# Patient Record
Sex: FEMALE | Race: WHITE | NOT HISPANIC OR LATINO | Employment: UNEMPLOYED | URBAN - METROPOLITAN AREA
[De-identification: names, ages, dates, MRNs, and addresses within clinical notes are randomized per-mention and may not be internally consistent; named-entity substitution may affect disease eponyms.]

---

## 2017-03-07 ENCOUNTER — APPOINTMENT (EMERGENCY)
Dept: CT IMAGING | Facility: HOSPITAL | Age: 42
DRG: 439 | End: 2017-03-07
Payer: COMMERCIAL

## 2017-03-07 ENCOUNTER — APPOINTMENT (INPATIENT)
Dept: ULTRASOUND IMAGING | Facility: HOSPITAL | Age: 42
DRG: 439 | End: 2017-03-07
Payer: COMMERCIAL

## 2017-03-07 ENCOUNTER — HOSPITAL ENCOUNTER (INPATIENT)
Facility: HOSPITAL | Age: 42
LOS: 4 days | Discharge: HOME | DRG: 439 | End: 2017-03-11
Attending: EMERGENCY MEDICINE | Admitting: INTERNAL MEDICINE
Payer: COMMERCIAL

## 2017-03-07 DIAGNOSIS — K85.90 PANCREATITIS: Primary | ICD-10-CM

## 2017-03-07 PROBLEM — E87.6 HYPOKALEMIA: Status: ACTIVE | Noted: 2017-03-07

## 2017-03-07 LAB
ALBUMIN SERPL BCP-MCNC: 2.8 G/DL (ref 3.5–5)
ALP SERPL-CCNC: 314 U/L (ref 46–116)
ALT SERPL W P-5'-P-CCNC: 32 U/L (ref 12–78)
ANION GAP SERPL CALCULATED.3IONS-SCNC: 10 MMOL/L (ref 4–13)
ANION GAP SERPL CALCULATED.3IONS-SCNC: 13 MMOL/L (ref 4–13)
AST SERPL W P-5'-P-CCNC: 126 U/L (ref 5–45)
BASOPHILS # BLD AUTO: 0.05 THOUSANDS/ΜL (ref 0–0.1)
BASOPHILS # BLD AUTO: 0.06 THOUSANDS/ΜL (ref 0–0.1)
BASOPHILS NFR BLD AUTO: 0 % (ref 0–1)
BASOPHILS NFR BLD AUTO: 0 % (ref 0–1)
BILIRUB SERPL-MCNC: 0.8 MG/DL (ref 0.2–1)
BUN SERPL-MCNC: 6 MG/DL (ref 5–25)
BUN SERPL-MCNC: 7 MG/DL (ref 5–25)
CALCIUM SERPL-MCNC: 7.5 MG/DL (ref 8.3–10.1)
CALCIUM SERPL-MCNC: 8.4 MG/DL (ref 8.3–10.1)
CHLORIDE SERPL-SCNC: 96 MMOL/L (ref 100–108)
CHLORIDE SERPL-SCNC: 97 MMOL/L (ref 100–108)
CLARITY, POC: CLEAR
CO2 SERPL-SCNC: 26 MMOL/L (ref 21–32)
CO2 SERPL-SCNC: 27 MMOL/L (ref 21–32)
COLOR, POC: ABNORMAL
CREAT SERPL-MCNC: 0.61 MG/DL (ref 0.6–1.3)
CREAT SERPL-MCNC: 0.68 MG/DL (ref 0.6–1.3)
EOSINOPHIL # BLD AUTO: 0.07 THOUSAND/ΜL (ref 0–0.61)
EOSINOPHIL # BLD AUTO: 0.08 THOUSAND/ΜL (ref 0–0.61)
EOSINOPHIL NFR BLD AUTO: 1 % (ref 0–6)
EOSINOPHIL NFR BLD AUTO: 1 % (ref 0–6)
ERYTHROCYTE [DISTWIDTH] IN BLOOD BY AUTOMATED COUNT: 14.6 % (ref 11.6–15.1)
ERYTHROCYTE [DISTWIDTH] IN BLOOD BY AUTOMATED COUNT: 14.7 % (ref 11.6–15.1)
EXT BILIRUBIN, UA: ABNORMAL
EXT BLOOD URINE: ABNORMAL
EXT GLUCOSE, UA: ABNORMAL
EXT KETONES: ABNORMAL
EXT NITRITE, UA: ABNORMAL
EXT PH, UA: 6
EXT PROTEIN, UA: ABNORMAL
EXT SPECIFIC GRAVITY, UA: 1
EXT UROBILINOGEN: 2
GFR SERPL CREATININE-BSD FRML MDRD: >60 ML/MIN/1.73SQ M
GFR SERPL CREATININE-BSD FRML MDRD: >60 ML/MIN/1.73SQ M
GLUCOSE SERPL-MCNC: 135 MG/DL (ref 65–140)
GLUCOSE SERPL-MCNC: 173 MG/DL (ref 65–140)
HCG UR QL: NEGATIVE
HCT VFR BLD AUTO: 38.6 % (ref 34.8–46.1)
HCT VFR BLD AUTO: 46.6 % (ref 34.8–46.1)
HGB BLD-MCNC: 12.9 G/DL (ref 11.5–15.4)
HGB BLD-MCNC: 15.6 G/DL (ref 11.5–15.4)
LIPASE SERPL-CCNC: 558 U/L (ref 73–393)
LYMPHOCYTES # BLD AUTO: 2.46 THOUSANDS/ΜL (ref 0.6–4.47)
LYMPHOCYTES # BLD AUTO: 2.91 THOUSANDS/ΜL (ref 0.6–4.47)
LYMPHOCYTES NFR BLD AUTO: 17 % (ref 14–44)
LYMPHOCYTES NFR BLD AUTO: 18 % (ref 14–44)
MAGNESIUM SERPL-MCNC: 1.7 MG/DL (ref 1.6–2.6)
MCH RBC QN AUTO: 32.4 PG (ref 26.8–34.3)
MCH RBC QN AUTO: 33 PG (ref 26.8–34.3)
MCHC RBC AUTO-ENTMCNC: 33.4 G/DL (ref 31.4–37.4)
MCHC RBC AUTO-ENTMCNC: 33.5 G/DL (ref 31.4–37.4)
MCV RBC AUTO: 97 FL (ref 82–98)
MCV RBC AUTO: 99 FL (ref 82–98)
MONOCYTES # BLD AUTO: 1.1 THOUSAND/ΜL (ref 0.17–1.22)
MONOCYTES # BLD AUTO: 1.17 THOUSAND/ΜL (ref 0.17–1.22)
MONOCYTES NFR BLD AUTO: 7 % (ref 4–12)
MONOCYTES NFR BLD AUTO: 8 % (ref 4–12)
NEUTROPHILS # BLD AUTO: 10.64 THOUSANDS/ΜL (ref 1.85–7.62)
NEUTROPHILS # BLD AUTO: 12.21 THOUSANDS/ΜL (ref 1.85–7.62)
NEUTS SEG NFR BLD AUTO: 74 % (ref 43–75)
NEUTS SEG NFR BLD AUTO: 74 % (ref 43–75)
NRBC BLD AUTO-RTO: 0 /100 WBCS
NRBC BLD AUTO-RTO: 0 /100 WBCS
PLATELET # BLD AUTO: 187 THOUSANDS/UL (ref 149–390)
PLATELET # BLD AUTO: 187 THOUSANDS/UL (ref 149–390)
PLATELET # BLD AUTO: 243 THOUSANDS/UL (ref 149–390)
PMV BLD AUTO: 10 FL (ref 8.9–12.7)
POTASSIUM SERPL-SCNC: 3.2 MMOL/L (ref 3.5–5.3)
POTASSIUM SERPL-SCNC: 3.6 MMOL/L (ref 3.5–5.3)
PROT SERPL-MCNC: 8 G/DL (ref 6.4–8.2)
RBC # BLD AUTO: 3.91 MILLION/UL (ref 3.81–5.12)
RBC # BLD AUTO: 4.81 MILLION/UL (ref 3.81–5.12)
SODIUM SERPL-SCNC: 133 MMOL/L (ref 136–145)
SODIUM SERPL-SCNC: 136 MMOL/L (ref 136–145)
WBC # BLD AUTO: 14.44 THOUSAND/UL (ref 4.31–10.16)
WBC # BLD AUTO: 16.5 THOUSAND/UL (ref 4.31–10.16)
WBC # BLD EST: ABNORMAL 10*3/UL

## 2017-03-07 PROCEDURE — 96361 HYDRATE IV INFUSION ADD-ON: CPT

## 2017-03-07 PROCEDURE — 85025 COMPLETE CBC W/AUTO DIFF WBC: CPT | Performed by: EMERGENCY MEDICINE

## 2017-03-07 PROCEDURE — 85049 AUTOMATED PLATELET COUNT: CPT | Performed by: PHYSICIAN ASSISTANT

## 2017-03-07 PROCEDURE — 36415 COLL VENOUS BLD VENIPUNCTURE: CPT | Performed by: EMERGENCY MEDICINE

## 2017-03-07 PROCEDURE — 81002 URINALYSIS NONAUTO W/O SCOPE: CPT

## 2017-03-07 PROCEDURE — 83735 ASSAY OF MAGNESIUM: CPT | Performed by: PHYSICIAN ASSISTANT

## 2017-03-07 PROCEDURE — 80053 COMPREHEN METABOLIC PANEL: CPT | Performed by: EMERGENCY MEDICINE

## 2017-03-07 PROCEDURE — 83690 ASSAY OF LIPASE: CPT | Performed by: EMERGENCY MEDICINE

## 2017-03-07 PROCEDURE — 74177 CT ABD & PELVIS W/CONTRAST: CPT

## 2017-03-07 PROCEDURE — 80048 BASIC METABOLIC PNL TOTAL CA: CPT | Performed by: PHYSICIAN ASSISTANT

## 2017-03-07 PROCEDURE — 85025 COMPLETE CBC W/AUTO DIFF WBC: CPT | Performed by: PHYSICIAN ASSISTANT

## 2017-03-07 PROCEDURE — 81025 URINE PREGNANCY TEST: CPT

## 2017-03-07 PROCEDURE — 99285 EMERGENCY DEPT VISIT HI MDM: CPT

## 2017-03-07 PROCEDURE — 96374 THER/PROPH/DIAG INJ IV PUSH: CPT

## 2017-03-07 PROCEDURE — 96375 TX/PRO/DX INJ NEW DRUG ADDON: CPT

## 2017-03-07 RX ORDER — DOCUSATE SODIUM 100 MG/1
100 CAPSULE, LIQUID FILLED ORAL 2 TIMES DAILY
Status: DISCONTINUED | OUTPATIENT
Start: 2017-03-07 | End: 2017-03-11 | Stop reason: HOSPADM

## 2017-03-07 RX ORDER — OXYCODONE HYDROCHLORIDE 5 MG/1
5 TABLET ORAL EVERY 4 HOURS PRN
Status: DISCONTINUED | OUTPATIENT
Start: 2017-03-07 | End: 2017-03-11 | Stop reason: HOSPADM

## 2017-03-07 RX ORDER — PANTOPRAZOLE SODIUM 40 MG/1
40 TABLET, DELAYED RELEASE ORAL DAILY
Status: DISCONTINUED | OUTPATIENT
Start: 2017-03-07 | End: 2017-03-11 | Stop reason: HOSPADM

## 2017-03-07 RX ORDER — SODIUM CHLORIDE, SODIUM LACTATE, POTASSIUM CHLORIDE, CALCIUM CHLORIDE 600; 310; 30; 20 MG/100ML; MG/100ML; MG/100ML; MG/100ML
75 INJECTION, SOLUTION INTRAVENOUS CONTINUOUS
Status: DISCONTINUED | OUTPATIENT
Start: 2017-03-07 | End: 2017-03-11

## 2017-03-07 RX ORDER — NICOTINE 21 MG/24HR
1 PATCH, TRANSDERMAL 24 HOURS TRANSDERMAL DAILY
Status: DISCONTINUED | OUTPATIENT
Start: 2017-03-07 | End: 2017-03-11 | Stop reason: HOSPADM

## 2017-03-07 RX ORDER — ONDANSETRON 2 MG/ML
4 INJECTION INTRAMUSCULAR; INTRAVENOUS EVERY 6 HOURS PRN
Status: DISCONTINUED | OUTPATIENT
Start: 2017-03-07 | End: 2017-03-11 | Stop reason: HOSPADM

## 2017-03-07 RX ORDER — CLONIDINE HYDROCHLORIDE 0.1 MG/1
0.1 TABLET ORAL 2 TIMES DAILY
Status: DISCONTINUED | OUTPATIENT
Start: 2017-03-07 | End: 2017-03-11 | Stop reason: HOSPADM

## 2017-03-07 RX ORDER — NICOTINE 21 MG/24HR
1 PATCH, TRANSDERMAL 24 HOURS TRANSDERMAL DAILY
Status: DISCONTINUED | OUTPATIENT
Start: 2017-03-07 | End: 2017-03-07

## 2017-03-07 RX ORDER — POTASSIUM CHLORIDE 20 MEQ/1
20 TABLET, EXTENDED RELEASE ORAL ONCE
Status: COMPLETED | OUTPATIENT
Start: 2017-03-07 | End: 2017-03-07

## 2017-03-07 RX ORDER — SODIUM CHLORIDE 9 MG/ML
125 INJECTION, SOLUTION INTRAVENOUS CONTINUOUS
Status: DISCONTINUED | OUTPATIENT
Start: 2017-03-07 | End: 2017-03-07

## 2017-03-07 RX ORDER — ONDANSETRON 2 MG/ML
4 INJECTION INTRAMUSCULAR; INTRAVENOUS ONCE
Status: COMPLETED | OUTPATIENT
Start: 2017-03-07 | End: 2017-03-07

## 2017-03-07 RX ADMIN — ONDANSETRON 4 MG: 2 INJECTION INTRAMUSCULAR; INTRAVENOUS at 03:02

## 2017-03-07 RX ADMIN — SODIUM CHLORIDE, SODIUM LACTATE, POTASSIUM CHLORIDE, AND CALCIUM CHLORIDE 250 ML/HR: .6; .31; .03; .02 INJECTION, SOLUTION INTRAVENOUS at 22:19

## 2017-03-07 RX ADMIN — OXYCODONE HYDROCHLORIDE 5 MG: 5 TABLET ORAL at 13:01

## 2017-03-07 RX ADMIN — SODIUM CHLORIDE, SODIUM LACTATE, POTASSIUM CHLORIDE, AND CALCIUM CHLORIDE 150 ML/HR: .6; .31; .03; .02 INJECTION, SOLUTION INTRAVENOUS at 15:23

## 2017-03-07 RX ADMIN — HYDROMORPHONE HYDROCHLORIDE 1 MG: 1 INJECTION, SOLUTION INTRAMUSCULAR; INTRAVENOUS; SUBCUTANEOUS at 03:02

## 2017-03-07 RX ADMIN — POTASSIUM CHLORIDE 20 MEQ: 1500 TABLET, EXTENDED RELEASE ORAL at 05:30

## 2017-03-07 RX ADMIN — ENOXAPARIN SODIUM 40 MG: 40 INJECTION SUBCUTANEOUS at 08:23

## 2017-03-07 RX ADMIN — HYDROMORPHONE HYDROCHLORIDE 0.5 MG: 1 INJECTION, SOLUTION INTRAMUSCULAR; INTRAVENOUS; SUBCUTANEOUS at 08:19

## 2017-03-07 RX ADMIN — SODIUM CHLORIDE 1000 ML: 0.9 INJECTION, SOLUTION INTRAVENOUS at 03:02

## 2017-03-07 RX ADMIN — SODIUM CHLORIDE 125 ML/HR: 0.9 INJECTION, SOLUTION INTRAVENOUS at 05:05

## 2017-03-07 RX ADMIN — HYDROMORPHONE HYDROCHLORIDE 0.5 MG: 1 INJECTION, SOLUTION INTRAMUSCULAR; INTRAVENOUS; SUBCUTANEOUS at 14:24

## 2017-03-07 RX ADMIN — HYDROMORPHONE HYDROCHLORIDE 1 MG: 1 INJECTION, SOLUTION INTRAMUSCULAR; INTRAVENOUS; SUBCUTANEOUS at 04:52

## 2017-03-07 RX ADMIN — NICOTINE 1 PATCH: 21 PATCH, EXTENDED RELEASE TRANSDERMAL at 06:15

## 2017-03-07 RX ADMIN — HYDROMORPHONE HYDROCHLORIDE 0.5 MG: 1 INJECTION, SOLUTION INTRAMUSCULAR; INTRAVENOUS; SUBCUTANEOUS at 18:33

## 2017-03-07 RX ADMIN — OXYCODONE HYDROCHLORIDE 5 MG: 5 TABLET ORAL at 06:57

## 2017-03-07 RX ADMIN — DOCUSATE SODIUM 100 MG: 100 CAPSULE, LIQUID FILLED ORAL at 08:24

## 2017-03-07 RX ADMIN — HYDROMORPHONE HYDROCHLORIDE 0.5 MG: 1 INJECTION, SOLUTION INTRAMUSCULAR; INTRAVENOUS; SUBCUTANEOUS at 11:25

## 2017-03-07 RX ADMIN — OXYCODONE HYDROCHLORIDE 5 MG: 5 TABLET ORAL at 21:06

## 2017-03-07 RX ADMIN — PANTOPRAZOLE SODIUM 40 MG: 40 TABLET, DELAYED RELEASE ORAL at 06:15

## 2017-03-07 RX ADMIN — SODIUM CHLORIDE, SODIUM LACTATE, POTASSIUM CHLORIDE, AND CALCIUM CHLORIDE 250 ML/HR: .6; .31; .03; .02 INJECTION, SOLUTION INTRAVENOUS at 18:33

## 2017-03-07 RX ADMIN — CLONIDINE HYDROCHLORIDE 0.1 MG: 0.1 TABLET ORAL at 08:24

## 2017-03-07 RX ADMIN — IOHEXOL 100 ML: 350 INJECTION, SOLUTION INTRAVENOUS at 03:49

## 2017-03-07 RX ADMIN — HYDROMORPHONE HYDROCHLORIDE 0.5 MG: 1 INJECTION, SOLUTION INTRAMUSCULAR; INTRAVENOUS; SUBCUTANEOUS at 21:46

## 2017-03-08 LAB
CHOLEST SERPL-MCNC: 113 MG/DL (ref 50–200)
HDLC SERPL-MCNC: 18 MG/DL (ref 40–60)
LDLC SERPL CALC-MCNC: 73 MG/DL (ref 0–100)
TRIGL SERPL-MCNC: 112 MG/DL

## 2017-03-08 PROCEDURE — 80061 LIPID PANEL: CPT | Performed by: PHYSICIAN ASSISTANT

## 2017-03-08 RX ADMIN — OXYCODONE HYDROCHLORIDE 5 MG: 5 TABLET ORAL at 07:45

## 2017-03-08 RX ADMIN — SODIUM CHLORIDE, SODIUM LACTATE, POTASSIUM CHLORIDE, AND CALCIUM CHLORIDE 250 ML/HR: .6; .31; .03; .02 INJECTION, SOLUTION INTRAVENOUS at 06:39

## 2017-03-08 RX ADMIN — OXYCODONE HYDROCHLORIDE 5 MG: 5 TABLET ORAL at 00:10

## 2017-03-08 RX ADMIN — METOPROLOL TARTRATE 5 MG: 5 INJECTION, SOLUTION INTRAVENOUS at 20:20

## 2017-03-08 RX ADMIN — PANTOPRAZOLE SODIUM 40 MG: 40 TABLET, DELAYED RELEASE ORAL at 06:17

## 2017-03-08 RX ADMIN — NICOTINE 1 PATCH: 21 PATCH, EXTENDED RELEASE TRANSDERMAL at 10:42

## 2017-03-08 RX ADMIN — OXYCODONE HYDROCHLORIDE 5 MG: 5 TABLET ORAL at 16:06

## 2017-03-08 RX ADMIN — OXYCODONE HYDROCHLORIDE 5 MG: 5 TABLET ORAL at 12:12

## 2017-03-08 RX ADMIN — OXYCODONE HYDROCHLORIDE 5 MG: 5 TABLET ORAL at 20:13

## 2017-03-08 RX ADMIN — SODIUM CHLORIDE, SODIUM LACTATE, POTASSIUM CHLORIDE, AND CALCIUM CHLORIDE 150 ML/HR: .6; .31; .03; .02 INJECTION, SOLUTION INTRAVENOUS at 22:18

## 2017-03-08 RX ADMIN — HYDROMORPHONE HYDROCHLORIDE 0.5 MG: 1 INJECTION, SOLUTION INTRAMUSCULAR; INTRAVENOUS; SUBCUTANEOUS at 07:18

## 2017-03-08 RX ADMIN — CLONIDINE HYDROCHLORIDE 0.1 MG: 0.1 TABLET ORAL at 18:23

## 2017-03-08 RX ADMIN — CLONIDINE HYDROCHLORIDE 0.1 MG: 0.1 TABLET ORAL at 10:42

## 2017-03-08 RX ADMIN — HYDROMORPHONE HYDROCHLORIDE 0.5 MG: 1 INJECTION, SOLUTION INTRAMUSCULAR; INTRAVENOUS; SUBCUTANEOUS at 04:16

## 2017-03-08 RX ADMIN — SODIUM CHLORIDE, SODIUM LACTATE, POTASSIUM CHLORIDE, AND CALCIUM CHLORIDE 250 ML/HR: .6; .31; .03; .02 INJECTION, SOLUTION INTRAVENOUS at 02:36

## 2017-03-08 RX ADMIN — HYDROMORPHONE HYDROCHLORIDE 0.5 MG: 1 INJECTION, SOLUTION INTRAMUSCULAR; INTRAVENOUS; SUBCUTANEOUS at 21:53

## 2017-03-08 RX ADMIN — HYDROMORPHONE HYDROCHLORIDE 0.5 MG: 1 INJECTION, SOLUTION INTRAMUSCULAR; INTRAVENOUS; SUBCUTANEOUS at 00:44

## 2017-03-08 RX ADMIN — SODIUM CHLORIDE, SODIUM LACTATE, POTASSIUM CHLORIDE, AND CALCIUM CHLORIDE 250 ML/HR: .6; .31; .03; .02 INJECTION, SOLUTION INTRAVENOUS at 10:42

## 2017-03-08 RX ADMIN — OXYCODONE HYDROCHLORIDE 5 MG: 5 TABLET ORAL at 03:42

## 2017-03-08 RX ADMIN — HYDROMORPHONE HYDROCHLORIDE 0.5 MG: 1 INJECTION, SOLUTION INTRAMUSCULAR; INTRAVENOUS; SUBCUTANEOUS at 10:43

## 2017-03-08 RX ADMIN — HYDROMORPHONE HYDROCHLORIDE 0.5 MG: 1 INJECTION, SOLUTION INTRAMUSCULAR; INTRAVENOUS; SUBCUTANEOUS at 15:21

## 2017-03-08 RX ADMIN — DOCUSATE SODIUM 100 MG: 100 CAPSULE, LIQUID FILLED ORAL at 10:42

## 2017-03-08 RX ADMIN — HYDROMORPHONE HYDROCHLORIDE 0.5 MG: 1 INJECTION, SOLUTION INTRAMUSCULAR; INTRAVENOUS; SUBCUTANEOUS at 18:24

## 2017-03-09 LAB
ALBUMIN SERPL BCP-MCNC: 2.3 G/DL (ref 3.5–5)
ALP SERPL-CCNC: 229 U/L (ref 46–116)
ALT SERPL W P-5'-P-CCNC: 24 U/L (ref 12–78)
ANION GAP SERPL CALCULATED.3IONS-SCNC: 9 MMOL/L (ref 4–13)
AST SERPL W P-5'-P-CCNC: 95 U/L (ref 5–45)
BILIRUB SERPL-MCNC: 1 MG/DL (ref 0.2–1)
BILIRUB UR QL STRIP: NEGATIVE
BUN SERPL-MCNC: 3 MG/DL (ref 5–25)
CALCIUM SERPL-MCNC: 8 MG/DL (ref 8.3–10.1)
CHLORIDE SERPL-SCNC: 100 MMOL/L (ref 100–108)
CLARITY UR: CLEAR
CO2 SERPL-SCNC: 27 MMOL/L (ref 21–32)
COLOR UR: YELLOW
CREAT SERPL-MCNC: 0.62 MG/DL (ref 0.6–1.3)
ERYTHROCYTE [DISTWIDTH] IN BLOOD BY AUTOMATED COUNT: 15 % (ref 11.6–15.1)
GFR SERPL CREATININE-BSD FRML MDRD: >60 ML/MIN/1.73SQ M
GLUCOSE SERPL-MCNC: 108 MG/DL (ref 65–140)
GLUCOSE UR STRIP-MCNC: NEGATIVE MG/DL
HCT VFR BLD AUTO: 38.7 % (ref 34.8–46.1)
HGB BLD-MCNC: 12.6 G/DL (ref 11.5–15.4)
HGB UR QL STRIP.AUTO: NEGATIVE
KETONES UR STRIP-MCNC: NEGATIVE MG/DL
LEUKOCYTE ESTERASE UR QL STRIP: NEGATIVE
LIPASE SERPL-CCNC: 189 U/L (ref 73–393)
MCH RBC QN AUTO: 33.1 PG (ref 26.8–34.3)
MCHC RBC AUTO-ENTMCNC: 32.6 G/DL (ref 31.4–37.4)
MCV RBC AUTO: 102 FL (ref 82–98)
NITRITE UR QL STRIP: NEGATIVE
PH UR STRIP.AUTO: 7.5 [PH] (ref 4.5–8)
PLATELET # BLD AUTO: 188 THOUSANDS/UL (ref 149–390)
PMV BLD AUTO: 10.8 FL (ref 8.9–12.7)
POTASSIUM SERPL-SCNC: 3.9 MMOL/L (ref 3.5–5.3)
PROT SERPL-MCNC: 6.5 G/DL (ref 6.4–8.2)
PROT UR STRIP-MCNC: NEGATIVE MG/DL
RBC # BLD AUTO: 3.81 MILLION/UL (ref 3.81–5.12)
SODIUM SERPL-SCNC: 136 MMOL/L (ref 136–145)
SP GR UR STRIP.AUTO: 1.01 (ref 1–1.03)
UROBILINOGEN UR QL STRIP.AUTO: 1 E.U./DL
WBC # BLD AUTO: 10.89 THOUSAND/UL (ref 4.31–10.16)

## 2017-03-09 PROCEDURE — 81003 URINALYSIS AUTO W/O SCOPE: CPT | Performed by: INTERNAL MEDICINE

## 2017-03-09 PROCEDURE — 85027 COMPLETE CBC AUTOMATED: CPT | Performed by: INTERNAL MEDICINE

## 2017-03-09 PROCEDURE — 80053 COMPREHEN METABOLIC PANEL: CPT | Performed by: PHYSICIAN ASSISTANT

## 2017-03-09 PROCEDURE — 83690 ASSAY OF LIPASE: CPT | Performed by: INTERNAL MEDICINE

## 2017-03-09 RX ORDER — SIMETHICONE 20 MG/.3ML
80 EMULSION ORAL EVERY 6 HOURS PRN
Status: DISCONTINUED | OUTPATIENT
Start: 2017-03-09 | End: 2017-03-09 | Stop reason: CLARIF

## 2017-03-09 RX ORDER — SIMETHICONE 80 MG
80 TABLET,CHEWABLE ORAL EVERY 6 HOURS PRN
Status: DISCONTINUED | OUTPATIENT
Start: 2017-03-09 | End: 2017-03-11 | Stop reason: HOSPADM

## 2017-03-09 RX ORDER — CLONIDINE HYDROCHLORIDE 0.1 MG/1
TABLET ORAL
Status: COMPLETED
Start: 2017-03-09 | End: 2017-03-09

## 2017-03-09 RX ADMIN — ONDANSETRON 4 MG: 2 INJECTION INTRAMUSCULAR; INTRAVENOUS at 16:45

## 2017-03-09 RX ADMIN — NICOTINE 1 PATCH: 21 PATCH, EXTENDED RELEASE TRANSDERMAL at 10:43

## 2017-03-09 RX ADMIN — HYDROMORPHONE HYDROCHLORIDE 0.5 MG: 1 INJECTION, SOLUTION INTRAMUSCULAR; INTRAVENOUS; SUBCUTANEOUS at 07:53

## 2017-03-09 RX ADMIN — CLONIDINE HYDROCHLORIDE 0.1 MG: 0.1 TABLET ORAL at 19:17

## 2017-03-09 RX ADMIN — CLONIDINE HYDROCHLORIDE 0.1 MG: 0.1 TABLET ORAL at 10:41

## 2017-03-09 RX ADMIN — HYDROMORPHONE HYDROCHLORIDE 0.5 MG: 1 INJECTION, SOLUTION INTRAMUSCULAR; INTRAVENOUS; SUBCUTANEOUS at 23:15

## 2017-03-09 RX ADMIN — SODIUM CHLORIDE, SODIUM LACTATE, POTASSIUM CHLORIDE, AND CALCIUM CHLORIDE 150 ML/HR: .6; .31; .03; .02 INJECTION, SOLUTION INTRAVENOUS at 05:22

## 2017-03-09 RX ADMIN — HYDROMORPHONE HYDROCHLORIDE 0.5 MG: 1 INJECTION, SOLUTION INTRAMUSCULAR; INTRAVENOUS; SUBCUTANEOUS at 19:18

## 2017-03-09 RX ADMIN — OXYCODONE HYDROCHLORIDE 5 MG: 5 TABLET ORAL at 05:19

## 2017-03-09 RX ADMIN — SIMETHICONE CHEW TAB 80 MG 80 MG: 80 TABLET ORAL at 10:41

## 2017-03-09 RX ADMIN — SIMETHICONE CHEW TAB 80 MG 80 MG: 80 TABLET ORAL at 16:49

## 2017-03-09 RX ADMIN — PANTOPRAZOLE SODIUM 40 MG: 40 TABLET, DELAYED RELEASE ORAL at 05:19

## 2017-03-09 RX ADMIN — SODIUM CHLORIDE, SODIUM LACTATE, POTASSIUM CHLORIDE, AND CALCIUM CHLORIDE 75 ML/HR: .6; .31; .03; .02 INJECTION, SOLUTION INTRAVENOUS at 16:50

## 2017-03-09 RX ADMIN — HYDROMORPHONE HYDROCHLORIDE 0.5 MG: 1 INJECTION, SOLUTION INTRAMUSCULAR; INTRAVENOUS; SUBCUTANEOUS at 11:25

## 2017-03-09 RX ADMIN — HYDROMORPHONE HYDROCHLORIDE 0.5 MG: 1 INJECTION, SOLUTION INTRAMUSCULAR; INTRAVENOUS; SUBCUTANEOUS at 01:01

## 2017-03-09 RX ADMIN — HYDROMORPHONE HYDROCHLORIDE 0.5 MG: 1 INJECTION, SOLUTION INTRAMUSCULAR; INTRAVENOUS; SUBCUTANEOUS at 04:08

## 2017-03-09 RX ADMIN — OXYCODONE HYDROCHLORIDE 5 MG: 5 TABLET ORAL at 00:13

## 2017-03-10 ENCOUNTER — APPOINTMENT (INPATIENT)
Dept: ULTRASOUND IMAGING | Facility: HOSPITAL | Age: 42
DRG: 439 | End: 2017-03-10
Payer: COMMERCIAL

## 2017-03-10 LAB
ANION GAP SERPL CALCULATED.3IONS-SCNC: 9 MMOL/L (ref 4–13)
BUN SERPL-MCNC: 3 MG/DL (ref 5–25)
CALCIUM SERPL-MCNC: 8.1 MG/DL (ref 8.3–10.1)
CHLORIDE SERPL-SCNC: 101 MMOL/L (ref 100–108)
CO2 SERPL-SCNC: 27 MMOL/L (ref 21–32)
CREAT SERPL-MCNC: 0.71 MG/DL (ref 0.6–1.3)
ERYTHROCYTE [DISTWIDTH] IN BLOOD BY AUTOMATED COUNT: 14.9 % (ref 11.6–15.1)
GFR SERPL CREATININE-BSD FRML MDRD: >60 ML/MIN/1.73SQ M
GLUCOSE SERPL-MCNC: 141 MG/DL (ref 65–140)
HCT VFR BLD AUTO: 38.8 % (ref 34.8–46.1)
HGB BLD-MCNC: 12.6 G/DL (ref 11.5–15.4)
MCH RBC QN AUTO: 32.9 PG (ref 26.8–34.3)
MCHC RBC AUTO-ENTMCNC: 32.5 G/DL (ref 31.4–37.4)
MCV RBC AUTO: 101 FL (ref 82–98)
PLATELET # BLD AUTO: 192 THOUSANDS/UL (ref 149–390)
PMV BLD AUTO: 10.8 FL (ref 8.9–12.7)
POTASSIUM SERPL-SCNC: 3.6 MMOL/L (ref 3.5–5.3)
RBC # BLD AUTO: 3.83 MILLION/UL (ref 3.81–5.12)
SODIUM SERPL-SCNC: 137 MMOL/L (ref 136–145)
WBC # BLD AUTO: 10.41 THOUSAND/UL (ref 4.31–10.16)

## 2017-03-10 PROCEDURE — 85027 COMPLETE CBC AUTOMATED: CPT | Performed by: INTERNAL MEDICINE

## 2017-03-10 PROCEDURE — 76705 ECHO EXAM OF ABDOMEN: CPT

## 2017-03-10 PROCEDURE — 80048 BASIC METABOLIC PNL TOTAL CA: CPT | Performed by: INTERNAL MEDICINE

## 2017-03-10 RX ORDER — SIMETHICONE 80 MG
TABLET,CHEWABLE ORAL
Status: COMPLETED
Start: 2017-03-10 | End: 2017-03-10

## 2017-03-10 RX ORDER — PANTOPRAZOLE SODIUM 40 MG/1
TABLET, DELAYED RELEASE ORAL
Status: COMPLETED
Start: 2017-03-10 | End: 2017-03-10

## 2017-03-10 RX ADMIN — HYDROMORPHONE HYDROCHLORIDE 0.5 MG: 1 INJECTION, SOLUTION INTRAMUSCULAR; INTRAVENOUS; SUBCUTANEOUS at 09:25

## 2017-03-10 RX ADMIN — PANTOPRAZOLE SODIUM 40 MG: 40 TABLET, DELAYED RELEASE ORAL at 06:17

## 2017-03-10 RX ADMIN — SODIUM CHLORIDE, SODIUM LACTATE, POTASSIUM CHLORIDE, AND CALCIUM CHLORIDE 75 ML/HR: .6; .31; .03; .02 INJECTION, SOLUTION INTRAVENOUS at 05:51

## 2017-03-10 RX ADMIN — SIMETHICONE CHEW TAB 80 MG 80 MG: 80 TABLET ORAL at 03:04

## 2017-03-10 RX ADMIN — HYDROMORPHONE HYDROCHLORIDE 0.5 MG: 1 INJECTION, SOLUTION INTRAMUSCULAR; INTRAVENOUS; SUBCUTANEOUS at 03:04

## 2017-03-10 RX ADMIN — SIMETHICONE CHEW TAB 80 MG 80 MG: 80 TABLET ORAL at 18:49

## 2017-03-10 RX ADMIN — SODIUM CHLORIDE, SODIUM LACTATE, POTASSIUM CHLORIDE, AND CALCIUM CHLORIDE 75 ML/HR: .6; .31; .03; .02 INJECTION, SOLUTION INTRAVENOUS at 19:46

## 2017-03-10 RX ADMIN — HYDROMORPHONE HYDROCHLORIDE 0.5 MG: 1 INJECTION, SOLUTION INTRAMUSCULAR; INTRAVENOUS; SUBCUTANEOUS at 15:43

## 2017-03-10 RX ADMIN — CLONIDINE HYDROCHLORIDE 0.1 MG: 0.1 TABLET ORAL at 09:24

## 2017-03-10 RX ADMIN — HYDROMORPHONE HYDROCHLORIDE 0.5 MG: 1 INJECTION, SOLUTION INTRAMUSCULAR; INTRAVENOUS; SUBCUTANEOUS at 06:19

## 2017-03-10 RX ADMIN — NICOTINE 1 PATCH: 21 PATCH, EXTENDED RELEASE TRANSDERMAL at 09:25

## 2017-03-10 RX ADMIN — HYDROMORPHONE HYDROCHLORIDE 0.5 MG: 1 INJECTION, SOLUTION INTRAMUSCULAR; INTRAVENOUS; SUBCUTANEOUS at 22:30

## 2017-03-10 RX ADMIN — HYDROMORPHONE HYDROCHLORIDE 0.5 MG: 1 INJECTION, SOLUTION INTRAMUSCULAR; INTRAVENOUS; SUBCUTANEOUS at 18:49

## 2017-03-10 RX ADMIN — SIMETHICONE CHEW TAB 80 MG 80 MG: 80 TABLET ORAL at 09:24

## 2017-03-10 RX ADMIN — CLONIDINE HYDROCHLORIDE 0.1 MG: 0.1 TABLET ORAL at 18:49

## 2017-03-11 VITALS
TEMPERATURE: 98 F | RESPIRATION RATE: 18 BRPM | SYSTOLIC BLOOD PRESSURE: 140 MMHG | OXYGEN SATURATION: 98 % | WEIGHT: 186.07 LBS | HEART RATE: 69 BPM | DIASTOLIC BLOOD PRESSURE: 78 MMHG | BODY MASS INDEX: 31.77 KG/M2 | HEIGHT: 64 IN

## 2017-03-11 RX ORDER — ONDANSETRON 4 MG/1
4 TABLET, FILM COATED ORAL EVERY 8 HOURS PRN
Qty: 21 TABLET | Refills: 0 | Status: ON HOLD | OUTPATIENT
Start: 2017-03-11 | End: 2017-12-16

## 2017-03-11 RX ORDER — SIMETHICONE 80 MG
80 TABLET,CHEWABLE ORAL EVERY 8 HOURS PRN
Qty: 15 TABLET | Refills: 0 | Status: SHIPPED | OUTPATIENT
Start: 2017-03-11 | End: 2017-03-16

## 2017-03-11 RX ORDER — DOCUSATE SODIUM 100 MG/1
100 CAPSULE, LIQUID FILLED ORAL 2 TIMES DAILY
Qty: 30 CAPSULE | Refills: 0 | Status: SHIPPED | OUTPATIENT
Start: 2017-03-11 | End: 2017-12-16 | Stop reason: HOSPADM

## 2017-03-11 RX ADMIN — HYDROMORPHONE HYDROCHLORIDE 0.5 MG: 1 INJECTION, SOLUTION INTRAMUSCULAR; INTRAVENOUS; SUBCUTANEOUS at 05:44

## 2017-03-11 RX ADMIN — HYDROMORPHONE HYDROCHLORIDE 0.5 MG: 1 INJECTION, SOLUTION INTRAMUSCULAR; INTRAVENOUS; SUBCUTANEOUS at 02:01

## 2017-03-11 RX ADMIN — NICOTINE 1 PATCH: 21 PATCH, EXTENDED RELEASE TRANSDERMAL at 08:13

## 2017-03-11 RX ADMIN — OXYCODONE HYDROCHLORIDE 5 MG: 5 TABLET ORAL at 08:11

## 2017-03-11 RX ADMIN — SIMETHICONE CHEW TAB 80 MG 80 MG: 80 TABLET ORAL at 02:02

## 2017-03-11 RX ADMIN — PANTOPRAZOLE SODIUM 40 MG: 40 TABLET, DELAYED RELEASE ORAL at 06:13

## 2017-03-11 RX ADMIN — CLONIDINE HYDROCHLORIDE 0.1 MG: 0.1 TABLET ORAL at 08:11

## 2017-03-11 RX ADMIN — OXYCODONE HYDROCHLORIDE 5 MG: 5 TABLET ORAL at 12:13

## 2017-03-11 RX ADMIN — SODIUM CHLORIDE, SODIUM LACTATE, POTASSIUM CHLORIDE, AND CALCIUM CHLORIDE 75 ML/HR: .6; .31; .03; .02 INJECTION, SOLUTION INTRAVENOUS at 10:29

## 2017-05-01 ENCOUNTER — HOSPITAL ENCOUNTER (EMERGENCY)
Facility: HOSPITAL | Age: 42
Discharge: HOME | End: 2017-05-01
Attending: EMERGENCY MEDICINE | Admitting: EMERGENCY MEDICINE
Payer: COMMERCIAL

## 2017-05-01 VITALS
HEIGHT: 64 IN | TEMPERATURE: 98.5 F | WEIGHT: 185 LBS | SYSTOLIC BLOOD PRESSURE: 170 MMHG | DIASTOLIC BLOOD PRESSURE: 103 MMHG | RESPIRATION RATE: 19 BRPM | OXYGEN SATURATION: 100 % | HEART RATE: 83 BPM | BODY MASS INDEX: 31.58 KG/M2

## 2017-05-01 DIAGNOSIS — K04.7 DENTAL INFECTION: Primary | ICD-10-CM

## 2017-05-01 PROCEDURE — 99282 EMERGENCY DEPT VISIT SF MDM: CPT

## 2017-05-01 RX ORDER — PENICILLIN V POTASSIUM 250 MG/1
500 TABLET ORAL ONCE
Status: COMPLETED | OUTPATIENT
Start: 2017-05-01 | End: 2017-05-01

## 2017-05-01 RX ORDER — OXYCODONE HYDROCHLORIDE AND ACETAMINOPHEN 5; 325 MG/1; MG/1
1 TABLET ORAL EVERY 6 HOURS PRN
Qty: 15 TABLET | Refills: 0 | Status: SHIPPED | OUTPATIENT
Start: 2017-05-01 | End: 2017-05-16

## 2017-05-01 RX ORDER — PENICILLIN V POTASSIUM 500 MG/1
500 TABLET ORAL 4 TIMES DAILY
Qty: 40 TABLET | Refills: 0 | Status: SHIPPED | OUTPATIENT
Start: 2017-05-01 | End: 2017-05-11

## 2017-05-01 RX ORDER — ONDANSETRON 4 MG/1
4 TABLET, ORALLY DISINTEGRATING ORAL ONCE
Status: COMPLETED | OUTPATIENT
Start: 2017-05-01 | End: 2017-05-01

## 2017-05-01 RX ORDER — OXYCODONE HYDROCHLORIDE AND ACETAMINOPHEN 5; 325 MG/1; MG/1
1 TABLET ORAL ONCE
Status: COMPLETED | OUTPATIENT
Start: 2017-05-01 | End: 2017-05-01

## 2017-05-01 RX ADMIN — ONDANSETRON 4 MG: 4 TABLET, ORALLY DISINTEGRATING ORAL at 19:10

## 2017-05-01 RX ADMIN — OXYCODONE HYDROCHLORIDE AND ACETAMINOPHEN 1 TABLET: 5; 325 TABLET ORAL at 19:10

## 2017-05-01 RX ADMIN — PENICILLIN V POTASIUM 500 MG: 250 TABLET ORAL at 19:10

## 2017-10-27 ENCOUNTER — APPOINTMENT (EMERGENCY)
Dept: CT IMAGING | Facility: HOSPITAL | Age: 42
DRG: 249 | End: 2017-10-27
Payer: COMMERCIAL

## 2017-10-27 ENCOUNTER — HOSPITAL ENCOUNTER (INPATIENT)
Facility: HOSPITAL | Age: 42
LOS: 1 days | Discharge: HOME | DRG: 249 | End: 2017-10-31
Attending: EMERGENCY MEDICINE | Admitting: INTERNAL MEDICINE
Payer: COMMERCIAL

## 2017-10-27 DIAGNOSIS — R11.2 INTRACTABLE NAUSEA AND VOMITING: Primary | ICD-10-CM

## 2017-10-27 DIAGNOSIS — E87.6 HYPOKALEMIA: ICD-10-CM

## 2017-10-27 DIAGNOSIS — K52.9 COLITIS: ICD-10-CM

## 2017-10-27 DIAGNOSIS — E83.42 HYPOMAGNESEMIA: ICD-10-CM

## 2017-10-27 DIAGNOSIS — K86.1 CHRONIC PANCREATITIS (HCC): ICD-10-CM

## 2017-10-27 DIAGNOSIS — E86.0 DEHYDRATION: ICD-10-CM

## 2017-10-27 LAB
ALBUMIN SERPL BCP-MCNC: 3.8 G/DL (ref 3.5–5)
ALP SERPL-CCNC: 186 U/L (ref 46–116)
ALT SERPL W P-5'-P-CCNC: 57 U/L (ref 12–78)
ANION GAP SERPL CALCULATED.3IONS-SCNC: 17 MMOL/L (ref 4–13)
ANION GAP SERPL CALCULATED.3IONS-SCNC: 9 MMOL/L (ref 4–13)
AST SERPL W P-5'-P-CCNC: 139 U/L (ref 5–45)
ATRIAL RATE: 89 BPM
BASOPHILS # BLD AUTO: 0.09 THOUSANDS/ΜL (ref 0–0.1)
BASOPHILS NFR BLD AUTO: 1 % (ref 0–1)
BILIRUB SERPL-MCNC: 0.8 MG/DL (ref 0.2–1)
BUN SERPL-MCNC: 7 MG/DL (ref 5–25)
BUN SERPL-MCNC: 8 MG/DL (ref 5–25)
CALCIUM SERPL-MCNC: 7.5 MG/DL (ref 8.3–10.1)
CALCIUM SERPL-MCNC: 8.5 MG/DL (ref 8.3–10.1)
CHLORIDE SERPL-SCNC: 100 MMOL/L (ref 100–108)
CHLORIDE SERPL-SCNC: 98 MMOL/L (ref 100–108)
CO2 SERPL-SCNC: 25 MMOL/L (ref 21–32)
CO2 SERPL-SCNC: 28 MMOL/L (ref 21–32)
CREAT SERPL-MCNC: 0.65 MG/DL (ref 0.6–1.3)
CREAT SERPL-MCNC: 0.69 MG/DL (ref 0.6–1.3)
EOSINOPHIL # BLD AUTO: 0.1 THOUSAND/ΜL (ref 0–0.61)
EOSINOPHIL NFR BLD AUTO: 1 % (ref 0–6)
ERYTHROCYTE [DISTWIDTH] IN BLOOD BY AUTOMATED COUNT: 14.9 % (ref 11.6–15.1)
EXT PREG TEST URINE: NEGATIVE
GFR SERPL CREATININE-BSD FRML MDRD: 108 ML/MIN/1.73SQ M
GFR SERPL CREATININE-BSD FRML MDRD: 111 ML/MIN/1.73SQ M
GLUCOSE SERPL-MCNC: 130 MG/DL (ref 65–140)
GLUCOSE SERPL-MCNC: 155 MG/DL (ref 65–140)
HCT VFR BLD AUTO: 47.6 % (ref 34.8–46.1)
HGB BLD-MCNC: 16.3 G/DL (ref 11.5–15.4)
LIPASE SERPL-CCNC: 109 U/L (ref 73–393)
LYMPHOCYTES # BLD AUTO: 3.33 THOUSANDS/ΜL (ref 0.6–4.47)
LYMPHOCYTES NFR BLD AUTO: 22 % (ref 14–44)
MAGNESIUM SERPL-MCNC: 1.5 MG/DL (ref 1.6–2.6)
MCH RBC QN AUTO: 32.5 PG (ref 26.8–34.3)
MCHC RBC AUTO-ENTMCNC: 34.2 G/DL (ref 31.4–37.4)
MCV RBC AUTO: 95 FL (ref 82–98)
MONOCYTES # BLD AUTO: 1.28 THOUSAND/ΜL (ref 0.17–1.22)
MONOCYTES NFR BLD AUTO: 9 % (ref 4–12)
NEUTROPHILS # BLD AUTO: 10.06 THOUSANDS/ΜL (ref 1.85–7.62)
NEUTS SEG NFR BLD AUTO: 67 % (ref 43–75)
NRBC BLD AUTO-RTO: 0 /100 WBCS
P AXIS: 52 DEGREES
PLATELET # BLD AUTO: 220 THOUSANDS/UL (ref 149–390)
PLATELET # BLD AUTO: 281 THOUSANDS/UL (ref 149–390)
PMV BLD AUTO: 9.5 FL (ref 8.9–12.7)
PMV BLD AUTO: 9.6 FL (ref 8.9–12.7)
POTASSIUM SERPL-SCNC: 2.8 MMOL/L (ref 3.5–5.3)
POTASSIUM SERPL-SCNC: 3.9 MMOL/L (ref 3.5–5.3)
PR INTERVAL: 118 MS
PROT SERPL-MCNC: 8.8 G/DL (ref 6.4–8.2)
QRS AXIS: 53 DEGREES
QRSD INTERVAL: 86 MS
QT INTERVAL: 398 MS
QTC INTERVAL: 484 MS
RBC # BLD AUTO: 5.01 MILLION/UL (ref 3.81–5.12)
SODIUM SERPL-SCNC: 137 MMOL/L (ref 136–145)
SODIUM SERPL-SCNC: 140 MMOL/L (ref 136–145)
T WAVE AXIS: 57 DEGREES
VENTRICULAR RATE: 89 BPM
WBC # BLD AUTO: 14.95 THOUSAND/UL (ref 4.31–10.16)

## 2017-10-27 PROCEDURE — 74177 CT ABD & PELVIS W/CONTRAST: CPT

## 2017-10-27 PROCEDURE — 36415 COLL VENOUS BLD VENIPUNCTURE: CPT | Performed by: PHYSICIAN ASSISTANT

## 2017-10-27 PROCEDURE — 96366 THER/PROPH/DIAG IV INF ADDON: CPT

## 2017-10-27 PROCEDURE — 96375 TX/PRO/DX INJ NEW DRUG ADDON: CPT

## 2017-10-27 PROCEDURE — 83690 ASSAY OF LIPASE: CPT | Performed by: PHYSICIAN ASSISTANT

## 2017-10-27 PROCEDURE — 85025 COMPLETE CBC W/AUTO DIFF WBC: CPT | Performed by: PHYSICIAN ASSISTANT

## 2017-10-27 PROCEDURE — 83735 ASSAY OF MAGNESIUM: CPT | Performed by: PHYSICIAN ASSISTANT

## 2017-10-27 PROCEDURE — 96376 TX/PRO/DX INJ SAME DRUG ADON: CPT

## 2017-10-27 PROCEDURE — 85049 AUTOMATED PLATELET COUNT: CPT | Performed by: PHYSICIAN ASSISTANT

## 2017-10-27 PROCEDURE — 99285 EMERGENCY DEPT VISIT HI MDM: CPT

## 2017-10-27 PROCEDURE — 81025 URINE PREGNANCY TEST: CPT | Performed by: PHYSICIAN ASSISTANT

## 2017-10-27 PROCEDURE — 96365 THER/PROPH/DIAG IV INF INIT: CPT

## 2017-10-27 PROCEDURE — C9113 INJ PANTOPRAZOLE SODIUM, VIA: HCPCS | Performed by: PHYSICIAN ASSISTANT

## 2017-10-27 PROCEDURE — 96367 TX/PROPH/DG ADDL SEQ IV INF: CPT

## 2017-10-27 PROCEDURE — 80048 BASIC METABOLIC PNL TOTAL CA: CPT | Performed by: PHYSICIAN ASSISTANT

## 2017-10-27 PROCEDURE — 93005 ELECTROCARDIOGRAM TRACING: CPT

## 2017-10-27 PROCEDURE — 80053 COMPREHEN METABOLIC PANEL: CPT | Performed by: PHYSICIAN ASSISTANT

## 2017-10-27 PROCEDURE — 96361 HYDRATE IV INFUSION ADD-ON: CPT

## 2017-10-27 RX ORDER — PANTOPRAZOLE SODIUM 40 MG/1
40 INJECTION, POWDER, FOR SOLUTION INTRAVENOUS ONCE
Status: COMPLETED | OUTPATIENT
Start: 2017-10-27 | End: 2017-10-27

## 2017-10-27 RX ORDER — DOCUSATE SODIUM 100 MG/1
100 CAPSULE, LIQUID FILLED ORAL 2 TIMES DAILY
Status: DISCONTINUED | OUTPATIENT
Start: 2017-10-27 | End: 2017-10-31 | Stop reason: HOSPADM

## 2017-10-27 RX ORDER — NICOTINE 21 MG/24HR
1 PATCH, TRANSDERMAL 24 HOURS TRANSDERMAL DAILY
Status: DISCONTINUED | OUTPATIENT
Start: 2017-10-27 | End: 2017-10-31 | Stop reason: HOSPADM

## 2017-10-27 RX ORDER — CLONIDINE HYDROCHLORIDE 0.1 MG/1
0.1 TABLET ORAL 2 TIMES DAILY
Status: DISCONTINUED | OUTPATIENT
Start: 2017-10-27 | End: 2017-10-31 | Stop reason: HOSPADM

## 2017-10-27 RX ORDER — POTASSIUM CHLORIDE 14.9 MG/ML
20 INJECTION INTRAVENOUS
Status: DISPENSED | OUTPATIENT
Start: 2017-10-27 | End: 2017-10-27

## 2017-10-27 RX ORDER — ONDANSETRON 2 MG/ML
4 INJECTION INTRAMUSCULAR; INTRAVENOUS ONCE
Status: COMPLETED | OUTPATIENT
Start: 2017-10-27 | End: 2017-10-27

## 2017-10-27 RX ORDER — NICOTINE 21 MG/24HR
14 PATCH, TRANSDERMAL 24 HOURS TRANSDERMAL ONCE
Status: DISCONTINUED | OUTPATIENT
Start: 2017-10-27 | End: 2017-10-28 | Stop reason: DRUGHIGH

## 2017-10-27 RX ORDER — SIMETHICONE 80 MG
80 TABLET,CHEWABLE ORAL EVERY 6 HOURS PRN
Status: DISCONTINUED | OUTPATIENT
Start: 2017-10-27 | End: 2017-10-31 | Stop reason: HOSPADM

## 2017-10-27 RX ORDER — PANTOPRAZOLE SODIUM 40 MG/1
40 TABLET, DELAYED RELEASE ORAL DAILY
Status: DISCONTINUED | OUTPATIENT
Start: 2017-10-28 | End: 2017-10-31 | Stop reason: HOSPADM

## 2017-10-27 RX ORDER — ONDANSETRON 2 MG/ML
4 INJECTION INTRAMUSCULAR; INTRAVENOUS EVERY 6 HOURS PRN
Status: DISCONTINUED | OUTPATIENT
Start: 2017-10-27 | End: 2017-10-31 | Stop reason: HOSPADM

## 2017-10-27 RX ORDER — NICOTINE 21 MG/24HR
1 PATCH, TRANSDERMAL 24 HOURS TRANSDERMAL DAILY
Status: DISCONTINUED | OUTPATIENT
Start: 2017-10-28 | End: 2017-10-27

## 2017-10-27 RX ORDER — MAGNESIUM SULFATE HEPTAHYDRATE 40 MG/ML
2 INJECTION, SOLUTION INTRAVENOUS ONCE
Status: COMPLETED | OUTPATIENT
Start: 2017-10-27 | End: 2017-10-28

## 2017-10-27 RX ORDER — MAGNESIUM SULFATE HEPTAHYDRATE 40 MG/ML
2 INJECTION, SOLUTION INTRAVENOUS ONCE
Status: COMPLETED | OUTPATIENT
Start: 2017-10-27 | End: 2017-10-27

## 2017-10-27 RX ORDER — SODIUM CHLORIDE 9 MG/ML
100 INJECTION, SOLUTION INTRAVENOUS CONTINUOUS
Status: DISCONTINUED | OUTPATIENT
Start: 2017-10-27 | End: 2017-10-31 | Stop reason: HOSPADM

## 2017-10-27 RX ORDER — POTASSIUM CHLORIDE 14.9 MG/ML
20 INJECTION INTRAVENOUS ONCE
Status: DISCONTINUED | OUTPATIENT
Start: 2017-10-27 | End: 2017-10-31 | Stop reason: HOSPADM

## 2017-10-27 RX ORDER — POTASSIUM CHLORIDE 29.8 MG/ML
40 INJECTION INTRAVENOUS ONCE
Status: DISCONTINUED | OUTPATIENT
Start: 2017-10-27 | End: 2017-10-27

## 2017-10-27 RX ADMIN — MAGNESIUM SULFATE HEPTAHYDRATE 2 G: 40 INJECTION, SOLUTION INTRAVENOUS at 18:38

## 2017-10-27 RX ADMIN — HYDROMORPHONE HYDROCHLORIDE 1 MG: 1 INJECTION, SOLUTION INTRAMUSCULAR; INTRAVENOUS; SUBCUTANEOUS at 20:30

## 2017-10-27 RX ADMIN — NICOTINE 14 MG: 14 PATCH TRANSDERMAL at 23:23

## 2017-10-27 RX ADMIN — ONDANSETRON 4 MG: 2 INJECTION INTRAMUSCULAR; INTRAVENOUS at 16:37

## 2017-10-27 RX ADMIN — MAGNESIUM SULFATE HEPTAHYDRATE 2 G: 40 INJECTION, SOLUTION INTRAVENOUS at 23:24

## 2017-10-27 RX ADMIN — SODIUM CHLORIDE 1000 ML: 0.9 INJECTION, SOLUTION INTRAVENOUS at 16:32

## 2017-10-27 RX ADMIN — NICOTINE 14 MG: 14 PATCH TRANSDERMAL at 20:29

## 2017-10-27 RX ADMIN — PANTOPRAZOLE SODIUM 40 MG: 40 INJECTION, POWDER, FOR SOLUTION INTRAVENOUS at 17:26

## 2017-10-27 RX ADMIN — SIMETHICONE CHEW TAB 80 MG 80 MG: 80 TABLET ORAL at 23:22

## 2017-10-27 RX ADMIN — SODIUM CHLORIDE 125 ML/HR: 0.9 INJECTION, SOLUTION INTRAVENOUS at 23:22

## 2017-10-27 RX ADMIN — NICOTINE 1 PATCH: 21 PATCH, EXTENDED RELEASE TRANSDERMAL at 23:39

## 2017-10-27 RX ADMIN — HYDROMORPHONE HYDROCHLORIDE 0.5 MG: 1 INJECTION, SOLUTION INTRAMUSCULAR; INTRAVENOUS; SUBCUTANEOUS at 16:58

## 2017-10-27 RX ADMIN — HYDROMORPHONE HYDROCHLORIDE 0.5 MG: 1 INJECTION, SOLUTION INTRAMUSCULAR; INTRAVENOUS; SUBCUTANEOUS at 18:37

## 2017-10-27 RX ADMIN — CLONIDINE HYDROCHLORIDE 0.1 MG: 0.1 TABLET ORAL at 23:28

## 2017-10-27 RX ADMIN — POTASSIUM CHLORIDE 20 MEQ: 200 INJECTION, SOLUTION INTRAVENOUS at 20:30

## 2017-10-27 RX ADMIN — IOHEXOL 100 ML: 350 INJECTION, SOLUTION INTRAVENOUS at 18:20

## 2017-10-27 RX ADMIN — ONDANSETRON 4 MG: 2 INJECTION INTRAMUSCULAR; INTRAVENOUS at 22:22

## 2017-10-27 NOTE — ED CARE HANDOFF
Emergency Department Sign Out Note        Sign out and transfer of care from Enriqueta Milady  See Separate Emergency Department note  The patient, Theresa Turner, was evaluated by the previous provider for n/v and dehydration  Workup Completed:  Laboratory evaluation    ED Course / Workup Pending (followup):  CT a/p                           ED Course as of Oct 28 0027   Fri Oct 27, 2017   1930 Patient's nausea and vomiting returned  PO challenge unsuccessful  Agreeable to admission at this time      Procedures  MDM  Number of Diagnoses or Management Options  Dehydration: new and requires workup  Hypokalemia: new and requires workup  Hypomagnesemia: new and requires workup  Intractable nausea and vomiting: new and requires workup  Risk of Complications, Morbidity, and/or Mortality  Presenting problems: moderate  Management options: low  General comments: 52-year-old female with acute nausea and vomiting  Care signed out from morning physician assistant  Patient initially thought she was feeling better however upon p  o  challenge she is now immediately vomiting  Her pain is also worsened  She has clear signs of dehydration  We will admit to hospital for IV hydration  Electrolyte repletion  Symptomatic control  Patient agrees with this plan  I spoke with Internal Medicine who agrees with admission  She is hemodynamically stable and nontoxic at the time of admission      Patient Progress  Patient progress: stable    CritCare Time      Disposition  Final diagnoses:   Intractable nausea and vomiting   Dehydration   Hypokalemia   Hypomagnesemia     Time reflects when diagnosis was documented in both MDM as applicable and the Disposition within this note     Time User Action Codes Description Comment    10/27/2017  8:54 PM Shala LEHMAN Add [R11 2] Intractable nausea and vomiting     10/27/2017  8:54 PM Ryan Walsh Add [E86 0] Dehydration     10/27/2017  8:54 PM Ryan Walsh Add [E87 6] Hypokalemia     10/27/2017  8:54 PM Ryan Walsh Add [E83 42] Hypomagnesemia     10/27/2017  9:18 PM Tiffany Silence Add [K52 9] Colitis     10/27/2017  9:18 PM Tiffany Silence Add [K86 1] Chronic pancreatitis [facility])       ED Disposition     ED Disposition Condition Comment    Admit  Case was discussed with Asya Allen and the patient's admission status was agreed to be Admission Status: observation status to the service of Dr Bahman Zhu           Follow-up Information    None       Current Discharge Medication List      CONTINUE these medications which have NOT CHANGED    Details   cloNIDine (CATAPRES) 0 1 mg tablet Take 0 1 mg by mouth 2 (two) times a day      docusate sodium (COLACE) 100 mg capsule Take 1 capsule by mouth 2 (two) times a day for 15 days  Qty: 30 capsule, Refills: 0      Mometasone Furo-Formoterol Fum (DULERA) 100-5 MCG/ACT AERO Inhale Unsure of dose      ondansetron (ZOFRAN) 4 mg tablet Take 1 tablet by mouth every 8 (eight) hours as needed for nausea or vomiting for up to 7 days  Qty: 21 tablet, Refills: 0      pantoprazole (PROTONIX) 40 mg tablet Take 40 mg by mouth daily           No discharge procedures on file         ED Provider  Electronically Signed by

## 2017-10-27 NOTE — ED NOTES
Spoke with patient about pain medication   "pt states they give me the other stuff because morphine makes my arms sweel " pt confirmed took dilauded before with no interaction      Darek Amezquita RN  10/27/17 4566

## 2017-10-28 LAB
ALBUMIN SERPL BCP-MCNC: 2.9 G/DL (ref 3.5–5)
ALP SERPL-CCNC: 136 U/L (ref 46–116)
ALT SERPL W P-5'-P-CCNC: 47 U/L (ref 12–78)
ANION GAP SERPL CALCULATED.3IONS-SCNC: 10 MMOL/L (ref 4–13)
ANION GAP SERPL CALCULATED.3IONS-SCNC: 7 MMOL/L (ref 4–13)
AST SERPL W P-5'-P-CCNC: 116 U/L (ref 5–45)
BILIRUB SERPL-MCNC: 1.1 MG/DL (ref 0.2–1)
BUN SERPL-MCNC: 8 MG/DL (ref 5–25)
BUN SERPL-MCNC: 8 MG/DL (ref 5–25)
CALCIUM SERPL-MCNC: 7.1 MG/DL (ref 8.3–10.1)
CALCIUM SERPL-MCNC: 7.2 MG/DL (ref 8.3–10.1)
CHLORIDE SERPL-SCNC: 101 MMOL/L (ref 100–108)
CHLORIDE SERPL-SCNC: 102 MMOL/L (ref 100–108)
CO2 SERPL-SCNC: 26 MMOL/L (ref 21–32)
CO2 SERPL-SCNC: 28 MMOL/L (ref 21–32)
CREAT SERPL-MCNC: 0.54 MG/DL (ref 0.6–1.3)
CREAT SERPL-MCNC: 0.59 MG/DL (ref 0.6–1.3)
ERYTHROCYTE [DISTWIDTH] IN BLOOD BY AUTOMATED COUNT: 15 % (ref 11.6–15.1)
GFR SERPL CREATININE-BSD FRML MDRD: 114 ML/MIN/1.73SQ M
GFR SERPL CREATININE-BSD FRML MDRD: 118 ML/MIN/1.73SQ M
GLUCOSE P FAST SERPL-MCNC: 170 MG/DL (ref 65–99)
GLUCOSE SERPL-MCNC: 135 MG/DL (ref 65–140)
GLUCOSE SERPL-MCNC: 170 MG/DL (ref 65–140)
HCT VFR BLD AUTO: 41.7 % (ref 34.8–46.1)
HGB BLD-MCNC: 13.6 G/DL (ref 11.5–15.4)
INR PPP: 1.11 (ref 0.86–1.16)
MCH RBC QN AUTO: 32.2 PG (ref 26.8–34.3)
MCHC RBC AUTO-ENTMCNC: 32.6 G/DL (ref 31.4–37.4)
MCV RBC AUTO: 99 FL (ref 82–98)
PLATELET # BLD AUTO: 182 THOUSANDS/UL (ref 149–390)
PMV BLD AUTO: 9.6 FL (ref 8.9–12.7)
POTASSIUM SERPL-SCNC: 3.6 MMOL/L (ref 3.5–5.3)
POTASSIUM SERPL-SCNC: 4.1 MMOL/L (ref 3.5–5.3)
PROT SERPL-MCNC: 6.7 G/DL (ref 6.4–8.2)
PROTHROMBIN TIME: 14.5 SECONDS (ref 12.1–14.4)
RBC # BLD AUTO: 4.22 MILLION/UL (ref 3.81–5.12)
SODIUM SERPL-SCNC: 137 MMOL/L (ref 136–145)
SODIUM SERPL-SCNC: 137 MMOL/L (ref 136–145)
WBC # BLD AUTO: 11.16 THOUSAND/UL (ref 4.31–10.16)

## 2017-10-28 PROCEDURE — 80048 BASIC METABOLIC PNL TOTAL CA: CPT | Performed by: PHYSICIAN ASSISTANT

## 2017-10-28 PROCEDURE — 80053 COMPREHEN METABOLIC PANEL: CPT | Performed by: INTERNAL MEDICINE

## 2017-10-28 PROCEDURE — 85027 COMPLETE CBC AUTOMATED: CPT | Performed by: PHYSICIAN ASSISTANT

## 2017-10-28 PROCEDURE — 85610 PROTHROMBIN TIME: CPT | Performed by: INTERNAL MEDICINE

## 2017-10-28 RX ORDER — OXYCODONE HYDROCHLORIDE 5 MG/1
5 TABLET ORAL EVERY 4 HOURS PRN
Status: DISCONTINUED | OUTPATIENT
Start: 2017-10-28 | End: 2017-10-31 | Stop reason: HOSPADM

## 2017-10-28 RX ORDER — SUCRALFATE ORAL 1 G/10ML
1000 SUSPENSION ORAL EVERY 6 HOURS SCHEDULED
Status: DISCONTINUED | OUTPATIENT
Start: 2017-10-28 | End: 2017-10-31 | Stop reason: HOSPADM

## 2017-10-28 RX ADMIN — PANTOPRAZOLE SODIUM 40 MG: 40 TABLET, DELAYED RELEASE ORAL at 05:52

## 2017-10-28 RX ADMIN — SIMETHICONE CHEW TAB 80 MG 80 MG: 80 TABLET ORAL at 08:56

## 2017-10-28 RX ADMIN — HYDROMORPHONE HYDROCHLORIDE 0.5 MG: 1 INJECTION, SOLUTION INTRAMUSCULAR; INTRAVENOUS; SUBCUTANEOUS at 08:55

## 2017-10-28 RX ADMIN — HYDROMORPHONE HYDROCHLORIDE 0.5 MG: 1 INJECTION, SOLUTION INTRAMUSCULAR; INTRAVENOUS; SUBCUTANEOUS at 01:05

## 2017-10-28 RX ADMIN — SIMETHICONE CHEW TAB 80 MG 80 MG: 80 TABLET ORAL at 05:53

## 2017-10-28 RX ADMIN — FLUTICASONE PROPIONATE AND SALMETEROL 1 PUFF: 50; 250 POWDER RESPIRATORY (INHALATION) at 00:40

## 2017-10-28 RX ADMIN — NYSTATIN 500000 UNITS: 100000 SUSPENSION ORAL at 14:19

## 2017-10-28 RX ADMIN — CLONIDINE HYDROCHLORIDE 0.1 MG: 0.1 TABLET ORAL at 08:08

## 2017-10-28 RX ADMIN — FLUTICASONE PROPIONATE AND SALMETEROL 1 PUFF: 50; 250 POWDER RESPIRATORY (INHALATION) at 08:01

## 2017-10-28 RX ADMIN — HYDROMORPHONE HYDROCHLORIDE 0.5 MG: 1 INJECTION, SOLUTION INTRAMUSCULAR; INTRAVENOUS; SUBCUTANEOUS at 19:45

## 2017-10-28 RX ADMIN — OXYCODONE HYDROCHLORIDE 5 MG: 5 TABLET ORAL at 00:39

## 2017-10-28 RX ADMIN — SUCRALFATE 1000 MG: 1 SUSPENSION ORAL at 23:50

## 2017-10-28 RX ADMIN — NICOTINE 1 PATCH: 21 PATCH, EXTENDED RELEASE TRANSDERMAL at 19:42

## 2017-10-28 RX ADMIN — ONDANSETRON 4 MG: 2 INJECTION INTRAMUSCULAR; INTRAVENOUS at 04:42

## 2017-10-28 RX ADMIN — OXYCODONE HYDROCHLORIDE 5 MG: 5 TABLET ORAL at 07:03

## 2017-10-28 RX ADMIN — SUCRALFATE 1000 MG: 1 SUSPENSION ORAL at 14:19

## 2017-10-28 RX ADMIN — OXYCODONE HYDROCHLORIDE 5 MG: 5 TABLET ORAL at 22:41

## 2017-10-28 RX ADMIN — OXYCODONE HYDROCHLORIDE 5 MG: 5 TABLET ORAL at 12:32

## 2017-10-28 RX ADMIN — PANCRELIPASE 24000 UNITS: 24000; 76000; 120000 CAPSULE, DELAYED RELEASE PELLETS ORAL at 15:43

## 2017-10-28 RX ADMIN — NYSTATIN 500000 UNITS: 100000 SUSPENSION ORAL at 18:10

## 2017-10-28 RX ADMIN — SUCRALFATE 1000 MG: 1 SUSPENSION ORAL at 18:09

## 2017-10-28 RX ADMIN — SIMETHICONE CHEW TAB 80 MG 80 MG: 80 TABLET ORAL at 19:50

## 2017-10-28 RX ADMIN — HYDROMORPHONE HYDROCHLORIDE 0.5 MG: 1 INJECTION, SOLUTION INTRAMUSCULAR; INTRAVENOUS; SUBCUTANEOUS at 14:23

## 2017-10-28 RX ADMIN — CLONIDINE HYDROCHLORIDE 0.1 MG: 0.1 TABLET ORAL at 18:09

## 2017-10-28 RX ADMIN — OXYCODONE HYDROCHLORIDE 5 MG: 5 TABLET ORAL at 18:13

## 2017-10-28 RX ADMIN — HYDROMORPHONE HYDROCHLORIDE 0.5 MG: 1 INJECTION, SOLUTION INTRAMUSCULAR; INTRAVENOUS; SUBCUTANEOUS at 04:42

## 2017-10-28 NOTE — H&P
History and Physical - [facility] Internal Medicine    Patient Information: Josette Castanon 40 y o  female MRN: [id number]  Unit/Bed#: -01 Encounter: [id number]  Admitting Physician: Nadeen Haq PA-C  PCP: Carlee Payan MD  Date of Admission:  10/27/17    Assessment/Plan:    Hospital Problem List:     Principal Problem:    Colitis  Active Problems:    Pancreatitis    Hypertension    Nicotine dependence    Hypokalemia      Plan for the Primary Problem(s):  · Abdominal pain - colitis/pancreatitis  · Admit  · Pain control  · IV fluids  · GI consult    Plan for Additional Problems:   · Hypokalemia - replace, recheck BMP  · Nicotine dependence - nicotine patch  · Hypertension - continue clonidine    VTE Prophylaxis: Enoxaparin (Lovenox)  / sequential compression device   Code Status:  Full  POLST: There is no POLST form on file for this patient (pre-hospital)    Anticipated Length of Stay:  Patient will be admitted on an Observation basis with an anticipated length of stay of  less than 2 midnights  Justification for Hospital Stay:  IV fluids and pain control    Total Time for Visit, including Counseling / Coordination of Care: 30 minutes  Greater than 50% of this total time spent on direct patient counseling and coordination of care  Chief Complaint:   Abdominal pain    History of Present Illness:    Josette Castanon is a 40 y o  female who presents with complaints of abdominal pain for the past 2 days  Patient states that she has significant amount of nausea and vomiting  Patient's history of chronic pancreatitis and has had several flares in the last several years  Patient states that last weekend she did have some alcoholic beverages and then a couple days later developed abdominal pain  Patient denies fevers, chills, chest pain, shortness of breath  Review of Systems:    Review of Systems   Gastrointestinal: Positive for abdominal pain  All other systems reviewed and are negative        Past Medical and Surgical History:     Past Medical History:   Diagnosis Date    Alcohol abuse     GERD (gastroesophageal reflux disease)     Hypertension     Nicotine dependence     Obesity     Pancreatitis     Panic attack        History reviewed  No pertinent surgical history  Meds/Allergies:    Prior to Admission medications    Medication Sig Start Date End Date Taking? Authorizing Provider   cloNIDine (CATAPRES) 0 1 mg tablet Take 0 1 mg by mouth 2 (two) times a day    Historical Provider, MD   docusate sodium (COLACE) 100 mg capsule Take 1 capsule by mouth 2 (two) times a day for 15 days 3/11/17 3/26/17  Ana Chavarria MD   Mometasone Furo-Formoterol Fum (DULERA) 100-5 MCG/ACT AERO Inhale Unsure of dose    Historical Provider, MD   ondansetron (ZOFRAN) 4 mg tablet Take 1 tablet by mouth every 8 (eight) hours as needed for nausea or vomiting for up to 7 days 3/11/17 3/18/17  Ana Chavarria MD   pantoprazole (PROTONIX) 40 mg tablet Take 40 mg by mouth daily    Historical Provider, MD     I have reviewed home medications with patient personally  Allergies:    Allergies   Allergen Reactions    Morphine And Related Swelling       Social History:     Marital Status: Single   Occupation: unemployed  Patient Pre-hospital Living Situation:  Lives at home  Patient Pre-hospital Level of Mobility:  Ambulatory  Patient Pre-hospital Diet Restrictions:  None  Substance Use History:   History   Alcohol Use No     History   Smoking Status    Current Every Day Smoker    Packs/day: 1 00    Types: Cigarettes   Smokeless Tobacco    Never Used     History   Drug Use No       Family History:    Family History   Problem Relation Age of Onset    Cirrhosis Father        Physical Exam:     Vitals:   Blood Pressure: 166/96 (10/27/17 2158)  Pulse: 79 (10/27/17 2158)  Temperature: 97 7 °F (36 5 °C) (10/27/17 2158)  Temp Source: Oral (10/27/17 2158)  Respirations: 18 (10/27/17 2158)  Height: 5' 4" (162 6 cm) (10/27/17 2158)  Weight - Scale: 83 1 kg (183 lb 3 2 oz) (10/27/17 2158)  SpO2: 96 % (10/27/17 2158)    Physical Exam   Constitutional: She is oriented to person, place, and time  She appears well-developed and well-nourished  HENT:   Head: Normocephalic and atraumatic  Right Ear: External ear normal    Left Ear: External ear normal    Nose: Nose normal    Mouth/Throat: Oropharynx is clear and moist    Eyes: Conjunctivae and EOM are normal  Pupils are equal, round, and reactive to light  Neck: Normal range of motion  Cardiovascular: Normal rate, regular rhythm and normal heart sounds  Pulmonary/Chest: Effort normal and breath sounds normal    Abdominal: Soft  Bowel sounds are normal  There is generalized tenderness  Musculoskeletal: Normal range of motion  Neurological: She is alert and oriented to person, place, and time  Skin: Skin is warm and dry  Psychiatric: She has a normal mood and affect  Her behavior is normal  Judgment and thought content normal            Additional Data:     Lab Results: I have personally reviewed pertinent reports  Results from last 7 days  Lab Units 10/27/17  1624   WBC Thousand/uL 14 95*   HEMOGLOBIN g/dL 16 3*   HEMATOCRIT % 47 6*   PLATELETS Thousands/uL 281   NEUTROS PCT % 67   LYMPHS PCT % 22   MONOS PCT % 9   EOS PCT % 1       Results from last 7 days  Lab Units 10/27/17  1624   SODIUM mmol/L 140   POTASSIUM mmol/L 2 8*   CHLORIDE mmol/L 98*   CO2 mmol/L 25   BUN mg/dL 8   CREATININE mg/dL 0 65   CALCIUM mg/dL 8 5   TOTAL PROTEIN g/dL 8 8*   BILIRUBIN TOTAL mg/dL 0 80   ALK PHOS U/L 186*   ALT U/L 57   AST U/L 139*   GLUCOSE RANDOM mg/dL 155*           Imaging: I have personally reviewed pertinent reports  Ct Abdomen Pelvis With Contrast    Result Date: 10/27/2017  Narrative: CT ABDOMEN AND PELVIS WITH IV CONTRAST INDICATION:  Pancreatitis COMPARISON: CT abdomen pelvis 3/7/2017 TECHNIQUE:  CT examination of the abdomen and pelvis was performed  Reformatted images were created in axial, sagittal, and coronal planes  Radiation dose length product (DLP) for this visit:  601 mGy-cm   This examination, like all CT scans performed in the [facility], was performed utilizing techniques to minimize radiation dose exposure, including the use of iterative reconstruction and automated exposure control  IV Contrast:  100 mL of iohexol (OMNIPAQUE)     Enteric Contrast:  Enteric contrast was not administered  FINDINGS: ABDOMEN LOWER CHEST:  No significant abnormalities identified in the lower chest  LIVER/BILIARY TREE:  The liver is enlarged and fatty infiltrated changes  Again noted is diffuse heterogeneous enhancement of the liver  Slightly nodular contour of the liver surface  GALLBLADDER:  No calcified gallstones  No pericholecystic inflammatory change  SPLEEN:  Unremarkable  PANCREAS:  Stable atrophy of the pancreas with dilation of the pancreatic duct in the mid body and tail  No significant peripancreatic fat stranding  ADRENAL GLANDS:  Unremarkable  KIDNEYS/URETERS:  Unremarkable  No hydronephrosis  STOMACH AND BOWEL:  There is colonic diverticulosis  There is mild thickening of the sigmoid colon without significant pericolonic inflammatory change  APPENDIX:  No findings to suggest appendicitis  ABDOMINOPELVIC CAVITY:  No ascites or free intraperitoneal air  No lymphadenopathy  VESSELS:  Unremarkable for patient's age  PELVIS REPRODUCTIVE ORGANS:  Unremarkable for patient's age  URINARY BLADDER:  Unremarkable  ABDOMINAL WALL/INGUINAL REGIONS:  Unremarkable  OSSEOUS STRUCTURES:  No acute fracture or destructive osseous lesion  Impression: 1  Stable mild dilation of the pancreatic duct  No peripancreatic fat stranding to suggest acute pancreatitis  2   Hepatomegaly and hepatic steatosis  Persistent heterogeneous enhancement of the liver with suggestion of nodular contour  Findings may represent early changes of cirrhosis    Recommend clinical correlation and consider MRI follow-up  3   Mild bowel wall thickening of the sigmoid colon may represent mild colitis versus underdistention  4   Colonic diverticulosis  Workstation performed: [id number]       EKG, Pathology, and Other Studies Reviewed on Admission:   · EKG:  NSR    Allscripts / Epic Records Reviewed: Yes     ** Please Note: This note has been constructed using a voice recognition system   **

## 2017-10-28 NOTE — PLAN OF CARE
Problem: DISCHARGE PLANNING - CARE MANAGEMENT  Goal: Discharge to post-acute care or home with appropriate resources  INTERVENTIONS:  - Conduct assessment to determine patient/family and health care team treatment goals, and need for post-acute services based on payer coverage, community resources, and patient preferences, and barriers to discharge  - Address psychosocial, clinical, and financial barriers to discharge as identified in assessment in conjunction with the patient/family and health care team  - Arrange appropriate level of post-acute services according to patient's   needs and preference and payer coverage in collaboration with the physician and health care team  - Communicate with and update the patient/family, physician, and health care team regarding progress on the discharge plan  - Arrange appropriate transportation to post-acute venues  Outcome: Progressing  CM met with patient at bedside  Patient is alert and oriented  Patient lives at home with her boyfriend  No concerns noted with current living situation  She is independent with ADLS  No hx of HH or DME  She uses [facility] in [facility]  Denies substance abuse  No POA or advance directives and does not want any information at this time  Patient does not drive much anymore but states her boyfriend will transport her home when needed  Good support noted  No needs anticipated at this time    CM department will follow patient through Dc

## 2017-10-28 NOTE — CONSULTS
Consultation -  [facility]  Thee Santiago 40 y o  female MRN: [id number]  Unit/Bed#: -01 Encounter: [id number]        Consults    ASSESSMENT/PLAN:   31-year-old female with history of alcohol abuse, multiple episodes of pancreatitis, comes in with complains of worsening abdominal pain, nausea and vomiting  She is noted to be hypokalemic on admission with leukocytosis  Transaminases are mildly elevated  1   Epigastric pain:  No evidence to suggest pancreatitis based on labs or CT scan, however patient still continues to drink, therefore it is possible symptoms may likely be from chronic pancreatitis versus severe esophagitis or gastritis or peptic ulcer disease  She has oral thrush on exam   -would recommend to start on Protonix 40 mg b i d  -Carafate 1 g q i d  Lady Sole -start on nystatin swish in swallow   -she should get an EGD however can be done as an early outpatient, as there are no signs of acute GI bleed   -avoid NSAIDs  -discussed importance of alcohol abstinence  -start on Creon with meals and snacks  2   Transaminitis:  Likely secondary to alcohol, improving  There is also evidence of hepatic steatosis and nodular appearance of liver on CT scan, would recommend to obtain MRI  Luckily, there is no stigmata of cirrhosis on exam so far  I had a lengthy discussion with patient regarding stopping alcohol use  Meanwhile, check other causes of chronic liver disease such as chronic hepatitis  If chronic hepatitis panel was negative, will consider further workup to rule out rare causes of chronic liver disease   -continue to monitor LFTs  -check coags  Reason for Consult / Principal Problem: Colitis    HPI: Thee Santiago is a 40y o  year old female who presents with Colitis   31-year-old female with history of alcohol abuse, multiple episodes of pancreatitis, comes in with complains of worsening abdominal pain for the past 2 days    Patient states that the pain is in the periumbilical a region, associated with nausea and vomiting  She states that she has had multiple episodes of nausea vomiting every morning and has been ongoing for several months  She states that during 1 of these episodes 1 month ago, she had hematemesis  Her last alcoholic drink was several days ago  She denies diarrhea, melena, hematochezia or change in bowel habits though does endorse family history of colon cancer -unclear who  She states that she underwent EGD 3 years ago-does not recall the results  She has never had a colonoscopy  She states that she takes Protonix 40 mg on a daily basis along with Tums and Maalox  On admission, she was noted to have leukocytosis of 14 95, hemoglobin of 16 3, platelets of 557  Potassium was 2 8, BUN 8, , ALT 57, normal bilirubin and normal lipase  Magnesium was 1 5  CT of abdomen and pelvis showed hepatomegaly and hepatic steatosis  Persistent heterogeneous enhancement of the liver with suggestion of nodular contour suspicious for early cirrhosis  There was mild dilatation of the pancreatic duct but no peripancreatic fat stranding to suggest acute pancreatitis  There was mild bowel wall thickening of the sigmoid colon and colonic diverticulosis  There are no calcified gallstones or pericholecystic inflammatory change  Review of Systems: The remainder of the review of systems was negative except for the pertinent positives noted in HPI       Historical Information   Past Medical History:   Diagnosis Date    Alcohol abuse     Arthritis     GERD (gastroesophageal reflux disease)     Hypertension     Nicotine dependence     Obesity     Pancreatitis     Panic attack      Past Surgical History:   Procedure Laterality Date    ABDOMINAL SURGERY      C SECTION     Social History   History   Alcohol Use    Yes     Comment: [employer]     History   Drug Use No     History   Smoking Status    Current Every Day Smoker    Packs/day: 1 00    Years: 29 00    Types: Cigarettes   Smokeless Tobacco    Never Used     Family History   Problem Relation Age of Onset    Cirrhosis Father     Alcohol abuse Father     Drug abuse Mother        Meds/Allergies     Prescriptions Prior to Admission   Medication    cloNIDine (CATAPRES) 0 1 mg tablet    docusate sodium (COLACE) 100 mg capsule    Mometasone Furo-Formoterol Fum (DULERA) 100-5 MCG/ACT AERO    ondansetron (ZOFRAN) 4 mg tablet    pantoprazole (PROTONIX) 40 mg tablet     Current Facility-Administered Medications   Medication Dose Route Frequency    cloNIDine (CATAPRES) tablet 0 1 mg  0 1 mg Oral BID    docusate sodium (COLACE) capsule 100 mg  100 mg Oral BID    enoxaparin (LOVENOX) subcutaneous injection 40 mg  40 mg Subcutaneous Daily    fluticasone-salmeterol (ADVAIR) 250-50 mcg/dose inhaler 1 puff  1 puff Inhalation Q12H [address]    HYDROmorphone (DILAUDID) 1 mg/mL injection 0 5 mg  0 5 mg Intravenous Q4H PRN    nicotine (NICODERM CQ) 21 mg/24 hr TD 24 hr patch 1 patch  1 patch Transdermal Daily    ondansetron (ZOFRAN) injection 4 mg  4 mg Intravenous Q6H PRN    oxyCODONE (ROXICODONE) IR tablet 5 mg  5 mg Oral Q4H PRN    pantoprazole (PROTONIX) EC tablet 40 mg  40 mg Oral Daily    potassium chloride 20 mEq IVPB (premix)  20 mEq Intravenous Once    simethicone (MYLICON) chewable tablet 80 mg  80 mg Oral Q6H PRN    sodium chloride 0 9 % infusion  125 mL/hr Intravenous Continuous       Allergies   Allergen Reactions    Morphine And Related Swelling       Objective     Blood pressure (!) 189/99, pulse 68, temperature 97 8 °F (36 6 °C), temperature source Oral, resp  rate 16, height 5' 4" (1 626 m), weight 83 1 kg (183 lb 3 2 oz), last menstrual period 09/30/2017, SpO2 96 %, not currently breastfeeding        Intake/Output Summary (Last 24 hours) at 10/28/17 1300  Last data filed at 10/27/17 2245   Gross per 24 hour   Intake             1700 ml   Output                0 ml   Net             1700 ml       PHYSICAL EXAM     GEN: well nourished, well developed, no acute distress  HEENT: anicteric, oral thrush noted on exam   CV: RRR, no m/r/g  CHEST: CTA b/l, no WRR  ABD: +BS, soft, diffuse tenderness but no rebound or guarding, no ascites   EXT: no c/c/e  SKIN: no rashes,  NEURO: aaox3    Lab Results:   Admission on 10/27/2017   Component Date Value    WBC 10/27/2017 14 95*    RBC 10/27/2017 5 01     Hemoglobin 10/27/2017 16 3*    Hematocrit 10/27/2017 47 6*    MCV 10/27/2017 95     MCH 10/27/2017 32 5     MCHC 10/27/2017 34 2     RDW 10/27/2017 14 9     MPV 10/27/2017 9 5     Platelets 96/45/7485 281     nRBC 10/27/2017 0     Neutrophils Relative 10/27/2017 67     Lymphocytes Relative 10/27/2017 22     Monocytes Relative 10/27/2017 9     Eosinophils Relative 10/27/2017 1     Basophils Relative 10/27/2017 1     Neutrophils Absolute 10/27/2017 10 06*    Lymphocytes Absolute 10/27/2017 3 33     Monocytes Absolute 10/27/2017 1 28*    Eosinophils Absolute 10/27/2017 0 10     Basophils Absolute 10/27/2017 0 09     Sodium 10/27/2017 140     Potassium 10/27/2017 2 8*    Chloride 10/27/2017 98*    CO2 10/27/2017 25     Anion Gap 10/27/2017 17*    BUN 10/27/2017 8     Creatinine 10/27/2017 0 65     Glucose 10/27/2017 155*    Calcium 10/27/2017 8 5     AST 10/27/2017 139*    ALT 10/27/2017 57     Alkaline Phosphatase 10/27/2017 186*    Total Protein 10/27/2017 8 8*    Albumin 10/27/2017 3 8     Total Bilirubin 10/27/2017 0 80     eGFR 10/27/2017 111     Lipase 10/27/2017 109     EXT PREG TEST UR (Ref: N* 10/27/2017 NEGATIVE     Magnesium 10/27/2017 1 5*    Ventricular Rate 10/27/2017 89     Atrial Rate 10/27/2017 89     IN Interval 10/27/2017 118     QRSD Interval 10/27/2017 86     QT Interval 10/27/2017 398     QTC Interval 10/27/2017 484     P Axis 10/27/2017 52     QRS Axis 10/27/2017 53     T Wave Axis 10/27/2017 57     Platelets 29/52/5621 220     MPV 10/27/2017 9  6     Sodium 10/27/2017 137     Potassium 10/27/2017 3 9     Chloride 10/27/2017 100     CO2 10/27/2017 28     Anion Gap 10/27/2017 9     BUN 10/27/2017 7     Creatinine 10/27/2017 0 69     Glucose 10/27/2017 130     Calcium 10/27/2017 7 5*    eGFR 10/27/2017 108     Sodium 10/28/2017 137     Potassium 10/28/2017 4 1     Chloride 10/28/2017 101     CO2 10/28/2017 26     Anion Gap 10/28/2017 10     BUN 10/28/2017 8     Creatinine 10/28/2017 0 54*    Glucose 10/28/2017 135     Calcium 10/28/2017 7 2*    eGFR 10/28/2017 118     WBC 10/28/2017 11 16*    RBC 10/28/2017 4 22     Hemoglobin 10/28/2017 13 6     Hematocrit 10/28/2017 41 7     MCV 10/28/2017 99*    MCH 10/28/2017 32 2     MCHC 10/28/2017 32 6     RDW 10/28/2017 15 0     Platelets 92/57/0119 182     MPV 10/28/2017 9 6     Sodium 10/28/2017 137     Potassium 10/28/2017 3 6     Chloride 10/28/2017 102     CO2 10/28/2017 28     Anion Gap 10/28/2017 7     BUN 10/28/2017 8     Creatinine 10/28/2017 0 59*    Glucose 10/28/2017 170*    Glucose, Fasting 10/28/2017 170*    Calcium 10/28/2017 7 1*    AST 10/28/2017 116*    ALT 10/28/2017 47     Alkaline Phosphatase 10/28/2017 136*    Total Protein 10/28/2017 6 7     Albumin 10/28/2017 2 9*    Total Bilirubin 10/28/2017 1 10*    eGFR 10/28/2017 114     Protime 10/28/2017 14 5*    INR 10/28/2017 1 11      Imaging Studies: I have personally reviewed pertinent films in PACS

## 2017-10-28 NOTE — CASE MANAGEMENT
[address] [facility] in the [facility] by Dk Rodríguez for 2017  Network Utilization Review Department  Phone: [phone]; Fax [phone]  ATTENTION: The Network Utilization Review Department is now centralized for our 7 Facilities  Make a note that we have a new phone and fax numbers for our Department  Please call with any questions or concerns to [phone] and carefully follow the prompts so that you are directed to the right person  All voicemails are confidential  Fax any determinations, approvals, denials, and requests for initial or continue stay review clinical to [phone]  Due to HIGH CALL volume, it would be easier if you could please send faxed requests to expedite your requests and in part, help us provide discharge notifications faster  Initial Clinical Review    Admission: Date/Time/Statement:  10/27/17 AT 2057 OBSERVATION    Orders Placed This Encounter   Procedures    Place in Observation (expected length of stay for this patient is less than two midnights)     Standing Status:   Standing     Number of Occurrences:   1     Order Specific Question:   Admitting Physician     Answer:   Juan Solano [[id number]]     Order Specific Question:   Level of Care     Answer:   Med Surg [16]     ED: Date/Time/Mode of Arrival:   ED Arrival Information     Expected Arrival Acuity Means of Arrival Escorted By Service Admission Type    - 10/27/2017 15:56 Urgent Ambulance [facility] Medicine Urgent    Arrival Complaint    ABDOMINAL PAIN        Chief Complaint:   Chief Complaint   Patient presents with    Abdominal Pain     Pt with nausea and vomiting x's several days, thinks its her pancreas  Per EMS, patient denies any drinking      Chief Complaint:   Abdominal pain     History of Present Illness:   Ailyn Figueredo is a 40 y o  female who presents with complaints of abdominal pain for the past 2 days    Patient states that she has significant amount of nausea and vomiting  Patient's history of chronic pancreatitis and has had several flares in the last several years  Patient states that last weekend she did have some alcoholic beverages and then a couple days later developed abdominal pain  Patient denies fevers, chills, chest pain, shortness of breath      Review of Systems:   Gastrointestinal: Positive for abdominal pain  All other systems reviewed and are negative  Physical Exam   Constitutional: She is oriented to person, place, and time  She appears well-developed and well-nourished  Cardiovascular: Normal rate, regular rhythm and normal heart sounds  Pulmonary/Chest: Effort normal and breath sounds normal    Abdominal: Soft  Bowel sounds are normal  There is generalized tenderness       ED Vital Signs:   ED Triage Vitals [10/27/17 1558]   Temperature Pulse Respirations Blood Pressure SpO2   98 3 °F (36 8 °C) 90 18 167/87 98 %      Temp Source Heart Rate Source Patient Position - Orthostatic VS BP Location FiO2 (%)   Oral Monitor Sitting Right arm --      Pain Score       8        Wt Readings from Last 1 Encounters:   10/27/17 83 1 kg (183 lb 3 2 oz)      10/27 0701  10/28 0700 10/28 0701  10/28 1014  Most Recent    Temperature (°F) 97 7-98 3 97 8  97 8 (36 6)    Pulse 78-90 68  68    Respirations 15-18 16  16    Blood Pressure 151//87 189/99  189/99    SpO2 (%) 93-99 96  96        LABS/Diagnostic Test Results:   CBC  Results from last 7 days  Lab Units 10/27/17  1624   WBC Thousand/uL 14 95*   HEMOGLOBIN g/dL 16 3*   HEMATOCRIT % 47 6*   PLATELETS Thousands/uL 281   NEUTROS PCT % 67   LYMPHS PCT % 22   MONOS PCT % 9   EOS PCT % 1      CMP  Results from last 7 days  Lab Units 10/27/17  1624   SODIUM mmol/L 140   POTASSIUM mmol/L 2 8*   CHLORIDE mmol/L 98*   CO2 mmol/L 25   BUN mg/dL 8   CREATININE mg/dL 0 65   CALCIUM mg/dL 8 5   TOTAL PROTEIN g/dL 8 8*   BILIRUBIN TOTAL mg/dL 0 80   ALK PHOS U/L 186*   ALT U/L 57   AST U/L 139*   GLUCOSE RANDOM mg/dL 155*     Magnesium [[id number]] (Abnormal) Collected: 10/27/17 1624   Lab Status: Final result Specimen: Blood from Arm, Left Updated: 10/27/17 1718    Magnesium 1 5 (L) 1 6 - 2 6 mg/dL      Lipase [[id number]] (Normal) Collected: 10/27/17 1624   Lab Status: Final result Specimen: Blood from Arm, Left Updated: 10/27/17 1654    Lipase 109 73 - 393 u/L        CT ABDOMEN PELVIS -  1  Stable mild dilation of the pancreatic duct  No peripancreatic fat stranding to suggest acute pancreatitis  2   Hepatomegaly and hepatic steatosis  Persistent heterogeneous enhancement of the liver with suggestion of nodular contour  Findings may represent early changes of cirrhosis  Recommend clinical correlation and consider MRI follow-up  3   Mild bowel wall thickening of the sigmoid colon may represent mild colitis versus underdistention     4   Colonic diverticulosis      ED Treatment:   Medication Administration from 10/27/2017 1556 to 10/27/2017 2158       Date/Time Order Dose Route Action Action by Comments     10/27/2017 1732 sodium chloride 0 9 % bolus 1,000 mL 0 mL Intravenous Stopped Raffy Esquivel RN      10/27/2017 1632 sodium chloride 0 9 % bolus 1,000 mL 1,000 mL Intravenous [address] Liliam Alcaraz RN      10/27/2017 1637 ondansetron (ZOFRAN) injection 4 mg 4 mg Intravenous Given Liliam Alcaraz RN      10/27/2017 1658 HYDROmorphone (DILAUDID) 1 mg/mL injection 0 5 mg 0 5 mg Intravenous Given Liliam Alcaraz RN      10/27/2017 1726 pantoprazole (PROTONIX) injection 40 mg 40 mg Intravenous Given Liliam Alcaraz RN      10/27/2017 2038 magnesium sulfate 2 g/50 mL IVPB (premix) 2 g 0 g Intravenous Stopped Raffy Esquivel RN      10/27/2017 1838 magnesium sulfate 2 g/50 mL IVPB (premix) 2 g 2 g Intravenous [address] Chad Diehl RN      10/27/2017 2030 potassium chloride 20 mEq IVPB (premix) 20 mEq Intravenous [address] Raffy Esquivel [facility]      10/27/2017 0673 HYDROmorphone (DILAUDID) 1 mg/mL injection 0 5 mg 0 5 mg Intravenous Given Steve Knowles RN      10/27/2017 1820 iohexol (OMNIPAQUE) 350 MG/ML injection (MULTI-DOSE) 100 mL 100 mL Intravenous Given Leandro Waldenefrain      10/27/2017 2030 HYDROmorphone (DILAUDID) 1 mg/mL injection 1 mg 1 mg Intravenous Given Hair Munson RN      10/27/2017 2029 nicotine (NICODERM CQ) 14 mg/24hr TD 24 hr patch 14 mg 14 mg Transdermal Medication Applied Hair Munson RN           Past Medical/Surgical History: Active Ambulatory Problems     Diagnosis Date Noted    Pancreatitis 10/06/2016    Abdominal pain 10/06/2016    Hypertension 10/06/2016    Nicotine dependence 10/06/2016    Hypokalemia 03/07/2017     Past Medical History:   Diagnosis Date    Alcohol abuse     Arthritis     GERD (gastroesophageal reflux disease)     Hypertension     Nicotine dependence     Obesity     Pancreatitis     Panic attack        Admitting Diagnosis: Chronic pancreatitis ([address] ) [K86 1]  Dehydration [E86 0]  Hypokalemia [E87 6]  Hypomagnesemia [E83 42]  Colitis [K52 9]  Abdominal pain [R10 9]  Intractable nausea and vomiting [R11 2]    Age/Sex: 40 y o  female      Assessment/Plan:   Hospital Problem List:    Principal Problem:    Colitis    Active Problems:    Pancreatitis    Hypertension    Nicotine dependence    Hypokalemia      Plan for the Primary Problem(s):  · Abdominal pain - colitis/pancreatitis  ? Admit  ? Pain control  ? IV fluids  ? GI consult     Plan for Additional Problems:   · Hypokalemia - replace, recheck BMP  · Nicotine dependence - nicotine patch  · Hypertension - continue clonidine     VTE Prophylaxis: Enoxaparin (Lovenox)  / sequential compression device   Code Status:  Full  POLST: There is no POLST form on file for this patient (pre-hospital)     Anticipated Length of Stay:  Patient will be admitted on an Observation basis with an anticipated length of stay of  less than 2 midnights       Justification for Hospital Stay:  IV fluids and pain control      Admission Orders:  10/27/17 AT 2057  PLACE IN OBSERVATION   VS Q4HRS    Up as Tolerated  SCD    Clear Liquid Diet    Continuous IV Infusions:   sodium chloride 125 mL/hr Last Rate: 125 mL/hr (10/27/17 8022)       Scheduled Meds:   cloNIDine 0 1 mg Oral BID   docusate sodium 100 mg Oral BID   enoxaparin 40 mg Subcutaneous Daily   fluticasone-salmeterol 1 puff Inhalation Q12H [address]   nicotine 1 patch Transdermal Daily   pantoprazole 40 mg Oral Daily   potassium chloride 20 mEq Intravenous Once     PRN Meds:      IV HYDROmorphone 0 5 mg q4hrs prn given x 3    IV ondansetron 4 mg q6hrs prn given x 2    oxyCODONE 5 mg q4hrs prn given x 2    Simethicone 80 mg q6hrs prn given x 3

## 2017-10-28 NOTE — PLAN OF CARE
Problem: SAFETY ADULT  Goal: Patient will remain free of falls  INTERVENTIONS:  - Assess patient frequently for physical needs  -  Identify cognitive and physical deficits and behaviors that affect risk of falls    -  [place] fall precautions as indicated by assessment   - Educate patient/family on patient safety including physical limitations  - Instruct patient to call for assistance with activity based on assessment  - Modify environment to reduce risk of injury  - Consider OT/PT consult to assist with strengthening/mobility   Outcome: Progressing    Goal: Maintain or return to baseline ADL function  INTERVENTIONS:  -  Assess patient's ability to carry out ADLs; assess patient's baseline for ADL function and identify physical deficits which impact ability to perform ADLs (bathing, care of mouth/teeth, toileting, grooming, dressing, etc )  - Assess/evaluate cause of self-care deficits   - Assess range of motion  - Assess patient's mobility; develop plan if impaired  - Assess patient's need for assistive devices and provide as appropriate  - Encourage maximum independence but intervene and supervise when necessary  ¯ Involve family in performance of ADLs  ¯ Assess for home care needs following discharge   ¯ Request OT consult to assist with ADL evaluation and planning for discharge  ¯ Provide patient education as appropriate   Outcome: Progressing    Goal: Maintain or return mobility status to optimal level  INTERVENTIONS:  - Assess patient's baseline mobility status (ambulation, transfers, stairs, etc )    - Identify cognitive and physical deficits and behaviors that affect mobility  - Identify mobility aids required to assist with transfers and/or ambulation (gait belt, sit-to-stand, lift, walker, cane, etc )  - [place] fall precautions as indicated by assessment  - Record patient progress and toleration of activity level on Mobility SBAR; progress patient to next Phase/Stage  - Instruct patient to call for assistance with activity based on assessment  - Request Rehabilitation consult to assist with strengthening/weightbearing, etc    Outcome: Progressing

## 2017-10-28 NOTE — SOCIAL WORK
CM met with patient at bedside  Patient is alert and oriented  Patient lives at home with her boyfriend  No concerns noted with current living situation  She is independent with ADLS  No hx of HH or DME  She [facility]  Denies substance abuse  No POA or advance directives and does not want any information at this time  Patient does not drive much anymore but states her boyfriend will transport her home when needed  Good support noted  No needs anticipated at this time    CM department will follow patient through Dc

## 2017-10-28 NOTE — ED NOTES
SLIM at bedside      Frederick Arteaga, [address] [facility]  10/27/17 9179

## 2017-10-29 PROCEDURE — 86705 HEP B CORE ANTIBODY IGM: CPT | Performed by: INTERNAL MEDICINE

## 2017-10-29 PROCEDURE — 87340 HEPATITIS B SURFACE AG IA: CPT | Performed by: INTERNAL MEDICINE

## 2017-10-29 PROCEDURE — 86803 HEPATITIS C AB TEST: CPT | Performed by: INTERNAL MEDICINE

## 2017-10-29 PROCEDURE — 86704 HEP B CORE ANTIBODY TOTAL: CPT | Performed by: INTERNAL MEDICINE

## 2017-10-29 RX ORDER — DICYCLOMINE HYDROCHLORIDE 10 MG/1
10 CAPSULE ORAL
Status: DISCONTINUED | OUTPATIENT
Start: 2017-10-29 | End: 2017-10-30

## 2017-10-29 RX ORDER — HYDRALAZINE HYDROCHLORIDE 20 MG/ML
5 INJECTION INTRAMUSCULAR; INTRAVENOUS ONCE
Status: COMPLETED | OUTPATIENT
Start: 2017-10-29 | End: 2017-10-29

## 2017-10-29 RX ADMIN — CLONIDINE HYDROCHLORIDE 0.1 MG: 0.1 TABLET ORAL at 08:50

## 2017-10-29 RX ADMIN — OXYCODONE HYDROCHLORIDE 5 MG: 5 TABLET ORAL at 10:49

## 2017-10-29 RX ADMIN — FLUTICASONE PROPIONATE AND SALMETEROL 1 PUFF: 50; 250 POWDER RESPIRATORY (INHALATION) at 08:51

## 2017-10-29 RX ADMIN — OXYCODONE HYDROCHLORIDE 5 MG: 5 TABLET ORAL at 23:00

## 2017-10-29 RX ADMIN — PANCRELIPASE 24000 UNITS: 24000; 76000; 120000 CAPSULE, DELAYED RELEASE PELLETS ORAL at 07:35

## 2017-10-29 RX ADMIN — HYDROMORPHONE HYDROCHLORIDE 0.5 MG: 1 INJECTION, SOLUTION INTRAMUSCULAR; INTRAVENOUS; SUBCUTANEOUS at 08:55

## 2017-10-29 RX ADMIN — NICOTINE 1 PATCH: 21 PATCH, EXTENDED RELEASE TRANSDERMAL at 21:13

## 2017-10-29 RX ADMIN — NYSTATIN 500000 UNITS: 100000 SUSPENSION ORAL at 08:50

## 2017-10-29 RX ADMIN — DICYCLOMINE HYDROCHLORIDE 10 MG: 10 CAPSULE ORAL at 21:14

## 2017-10-29 RX ADMIN — SIMETHICONE CHEW TAB 80 MG 80 MG: 80 TABLET ORAL at 08:57

## 2017-10-29 RX ADMIN — HYDRALAZINE HYDROCHLORIDE 5 MG: 20 INJECTION INTRAMUSCULAR; INTRAVENOUS at 04:29

## 2017-10-29 RX ADMIN — OXYCODONE HYDROCHLORIDE 5 MG: 5 TABLET ORAL at 17:36

## 2017-10-29 RX ADMIN — HYDROMORPHONE HYDROCHLORIDE 0.5 MG: 1 INJECTION, SOLUTION INTRAMUSCULAR; INTRAVENOUS; SUBCUTANEOUS at 16:18

## 2017-10-29 RX ADMIN — SIMETHICONE CHEW TAB 80 MG 80 MG: 80 TABLET ORAL at 14:54

## 2017-10-29 RX ADMIN — CLONIDINE HYDROCHLORIDE 0.1 MG: 0.1 TABLET ORAL at 17:32

## 2017-10-29 RX ADMIN — HYDROMORPHONE HYDROCHLORIDE 0.5 MG: 1 INJECTION, SOLUTION INTRAMUSCULAR; INTRAVENOUS; SUBCUTANEOUS at 12:34

## 2017-10-29 RX ADMIN — SODIUM CHLORIDE 100 ML/HR: 0.9 INJECTION, SOLUTION INTRAVENOUS at 10:46

## 2017-10-29 RX ADMIN — PANCRELIPASE 24000 UNITS: 24000; 76000; 120000 CAPSULE, DELAYED RELEASE PELLETS ORAL at 16:14

## 2017-10-29 RX ADMIN — SUCRALFATE 1000 MG: 1 SUSPENSION ORAL at 17:32

## 2017-10-29 RX ADMIN — HYDROMORPHONE HYDROCHLORIDE 0.5 MG: 1 INJECTION, SOLUTION INTRAMUSCULAR; INTRAVENOUS; SUBCUTANEOUS at 04:30

## 2017-10-29 RX ADMIN — SUCRALFATE 1000 MG: 1 SUSPENSION ORAL at 12:24

## 2017-10-29 RX ADMIN — SUCRALFATE 1000 MG: 1 SUSPENSION ORAL at 06:06

## 2017-10-29 RX ADMIN — FLUTICASONE PROPIONATE AND SALMETEROL 1 PUFF: 50; 250 POWDER RESPIRATORY (INHALATION) at 21:18

## 2017-10-29 RX ADMIN — HYDROMORPHONE HYDROCHLORIDE 0.5 MG: 1 INJECTION, SOLUTION INTRAMUSCULAR; INTRAVENOUS; SUBCUTANEOUS at 00:11

## 2017-10-29 RX ADMIN — OXYCODONE HYDROCHLORIDE 5 MG: 5 TABLET ORAL at 17:37

## 2017-10-29 RX ADMIN — NYSTATIN 500000 UNITS: 100000 SUSPENSION ORAL at 12:24

## 2017-10-29 RX ADMIN — HYDROMORPHONE HYDROCHLORIDE 0.5 MG: 1 INJECTION, SOLUTION INTRAMUSCULAR; INTRAVENOUS; SUBCUTANEOUS at 21:14

## 2017-10-29 RX ADMIN — DICYCLOMINE HYDROCHLORIDE 10 MG: 10 CAPSULE ORAL at 17:58

## 2017-10-29 RX ADMIN — SODIUM CHLORIDE 100 ML/HR: 0.9 INJECTION, SOLUTION INTRAVENOUS at 21:14

## 2017-10-29 RX ADMIN — OXYCODONE HYDROCHLORIDE 5 MG: 5 TABLET ORAL at 05:06

## 2017-10-29 RX ADMIN — SIMETHICONE CHEW TAB 80 MG 80 MG: 80 TABLET ORAL at 02:08

## 2017-10-29 RX ADMIN — PANTOPRAZOLE SODIUM 40 MG: 40 TABLET, DELAYED RELEASE ORAL at 06:06

## 2017-10-29 RX ADMIN — SODIUM CHLORIDE 100 ML/HR: 0.9 INJECTION, SOLUTION INTRAVENOUS at 01:06

## 2017-10-29 RX ADMIN — PANCRELIPASE 24000 UNITS: 24000; 76000; 120000 CAPSULE, DELAYED RELEASE PELLETS ORAL at 12:24

## 2017-10-29 NOTE — PLAN OF CARE
Knowledge Deficit     Patient/family/caregiver demonstrates understanding of disease process, treatment plan, medications, and discharge instructions Not Progressing        PAIN - ADULT     Verbalizes/displays adequate comfort level or baseline comfort level  Pt continues to report pain 10/10/ Will medicate per schedule  DISCHARGE PLANNING - CARE MANAGEMENT     Discharge to post-acute care or home with appropriate resources Progressing        Nutrition/Hydration-ADULT     Nutrient/Hydration intake appropriate for improving, restoring or maintaining nutritional needs Progressing        Prexisting or High Potential for Compromised Skin Integrity     Skin integrity is maintained or improved Progressing        SAFETY ADULT     Patient will remain free of falls Progressing     Maintain or return to baseline ADL function Progressing     Maintain or return mobility status to optimal level Progressing         Not Progressing

## 2017-10-29 NOTE — PLAN OF CARE
Problem: SAFETY ADULT  Goal: Patient will remain free of falls  INTERVENTIONS:  - Assess patient frequently for physical needs  -  Identify cognitive and physical deficits and behaviors that affect risk of falls  -  [place] fall precautions as indicated by assessment   - Educate patient/family on patient safety including physical limitations  - Instruct patient to call for assistance with activity based on assessment  - Modify environment to reduce risk of injury  - Consider OT/PT consult to assist with strengthening/mobility    Outcome: Progressing      Problem: Potential for Falls  Goal: Patient will remain free of falls  INTERVENTIONS:  - Assess patient frequently for physical needs  -  Identify cognitive and physical deficits and behaviors that affect risk of falls    -  [place] fall precautions as indicated by assessment   - Educate patient/family on patient safety including physical limitations  - Instruct patient to call for assistance with activity based on assessment  - Modify environment to reduce risk of injury  - Consider OT/PT consult to assist with strengthening/mobility    Outcome: Progressing

## 2017-10-29 NOTE — PLAN OF CARE
DISCHARGE PLANNING - CARE MANAGEMENT     Discharge to post-acute care or home with appropriate resources Progressing        Knowledge Deficit     Patient/family/caregiver demonstrates understanding of disease process, treatment plan, medications, and discharge instructions Progressing        Nutrition/Hydration-ADULT     Nutrient/Hydration intake appropriate for improving, restoring or maintaining nutritional needs Progressing        PAIN - ADULT     Verbalizes/displays adequate comfort level or baseline comfort level Progressing        SAFETY ADULT     Patient will remain free of falls Progressing     Maintain or return to baseline ADL function Progressing     Maintain or return mobility status to optimal level Progressing

## 2017-10-29 NOTE — CASE MANAGEMENT
[address] [facility] in the [facility] by Dk Rodríguez for 2017  Network Utilization Review Department  Phone: [phone]; Fax [phone]  ATTENTION: The Network Utilization Review Department is now centralized for our 7 Facilities  Make a note that we have a new phone and fax numbers for our Department  Please call with any questions or concerns to [phone] and carefully follow the prompts so that you are directed to the right person  All voicemails are confidential  Fax any determinations, approvals, denials, and requests for initial or continue stay review clinical to [phone]  Due to HIGH CALL volume, it would be easier if you could please send faxed requests to expedite your requests and in part, help us provide discharge notifications faster  Continued Stay Review    Date:  10/29/17 Anthony OBSERVATION CONTINUED    Vital Signs: /83   Pulse 80   Temp 97 9 °F (36 6 °C) (Oral)   Resp 20   Ht 5' 4" (1 626 m)   Wt 83 1 kg (183 lb 3 2 oz)   LMP 09/30/2017 (Exact Date)   SpO2 99%   Breastfeeding? No   BMI 31 45 kg/m²      10/28 0701  10/29 0700 10/29 0701  10/29 1329  Most Recent    Temperature (°F) 97 8-98 2 97 9  97 9 (36 6)    Pulse 68-76 80  80    Respirations 16-22 20  20    Blood Pressure 162//106 163/83  163/83    SpO2 (%) 93-99 99  99        Diet Regular; Regular House;  Fl Liq Toast Crax     Continuous IV Infusions:   sodium chloride 100 mL/hr Last Rate: 100 mL/hr (10/29/17 1046)       Medications:   Scheduled Meds:   cloNIDine 0 1 mg Oral BID   docusate sodium 100 mg Oral BID   enoxaparin 40 mg Subcutaneous Daily   fluticasone-salmeterol 1 puff Inhalation Q12H [facility]   nicotine 1 patch Transdermal Daily   nystatin 500,000 Units Swish & Swallow 4x Daily   pancrelipase (Lip-Prot-Amyl) 24,000 Units Oral TID With Meals   pantoprazole 40 mg Oral Daily   potassium chloride 20 mEq Intravenous Once   sucralfate 1,000 mg Oral Q6H [facility] PRN Meds:     IV  HYDROmorphone 0 5 mg q4hrs prn givenn x 6/ last 24 hrs    ondansetron    oxyCODONE 5 mg q4hrs prn given x 5/ last 24 hrs    Simethicone 80 mg q6hrs prn given x 3/ last 24 hrs    LABS/Diagnostic Results:   Comprehensive metabolic panel [[id number]] (Abnormal) Collected: 10/28/17 1021   Lab Status: Final result Specimen: Blood from Hand, Right Updated: 10/28/17 1049    Sodium 137 136 - 145 mmol/L     Potassium 3 6 3 5 - 5 3 mmol/L     Chloride 102 100 - 108 mmol/L     CO2 28 21 - 32 mmol/L     Anion Gap 7 4 - 13 mmol/L     BUN 8 5 - 25 mg/dL     Creatinine 0 59 (L) 0 60 - 1 30 mg/dL     Comment: Standardized to IDMS reference method       Glucose 170 (H) 65 - 140 mg/dL             Glucose, Fasting 170 (H) 65 - 99 mg/dL             Calcium 7 1 (L) 8 3 - 10 1 mg/dL      (H) 5 - 45 U/L     Comment:   Specimen collection should occur prior to Sulfasalazine administration due to the potential for falsely depressed results  ALT 47 12 - 78 U/L             Alkaline Phosphatase 136 (H) 46 - 116 U/L     Total Protein 6 7 6 4 - 8 2 g/dL     Albumin 2 9 (L) 3 5 - 5 0 g/dL     Total Bilirubin 1 10 (H) 0 20 - 1 00 mg/dL     eGFR 114 ml/min/1 73sq m          Age/Sex: 40 y o  female       Gastroenterology  Consults Date of Service: 10/28/2017  1:00 PM      ASSESSMENT/PLAN:   71-year-old female with history of alcohol abuse, multiple episodes of pancreatitis, comes in with complains of worsening abdominal pain, nausea and vomiting  She is noted to be hypokalemic on admission with leukocytosis  Transaminases are mildly elevated  1   Epigastric pain:  No evidence to suggest pancreatitis based on labs or CT scan, however patient still continues to drink, therefore it is possible symptoms may likely be from chronic pancreatitis versus severe esophagitis or gastritis or peptic ulcer disease  She has oral thrush on exam   -would recommend to start on Protonix 40 mg b i d    -Carafate 1 g q  i  d  Lady Sole -start on nystatin swish in swallow   -she should get an EGD however can be done as an early outpatient, as there are no signs of acute GI bleed   -avoid NSAIDs  -discussed importance of alcohol abstinence  -start on Creon with meals and snacks      2  Transaminitis:  Likely secondary to alcohol, improving  There is also evidence of hepatic steatosis and nodular appearance of liver on CT scan, would recommend to obtain MRI  Luckily, there is no stigmata of cirrhosis on exam so far  I had a lengthy discussion with patient regarding stopping alcohol use  Meanwhile, check other causes of chronic liver disease such as chronic hepatitis  If chronic hepatitis panel was negative, will consider further workup to rule out rare causes of chronic liver disease   -continue to monitor LFTs  -check coags            Discharge Plan:   Shelby Kumar [facility] CLEARED    CASE MANAGEMENT FOLLOWING CLOSELY FOR ALL DISCHARGE NEEDS

## 2017-10-30 ENCOUNTER — APPOINTMENT (INPATIENT)
Dept: MRI IMAGING | Facility: HOSPITAL | Age: 42
DRG: 249 | End: 2017-10-30
Payer: COMMERCIAL

## 2017-10-30 LAB
ALBUMIN SERPL BCP-MCNC: 3.1 G/DL (ref 3.5–5)
ALP SERPL-CCNC: 135 U/L (ref 46–116)
ALT SERPL W P-5'-P-CCNC: 42 U/L (ref 12–78)
ANION GAP SERPL CALCULATED.3IONS-SCNC: 10 MMOL/L (ref 4–13)
AST SERPL W P-5'-P-CCNC: 86 U/L (ref 5–45)
BACTERIA UR QL AUTO: ABNORMAL /HPF
BILIRUB SERPL-MCNC: 1.2 MG/DL (ref 0.2–1)
BILIRUB UR QL STRIP: NEGATIVE
BUN SERPL-MCNC: 3 MG/DL (ref 5–25)
CALCIUM SERPL-MCNC: 7.8 MG/DL (ref 8.3–10.1)
CHLORIDE SERPL-SCNC: 100 MMOL/L (ref 100–108)
CLARITY UR: CLEAR
CO2 SERPL-SCNC: 25 MMOL/L (ref 21–32)
COLOR UR: YELLOW
CREAT SERPL-MCNC: 0.6 MG/DL (ref 0.6–1.3)
ERYTHROCYTE [DISTWIDTH] IN BLOOD BY AUTOMATED COUNT: 14.5 % (ref 11.6–15.1)
GFR SERPL CREATININE-BSD FRML MDRD: 114 ML/MIN/1.73SQ M
GLUCOSE P FAST SERPL-MCNC: 118 MG/DL (ref 65–99)
GLUCOSE SERPL-MCNC: 118 MG/DL (ref 65–140)
GLUCOSE UR STRIP-MCNC: NEGATIVE MG/DL
HCT VFR BLD AUTO: 38.1 % (ref 34.8–46.1)
HGB BLD-MCNC: 12.5 G/DL (ref 11.5–15.4)
HGB UR QL STRIP.AUTO: NEGATIVE
KETONES UR STRIP-MCNC: NEGATIVE MG/DL
LEUKOCYTE ESTERASE UR QL STRIP: NEGATIVE
MCH RBC QN AUTO: 32.6 PG (ref 26.8–34.3)
MCHC RBC AUTO-ENTMCNC: 32.8 G/DL (ref 31.4–37.4)
MCV RBC AUTO: 100 FL (ref 82–98)
NITRITE UR QL STRIP: NEGATIVE
NON-SQ EPI CELLS URNS QL MICRO: ABNORMAL /HPF
PH UR STRIP.AUTO: 7.5 [PH] (ref 4.5–8)
PLATELET # BLD AUTO: 151 THOUSANDS/UL (ref 149–390)
PMV BLD AUTO: 10.2 FL (ref 8.9–12.7)
POTASSIUM SERPL-SCNC: 3.6 MMOL/L (ref 3.5–5.3)
PROT SERPL-MCNC: 7.2 G/DL (ref 6.4–8.2)
PROT UR STRIP-MCNC: NEGATIVE MG/DL
RBC # BLD AUTO: 3.83 MILLION/UL (ref 3.81–5.12)
RBC #/AREA URNS AUTO: ABNORMAL /HPF
SODIUM SERPL-SCNC: 135 MMOL/L (ref 136–145)
SP GR UR STRIP.AUTO: 1.01 (ref 1–1.03)
UROBILINOGEN UR QL STRIP.AUTO: 4 E.U./DL
WBC # BLD AUTO: 8.82 THOUSAND/UL (ref 4.31–10.16)
WBC #/AREA URNS AUTO: ABNORMAL /HPF

## 2017-10-30 PROCEDURE — 80053 COMPREHEN METABOLIC PANEL: CPT | Performed by: INTERNAL MEDICINE

## 2017-10-30 PROCEDURE — A9585 GADOBUTROL INJECTION: HCPCS | Performed by: INTERNAL MEDICINE

## 2017-10-30 PROCEDURE — 85027 COMPLETE CBC AUTOMATED: CPT | Performed by: INTERNAL MEDICINE

## 2017-10-30 PROCEDURE — 76377 3D RENDER W/INTRP POSTPROCES: CPT

## 2017-10-30 PROCEDURE — 81001 URINALYSIS AUTO W/SCOPE: CPT | Performed by: INTERNAL MEDICINE

## 2017-10-30 PROCEDURE — 74183 MRI ABD W/O CNTR FLWD CNTR: CPT

## 2017-10-30 RX ORDER — HYDRALAZINE HYDROCHLORIDE 20 MG/ML
10 INJECTION INTRAMUSCULAR; INTRAVENOUS EVERY 6 HOURS PRN
Status: DISCONTINUED | OUTPATIENT
Start: 2017-10-30 | End: 2017-10-31 | Stop reason: HOSPADM

## 2017-10-30 RX ORDER — DICYCLOMINE HYDROCHLORIDE 10 MG/1
20 CAPSULE ORAL
Status: DISCONTINUED | OUTPATIENT
Start: 2017-10-30 | End: 2017-10-31 | Stop reason: HOSPADM

## 2017-10-30 RX ORDER — ALPRAZOLAM 0.5 MG/1
0.5 TABLET ORAL ONCE
Status: COMPLETED | OUTPATIENT
Start: 2017-10-30 | End: 2017-10-30

## 2017-10-30 RX ORDER — SACCHAROMYCES BOULARDII 250 MG
250 CAPSULE ORAL 2 TIMES DAILY
Status: DISCONTINUED | OUTPATIENT
Start: 2017-10-30 | End: 2017-10-31 | Stop reason: HOSPADM

## 2017-10-30 RX ADMIN — SUCRALFATE 1000 MG: 1 SUSPENSION ORAL at 05:39

## 2017-10-30 RX ADMIN — Medication 250 MG: at 13:31

## 2017-10-30 RX ADMIN — Medication 250 MG: at 17:34

## 2017-10-30 RX ADMIN — OXYCODONE HYDROCHLORIDE 5 MG: 5 TABLET ORAL at 03:07

## 2017-10-30 RX ADMIN — SIMETHICONE CHEW TAB 80 MG 80 MG: 80 TABLET ORAL at 10:20

## 2017-10-30 RX ADMIN — PANCRELIPASE 24000 UNITS: 24000; 76000; 120000 CAPSULE, DELAYED RELEASE PELLETS ORAL at 08:05

## 2017-10-30 RX ADMIN — NYSTATIN 500000 UNITS: 100000 SUSPENSION ORAL at 21:20

## 2017-10-30 RX ADMIN — PANCRELIPASE 24000 UNITS: 24000; 76000; 120000 CAPSULE, DELAYED RELEASE PELLETS ORAL at 13:31

## 2017-10-30 RX ADMIN — SUCRALFATE 1000 MG: 1 SUSPENSION ORAL at 01:23

## 2017-10-30 RX ADMIN — DICYCLOMINE HYDROCHLORIDE 20 MG: 10 CAPSULE ORAL at 21:21

## 2017-10-30 RX ADMIN — HYDROMORPHONE HYDROCHLORIDE 0.5 MG: 1 INJECTION, SOLUTION INTRAMUSCULAR; INTRAVENOUS; SUBCUTANEOUS at 18:50

## 2017-10-30 RX ADMIN — OXYCODONE HYDROCHLORIDE 5 MG: 5 TABLET ORAL at 08:02

## 2017-10-30 RX ADMIN — FLUTICASONE PROPIONATE AND SALMETEROL 1 PUFF: 50; 250 POWDER RESPIRATORY (INHALATION) at 21:21

## 2017-10-30 RX ADMIN — ALPRAZOLAM 0.5 MG: 0.5 TABLET ORAL at 18:20

## 2017-10-30 RX ADMIN — CLONIDINE HYDROCHLORIDE 0.1 MG: 0.1 TABLET ORAL at 08:01

## 2017-10-30 RX ADMIN — SIMETHICONE CHEW TAB 80 MG 80 MG: 80 TABLET ORAL at 17:49

## 2017-10-30 RX ADMIN — SODIUM CHLORIDE 100 ML/HR: 0.9 INJECTION, SOLUTION INTRAVENOUS at 18:22

## 2017-10-30 RX ADMIN — DICYCLOMINE HYDROCHLORIDE 20 MG: 10 CAPSULE ORAL at 17:34

## 2017-10-30 RX ADMIN — HYDROMORPHONE HYDROCHLORIDE 0.5 MG: 1 INJECTION, SOLUTION INTRAMUSCULAR; INTRAVENOUS; SUBCUTANEOUS at 14:52

## 2017-10-30 RX ADMIN — PANTOPRAZOLE SODIUM 40 MG: 40 TABLET, DELAYED RELEASE ORAL at 05:39

## 2017-10-30 RX ADMIN — SUCRALFATE 1000 MG: 1 SUSPENSION ORAL at 23:00

## 2017-10-30 RX ADMIN — OXYCODONE HYDROCHLORIDE 5 MG: 5 TABLET ORAL at 13:20

## 2017-10-30 RX ADMIN — SUCRALFATE 1000 MG: 1 SUSPENSION ORAL at 17:33

## 2017-10-30 RX ADMIN — HYDROMORPHONE HYDROCHLORIDE 0.5 MG: 1 INJECTION, SOLUTION INTRAMUSCULAR; INTRAVENOUS; SUBCUTANEOUS at 22:56

## 2017-10-30 RX ADMIN — NICOTINE 1 PATCH: 21 PATCH, EXTENDED RELEASE TRANSDERMAL at 21:21

## 2017-10-30 RX ADMIN — OXYCODONE HYDROCHLORIDE 5 MG: 5 TABLET ORAL at 21:35

## 2017-10-30 RX ADMIN — DICYCLOMINE HYDROCHLORIDE 10 MG: 10 CAPSULE ORAL at 05:39

## 2017-10-30 RX ADMIN — GADOBUTROL 8 ML: 604.72 INJECTION INTRAVENOUS at 20:40

## 2017-10-30 RX ADMIN — HYDROMORPHONE HYDROCHLORIDE 0.5 MG: 1 INJECTION, SOLUTION INTRAMUSCULAR; INTRAVENOUS; SUBCUTANEOUS at 05:38

## 2017-10-30 RX ADMIN — DICYCLOMINE HYDROCHLORIDE 20 MG: 10 CAPSULE ORAL at 13:30

## 2017-10-30 RX ADMIN — HYDRALAZINE HYDROCHLORIDE 10 MG: 20 INJECTION INTRAMUSCULAR; INTRAVENOUS at 18:20

## 2017-10-30 RX ADMIN — SIMETHICONE CHEW TAB 80 MG 80 MG: 80 TABLET ORAL at 03:07

## 2017-10-30 RX ADMIN — OXYCODONE HYDROCHLORIDE 5 MG: 5 TABLET ORAL at 17:34

## 2017-10-30 RX ADMIN — PANCRELIPASE 24000 UNITS: 24000; 76000; 120000 CAPSULE, DELAYED RELEASE PELLETS ORAL at 17:35

## 2017-10-30 RX ADMIN — HYDROMORPHONE HYDROCHLORIDE 0.5 MG: 1 INJECTION, SOLUTION INTRAMUSCULAR; INTRAVENOUS; SUBCUTANEOUS at 10:35

## 2017-10-30 RX ADMIN — HYDROMORPHONE HYDROCHLORIDE 0.5 MG: 1 INJECTION, SOLUTION INTRAMUSCULAR; INTRAVENOUS; SUBCUTANEOUS at 01:21

## 2017-10-30 RX ADMIN — CLONIDINE HYDROCHLORIDE 0.1 MG: 0.1 TABLET ORAL at 17:34

## 2017-10-30 NOTE — PROGRESS NOTES
[facility] Progress Note  Patient: Ailyn Figueredo 40 y o  female   MRN: [id number]  PCP: Tamanna Newman MD  Unit/Bed#: MS Main Encounter: [id number]  Date Of Visit: 10/28/17    Assessment:    Principal Problem:    Colitis  Active Problems:    Pancreatitis    Hypertension    Nicotine dependence    Hypokalemia      Plan:    · Colitis:  Bowel wall thickening in the sigmoid area was felt to be either mild colitis or under distention  In light of the fact that the patient had no fever, and had other findings consistent with possible pancreatitis the patient was not treated with antibiotics  If symptoms persist will discuss with GI regarding her ear which he versus empiric antibiotic therapy  But in light of the fact that her white count is trending down and she still has no fever would continue to monitor  · Epigastric pain:  Felt not to be secondary to pancreatitis since her lipase was normal and there is no evidence of stranding or changes on the scan  GI feels that this is likely chronic pancreatitis versus severe esophagitis or peptic ulcer disease  She was therefore started on Carafate, nystatin  Patient refused to abide by a dietary restriction  We therefore attempted to release limit her to full liquids  I counseled her on the fact that if she is having persistent pain that she should stop eating and give herself about rest   Patient prescribed Creon to take with meals  Continue Protonix  · Hypertension:  Continue clonidine  · Tobacco abuse continue Nicoderm  · Bronchitis vs asthma; continue Advair  · Leukocytosis of unclear etiology not felt to be infectious this time  Serial observation, obtain urinalysis  If diarrhea present consider C diff however, no report of diarrhea  · Alcohol abuse last drink was several days prior to admission    Patient not displaying any signs of alcohol withdrawal  · Transaminitis of unclear etiology:  Doing serial observation, evaluation for hepatitis  Consideration for MRI will be made  Will clarify with GI on timing of this study  VTE Pharmacologic Prophylaxis:   Pharmacologic: Enoxaparin (Lovenox)  Mechanical VTE Prophylaxis in Place: Yes    Patient Centered Rounds: I have performed bedside rounds with nursing staff today  Discussions with Specialists or Other Care Team Provider:  Reviewed TIA note    Education and Discussions with Family / Patient:  None present    Time Spent for Care: 30 minutes  More than 50% of total time spent on counseling and coordination of care as described above  Current Length of Stay: 0 day(s)    Current Patient Status: Observation   Certification Statement: The patient will continue to require additional inpatient hospital stay due to Continued pain and discovery of source for transaminitis which may be alcohol related    Discharge Plan / Estimated Discharge Date: To be determined    Code Status: Level 1 - Full Code      Subjective:   Patient refusing to maintain the either clear liquid or of NPO status to try the aid in her abdominal discomfort  Prescribe full liquid with continued recommendations from GI for Carafate, Protonix  Patient without fever or diarrhea still complaining of the abdominal discomfort  Objective:     Vitals:   Temp (24hrs), Av °F (36 7 °C), Min:97 8 °F (36 6 °C), Max:98 4 °F (36 9 °C)    HR:  [80-82] 82  Resp:  [18-20] 18  BP: (140-163)/(83-94) 154/84  SpO2:  [98 %-100 %] 100 %  Body mass index is 31 45 kg/m²  Input and Output Summary (last 24 hours): Intake/Output Summary (Last 24 hours)   Last data filed at 10/29/17 2114   Gross per 24 hour   Intake             1000 ml   Output                0 ml   Net             1000 ml       Physical Exam:     Physical Exam    General overweight white female in moderate distress secondary to abdominal discomfort  Patient refuses to remain NPO  Insisting on eating      Normocephalic atraumatic pupils equal round and reactive to light active ocular muscles are intact neck is supple there is no JVD no lymph nodes no carotid bruits chest is decreased but clear to auscultation is no rhonchi rales or wheezes  Cardiovascular regular rate rhythm positive S1 and S2 no S3 for murmur or gallop  Abdomen soft nontender minimally distended with positive bowel sounds no hepatosplenomegaly no guarding or rebound, patient however does indicate a central some discomfort on palpation  Extremities no clubbing cyanosis edema neurologically she is awake alert oriented cranial nerves 2-12 appear to be intact  Additional Data:     Labs:      Results from last 7 days  Lab Units 10/28/17  0505  10/27/17  1624   WBC Thousand/uL 811 16  < > 14 95*   HEMOGLOBIN g/dL 13 6  < > 16 3*   HEMATOCRIT % 32 2  < > 47 6*   PLATELETS Thousands/uL 182  < > 281   NEUTROS PCT %  --   --  67   LYMPHS PCT %  --   --  22   MONOS PCT %  --   --  9   EOS PCT %  --   --  1   < > = values in this interval not displayed  Results from last 7 days  Lab Units 10/28/17  0505   SODIUM mmol/L 137*   POTASSIUM mmol/L 4 1   CHLORIDE mmol/L 101   CO2 mmol/L 26   BUN mg/dL 8   CREATININE mg/dL 0 54   CALCIUM mg/dL 7 2*   TOTAL PROTEIN g/dL    BILIRUBIN TOTAL mg/dL    ALK PHOS U/L    ALT U/L    AST U/L    GLUCOSE RANDOM mg/dL 118       Results from last 7 days  Lab Units 10/28/17  1021   INR  1 11       * I Have Reviewed All Lab Data Listed Above  * Additional Pertinent Lab Tests Reviewed:  All Labs Within Last 24 Hours Reviewed    Imaging:    Imaging Reports Reviewed Today Include:  None  Imaging Personally Reviewed by Myself Includes:  None     Recent Cultures (last 7 days):           Last 24 Hours Medication List:     cloNIDine 0 1 mg Oral BID         docusate sodium 100 mg Oral BID   enoxaparin 40 mg Subcutaneous Daily   fluticasone-salmeterol 1 puff Inhalation Q12H [address]   nicotine 1 patch Transdermal Daily   nystatin 500,000 Units Swish & Swallow 4x Daily   pancrelipase (Lip-Prot-Amyl) 24,000 Units Oral TID With Meals   pantoprazole 40 mg Oral Daily   potassium chloride 20 mEq Intravenous Once   sucralfate 1,000 mg Oral Q6H [address]        Today, Patient Was Seen By: Santy Rolle MD Pager : [phone]

## 2017-10-30 NOTE — CASE MANAGEMENT
[address] [facility] in the [facility] by Dk Rodríguez for 2017  Network Utilization Review Department  Phone: [phone]; Fax [phone]  ATTENTION: The Network Utilization Review Department is now centralized for our 7 Facilities  Make a note that we have a new phone and fax numbers for our Department  Please call with any questions or concerns to [phone] and carefully follow the prompts so that you are directed to the right person  All voicemails are confidential  Fax any determinations, approvals, denials, and requests for initial or continue stay review clinical to [phone]  Due to HIGH CALL volume, it would be easier if you could please send faxed requests to expedite your requests and in part, help us provide discharge notifications faster         Initial Clinical Review     Admission: Date/Time/Statement: IP order entered  10/30   1556   Admitting Physician ABDOUL CALLES    Level of Care Med Surg    Estimated length of stay More than 2 Midnights    Certification I certify that inpatient services are medically necessary for this patient for a duration of greater than two midnights  See H&P and MD Progress Notes for additional information about the patient's course of treatment              ED: Date/Time/Mode of Arrival:             ED Arrival Information      Expected Arrival Acuity Means of Arrival Escorted By Service Admission Type     - 10/27/2017 15:56 Urgent Ambulance [facility] Urgent     Arrival Complaint     ABDOMINAL PAIN          Chief Complaint:        Chief Complaint   Patient presents with    Abdominal Pain       Pt with nausea and vomiting x's several days, thinks its her pancreas    Per EMS, patient denies any drinking       Chief Complaint:   Abdominal pain     History of Present Illness:   Scott Pinto a 40 y o  female who presents with complaints of abdominal pain for the past 2 days   Patient states that she has significant amount of nausea and vomiting   Patient's history of chronic pancreatitis and has had several flares in the last several years  Wilner Shipley states that last weekend she did have some alcoholic beverages and then a couple days later developed abdominal pain  Patient denies fevers, chills, chest pain, shortness of breath      Review of Systems:   Gastrointestinal: Positive for abdominal pain  All other systems reviewed and are negative      Physical Exam   Constitutional: She is oriented to person, place, and time  She appears well-developed and well-nourished  Cardiovascular: Normal rate, regular rhythm and normal heart sounds     Pulmonary/Chest: Effort normal and breath sounds normal    Abdominal: Soft   Bowel sounds are normal  There is generalized tenderness       ED Vital Signs:            ED Triage Vitals [10/27/17 1558]   Temperature Pulse Respirations Blood Pressure SpO2   98 3 °F (36 8 °C) 90 18 167/87 98 %       Temp Source Heart Rate Source Patient Position - Orthostatic VS BP Location FiO2 (%)   Oral Monitor Sitting Right arm --       Pain Score           8                Wt Readings from Last 1 Encounters:   10/27/17 83 1 kg (183 lb 3 2 oz)        10/27 0701  10/28 0700 10/28 0701  10/28 1014   Most Recent     Temperature (°F) 97 7-98 3 97 8   97 8 (36 6)     Pulse 78-90 68   68     Respirations 15-18 16   16     Blood Pressure 151//87 189/99   189/99     SpO2 (%) 93-99 96   96           LABS/Diagnostic Test Results:   CBC  Results from last 7 days  Lab Units 10/27/17  1624   WBC Thousand/uL 14 95*   HEMOGLOBIN g/dL 16 3*   HEMATOCRIT % 47 6*   PLATELETS Thousands/uL 281   NEUTROS PCT % 67   LYMPHS PCT % 22   MONOS PCT % 9   EOS PCT % 1      CMP  Results from last 7 days  Lab Units 10/27/17  1624   SODIUM mmol/L 140   POTASSIUM mmol/L 2 8*   CHLORIDE mmol/L 98*   CO2 mmol/L 25   BUN mg/dL 8   CREATININE mg/dL 0 65   CALCIUM mg/dL 8 5   TOTAL PROTEIN g/dL 8 8*   BILIRUBIN TOTAL mg/dL 0 80   ALK PHOS U/L 186*   ALT U/L 57   AST U/L 139*   GLUCOSE RANDOM mg/dL 155*             Magnesium [[id number]] (Abnormal) Collected: 10/27/17 1624   Lab Status: Final result Specimen: Blood from Arm, Left Updated: 10/27/17 1718     Magnesium 1 5 (L) 1 6 - 2 6 mg/dL               Lipase [[id number]] (Normal) Collected: 10/27/17 1624   Lab Status: Final result Specimen: Blood from Arm, Left Updated: 10/27/17 1654     Lipase 109 73 - 393 u/L           CT ABDOMEN PELVIS -  1   Stable mild dilation of the pancreatic duct   No peripancreatic fat stranding to suggest acute pancreatitis  2   Hepatomegaly and hepatic steatosis   Persistent heterogeneous enhancement of the liver with suggestion of nodular contour   Findings may represent early changes of cirrhosis   Recommend clinical correlation and consider MRI follow-up  3   Mild bowel wall thickening of the sigmoid colon may represent mild colitis versus underdistention     4   Colonic diverticulosis        ED Treatment:              Medication Administration from 10/27/2017 1556 to 10/27/2017 2158        Date/Time Order Dose Route Action Action by Comments       10/27/2017 1732 sodium chloride 0 9 % bolus 1,000 mL 0 mL Intravenous Stopped Fabian [place], RN         10/27/2017 1632 sodium chloride 0 9 % bolus 1,000 mL 1,000 mL Intravenous [address] Dre Breath, RN         10/27/2017 1637 ondansetron (ZOFRAN) injection 4 mg 4 mg Intravenous Given Dre Breath, RN         10/27/2017 1658 HYDROmorphone (DILAUDID) 1 mg/mL injection 0 5 mg 0 5 mg Intravenous Given Dre Breath, RN         10/27/2017 1726 pantoprazole (PROTONIX) injection 40 mg 40 mg Intravenous Given Dre Breath, RN         10/27/2017 2038 magnesium sulfate 2 g/50 mL IVPB (premix) 2 g 0 g Intravenous Stopped Jacquenette [place], RN         10/27/2017 1838 magnesium sulfate 2 g/50 mL IVPB (premix) 2 g 2 g Intravenous [address] Dre Breath, RN         10/27/2017 2030 potassium chloride 20 mEq IVPB (premix) 20 mEq Intravenous [address] Mya Davila RN         10/27/2017 1837 HYDROmorphone (DILAUDID) 1 mg/mL injection 0 5 mg 0 5 mg Intravenous Given Kim Means RN         10/27/2017 1820 iohexol (OMNIPAQUE) 350 MG/ML injection (MULTI-DOSE) 100 mL 100 mL Intravenous Given Yosvany Paul         10/27/2017 2030 HYDROmorphone (DILAUDID) 1 mg/mL injection 1 mg 1 mg Intravenous Given Mya Davila RN         10/27/2017 2029 nicotine (NICODERM CQ) 14 mg/24hr TD 24 hr patch 14 mg 14 mg Transdermal Medication Applied Mya Davila RN               Past Medical/Surgical History: Active Ambulatory Problems     Diagnosis Date Noted    Pancreatitis 10/06/2016    Abdominal pain 10/06/2016    Hypertension 10/06/2016    Nicotine dependence 10/06/2016    Hypokalemia 03/07/2017           Past Medical History:   Diagnosis Date    Alcohol abuse      Arthritis      GERD (gastroesophageal reflux disease)      Hypertension      Nicotine dependence      Obesity      Pancreatitis      Panic attack           Admitting Diagnosis: Chronic pancreatitis (HCC) [K86 1]  Dehydration [E86 0]  Hypokalemia [E87 6]  Hypomagnesemia [E83 42]  Colitis [K52 9]  Abdominal pain [R10 9]  Intractable nausea and vomiting [R11 2]     Age/Sex: 40 y o  female        Assessment/Plan:   Hospital Problem List:    Principal Problem:    Colitis     Active Problems:    Pancreatitis    Hypertension    Nicotine dependence    Hypokalemia      Plan for the Primary Problem(s):  · Abdominal pain - colitis/pancreatitis  ? Admit  ? Pain control  ? IV fluids  ?  GI consult     Plan for Additional Problems:   · Hypokalemia - replace, recheck BMP  · Nicotine dependence - nicotine patch  · Hypertension - continue clonidine     VTE Prophylaxis: Enoxaparin (Lovenox)  / sequential compression device   Code Status:  Full  POLST: There is no POLST form on file for this patient (pre-hospital)     Anticipated Length of Stay: Lucy Mae will be admitted on an Observation basis with an anticipated length of stay of  less than 2 midnights      Justification for Hospital Stay:  IV fluids and pain control        Admission Orders:  10/27/17 AT 2057  PLACE IN OBSERVATION   VS Q4HRS     Up as Tolerated  SCD     Clear Liquid Diet     Continuous IV Infusions:   sodium chloride 125 mL/hr Last Rate: 125 mL/hr (10/27/17 6793)         Scheduled Meds:   cloNIDine 0 1 mg Oral BID   docusate sodium 100 mg Oral BID   enoxaparin 40 mg Subcutaneous Daily   fluticasone-salmeterol 1 puff Inhalation Q12H [address]   nicotine 1 patch Transdermal Daily   pantoprazole 40 mg Oral Daily   potassium chloride 20 mEq Intravenous Once      PRN Meds:      IV HYDROmorphone 0 5 mg q4hrs prn given x 3    IV ondansetron 4 mg q6hrs prn given x 2    oxyCODONE 5 mg q4hrs prn given x 2    Simethicone 80 mg q6hrs prn given x 3    Internal med progress note  10/30  Assessment:   Principal Problem:    Colitis  Active Problems:    Pancreatitis    Hypertension    Nicotine dependence    Hypokalemia   Plan:   · Radiological finding of possible colitis:  No diarrhea no C diff sent  GI evaluated and felt patient likely has an under distended bowel  Abdominal pain improved today patient able to sit up in tolerating some bites of food much better from yesterday  Patient has been unwilling to avoid food up to this point despite coaching to do so  Continue Bentyl and simethicone  · Transaminitis with a history of alcohol abuse  Total bilirubin is increased slightly so were going to send the patient for an MRCP  Will need to be followed up in the a   with GI  · Alcohol use no signs of withdrawal patient being counseled against  · Hypertension continue clonidine  Added hydralazine p r n  patient may need additional agent for blood pressure control    · Hypokalemia resolved serial laboratories will be obtained  · Leukocytosis on admission of unclear etiology currently resolved  Urinalysis  · Tobacco abuse continue Advair Nicoderm   VTE Pharmacologic Prophylaxis:   Pharmacologic: Enoxaparin (Lovenox)  Mechanical VTE Prophylaxis in Place: Yes   Patient Centered Rounds: I have performed bedside rounds with nursing staff today    Discussions with Specialists or Other Care Team Provider:  GI   Education and Discussions with Family / Patient:  None   Time Spent for Care: 35  More than 50% of total time spent on counseling and coordination of care as described above    Current Length of Stay: 1 day(s)   Current Patient Status: Inpatient   Certification Statement: The patient will continue to require additional inpatient hospital stay due to Evaluation of transaminitis an abdominal pain was possible pancreatitis   Discharge Plan / Estimated Discharge Date:   To be determined      10/31     Gluc   118, sangeetha  7 8, ast  86, alk phos  135, alb  3 1, total bili 1 20, na  135, BUN creat   3  0 60

## 2017-10-30 NOTE — PLAN OF CARE
Problem: PAIN - ADULT  Goal: Verbalizes/displays adequate comfort level or baseline comfort level  Interventions:  - Encourage patient to monitor pain and request assistance  - Assess pain using appropriate pain scale  - Administer analgesics based on type and severity of pain and evaluate response  - Implement non-pharmacological measures as appropriate and evaluate response  -   - Notify physician/advanced practitioner if interventions unsuccessful or patient reports new pain    Outcome: Progressing      Problem: SAFETY ADULT  Goal: Patient will remain free of falls  INTERVENTIONS:  - Assess patient frequently for physical needs  -  Identify cognitive and physical deficits and behaviors that affect risk of falls    -  [place] fall precautions as indicated by assessment   - Educate patient/family on patient safety including physical limitations  - Instruct patient to call for assistance with activity based on assessment  - Modify environment to reduce risk of injury  - Consider OT/PT consult to assist with strengthening/mobility    Outcome: Progressing    Goal: Maintain or return to baseline ADL function  INTERVENTIONS:  -  Assess patient's ability to carry out ADLs; assess patient's baseline for ADL function and identify physical deficits which impact ability to perform ADLs (bathing, care of mouth/teeth, toileting, grooming, dressing, etc )  - Assess/evaluate cause of self-care deficits   - Assess range of motion  - Assess patient's mobility; develop plan if impaired  - Assess patient's need for assistive devices and provide as appropriate  - Encourage maximum independence but intervene and supervise when necessary  ¯ Involve family in performance of ADLs  ¯ Assess for home care needs following discharge   ¯ Request OT consult to assist with ADL evaluation and planning for discharge  ¯ Provide patient education as appropriate   Outcome: Progressing    Goal: Maintain or return mobility status to optimal level  INTERVENTIONS:  - Assess patient's baseline mobility status (ambulation, transfers, stairs, etc )    - Identify cognitive and physical deficits and behaviors that affect mobility  - Identify mobility aids required to assist with transfers and/or ambulation (gait belt, sit-to-stand, lift, walker, cane, etc )  - [place] fall precautions as indicated by assessment  - Record patient progress and toleration of activity level on Mobility SBAR; progress patient to next Phase/Stage  - Instruct patient to call for assistance with activity based on assessment  - Request Rehabilitation consult to assist with strengthening/weightbearing, etc    Outcome: Progressing      Problem: Knowledge Deficit  Goal: Patient/family/caregiver demonstrates understanding of disease process, treatment plan, medications, and discharge instructions  Complete learning assessment and assess knowledge base  Interventions:  - Provide teaching at level of understanding  - Provide teaching via preferred learning methods   Outcome: Progressing      Problem: Nutrition/Hydration-ADULT  Goal: Nutrient/Hydration intake appropriate for improving, restoring or maintaining nutritional needs  Monitor and assess patient's nutrition/hydration status for malnutrition (ex- brittle hair, bruises, dry skin, pale skin and conjunctiva, muscle wasting, smooth red tongue, and disorientation)  Collaborate with interdisciplinary team and initiate plan and interventions as ordered  Monitor patient's weight and dietary intake as ordered or per policy  Utilize nutrition screening tool and intervene per policy      INTERVENTIONS:  - Monitor oral intake, urinary output, labs, and treatment plans  - Assess nutrition and hydration status and recommend course of action  - Evaluate amount of meals eaten  - Assist patient with eating if necessary   - Allow adequate time for meals  - Recommend/ encourage appropriate diets, oral nutritional supplements, and vitamin/mineral supplements  - Order, calculate, and assess calorie counts as needed  - Assess need for intravenous fluids  - Provide specific nutrition/hydration education as appropriate  - Include patient/family/caregiver in decisions related to nutrition    Outcome: Progressing      Problem: DISCHARGE PLANNING - CARE MANAGEMENT  Goal: Discharge to post-acute care or home with appropriate resources  INTERVENTIONS:  - Conduct assessment to determine patient/family and health care team treatment goals, and need for post-acute services based on payer coverage, community resources, and patient preferences, and barriers to discharge  - Address psychosocial, clinical, and financial barriers to discharge as identified in assessment in conjunction with the patient/family and health care team  - Arrange appropriate level of post-acute services according to patient's   needs and preference and payer coverage in collaboration with the physician and health care team  - Communicate with and update the patient/family, physician, and health care team regarding progress on the discharge plan  - Arrange appropriate transportation to post-acute venues   Outcome: Progressing      Problem: Prexisting or High Potential for Compromised Skin Integrity  Goal: Skin integrity is maintained or improved  INTERVENTIONS:  - Identify patients at risk for skin breakdown  - Assess and monitor skin integrity  - Assess and monitor nutrition and hydration status  - Monitor labs (i e  albumin)  - Assess for incontinence   - Turn and reposition patient  - Assist with mobility/ambulation  - Relieve pressure over bony prominences  - Avoid friction and shearing  - Provide appropriate hygiene as needed including keeping skin clean and dry  - Evaluate need for skin moisturizer/barrier cream  - Collaborate with interdisciplinary team (i e  Nutrition, Rehabilitation, etc )   - Patient/family teaching   Outcome: Progressing      Problem: Potential for Falls  Goal: Patient will remain free of falls  INTERVENTIONS:  - Assess patient frequently for physical needs  -  Identify cognitive and physical deficits and behaviors that affect risk of falls    -  [place] fall precautions as indicated by assessment   - Educate patient/family on patient safety including physical limitations  - Instruct patient to call for assistance with activity based on assessment  - Modify environment to reduce risk of injury  - Consider OT/PT consult to assist with strengthening/mobility    Outcome: Progressing

## 2017-10-30 NOTE — PROGRESS NOTES
GI Progress Note - Samira Cobb 40 y o  female MRN: [id number]    Unit/Bed#: -01 Encounter: [id number]    Subjective: She is having abdominal cramping and significant gas with belching and flatus  Having soft BMs, no melena or hematochezia  Tolerated toast this AM without nausea/vomiting  She is very anxious and has not started recommended treatment with zoloft  Never had EGD/colonoscopy  Objective:     Vitals: Blood pressure 150/82, pulse 78, temperature 98 9 °F (37 2 °C), temperature source Oral, resp  rate 20, height 5' 4" (1 626 m), weight 83 1 kg (183 lb 3 2 oz), last menstrual period 09/30/2017, SpO2 98 %, not currently breastfeeding  ,Body mass index is 31 45 kg/m²  Intake/Output Summary (Last 24 hours) at 10/30/17 1334  Last data filed at 10/29/17 2114   Gross per 24 hour   Intake             1000 ml   Output                0 ml   Net             1000 ml       Physical Exam:     General Appearance: Alert, appears mildly uncomfortable, nontoxic appearing  Lungs: Clear to auscultation bilaterally, no respiratory distress  Heart: RRR, no murmur  Abdomen: Non-distended, soft, BS active, NTTP  Extremities: No cyanosis or edema    Invasive Devices     Peripheral Intravenous Line            Peripheral IV 10/27/17 Left Antecubital 2 days                Lab Results:    Results from last 7 days  Lab Units 10/30/17  0505  10/27/17  1624   WBC Thousand/uL 8 82  < > 14 95*   HEMOGLOBIN g/dL 12 5  < > 16 3*   HEMATOCRIT % 38 1  < > 47 6*   PLATELETS Thousands/uL 151  < > 281   NEUTROS PCT %  --   --  67   LYMPHS PCT %  --   --  22   MONOS PCT %  --   --  9   EOS PCT %  --   --  1   < > = values in this interval not displayed      Results from last 7 days  Lab Units 10/30/17  0505   SODIUM mmol/L 135*   POTASSIUM mmol/L 3 6   CHLORIDE mmol/L 100   CO2 mmol/L 25   BUN mg/dL 3*   CREATININE mg/dL 0 60   CALCIUM mg/dL 7 8*   TOTAL PROTEIN g/dL 7 2   BILIRUBIN TOTAL mg/dL 1 20*   ALK PHOS U/L 135*   ALT U/L 42 AST U/L 86*   GLUCOSE RANDOM mg/dL 118       Results from last 7 days  Lab Units 10/28/17  1021   INR  1 11       Results from last 7 days  Lab Units 10/27/17  1624   LIPASE u/L 109       Imaging Studies: I have personally reviewed pertinent imaging studies  Ct Abdomen Pelvis With Contrast  Result Date: 10/27/2017  Impression: 1  Stable mild dilation of the pancreatic duct  No peripancreatic fat stranding to suggest acute pancreatitis  2   Hepatomegaly and hepatic steatosis  Persistent heterogeneous enhancement of the liver with suggestion of nodular contour  Findings may represent early changes of cirrhosis  Recommend clinical correlation and consider MRI follow-up  3   Mild bowel wall thickening of the sigmoid colon may represent mild colitis versus underdistention  4   Colonic diverticulosis         Assessment and Plan:   Principal Problem:    Colitis  Active Problems:    Pancreatitis    Hypertension    Nicotine dependence    Hypokalemia      Abdominal Pain  Leukocytosis  - Likely 2/2 to viral colitis v esophagitis/gastritis v chronic pancreatitis  - Continue to observe off antibiotics as she has remained afebrile  - Continue protonix 40 mg BID, carafate 1 g BID, nystatin swish/swallow  - Continue creon supplements  - Start probiotic and continue bentyl 20 mg 4x daily for now  - She has untreated anxiety and likely has underlying IBS but should have EGD/colonoscopy as outpatient for further workup  - Avoid NSAIDs and alcohol abstinence discussed with patient      Transaminitis  - Mild elevation of bilirubin at 1 2, AST 86 today and alk phos 135  - She has normal platelet counts and normal INR  - CT on admission with questionable nodular liver contour; agree with MRI follow up to evaluate for cirrhosis  - Chronic hepatitis panel pending  - Consider checking KYLER, AMA, anti-smooth muscle ab for other etiologies of liver disease depending on how her LFTs trend      The patient will be seen by   Cruz

## 2017-10-30 NOTE — PROGRESS NOTES
[facility] Progress Note  Patient: Randa Gutierrez 40 y o  female   MRN: [id number]  PCP: Favio Eric MD  Unit/Bed#: [facility]-01 Encounter: [id number]  Date Of Visit: 10/29/17    Assessment:    Principal Problem:    Colitis  Active Problems:    Pancreatitis    Hypertension    Nicotine dependence    Hypokalemia      Plan:    · Colitis: as previous stated, no diarrhea so no Cdiff sent  Felt to just be under distended  Patient with persistent pain, however, seems to be more epigastric, vs central abdomen  Patient c/o excess gas and burping   Pain out of proportion to exam  Add bentyl and talk with GI in am about further imaging  · Transaminitis: concern that it is alcohol related  Values trending down  Again will speak with GI about need to image  Previous MRCP within normal limits reviewed all notes regarding chronic pancreatitis  Check labs again in a m  · Hypokalemia: resolved   Serial labs to monitor  · Leukocytosis of unclear etiology without fever: check UA just to be sure  · Hypertension: continue clonidine  · Asthma ?/Tobacco abuse:  Continue Advair and Nicoderm      VTE Pharmacologic Prophylaxis:   Pharmacologic: Enoxaparin (Lovenox)  Mechanical VTE Prophylaxis in Place: Yes    Patient Centered Rounds: I have performed bedside rounds with nursing staff today  Discussions with Specialists or Other Care Team Provider:  None    Education and Discussions with Family / Patient:  None     Time Spent for Care: 30 minutes  More than 50% of total time spent on counseling and coordination of care as described above  Current Length of Stay: 0 day(s)    Current Patient Status: Observation   Certification Statement: The patient will continue to require additional inpatient hospital stay due to Continued pain    Discharge Plan / Estimated Discharge Date:   To be determined    Code Status: Level 1 - Full Code      Subjective:   Patient amenable to perhaps scaling back but diet since she still having pain in her abdomen  Had recommended this from day 1 but patient declined to be NPO or just use clear liquids  Patient states her biggest problem right now is significant gas which is causing her to burp and passed flatulence  Currently artery on simethicone  Objective:     Vitals:   Temp (24hrs), Av 1 °F (36 7 °C), Min:97 8 °F (36 6 °C), Max:98 4 °F (36 9 °C)    HR:  [82-85] 85  Resp:  [18] 18  BP: (140-166)/(83-94) 166/83  SpO2:  [98 %-100 %] 100 %  Body mass index is 31 45 kg/m²  Input and Output Summary (last 24 hours): Intake/Output Summary (Last 24 hours) at 10/30/17 0802  Last data filed at 10/29/17 2114   Gross per 24 hour   Intake             1000 ml   Output                0 ml   Net             1000 ml       Physical Exam:     Physical Exam  General obese white female in moderate distress secondary to her sensation of abdominal bloating and pain  Normocephalic atraumatic pupils equal round and reactive to light extraocular muscles are intact mucous membranes moist neck is supple there is no JVD no lymph nodes no carotid bruits chest is decreased but clear to auscultation is no rhonchi rales or wheezes  Cardiovascular regular rate rhythm positive S1 and S2 no S3-S4 murmur or gallop  Abdomen is mildly distended soft no significant guarding although patient reports pain on palpation in the center around her umbilicus in between the umbilicus and the epigastric area  Extremities no clubbing cyanosis edema  Neurologically patient is awake alert oriented cranial nerves 2-12 are intact    Additional Data:     Labs:      Results from last 7 days  Lab Units  10/27/17  1624   WBC Thousand/uL  < > 14 95*   HEMOGLOBIN g/dL  < > 16 3*   HEMATOCRIT %  < > 47 6*   PLATELETS Thousands/uL  < > 281   NEUTROS PCT %  --  67   LYMPHS PCT %  --  22   MONOS PCT %  --  9   EOS PCT %  --  1   < > = values in this interval not displayed      Results from last 7 days  Lab Units   SODIUM mmol/L   POTASSIUM mmol/L   CHLORIDE mmol/L   CO2 mmol/L   BUN mg/dL   CREATININE mg/dL   CALCIUM mg/dL   TOTAL PROTEIN g/dL   BILIRUBIN TOTAL mg/dL   ALK PHOS U/L   ALT U/L   AST U/L   GLUCOSE RANDOM mg/dL       Results from last 7 days  Lab Units 10/28/17  1021   INR  1 11       * I Have Reviewed All Lab Data Listed Above    * Additional Pertinent Lab Tests Reviewed: No New Labs Available For Today    Imaging:    Imaging Reports Reviewed Today Include:  None  Imaging Personally Reviewed by Myself Includes:  None    Recent Cultures (last 7 days):           Last 24 Hours Medication List:     cloNIDine 0 1 mg Oral BID   dicyclomine 10 mg Oral 4x Daily (AC & HS)   docusate sodium 100 mg Oral BID   enoxaparin 40 mg Subcutaneous Daily   fluticasone-salmeterol 1 puff Inhalation Q12H [address]   nicotine 1 patch Transdermal Daily   nystatin 500,000 Units Swish & Swallow 4x Daily   pancrelipase (Lip-Prot-Amyl) 24,000 Units Oral TID With Meals   pantoprazole 40 mg Oral Daily   potassium chloride 20 mEq Intravenous Once   sucralfate 1,000 mg Oral Q6H [address]        Today, Patient Was Seen By: Brennan Rubinstein, MD Pager : [phone]

## 2017-10-30 NOTE — CASE MANAGEMENT
Continued Stay Review    Date: 10/30    IP order  10/30   1556    Admitting Physician ABDOUL CALLES    Level of Care Med Surg    Estimated length of stay More than 2 Midnights    Certification I certify that inpatient services are medically necessary for this patient for a duration of greater than two midnights  See H&P and MD Progress Notes for additional information about the patient's course of treatment  Vital Signs: BP (!) 196/98   Pulse 78   Temp 98 9 °F (37 2 °C) (Oral)   Resp 20   Ht 5' 4" (1 626 m)   Wt 83 1 kg (183 lb 3 2 oz)   LMP 09/30/2017 (Exact Date)   SpO2 98%   Breastfeeding? No   BMI 31 45 kg/m²     Medications:   Scheduled Meds:   cloNIDine 0 1 mg Oral BID   dicyclomine 20 mg Oral 4x Daily (AC & HS)   docusate sodium 100 mg Oral BID   enoxaparin 40 mg Subcutaneous Daily   fluticasone-salmeterol 1 puff Inhalation Q12H [address]   nicotine 1 patch Transdermal Daily   nystatin 500,000 Units Swish & Swallow 4x Daily   pancrelipase (Lip-Prot-Amyl) 24,000 Units Oral TID With Meals   pantoprazole 40 mg Oral Daily   potassium chloride 20 mEq Intravenous Once   saccharomyces boulardii 250 mg Oral BID   sucralfate 1,000 mg Oral Q6H [address]     Continuous Infusions:   sodium chloride 100 mL/hr Last Rate: 100 mL/hr (10/29/17 2114)     PRN Meds: HYDROmorphone    ondansetron    oxyCODONE    simethicone    Abnormal Labs/Diagnostic Results: Na   135, BUN creat   3  0 60, sangeetha   7 8, total bili  1 20, alk phos  135, ast   86    Age/Sex: 40 y o  female     Assessment/Plan: Principal Problem:  Note from 10/29    Colitis  Active Problems:    Pancreatitis    Hypertension    Nicotine dependence    Hypokalemia   Plan:   · Colitis: as previous stated, no diarrhea so no Cdiff sent  Felt to just be under distended  Patient with persistent pain, however, seems to be more epigastric, vs central abdomen  Patient c/o excess gas and burping    Pain out of proportion to exam  Add bentyl and talk with GI in am about further imaging  · Transaminitis: concern that it is alcohol related  Values trending down  Again will speak with GI about need to image  Previous MRCP within normal limits reviewed all notes regarding chronic pancreatitis  Check labs again in a m  · Hypokalemia: resolved   Serial labs to monitor  · Leukocytosis of unclear etiology without fever: check UA just to be sure  · Hypertension: continue clonidine  · Asthma ?/Tobacco abuse:  Continue Advair and Nicoderm   VTE Pharmacologic Prophylaxis:   Pharmacologic: Enoxaparin (Lovenox)  Mechanical VTE Prophylaxis in Place: Yes   Patient Centered Rounds: I have performed bedside rounds with nursing staff today    Discussions with Specialists or Other Care Team Provider:  None  Education and Discussions with Family / Patient:  None    Time Spent for Care: 30 minutes  More than 50% of total time spent on counseling and coordination of care as described above    Current Length of Stay: 0 day(s)   Current Patient Status: Observation   Certification Statement: The patient will continue to require additional inpatient hospital stay due to Continued pain   Discharge Plan / Estimated Discharge Date:   To be determined    GI progress note 10/30  Assessment and Plan:   Principal Problem:    Colitis  Active Problems:    Pancreatitis    Hypertension    Nicotine dependence    Hypokalemia   Abdominal Pain  Leukocytosis  - Likely 2/2 to viral colitis v esophagitis/gastritis v chronic pancreatitis  - Continue to observe off antibiotics as she has remained afebrile  - Continue protonix 40 mg BID, carafate 1 g BID, nystatin swish/swallow  - Continue creon supplements  - Start probiotic and continue bentyl 20 mg 4x daily for now  - She has untreated anxiety and likely has underlying IBS but should have EGD/colonoscopy as outpatient for further workup  - Avoid NSAIDs and alcohol abstinence discussed with patient   Transaminitis  - Mild elevation of bilirubin at 1 2, AST 86 today and alk phos 135  - She has normal platelet counts and normal INR  - CT on admission with questionable nodular liver contour; agree with MRI follow up to evaluate for cirrhosis  - Chronic hepatitis panel pending  - Consider checking KYLER, AMA, anti-smooth muscle ab for other etiologies of liver disease depending on how her LFTs trend

## 2017-10-31 VITALS
TEMPERATURE: 97.8 F | HEART RATE: 77 BPM | RESPIRATION RATE: 18 BRPM | OXYGEN SATURATION: 99 % | SYSTOLIC BLOOD PRESSURE: 155 MMHG | DIASTOLIC BLOOD PRESSURE: 81 MMHG | WEIGHT: 183.2 LBS | HEIGHT: 64 IN | BODY MASS INDEX: 31.28 KG/M2

## 2017-10-31 PROBLEM — E87.6 HYPOKALEMIA: Status: RESOLVED | Noted: 2017-03-07 | Resolved: 2017-10-31

## 2017-10-31 LAB
ALBUMIN SERPL BCP-MCNC: 3.6 G/DL (ref 3.5–5)
ALP SERPL-CCNC: 150 U/L (ref 46–116)
ALT SERPL W P-5'-P-CCNC: 41 U/L (ref 12–78)
ANION GAP SERPL CALCULATED.3IONS-SCNC: 9 MMOL/L (ref 4–13)
AST SERPL W P-5'-P-CCNC: 62 U/L (ref 5–45)
BILIRUB SERPL-MCNC: 1.2 MG/DL (ref 0.2–1)
BUN SERPL-MCNC: 5 MG/DL (ref 5–25)
CALCIUM SERPL-MCNC: 8.8 MG/DL (ref 8.3–10.1)
CHLORIDE SERPL-SCNC: 100 MMOL/L (ref 100–108)
CO2 SERPL-SCNC: 26 MMOL/L (ref 21–32)
CREAT SERPL-MCNC: 0.7 MG/DL (ref 0.6–1.3)
FERRITIN SERPL-MCNC: 163 NG/ML (ref 8–388)
GFR SERPL CREATININE-BSD FRML MDRD: 108 ML/MIN/1.73SQ M
GLUCOSE SERPL-MCNC: 158 MG/DL (ref 65–140)
HBV CORE AB SER QL: NORMAL
HBV CORE IGM SER QL: NORMAL
HBV SURFACE AG SER QL: NORMAL
HCV AB SER QL: NORMAL
IRON SATN MFR SERPL: 19 %
IRON SERPL-MCNC: 91 UG/DL (ref 50–170)
POTASSIUM SERPL-SCNC: 3.8 MMOL/L (ref 3.5–5.3)
PROT SERPL-MCNC: 8.2 G/DL (ref 6.4–8.2)
SODIUM SERPL-SCNC: 135 MMOL/L (ref 136–145)
TIBC SERPL-MCNC: 470 UG/DL (ref 250–450)

## 2017-10-31 PROCEDURE — 86038 ANTINUCLEAR ANTIBODIES: CPT | Performed by: PHYSICIAN ASSISTANT

## 2017-10-31 PROCEDURE — 86256 FLUORESCENT ANTIBODY TITER: CPT | Performed by: PHYSICIAN ASSISTANT

## 2017-10-31 PROCEDURE — 83540 ASSAY OF IRON: CPT | Performed by: PHYSICIAN ASSISTANT

## 2017-10-31 PROCEDURE — 80053 COMPREHEN METABOLIC PANEL: CPT | Performed by: INTERNAL MEDICINE

## 2017-10-31 PROCEDURE — 82728 ASSAY OF FERRITIN: CPT | Performed by: PHYSICIAN ASSISTANT

## 2017-10-31 PROCEDURE — 83550 IRON BINDING TEST: CPT | Performed by: PHYSICIAN ASSISTANT

## 2017-10-31 PROCEDURE — 86235 NUCLEAR ANTIGEN ANTIBODY: CPT | Performed by: PHYSICIAN ASSISTANT

## 2017-10-31 RX ORDER — SACCHAROMYCES BOULARDII 250 MG
250 CAPSULE ORAL 2 TIMES DAILY
Refills: 0 | COMMUNITY
Start: 2017-10-31 | End: 2018-04-21 | Stop reason: ALTCHOICE

## 2017-10-31 RX ORDER — DICYCLOMINE HYDROCHLORIDE 10 MG/1
20 CAPSULE ORAL EVERY 6 HOURS PRN
Qty: 30 CAPSULE | Refills: 0 | Status: SHIPPED | OUTPATIENT
Start: 2017-10-31 | End: 2018-04-21 | Stop reason: ALTCHOICE

## 2017-10-31 RX ORDER — HYDRALAZINE HYDROCHLORIDE 20 MG/ML
10 INJECTION INTRAMUSCULAR; INTRAVENOUS ONCE
Status: COMPLETED | OUTPATIENT
Start: 2017-10-31 | End: 2017-10-31

## 2017-10-31 RX ORDER — SIMETHICONE 80 MG
80 TABLET,CHEWABLE ORAL EVERY 6 HOURS PRN
Qty: 30 TABLET | Refills: 0 | COMMUNITY
Start: 2017-10-31 | End: 2018-05-28

## 2017-10-31 RX ADMIN — PANCRELIPASE 24000 UNITS: 24000; 76000; 120000 CAPSULE, DELAYED RELEASE PELLETS ORAL at 11:45

## 2017-10-31 RX ADMIN — SUCRALFATE 1000 MG: 1 SUSPENSION ORAL at 05:27

## 2017-10-31 RX ADMIN — SIMETHICONE CHEW TAB 80 MG 80 MG: 80 TABLET ORAL at 07:02

## 2017-10-31 RX ADMIN — NYSTATIN 500000 UNITS: 100000 SUSPENSION ORAL at 08:52

## 2017-10-31 RX ADMIN — DICYCLOMINE HYDROCHLORIDE 20 MG: 10 CAPSULE ORAL at 11:45

## 2017-10-31 RX ADMIN — PANTOPRAZOLE SODIUM 40 MG: 40 TABLET, DELAYED RELEASE ORAL at 05:27

## 2017-10-31 RX ADMIN — PANCRELIPASE 24000 UNITS: 24000; 76000; 120000 CAPSULE, DELAYED RELEASE PELLETS ORAL at 08:53

## 2017-10-31 RX ADMIN — HYDRALAZINE HYDROCHLORIDE 10 MG: 20 INJECTION INTRAMUSCULAR; INTRAVENOUS at 02:57

## 2017-10-31 RX ADMIN — CLONIDINE HYDROCHLORIDE 0.1 MG: 0.1 TABLET ORAL at 08:54

## 2017-10-31 RX ADMIN — FLUTICASONE PROPIONATE AND SALMETEROL 1 PUFF: 50; 250 POWDER RESPIRATORY (INHALATION) at 08:52

## 2017-10-31 RX ADMIN — OXYCODONE HYDROCHLORIDE 5 MG: 5 TABLET ORAL at 05:24

## 2017-10-31 RX ADMIN — SUCRALFATE 1000 MG: 1 SUSPENSION ORAL at 11:46

## 2017-10-31 RX ADMIN — HYDROMORPHONE HYDROCHLORIDE 0.5 MG: 1 INJECTION, SOLUTION INTRAMUSCULAR; INTRAVENOUS; SUBCUTANEOUS at 06:58

## 2017-10-31 RX ADMIN — HYDROMORPHONE HYDROCHLORIDE 0.5 MG: 1 INJECTION, SOLUTION INTRAMUSCULAR; INTRAVENOUS; SUBCUTANEOUS at 10:58

## 2017-10-31 RX ADMIN — HYDROMORPHONE HYDROCHLORIDE 0.5 MG: 1 INJECTION, SOLUTION INTRAMUSCULAR; INTRAVENOUS; SUBCUTANEOUS at 02:57

## 2017-10-31 RX ADMIN — NYSTATIN 500000 UNITS: 100000 SUSPENSION ORAL at 11:46

## 2017-10-31 RX ADMIN — DICYCLOMINE HYDROCHLORIDE 20 MG: 10 CAPSULE ORAL at 06:58

## 2017-10-31 RX ADMIN — HYDRALAZINE HYDROCHLORIDE 10 MG: 20 INJECTION INTRAMUSCULAR; INTRAVENOUS at 11:08

## 2017-10-31 RX ADMIN — Medication 250 MG: at 08:51

## 2017-10-31 NOTE — PROGRESS NOTES
GI Progress Note - Adrian Mcgregor 40 y o  female MRN: [id number]    Unit/Bed#: -01 Encounter: [id number]    Subjective: Her abdominal pain is mildly improved, no nausea/vomiting/diarrhea  She continues to endorse large amounts of gas  No chest pain or SOB  Discharge planned for today  Objective:     Vitals: Blood pressure (!) 176/102, pulse 77, temperature 97 8 °F (36 6 °C), temperature source Oral, resp  rate 18, height 5' 4" (1 626 m), weight 83 1 kg (183 lb 3 2 oz), last menstrual period 09/30/2017, SpO2 99 %, not currently breastfeeding  ,Body mass index is 31 45 kg/m²  Intake/Output Summary (Last 24 hours) at 10/31/17 1200  Last data filed at 10/30/17 1850   Gross per 24 hour   Intake              120 ml   Output                0 ml   Net              120 ml       Physical Exam:     General Appearance: Alert, no acute distress  Lungs: Clear to auscultation bilaterally, no respiratory distress  Heart: RRR, no murmur  Abdomen: Non-distended, soft, BS active, (+) mild TTP in the epigastric region  Extremities: No cyanosis or edema    Invasive Devices     Peripheral Intravenous Line            Peripheral IV 10/30/17 Right Forearm less than 1 day                Lab Results:    Results from last 7 days  Lab Units 10/30/17  0505  10/27/17  1624   WBC Thousand/uL 8 82  < > 14 95*   HEMOGLOBIN g/dL 12 5  < > 16 3*   HEMATOCRIT % 38 1  < > 47 6*   PLATELETS Thousands/uL 151  < > 281   NEUTROS PCT %  --   --  67   LYMPHS PCT %  --   --  22   MONOS PCT %  --   --  9   EOS PCT %  --   --  1   < > = values in this interval not displayed      Results from last 7 days  Lab Units 10/31/17  1025   SODIUM mmol/L 135*   POTASSIUM mmol/L 3 8   CHLORIDE mmol/L 100   CO2 mmol/L 26   BUN mg/dL 5   CREATININE mg/dL 0 70   CALCIUM mg/dL 8 8   TOTAL PROTEIN g/dL 8 2   BILIRUBIN TOTAL mg/dL 1 20*   ALK PHOS U/L 150*   ALT U/L 41   AST U/L 62*   GLUCOSE RANDOM mg/dL 158*       Results from last 7 days  Lab Units 10/28/17  1021 INR  1 11       Results from last 7 days  Lab Units 10/27/17  1624   LIPASE u/L 109       Imaging Studies: I have personally reviewed pertinent imaging studies  Mri Abdomen W Wo Contrast And Mrcp  Result Date: 10/30/2017  Impression: Unchanged mild dilatation of the main pancreatic duct in the pancreatic tail to 7 mm  This probably due to stricturing from recurrent bouts of pancreatitis  There is hepatomegaly, and cirrhotic liver changes  Ct Abdomen Pelvis With Contrast  Result Date: 10/27/2017  Impression: 1  Stable mild dilation of the pancreatic duct  No peripancreatic fat stranding to suggest acute pancreatitis  2   Hepatomegaly and hepatic steatosis  Persistent heterogeneous enhancement of the liver with suggestion of nodular contour  Findings may represent early changes of cirrhosis  Recommend clinical correlation and consider MRI follow-up  3   Mild bowel wall thickening of the sigmoid colon may represent mild colitis versus underdistention  4   Colonic diverticulosis         Assessment and Plan:     Abdominal Pain  Leukocytosis  - Likely 2/2 to viral colitis v esophagitis/gastritis v chronic pancreatitis v acute on chronic pancreatitis  - Continue protonix 40 mg BID, carafate 1 g BID  - Continue creon supplements  - Start probiotic and continue bentyl 20 mg q6h PRN  - She has untreated anxiety and likely has underlying IBS but should have EGD/colonoscopy as outpatient for further workup  - Avoid NSAIDs and alcohol abstinence discussed with patient  - Our office will contact the patient for follow up in 2-3 weeks; she is stable for discharge from our standpoint        Cirrhosis  - Etiology likely 2/2 to alcohol though due to her age, will check further liver labs for autoimmune etiology v PBC v hemochromatosis  - Check KYLER, AMA, anti-smooth muscle ab, iron panel  - Hepatitis panel neg  - MELD 8, Child Class A  - Discussed important of complete abstinence from alcohol even if her cirrhosis is multifactorial  - MRI/MRCP yesterday with normal appearing gallbladder and biliary tree, no evidence of obstruction/choledocholithiasis, diffuse lobulation of the liver consistent with cirrhosis, persistent dilatation of the main pancreatic duct in the tail measuring 7 mm which is stable from her MRI 1 year ago; no suspicious pancreatic lesions/masses so this is likely 2/2 to recurrent pancreatitis  - Grade 0 encephalopathy  - No suspicious masses or lesions noted on the MRI, HCC screen negative  - Will need EGD as outpatient for above reasons and to screen for varices; no evidence of GI bleeding and it is unlikely for her to have varices with normal platelets and INR          The patient's care will be discussed with Dr Crystal Daugherty

## 2017-10-31 NOTE — PLAN OF CARE
Problem: DISCHARGE PLANNING - CARE MANAGEMENT  Goal: Discharge to post-acute care or home with appropriate resources  INTERVENTIONS:  - Conduct assessment to determine patient/family and health care team treatment goals, and need for post-acute services based on payer coverage, community resources, and patient preferences, and barriers to discharge  - Address psychosocial, clinical, and financial barriers to discharge as identified in assessment in conjunction with the patient/family and health care team  - Arrange appropriate level of post-acute services according to patient's   needs and preference and payer coverage in collaboration with the physician and health care team  - Communicate with and update the patient/family, physician, and health care team regarding progress on the discharge plan  - Arrange appropriate transportation to post-acute venues   INTERVENTIONS:  - Conduct assessment to determine patient/family and health care team treatment goals, and need for post-acute services based on payer coverage, community resources, and patient preferences, and barriers to discharge  - Address psychosocial, clinical, and financial barriers to discharge as identified in assessment in conjunction with the patient/family and health care team  - Arrange appropriate level of post-acute services according to patients   needs and preference and payer coverage in collaboration with the physician and health care team  - Communicate with and update the patient/family, physician, and health care team regarding progress on the discharge plan  - Arrange appropriate transportation to post-acute venues  Outcome: Completed Date Met: 10/31/17  Patient [address] with no needs

## 2017-10-31 NOTE — PROGRESS NOTES
[address] Internal Medicine Progress Note  Patient: Roshan Mckeer 40 y o  female   MRN: [id number]  PCP: Joi Keane MD  Unit/Bed#: MS Main Encounter: [id number]  Date Of Visit: 10/31/17    Assessment:    Principal Problem:    Colitis  Active Problems:    Pancreatitis    Hypertension    Nicotine dependence    Hypokalemia      Plan:    · Radiological finding of possible colitis:  No diarrhea no C diff sent  GI evaluated and felt patient likely has an under distended bowel  Abdominal pain improved today patient able to sit up in tolerating some bites of food much better from yesterday  Patient has been unwilling to avoid food up to this point despite coaching to do so  Continue Bentyl and simethicone  · Transaminitis with a history of alcohol abuse  Total bilirubin is increased slightly so were going to send the patient for an MRCP  Will need to be followed up in the a   with GI  · Alcohol use no signs of withdrawal patient being counseled against  · Hypertension continue clonidine  Added hydralazine p r n  patient may need additional agent for blood pressure control  · Hypokalemia resolved serial laboratories will be obtained  · Leukocytosis on admission of unclear etiology currently resolved  Urinalysis  · Tobacco abuse continue Advair Nicoderm      VTE Pharmacologic Prophylaxis:   Pharmacologic: Enoxaparin (Lovenox)  Mechanical VTE Prophylaxis in Place: Yes    Patient Centered Rounds: I have performed bedside rounds with nursing staff today  Discussions with Specialists or Other Care Team Provider:  GI    Education and Discussions with Family / Patient:  None    Time Spent for Care: 35  More than 50% of total time spent on counseling and coordination of care as described above      Current Length of Stay: 1 day(s)    Current Patient Status: Inpatient   Certification Statement: The patient will continue to require additional inpatient hospital stay due to Evaluation of transaminitis an abdominal pain was possible pancreatitis    Discharge Plan / Estimated Discharge Date: To be determined     Code Status: Level 1 - Full Code      Subjective:   Patient appears more comfortable today she is sitting up in the bed without any discomfort during my whole time she did not grab her abdomen  She states she is taking a few bites of food  Her biggest complaint is being afraid to go for the MRCP  We assured her she will be premedicated with pain and small amount of Xanax  Objective:     Vitals:   Temp (24hrs), Av 2 °F (36 8 °C), Min:97 8 °F (36 6 °C), Max:98 9 °F (37 2 °C)    HR:  [77-81] 77  Resp:  [18-20] 18  BP: (150-196)/() 182/104  SpO2:  [98 %-99 %] 99 %  Body mass index is 31 45 kg/m²  Input and Output Summary (last 24 hours): Intake/Output Summary (Last 24 hours)  Last data filed at 10/30/17 1850   Gross per 24 hour   Intake              120 ml   Output                0 ml   Net              120 ml       Physical Exam:     Physical Exam    General obese white female in no acute distress other than psychologically and emotionally distraught  Pupils equal round and reactive to light extraocular muscles intact mucous membranes are moist neck is supple there is no JVD no lymph nodes no carotid bruits chest is decreased but clear to auscultation there is no rhonchi rales or wheezes  Abdomen is soft less distended nontender with positive bowel sounds no hepatosplenomegaly  Extremities no clubbing cyanosis edema neurologically awake alert oriented cranial nerves 2-12 are intact    Additional Data:     Labs:      Results from last 7 days  Lab Units 10/30/17  0505  10/27/17  1624   WBC Thousand/uL 8 82  < > 14 95*   HEMOGLOBIN g/dL 12 5  < > 16 3*   HEMATOCRIT % 38 1  < > 47 6*   PLATELETS Thousands/uL 151  < > 281   NEUTROS PCT %  --   --  67   LYMPHS PCT %  --   --  22   MONOS PCT %  --   --  9   EOS PCT %  --   --  1   < > = values in this interval not displayed      Results from last 7 days  Lab Units 10/30/17  0505   SODIUM mmol/L 135*   POTASSIUM mmol/L 3 6   CHLORIDE mmol/L 100   CO2 mmol/L 25   BUN mg/dL 3*   CREATININE mg/dL 0 60   CALCIUM mg/dL 7 8*   TOTAL PROTEIN g/dL 7 2   BILIRUBIN TOTAL mg/dL 1 20*   ALK PHOS U/L 135*   ALT U/L 42   AST U/L 86*   GLUCOSE RANDOM mg/dL 118       Results from last 7 days  Lab Units 10/28/17  1021   INR  1 11       * I Have Reviewed All Lab Data Listed Above  * Additional Pertinent Lab Tests Reviewed:  All Labs Within Last 24 Hours Reviewed    Imaging:    Imaging Reports Reviewed Today Include:  None  Imaging Personally Reviewed by Myself Includes:  None    Recent Cultures (last 7 days):           Last 24 Hours Medication List:     cloNIDine 0 1 mg Oral BID   dicyclomine 20 mg Oral 4x Daily (AC & HS)   docusate sodium 100 mg Oral BID   enoxaparin 40 mg Subcutaneous Daily   fluticasone-salmeterol 1 puff Inhalation Q12H [facility]   nicotine 1 patch Transdermal Daily   nystatin 500,000 Units Swish & Swallow 4x Daily   pancrelipase (Lip-Prot-Amyl) 24,000 Units Oral TID With Meals   pantoprazole 40 mg Oral Daily   potassium chloride 20 mEq Intravenous Once   saccharomyces boulardii 250 mg Oral BID   sucralfate 1,000 mg Oral Q6H [facility]        Today, Patient Was Seen By: Lencho Cody MD Pager : [phone]

## 2017-10-31 NOTE — PLAN OF CARE
DISCHARGE PLANNING - CARE MANAGEMENT     Discharge to post-acute care or home with appropriate resources Progressing        Knowledge Deficit     Patient/family/caregiver demonstrates understanding of disease process, treatment plan, medications, and discharge instructions Progressing        Nutrition/Hydration-ADULT     Nutrient/Hydration intake appropriate for improving, restoring or maintaining nutritional needs Progressing        PAIN - ADULT     Verbalizes/displays adequate comfort level or baseline comfort level Progressing        Potential for Falls     Patient will remain free of falls Progressing        Prexisting or High Potential for Compromised Skin Integrity     Skin integrity is maintained or improved Progressing        SAFETY ADULT     Patient will remain free of falls Progressing     Maintain or return to baseline ADL function Progressing     Maintain or return mobility status to optimal level Progressing

## 2017-10-31 NOTE — DISCHARGE SUMMARY
Discharge Summary - [facility] Internal Medicine    Patient Information: Pavel Hdez 40 y o  female MRN: [id number]  Unit/Bed#: -01 Encounter: [id number]    Discharging Physician / Practitioner: Hamida Steven MD  PCP: Saud Castillo MD  Admission Date: 10/27/2017  Discharge Date: 10/31/17    Reason for Admission:  Probable pancreatitis    Discharge Diagnoses:     Principal Problem:    Colitis  Active Problems:    Pancreatitis    Hypertension    Nicotine dependence  Resolved Problems:    Hypokalemia      Consultations During Hospital Stay:  · [facility] gastroenterology    Procedures Performed:     MRCP:Unchanged mild dilatation of the main pancreatic duct in the pancreatic tail to 7 mm  This probably due to stricturing from recurrent bouts of pancreatitis  There is hepatomegaly, and cirrhotic liver changes  Significant Findings / Test Results:     · See above    Incidental Findings:   · See above     Test Results Pending at Discharge (will require follow up): · None     Outpatient Tests Requested:  · Follow-up on liver test which were drawn prior to discharge    Complications:  None    Hospital Course:     Pavel Hdez is a 40 y o  female patient who originally presented to the hospital on 10/27/2017 due to abdominal pain  History of presenting [facility] is a 40 y o  female who presents with complaints of abdominal pain for the past 2 days  Patient states that she has significant amount of nausea and vomiting  Patient's history of chronic pancreatitis and has had several flares in the last several years  Patient states that last weekend she did have some alcoholic beverages and then a couple days later developed abdominal pain  Patient denies fevers, chills, chest pain, shortness of breath  Hospital course:  Patient was admitted for the above reasons  She does have history of abdominal pain and acute on chronic pancreatitis  She has recurrent admissions for the same    MRI was done this time around did show cirrhotic changes  Pancreas itself was normal   The patient is on Creon at this time and also on Bentyl for the abdominal pain she is tolerating a diet and eager and anxious to go home today  She was seen by Gastroenterology  Some liver test were ordered prior to discharge and the patient could follow up with the result as an outpatient  Condition at Discharge: good     Discharge Day Visit / Exam:     Subjective:  Patient seen examined  Vitals: Blood Pressure: (!) 182/104 (10/31/17 0700)  Pulse: 77 (10/31/17 0700)  Temperature: 97 8 °F (36 6 °C) (10/31/17 0700)  Temp Source: Oral (10/31/17 0700)  Respirations: 18 (10/31/17 0700)  Height: 5' 4" (162 6 cm) (10/27/17 2158)  Weight - Scale: 83 1 kg (183 lb 3 2 oz) (10/27/17 2158)  SpO2: 99 % (10/31/17 0700)  Exam:   Physical Exam  (   General Appearance:    Alert, cooperative, no distress, appears stated age                               Lungs:     Clear to auscultation bilaterally, respirations unlabored       Heart:    Regular rate and rhythm, S1 and S2 normal, no murmur, rub    or gallop   Abdomen:     Soft, non-tender, bowel sounds active all four quadrants,     no masses, no organomegaly           Extremities:   Extremities normal, atraumatic, no cyanosis or edema                     Discussion with Family:  Patient    Discharge instructions/Information to patient and family:   See after visit summary for information provided to patient and family  Provisions for Follow-Up Care:  See after visit summary for information related to follow-up care and any pertinent home health orders  Disposition:     Home    For Discharges to [facility] SNF:   · Not Applicable to this Patient - Not Applicable to this Patient    Planned Readmission:  None to spit     Discharge Statement:  I spent 25 minutes discharging the patient  This time was spent on the day of discharge   I had direct contact with the patient on the day of discharge  Greater than 50% of the total time was spent examining patient, answering all patient questions, arranging and discussing plan of care with patient as well as directly providing post-discharge instructions  Additional time then spent on discharge activities  Discharge Medications:  See after visit summary for reconciled discharge medications provided to patient and family        ** Please Note: This note has been constructed using a voice recognition system **

## 2017-10-31 NOTE — PLAN OF CARE

## 2017-11-01 LAB
ACTIN IGG SERPL-ACNC: 4 UNITS (ref 0–19)
MITOCHONDRIA M2 IGG SER-ACNC: 3.1 UNITS (ref 0–20)
RYE IGE QN: NEGATIVE

## 2017-11-03 ENCOUNTER — GENERIC CONVERSION - ENCOUNTER (OUTPATIENT)
Dept: OTHER | Facility: OTHER | Age: 42
End: 2017-11-03

## 2017-11-17 NOTE — ED PROVIDER NOTES
History  Chief Complaint   Patient presents with    Abdominal Pain     Pt with nausea and vomiting x's several days, thinks its her pancreas  Per EMS, patient denies any drinking      Amira Alonzo is a 40 y o  female w PMH pancreatitis, GERD, obesity who presents for evaluation of abdominal pain  Pt w abd pain x 2 days  Intractable n/v  Unable to tolerate PO  Has hx chronic pancreatitis  Does attest to etoh ingestion several days ago  No cp / tightness / [place] / f/c  No ur or vaginal sx  Prior to Admission Medications   Prescriptions Last Dose Informant Patient Reported? Taking? Mometasone Furo-Formoterol Fum (DULERA) 100-5 MCG/ACT AERO   Yes No   Sig: Inhale Unsure of dose   cloNIDine (CATAPRES) 0 1 mg tablet   Yes No   Sig: Take 0 1 mg by mouth 2 (two) times a day   docusate sodium (COLACE) 100 mg capsule   No No   Sig: Take 1 capsule by mouth 2 (two) times a day for 15 days   ondansetron (ZOFRAN) 4 mg tablet   No No   Sig: Take 1 tablet by mouth every 8 (eight) hours as needed for nausea or vomiting for up to 7 days   pantoprazole (PROTONIX) 40 mg tablet   Yes No   Sig: Take 40 mg by mouth daily      Facility-Administered Medications: None       Past Medical History:   Diagnosis Date    Alcohol abuse     Arthritis     GERD (gastroesophageal reflux disease)     Hypertension     Nicotine dependence     Obesity     Pancreatitis     Panic attack        Past Surgical History:   Procedure Laterality Date    ABDOMINAL SURGERY      C SECTION       Family History   Problem Relation Age of Onset    Cirrhosis Father     Alcohol abuse Father     Drug abuse Mother      I have reviewed and agree with the history as documented      Social History   Substance Use Topics    Smoking status: Current Every Day Smoker     Packs/day: 1 00     Years: 29 00     Types: Cigarettes    Smokeless tobacco: Never Used    Alcohol use Yes      Comment: [employer]        Review of Systems   Constitutional: Negative for chills, diaphoresis and fever  HENT: Negative for congestion and sore throat  Eyes: Negative for visual disturbance  Respiratory: Negative for cough, chest tightness, shortness of breath and wheezing  Cardiovascular: Negative for chest pain and leg swelling  Gastrointestinal: Positive for abdominal pain, diarrhea, nausea and vomiting  Negative for constipation  Genitourinary: Negative for difficulty urinating, dysuria, frequency, hematuria, urgency, vaginal bleeding, vaginal discharge and vaginal pain  Musculoskeletal: Negative for arthralgias and myalgias  Neurological: Negative for dizziness, weakness, light-headedness, numbness and headaches  Psychiatric/Behavioral: The patient is not nervous/anxious  Physical Exam  ED Triage Vitals [10/27/17 1558]   Temperature Pulse Respirations Blood Pressure SpO2   98 3 °F (36 8 °C) 90 18 167/87 98 %      Temp Source Heart Rate Source Patient Position - Orthostatic VS BP Location FiO2 (%)   Oral Monitor Sitting Right arm --      Pain Score       8           Orthostatic Vital Signs  Vitals:    10/30/17 2300 10/31/17 0700 10/31/17 1107 10/31/17 1221   BP: (!) 182/94 (!) 182/104 (!) 176/102 155/81   Pulse: 81 77 77    Patient Position - Orthostatic VS: Lying Lying  Lying       Physical Exam   Constitutional: She is oriented to person, place, and time  She appears well-developed and well-nourished  No distress  HENT:   Head: Normocephalic and atraumatic    dmm   Eyes: Pupils are equal, round, and reactive to light  Neck: Neck supple  No tracheal deviation present  Cardiovascular: Normal rate, regular rhythm and intact distal pulses  Exam reveals no gallop and no friction rub  No murmur heard  Pulmonary/Chest: Effort normal and breath sounds normal  No respiratory distress  She has no wheezes  She has no rales  Abdominal: Soft  Bowel sounds are normal  She exhibits no distension and no mass  There is tenderness   There is no guarding  abd pain of the epigastrium predominately  No guarding or peritonitis   Musculoskeletal: She exhibits no edema or deformity  Neurological: She is alert and oriented to person, place, and time  Skin: Skin is warm and dry  She is not diaphoretic  Psychiatric: She has a normal mood and affect  Her behavior is normal    Nursing note and vitals reviewed        ED Medications  Medications   potassium chloride 20 mEq IVPB (premix) (0 mEq Intravenous Stopped 10/28/17 0015)   sodium chloride 0 9 % bolus 1,000 mL (0 mL Intravenous Stopped 10/27/17 1732)   ondansetron (ZOFRAN) injection 4 mg (4 mg Intravenous Given 10/27/17 1637)   HYDROmorphone (DILAUDID) 1 mg/mL injection 0 5 mg (0 5 mg Intravenous Given 10/27/17 1658)   pantoprazole (PROTONIX) injection 40 mg (40 mg Intravenous Given 10/27/17 1726)   magnesium sulfate 2 g/50 mL IVPB (premix) 2 g (0 g Intravenous Stopped 10/27/17 2038)   HYDROmorphone (DILAUDID) 1 mg/mL injection 0 5 mg (0 5 mg Intravenous Given 10/27/17 1837)   iohexol (OMNIPAQUE) 350 MG/ML injection (MULTI-DOSE) 100 mL (100 mL Intravenous Given 10/27/17 1820)   HYDROmorphone (DILAUDID) 1 mg/mL injection 1 mg (1 mg Intravenous Given 10/27/17 2030)   magnesium sulfate 2 g/50 mL IVPB (premix) 2 g (0 g Intravenous Stopped 10/28/17 0100)   hydrALAZINE (APRESOLINE) injection 5 mg (5 mg Intravenous Given 10/29/17 0429)   ALPRAZolam (XANAX) tablet 0 5 mg (0 5 mg Oral Given 10/30/17 1820)   gadobutrol injection (MULTI-DOSE) SOLN 8 mL (8 mL Intravenous Given 10/30/17 2040)   hydrALAZINE (APRESOLINE) injection 10 mg (10 mg Intravenous Given 10/31/17 1108)       Diagnostic Studies  Results Reviewed     Procedure Component Value Units Date/Time    Magnesium [[id number]]  (Abnormal) Collected:  10/27/17 1624    Lab Status:  Final result Specimen:  Blood from Arm, Left Updated:  10/27/17 1718     Magnesium 1 5 (L) mg/dL     POCT pregnancy, urine [[id number]]  (Normal) Resulted:  10/27/17 1718    Lab Status: Final result Updated:  10/27/17 1718     EXT PREG TEST UR (Ref: Negative) NEGATIVE    Comprehensive metabolic panel [[id number]]  (Abnormal) Collected:  10/27/17 1624    Lab Status:  Final result Specimen:  Blood from Arm, Left Updated:  10/27/17 1654     Sodium 140 mmol/L      Potassium 2 8 (L) mmol/L      Chloride 98 (L) mmol/L      CO2 25 mmol/L      Anion Gap 17 (H) mmol/L      BUN 8 mg/dL      Creatinine 0 65 mg/dL      Glucose 155 (H) mg/dL      Calcium 8 5 mg/dL       (H) U/L      ALT 57 U/L      Alkaline Phosphatase 186 (H) U/L      Total Protein 8 8 (H) g/dL      Albumin 3 8 g/dL      Total Bilirubin 0 80 mg/dL      eGFR 111 ml/min/1 73sq m     Narrative:         National Kidney Disease Education Program recommendations are as follows:  GFR calculation is accurate only with a steady state creatinine  Chronic Kidney disease less than 60 ml/min/1 73 sq  meters  Kidney failure less than 15 ml/min/1 73 sq  meters      Lipase [[id number]]  (Normal) Collected:  10/27/17 1624    Lab Status:  Final result Specimen:  Blood from Arm, Left Updated:  10/27/17 1654     Lipase 109 u/L     CBC and differential [[id number]]  (Abnormal) Collected:  10/27/17 1624    Lab Status:  Final result Specimen:  Blood from Arm, Left Updated:  10/27/17 1632     WBC 14 95 (H) Thousand/uL      RBC 5 01 Million/uL      Hemoglobin 16 3 (H) g/dL      Hematocrit 47 6 (H) %      MCV 95 fL      MCH 32 5 pg      MCHC 34 2 g/dL      RDW 14 9 %      MPV 9 5 fL      Platelets 293 Thousands/uL      nRBC 0 /100 WBCs      Neutrophils Relative 67 %      Lymphocytes Relative 22 %      Monocytes Relative 9 %      Eosinophils Relative 1 %      Basophils Relative 1 %      Neutrophils Absolute 10 06 (H) Thousands/µL      Lymphocytes Absolute 3 33 Thousands/µL      Monocytes Absolute 1 28 (H) Thousand/µL      Eosinophils Absolute 0 10 Thousand/µL      Basophils Absolute 0 09 Thousands/µL                  MRI abdomen w wo contrast and mrcp   Final Result by Al Glynn MD (10/30 2058)      Unchanged mild dilatation of the main pancreatic duct in the pancreatic tail to 7 mm  This probably due to stricturing from recurrent bouts of pancreatitis  There is hepatomegaly, and cirrhotic liver changes  Workstation performed: [id number]         CT abdomen pelvis with contrast   Final Result by Iesha Chino MD (10/27 1846)   1  Stable mild dilation of the pancreatic duct  No peripancreatic fat stranding to suggest acute pancreatitis  2   Hepatomegaly and hepatic steatosis  Persistent heterogeneous enhancement of the liver with suggestion of nodular contour  Findings may represent early changes of cirrhosis  Recommend clinical correlation and consider MRI follow-up  3   Mild bowel wall thickening of the sigmoid colon may represent mild colitis versus underdistention  4   Colonic diverticulosis  Workstation performed: [id number]                    Procedures  Procedures       Phone Contacts  ED Phone Contact    ED Course  ED Course                                MDM  Number of Diagnoses or Management Options  Dehydration:   Hypokalemia:   Hypomagnesemia:   Intractable nausea and vomiting:   Diagnosis management comments: DDX includes but not ltd to:   abd pain and n/v - consider from cholecystitis, acute on chronic pancreatitis, viral gastroenteritis, colitis  Concern for lyte abnormalites and dehydration    Plan is to obtain:  CBC as marker of infectivity, hemoconcentration for hydration status  CMP to check for electrolytes, renal function, liver function   Lipase to check for pancreatitis  CT a/p to check for acute intra-abdominal pathology to explain symptomatology     Based on results:  Patient signed out to alternate provider Carol Brooke while awaiting CT results  She would benefit from adm for fluid hydration and inability to tolerate PO at home   Will await official reads to ensure she does not need adm to surgical service or higher level surgical care  If stable scan will be admitted to [facility]  Portions of the record may have been created with voice recognition software   Occasional wrong word or "sound a like" substitutions may have occurred due to the inherent limitations of voice recognition software   Read the chart carefully and recognize, using context, where substitutions have occurred        CritCare Time    Disposition  Final diagnoses:   Intractable nausea and vomiting   Dehydration   Hypokalemia   Hypomagnesemia     Time reflects when diagnosis was documented in both MDM as applicable and the Disposition within this note     Time User Action Codes Description Comment    10/27/2017  8:54 PM Nanetta Barefoot L Add [R11 2] Intractable nausea and vomiting     10/27/2017  8:54 PM Mahoney Ally Add [E86 0] Dehydration     10/27/2017  8:54 PM Nanetta Barefoot L Add [E87 6] Hypokalemia     10/27/2017  8:54 PM Nanetta Barefoot L Add [E83 42] Hypomagnesemia     10/27/2017  9:18 PM Sparrow Nageotte Add [K52 9] Colitis     10/27/2017  9:18 PM Sparrow Nageotte Add [K86 1] Chronic pancreatitis [facility])       ED Disposition     ED Disposition Condition Comment    Admit  Case was discussed with June Le and the patient's admission status was agreed to be Admission Status: observation status to the service of Dr Vish Tapia           Follow-up Information    None       Discharge Medication List as of 10/31/2017  2:53 PM      START taking these medications    Details   dicyclomine (BENTYL) 10 mg capsule Take 2 capsules by mouth every 6 (six) hours as needed (abd  spasm), Starting Tue 10/31/2017, Normal      nystatin (MYCOSTATIN) 100,000 units/mL suspension Swish and swallow 5 mL 4 (four) times a day, Starting Tue 10/31/2017, Normal      pancrelipase, Lip-Prot-Amyl, (CREON) 24,000 units Take 1 capsule by mouth 3 (three) times a day with meals, Starting Tue 10/31/2017, Normal      saccharomyces boulardii (FLORASTOR) 250 mg capsule Take 1 capsule by mouth 2 (two) times a day, Starting Tue 10/31/2017, OTC      simethicone (MYLICON) 80 mg chewable tablet Chew 1 tablet every 6 (six) hours as needed for flatulence, Starting Tue 10/31/2017, OTC         CONTINUE these medications which have NOT CHANGED    Details   cloNIDine (CATAPRES) 0 1 mg tablet Take 0 1 mg by mouth 2 (two) times a day, Until Discontinued, Historical Med      docusate sodium (COLACE) 100 mg capsule Take 1 capsule by mouth 2 (two) times a day for 15 days, Starting 3/11/2017, Until Sun 3/26/17, Normal      Mometasone Furo-Formoterol Fum (DULERA) 100-5 MCG/ACT AERO Inhale Unsure of dose, Until Discontinued, Historical Med      ondansetron (ZOFRAN) 4 mg tablet Take 1 tablet by mouth every 8 (eight) hours as needed for nausea or vomiting for up to 7 days, Starting 3/11/2017, Until Sat 3/18/17, Normal      pantoprazole (PROTONIX) 40 mg tablet Take 40 mg by mouth daily, Until Discontinued, Historical Med             Outpatient Discharge Orders  Discharge Diet     Activity as tolerated         ED Provider  Electronically Signed by           Jodi Arce PA-C  11/17/17 [id number]

## 2017-11-21 ENCOUNTER — HOSPITAL ENCOUNTER (EMERGENCY)
Facility: HOSPITAL | Age: 42
Discharge: HOME | End: 2017-11-21
Admitting: EMERGENCY MEDICINE
Payer: COMMERCIAL

## 2017-11-21 VITALS
WEIGHT: 185 LBS | OXYGEN SATURATION: 95 % | SYSTOLIC BLOOD PRESSURE: 184 MMHG | BODY MASS INDEX: 31.76 KG/M2 | TEMPERATURE: 97.7 F | DIASTOLIC BLOOD PRESSURE: 104 MMHG | RESPIRATION RATE: 20 BRPM | HEART RATE: 78 BPM

## 2017-11-21 DIAGNOSIS — K08.89 DENTALGIA: Primary | ICD-10-CM

## 2017-11-21 PROCEDURE — 99282 EMERGENCY DEPT VISIT SF MDM: CPT

## 2017-11-21 RX ORDER — BUPIVACAINE HYDROCHLORIDE AND EPINEPHRINE 5; 5 MG/ML; UG/ML
1.8 INJECTION, SOLUTION EPIDURAL; INTRACAUDAL; PERINEURAL ONCE
Status: COMPLETED | OUTPATIENT
Start: 2017-11-21 | End: 2017-11-21

## 2017-11-21 RX ORDER — PENICILLIN V POTASSIUM 500 MG/1
500 TABLET ORAL 4 TIMES DAILY
Qty: 28 TABLET | Refills: 0 | Status: SHIPPED | OUTPATIENT
Start: 2017-11-21 | End: 2017-11-24 | Stop reason: HOSPADM

## 2017-11-21 RX ADMIN — BUPIVACAINE HYDROCHLORIDE AND EPINEPHRINE BITARTRATE 1.8 ML: 5; .005 INJECTION, SOLUTION SUBCUTANEOUS at 09:51

## 2017-11-21 NOTE — DISCHARGE INSTRUCTIONS
Return to the Emergency Department sooner if increased pain, fever, vomiting, difficulty breathing or swallowing, drooling  Toothache   WHAT YOU NEED TO KNOW:   A toothache is pain that is caused by irritation of the nerves in the center of your tooth  The irritation may be caused by several problems, such as a cavity, an infection, a cracked tooth, or gum disease  It is very important to follow up with your dentist so the cause of your toothache can be diagnosed and treated  This can help prevent more serious problems  DISCHARGE INSTRUCTIONS:   Medicines: You may  need any of the following:  · NSAIDs  decrease swelling and pain  This medicine can be bought with or without a doctor's order  This medicine can cause stomach bleeding or kidney problems in certain people  If you take blood thinner medicine, always ask your healthcare provider if NSAIDs are safe for you  Always read the medicine label and follow the directions on it before using this medicine  · Acetaminophen  decreases pain  It is available without a doctor's order  Ask how much to take and how often to take it  Follow directions  Acetaminophen can cause liver damage if not taken correctly  · Pain medicine  may be given as a pill or as medicine that you put directly on your tooth or gums  Do not wait until the pain is severe before you take this medicine  · Antibiotics  help fight or prevent an infection caused by bacteria  Take them as directed  · Take your medicine as directed  Contact your healthcare provider if you think your medicine is not helping or if you have side effects  Tell him of her if you are allergic to any medicine  Keep a list of the medicines, vitamins, and herbs you take  Include the amounts, and when and why you take them  Bring the list or the pill bottles to follow-up visits  Carry your medicine list with you in case of an emergency  Follow up with your dentist as directed:   You may be referred to a dental surgeon  Write down your questions so you remember to ask them during your visits  Self-care:   · Rinse your mouth with warm salt water 4 times a day or as directed  · You may need to eat soft foods to help relieve pain caused by chewing  Contact your dentist if:   · You have questions or concerns about your condition or care  Return to the emergency department if:   · You have trouble breathing  · You have swelling in your face or neck  · You have a fever and chills  · You have trouble speaking or swallowing  · You have trouble opening or closing your mouth  © 2017 [facility] Information is for End User's use only and may not be sold, redistributed or otherwise used for commercial purposes  All illustrations and images included in CareNotes® are the copyrighted property of [employer]  or [employer]  The above information is an  only  It is not intended as medical advice for individual conditions or treatments  Talk to your doctor, nurse or pharmacist before following any medical regimen to see if it is safe and effective for you

## 2017-11-21 NOTE — ED PROVIDER NOTES
History  Chief Complaint   Patient presents with    Dental Pain     Pt c/o dental pain on left side front after tooth breaking 3 weeks ago  Pt has to make appt for dental surgeon  81-year-old female with dental pain  She states 2 weeks ago she chipped her left lower canine  pain since that time  Seem to be getting worse over the past 2-3 days  She did see her dentist within the past 2 weeks who told her she needs to follow up with an oral surgeon  At this point she is having increasing pain she is worried about infection  No fevers no chills no difficulty swallowing  She states she can hardly even touch the tooth without having pain  She is still eating and drinking on the opposite side of her mouth  No difficulty swallowing  No facial swelling  No nausea vomiting or chest pain shortness of breath  No headache lightheadedness or dizziness  History provided by:  Patient   used: No    Dental Pain   Location:  Lower  Lower teeth location:  22/LL cuspid  Quality:  Throbbing  Severity:  Severe  Onset quality:  Gradual  Duration:  3 days  Timing:  Constant  Progression:  Unchanged  Chronicity:  New  Context: crown fracture, dental caries and poor dentition    Previous work-up:  Dental exam  Relieved by:  Nothing  Worsened by:  Cold food/drink, hot food/drink and touching  Ineffective treatments:  Acetaminophen and NSAIDs  Associated symptoms: no congestion, no difficulty swallowing, no drooling, no facial pain, no facial swelling, no fever, no gum swelling, no headaches, no neck pain, no neck swelling, no oral bleeding, no oral lesions and no trismus    Risk factors: smoking    Risk factors: no alcohol problem, no cancer, no chewing tobacco use, no diabetes, no immunosuppression, sufficient dental care and no periodontal disease        Prior to Admission Medications   Prescriptions Last Dose Informant Patient Reported? Taking?    Mometasone Furo-Formoterol Fum (DULERA) 100-5 MCG/ACT AERO   Yes No   Sig: Inhale Unsure of dose   cloNIDine (CATAPRES) 0 1 mg tablet   Yes No   Sig: Take 0 1 mg by mouth 2 (two) times a day   dicyclomine (BENTYL) 10 mg capsule   No No   Sig: Take 2 capsules by mouth every 6 (six) hours as needed (abd  spasm)   docusate sodium (COLACE) 100 mg capsule   No No   Sig: Take 1 capsule by mouth 2 (two) times a day for 15 days   nystatin (MYCOSTATIN) 100,000 units/mL suspension   No No   Sig: Swish and swallow 5 mL 4 (four) times a day   ondansetron (ZOFRAN) 4 mg tablet   No No   Sig: Take 1 tablet by mouth every 8 (eight) hours as needed for nausea or vomiting for up to 7 days   pancrelipase, Lip-Prot-Amyl, (CREON) 24,000 units   No No   Sig: Take 1 capsule by mouth 3 (three) times a day with meals   pantoprazole (PROTONIX) 40 mg tablet   Yes No   Sig: Take 40 mg by mouth daily   saccharomyces boulardii (FLORASTOR) 250 mg capsule   No No   Sig: Take 1 capsule by mouth 2 (two) times a day   simethicone (MYLICON) 80 mg chewable tablet   No No   Sig: Chew 1 tablet every 6 (six) hours as needed for flatulence      Facility-Administered Medications: None       Past Medical History:   Diagnosis Date    Alcohol abuse     Arthritis     GERD (gastroesophageal reflux disease)     Hypertension     Nicotine dependence     Obesity     Pancreatitis     Panic attack        Past Surgical History:   Procedure Laterality Date    ABDOMINAL SURGERY      C SECTION       Family History   Problem Relation Age of Onset    Cirrhosis Father     Alcohol abuse Father     Drug abuse Mother      I have reviewed and agree with the history as documented      Social History   Substance Use Topics    Smoking status: Current Every Day Smoker     Packs/day: 1 00     Years: 29 00     Types: Cigarettes    Smokeless tobacco: Never Used    Alcohol use Yes      Comment: [employer]        Review of Systems   Constitutional: Negative for activity change, appetite change, chills, diaphoresis, fatigue, fever and unexpected weight change  HENT: Positive for dental problem  Negative for congestion, drooling, facial swelling, mouth sores, rhinorrhea, sinus pressure, sore throat and trouble swallowing  Eyes: Negative for photophobia and visual disturbance  Respiratory: Negative for apnea, cough, choking, chest tightness, shortness of breath, wheezing and stridor  Cardiovascular: Negative for chest pain, palpitations and leg swelling  Gastrointestinal: Negative for abdominal distention, abdominal pain, blood in stool, constipation, diarrhea, nausea and vomiting  Genitourinary: Negative for decreased urine volume, difficulty urinating, dysuria, enuresis, flank pain, frequency, hematuria and urgency  Musculoskeletal: Negative for arthralgias, myalgias, neck pain and neck stiffness  Skin: Negative for color change, pallor, rash and wound  Allergic/Immunologic: Negative  Neurological: Negative for dizziness, tremors, syncope, weakness, light-headedness, numbness and headaches  Hematological: Negative  Psychiatric/Behavioral: Negative  All other systems reviewed and are negative  Physical Exam  ED Triage Vitals [11/21/17 0921]   Temperature Pulse Respirations Blood Pressure SpO2   97 7 °F (36 5 °C) 78 20 (!) 184/104 95 %      Temp Source Heart Rate Source Patient Position - Orthostatic VS BP Location FiO2 (%)   Oral Monitor Lying Left arm --      Pain Score       Worst Possible Pain           Orthostatic Vital Signs  Vitals:    11/21/17 0921   BP: (!) 184/104   Pulse: 78   Patient Position - Orthostatic VS: Lying       Physical Exam   Constitutional: She is oriented to person, place, and time  She appears well-developed and well-nourished  Non-toxic appearance  She does not have a sickly appearance  She does not appear ill  No distress  HENT:   Head: Normocephalic and atraumatic     Mouth/Throat: Uvula is midline, oropharynx is clear and moist and mucous membranes are normal  No trismus in the jaw  Abnormal dentition  Dental caries present  No trismus  No submandibular swelling  No peritonsillar abscess  Eyes: EOM and lids are normal  Pupils are equal, round, and reactive to light  Neck: Normal range of motion  Neck supple  Cardiovascular: Normal rate, regular rhythm, S1 normal, S2 normal, normal heart sounds, intact distal pulses and normal pulses  Exam reveals no gallop, no distant heart sounds, no friction rub and no decreased pulses  No murmur heard  Pulses:       Radial pulses are 2+ on the right side, and 2+ on the left side  Pulmonary/Chest: Effort normal and breath sounds normal  No accessory muscle usage  No apnea, no tachypnea and no bradypnea  No respiratory distress  She has no decreased breath sounds  She has no wheezes  She has no rhonchi  She has no rales  Abdominal: Soft  Normal appearance and bowel sounds are normal  She exhibits no distension and no mass  There is no tenderness  There is no rigidity, no rebound and no guarding  No hernia  Musculoskeletal: Normal range of motion  She exhibits no edema, tenderness or deformity  Neurological: She is alert and oriented to person, place, and time  No cranial nerve deficit  GCS eye subscore is 4  GCS verbal subscore is 5  GCS motor subscore is 6  GCS 15  AAOx3  Ambulating in department without difficulty  CN II-XII grossly intact  No focal neuro deficits  Skin: Skin is warm, dry and intact  No rash noted  She is not diaphoretic  No erythema  No pallor  Psychiatric: Her speech is normal    Nursing note and vitals reviewed        ED Medications  Medications   bupivacaine-epinephrine (PF) (MARCAINE/EPINEPHRINE PF) 0 5 %-[id number] injection 1 8 mL (1 8 mL Injection Given 11/21/17 0951)       Diagnostic Studies  Results Reviewed     None                 No orders to display              Procedures  Nerve Block  Date/Time: 11/21/2017 9:58 AM  Performed by: Christiana Mcgovern by: Srinath Siddiqui     Patient location:  ED  Other Assisting Provider: Yes (comment)    Consent:     Consent obtained:  Verbal    Consent given by:  Patient    Risks discussed: Allergic reaction, infection, intravenous injection, bleeding, pain, nerve damage, swelling and unsuccessful block    Alternatives discussed:  No treatment  Universal protocol:     Procedure explained and questions answered to patient or proxy's satisfaction: yes      Patient identity confirmed:  Arm band, verbally with patient and hospital-assigned identification number  Indications:     Indications:  Pain relief  Location:     Nerve block body site: dental block (left lower)    Laterality:  Left  Skin anesthesia (see MAR for exact dosages):     Skin anesthesia method:  Local infiltration  Procedure details (see MAR for exact dosages): Block needle gauge:  25 G    Anesthetic injected:  Bupivacaine 0 5% w/o epi    Steroid injected:  None    Additive injected:  None    Injection procedure:  Anatomic landmarks palpated, anatomic landmarks identified, incremental injection, introduced needle and negative aspiration for blood  Post-procedure details:     Dressing:  None    Outcome:  Pain improved    Patient tolerance of procedure: Tolerated well, no immediate complications           Phone Contacts  ED Phone Contact    ED Course  ED Course                                MDM  Number of Diagnoses or Management Options  Ruddy Moya: new and requires workup  Diagnosis management comments: DDx including but not limited to: dental stephanie, dental infection, dental abscess, dry socket, gingivitis; doubt leora's angina  Risk of Complications, Morbidity, and/or Mortality  Presenting problems: low  Management options: low  General comments: 60-year-old female with dental pain  Likely dentalgia, possible early abscess  We will start the patient on antibiotics  I offered treatment with Toradol versus a dental block here    She would prefer dental block   She was given this dental block with bupivacaine  She tolerated the procedure well  There no complications  She had immediate pain relief  She states the edges been taken off it still continues to have pain  I recommend she begin taking Tylenol with Motrin simultaneously every 6 hours  500 mg Tylenol, 600 mg Motrin  Follow up with dental surgeon  Provided contact information for [facility] at [facility]  Return parameters were provided  The patient understands agrees the plan  Do not suspect Chris's angina  Patient Progress  Patient progress: stable    CritCare Time    Disposition  Final diagnoses:   Anuel      Time reflects when diagnosis was documented in both MDM as applicable and the Disposition within this note     Time User Action Codes Description Comment    11/21/2017  9:52 AM Denzel Pierre Add [[id number]] Anuel        ED Disposition     ED Disposition Condition Comment    Discharge  Lum Priestly discharge to home/self care  Condition at discharge: Good        Follow-up Information     Follow up With Specialties Details Why [id number] [facility]  Call as early as possible for follow up evaluation [id number] [address]  [phone]        Patient's Medications   Discharge Prescriptions    PENICILLIN V POTASSIUM (VEETID) 500 MG TABLET    Take 1 tablet by mouth 4 (four) times a day for 7 days       Start Date: 11/21/2017End Date: 11/28/2017       Order Dose: 500 mg       Quantity: 28 tablet    Refills: 0     No discharge procedures on file      ED Provider  Electronically Signed by           Dorie Dacosta PA-C  11/21/17 1000

## 2017-11-22 ENCOUNTER — HOSPITAL ENCOUNTER (INPATIENT)
Facility: HOSPITAL | Age: 42
LOS: 2 days | Discharge: HOME | DRG: 812 | End: 2017-11-24
Attending: INTERNAL MEDICINE | Admitting: INTERNAL MEDICINE
Payer: COMMERCIAL

## 2017-11-22 DIAGNOSIS — T39.1X1A TYLENOL OVERDOSE, ACCIDENTAL OR UNINTENTIONAL, INITIAL ENCOUNTER: Primary | ICD-10-CM

## 2017-11-22 DIAGNOSIS — R11.10 VOMITING: ICD-10-CM

## 2017-11-22 DIAGNOSIS — K08.89 PAIN, DENTAL: ICD-10-CM

## 2017-11-22 PROBLEM — F10.10 ALCOHOL ABUSE: Status: ACTIVE | Noted: 2017-11-22

## 2017-11-22 PROBLEM — R73.09 ELEVATED GLUCOSE: Status: ACTIVE | Noted: 2017-11-22

## 2017-11-22 LAB
ALBUMIN SERPL BCP-MCNC: 4.8 G/DL (ref 3.5–5)
ALP SERPL-CCNC: 144 U/L (ref 46–116)
ALT SERPL W P-5'-P-CCNC: 30 U/L (ref 12–78)
AMPHETAMINES SERPL QL SCN: NEGATIVE
ANION GAP SERPL CALCULATED.3IONS-SCNC: 24 MMOL/L (ref 4–13)
APAP SERPL-MCNC: 46.8 UG/ML (ref 10–30)
APTT PPP: 30 SECONDS (ref 23–35)
AST SERPL W P-5'-P-CCNC: 38 U/L (ref 5–45)
BACTERIA UR QL AUTO: ABNORMAL /HPF
BARBITURATES UR QL: NEGATIVE
BASOPHILS # BLD AUTO: 0.15 THOUSANDS/ΜL (ref 0–0.1)
BASOPHILS NFR BLD AUTO: 1 % (ref 0–1)
BENZODIAZ UR QL: NEGATIVE
BILIRUB DIRECT SERPL-MCNC: 0.21 MG/DL (ref 0–0.2)
BILIRUB SERPL-MCNC: 0.6 MG/DL (ref 0.2–1)
BILIRUB UR QL STRIP: NEGATIVE
BUN SERPL-MCNC: 4 MG/DL (ref 5–25)
CALCIUM SERPL-MCNC: 9.5 MG/DL (ref 8.3–10.1)
CHLORIDE SERPL-SCNC: 102 MMOL/L (ref 100–108)
CLARITY UR: CLEAR
CO2 SERPL-SCNC: 14 MMOL/L (ref 21–32)
COCAINE UR QL: NEGATIVE
COLOR UR: YELLOW
CREAT SERPL-MCNC: 0.64 MG/DL (ref 0.6–1.3)
EOSINOPHIL # BLD AUTO: 0.02 THOUSAND/ΜL (ref 0–0.61)
EOSINOPHIL NFR BLD AUTO: 0 % (ref 0–6)
ERYTHROCYTE [DISTWIDTH] IN BLOOD BY AUTOMATED COUNT: 13.9 % (ref 11.6–15.1)
ETHANOL SERPL-MCNC: 51 MG/DL (ref 0–3)
GFR SERPL CREATININE-BSD FRML MDRD: 111 ML/MIN/1.73SQ M
GLUCOSE SERPL-MCNC: 152 MG/DL (ref 65–140)
GLUCOSE UR STRIP-MCNC: NEGATIVE MG/DL
HCT VFR BLD AUTO: 51.6 % (ref 34.8–46.1)
HGB BLD-MCNC: 17.3 G/DL (ref 11.5–15.4)
HGB UR QL STRIP.AUTO: NEGATIVE
INR PPP: 1.07 (ref 0.86–1.16)
KETONES UR STRIP-MCNC: ABNORMAL MG/DL
LEUKOCYTE ESTERASE UR QL STRIP: NEGATIVE
LIPASE SERPL-CCNC: 122 U/L (ref 73–393)
LYMPHOCYTES # BLD AUTO: 2.66 THOUSANDS/ΜL (ref 0.6–4.47)
LYMPHOCYTES NFR BLD AUTO: 11 % (ref 14–44)
MAGNESIUM SERPL-MCNC: 1.2 MG/DL (ref 1.6–2.6)
MCH RBC QN AUTO: 32.2 PG (ref 26.8–34.3)
MCHC RBC AUTO-ENTMCNC: 33.5 G/DL (ref 31.4–37.4)
MCV RBC AUTO: 96 FL (ref 82–98)
METHADONE UR QL: NEGATIVE
MONOCYTES # BLD AUTO: 1.56 THOUSAND/ΜL (ref 0.17–1.22)
MONOCYTES NFR BLD AUTO: 7 % (ref 4–12)
NEUTROPHILS # BLD AUTO: 19.22 THOUSANDS/ΜL (ref 1.85–7.62)
NEUTS SEG NFR BLD AUTO: 81 % (ref 43–75)
NITRITE UR QL STRIP: NEGATIVE
NON-SQ EPI CELLS URNS QL MICRO: ABNORMAL /HPF
NRBC BLD AUTO-RTO: 0 /100 WBCS
OPIATES UR QL SCN: NEGATIVE
PCP UR QL: NEGATIVE
PH UR STRIP.AUTO: 5 [PH] (ref 4.5–8)
PHOSPHATE SERPL-MCNC: 3.1 MG/DL (ref 2.7–4.5)
PLATELET # BLD AUTO: 277 THOUSANDS/UL (ref 149–390)
PMV BLD AUTO: 9.7 FL (ref 8.9–12.7)
POTASSIUM SERPL-SCNC: 4.2 MMOL/L (ref 3.5–5.3)
PROT SERPL-MCNC: 9.5 G/DL (ref 6.4–8.2)
PROT UR STRIP-MCNC: ABNORMAL MG/DL
PROTHROMBIN TIME: 14.2 SECONDS (ref 12.1–14.4)
RBC # BLD AUTO: 5.37 MILLION/UL (ref 3.81–5.12)
RBC #/AREA URNS AUTO: ABNORMAL /HPF
SALICYLATES SERPL-MCNC: 4.8 MG/DL (ref 3–20)
SODIUM SERPL-SCNC: 140 MMOL/L (ref 136–145)
SP GR UR STRIP.AUTO: >=1.03 (ref 1–1.03)
THC UR QL: POSITIVE
UROBILINOGEN UR QL STRIP.AUTO: 0.2 E.U./DL
WBC # BLD AUTO: 23.72 THOUSAND/UL (ref 4.31–10.16)
WBC #/AREA URNS AUTO: ABNORMAL /HPF

## 2017-11-22 PROCEDURE — 81001 URINALYSIS AUTO W/SCOPE: CPT | Performed by: PHYSICIAN ASSISTANT

## 2017-11-22 PROCEDURE — 80048 BASIC METABOLIC PNL TOTAL CA: CPT | Performed by: PHYSICIAN ASSISTANT

## 2017-11-22 PROCEDURE — 80307 DRUG TEST PRSMV CHEM ANLYZR: CPT | Performed by: PHYSICIAN ASSISTANT

## 2017-11-22 PROCEDURE — 80320 DRUG SCREEN QUANTALCOHOLS: CPT | Performed by: PHYSICIAN ASSISTANT

## 2017-11-22 PROCEDURE — 99284 EMERGENCY DEPT VISIT MOD MDM: CPT

## 2017-11-22 PROCEDURE — 80076 HEPATIC FUNCTION PANEL: CPT | Performed by: PHYSICIAN ASSISTANT

## 2017-11-22 PROCEDURE — 83690 ASSAY OF LIPASE: CPT | Performed by: PHYSICIAN ASSISTANT

## 2017-11-22 PROCEDURE — 36415 COLL VENOUS BLD VENIPUNCTURE: CPT | Performed by: PHYSICIAN ASSISTANT

## 2017-11-22 PROCEDURE — 85025 COMPLETE CBC W/AUTO DIFF WBC: CPT | Performed by: PHYSICIAN ASSISTANT

## 2017-11-22 PROCEDURE — 80329 ANALGESICS NON-OPIOID 1 OR 2: CPT | Performed by: PHYSICIAN ASSISTANT

## 2017-11-22 PROCEDURE — 83735 ASSAY OF MAGNESIUM: CPT | Performed by: PHYSICIAN ASSISTANT

## 2017-11-22 PROCEDURE — 96372 THER/PROPH/DIAG INJ SC/IM: CPT

## 2017-11-22 PROCEDURE — 85610 PROTHROMBIN TIME: CPT | Performed by: PHYSICIAN ASSISTANT

## 2017-11-22 PROCEDURE — 84100 ASSAY OF PHOSPHORUS: CPT | Performed by: PHYSICIAN ASSISTANT

## 2017-11-22 PROCEDURE — 85730 THROMBOPLASTIN TIME PARTIAL: CPT | Performed by: PHYSICIAN ASSISTANT

## 2017-11-22 RX ORDER — PANTOPRAZOLE SODIUM 40 MG/1
40 TABLET, DELAYED RELEASE ORAL
Status: DISCONTINUED | OUTPATIENT
Start: 2017-11-23 | End: 2017-11-24 | Stop reason: HOSPADM

## 2017-11-22 RX ORDER — NICOTINE 21 MG/24HR
1 PATCH, TRANSDERMAL 24 HOURS TRANSDERMAL DAILY
Status: DISCONTINUED | OUTPATIENT
Start: 2017-11-22 | End: 2017-11-24 | Stop reason: HOSPADM

## 2017-11-22 RX ORDER — CALCIUM CARBONATE 200(500)MG
1000 TABLET,CHEWABLE ORAL DAILY PRN
Status: DISCONTINUED | OUTPATIENT
Start: 2017-11-22 | End: 2017-11-24 | Stop reason: HOSPADM

## 2017-11-22 RX ORDER — ONDANSETRON 2 MG/ML
4 INJECTION INTRAMUSCULAR; INTRAVENOUS EVERY 6 HOURS PRN
Status: DISCONTINUED | OUTPATIENT
Start: 2017-11-22 | End: 2017-11-24 | Stop reason: HOSPADM

## 2017-11-22 RX ORDER — HYDRALAZINE HYDROCHLORIDE 20 MG/ML
10 INJECTION INTRAMUSCULAR; INTRAVENOUS EVERY 4 HOURS PRN
Status: DISCONTINUED | OUTPATIENT
Start: 2017-11-22 | End: 2017-11-24 | Stop reason: HOSPADM

## 2017-11-22 RX ORDER — SACCHAROMYCES BOULARDII 250 MG
250 CAPSULE ORAL 2 TIMES DAILY
Status: DISCONTINUED | OUTPATIENT
Start: 2017-11-23 | End: 2017-11-24 | Stop reason: HOSPADM

## 2017-11-22 RX ORDER — PENICILLIN V POTASSIUM 250 MG/1
500 TABLET ORAL 4 TIMES DAILY
Status: DISCONTINUED | OUTPATIENT
Start: 2017-11-23 | End: 2017-11-23

## 2017-11-22 RX ORDER — ONDANSETRON 4 MG/1
4 TABLET, ORALLY DISINTEGRATING ORAL ONCE
Status: COMPLETED | OUTPATIENT
Start: 2017-11-22 | End: 2017-11-22

## 2017-11-22 RX ORDER — CLONIDINE HYDROCHLORIDE 0.1 MG/1
0.1 TABLET ORAL 2 TIMES DAILY
Status: DISCONTINUED | OUTPATIENT
Start: 2017-11-23 | End: 2017-11-24 | Stop reason: HOSPADM

## 2017-11-22 RX ORDER — SIMETHICONE 80 MG
80 TABLET,CHEWABLE ORAL EVERY 6 HOURS PRN
Status: DISCONTINUED | OUTPATIENT
Start: 2017-11-22 | End: 2017-11-24 | Stop reason: HOSPADM

## 2017-11-22 RX ORDER — DOCUSATE SODIUM 100 MG/1
100 CAPSULE, LIQUID FILLED ORAL 2 TIMES DAILY
Status: DISCONTINUED | OUTPATIENT
Start: 2017-11-23 | End: 2017-11-24 | Stop reason: HOSPADM

## 2017-11-22 RX ORDER — METOCLOPRAMIDE HYDROCHLORIDE 5 MG/ML
10 INJECTION INTRAMUSCULAR; INTRAVENOUS ONCE
Status: COMPLETED | OUTPATIENT
Start: 2017-11-22 | End: 2017-11-22

## 2017-11-22 RX ORDER — LORAZEPAM 2 MG/ML
0.5 INJECTION INTRAMUSCULAR 4 TIMES DAILY PRN
Status: DISCONTINUED | OUTPATIENT
Start: 2017-11-22 | End: 2017-11-24 | Stop reason: HOSPADM

## 2017-11-22 RX ORDER — DICYCLOMINE HYDROCHLORIDE 10 MG/1
20 CAPSULE ORAL EVERY 6 HOURS PRN
Status: DISCONTINUED | OUTPATIENT
Start: 2017-11-22 | End: 2017-11-24 | Stop reason: HOSPADM

## 2017-11-22 RX ORDER — ONDANSETRON 2 MG/ML
4 INJECTION INTRAMUSCULAR; INTRAVENOUS ONCE
Status: COMPLETED | OUTPATIENT
Start: 2017-11-22 | End: 2017-11-22

## 2017-11-22 RX ORDER — SODIUM CHLORIDE 9 MG/ML
75 INJECTION, SOLUTION INTRAVENOUS CONTINUOUS
Status: DISCONTINUED | OUTPATIENT
Start: 2017-11-22 | End: 2017-11-24 | Stop reason: HOSPADM

## 2017-11-22 RX ADMIN — SODIUM CHLORIDE 1000 ML: 0.9 INJECTION, SOLUTION INTRAVENOUS at 22:17

## 2017-11-22 RX ADMIN — ONDANSETRON 4 MG: 2 INJECTION INTRAMUSCULAR; INTRAVENOUS at 21:05

## 2017-11-22 RX ADMIN — SODIUM CHLORIDE 1000 ML: 0.9 INJECTION, SOLUTION INTRAVENOUS at 23:27

## 2017-11-22 RX ADMIN — ONDANSETRON 4 MG: 4 TABLET, ORALLY DISINTEGRATING ORAL at 17:47

## 2017-11-22 RX ADMIN — ONDANSETRON 4 MG: 2 INJECTION INTRAMUSCULAR; INTRAVENOUS at 22:43

## 2017-11-22 RX ADMIN — METOCLOPRAMIDE 10 MG: 5 INJECTION, SOLUTION INTRAMUSCULAR; INTRAVENOUS at 19:10

## 2017-11-22 RX ADMIN — ACETYLCYSTEINE 12580 MG: 200 INJECTION, SOLUTION INTRAVENOUS at 21:09

## 2017-11-22 NOTE — ED PROVIDER NOTES
History  Chief Complaint   Patient presents with    Dental Pain     Pt presents to ED with left lower jaw pain due to missing tooth  Pt was in ED yesterday for similar issue  Pt states she is now vomiting and dizzy  Pt denies any foul taste in mouth     41-year-old female with past medical history significant for hypertension, pancreatitis, alcohol abuse and gastroesophageal reflux disease presents to the emergency department with chief complaint of dental pain  Onset of symptoms reported as 2 days ago  Location of symptoms reported as the left lower tooth  Quality is reported as throbbing pain  Severity is reported as severe listed as 10/10 on the pain scale  Associated symptoms:  Positive for nausea  Positive for vomiting  Positive for dizziness  Positive for dental pain  Denies fever  Denies rash  Denies dysphagia or dysphonia  Denies drooling  Modifying factors:  Patient reports that she has taken " bottle of tylenol and a bottle of motrin" since yesterday with no improvement in pain  Context:  Patient reports she was seen in ED yesterday for similar dental pain - received dental block and was started on penicillin  She reports that she is allergic to morphine  She reports still with dental pain today  Also has a history of alcoholic pancreatitis in the past  Patient reports to me that the last dose of tylenol she took was 3 tablets of 500 mg tylenol at 11 am this morning  Medical summary: review of past visit history via EPIC demonstrates patient was last seen on 11/21/2017 for evaluation of dental pain  History provided by:  Patient   used: No    Dental Pain   Associated symptoms: no congestion, no drooling, no facial swelling, no fever, no headaches, no neck pain and no oral lesions        Prior to Admission Medications   Prescriptions Last Dose Informant Patient Reported? Taking?    Mometasone Furo-Formoterol Fum (DULERA) 100-5 MCG/ACT AERO   Yes No   Sig: Inhale Unsure of dose   cloNIDine (CATAPRES) 0 1 mg tablet   Yes No   Sig: Take 0 1 mg by mouth 2 (two) times a day   dicyclomine (BENTYL) 10 mg capsule   No No   Sig: Take 2 capsules by mouth every 6 (six) hours as needed (abd  spasm)   docusate sodium (COLACE) 100 mg capsule   No No   Sig: Take 1 capsule by mouth 2 (two) times a day for 15 days   nystatin (MYCOSTATIN) 100,000 units/mL suspension   No No   Sig: Swish and swallow 5 mL 4 (four) times a day   ondansetron (ZOFRAN) 4 mg tablet   No No   Sig: Take 1 tablet by mouth every 8 (eight) hours as needed for nausea or vomiting for up to 7 days   pancrelipase, Lip-Prot-Amyl, (CREON) 24,000 units   No No   Sig: Take 1 capsule by mouth 3 (three) times a day with meals   pantoprazole (PROTONIX) 40 mg tablet   Yes No   Sig: Take 40 mg by mouth daily   penicillin V potassium (VEETID) 500 mg tablet   No No   Sig: Take 1 tablet by mouth 4 (four) times a day for 7 days   saccharomyces boulardii (FLORASTOR) 250 mg capsule   No No   Sig: Take 1 capsule by mouth 2 (two) times a day   simethicone (MYLICON) 80 mg chewable tablet   No No   Sig: Chew 1 tablet every 6 (six) hours as needed for flatulence      Facility-Administered Medications: None       Past Medical History:   Diagnosis Date    Alcohol abuse     Arthritis     GERD (gastroesophageal reflux disease)     Hypertension     Nicotine dependence     Obesity     Pancreatitis     Panic attack        Past Surgical History:   Procedure Laterality Date    ABDOMINAL SURGERY      C SECTION       Family History   Problem Relation Age of Onset    Cirrhosis Father     Alcohol abuse Father     Drug abuse Mother      I have reviewed and agree with the history as documented      Social History   Substance Use Topics    Smoking status: Current Every Day Smoker     Packs/day: 1 00     Years: 29 00     Types: Cigarettes    Smokeless tobacco: Never Used    Alcohol use Yes      Comment: [employer]        Review of Systems   Constitutional: Negative for activity change, appetite change, chills, diaphoresis, fatigue and fever  HENT: Positive for dental problem  Negative for congestion, drooling, ear discharge, ear pain, facial swelling, hearing loss, mouth sores, nosebleeds, postnasal drip, rhinorrhea, sinus pain, sinus pressure, sneezing, sore throat, tinnitus, trouble swallowing and voice change  Eyes: Negative for photophobia, pain, discharge, redness and itching  Respiratory: Negative for cough, chest tightness, shortness of breath and wheezing  Cardiovascular: Negative for chest pain, palpitations and leg swelling  Gastrointestinal: Positive for nausea and vomiting  Negative for abdominal pain, constipation and diarrhea  Endocrine: Negative for cold intolerance, heat intolerance, polydipsia, polyphagia and polyuria  Genitourinary: Negative for decreased urine volume, difficulty urinating, dysuria, flank pain, frequency, hematuria and urgency  Musculoskeletal: Positive for arthralgias  Negative for back pain, joint swelling, myalgias, neck pain and neck stiffness  Skin: Negative for color change, pallor, rash and wound  Allergic/Immunologic: Negative for environmental allergies, food allergies and immunocompromised state  Neurological: Negative for dizziness, tremors, seizures, syncope, facial asymmetry, speech difficulty, weakness, light-headedness, numbness and headaches  Hematological: Negative for adenopathy  Does not bruise/bleed easily  Psychiatric/Behavioral: Negative for agitation, confusion, decreased concentration and hallucinations  The patient is nervous/anxious  All other systems reviewed and are negative        Physical Exam  ED Triage Vitals   Temperature Pulse Respirations Blood Pressure SpO2   11/22/17 1705 11/22/17 1705 11/22/17 1705 11/22/17 1706 11/22/17 1705   98 7 °F (37 1 °C) 101 20 (!) 182/121 96 %      Temp Source Heart Rate Source Patient Position - Orthostatic VS BP Location FiO2 (%)   11/22/17 1705 11/22/17 1705 11/22/17 1706 11/22/17 1706 --   Temporal Monitor Sitting Left arm       Pain Score       11/22/17 1705       Worst Possible Pain           Orthostatic Vital Signs  Vitals:    11/22/17 1707 11/22/17 2109 11/22/17 2115 11/22/17 2326   BP: (!) 184/116 (!) 160/111 (!) 160/111 (!) 183/101   Pulse:  85 89 90   Patient Position - Orthostatic VS: Sitting Lying Lying Lying       Physical Exam   Constitutional: She is oriented to person, place, and time  She appears well-developed and well-nourished  No distress  BP (!) 184/116   Pulse 101   Temp 98 7 °F (37 1 °C) (Temporal)   Resp 20   Ht 5' 4" (1 626 m)   Wt 83 9 kg (184 lb 15 5 oz)   LMP 11/14/2017   SpO2 99%   BMI 31 75 kg/m²   interp blood pressure hypertensive, pulse tachycardic - patient appears anxious - vitals signs reviewed from ed chart yesterday also hypertensive  Patient actively vomiting on approach in ed  HENT:   Head: Normocephalic and atraumatic  Right Ear: External ear normal    Left Ear: External ear normal    Nose: Nose normal    Mouth/Throat: Oropharynx is clear and moist  No oropharyngeal exudate  There is left lower canine tooth where enamel is completely eroded to the gingival with gingival erythema  No dental abscess  No sublingual or submandibular fullness or swelling  No drooling or pooling of secretions  No trismus  Eyes: Conjunctivae and EOM are normal  Pupils are equal, round, and reactive to light  Right eye exhibits no discharge  Left eye exhibits no discharge  No scleral icterus  Neck: Normal range of motion  Neck supple  No JVD present  No tracheal deviation present  No thyromegaly present  Cardiovascular: Normal rate, regular rhythm and intact distal pulses  Pulmonary/Chest: Effort normal and breath sounds normal  No stridor  No respiratory distress  She has no wheezes  She has no rales  She exhibits no tenderness  Abdominal: Soft   Bowel sounds are normal  She exhibits no distension and no mass  There is no tenderness  There is no rebound and no guarding  No hernia  Musculoskeletal: Normal range of motion  She exhibits no edema, tenderness or deformity  Lymphadenopathy:     She has no cervical adenopathy  Neurological: She is alert and oriented to person, place, and time  She displays normal reflexes  No cranial nerve deficit or sensory deficit  She exhibits normal muscle tone  Coordination normal    Skin: Skin is warm and dry  Capillary refill takes less than 2 seconds  No rash noted  She is not diaphoretic  No erythema  No pallor  Psychiatric: Judgment and thought content normal  Her mood appears anxious  Her speech is rapid and/or pressured  She is hyperactive  Cognition and memory are normal    Nursing note and vitals reviewed        ED Medications  Medications   sodium chloride 0 9 % bolus 1,000 mL (1,000 mL Intravenous New Bag 11/22/17 7370)   cloNIDine (CATAPRES) tablet 0 1 mg (not administered)   dicyclomine (BENTYL) capsule 20 mg (not administered)   docusate sodium (COLACE) capsule 100 mg (not administered)   nystatin (MYCOSTATIN) oral suspension 500,000 Units (not administered)   pancrelipase (Lip-Prot-Amyl) (CREON) delayed release capsule 24,000 Units (not administered)   pantoprazole (PROTONIX) EC tablet 40 mg (not administered)   penicillin V potassium (VEETID) tablet 500 mg (not administered)   saccharomyces boulardii (FLORASTOR) capsule 250 mg (not administered)   simethicone (MYLICON) chewable tablet 80 mg (not administered)   sodium chloride 0 9 % infusion (not administered)   ondansetron (ZOFRAN) injection 4 mg (not administered)   calcium carbonate (TUMS) chewable tablet 1,000 mg (not administered)   nicotine (NICODERM CQ) 21 mg/24 hr TD 24 hr patch 1 patch (not administered)   enoxaparin (LOVENOX) subcutaneous injection 40 mg (not administered)   acetylcysteine (ACETADOTE) 4,200 mg in dextrose 5 % 500 mL IVPB (not administered)   acetylcysteine (ACETADOTE) 8,400 mg in dextrose 5 % 1,000 mL IVPB (not administered)   hydrALAZINE (APRESOLINE) injection 10 mg (not administered)   LORazepam (ATIVAN) 2 mg/mL injection 0 5 mg (not administered)   ondansetron (ZOFRAN-ODT) dispersible tablet 4 mg (4 mg Oral Given 11/22/17 1747)   metoclopramide (REGLAN) injection 10 mg (10 mg Intramuscular Given 11/22/17 1910)   acetylcysteine (ACETADOTE) 12,580 mg in dextrose 5 % 200 mL IVPB (0 mg/kg × 83 9 kg Intravenous Stopped 11/22/17 2209)   sodium chloride 0 9 % bolus 1,000 mL (1,000 mL Intravenous New Bag 11/22/17 2217)   ondansetron (ZOFRAN) injection 4 mg (4 mg Intravenous Given 11/22/17 2105)   ondansetron (ZOFRAN) injection 4 mg (4 mg Intravenous Given 11/22/17 2243)       Diagnostic Studies  Results Reviewed     Procedure Component Value Units Date/Time    Magnesium [[id number]]  (Abnormal) Collected:  11/22/17 2151    Lab Status:  Final result Specimen:  Blood from Hand, Right Updated:  11/22/17 2209     Magnesium 1 2 (L) mg/dL     Phosphorus [[id number]]  (Normal) Collected:  11/22/17 2151    Lab Status:  Final result Specimen:  Blood from Hand, Right Updated:  11/22/17 2209     Phosphorus 3 1 mg/dL     Lipase [[id number]]  (Normal) Collected:  11/22/17 2151    Lab Status:  Final result Specimen:  Blood from Hand, Right Updated:  11/22/17 2209     Lipase 122 u/L     Urine Microscopic [[id number]]  (Abnormal) Collected:  11/22/17 2112    Lab Status:  Final result Specimen:  Urine from Urine, Clean Catch Updated:  11/22/17 2128     RBC, UA None Seen /hpf      WBC, UA 0-5 /hpf      Epithelial Cells Innumerable (A) /hpf      Bacteria, UA Moderate (A) /hpf     Rapid drug screen, urine [[id number]]  (Abnormal) Collected:  11/22/17 2111    Lab Status:  Final result Specimen:  Urine from Urine, Clean Catch Updated:  11/22/17 2127     Amph/Meth UR Negative     Barbiturate Ur Negative     Benzodiazepine Urine Negative     Cocaine Urine Negative     Methadone Urine Negative     Opiate Urine Negative     PCP Ur Negative     THC Urine Positive (A)    Narrative:         Presumptive report  If requested, specimen will be sent to reference lab for confirmation  FOR MEDICAL PURPOSES ONLY  IF CONFIRMATION NEEDED PLEASE CONTACT THE LAB WITHIN 5 DAYS  Drug Screen Cutoff Levels:  AMPHETAMINE/METHAMPHETAMINES  1000 ng/mL  BARBITURATES     200 ng/mL  BENZODIAZEPINES     200 ng/mL  COCAINE      300 ng/mL  METHADONE      300 ng/mL  OPIATES      300 ng/mL  PHENCYCLIDINE     25 ng/mL  THC       50 ng/mL    UA w Reflex to Microscopic w Reflex to Culture [[id number]]  (Abnormal) Collected:  11/22/17 2112    Lab Status:  Final result Specimen:  Urine from Urine, Clean Catch Updated:  11/22/17 2118     Color, UA Yellow     Clarity, UA Clear     Specific Gravity, UA >=1 030     pH, UA 5 0     Leukocytes, UA Negative     Nitrite, UA Negative     Protein,  (2+) (A) mg/dl      Glucose, UA Negative mg/dl      Ketones, UA Trace (A) mg/dl      Urobilinogen, UA 0 2 E U /dl      Bilirubin, UA Negative     Blood, UA Negative    Protime-INR [[id number]]  (Normal) Collected:  11/22/17 2016    Lab Status:  Final result Specimen:  Blood from Arm, Left Updated:  11/22/17 2041     Protime 14 2 seconds      INR 1 07    APTT [[id number]]  (Normal) Collected:  11/22/17 2016    Lab Status:  Final result Specimen:  Blood from Arm, Left Updated:  11/22/17 2041     PTT 30 seconds     Narrative:          Therapeutic Heparin Range = 60-90 seconds    CBC and differential [[id number]]  (Abnormal) Collected:  11/22/17 2016    Lab Status:  Final result Specimen:  Blood from Arm, Left Updated:  11/22/17 2022     WBC 23 72 (H) Thousand/uL      RBC 5 37 (H) Million/uL      Hemoglobin 17 3 (H) g/dL      Hematocrit 51 6 (H) %      MCV 96 fL      MCH 32 2 pg      MCHC 33 5 g/dL      RDW 13 9 %      MPV 9 7 fL      Platelets 382 Thousands/uL      nRBC 0 /100 WBCs      Neutrophils Relative 81 (H) %      Lymphocytes Relative 11 (L) %      Monocytes Relative 7 %      Eosinophils Relative 0 %      Basophils Relative 1 %      Neutrophils Absolute 19 22 (H) Thousands/µL      Lymphocytes Absolute 2 66 Thousands/µL      Monocytes Absolute 1 56 (H) Thousand/µL      Eosinophils Absolute 0 02 Thousand/µL      Basophils Absolute 0 15 (H) Thousands/µL     Hepatic function panel [[id number]]  (Abnormal) Collected:  11/22/17 1758    Lab Status:  Final result Specimen:  Blood from Arm, Right Updated:  11/22/17 1826     Total Bilirubin 0 60 mg/dL      Bilirubin, Direct 0 21 (H) mg/dL      Alkaline Phosphatase 144 (H) U/L      AST 38 U/L      ALT 30 U/L      Total Protein 9 5 (H) g/dL      Albumin 4 8 g/dL     Basic metabolic panel [[id number]]  (Abnormal) Collected:  11/22/17 1758    Lab Status:  Final result Specimen:  Blood from Arm, Right Updated:  11/22/17 1825     Sodium 140 mmol/L      Potassium 4 2 mmol/L      Chloride 102 mmol/L      CO2 14 (L) mmol/L      Anion Gap 24 (H) mmol/L      BUN 4 (L) mg/dL      Creatinine 0 64 mg/dL      Glucose 152 (H) mg/dL      Calcium 9 5 mg/dL      eGFR 111 ml/min/1 73sq m     Narrative:         National Kidney Disease Education Program recommendations are as follows:  GFR calculation is accurate only with a steady state creatinine  Chronic Kidney disease less than 60 ml/min/1 73 sq  meters  Kidney failure less than 15 ml/min/1 73 sq  meters      Salicylate level [[id number]]  (Normal) Collected:  11/22/17 1758    Lab Status:  Final result Specimen:  Blood from Arm, Right Updated:  06/78/85 3388     Salicylate Lvl 4 8 mg/dL     Acetaminophen level [[id number]]  (Abnormal) Collected:  11/22/17 1758    Lab Status:  Final result Specimen:  Blood from Arm, Right Updated:  11/22/17 1825     Acetaminophen Level 46 8 (H) ug/mL     Ethanol [[id number]]  (Abnormal) Collected:  11/22/17 1758    Lab Status:  Final result Specimen:  Blood from Arm, Right Updated:  11/22/17 1815     Ethanol Lvl 51 (H) mg/dL                  No orders to display Procedures  Procedures       Phone Contacts  ED Phone Contact    ED Course  ED Course                                MDM  Number of Diagnoses or Management Options  Pain, dental: established and worsening  Tylenol overdose, accidental or unintentional, initial encounter: new and requires workup  Vomiting: new and requires workup  Diagnosis management comments: ddx includes but is not limited to dental infection, medication reaction, narcotic withdrawal, alcohol withdrawal, anxiety, gastritis, Tylenol toxicity, azotemia, dehydration, plan check, panel, check liver function panel and chemistry panel  Lab results reviewed:  Cbc remarkable for elevated wbc at 23 7 - question if this is secondary to stress/vomiting/anxiety/pain   hgb 17 3 and hct 51 6 are elevated - possibly consistent with dehydration/hemoconcentration  Hepatic function panel reviewed-  Direct bili elevated at 0 21  Alk phos elevated at 144 - possibly secondary to vomiting  ast normal at 38, alt normal at 30  Bmp remarkable for co2 low at 14 and anion gap of 24  Glucose elevated at 152  Ethanol elevated at 51  Acetaminophen elevated at 46 8  Reviewed all lab results with patient, elevated acetaminophen level - unclear if level is increasing or decreasing as patient has been taking acetaminophen routinely since 1 day ago - she is unable to accurately estimate how much tylenol she has ingested in the last 24 hours - she initially stated 1 bottle - but is unable to clarify how big the bottle was - she then amended her statement to say she took half a bottle  The only thing she is able to accurately states is that she took 3 tablets of 500 mg tylenol at 11 am this morning  Given associated alcohol ingestion - will admit and start patient on NAC treatment for tylenol toxicity - AST/ALT normal, but patient is vomiting in ed - question secondary to alcohol ingestion/pancreatitis vs tylenol toxicity   Critical care time spent: 45 minutes in physical evaluation of patient, obtaining story from patient, ordering and interpretation of lab results and ordering and evaluation of efficacy of treatments in the emergency department as well as discussion of the case with admitting physician  Will need admission for further evaluation of toxicity and iv fluids and reassessment  Case discussed with Mayelin Montano PA-C regarding admission  NAC ordered  Patient repetitively asking for pain medications for her dental pain  Discussed with patient at this time need to be judicious with medication administration until severity of liver injury determined  Amount and/or Complexity of Data Reviewed  Clinical lab tests: ordered and reviewed  Discussion of test results with the performing providers: yes  Decide to obtain previous medical records or to obtain history from someone other than the patient: yes (Reviewed ed medical record from 11/21/2017)  Obtain history from someone other than the patient: yes (Friend at bedside  )  Review and summarize past medical records: yes  Discuss the patient with other providers: yes      The patient presented with a condition in which there was a high probability of imminent or life-threatening deterioration, and critical care services (excluding separately billable procedures) totalled 30-74 minutes          Disposition  Final diagnoses:   Tylenol overdose, accidental or unintentional, initial encounter   Pain, dental   Vomiting     Time reflects when diagnosis was documented in both MDM as applicable and the Disposition within this note     Time User Action Codes Description Comment    11/22/2017  8:54 PM Surya Bright Add [T39 1X1A] Tylenol overdose, accidental or unintentional, initial encounter     11/22/2017  8:54 PM Surya Bright Add [K08 89] Pain, dental     11/22/2017  8:54 PM Surya Bright Add [R11 10] Vomiting       ED Disposition     ED Disposition Condition Comment    Admit  Case was discussed with Mayelin Montano NATALIIA and the patient's admission status was agreed to be Admission Status: inpatient status to the service of Dr Florin Lim   Follow-up Information    None       Current Discharge Medication List      CONTINUE these medications which have NOT CHANGED    Details   cloNIDine (CATAPRES) 0 1 mg tablet Take 0 1 mg by mouth 2 (two) times a day      dicyclomine (BENTYL) 10 mg capsule Take 2 capsules by mouth every 6 (six) hours as needed (abd  spasm)  Qty: 30 capsule, Refills: 0      docusate sodium (COLACE) 100 mg capsule Take 1 capsule by mouth 2 (two) times a day for 15 days  Qty: 30 capsule, Refills: 0      Mometasone Furo-Formoterol Fum (DULERA) 100-5 MCG/ACT AERO Inhale Unsure of dose      nystatin (MYCOSTATIN) 100,000 units/mL suspension Swish and swallow 5 mL 4 (four) times a day  Qty: 60 mL, Refills: 0      ondansetron (ZOFRAN) 4 mg tablet Take 1 tablet by mouth every 8 (eight) hours as needed for nausea or vomiting for up to 7 days  Qty: 21 tablet, Refills: 0      pancrelipase, Lip-Prot-Amyl, (CREON) 24,000 units Take 1 capsule by mouth 3 (three) times a day with meals  Qty: 90 capsule, Refills: 0      pantoprazole (PROTONIX) 40 mg tablet Take 40 mg by mouth daily      penicillin V potassium (VEETID) 500 mg tablet Take 1 tablet by mouth 4 (four) times a day for 7 days  Qty: 28 tablet, Refills: 0      saccharomyces boulardii (FLORASTOR) 250 mg capsule Take 1 capsule by mouth 2 (two) times a day  Refills: 0      simethicone (MYLICON) 80 mg chewable tablet Chew 1 tablet every 6 (six) hours as needed for flatulence  Qty: 30 tablet, Refills: 0           No discharge procedures on file      ED Provider  Electronically Signed by           Andrea Roberto PA-C  11/22/17 2094

## 2017-11-23 LAB
ALBUMIN SERPL BCP-MCNC: 3.1 G/DL (ref 3.5–5)
ALBUMIN SERPL BCP-MCNC: 3.3 G/DL (ref 3.5–5)
ALBUMIN SERPL BCP-MCNC: 3.6 G/DL (ref 3.5–5)
ALP SERPL-CCNC: 104 U/L (ref 46–116)
ALP SERPL-CCNC: 87 U/L (ref 46–116)
ALP SERPL-CCNC: 90 U/L (ref 46–116)
ALT SERPL W P-5'-P-CCNC: 21 U/L (ref 12–78)
ALT SERPL W P-5'-P-CCNC: 23 U/L (ref 12–78)
ALT SERPL W P-5'-P-CCNC: 25 U/L (ref 12–78)
ANION GAP SERPL CALCULATED.3IONS-SCNC: 14 MMOL/L (ref 4–13)
APAP SERPL-MCNC: 3.5 UG/ML (ref 10–30)
APTT PPP: 30 SECONDS (ref 23–35)
AST SERPL W P-5'-P-CCNC: 20 U/L (ref 5–45)
AST SERPL W P-5'-P-CCNC: 23 U/L (ref 5–45)
AST SERPL W P-5'-P-CCNC: 26 U/L (ref 5–45)
BILIRUB DIRECT SERPL-MCNC: 0.33 MG/DL (ref 0–0.2)
BILIRUB DIRECT SERPL-MCNC: 0.34 MG/DL (ref 0–0.2)
BILIRUB SERPL-MCNC: 0.8 MG/DL (ref 0.2–1)
BILIRUB SERPL-MCNC: 1 MG/DL (ref 0.2–1)
BILIRUB SERPL-MCNC: 1.1 MG/DL (ref 0.2–1)
BUN SERPL-MCNC: 6 MG/DL (ref 5–25)
CALCIUM SERPL-MCNC: 8.3 MG/DL (ref 8.3–10.1)
CHLORIDE SERPL-SCNC: 103 MMOL/L (ref 100–108)
CO2 SERPL-SCNC: 22 MMOL/L (ref 21–32)
CREAT SERPL-MCNC: 0.74 MG/DL (ref 0.6–1.3)
ERYTHROCYTE [DISTWIDTH] IN BLOOD BY AUTOMATED COUNT: 13.7 % (ref 11.6–15.1)
EST. AVERAGE GLUCOSE BLD GHB EST-MCNC: 148 MG/DL
GFR SERPL CREATININE-BSD FRML MDRD: 101 ML/MIN/1.73SQ M
GLUCOSE SERPL-MCNC: 192 MG/DL (ref 65–140)
HBA1C MFR BLD: 6.8 % (ref 4.2–6.3)
HCT VFR BLD AUTO: 41.8 % (ref 34.8–46.1)
HGB BLD-MCNC: 14.1 G/DL (ref 11.5–15.4)
INR PPP: 1.22 (ref 0.86–1.16)
MCH RBC QN AUTO: 32.5 PG (ref 26.8–34.3)
MCHC RBC AUTO-ENTMCNC: 33.7 G/DL (ref 31.4–37.4)
MCV RBC AUTO: 96 FL (ref 82–98)
PLATELET # BLD AUTO: 203 THOUSANDS/UL (ref 149–390)
PLATELET # BLD AUTO: 218 THOUSANDS/UL (ref 149–390)
PMV BLD AUTO: 11.5 FL (ref 8.9–12.7)
PMV BLD AUTO: 9.8 FL (ref 8.9–12.7)
POTASSIUM SERPL-SCNC: 3.5 MMOL/L (ref 3.5–5.3)
PROT SERPL-MCNC: 7.3 G/DL (ref 6.4–8.2)
PROT SERPL-MCNC: 7.5 G/DL (ref 6.4–8.2)
PROT SERPL-MCNC: 7.6 G/DL (ref 6.4–8.2)
PROTHROMBIN TIME: 15.7 SECONDS (ref 12.1–14.4)
RBC # BLD AUTO: 4.34 MILLION/UL (ref 3.81–5.12)
SALICYLATES SERPL-MCNC: <3 MG/DL (ref 3–20)
SODIUM SERPL-SCNC: 139 MMOL/L (ref 136–145)
WBC # BLD AUTO: 20.51 THOUSAND/UL (ref 4.31–10.16)

## 2017-11-23 PROCEDURE — 85610 PROTHROMBIN TIME: CPT | Performed by: PHYSICIAN ASSISTANT

## 2017-11-23 PROCEDURE — 85730 THROMBOPLASTIN TIME PARTIAL: CPT | Performed by: PHYSICIAN ASSISTANT

## 2017-11-23 PROCEDURE — 80053 COMPREHEN METABOLIC PANEL: CPT | Performed by: PHYSICIAN ASSISTANT

## 2017-11-23 PROCEDURE — 85027 COMPLETE CBC AUTOMATED: CPT | Performed by: PHYSICIAN ASSISTANT

## 2017-11-23 PROCEDURE — 83036 HEMOGLOBIN GLYCOSYLATED A1C: CPT | Performed by: PHYSICIAN ASSISTANT

## 2017-11-23 PROCEDURE — 80329 ANALGESICS NON-OPIOID 1 OR 2: CPT | Performed by: PHYSICIAN ASSISTANT

## 2017-11-23 PROCEDURE — 85049 AUTOMATED PLATELET COUNT: CPT | Performed by: PHYSICIAN ASSISTANT

## 2017-11-23 PROCEDURE — 80076 HEPATIC FUNCTION PANEL: CPT | Performed by: PHYSICIAN ASSISTANT

## 2017-11-23 PROCEDURE — 87040 BLOOD CULTURE FOR BACTERIA: CPT | Performed by: INTERNAL MEDICINE

## 2017-11-23 RX ORDER — OXYCODONE HYDROCHLORIDE 5 MG/1
5 TABLET ORAL EVERY 4 HOURS PRN
Status: DISCONTINUED | OUTPATIENT
Start: 2017-11-23 | End: 2017-11-23

## 2017-11-23 RX ORDER — OXYCODONE HYDROCHLORIDE AND ACETAMINOPHEN 5; 325 MG/1; MG/1
1 TABLET ORAL EVERY 4 HOURS PRN
Status: DISCONTINUED | OUTPATIENT
Start: 2017-11-23 | End: 2017-11-23

## 2017-11-23 RX ORDER — TRAMADOL HYDROCHLORIDE 50 MG/1
50 TABLET ORAL EVERY 6 HOURS PRN
Status: DISCONTINUED | OUTPATIENT
Start: 2017-11-23 | End: 2017-11-23

## 2017-11-23 RX ORDER — OXYCODONE HYDROCHLORIDE 10 MG/1
10 TABLET ORAL EVERY 6 HOURS PRN
Status: DISCONTINUED | OUTPATIENT
Start: 2017-11-23 | End: 2017-11-24 | Stop reason: HOSPADM

## 2017-11-23 RX ORDER — LANOLIN ALCOHOL/MO/W.PET/CERES
6 CREAM (GRAM) TOPICAL
Status: DISCONTINUED | OUTPATIENT
Start: 2017-11-23 | End: 2017-11-24 | Stop reason: HOSPADM

## 2017-11-23 RX ADMIN — ACETYLCYSTEINE 4200 MG: 200 INJECTION, SOLUTION INTRAVENOUS at 00:45

## 2017-11-23 RX ADMIN — ONDANSETRON 4 MG: 2 INJECTION INTRAMUSCULAR; INTRAVENOUS at 14:56

## 2017-11-23 RX ADMIN — NYSTATIN 500000 UNITS: 100000 SUSPENSION ORAL at 08:34

## 2017-11-23 RX ADMIN — BENZOCAINE: 100 SOLUTION TOPICAL at 00:47

## 2017-11-23 RX ADMIN — OXYCODONE HYDROCHLORIDE 10 MG: 10 TABLET ORAL at 17:22

## 2017-11-23 RX ADMIN — OXYCODONE HYDROCHLORIDE 5 MG: 5 TABLET ORAL at 11:50

## 2017-11-23 RX ADMIN — OXYCODONE HYDROCHLORIDE 10 MG: 10 TABLET ORAL at 22:23

## 2017-11-23 RX ADMIN — ONDANSETRON 4 MG: 2 INJECTION INTRAMUSCULAR; INTRAVENOUS at 08:35

## 2017-11-23 RX ADMIN — PIPERACILLIN SODIUM,TAZOBACTAM SODIUM 3.38 G: 3; .375 INJECTION, POWDER, FOR SOLUTION INTRAVENOUS at 15:36

## 2017-11-23 RX ADMIN — DOCUSATE SODIUM 100 MG: 100 CAPSULE, LIQUID FILLED ORAL at 17:17

## 2017-11-23 RX ADMIN — CLONIDINE HYDROCHLORIDE 0.1 MG: 0.1 TABLET ORAL at 17:17

## 2017-11-23 RX ADMIN — NYSTATIN 500000 UNITS: 100000 SUSPENSION ORAL at 17:17

## 2017-11-23 RX ADMIN — ACETYLCYSTEINE 8400 MG: 200 INJECTION, SOLUTION INTRAVENOUS at 06:17

## 2017-11-23 RX ADMIN — HYDRALAZINE HYDROCHLORIDE 10 MG: 20 INJECTION INTRAMUSCULAR; INTRAVENOUS at 06:16

## 2017-11-23 RX ADMIN — SODIUM CHLORIDE 75 ML/HR: 0.9 INJECTION, SOLUTION INTRAVENOUS at 17:17

## 2017-11-23 RX ADMIN — TRAMADOL HYDROCHLORIDE 50 MG: 50 TABLET, COATED ORAL at 09:28

## 2017-11-23 RX ADMIN — MELATONIN TAB 3 MG 6 MG: 3 TAB at 00:47

## 2017-11-23 RX ADMIN — ANTACID TABLETS 1000 MG: 500 TABLET, CHEWABLE ORAL at 01:54

## 2017-11-23 RX ADMIN — LORAZEPAM 0.5 MG: 2 INJECTION INTRAMUSCULAR; INTRAVENOUS at 20:37

## 2017-11-23 RX ADMIN — CLONIDINE HYDROCHLORIDE 0.1 MG: 0.1 TABLET ORAL at 08:34

## 2017-11-23 RX ADMIN — NYSTATIN 500000 UNITS: 100000 SUSPENSION ORAL at 11:48

## 2017-11-23 RX ADMIN — LORAZEPAM 0.5 MG: 2 INJECTION INTRAMUSCULAR; INTRAVENOUS at 02:10

## 2017-11-23 RX ADMIN — Medication 250 MG: at 17:17

## 2017-11-23 RX ADMIN — PANTOPRAZOLE SODIUM 40 MG: 40 TABLET, DELAYED RELEASE ORAL at 06:17

## 2017-11-23 RX ADMIN — PANCRELIPASE 24000 UNITS: 24000; 76000; 120000 CAPSULE, DELAYED RELEASE PELLETS ORAL at 16:08

## 2017-11-23 RX ADMIN — PANCRELIPASE 24000 UNITS: 24000; 76000; 120000 CAPSULE, DELAYED RELEASE PELLETS ORAL at 08:37

## 2017-11-23 RX ADMIN — PENICILLIN V POTASSIUM 500 MG: 250 TABLET, FILM COATED ORAL at 08:37

## 2017-11-23 RX ADMIN — SODIUM CHLORIDE 75 ML/HR: 0.9 INJECTION, SOLUTION INTRAVENOUS at 02:23

## 2017-11-23 RX ADMIN — NICOTINE 1 PATCH: 21 PATCH, EXTENDED RELEASE TRANSDERMAL at 08:34

## 2017-11-23 RX ADMIN — PENICILLIN V POTASSIUM 500 MG: 250 TABLET, FILM COATED ORAL at 03:15

## 2017-11-23 RX ADMIN — HYDRALAZINE HYDROCHLORIDE 10 MG: 20 INJECTION INTRAMUSCULAR; INTRAVENOUS at 00:47

## 2017-11-23 RX ADMIN — PANCRELIPASE 24000 UNITS: 24000; 76000; 120000 CAPSULE, DELAYED RELEASE PELLETS ORAL at 11:49

## 2017-11-23 RX ADMIN — ONDANSETRON 4 MG: 2 INJECTION INTRAMUSCULAR; INTRAVENOUS at 01:54

## 2017-11-23 RX ADMIN — PIPERACILLIN SODIUM,TAZOBACTAM SODIUM 3.38 G: 3; .375 INJECTION, POWDER, FOR SOLUTION INTRAVENOUS at 19:12

## 2017-11-23 RX ADMIN — OXYCODONE HYDROCHLORIDE 5 MG: 5 TABLET ORAL at 16:07

## 2017-11-23 RX ADMIN — PENICILLIN V POTASSIUM 500 MG: 250 TABLET, FILM COATED ORAL at 11:49

## 2017-11-23 RX ADMIN — Medication 250 MG: at 08:34

## 2017-11-23 RX ADMIN — NYSTATIN 500000 UNITS: 100000 SUSPENSION ORAL at 00:47

## 2017-11-23 NOTE — CONSULTS
Consultation - [facility]  Awanda Goldberg 40 y o  female MRN: [id number]  Unit/Bed#: -01 Encounter: [id number]      Inpatient consult to gastroenterology  Consult performed by: Desi Saunders  Consult ordered by: Chino Bridges           Reason for Consult / Principal Problem:  Tooth pain and to much Tylenol    HPI: Awanda Goldberg is a 40y o  year old female who presents with tooth pain  She reports that she came to the emergency room proximally 2 days ago with tooth pain and was given a nerve block which did not work  She then went home and was told to take Motrin and Tylenol she drank alcohol and she reports that she took extra Tylenol but she did not take a bottle of Tylenol  The patient did not overdose on Tylenol according to her  She still reports that she took it every 4 hours over the last 24 hours  Currently she is resting in bed  She is alert and oriented  She is complaining of tooth pain  She has no fevers  She has no nausea or vomiting or abdominal pain  She reports over the last month since her last hospital stay she has been alcohol-free with the exception of 2 nights ago  Her abdominal symptoms have not been causing a problem  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 and known recurrent acute on chronic pancreatitis  She denies melena hematochezia    REVIEW OF SYSTEMS:     CONSTITUTIONAL: Denies any fever, chills, or rigors  Good appetite, and no recent weight loss  HEENT: No earache or tinnitus  Denies hearing loss or visual disturbances  CARDIOVASCULAR: No chest pain or palpitations  RESPIRATORY: Denies any cough, hemoptysis, shortness of breath or dyspnea on exertion  GASTROINTESTINAL: As noted in the History of Present Illness  GENITOURINARY: No problems with urination  Denies any hematuria or dysuria  NEUROLOGIC: No dizziness or vertigo, denies headaches  MUSCULOSKELETAL: Denies any muscle or joint pain     SKIN: Denies skin rashes or itching  ENDOCRINE: Denies excessive thirst  Denies intolerance to heat or cold  PSYCHOSOCIAL: Denies depression or anxiety  Denies any recent memory loss       Historical Information   Past Medical History:   Diagnosis Date    Alcohol abuse     Arthritis     GERD (gastroesophageal reflux disease)     Hypertension     Nicotine dependence     Obesity     Pancreatitis     Panic attack      Past Surgical History:   Procedure Laterality Date    ABDOMINAL SURGERY      C SECTION     Social History   History   Alcohol Use    Yes     Comment: [employer]     History   Drug Use No     History   Smoking Status    Current Every Day Smoker    Packs/day: 1 00    Years: 29 00    Types: Cigarettes   Smokeless Tobacco    Never Used     Family History   Problem Relation Age of Onset    Cirrhosis Father     Alcohol abuse Father     Drug abuse Mother        Meds/Allergies     Prescriptions Prior to Admission   Medication    cloNIDine (CATAPRES) 0 1 mg tablet    dicyclomine (BENTYL) 10 mg capsule    docusate sodium (COLACE) 100 mg capsule    Mometasone Furo-Formoterol Fum (DULERA) 100-5 MCG/ACT AERO    nystatin (MYCOSTATIN) 100,000 units/mL suspension    ondansetron (ZOFRAN) 4 mg tablet    pancrelipase, Lip-Prot-Amyl, (CREON) 24,000 units    pantoprazole (PROTONIX) 40 mg tablet    penicillin V potassium (VEETID) 500 mg tablet    saccharomyces boulardii (FLORASTOR) 250 mg capsule    simethicone (MYLICON) 80 mg chewable tablet     Current Facility-Administered Medications   Medication Dose Route Frequency    acetylcysteine (ACETADOTE) 8,400 mg in dextrose 5 % 1,000 mL IVPB  100 mg/kg Intravenous Once    benzocaine (ANBESOL) 10 % mucosal liquid   Mucosal 4x Daily PRN    calcium carbonate (TUMS) chewable tablet 1,000 mg  1,000 mg Oral Daily PRN    cloNIDine (CATAPRES) tablet 0 1 mg  0 1 mg Oral BID    dicyclomine (BENTYL) capsule 20 mg  20 mg Oral Q6H PRN    docusate sodium (COLACE) capsule 100 mg  100 mg Oral BID    enoxaparin (LOVENOX) subcutaneous injection 40 mg  40 mg Subcutaneous Daily    hydrALAZINE (APRESOLINE) injection 10 mg  10 mg Intravenous Q4H PRN    LORazepam (ATIVAN) 2 mg/mL injection 0 5 mg  0 5 mg Intravenous 4x Daily PRN    melatonin tablet 6 mg  6 mg Oral HS    nicotine (NICODERM CQ) 21 mg/24 hr TD 24 hr patch 1 patch  1 patch Transdermal Daily    nystatin (MYCOSTATIN) oral suspension 500,000 Units  500,000 Units Swish & Swallow 4x Daily    ondansetron (ZOFRAN) injection 4 mg  4 mg Intravenous Q6H PRN    pancrelipase (Lip-Prot-Amyl) (CREON) delayed release capsule 24,000 Units  24,000 Units Oral TID With Meals    pantoprazole (PROTONIX) EC tablet 40 mg  40 mg Oral Early Morning    penicillin V potassium (VEETID) tablet 500 mg  500 mg Oral 4x Daily    saccharomyces boulardii (FLORASTOR) capsule 250 mg  250 mg Oral BID    simethicone (MYLICON) chewable tablet 80 mg  80 mg Oral Q6H PRN    sodium chloride 0 9 % infusion  75 mL/hr Intravenous Continuous       Allergies   Allergen Reactions    Morphine And Related Swelling       Objective   Blood pressure (!) 186/94, pulse (!) 119, temperature 98 3 °F (36 8 °C), temperature source Oral, resp  rate 17, height 5' 4" (1 626 m), weight 83 9 kg (184 lb 15 5 oz), last menstrual period 11/14/2017, SpO2 98 %, not currently breastfeeding      Intake/Output Summary (Last 24 hours) at 11/23/17 0801  Last data filed at 11/22/17 2209   Gross per 24 hour   Intake              250 ml   Output                0 ml   Net              250 ml         PHYSICAL EXAM:     General Appearance:   Alert, cooperative, no distress, appears stated age    HEENT:   Normocephalic, atraumatic, anicteric      Neck:  Supple, symmetrical, trachea midline, no adenopathy;    thyroid: no enlargement/tenderness/nodules; no carotid  bruit or JVD    Lungs:   Clear to auscultation bilaterally; no rales, rhonchi or wheezing; respirations unlabored  Heart[de-identified]   S1 and S2 normal; regular rate and rhythm; no murmur, rub, or gallop     Abdomen:   Soft, non-tender, non-distended; normal bowel sounds; no masses, no organomegaly    Genitalia:   Deferred    Rectal:   Deferred    Extremities:  No cyanosis, clubbing or edema    Pulses:  2+ and symmetric all extremities    Skin:  Skin color, texture, turgor normal, no rashes or lesions    Lymph nodes:  No palpable cervical, axillary or inguinal lymphadenopathy        Lab Results:   Admission on 11/22/2017   Component Date Value    Total Bilirubin 11/22/2017 0 60     Bilirubin, Direct 11/22/2017 0 21*    Alkaline Phosphatase 11/22/2017 144*    AST 11/22/2017 38     ALT 11/22/2017 30     Total Protein 11/22/2017 9 5*    Albumin 11/22/2017 4 8     Sodium 11/22/2017 140     Potassium 11/22/2017 4 2     Chloride 11/22/2017 102     CO2 11/22/2017 14*    Anion Gap 11/22/2017 24*    BUN 11/22/2017 4*    Creatinine 11/22/2017 0 64     Glucose 11/22/2017 152*    Calcium 11/22/2017 9 5     eGFR 11/22/2017 111     Ethanol Lvl 47/34/1207 51*    Salicylate Lvl 11/44/8389 4 8     Acetaminophen Level 11/22/2017 46 8*    WBC 11/22/2017 23 72*    RBC 11/22/2017 5 37*    Hemoglobin 11/22/2017 17 3*    Hematocrit 11/22/2017 51 6*    MCV 11/22/2017 96     MCH 11/22/2017 32 2     MCHC 11/22/2017 33 5     RDW 11/22/2017 13 9     MPV 11/22/2017 9 7     Platelets 40/64/7966 277     nRBC 11/22/2017 0     Neutrophils Relative 11/22/2017 81*    Lymphocytes Relative 11/22/2017 11*    Monocytes Relative 11/22/2017 7     Eosinophils Relative 11/22/2017 0     Basophils Relative 11/22/2017 1     Neutrophils Absolute 11/22/2017 19 22*    Lymphocytes Absolute 11/22/2017 2 66     Monocytes Absolute 11/22/2017 1 56*    Eosinophils Absolute 11/22/2017 0 02     Basophils Absolute 11/22/2017 0 15*    Protime 11/22/2017 14 2     INR 11/22/2017 1 07     PTT 11/22/2017 30     Magnesium 11/22/2017 1 2*    Phosphorus 11/22/2017 3 1     Lipase 11/22/2017 122     Color, UA 11/22/2017 Yellow     Clarity, UA 11/22/2017 Clear     Specific Gravity, UA 11/22/2017 >=1 030     pH, UA 11/22/2017 5 0     Leukocytes, UA 11/22/2017 Negative     Nitrite, UA 11/22/2017 Negative     Protein, UA 11/22/2017 100 (2+)*    Glucose, UA 11/22/2017 Negative     Ketones, UA 11/22/2017 Trace*    Urobilinogen, UA 11/22/2017 0 2     Bilirubin, UA 11/22/2017 Negative     Blood, UA 11/22/2017 Negative     Amph/Meth UR 11/22/2017 Negative     Barbiturate Ur 11/22/2017 Negative     Benzodiazepine Urine 11/22/2017 Negative     Cocaine Urine 11/22/2017 Negative     Methadone Urine 11/22/2017 Negative     Opiate Urine 11/22/2017 Negative     PCP Ur 11/22/2017 Negative     THC Urine 11/22/2017 Positive*    RBC, UA 11/22/2017 None Seen     WBC, UA 11/22/2017 0-5     Epithelial Cells 11/22/2017 Innumerable*    Bacteria, UA 11/22/2017 Moderate*    Platelets 47/04/7362 218     MPV 11/23/2017 9 8     Total Bilirubin 11/23/2017 1 10*    Bilirubin, Direct 11/23/2017 0 33*    Alkaline Phosphatase 11/23/2017 104     AST 11/23/2017 23     ALT 11/23/2017 25     Total Protein 11/23/2017 7 6     Albumin 56/66/4130 3 6     Salicylate Lvl 18/07/1406 <3*    Acetaminophen Level 11/23/2017 3 5*    Sodium 11/23/2017 139     Potassium 11/23/2017 3 5     Chloride 11/23/2017 103     CO2 11/23/2017 22     Anion Gap 11/23/2017 14*    BUN 11/23/2017 6     Creatinine 11/23/2017 0 74     Glucose 11/23/2017 192*    Calcium 11/23/2017 8 3     AST 11/23/2017 26     ALT 11/23/2017 23     Alkaline Phosphatase 11/23/2017 87     Total Protein 11/23/2017 7 5     Albumin 11/23/2017 3 3*    Total Bilirubin 11/23/2017 1 00     eGFR 11/23/2017 101     WBC 11/23/2017 20 51*    RBC 11/23/2017 4 34     Hemoglobin 11/23/2017 14 1     Hematocrit 11/23/2017 41 8     MCV 11/23/2017 96     MCH 11/23/2017 32 5     MCHC 11/23/2017 33 7     RDW 11/23/2017 13 7     Platelets 80/73/2144 203     MPV 11/23/2017 11 5     Protime 11/23/2017 15 7*    INR 11/23/2017 1 22*    PTT 11/23/2017 30        Imaging Studies: I have personally reviewed pertinent imaging studies  ASSESSMENT & PLAN:  Principal Problem:    Tylenol overdose, accidental or unintentional, initial encounter  Active Problems:    Essential hypertension    Nicotine dependence    Pain, dental    Alcohol abuse    Elevated glucose      She is a 27-year-old female with 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  She has acute on chronic pancreatitis from alcohol  Her liver testing was negative during her last hospitalization  I do not think that the patient had a Tylenol overdose    She is a low risk for fulminant hepatic failure based on her ingestion time and dose and Tylenol level according to the [place] nomogram    1 she can finish the N acetylcysteine protocol    2 monitor LFTs    3 continue Creon    4 continue PPI    5 alcohol cessation    6 outpatient follow-up    7 pain control for her teeth    At the present time I am going to sign off please re-consult as needed    Yoly Leung MD  11/23/2017,8:01 AM

## 2017-11-23 NOTE — H&P
History and Physical - [facility] Internal Medicine    Patient Information: Jenny Serrano 40 y o  female MRN: [id number]  Unit/Bed#: ED 25 Encounter: [id number]  Admitting Physician: Chris Bray PA-C  PCP: Mannie Walters MD  Date of Admission:  11/22/17    Assessment/Plan:    Hospital Problem List:     Principal Problem:    Tylenol overdose, accidental or unintentional, initial encounter  Active Problems:    Essential hypertension    Nicotine dependence    Pain, dental    Alcohol abuse    Elevated glucose      Plan for the Primary Problem(s):  · Tylenol overdose accidental  · Denies SI or homocidal ideation, given initial acetylcysteine loading dose 12,580mh IV will continue with four hour maintenance dose of 50mg/kg followed by repeat LFTs, tylenol and salicylate level, followed by 100mg/kg maintenance dose x 16 hours  · avoid hepatotoxic agents, AST and ALT currently WNL with elevated ALK phos, tylenol level 46 8  · Will contact poison control and toxicology consult    Plan for Additional Problems:   · HTN  · PRN hydralazine, monitor vitals  · Nicotine dependence  · nicoderm patch, cessation counseling  · Dental pain  · Avoid pain medications at this time given OD on tylenol and motrin-allergic to morphine, PCN, nystatin oral suspension  · Alcohol abuse  · Cessation counseling, seizure precautions, CIWA scale, monitor LFTs, hx of alcoholic induced pancreatitis  · Elevated glucose   · Check a1c, no diagnosis of diabetes    VTE Prophylaxis: Enoxaparin (Lovenox)  / sequential compression device   Code Status: full  POLST: POLST form is not discussed and not completed at this time  Anticipated Length of Stay:  Patient will be admitted on an Inpatient basis with an anticipated length of stay of  > 2 midnights  Justification for Hospital Stay: IV acetylcysteine    Total Time for Visit, including Counseling / Coordination of Care: 1 hour    Greater than 50% of this total time spent on direct patient counseling and coordination of care  Chief Complaint:   Dental pain    History of Present Illness:    Lidya Chen is a 40 y o  female who presents with PMHx of alcohol abuse, tobacco abuse, GERD, HTN, recurrent pancreatitis presenting with dental pain x 3 days  Pt presented to the ED last night and d/c home on tylenol and motrin  Presented tonight with recurrent dental pain stating that she took "a bottle of tylenol and motrin" over the course of 24 hours  Admits to abdominal pain, nausea, and vomiting  Admits to dizziness  Denies any other systemic sx at this time  States that he tooth broke about 3 weeks ago- pain started three days ago  Presented to oral surgeon which were closed today  Review of Systems:    Review of Systems   Constitutional: Positive for unexpected weight change (loss 10 lbs)  Negative for chills and fever  HENT: Positive for dental problem  Negative for congestion, drooling, facial swelling, sore throat and trouble swallowing  Eyes: Negative for photophobia and visual disturbance  Respiratory: Negative for cough, shortness of breath and wheezing  Cardiovascular: Negative for chest pain, palpitations and leg swelling  Gastrointestinal: Positive for abdominal pain, nausea and vomiting  Negative for abdominal distention, constipation and diarrhea  Genitourinary: Negative for hematuria  Musculoskeletal: Positive for arthralgias (jaw pain)  Negative for gait problem  Skin: Negative for color change  Neurological: Negative for dizziness, seizures, syncope, weakness, light-headedness, numbness and headaches  Psychiatric/Behavioral: Negative for confusion         Past Medical and Surgical History:     Past Medical History:   Diagnosis Date    Alcohol abuse     Arthritis     GERD (gastroesophageal reflux disease)     Hypertension     Nicotine dependence     Obesity     Pancreatitis     Panic attack        Past Surgical History:   Procedure Laterality Date    ABDOMINAL SURGERY      C SECTION       Meds/Allergies:    Prior to Admission medications    Medication Sig Start Date End Date Taking? Authorizing Provider   cloNIDine (CATAPRES) 0 1 mg tablet Take 0 1 mg by mouth 2 (two) times a day    Historical Provider, MD   dicyclomine (BENTYL) 10 mg capsule Take 2 capsules by mouth every 6 (six) hours as needed (abd  spasm) 10/31/17   Sunshine Kapoor MD   docusate sodium (COLACE) 100 mg capsule Take 1 capsule by mouth 2 (two) times a day for 15 days 3/11/17 3/26/17  Elena Harris MD   Mometasone Furo-Formoterol Fum (DULERA) 100-5 MCG/ACT AERO Inhale Unsure of dose    Historical Provider, MD   nystatin (MYCOSTATIN) 100,000 units/mL suspension Swish and swallow 5 mL 4 (four) times a day 10/31/17   Sunshine Kapoor MD   ondansetron (ZOFRAN) 4 mg tablet Take 1 tablet by mouth every 8 (eight) hours as needed for nausea or vomiting for up to 7 days 3/11/17 3/18/17  Elena Harris MD   pancrelipase, Lip-Prot-Amyl, (CREON) 24,000 units Take 1 capsule by mouth 3 (three) times a day with meals 10/31/17   Sunshine Kapoor MD   pantoprazole (PROTONIX) 40 mg tablet Take 40 mg by mouth daily    Historical Provider, MD   penicillin V potassium (VEETID) 500 mg tablet Take 1 tablet by mouth 4 (four) times a day for 7 days 11/21/17 11/28/17  Michele Yanez DO   saccharomyces boulardii (FLORASTOR) 250 mg capsule Take 1 capsule by mouth 2 (two) times a day 10/31/17   Sunshine Kapoor MD   simethicone (MYLICON) 80 mg chewable tablet Chew 1 tablet every 6 (six) hours as needed for flatulence 10/31/17   Sunshine Kapoor MD     nurse med rec    Allergies:    Allergies   Allergen Reactions    Morphine And Related Swelling       Social History:     Marital Status: Single   Occupation: unknown  Patient Pre-hospital Living Situation: family  Patient Pre-hospital Level of Mobility: full  Patient Pre-hospital Diet Restrictions: none  Substance Use History:   History   Alcohol Use    Yes     Comment: [employer]     History Smoking Status    Current Every Day Smoker    Packs/day: 1 00    Years: 29 00    Types: Cigarettes   Smokeless Tobacco    Never Used     History   Drug Use No       Family History:    Family History   Problem Relation Age of Onset    Cirrhosis Father     Alcohol abuse Father     Drug abuse Mother        Physical Exam:     Vitals:   Blood Pressure: (!) 160/111 (11/22/17 2109)  Pulse: 85 (11/22/17 2109)  Temperature: 98 7 °F (37 1 °C) (11/22/17 1705)  Temp Source: Temporal (11/22/17 1705)  Respirations: 20 (11/22/17 2109)  Height: 5' 4" (162 6 cm) (11/22/17 1706)  Weight - Scale: 83 9 kg (184 lb 15 5 oz) (11/22/17 1706)  SpO2: 100 % (11/22/17 2109)    Physical Exam   Constitutional: She is oriented to person, place, and time  She appears well-developed and well-nourished  She appears distressed  HENT:   Head: Normocephalic and atraumatic  Mouth/Throat: Oropharynx is clear and moist  No oropharyngeal exudate  Left lower canine broken with eroded enamel with gingival erythema  No discharge noted  No palpable abscess noted  No trismus or drooling noted  Eyes: Conjunctivae are normal  Right eye exhibits no discharge  Left eye exhibits no discharge  No scleral icterus  Neck: Neck supple  Cardiovascular: Normal rate, regular rhythm, normal heart sounds and intact distal pulses  Exam reveals no gallop and no friction rub  No murmur heard  Pulmonary/Chest: Effort normal and breath sounds normal  No respiratory distress  She has no wheezes  She has no rales  She exhibits no tenderness  Abdominal: Soft  Bowel sounds are normal  She exhibits no distension and no mass  There is no tenderness  There is no rebound and no guarding  Musculoskeletal: Normal range of motion  She exhibits no edema, tenderness or deformity  Neurological: She is alert and oriented to person, place, and time  No cranial nerve deficit  Coordination normal    Skin: Skin is warm and dry  No rash noted  She is not diaphoretic  No erythema  No pallor  Psychiatric: She has a normal mood and affect  Her behavior is normal    Nursing note and vitals reviewed  Additional Data:     Lab Results: I have personally reviewed pertinent reports  Results from last 7 days  Lab Units 11/22/17 2016   WBC Thousand/uL 23 72*   HEMOGLOBIN g/dL 17 3*   HEMATOCRIT % 51 6*   PLATELETS Thousands/uL 277   NEUTROS PCT % 81*   LYMPHS PCT % 11*   MONOS PCT % 7   EOS PCT % 0       Results from last 7 days  Lab Units 11/22/17  1758   SODIUM mmol/L 140   POTASSIUM mmol/L 4 2   CHLORIDE mmol/L 102   CO2 mmol/L 14*   BUN mg/dL 4*   CREATININE mg/dL 0 64   CALCIUM mg/dL 9 5   TOTAL PROTEIN g/dL 9 5*   BILIRUBIN TOTAL mg/dL 0 60   ALK PHOS U/L 144*   ALT U/L 30   AST U/L 38   GLUCOSE RANDOM mg/dL 152*       Results from last 7 days  Lab Units 11/22/17 2016   INR  1 07       Imaging: I have personally reviewed pertinent reports  Mri Abdomen W Wo Contrast And Mrcp    Result Date: 10/30/2017  Narrative: MRI OF THE ABDOMEN WITH AND WITHOUT CONTRAST WITH MRCP INDICATION:  Abdominal pain, nausea, vomiting for one month  History of pancreatitis  COMPARISON: Abdomen and pelvic CT from 10/27/2017, and abdomen MR from 10/7/2016  TECHNIQUE:  The following pulse sequences were obtained on a 1 5 T scanner:  Coronal and axial T2 with TE of 90 and 180 respectively, axial T2 with fat saturation, axial FIESTA fat-sat, axial T1-weighted in-and-out-of phase, axial DWI/ADC, pre-contrast axial T1 with fat saturation, post-contrast dynamic axial T1 with fat saturation at 20, 70, and 180 seconds, followed by coronal and 7 minute delayed axial T1 with fat saturation  3D MRCP images were obtained with radial thick slabs and projections  3D rendering was performed from the acquisition scanner  IV Contrast:  8 mL of gadobutrol injection (MULTI-DOSE) FINDINGS: LIVER:  General:  Liver is enlarged, and there is diffuse lobulation to the surface contour consistent with cirrhosis  Liver also enhances heterogeneously, likely sequela of cirrhosis  Lesions:  No cystic or solid lesions identified  No areas of abnormal arterial enhancement  Vasculature: Portal and hepatic veins patent without evidence of thrombosis  BILIARY TREE:  No intra-hepatic biliary ductal dilatation  Common bile duct is normal in caliber  No evidence of bile duct stricture or choledocholithiasis  No mass identified  GALLBLADDER:  Normal  PANCREAS:  Unchanged mild dilatation of the main pancreatic duct in the pancreatic tail to 7 mm  This probably due to stricturing from recurrent bouts of pancreatitis  There is no evidence of acute hepatitis currently  There is no evidence of pancreatic mass  ADRENAL GLANDS:  Normal  SPLEEN:  Normal  KIDNEYS:  Normal  ABDOMINAL CAVITY:  No lymphadenopathy or mass  No ascites  BOWEL:  Unremarkable MRI appearance  OSSEOUS STRUCTURES:  No osseous destruction  EXTRAHEPATIC VASCULAR STRUCTURES:  Visualized vasculature is normal  ABDOMINAL WALL:  Normal  LOWER CHEST:  Unremarkable MRI appearance  Impression: Unchanged mild dilatation of the main pancreatic duct in the pancreatic tail to 7 mm  This probably due to stricturing from recurrent bouts of pancreatitis  There is hepatomegaly, and cirrhotic liver changes  Workstation performed: [id number]     Ct Abdomen Pelvis With Contrast    Result Date: 10/27/2017  Narrative: CT ABDOMEN AND PELVIS WITH IV CONTRAST INDICATION:  Pancreatitis COMPARISON: CT abdomen pelvis 3/7/2017 TECHNIQUE:  CT examination of the abdomen and pelvis was performed  Reformatted images were created in axial, sagittal, and coronal planes  Radiation dose length product (DLP) for this visit:  601 mGy-cm   This examination, like all CT scans performed in the [facility], was performed utilizing techniques to minimize radiation dose exposure, including the use of iterative reconstruction and automated exposure control   IV Contrast:  100 mL of iohexol (OMNIPAQUE)     Enteric Contrast:  Enteric contrast was not administered  FINDINGS: ABDOMEN LOWER CHEST:  No significant abnormalities identified in the lower chest  LIVER/BILIARY TREE:  The liver is enlarged and fatty infiltrated changes  Again noted is diffuse heterogeneous enhancement of the liver  Slightly nodular contour of the liver surface  GALLBLADDER:  No calcified gallstones  No pericholecystic inflammatory change  SPLEEN:  Unremarkable  PANCREAS:  Stable atrophy of the pancreas with dilation of the pancreatic duct in the mid body and tail  No significant peripancreatic fat stranding  ADRENAL GLANDS:  Unremarkable  KIDNEYS/URETERS:  Unremarkable  No hydronephrosis  STOMACH AND BOWEL:  There is colonic diverticulosis  There is mild thickening of the sigmoid colon without significant pericolonic inflammatory change  APPENDIX:  No findings to suggest appendicitis  ABDOMINOPELVIC CAVITY:  No ascites or free intraperitoneal air  No lymphadenopathy  VESSELS:  Unremarkable for patient's age  PELVIS REPRODUCTIVE ORGANS:  Unremarkable for patient's age  URINARY BLADDER:  Unremarkable  ABDOMINAL WALL/INGUINAL REGIONS:  Unremarkable  OSSEOUS STRUCTURES:  No acute fracture or destructive osseous lesion  Impression: 1  Stable mild dilation of the pancreatic duct  No peripancreatic fat stranding to suggest acute pancreatitis  2   Hepatomegaly and hepatic steatosis  Persistent heterogeneous enhancement of the liver with suggestion of nodular contour  Findings may represent early changes of cirrhosis  Recommend clinical correlation and consider MRI follow-up  3   Mild bowel wall thickening of the sigmoid colon may represent mild colitis versus underdistention  4   Colonic diverticulosis   Workstation performed: [id number]       EKG, Pathology, and Other Studies Reviewed on Admission:   · CMP, CBC, acetominophen level, salicylate level, medical alcohol    Allscripts / Epic Records Reviewed: Yes     ** Please Note: This note has been constructed using a voice recognition system   **

## 2017-11-24 VITALS
TEMPERATURE: 98.2 F | BODY MASS INDEX: 31.58 KG/M2 | HEART RATE: 89 BPM | HEIGHT: 64 IN | RESPIRATION RATE: 18 BRPM | DIASTOLIC BLOOD PRESSURE: 76 MMHG | OXYGEN SATURATION: 98 % | SYSTOLIC BLOOD PRESSURE: 118 MMHG | WEIGHT: 184.97 LBS

## 2017-11-24 LAB
ALBUMIN SERPL BCP-MCNC: 2.9 G/DL (ref 3.5–5)
ALBUMIN SERPL BCP-MCNC: 2.9 G/DL (ref 3.5–5)
ALP SERPL-CCNC: 77 U/L (ref 46–116)
ALP SERPL-CCNC: 77 U/L (ref 46–116)
ALT SERPL W P-5'-P-CCNC: 20 U/L (ref 12–78)
ALT SERPL W P-5'-P-CCNC: 21 U/L (ref 12–78)
ANION GAP SERPL CALCULATED.3IONS-SCNC: 11 MMOL/L (ref 4–13)
AST SERPL W P-5'-P-CCNC: 24 U/L (ref 5–45)
AST SERPL W P-5'-P-CCNC: 24 U/L (ref 5–45)
BASOPHILS # BLD AUTO: 0.04 THOUSANDS/ΜL (ref 0–0.1)
BASOPHILS NFR BLD AUTO: 1 % (ref 0–1)
BILIRUB DIRECT SERPL-MCNC: 0.3 MG/DL (ref 0–0.2)
BILIRUB SERPL-MCNC: 0.7 MG/DL (ref 0.2–1)
BILIRUB SERPL-MCNC: 0.7 MG/DL (ref 0.2–1)
BUN SERPL-MCNC: 6 MG/DL (ref 5–25)
CALCIUM SERPL-MCNC: 8 MG/DL (ref 8.3–10.1)
CHLORIDE SERPL-SCNC: 103 MMOL/L (ref 100–108)
CO2 SERPL-SCNC: 26 MMOL/L (ref 21–32)
CREAT SERPL-MCNC: 0.66 MG/DL (ref 0.6–1.3)
EOSINOPHIL # BLD AUTO: 0.07 THOUSAND/ΜL (ref 0–0.61)
EOSINOPHIL NFR BLD AUTO: 1 % (ref 0–6)
ERYTHROCYTE [DISTWIDTH] IN BLOOD BY AUTOMATED COUNT: 13.7 % (ref 11.6–15.1)
GFR SERPL CREATININE-BSD FRML MDRD: 110 ML/MIN/1.73SQ M
GLUCOSE SERPL-MCNC: 109 MG/DL (ref 65–140)
HCT VFR BLD AUTO: 36.5 % (ref 34.8–46.1)
HGB BLD-MCNC: 12.3 G/DL (ref 11.5–15.4)
LYMPHOCYTES # BLD AUTO: 2.75 THOUSANDS/ΜL (ref 0.6–4.47)
LYMPHOCYTES NFR BLD AUTO: 33 % (ref 14–44)
MCH RBC QN AUTO: 32.7 PG (ref 26.8–34.3)
MCHC RBC AUTO-ENTMCNC: 33.7 G/DL (ref 31.4–37.4)
MCV RBC AUTO: 97 FL (ref 82–98)
MONOCYTES # BLD AUTO: 0.77 THOUSAND/ΜL (ref 0.17–1.22)
MONOCYTES NFR BLD AUTO: 9 % (ref 4–12)
NEUTROPHILS # BLD AUTO: 4.66 THOUSANDS/ΜL (ref 1.85–7.62)
NEUTS SEG NFR BLD AUTO: 56 % (ref 43–75)
NRBC BLD AUTO-RTO: 0 /100 WBCS
PLATELET # BLD AUTO: 146 THOUSANDS/UL (ref 149–390)
PMV BLD AUTO: 10 FL (ref 8.9–12.7)
POTASSIUM SERPL-SCNC: 2.8 MMOL/L (ref 3.5–5.3)
PROT SERPL-MCNC: 6.4 G/DL (ref 6.4–8.2)
PROT SERPL-MCNC: 6.4 G/DL (ref 6.4–8.2)
RBC # BLD AUTO: 3.76 MILLION/UL (ref 3.81–5.12)
SODIUM SERPL-SCNC: 140 MMOL/L (ref 136–145)
WBC # BLD AUTO: 8.32 THOUSAND/UL (ref 4.31–10.16)

## 2017-11-24 PROCEDURE — 80053 COMPREHEN METABOLIC PANEL: CPT | Performed by: INTERNAL MEDICINE

## 2017-11-24 PROCEDURE — 85025 COMPLETE CBC W/AUTO DIFF WBC: CPT | Performed by: INTERNAL MEDICINE

## 2017-11-24 PROCEDURE — 80076 HEPATIC FUNCTION PANEL: CPT | Performed by: PHYSICIAN ASSISTANT

## 2017-11-24 RX ORDER — AMOXICILLIN AND CLAVULANATE POTASSIUM 875; 125 MG/1; MG/1
1 TABLET, FILM COATED ORAL EVERY 12 HOURS SCHEDULED
Qty: 14 TABLET | Refills: 0 | Status: SHIPPED | OUTPATIENT
Start: 2017-11-24 | End: 2017-12-01

## 2017-11-24 RX ORDER — OXYCODONE HYDROCHLORIDE 10 MG/1
10 TABLET ORAL ONCE
Status: COMPLETED | OUTPATIENT
Start: 2017-11-24 | End: 2017-11-24

## 2017-11-24 RX ORDER — POTASSIUM CHLORIDE 20 MEQ/1
40 TABLET, EXTENDED RELEASE ORAL ONCE
Status: COMPLETED | OUTPATIENT
Start: 2017-11-24 | End: 2017-11-24

## 2017-11-24 RX ORDER — NICOTINE 21 MG/24HR
1 PATCH, TRANSDERMAL 24 HOURS TRANSDERMAL DAILY
Qty: 28 PATCH | Refills: 0 | Status: SHIPPED | OUTPATIENT
Start: 2017-11-24 | End: 2018-05-28

## 2017-11-24 RX ORDER — OXYCODONE HYDROCHLORIDE 10 MG/1
10 TABLET ORAL EVERY 6 HOURS PRN
Qty: 20 TABLET | Refills: 0 | Status: SHIPPED | OUTPATIENT
Start: 2017-11-24 | End: 2017-12-04

## 2017-11-24 RX ADMIN — POTASSIUM CHLORIDE 40 MEQ: 1500 TABLET, EXTENDED RELEASE ORAL at 09:22

## 2017-11-24 RX ADMIN — LORAZEPAM 0.5 MG: 2 INJECTION INTRAMUSCULAR; INTRAVENOUS at 02:14

## 2017-11-24 RX ADMIN — POTASSIUM CHLORIDE 40 MEQ: 1500 TABLET, EXTENDED RELEASE ORAL at 10:41

## 2017-11-24 RX ADMIN — PANTOPRAZOLE SODIUM 40 MG: 40 TABLET, DELAYED RELEASE ORAL at 06:01

## 2017-11-24 RX ADMIN — BENZOCAINE: 100 SOLUTION TOPICAL at 02:18

## 2017-11-24 RX ADMIN — CLONIDINE HYDROCHLORIDE 0.1 MG: 0.1 TABLET ORAL at 09:18

## 2017-11-24 RX ADMIN — OXYCODONE HYDROCHLORIDE 10 MG: 10 TABLET ORAL at 09:18

## 2017-11-24 RX ADMIN — Medication 250 MG: at 09:16

## 2017-11-24 RX ADMIN — NICOTINE 1 PATCH: 21 PATCH, EXTENDED RELEASE TRANSDERMAL at 09:17

## 2017-11-24 RX ADMIN — PIPERACILLIN SODIUM,TAZOBACTAM SODIUM 3.38 G: 3; .375 INJECTION, POWDER, FOR SOLUTION INTRAVENOUS at 00:52

## 2017-11-24 RX ADMIN — NYSTATIN 500000 UNITS: 100000 SUSPENSION ORAL at 09:18

## 2017-11-24 RX ADMIN — PIPERACILLIN SODIUM,TAZOBACTAM SODIUM 3.38 G: 3; .375 INJECTION, POWDER, FOR SOLUTION INTRAVENOUS at 07:04

## 2017-11-24 RX ADMIN — SODIUM CHLORIDE 75 ML/HR: 0.9 INJECTION, SOLUTION INTRAVENOUS at 06:06

## 2017-11-24 RX ADMIN — OXYCODONE HYDROCHLORIDE 10 MG: 10 TABLET ORAL at 04:19

## 2017-11-24 RX ADMIN — LORAZEPAM 0.5 MG: 2 INJECTION INTRAMUSCULAR; INTRAVENOUS at 09:32

## 2017-11-24 RX ADMIN — HYDRALAZINE HYDROCHLORIDE 10 MG: 20 INJECTION INTRAMUSCULAR; INTRAVENOUS at 07:04

## 2017-11-24 RX ADMIN — PANCRELIPASE 24000 UNITS: 24000; 76000; 120000 CAPSULE, DELAYED RELEASE PELLETS ORAL at 08:30

## 2017-11-24 NOTE — CASE MANAGEMENT
[address] [facility] in the [facility] by [address] for 2017  Network Utilization Review Department  Phone: [phone]; Fax [phone]  ATTENTION: The Network Utilization Review Department is now centralized for our 7 Facilities  Make a note that we have a new phone and fax numbers for our Department  Please call with any questions or concerns to [phone] and carefully follow the prompts so that you are directed to the right person  All voicemails are confidential  Fax any determinations, approvals, denials, and requests for initial or continue stay review clinical to [phone]  Due to HIGH CALL volume, it would be easier if you could please send faxed requests to expedite your requests and in part, help us provide discharge notifications faster  Initial Clinical Review    Admission: Date/Time/Statement: 11/22/17 @ 2056     Orders Placed This Encounter   Procedures    Inpatient Admission (expected length of stay for this patient is greater than two midnights)     Standing Status:   Standing     Number of Occurrences:   1     Order Specific Question:   Admitting Physician     Answer:   Dewayne Lino [[id number]]     Order Specific Question:   Level of Care     Answer:   Med Surg [[id number]]     Order Specific Question:   Estimated length of stay     Answer:   More than 2 Midnights     Order Specific Question:   Certification     Answer:   I certify that inpatient services are medically necessary for this patient for a duration of greater than two midnights  See H&P and MD Progress Notes for additional information about the patient's course of treatment           ED: Date/Time/Mode of Arrival:   ED Arrival Information     Expected Arrival Acuity Means of Arrival Escorted By Service Admission Type    - 11/22/2017 16:51 Urgent Walk-In Family Member General Medicine Urgent    Arrival Complaint    DENTAL PAIN           Chief Complaint:   Chief Complaint   Patient presents with    Dental Pain     Pt presents to ED with left lower jaw pain due to missing tooth  Pt was in ED yesterday for similar issue  Pt states she is now vomiting and dizzy  Pt denies any foul taste in mouth       History of Illness: Parminder Govea is a 40 y o  female who presents with PMHx of alcohol abuse, tobacco abuse, GERD, HTN, recurrent pancreatitis presenting with dental pain x 3 days  Pt presented to the ED last night and d/c home on tylenol and motrin  Presented tonight with recurrent dental pain stating that she took "a bottle of tylenol and motrin" over the course of 24 hours  Admits to abdominal pain, nausea, and vomiting  Admits to dizziness  Denies any other systemic sx at this time  States that he tooth broke about 3 weeks ago- pain started three days ago  Presented to oral surgeon which were closed today  ED Vital Signs:   ED Triage Vitals   Temperature Pulse Respirations Blood Pressure SpO2   11/22/17 1705 11/22/17 1705 11/22/17 1705 11/22/17 1706 11/22/17 1705   98 7 °F (37 1 °C) 101 20 (!) 182/121 96 %      Temp Source Heart Rate Source Patient Position - Orthostatic VS BP Location FiO2 (%)   11/22/17 1705 11/22/17 1705 11/22/17 1706 11/22/17 1706 --   Temporal Monitor Sitting Left arm       Pain Score       11/22/17 1705       Worst Possible Pain        Wt Readings from Last 1 Encounters:   11/23/17 83 9 kg (184 lb 15 5 oz)       Vital Signs (abnormal):   11/22/17 2326 -- 90 18  183/101 97 % -- JT   11/22/17 2115 -- 89 20  160/111 99 % -- JT   11/22/17 2109 -- 85 20  160/111 100 % -- JT   11/22/17 1707 -- -- --  184/116 -- -- RAP   11/22/17 1706 -- -- --  182/121      Abnormal Labs/Diagnostic Test Results: UDS - + THC    WBC 23 72 (H) 4 31 - 10 16 Thousand/uL     RBC 5 37 (H) 3 81 - 5 12 Million/uL     Hemoglobin 17 3 (H) 11 5 - 15 4 g/dL     Hematocrit 51 6 (H)        Bilirubin, Direct 0 21 (H) 0 00 - 0 20 mg/dL     Comment: Slightly Hemolyzed;  Results May be Affected       Alkaline Phosphatase 144 (H)        Total Protein 9 5 (H)      CO2 14 (L) 21 - 32 mmol/L     Anion Gap 24 (H) 4 - 13 mmol/L     BUN 4 (L) 5 - 25 mg/dL     Creatinine 0 64 0 60 - 1 30 mg/dL     Comment: Standardized to IDMS reference method       Glucose 152 (H) 65 - 140 mg/dL     Comment:          Acetaminophen Level 46 8 (H)      Ethanol Lvl 51 (H)     ED Treatment:   Medication Administration from 11/22/2017 1651 to 11/22/2017 2341       Date/Time Order Dose Route Action Action by Comments     11/22/2017 1747 ondansetron (ZOFRAN-ODT) dispersible tablet 4 mg 4 mg Oral Given Millie Cueva, RN      11/22/2017 1910 metoclopramide (REGLAN) injection 10 mg 10 mg Intramuscular Given Blaze Segura, JORGE      11/22/2017 2327 sodium chloride 0 9 % bolus 1,000 mL 1,000 mL Intravenous New Bag Sly Rodriguez, JORGE      11/22/2017 2209 acetylcysteine (ACETADOTE) 12,580 mg in dextrose 5 % 200 mL IVPB 0 mg/kg Intravenous Stopped Sly Michael, RN      11/22/2017 2109 acetylcysteine (ACETADOTE) 12,580 mg in dextrose 5 % 200 mL IVPB 12,580 mg Intravenous New Bag Sly Marrow, RN      11/22/2017 2217 sodium chloride 0 9 % bolus 1,000 mL 1,000 mL Intravenous New Bag Sly Michael, RN      11/22/2017 2105 ondansetron (ZOFRAN) injection 4 mg 4 mg Intravenous Given Sly Rodriguez, JORGE      11/22/2017 2243 ondansetron (ZOFRAN) injection 4 mg 4 mg Intravenous Given Sly Rodriguez RN           Past Medical/Surgical History:    Active Ambulatory Problems     Diagnosis Date Noted    Pancreatitis 10/06/2016    Abdominal pain 10/06/2016    Essential hypertension 10/06/2016    Nicotine dependence 10/06/2016    Colitis 10/27/2017     Resolved Ambulatory Problems     Diagnosis Date Noted    Hypokalemia 03/07/2017     Past Medical History:   Diagnosis Date    Alcohol abuse     Arthritis     GERD (gastroesophageal reflux disease)     Hypertension     Nicotine dependence     Obesity     Pancreatitis     Panic attack Admitting Diagnosis: Vomiting [R11 10]  Pain, dental [K08 89]  Tylenol overdose, accidental or unintentional, initial encounter [T39 1X1A]    Age/Sex: 40 y o  female    Assessment/Plan: Hospital Problem List:      Principal Problem:    Tylenol overdose, accidental or unintentional, initial encounter  Active Problems:    Essential hypertension    Nicotine dependence    Pain, dental    Alcohol abuse    Elevated glucose        Plan for the Primary Problem(s):  · Tylenol overdose accidental  ? Denies SI or homocidal ideation, given initial acetylcysteine loading dose 12,580mh IV will continue with four hour maintenance dose of 50mg/kg followed by repeat LFTs, tylenol and salicylate level, followed by 100mg/kg maintenance dose x 16 hours  ? avoid hepatotoxic agents, AST and ALT currently WNL with elevated ALK phos, tylenol level 46 8  ? Will contact poison control and toxicology consult     Plan for Additional Problems:   · HTN  ? PRN hydralazine, monitor vitals  · Nicotine dependence  ? nicoderm patch, cessation counseling  · Dental pain  ? Avoid pain medications at this time given OD on tylenol and motrin-allergic to morphine, PCN, nystatin oral suspension  · Alcohol abuse  ? Cessation counseling, seizure precautions, CIWA scale, monitor LFTs, hx of alcoholic induced pancreatitis  · Elevated glucose   ? Check a1c, no diagnosis of diabetes     VTE Prophylaxis: Enoxaparin (Lovenox)  / sequential compression device   Code Status: full  POLST: POLST form is not discussed and not completed at this time      Anticipated Length of Stay:  Patient will be admitted on an Inpatient basis with an anticipated length of stay of  > 2 midnights     Justification for Hospital Stay: IV acetylcysteine    Admission Orders:  Scheduled Meds:   cloNIDine 0 1 mg Oral BID   docusate sodium 100 mg Oral BID   enoxaparin 40 mg Subcutaneous Daily   melatonin 6 mg Oral HS   nicotine 1 patch Transdermal Daily   nystatin 500,000 Units Swish & Swallow 4x Daily   pancrelipase (Lip-Prot-Amyl) 24,000 Units Oral TID With Meals   pantoprazole 40 mg Oral Early Morning   piperacillin-tazobactam 3 375 g Intravenous Q6H   saccharomyces boulardii 250 mg Oral BID     Continuous Infusions:   sodium chloride 75 mL/hr Last Rate: 75 mL/hr (11/23/17 4242)     PRN Meds:   benzocaine    calcium carbonate    dicyclomine    hydrALAZINE  x2    LORazepam    ondansetron    oxyCODONE    simethicone     Reg diet   Seizure precautions  Up and OOB as christina   11/24 cbc , cmp   SCD  GI consult   CIWA scores   12-24    GI consult  11/22  ASSESSMENT & PLAN:  Principal Problem:    Tylenol overdose, accidental or unintentional, initial encounter  Active Problems:    Essential hypertension    Nicotine dependence    Pain, dental    Alcohol abuse    Elevated glucose        She is a 43-year-old female with 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  She has acute on chronic pancreatitis from alcohol  Her liver testing was negative during her last hospitalization  I do not think that the patient had a Tylenol overdose  She is a low risk for fulminant hepatic failure based on her ingestion time and dose and Tylenol level according to the [place] nomogram     1 she can finish the N acetylcysteine protocol     2 monitor LFTs     3 continue Creon     4 continue PPI     5 alcohol cessation     6 outpatient follow-up     7 pain control for her teeth    Internal med progress note 11/23  Principal Problem:    Tylenol overdose, accidental or unintentional, initial encounter  Active Problems:    Essential hypertension    Nicotine dependence    Pain, dental    Alcohol abuse    Elevated glucose        Plan:     · 1  Tylenol overdose- patient accidentally took to many tylenol for her tooth pain that has been going on for 3 weeks  Continue supportive care  · 2  Tooth pain- possibly secondary to abscess  Will place on zosyn  Continue pain control  Will f/u blood cultures  Patient will need to followup with dentist or oral surgeon as outpatient  · 3  H/o Alcohol abuse- will place on withdrawal protocol  · 4  HTN- on clonidine  · 5  H/o pancreatitis- on creon        VTE Pharmacologic Prophylaxis:   Pharmacologic: Enoxaparin (Lovenox)  Mechanical VTE Prophylaxis in Place: Yes     Patient Centered Rounds: I have performed bedside rounds with nursing staff today      Discussions with Specialists or Other Care Team Provider:      Education and Discussions with Family / Patient:      Time Spent for Care: 20 minutes    More than 50% of total time spent on counseling and coordination of care as described above      Current Length of Stay: 1 day(s)     Current Patient Status: Inpatient   Certification Statement: The patient will continue to require additional inpatient hospital stay due to tylenol intoxication     Discharge Plan / Estimated Discharge Date: Once tylenol intoxication improves

## 2017-11-24 NOTE — DISCHARGE SUMMARY
Discharge Summary - [facility] Internal Medicine    Patient Information: Michael Murdock 40 y o  female MRN: [id number]  Unit/Bed#: -01 Encounter: [id number]    Discharging Physician / Practitioner: Claudean Stai, MD  PCP: Taniya Glover MD  Admission Date: 11/22/2017  Discharge Date: 11/24/17    Reason for Admission: Tylenol OD, dental pain    Discharge Diagnoses:     Principal Problem:    Tylenol overdose, accidental or unintentional, initial encounter  Active Problems:    Essential hypertension    Nicotine dependence    Pain, dental    Alcohol abuse    Elevated glucose  Resolved Problems:    * No resolved hospital problems  *      Consultations During Hospital Stay:  ·     Procedures Performed:     ·     Significant Findings / Test Results:     ·     Incidental Findings:   ·      Test Results Pending at Discharge (will require follow up):   ·      Outpatient Tests Requested:  ·     Complications:      Hospital Course:     Michael Murdock is a 40 y o  female patient who originally presented to the hospital on 11/22/2017 due to complaints of increased tooth pain  Patient states that her tooth was hurting so bad that she kept on taking tylenol  In ER patient found to have tylenol OD and was given acetylcysteine  Her tylenol levels improved  Patient found to have dental infection and placed on IV abx  Her infection improved  Patient advised to see an oral surgeon today to have her infected tooth removed  Patient is currently hemodynamically stable and will be discharged home with PO abx and pain control  Condition at Discharge: stable     Discharge Day Visit / Exam:     Subjective:  Patient seen and examined at bedside  Patient still complains of tooth pain    Vitals: Blood Pressure: 118/76 (11/24/17 1029)  Pulse: 89 (11/24/17 1029)  Temperature: 98 2 °F (36 8 °C) (11/24/17 0654)  Temp Source: Oral (11/24/17 0654)  Respirations: 18 (11/24/17 0654)  Height: 5' 4" (162 6 cm) (11/23/17 0002)  Weight - Scale: 83 9 kg (184 lb 15 5 oz) (11/23/17 0002)  SpO2: 98 % (11/24/17 0654)  Exam:   Physical Exam   Constitutional: She is oriented to person, place, and time  She appears well-developed and well-nourished  HENT:   Head: Normocephalic and atraumatic  Left tooth pain  Mild swelling  Eyes: Conjunctivae and EOM are normal  Pupils are equal, round, and reactive to light  Neck: Normal range of motion  Neck supple  No JVD present  No tracheal deviation present  No thyromegaly present  Cardiovascular: Normal rate, regular rhythm and normal heart sounds  Exam reveals no gallop and no friction rub  No murmur heard  Pulmonary/Chest: Effort normal and breath sounds normal  No respiratory distress  She has no wheezes  She has no rales  Abdominal: Soft  Bowel sounds are normal  She exhibits no distension  There is no tenderness  There is no rebound  Musculoskeletal: Normal range of motion  She exhibits no edema, tenderness or deformity  Neurological: She is alert and oriented to person, place, and time  Vitals reviewed  Discussion with Family:     Discharge instructions/Information to patient and family:   See after visit summary for information provided to patient and family  Provisions for Follow-Up Care:  See after visit summary for information related to follow-up care and any pertinent home health orders  Disposition:     Home    For Discharges to [facility]:   · Not Applicable to this Patient - Not Applicable to this Patient    Planned Readmission: none     Discharge Statement:  I spent 50 minutes discharging the patient  This time was spent on the day of discharge  I had direct contact with the patient on the day of discharge  Greater than 50% of the total time was spent examining patient, answering all patient questions, arranging and discussing plan of care with patient as well as directly providing post-discharge instructions    Additional time then spent on discharge activities  Discharge Medications:  See after visit summary for reconciled discharge medications provided to patient and family        ** Please Note: This note has been constructed using a voice recognition system **

## 2017-11-28 LAB
BACTERIA BLD CULT: NORMAL
BACTERIA BLD CULT: NORMAL

## 2017-11-29 NOTE — CASE MANAGEMENT
[address] [facility] in the [facility] by Dk Rodríguez for 2017  Network Utilization Review Department  Phone: [phone]; Fax [phone]  ATTENTION: The Network Utilization Review Department is now centralized for our 7 Facilities  Make a note that we have a new phone and fax numbers for our Department  Please call with any questions or concerns to [phone] and carefully follow the prompts so that you are directed to the right person  All voicemails are confidential  Fax any determinations, approvals, denials, and requests for initial or continue stay review clinical to [phone]  Due to HIGH CALL volume, it would be easier if you could please send faxed requests to expedite your requests and in part, help us provide discharge notifications faster  Initial Clinical Review     Admission: Date/Time/Statement: 11/22/17 @ 2056            Orders Placed This Encounter   Procedures    Inpatient Admission (expected length of stay for this patient is greater than two midnights)       Standing Status:   Standing       Number of Occurrences:   1       Order Specific Question:   Admitting Physician       Answer:   Nikhil Shen [[id number]]       Order Specific Question:   Level of Care       Answer:   Med Surg [16]       Order Specific Question:   Estimated length of stay       Answer:   More than 2 Midnights       Order Specific Question:   Certification       Answer:   I certify that inpatient services are medically necessary for this patient for a duration of greater than two midnights   See H&P and MD Progress Notes for additional information about the patient's course of treatment             ED: Date/Time/Mode of Arrival:             ED Arrival Information      Expected Arrival [address] Adriane Richardson of Arrival Escorted By Service Admission Type     - 11/22/2017 16:51 Urgent Walk-In Family Member General Medicine Urgent     Arrival Complaint     DENTAL PAIN              Chief Complaint:        Chief Complaint   Patient presents with    Dental Pain       Pt presents to ED with left lower jaw pain due to missing tooth  Pt was in ED yesterday for similar issue  Pt states she is now vomiting and dizzy  Pt denies any foul taste in mouth         History of Illness: Vazquez Rawls a 40 y o  female who presents with PMHx of alcohol abuse, tobacco abuse, GERD, HTN, recurrent pancreatitis presenting with dental pain x 3 days  Pt presented to the ED last night and d/c home on tylenol and motrin  Presented tonight with recurrent dental pain stating that she took "a bottle of tylenol and motrin" over the course of 24 hours  Admits to abdominal pain, nausea, and vomiting  Admits to dizziness  Denies any other systemic sx at this time  States that he tooth broke about 3 weeks ago- pain started three days ago   Presented to oral surgeon which were closed today       ED Vital Signs:            ED Triage Vitals   Temperature Pulse Respirations Blood Pressure SpO2   11/22/17 1705 11/22/17 1705 11/22/17 1705 11/22/17 1706 11/22/17 1705   98 7 °F (37 1 °C) 101 20 (!) 182/121 96 %       Temp Source Heart Rate Source Patient Position - Orthostatic VS BP Location FiO2 (%)   11/22/17 1705 11/22/17 1705 11/22/17 1706 11/22/17 1706 --   Temporal Monitor Sitting Left arm         Pain Score           11/22/17 1705           Worst Possible Pain                Wt Readings from Last 1 Encounters:   11/23/17 83 9 kg (184 lb 15 5 oz)         Vital Signs (abnormal):   11/22/17 2326 -- 90 18  183/101 97 % -- JT   11/22/17 2115 -- 89 20  160/111 99 % -- JT   11/22/17 2109 -- 85 20  160/111 100 % -- JT   11/22/17 1707 -- -- --  184/116 -- -- RAP   11/22/17 1706 -- -- --  182/121         Abnormal Labs/Diagnostic Test Results: UDS - + THC     WBC 23 72 (H) 4 31 - 10 16 Thousand/uL       RBC 5 37 (H) 3 81 - 5 12 Million/uL       Hemoglobin 17 3 (H) 11 5 - 15 4 g/dL       Hematocrit 51 6 (H)            Bilirubin, Direct 0 21 (H) 0 00 - 0 20 mg/dL       Comment: Slightly Hemolyzed; Results May be Affected         Alkaline Phosphatase 144 (H)            Total Protein 9 5 (H)        CO2 14 (L) 21 - 32 mmol/L       Anion Gap 24 (H) 4 - 13 mmol/L       BUN 4 (L) 5 - 25 mg/dL       Creatinine 0 64 0 60 - 1 30 mg/dL       Comment: Standardized to IDMS reference method         Glucose 152 (H) 65 - 140 mg/dL       Comment:              Acetaminophen Level 46 8 (H)        Ethanol Lvl 51 (H)      ED Treatment:              Medication Administration from 11/22/2017 1651 to 11/22/2017 2341        Date/Time Order Dose Route Action Action by Comments       11/22/2017 1747 ondansetron (ZOFRAN-ODT) dispersible tablet 4 mg 4 mg Oral Given Ashley Mccall RN         11/22/2017 1910 metoclopramide (REGLAN) injection 10 mg 10 mg Intramuscular Given Ky Hopkins RN         11/22/2017 2327 sodium chloride 0 9 % bolus 1,000 mL 1,000 mL Intravenous New Bag Sara Olvera RN         11/22/2017 2209 acetylcysteine (ACETADOTE) 12,580 mg in dextrose 5 % 200 mL IVPB 0 mg/kg Intravenous Stopped Sara Olvera RN         11/22/2017 2109 acetylcysteine (ACETADOTE) 12,580 mg in dextrose 5 % 200 mL IVPB 12,580 mg Intravenous New Bag Sara Olvera RN         11/22/2017 2217 sodium chloride 0 9 % bolus 1,000 mL 1,000 mL Intravenous New Bag Sara Olvera RN         11/22/2017 2105 ondansetron (ZOFRAN) injection 4 mg 4 mg Intravenous Given Sara Olvera RN         11/22/2017 2243 ondansetron (ZOFRAN) injection 4 mg 4 mg Intravenous Given Sara Olvera RN               Past Medical/Surgical History:         Active Ambulatory Problems     Diagnosis Date Noted    Pancreatitis 10/06/2016    Abdominal pain 10/06/2016    Essential hypertension 10/06/2016    Nicotine dependence 10/06/2016    Colitis 10/27/2017           Resolved Ambulatory Problems     Diagnosis Date Noted    Hypokalemia 03/07/2017           Past Medical History:   Diagnosis Date    Alcohol abuse      Arthritis      GERD (gastroesophageal reflux disease)      Hypertension      Nicotine dependence      Obesity      Pancreatitis      Panic attack           Admitting Diagnosis: Vomiting [R11 10]  Pain, dental [K08 89]  Tylenol overdose, accidental or unintentional, initial encounter [T39 1X1A]     Age/Sex: 40 y o  female     Assessment/Plan: Hospital Problem List:      Principal Problem:    Tylenol overdose, accidental or unintentional, initial encounter  Active Problems:    Essential hypertension    Nicotine dependence    Pain, dental    Alcohol abuse    Elevated glucose        Plan for the Primary Problem(s):  · Tylenol overdose accidental  ? Denies SI or homocidal ideation, given initial acetylcysteine loading dose 12,580mh IV will continue with four hour maintenance dose of 50mg/kg followed by repeat LFTs, tylenol and salicylate level, followed by 100mg/kg maintenance dose x 16 hours  ? avoid hepatotoxic agents, AST and ALT currently WNL with elevated ALK phos, tylenol level 46 8  ? Will contact poison control and toxicology consult     Plan for Additional Problems:   · HTN  ? PRN hydralazine, monitor vitals  · Nicotine dependence  ? nicoderm patch, cessation counseling  · Dental pain  ? Avoid pain medications at this time given OD on tylenol and motrin-allergic to morphine, PCN, nystatin oral suspension  · Alcohol abuse  ? Cessation counseling, seizure precautions, CIWA scale, monitor LFTs, hx of alcoholic induced pancreatitis  · Elevated glucose   ?  Check a1c, no diagnosis of diabetes     VTE Prophylaxis: Enoxaparin (Lovenox)  / sequential compression device   Code Status: full  POLST: POLST form is not discussed and not completed at this time      Anticipated Length of Stay: Rehana Lucia will be admitted on an Inpatient basis with an anticipated length of stay of  > 2 midnights    Justification for Hospital Stay: IV acetylcysteine     Admission Orders:  Scheduled Meds:   cloNIDine 0 1 mg Oral BID   docusate sodium 100 mg Oral BID   enoxaparin 40 mg Subcutaneous Daily   melatonin 6 mg Oral HS   nicotine 1 patch Transdermal Daily   nystatin 500,000 Units Swish & Swallow 4x Daily   pancrelipase (Lip-Prot-Amyl) 24,000 Units Oral TID With Meals   pantoprazole 40 mg Oral Early Morning   piperacillin-tazobactam 3 375 g Intravenous Q6H   saccharomyces boulardii 250 mg Oral BID      Continuous Infusions:   sodium chloride 75 mL/hr Last Rate: 75 mL/hr (11/23/17 1717)      PRN Meds:   benzocaine    calcium carbonate    dicyclomine    hydrALAZINE  x2    LORazepam    ondansetron    oxyCODONE    simethicone      Reg diet   Seizure precautions  Up and OOB as christina   11/24 cbc , cmp   SCD  GI consult   CIWA scores   12-24     GI consult  11/22  ASSESSMENT & PLAN:  Principal Problem:    Tylenol overdose, accidental or unintentional, initial encounter  Active Problems:    Essential hypertension    Nicotine dependence    Pain, dental    Alcohol abuse    Elevated glucose   She is a 52-year-old female with 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   She has acute on chronic pancreatitis from alcohol   Her liver testing was negative during her last hospitalization    I do not think that the patient had a Tylenol overdose  Eladio Minor is a low risk for fulminant hepatic failure based on her ingestion time and dose and Tylenol level according to the [place] nomogram   1 she can finish the N acetylcysteine protocol   2 monitor LFTs   3 continue Creon   4 continue PPI   5 alcohol cessation   6 outpatient follow-up   7 pain control for her teeth   Internal med progress note 11/23  Principal Problem:    Tylenol overdose, accidental or unintentional, initial encounter  Active Problems:    Essential hypertension    Nicotine dependence    Pain, dental    Alcohol abuse    Elevated glucose   Plan:   · 1   Tylenol overdose- patient accidentally took to many tylenol for her tooth pain that has been going on for 3 weeks   Continue supportive care  · 2   Tooth pain- possibly secondary to abscess  Will place on zosyn   Continue pain control   Will f/u blood cultures   Patient will need to followup with dentist or oral surgeon as outpatient  · 3   H/o Alcohol abuse- will place on withdrawal protocol  · 4   HTN- on clonidine  · 5   H/o pancreatitis- on creon   VTE Pharmacologic Prophylaxis:   Pharmacologic: Enoxaparin (Lovenox)  Mechanical VTE Prophylaxis in Place: Yes   Patient Centered Rounds: I have performed bedside rounds with nursing staff today    Discussions with Specialists or Other Care Team Provider:    Education and Discussions with Family / Patient:    Time Spent for Care: 20 minutes   More than 50% of total time spent on counseling and coordination of care as described above    Current Length of Stay: 1 day(s)   Current Patient Status: Inpatient   Certification Statement: The patient will continue to require additional inpatient hospital stay due to tylenol intoxication  Marva Richmond / Estimated Discharge Date: Once tylenol intoxication improves    DC summary  11/24     Kelly Iglesias is a 40 y o  female patient who originally presented to the hospital on 11/22/2017 due to complaints of increased tooth pain  Patient states that her tooth was hurting so bad that she kept on taking tylenol  In ER patient found to have tylenol OD and was given acetylcysteine  Her tylenol levels improved  Patient found to have dental infection and placed on IV abx  Her infection improved  Patient advised to see an oral surgeon today to have her infected tooth removed    Patient is currently hemodynamically stable and will be discharged home with PO abx and pain control

## 2017-12-14 ENCOUNTER — HOSPITAL ENCOUNTER (INPATIENT)
Facility: HOSPITAL | Age: 42
LOS: 2 days | Discharge: HOME | DRG: 243 | End: 2017-12-16
Attending: EMERGENCY MEDICINE | Admitting: GENERAL PRACTICE
Payer: COMMERCIAL

## 2017-12-14 ENCOUNTER — APPOINTMENT (EMERGENCY)
Dept: CT IMAGING | Facility: HOSPITAL | Age: 42
DRG: 243 | End: 2017-12-14
Payer: COMMERCIAL

## 2017-12-14 DIAGNOSIS — R10.13 EPIGASTRIC PAIN: ICD-10-CM

## 2017-12-14 DIAGNOSIS — R79.89 ELEVATED LACTIC ACID LEVEL: ICD-10-CM

## 2017-12-14 DIAGNOSIS — E86.0 DEHYDRATION: ICD-10-CM

## 2017-12-14 DIAGNOSIS — K92.0 HEMATEMESIS WITH NAUSEA: ICD-10-CM

## 2017-12-14 DIAGNOSIS — R11.2 NAUSEA AND VOMITING: ICD-10-CM

## 2017-12-14 DIAGNOSIS — K85.90 ACUTE ON CHRONIC PANCREATITIS (HCC): Primary | ICD-10-CM

## 2017-12-14 DIAGNOSIS — K86.1 ACUTE ON CHRONIC PANCREATITIS (HCC): Primary | ICD-10-CM

## 2017-12-14 PROBLEM — A41.9 SEPSIS (HCC): Status: ACTIVE | Noted: 2017-12-14

## 2017-12-14 LAB
ACETONE SERPL-MCNC: NEGATIVE MG/DL
ALBUMIN SERPL BCP-MCNC: 3.5 G/DL (ref 3.5–5)
ALBUMIN SERPL BCP-MCNC: 4.5 G/DL (ref 3.5–5)
ALP SERPL-CCNC: 119 U/L (ref 46–116)
ALP SERPL-CCNC: 92 U/L (ref 46–116)
ALT SERPL W P-5'-P-CCNC: 27 U/L (ref 12–78)
ALT SERPL W P-5'-P-CCNC: 33 U/L (ref 12–78)
ANION GAP SERPL CALCULATED.3IONS-SCNC: 15 MMOL/L (ref 4–13)
ANION GAP SERPL CALCULATED.3IONS-SCNC: 21 MMOL/L (ref 4–13)
AST SERPL W P-5'-P-CCNC: 34 U/L (ref 5–45)
AST SERPL W P-5'-P-CCNC: 38 U/L (ref 5–45)
BASE EX.OXY STD BLDV CALC-SCNC: 90.2 % (ref 60–80)
BASE EXCESS BLDV CALC-SCNC: -15.7 MMOL/L
BASOPHILS # BLD AUTO: 0.07 THOUSANDS/ΜL (ref 0–0.1)
BASOPHILS # BLD AUTO: 0.12 THOUSANDS/ΜL (ref 0–0.1)
BASOPHILS NFR BLD AUTO: 0 % (ref 0–1)
BASOPHILS NFR BLD AUTO: 1 % (ref 0–1)
BILIRUB SERPL-MCNC: 0.4 MG/DL (ref 0.2–1)
BILIRUB SERPL-MCNC: 0.6 MG/DL (ref 0.2–1)
BUN SERPL-MCNC: 3 MG/DL (ref 5–25)
BUN SERPL-MCNC: 4 MG/DL (ref 5–25)
CA-I BLD-SCNC: 1.1 MMOL/L (ref 1.12–1.32)
CALCIUM SERPL-MCNC: 7.9 MG/DL (ref 8.3–10.1)
CALCIUM SERPL-MCNC: 9.8 MG/DL (ref 8.3–10.1)
CHLORIDE SERPL-SCNC: 104 MMOL/L (ref 100–108)
CHLORIDE SERPL-SCNC: 105 MMOL/L (ref 100–108)
CO2 SERPL-SCNC: 19 MMOL/L (ref 21–32)
CO2 SERPL-SCNC: 22 MMOL/L (ref 21–32)
CREAT SERPL-MCNC: 0.68 MG/DL (ref 0.6–1.3)
CREAT SERPL-MCNC: 0.9 MG/DL (ref 0.6–1.3)
EOSINOPHIL # BLD AUTO: 0 THOUSAND/ΜL (ref 0–0.61)
EOSINOPHIL # BLD AUTO: 0.01 THOUSAND/ΜL (ref 0–0.61)
EOSINOPHIL NFR BLD AUTO: 0 % (ref 0–6)
EOSINOPHIL NFR BLD AUTO: 0 % (ref 0–6)
ERYTHROCYTE [DISTWIDTH] IN BLOOD BY AUTOMATED COUNT: 14.1 % (ref 11.6–15.1)
ERYTHROCYTE [DISTWIDTH] IN BLOOD BY AUTOMATED COUNT: 14.3 % (ref 11.6–15.1)
ETHANOL SERPL-MCNC: 157 MG/DL (ref 0–3)
GFR SERPL CREATININE-BSD FRML MDRD: 109 ML/MIN/1.73SQ M
GFR SERPL CREATININE-BSD FRML MDRD: 80 ML/MIN/1.73SQ M
GLUCOSE SERPL-MCNC: 152 MG/DL (ref 65–140)
GLUCOSE SERPL-MCNC: 212 MG/DL (ref 65–140)
HCG SERPL QL: NEGATIVE
HCO3 BLDV-SCNC: 8.7 MMOL/L (ref 24–30)
HCT VFR BLD AUTO: 41.3 % (ref 34.8–46.1)
HCT VFR BLD AUTO: 52.6 % (ref 34.8–46.1)
HGB BLD-MCNC: 13.6 G/DL (ref 11.5–15.4)
HGB BLD-MCNC: 17.7 G/DL (ref 11.5–15.4)
LACTATE SERPL-SCNC: 0.9 MMOL/L (ref 0.5–2)
LACTATE SERPL-SCNC: 1.8 MMOL/L (ref 0.5–2)
LACTATE SERPL-SCNC: 3.7 MMOL/L (ref 0.5–2)
LACTATE SERPL-SCNC: 4 MMOL/L (ref 0.5–2)
LACTATE SERPL-SCNC: 6.1 MMOL/L (ref 0.5–2)
LIPASE SERPL-CCNC: 69 U/L (ref 73–393)
LYMPHOCYTES # BLD AUTO: 3.28 THOUSANDS/ΜL (ref 0.6–4.47)
LYMPHOCYTES # BLD AUTO: 3.9 THOUSANDS/ΜL (ref 0.6–4.47)
LYMPHOCYTES NFR BLD AUTO: 20 % (ref 14–44)
LYMPHOCYTES NFR BLD AUTO: 24 % (ref 14–44)
MCH RBC QN AUTO: 31.6 PG (ref 26.8–34.3)
MCH RBC QN AUTO: 31.8 PG (ref 26.8–34.3)
MCHC RBC AUTO-ENTMCNC: 32.9 G/DL (ref 31.4–37.4)
MCHC RBC AUTO-ENTMCNC: 33.7 G/DL (ref 31.4–37.4)
MCV RBC AUTO: 94 FL (ref 82–98)
MCV RBC AUTO: 97 FL (ref 82–98)
MONOCYTES # BLD AUTO: 1.17 THOUSAND/ΜL (ref 0.17–1.22)
MONOCYTES # BLD AUTO: 1.22 THOUSAND/ΜL (ref 0.17–1.22)
MONOCYTES NFR BLD AUTO: 7 % (ref 4–12)
MONOCYTES NFR BLD AUTO: 8 % (ref 4–12)
NEUTROPHILS # BLD AUTO: 10.98 THOUSANDS/ΜL (ref 1.85–7.62)
NEUTROPHILS # BLD AUTO: 11.88 THOUSANDS/ΜL (ref 1.85–7.62)
NEUTS SEG NFR BLD AUTO: 67 % (ref 43–75)
NEUTS SEG NFR BLD AUTO: 72 % (ref 43–75)
NRBC BLD AUTO-RTO: 0 /100 WBCS
NRBC BLD AUTO-RTO: 0 /100 WBCS
O2 CT BLDV-SCNC: 10 ML/DL
PCO2 BLDV: 17.1 MM HG (ref 42–50)
PH BLDV: 7.32 [PH] (ref 7.3–7.4)
PLATELET # BLD AUTO: 252 THOUSANDS/UL (ref 149–390)
PLATELET # BLD AUTO: 354 THOUSANDS/UL (ref 149–390)
PMV BLD AUTO: 10 FL (ref 8.9–12.7)
PMV BLD AUTO: 9.5 FL (ref 8.9–12.7)
PO2 BLDV: 69.9 MM HG (ref 35–45)
POTASSIUM SERPL-SCNC: 3.7 MMOL/L (ref 3.5–5.3)
POTASSIUM SERPL-SCNC: 3.9 MMOL/L (ref 3.5–5.3)
PROT SERPL-MCNC: 7.8 G/DL (ref 6.4–8.2)
PROT SERPL-MCNC: 9.7 G/DL (ref 6.4–8.2)
RBC # BLD AUTO: 4.28 MILLION/UL (ref 3.81–5.12)
RBC # BLD AUTO: 5.61 MILLION/UL (ref 3.81–5.12)
SODIUM SERPL-SCNC: 142 MMOL/L (ref 136–145)
SODIUM SERPL-SCNC: 144 MMOL/L (ref 136–145)
WBC # BLD AUTO: 16.3 THOUSAND/UL (ref 4.31–10.16)
WBC # BLD AUTO: 16.48 THOUSAND/UL (ref 4.31–10.16)

## 2017-12-14 PROCEDURE — 82805 BLOOD GASES W/O2 SATURATION: CPT | Performed by: EMERGENCY MEDICINE

## 2017-12-14 PROCEDURE — 87040 BLOOD CULTURE FOR BACTERIA: CPT | Performed by: NURSE PRACTITIONER

## 2017-12-14 PROCEDURE — 85025 COMPLETE CBC W/AUTO DIFF WBC: CPT | Performed by: EMERGENCY MEDICINE

## 2017-12-14 PROCEDURE — 82330 ASSAY OF CALCIUM: CPT | Performed by: EMERGENCY MEDICINE

## 2017-12-14 PROCEDURE — 74177 CT ABD & PELVIS W/CONTRAST: CPT

## 2017-12-14 PROCEDURE — 80053 COMPREHEN METABOLIC PANEL: CPT | Performed by: EMERGENCY MEDICINE

## 2017-12-14 PROCEDURE — 83605 ASSAY OF LACTIC ACID: CPT | Performed by: NURSE PRACTITIONER

## 2017-12-14 PROCEDURE — C9113 INJ PANTOPRAZOLE SODIUM, VIA: HCPCS | Performed by: NURSE PRACTITIONER

## 2017-12-14 PROCEDURE — 83605 ASSAY OF LACTIC ACID: CPT | Performed by: EMERGENCY MEDICINE

## 2017-12-14 PROCEDURE — 99285 EMERGENCY DEPT VISIT HI MDM: CPT

## 2017-12-14 PROCEDURE — 96375 TX/PRO/DX INJ NEW DRUG ADDON: CPT

## 2017-12-14 PROCEDURE — 82009 KETONE BODYS QUAL: CPT | Performed by: GENERAL PRACTICE

## 2017-12-14 PROCEDURE — 96361 HYDRATE IV INFUSION ADD-ON: CPT

## 2017-12-14 PROCEDURE — 80320 DRUG SCREEN QUANTALCOHOLS: CPT | Performed by: EMERGENCY MEDICINE

## 2017-12-14 PROCEDURE — 96376 TX/PRO/DX INJ SAME DRUG ADON: CPT

## 2017-12-14 PROCEDURE — 96374 THER/PROPH/DIAG INJ IV PUSH: CPT

## 2017-12-14 PROCEDURE — 96365 THER/PROPH/DIAG IV INF INIT: CPT

## 2017-12-14 PROCEDURE — 83690 ASSAY OF LIPASE: CPT | Performed by: EMERGENCY MEDICINE

## 2017-12-14 PROCEDURE — 84703 CHORIONIC GONADOTROPIN ASSAY: CPT | Performed by: EMERGENCY MEDICINE

## 2017-12-14 PROCEDURE — 36415 COLL VENOUS BLD VENIPUNCTURE: CPT | Performed by: EMERGENCY MEDICINE

## 2017-12-14 RX ORDER — ONDANSETRON 2 MG/ML
4 INJECTION INTRAMUSCULAR; INTRAVENOUS ONCE
Status: COMPLETED | OUTPATIENT
Start: 2017-12-14 | End: 2017-12-14

## 2017-12-14 RX ORDER — PROMETHAZINE HYDROCHLORIDE 25 MG/ML
25 INJECTION, SOLUTION INTRAMUSCULAR; INTRAVENOUS ONCE
Status: COMPLETED | OUTPATIENT
Start: 2017-12-14 | End: 2017-12-14

## 2017-12-14 RX ORDER — PANTOPRAZOLE SODIUM 40 MG/1
40 INJECTION, POWDER, FOR SOLUTION INTRAVENOUS EVERY 12 HOURS SCHEDULED
Status: DISCONTINUED | OUTPATIENT
Start: 2017-12-15 | End: 2017-12-16 | Stop reason: HOSPADM

## 2017-12-14 RX ORDER — LORAZEPAM 2 MG/ML
0.5 INJECTION INTRAMUSCULAR EVERY 6 HOURS PRN
Status: DISCONTINUED | OUTPATIENT
Start: 2017-12-14 | End: 2017-12-16 | Stop reason: HOSPADM

## 2017-12-14 RX ORDER — SODIUM CHLORIDE, SODIUM GLUCONATE, SODIUM ACETATE, POTASSIUM CHLORIDE, MAGNESIUM CHLORIDE, SODIUM PHOSPHATE, DIBASIC, AND POTASSIUM PHOSPHATE .53; .5; .37; .037; .03; .012; .00082 G/100ML; G/100ML; G/100ML; G/100ML; G/100ML; G/100ML; G/100ML
1000 INJECTION, SOLUTION INTRAVENOUS ONCE
Status: DISCONTINUED | OUTPATIENT
Start: 2017-12-14 | End: 2017-12-14

## 2017-12-14 RX ORDER — ACETAMINOPHEN 325 MG/1
650 TABLET ORAL EVERY 6 HOURS PRN
Status: DISCONTINUED | OUTPATIENT
Start: 2017-12-14 | End: 2017-12-16

## 2017-12-14 RX ORDER — SIMETHICONE 80 MG
80 TABLET,CHEWABLE ORAL EVERY 6 HOURS PRN
Status: DISCONTINUED | OUTPATIENT
Start: 2017-12-14 | End: 2017-12-16 | Stop reason: HOSPADM

## 2017-12-14 RX ORDER — SACCHAROMYCES BOULARDII 250 MG
250 CAPSULE ORAL 2 TIMES DAILY
Status: DISCONTINUED | OUTPATIENT
Start: 2017-12-14 | End: 2017-12-16 | Stop reason: HOSPADM

## 2017-12-14 RX ORDER — METOCLOPRAMIDE HYDROCHLORIDE 5 MG/ML
10 INJECTION INTRAMUSCULAR; INTRAVENOUS ONCE
Status: COMPLETED | OUTPATIENT
Start: 2017-12-14 | End: 2017-12-14

## 2017-12-14 RX ORDER — SODIUM CHLORIDE, SODIUM GLUCONATE, SODIUM ACETATE, POTASSIUM CHLORIDE, MAGNESIUM CHLORIDE, SODIUM PHOSPHATE, DIBASIC, AND POTASSIUM PHOSPHATE .53; .5; .37; .037; .03; .012; .00082 G/100ML; G/100ML; G/100ML; G/100ML; G/100ML; G/100ML; G/100ML
200 INJECTION, SOLUTION INTRAVENOUS CONTINUOUS
Status: DISCONTINUED | OUTPATIENT
Start: 2017-12-14 | End: 2017-12-14

## 2017-12-14 RX ORDER — SODIUM CHLORIDE 9 MG/ML
200 INJECTION, SOLUTION INTRAVENOUS CONTINUOUS
Status: DISCONTINUED | OUTPATIENT
Start: 2017-12-14 | End: 2017-12-16

## 2017-12-14 RX ORDER — CLONIDINE HYDROCHLORIDE 0.1 MG/1
0.1 TABLET ORAL 2 TIMES DAILY
Status: DISCONTINUED | OUTPATIENT
Start: 2017-12-14 | End: 2017-12-15

## 2017-12-14 RX ORDER — LORAZEPAM 2 MG/ML
0.5 INJECTION INTRAMUSCULAR ONCE
Status: COMPLETED | OUTPATIENT
Start: 2017-12-14 | End: 2017-12-14

## 2017-12-14 RX ORDER — SODIUM CHLORIDE, SODIUM GLUCONATE, SODIUM ACETATE, POTASSIUM CHLORIDE, MAGNESIUM CHLORIDE, SODIUM PHOSPHATE, DIBASIC, AND POTASSIUM PHOSPHATE .53; .5; .37; .037; .03; .012; .00082 G/100ML; G/100ML; G/100ML; G/100ML; G/100ML; G/100ML; G/100ML
1000 INJECTION, SOLUTION INTRAVENOUS ONCE
Status: COMPLETED | OUTPATIENT
Start: 2017-12-14 | End: 2017-12-14

## 2017-12-14 RX ORDER — NICOTINE 21 MG/24HR
1 PATCH, TRANSDERMAL 24 HOURS TRANSDERMAL DAILY
Status: DISCONTINUED | OUTPATIENT
Start: 2017-12-14 | End: 2017-12-16 | Stop reason: HOSPADM

## 2017-12-14 RX ORDER — ONDANSETRON 2 MG/ML
4 INJECTION INTRAMUSCULAR; INTRAVENOUS EVERY 6 HOURS PRN
Status: DISCONTINUED | OUTPATIENT
Start: 2017-12-14 | End: 2017-12-16 | Stop reason: HOSPADM

## 2017-12-14 RX ORDER — ONDANSETRON 2 MG/ML
4 INJECTION INTRAMUSCULAR; INTRAVENOUS ONCE AS NEEDED
Status: COMPLETED | OUTPATIENT
Start: 2017-12-14 | End: 2017-12-14

## 2017-12-14 RX ORDER — PANTOPRAZOLE SODIUM 40 MG/1
40 INJECTION, POWDER, FOR SOLUTION INTRAVENOUS EVERY 12 HOURS SCHEDULED
Status: DISCONTINUED | OUTPATIENT
Start: 2017-12-14 | End: 2017-12-14

## 2017-12-14 RX ADMIN — HYDROMORPHONE HYDROCHLORIDE 0.2 MG: 1 INJECTION, SOLUTION INTRAMUSCULAR; INTRAVENOUS; SUBCUTANEOUS at 14:18

## 2017-12-14 RX ADMIN — HYDROMORPHONE HYDROCHLORIDE 1 MG: 1 INJECTION, SOLUTION INTRAMUSCULAR; INTRAVENOUS; SUBCUTANEOUS at 06:50

## 2017-12-14 RX ADMIN — LORAZEPAM 0.5 MG: 2 INJECTION INTRAMUSCULAR; INTRAVENOUS at 11:05

## 2017-12-14 RX ADMIN — HYDROMORPHONE HYDROCHLORIDE 0.2 MG: 1 INJECTION, SOLUTION INTRAMUSCULAR; INTRAVENOUS; SUBCUTANEOUS at 17:26

## 2017-12-14 RX ADMIN — PANTOPRAZOLE SODIUM 40 MG: 40 INJECTION, POWDER, FOR SOLUTION INTRAVENOUS at 15:28

## 2017-12-14 RX ADMIN — CLONIDINE HYDROCHLORIDE 0.1 MG: 0.1 TABLET ORAL at 17:27

## 2017-12-14 RX ADMIN — SODIUM CHLORIDE, SODIUM GLUCONATE, SODIUM ACETATE, POTASSIUM CHLORIDE, MAGNESIUM CHLORIDE, SODIUM PHOSPHATE, DIBASIC, AND POTASSIUM PHOSPHATE 200 ML/HR: .53; .5; .37; .037; .03; .012; .00082 INJECTION, SOLUTION INTRAVENOUS at 12:56

## 2017-12-14 RX ADMIN — IOHEXOL 100 ML: 350 INJECTION, SOLUTION INTRAVENOUS at 07:35

## 2017-12-14 RX ADMIN — Medication 250 MG: at 17:27

## 2017-12-14 RX ADMIN — ONDANSETRON 4 MG: 2 INJECTION INTRAMUSCULAR; INTRAVENOUS at 08:19

## 2017-12-14 RX ADMIN — METOCLOPRAMIDE 10 MG: 5 INJECTION, SOLUTION INTRAMUSCULAR; INTRAVENOUS at 08:36

## 2017-12-14 RX ADMIN — HYDROMORPHONE HYDROCHLORIDE 1 MG: 1 INJECTION, SOLUTION INTRAMUSCULAR; INTRAVENOUS; SUBCUTANEOUS at 07:23

## 2017-12-14 RX ADMIN — ONDANSETRON 4 MG: 2 INJECTION INTRAMUSCULAR; INTRAVENOUS at 07:23

## 2017-12-14 RX ADMIN — PANCRELIPASE 24000 UNITS: 24000; 76000; 120000 CAPSULE, DELAYED RELEASE PELLETS ORAL at 18:16

## 2017-12-14 RX ADMIN — SODIUM CHLORIDE 2000 ML: 0.9 INJECTION, SOLUTION INTRAVENOUS at 06:52

## 2017-12-14 RX ADMIN — HYDROMORPHONE HYDROCHLORIDE 0.2 MG: 1 INJECTION, SOLUTION INTRAMUSCULAR; INTRAVENOUS; SUBCUTANEOUS at 20:51

## 2017-12-14 RX ADMIN — SODIUM CHLORIDE 200 ML/HR: 0.9 INJECTION, SOLUTION INTRAVENOUS at 14:18

## 2017-12-14 RX ADMIN — LORAZEPAM 0.5 MG: 2 INJECTION INTRAMUSCULAR; INTRAVENOUS at 13:57

## 2017-12-14 RX ADMIN — SODIUM CHLORIDE, SODIUM GLUCONATE, SODIUM ACETATE, POTASSIUM CHLORIDE, MAGNESIUM CHLORIDE, SODIUM PHOSPHATE, DIBASIC, AND POTASSIUM PHOSPHATE 1000 ML: .53; .5; .37; .037; .03; .012; .00082 INJECTION, SOLUTION INTRAVENOUS at 08:59

## 2017-12-14 RX ADMIN — NICOTINE 1 PATCH: 21 PATCH, EXTENDED RELEASE TRANSDERMAL at 13:56

## 2017-12-14 RX ADMIN — SODIUM CHLORIDE 200 ML/HR: 0.9 INJECTION, SOLUTION INTRAVENOUS at 20:48

## 2017-12-14 RX ADMIN — HYDROMORPHONE HYDROCHLORIDE 1 MG: 1 INJECTION, SOLUTION INTRAMUSCULAR; INTRAVENOUS; SUBCUTANEOUS at 09:00

## 2017-12-14 RX ADMIN — PROMETHAZINE HYDROCHLORIDE 25 MG: 25 INJECTION INTRAMUSCULAR; INTRAVENOUS at 10:53

## 2017-12-14 RX ADMIN — ONDANSETRON 4 MG: 2 INJECTION INTRAMUSCULAR; INTRAVENOUS at 06:50

## 2017-12-14 NOTE — ED NOTES
Patient given container to give urine sample  When patient came out from bathroom she stated "Ohh I forgot to go in the cup"  She brought the cup out to the orlando Clinton RN  12/14/17 0836

## 2017-12-14 NOTE — ASSESSMENT & PLAN NOTE
· Present on admission evident by tachycardia, leukocytosis, suspected source viral gastroenteritis  · Will make patient NPO  · Give aggressive IV hydration as patient has an anion gap lactic acidosis, lactic level continues to improve will repeat every 4 hours x4  · Patient reporting her mad emesis and dark stools will check occult stool give PPI IV and consult GI  · Check blood cultures x2 CMP in a m

## 2017-12-14 NOTE — ED PROVIDER NOTES
History  Chief Complaint   Patient presents with    Abdominal Pain     c/o mid upper abdominal pain and vomiting for past 2 days     40y o  year-old female presents with 2 days of epigastric pain without radiation associated with numerous episodes of nonbloody, nonbilious emesis  Patient describes severe sharp pain that came on gradually, worsening and continues in the ER  Patient states nothing aggravates the pain and nothing alleviates it  Patient is a history of pancreatitis and states her symptoms feel similar to those prior episodes  ROS: Patient denies fever/chills, diarrhea, dyspnea, anorexia, constipation, diaphoresis, chest pain, groin pain, dysuria, hematuria, melena, or back/neck pain  All other systems reviewed and negative  Patient denies any recent illnesses  Patient denies any recent use of antibiotics, international travel, or trauma  Per medical record, patient has a history of chronic pancreatitis with numerous acute exacerbations of this that have been attributed to alcohol  Patient also has a history of cirrhosis per the medical record  Objective:   Vital signs reviewed  Constitutional: Severe acute distress  Eyes: No scleral icterus  HENT: Head normocephalic  Pharynx moist    CV: Tachycardic rate and regular rhythm  Respiratory: Lungs clear to auscultation bilaterally without adventitious sounds  Abdomen: Inspection of an obese abdomen without erythema, rashes or ecchymosis noted  No abdominal pulsations noted  Normal bowel sounds with no bruit auscultated  Soft abdomen  Palpitation noted in the epigastrum with no tenderness in the remainder, including no tenderness over the right upper quadrant; tenderness not over McBurney's point  No masses or pulsatile aorta noted on examination  No rebound or guarding noted on examination, non-peritoneal exam  Negative psoas, obturator, and Rovsing's signs  Negative Robertson's sign     Back: Normal inspection with no rash or signs of herpes zoster  Costovertebral tenderness not present  Skin: No ecchymosis of the umbilicus (negative [place]'s sign) or flank (negative Grey Tenorio's sign)  Warm and dry  Extremities: Non-tender lower extremities without asymmetry  Neuro: Alert  Answers questions appropriately  Psych: Normal mood and affect  Medical Decision Making   Abdominal pain with a broad differential   Considering patient's history, possibly secondary to pancreatitis or erosive gastritis  Patient has chronic pancreatitis which may limit the ability for evaluation in the emergency department  Will establish IV access and make patient NPO considering possibility of surgical intervention required  Will administer hydromorphone for pain control  Will initiate resuscitative fluids considering possibility pancreatitis at this point in the assessment  Differential includes significant likelihood of intra-abdominal pathology and based on patient's exam findings and history  Will obtain CBC to evaluate for anemia and leukocytosis  Will obtain CMP to evaluate electrolytes, renal and hepatic function  Will obtain lipase to evaluate for potential pancreatitis  Will obtain lactate to evaluate for mesenteric ischemia and sepsis  Will obtain urinalysis to evaluate for possible urinary infectious sources and ketones to evaluate potential hydration state  Based on patient's history, physical exam and presenting complaint, will obtain contrast enhanced CT imaging of patient's abdomen and pelvis to further evaluate for possible intraabdominal pathology including appendicitis, diverticulitis, or obstruction  Prior to Admission Medications   Prescriptions Last Dose Informant Patient Reported? Taking?    Mometasone Furo-Formoterol Fum (DULERA) 100-5 MCG/ACT AERO   Yes No   Sig: Inhale Unsure of dose   cloNIDine (CATAPRES) 0 1 mg tablet   Yes No   Sig: Take 0 1 mg by mouth 2 (two) times a day   dicyclomine (BENTYL) 10 mg capsule   No No   Sig: Take 2 capsules by mouth every 6 (six) hours as needed (abd  spasm)   docusate sodium (COLACE) 100 mg capsule   No No   Sig: Take 1 capsule by mouth 2 (two) times a day for 15 days   nicotine (NICODERM CQ) 21 mg/24 hr TD 24 hr patch   No No   Sig: Place 1 patch on the skin daily   nystatin (MYCOSTATIN) 100,000 units/mL suspension   No No   Sig: Swish and swallow 5 mL 4 (four) times a day   ondansetron (ZOFRAN) 4 mg tablet   No No   Sig: Take 1 tablet by mouth every 8 (eight) hours as needed for nausea or vomiting for up to 7 days   pancrelipase, Lip-Prot-Amyl, (CREON) 24,000 units   No No   Sig: Take 1 capsule by mouth 3 (three) times a day with meals   pantoprazole (PROTONIX) 40 mg tablet   Yes No   Sig: Take 40 mg by mouth daily   saccharomyces boulardii (FLORASTOR) 250 mg capsule   No No   Sig: Take 1 capsule by mouth 2 (two) times a day   simethicone (MYLICON) 80 mg chewable tablet   No No   Sig: Chew 1 tablet every 6 (six) hours as needed for flatulence      Facility-Administered Medications: None       Past Medical History:   Diagnosis Date    Alcohol abuse     Arthritis     GERD (gastroesophageal reflux disease)     Hypertension     Nicotine dependence     Obesity     Pancreatitis     Panic attack        Past Surgical History:   Procedure Laterality Date    ABDOMINAL SURGERY      C SECTION       Family History   Problem Relation Age of Onset    Cirrhosis Father     Alcohol abuse Father     Drug abuse Mother      I have reviewed and agree with the history as documented      Social History   Substance Use Topics    Smoking status: Current Every Day Smoker     Packs/day: 1 00     Years: 29 00     Types: Cigarettes    Smokeless tobacco: Never Used    Alcohol use Yes      Comment: [employer]        Review of Systems    Physical Exam  ED Triage Vitals   Temperature Pulse Respirations Blood Pressure SpO2   12/14/17 0654 12/14/17 0654 12/14/17 0654 12/14/17 0654 12/14/17 6035 97 7 °F (36 5 °C) 103 18 (!) 195/128 100 %      Temp Source Heart Rate Source Patient Position - Orthostatic VS BP Location FiO2 (%)   12/14/17 0654 12/14/17 0654 12/14/17 0654 12/14/17 0654 --   Oral Monitor Lying Right arm       Pain Score       12/14/17 0650       Worst Possible Pain           Orthostatic Vital Signs  Vitals:    12/15/17 1135 12/15/17 1314 12/15/17 1358 12/15/17 1408   BP: (!) 193/103 167/95 156/96 160/99   Pulse:  65 67 61   Patient Position - Orthostatic VS:           Physical Exam    ED Medications  Medications   nicotine (NICODERM CQ) 21 mg/24 hr TD 24 hr patch 1 patch (1 patch Transdermal Medication Applied 12/15/17 0829)   pancrelipase (Lip-Prot-Amyl) (CREON) delayed release capsule 24,000 Units (24,000 Units Oral Not Given 12/15/17 1229)   saccharomyces boulardii (FLORASTOR) capsule 250 mg (250 mg Oral Given 12/15/17 0828)   simethicone (MYLICON) chewable tablet 80 mg (not administered)   sodium chloride 0 9 % infusion (200 mL/hr Intravenous New Bag 12/15/17 1234)   acetaminophen (TYLENOL) tablet 650 mg ( Oral MAR Unhold 12/15/17 1506)   ondansetron (ZOFRAN) injection 4 mg ( Intravenous MAR Unhold 12/15/17 1506)   LORazepam (ATIVAN) 2 mg/mL injection 0 5 mg ( Intravenous MAR Unhold 12/15/17 1506)   HYDROmorphone (DILAUDID) 1 mg/mL injection 0 2 mg ( Intravenous MAR Unhold 12/15/17 1506)   pantoprazole (PROTONIX) injection 40 mg ( Intravenous MAR Unhold 12/15/17 1506)   dicyclomine (BENTYL) tablet 20 mg ( Oral MAR Unhold 12/15/17 1506)   cloNIDine (CATAPRES) tablet 0 3 mg ( Oral MAR Unhold 12/15/17 1506)   HYDROmorphone (DILAUDID) 1 mg/mL injection 1 mg (1 mg Intravenous Given 12/14/17 0650)   ondansetron (ZOFRAN) injection 4 mg (4 mg Intravenous Given 12/14/17 0650)   sodium chloride 0 9 % bolus 2,000 mL (0 mL Intravenous Stopped 12/14/17 0859)   HYDROmorphone (DILAUDID) 1 mg/mL injection 1 mg (1 mg Intravenous Given 12/14/17 0723)   ondansetron (ZOFRAN) injection 4 mg (4 mg Intravenous Given 12/14/17 0723)   iohexol (OMNIPAQUE) 350 MG/ML injection (MULTI-DOSE) 100 mL (100 mL Intravenous Given 12/14/17 0735)   multi-electrolyte (ISOLYTE-S PH 7 4) bolus 1,000 mL (0 mL Intravenous Stopped 12/14/17 1256)   ondansetron (ZOFRAN) injection 4 mg (4 mg Intravenous Given 12/14/17 0819)   metoclopramide (REGLAN) injection 10 mg (10 mg Intravenous Given 12/14/17 0836)   HYDROmorphone (DILAUDID) 1 mg/mL injection 1 mg (1 mg Intravenous Given 12/14/17 0900)   promethazine (PHENERGAN) injection 25 mg (25 mg Intramuscular Given 12/14/17 1053)   LORazepam (ATIVAN) 2 mg/mL injection 0 5 mg (0 5 mg Intravenous Given 12/14/17 1105)   potassium chloride (K-DUR,KLOR-CON) CR tablet 40 mEq (40 mEq Oral Given 12/15/17 1108)   cloNIDine (CATAPRES) tablet 0 2 mg (0 2 mg Oral Given 12/15/17 1135)       Diagnostic Studies  Results Reviewed     Procedure Component Value Units Date/Time    UA w Reflex to Microscopic [[id number]]  (Normal) Collected:  12/15/17 1257    Lab Status:  Final result Specimen:  Urine from Urine, Clean Catch Updated:  12/15/17 1307     Color, UA Yellow     Clarity, UA Clear     Specific Gravity, UA 1 010     pH, UA 7 5     Leukocytes, UA Negative     Nitrite, UA Negative     Protein, UA Negative mg/dl      Glucose, UA Negative mg/dl      Ketones, UA Negative mg/dl      Urobilinogen, UA 1 0 E U /dl      Bilirubin, UA Negative     Blood, UA Negative    Lactic Acid STAT and in 2 hours if first result greater than 2 [[id number]]  (Normal) Collected:  12/14/17 1837    Lab Status:  Final result Specimen:  Blood from Hand, Left Updated:  12/14/17 1903     LACTIC ACID 0 9 mmol/L     Narrative:         Result may be elevated if tourniquet was used during collection      Lactic Acid STAT and in 2 hours if first result greater than 2 [[id number]]  (Normal) Collected:  12/14/17 1431    Lab Status:  Final result Specimen:  Blood from Arm, Left Updated:  12/14/17 1504     LACTIC ACID 1 8 mmol/L     Narrative:         Result may be elevated if tourniquet was used during collection  Acetone [[id number]]  (Normal) Collected:  12/14/17 1431    Lab Status:  Final result Specimen:  Blood from Arm, Left Updated:  12/14/17 1453     Acetone, Bld Negative    Blood culture [[id number]] Collected:  12/14/17 1420    Lab Status: In process Specimen:  Blood from Arm, Right Updated:  12/14/17 1437    Blood culture [[id number]] Collected:  12/14/17 1431    Lab Status: In process Specimen:  Blood from Arm, Left Updated:  12/14/17 1436    Comprehensive metabolic panel [[id number]]  (Abnormal) Collected:  12/14/17 1136    Lab Status:  Final result Specimen:  Blood from Arm, Left Updated:  12/14/17 1159     Sodium 142 mmol/L      Potassium 3 9 mmol/L      Chloride 105 mmol/L      CO2 22 mmol/L      Anion Gap 15 (H) mmol/L      BUN 3 (L) mg/dL      Creatinine 0 68 mg/dL      Glucose 152 (H) mg/dL      Calcium 7 9 (L) mg/dL      AST 34 U/L      ALT 27 U/L      Alkaline Phosphatase 92 U/L      Total Protein 7 8 g/dL      Albumin 3 5 g/dL      Total Bilirubin 0 40 mg/dL      eGFR 109 ml/min/1 73sq m     Narrative:         National Kidney Disease Education Program recommendations are as follows:  GFR calculation is accurate only with a steady state creatinine  Chronic Kidney disease less than 60 ml/min/1 73 sq  meters  Kidney failure less than 15 ml/min/1 73 sq  meters  Lactic acid, plasma [[id number]]  (Abnormal) Collected:  12/14/17 1059    Lab Status:  Final result Specimen:  Blood from Arm, Right Updated:  12/14/17 1129     LACTIC ACID 3 7 (HH) mmol/L     Narrative:         Result may be elevated if tourniquet was used during collection      CBC and differential [[id number]]  (Abnormal) Collected:  12/14/17 1059    Lab Status:  Final result Specimen:  Blood from Arm, Right Updated:  12/14/17 1107     WBC 16 48 (H) Thousand/uL      RBC 4 28 Million/uL      Hemoglobin 13 6 g/dL      Hematocrit 41 3 %      MCV 97 fL      MCH 31 8 pg      MCHC 32 9 g/dL      RDW 14 3 %      MPV 9 5 fL      Platelets 666 Thousands/uL      nRBC 0 /100 WBCs      Neutrophils Relative 72 %      Lymphocytes Relative 20 %      Monocytes Relative 7 %      Eosinophils Relative 0 %      Basophils Relative 0 %      Neutrophils Absolute 11 88 (H) Thousands/µL      Lymphocytes Absolute 3 28 Thousands/µL      Monocytes Absolute 1 17 Thousand/µL      Eosinophils Absolute 0 00 Thousand/µL      Basophils Absolute 0 07 Thousands/µL     Blood gas, venous [[id number]]  (Abnormal) Collected:  12/14/17 1059    Lab Status:  Final result Specimen:  Blood from Arm, Right Updated:  12/14/17 1105     pH, Lyle 7 322     pCO2, Lyle 17 1 (L) mm Hg      pO2, Lyle 69 9 (H) mm Hg      HCO3, Lyle 8 7 (L) mmol/L      Base Excess, Lyle -15 7 mmol/L      O2 Content, Lyle 10 0 ml/dL      O2 HGB, VENOUS 90 2 (H) %     Lactic acid, plasma [[id number]]  (Abnormal) Collected:  12/14/17 0907    Lab Status:  Final result Specimen:  Blood from Arm, Right Updated:  12/14/17 0937     LACTIC ACID 4 0 (HH) mmol/L     Narrative:         Result may be elevated if tourniquet was used during collection  Lactic acid, plasma [[id number]]  (Abnormal) Collected:  12/14/17 0653    Lab Status:  Final result Specimen:  Blood from Arm, Left Updated:  12/14/17 0736     LACTIC ACID 6 1 (HH) mmol/L     Narrative:         Result may be elevated if tourniquet was used during collection      Lipase [[id number]]  (Abnormal) Collected:  12/14/17 0653    Lab Status:  Final result Specimen:  Blood from Arm, Left Updated:  12/14/17 0723     Lipase 69 (L) u/L     hCG, qualitative pregnancy [[id number]]  (Normal) Collected:  12/14/17 0653    Lab Status:  Final result Specimen:  Blood from Arm, Left Updated:  12/14/17 0723     Preg, Serum Negative    Calcium, ionized [[id number]]  (Abnormal) Collected:  12/14/17 0717    Lab Status:  Final result Specimen:  Blood from Arm, Left Updated:  12/14/17 0723     Calcium, Ionized 1 10 (L) mmol/L     CMP [[id number]]  (Abnormal) Collected: 12/14/17 0653    Lab Status:  Final result Specimen:  Blood from Arm, Left Updated:  12/14/17 9112     Sodium 144 mmol/L      Potassium 3 7 mmol/L      Chloride 104 mmol/L      CO2 19 (L) mmol/L      Anion Gap 21 (H) mmol/L      BUN 4 (L) mg/dL      Creatinine 0 90 mg/dL      Glucose 212 (H) mg/dL      Calcium 9 8 mg/dL      AST 38 U/L      ALT 33 U/L      Alkaline Phosphatase 119 (H) U/L      Total Protein 9 7 (H) g/dL      Albumin 4 5 g/dL      Total Bilirubin 0 60 mg/dL      eGFR 80 ml/min/1 73sq m     Narrative:         National Kidney Disease Education Program recommendations are as follows:  GFR calculation is accurate only with a steady state creatinine  Chronic Kidney disease less than 60 ml/min/1 73 sq  meters  Kidney failure less than 15 ml/min/1 73 sq  meters  Ethanol [[id number]]  (Abnormal) Collected:  12/14/17 0653    Lab Status:  Final result Specimen:  Blood from Arm, Left Updated:  12/14/17 0713     Ethanol Lvl 157 (H) mg/dL     CBC and differential [[id number]]  (Abnormal) Collected:  12/14/17 0653    Lab Status:  Final result Specimen:  Blood from Arm, Left Updated:  12/14/17 0703     WBC 16 30 (H) Thousand/uL      RBC 5 61 (H) Million/uL      Hemoglobin 17 7 (H) g/dL      Hematocrit 52 6 (H) %      MCV 94 fL      MCH 31 6 pg      MCHC 33 7 g/dL      RDW 14 1 %      MPV 10 0 fL      Platelets 241 Thousands/uL      nRBC 0 /100 WBCs      Neutrophils Relative 67 %      Lymphocytes Relative 24 %      Monocytes Relative 8 %      Eosinophils Relative 0 %      Basophils Relative 1 %      Neutrophils Absolute 10 98 (H) Thousands/µL      Lymphocytes Absolute 3 90 Thousands/µL      Monocytes Absolute 1 22 Thousand/µL      Eosinophils Absolute 0 01 Thousand/µL      Basophils Absolute 0 12 (H) Thousands/µL                  CT abdomen pelvis with contrast   Final Result by Wilton Park MD (12/14 5313)      No findings to suggest acute pancreatitis    Stable mild dilatation of the pancreatic duct within the tail   See above for further details  Mild prominence of the wall of the gastric antrum, nondistended stomach  This is a low specificity finding and can be related to artifact from peristalsis  Given the presence of epigastric pain, mild gastritis or PUD may also be considered  Findings of hepatomegaly and cirrhosis appearing stable  Workstation performed: [id number]                    Procedures  Procedures       Phone Contacts  ED Phone Contact    ED Course  ED Course as of Dec 15 1515   Thu Dec 14, 2017   0805 Additional fluids, switching to [facility] SYS ALBT LE  Likely from vomiting  LACTIC ACID: (!!) 6 1                               MDM  CritCare Time    Disposition  Final diagnoses:   Acute on chronic pancreatitis (HCC)   Epigastric pain   Nausea and vomiting   Dehydration   Elevated lactic acid level     Time reflects when diagnosis was documented in both MDM as applicable and the Disposition within this note     Time User Action Codes Description Comment    12/14/2017  9:48 AM Luna  E Add [K85 90,  K86 1] Acute on chronic pancreatitis ([address] )     12/14/2017  9:48 AM Luna  E Add [R10 13] Epigastric pain     12/14/2017  9:48 AM Luna  E Add [R11 2] Nausea and vomiting     12/14/2017  9:48 AM Luna  E Add [E86 0] Dehydration     12/14/2017  9:48 AM Luna  E Add [R79 89] Elevated lactic acid level     12/15/2017  9:34 AM Barbara Haddad Add [K92 0] Hematemesis with nausea     12/15/2017  1:51 PM Yoselin Clock Modify [R10 13] Epigastric pain     12/15/2017  1:51 PM Yoselin Clock Modify [R11 2] Nausea and vomiting     12/15/2017  1:51 PM Yoselin Clock Modify [K92 0] Hematemesis with nausea       ED Disposition     ED Disposition Condition Comment    Admit  Case was discussed with [facility] and the patient's admission status was agreed to be Admission Status: inpatient status to the service of Dr Marciano Rubio           Follow-up Information    None       Current Discharge Medication List      CONTINUE these medications which have NOT CHANGED    Details   cloNIDine (CATAPRES) 0 1 mg tablet Take 0 1 mg by mouth 2 (two) times a day      dicyclomine (BENTYL) 10 mg capsule Take 2 capsules by mouth every 6 (six) hours as needed (abd  spasm)  Qty: 30 capsule, Refills: 0      docusate sodium (COLACE) 100 mg capsule Take 1 capsule by mouth 2 (two) times a day for 15 days  Qty: 30 capsule, Refills: 0      Mometasone Furo-Formoterol Fum (DULERA) 100-5 MCG/ACT AERO Inhale Unsure of dose      nicotine (NICODERM CQ) 21 mg/24 hr TD 24 hr patch Place 1 patch on the skin daily  Qty: 28 patch, Refills: 0      nystatin (MYCOSTATIN) 100,000 units/mL suspension Swish and swallow 5 mL 4 (four) times a day  Qty: 60 mL, Refills: 0      ondansetron (ZOFRAN) 4 mg tablet Take 1 tablet by mouth every 8 (eight) hours as needed for nausea or vomiting for up to 7 days  Qty: 21 tablet, Refills: 0      pancrelipase, Lip-Prot-Amyl, (CREON) 24,000 units Take 1 capsule by mouth 3 (three) times a day with meals  Qty: 90 capsule, Refills: 0      pantoprazole (PROTONIX) 40 mg tablet Take 40 mg by mouth daily      saccharomyces boulardii (FLORASTOR) 250 mg capsule Take 1 capsule by mouth 2 (two) times a day  Refills: 0      simethicone (MYLICON) 80 mg chewable tablet Chew 1 tablet every 6 (six) hours as needed for flatulence  Qty: 30 tablet, Refills: 0           No discharge procedures on file      ED Provider  Electronically Signed by           Tyson Hernandez MD  12/15/17 7401

## 2017-12-14 NOTE — ASSESSMENT & PLAN NOTE
· Chronic in nature CT does not show any acute pancreatitis lipase is negative  · GI consult given GI symptoms appreciate recommendations

## 2017-12-14 NOTE — ED NOTES
Pt ambulated to bathroom to urinate but forgot urine sample  Will be provided next time        Radha Oswald RN  12/14/17 9325

## 2017-12-14 NOTE — ED NOTES
Paged SLIM at this time for pain medications per patients request       Avelino Sharma RN  12/14/17 1286

## 2017-12-14 NOTE — ASSESSMENT & PLAN NOTE
· Patient with urgency/crisis on admission will continue patient's Catapres and also add p r n  hydralazine for systolic blood pressure greater than 170

## 2017-12-14 NOTE — ASSESSMENT & PLAN NOTE
· Patient reports she has been dry for the past 2 months, however had a relapse and reports only drinking 1 drink when made aware of her ethanol level    · Monitor closely for withdrawal will put on IV Ativan p r n  along with CIWA protocol

## 2017-12-14 NOTE — ED NOTES
Patient sound asleep when nurse went in to give her Protonix   Patient resting in bed and appears comfortable     Jamelle Kayser, RN  12/14/17 3451

## 2017-12-14 NOTE — H&P
H&P- Jorge Thomas 1975, 40 y o  female MRN: [id number]    Unit/Bed#: ED 12 Encounter: [id number]    Primary Care Provider: Romulo Sky MD   Date and time admitted to hospital: 12/14/2017  6:40 AM        Alcohol abuse   Assessment & Plan    · Patient reports she has been dry for the past 2 months, however had a relapse and reports only drinking 1 drink when made aware of her ethanol level  · Monitor closely for withdrawal will put on IV Ativan p r n  along with CIWA protocol        Nicotine dependence   Assessment & Plan    · Sensation encourage  · Nicotine patch ordered        Essential hypertension   Assessment & Plan    · Patient with urgency/crisis on admission will continue patient's Catapres and also add p r n  hydralazine for systolic blood pressure greater than 170        Pancreatitis   Assessment & Plan    · Chronic in nature CT does not show any acute pancreatitis lipase is negative  · GI consult given GI symptoms appreciate recommendations        * Sepsis ([address] )   Assessment & Plan    · Present on admission evident by tachycardia, leukocytosis, suspected source viral gastroenteritis will monitor off antibiotics as patient is afebrile and presenting more of a viral nature  · Will make patient NPO  · Give aggressive IV hydration as patient has an anion gap lactic acidosis, lactic level continues to improve will repeat every 4 hours x4  · Patient reporting her mad emesis and dark stools will check occult stool give PPI IV and consult GI  · Check blood cultures x2 CMP in a m  · Hemoglobin stable/normal will check a CBC in the morning sooner if patient shows further signs of active bleeding                VTE Prophylaxis: Pharmacologic VTE Prophylaxis contraindicated due to patient reporting hematemesis  / sequential compression device   Code Status:  Full code  POLST: POLST is not applicable to this patient  Discussion with family:  None at bedside    Anticipated Length of Stay:  Patient will be admitted on an Inpatient basis with an anticipated length of stay of  > 2 midnights  Justification for Hospital Stay:  Presenting with ongoing abdominal pain sepsis likely secondary to gastroenteritis with need for further diagnostic evaluation    Total Time for Visit, including Counseling / Coordination of Care: 45 minutes  Greater than 50% of this total time spent on direct patient counseling and coordination of care  Chief Complaint:   I am vomiting blood and I have dark stools    History of Present Illness:    Yahaira Hugo is a 40 y o  female who presents with known history of chronic pancreatitis, alcohol abuse, nicotine abuse, hypertension, and panic attacks who presents with abdominal pain with nausea and vomiting at home after consuming alcohol  Patient reports with her alcohol abuse she has been dry over the past month however decided to have a drink in and started with vomiting noted blood abdominal pain, patient reports she has a history of IBS and her bowel movements are not above her baseline however does note they are now dark in color  Patient came to ED with further evaluation feeling that her pancreatitis had flared acutely in requesting further workup  Review of Systems:    Review of Systems   Constitutional: Negative for chills, fatigue and fever  HENT: Negative  Denies recent illness or being around anyone who is ill   Respiratory: Negative  Cardiovascular: Negative  Gastrointestinal: Positive for abdominal pain, nausea and vomiting  Patient with history of IBS reports no change in frequency or urgency above her baseline   Genitourinary: Negative  Negative for dysuria, flank pain and frequency  Musculoskeletal: Negative  Skin: Negative  Neurological: Negative  Psychiatric/Behavioral: Negative          Past Medical and Surgical History:     Past Medical History:   Diagnosis Date    Alcohol abuse     Arthritis     GERD (gastroesophageal reflux disease)     Hypertension     Nicotine dependence     Obesity     Pancreatitis     Panic attack        Past Surgical History:   Procedure Laterality Date    ABDOMINAL SURGERY      C SECTION       Meds/Allergies:    Prior to Admission medications    Medication Sig Start Date End Date Taking? Authorizing Provider   cloNIDine (CATAPRES) 0 1 mg tablet Take 0 1 mg by mouth 2 (two) times a day    Historical Provider, MD   dicyclomine (BENTYL) 10 mg capsule Take 2 capsules by mouth every 6 (six) hours as needed (abd  spasm) 10/31/17   Ailyn Gupta MD   docusate sodium (COLACE) 100 mg capsule Take 1 capsule by mouth 2 (two) times a day for 15 days 3/11/17 3/26/17  Ingrid Au MD   Mometasone Furo-Formoterol Fum (DULERA) 100-5 MCG/ACT AERO Inhale Unsure of dose    Historical Provider, MD   nicotine (NICODERM CQ) 21 mg/24 hr TD 24 hr patch Place 1 patch on the skin daily 11/24/17   Lucas Barksdale MD   nystatin (MYCOSTATIN) 100,000 units/mL suspension Swish and swallow 5 mL 4 (four) times a day 10/31/17   Ailyn Gupta MD   ondansetron (ZOFRAN) 4 mg tablet Take 1 tablet by mouth every 8 (eight) hours as needed for nausea or vomiting for up to 7 days 3/11/17 3/18/17  Ingrid Au MD   pancrelipase, Lip-Prot-Amyl, (CREON) 24,000 units Take 1 capsule by mouth 3 (three) times a day with meals 10/31/17   Ailyn Gupta MD   pantoprazole (PROTONIX) 40 mg tablet Take 40 mg by mouth daily    Historical Provider, MD   saccharomyces boulardii (FLORASTOR) 250 mg capsule Take 1 capsule by mouth 2 (two) times a day 10/31/17   Ailyn Gupta MD   simethicone (MYLICON) 80 mg chewable tablet Chew 1 tablet every 6 (six) hours as needed for flatulence 10/31/17   Ailyn Gupta MD     I have reviewed home medications with patient personally  Allergies:    Allergies   Allergen Reactions    Morphine And Related Swelling       Social History:     Marital Status: Single   Occupation:    Patient Pre-hospital Living Situation:    Patient Pre-hospital Level of Mobility:    Patient Pre-hospital Diet Restrictions:    Substance Use History:   History   Alcohol Use    Yes     Comment: [employer]     History   Smoking Status    Current Every Day Smoker    Packs/day: 1 00    Years: 29 00    Types: Cigarettes   Smokeless Tobacco    Never Used     History   Drug Use No       Family History:    non-contributory    Physical Exam:     Vitals:   Blood Pressure: (!) 177/98 (12/14/17 1200)  Pulse: 105 (12/14/17 1200)  Temperature: 98 °F (36 7 °C) (12/14/17 1102)  Temp Source: Oral (12/14/17 1102)  Respirations: 19 (12/14/17 1200)  Height: 5' 4" (162 6 cm) (12/14/17 0654)  Weight - Scale: 74 kg (163 lb 4 oz) (12/14/17 0654)  SpO2: 95 % (12/14/17 1200)    Physical Exam   Constitutional: She is oriented to person, place, and time  She appears well-developed and well-nourished  No distress  HENT:   Head: Normocephalic and atraumatic  Eyes: Conjunctivae and EOM are normal    Neck: Normal range of motion  Cardiovascular: Normal rate, regular rhythm and normal heart sounds  Pulmonary/Chest: Effort normal and breath sounds normal    Abdominal: Soft  Bowel sounds are normal  There is generalized tenderness  Musculoskeletal: Normal range of motion  Neurological: She is alert and oriented to person, place, and time  Skin: Skin is warm and dry  Psychiatric: She has a normal mood and affect  Her behavior is normal             Additional Data:     Lab Results: I have personally reviewed pertinent reports          Results from last 7 days  Lab Units 12/14/17  1059   WBC Thousand/uL 16 48*   HEMOGLOBIN g/dL 13 6   HEMATOCRIT % 41 3   PLATELETS Thousands/uL 252   NEUTROS PCT % 72   LYMPHS PCT % 20   MONOS PCT % 7   EOS PCT % 0       Results from last 7 days  Lab Units 12/14/17  1136   SODIUM mmol/L 142   POTASSIUM mmol/L 3 9   CHLORIDE mmol/L 105   CO2 mmol/L 22   BUN mg/dL 3*   CREATININE mg/dL 0 68   CALCIUM mg/dL 7 9*   TOTAL PROTEIN g/dL 7 8 BILIRUBIN TOTAL mg/dL 0 40   ALK PHOS U/L 92   ALT U/L 27   AST U/L 34   GLUCOSE RANDOM mg/dL 152*           Imaging: I have personally reviewed pertinent reports  CT abdomen pelvis with contrast   Final Result by Vincent Torres MD (12/14 4110)      No findings to suggest acute pancreatitis  Stable mild dilatation of the pancreatic duct within the tail  See above for further details  Mild prominence of the wall of the gastric antrum, nondistended stomach  This is a low specificity finding and can be related to artifact from peristalsis  Given the presence of epigastric pain, mild gastritis or PUD may also be considered  Findings of hepatomegaly and cirrhosis appearing stable  Workstation performed: [id number]             EKG, Pathology, and Other Studies Reviewed on Admission:   · EKG:      Allscripts / Epic Records Reviewed: No     ** Please Note: This note has been constructed using a voice recognition system   **

## 2017-12-15 ENCOUNTER — ANESTHESIA (INPATIENT)
Dept: PERIOP | Facility: HOSPITAL | Age: 42
DRG: 243 | End: 2017-12-15
Payer: COMMERCIAL

## 2017-12-15 ENCOUNTER — GENERIC CONVERSION - ENCOUNTER (OUTPATIENT)
Dept: OTHER | Facility: OTHER | Age: 42
End: 2017-12-15

## 2017-12-15 ENCOUNTER — ANESTHESIA EVENT (INPATIENT)
Dept: PERIOP | Facility: HOSPITAL | Age: 42
DRG: 243 | End: 2017-12-15
Payer: COMMERCIAL

## 2017-12-15 ENCOUNTER — GENERIC CONVERSION - ENCOUNTER (OUTPATIENT)
Dept: GASTROENTEROLOGY | Facility: CLINIC | Age: 42
End: 2017-12-15

## 2017-12-15 PROBLEM — R10.13 EPIGASTRIC PAIN: Status: ACTIVE | Noted: 2017-12-14

## 2017-12-15 PROBLEM — K92.0 HEMATEMESIS WITH NAUSEA: Status: ACTIVE | Noted: 2017-12-14

## 2017-12-15 PROBLEM — R11.2 NAUSEA AND VOMITING: Status: ACTIVE | Noted: 2017-12-14

## 2017-12-15 LAB
ANION GAP SERPL CALCULATED.3IONS-SCNC: 7 MMOL/L (ref 4–13)
BILIRUB UR QL STRIP: NEGATIVE
BUN SERPL-MCNC: 5 MG/DL (ref 5–25)
CALCIUM SERPL-MCNC: 7.2 MG/DL (ref 8.3–10.1)
CHLORIDE SERPL-SCNC: 104 MMOL/L (ref 100–108)
CLARITY UR: CLEAR
CO2 SERPL-SCNC: 27 MMOL/L (ref 21–32)
COLOR UR: YELLOW
CREAT SERPL-MCNC: 0.57 MG/DL (ref 0.6–1.3)
ERYTHROCYTE [DISTWIDTH] IN BLOOD BY AUTOMATED COUNT: 14 % (ref 11.6–15.1)
GFR SERPL CREATININE-BSD FRML MDRD: 116 ML/MIN/1.73SQ M
GLUCOSE SERPL-MCNC: 100 MG/DL (ref 65–140)
GLUCOSE UR STRIP-MCNC: NEGATIVE MG/DL
HCT VFR BLD AUTO: 40.8 % (ref 34.8–46.1)
HGB BLD-MCNC: 13.4 G/DL (ref 11.5–15.4)
HGB UR QL STRIP.AUTO: NEGATIVE
KETONES UR STRIP-MCNC: NEGATIVE MG/DL
LEUKOCYTE ESTERASE UR QL STRIP: NEGATIVE
MCH RBC QN AUTO: 31.9 PG (ref 26.8–34.3)
MCHC RBC AUTO-ENTMCNC: 32.8 G/DL (ref 31.4–37.4)
MCV RBC AUTO: 97 FL (ref 82–98)
NITRITE UR QL STRIP: NEGATIVE
PH UR STRIP.AUTO: 7.5 [PH] (ref 4.5–8)
PLATELET # BLD AUTO: 169 THOUSANDS/UL (ref 149–390)
PMV BLD AUTO: 10.4 FL (ref 8.9–12.7)
POTASSIUM SERPL-SCNC: 3.4 MMOL/L (ref 3.5–5.3)
PROT UR STRIP-MCNC: NEGATIVE MG/DL
RBC # BLD AUTO: 4.2 MILLION/UL (ref 3.81–5.12)
SODIUM SERPL-SCNC: 138 MMOL/L (ref 136–145)
SP GR UR STRIP.AUTO: 1.01 (ref 1–1.03)
UROBILINOGEN UR QL STRIP.AUTO: 1 E.U./DL
WBC # BLD AUTO: 8.46 THOUSAND/UL (ref 4.31–10.16)

## 2017-12-15 PROCEDURE — C9113 INJ PANTOPRAZOLE SODIUM, VIA: HCPCS | Performed by: GENERAL PRACTICE

## 2017-12-15 PROCEDURE — 88342 IMHCHEM/IMCYTCHM 1ST ANTB: CPT | Performed by: INTERNAL MEDICINE

## 2017-12-15 PROCEDURE — 81003 URINALYSIS AUTO W/O SCOPE: CPT | Performed by: EMERGENCY MEDICINE

## 2017-12-15 PROCEDURE — 85027 COMPLETE CBC AUTOMATED: CPT | Performed by: NURSE PRACTITIONER

## 2017-12-15 PROCEDURE — 0DB68ZX EXCISION OF STOMACH, VIA NATURAL OR ARTIFICIAL OPENING ENDOSCOPIC, DIAGNOSTIC: ICD-10-PCS | Performed by: INTERNAL MEDICINE

## 2017-12-15 PROCEDURE — 88305 TISSUE EXAM BY PATHOLOGIST: CPT | Performed by: INTERNAL MEDICINE

## 2017-12-15 PROCEDURE — 80048 BASIC METABOLIC PNL TOTAL CA: CPT | Performed by: NURSE PRACTITIONER

## 2017-12-15 PROCEDURE — 0DB58ZX EXCISION OF ESOPHAGUS, VIA NATURAL OR ARTIFICIAL OPENING ENDOSCOPIC, DIAGNOSTIC: ICD-10-PCS | Performed by: INTERNAL MEDICINE

## 2017-12-15 RX ORDER — SODIUM CHLORIDE, SODIUM LACTATE, POTASSIUM CHLORIDE, CALCIUM CHLORIDE 600; 310; 30; 20 MG/100ML; MG/100ML; MG/100ML; MG/100ML
INJECTION, SOLUTION INTRAVENOUS CONTINUOUS PRN
Status: DISCONTINUED | OUTPATIENT
Start: 2017-12-15 | End: 2017-12-15 | Stop reason: SURG

## 2017-12-15 RX ORDER — POTASSIUM CHLORIDE 20 MEQ/1
40 TABLET, EXTENDED RELEASE ORAL ONCE
Status: COMPLETED | OUTPATIENT
Start: 2017-12-15 | End: 2017-12-15

## 2017-12-15 RX ORDER — PROPOFOL 10 MG/ML
INJECTION, EMULSION INTRAVENOUS AS NEEDED
Status: DISCONTINUED | OUTPATIENT
Start: 2017-12-15 | End: 2017-12-15 | Stop reason: SURG

## 2017-12-15 RX ORDER — CLONIDINE HYDROCHLORIDE 0.1 MG/1
0.3 TABLET ORAL 2 TIMES DAILY
Status: DISCONTINUED | OUTPATIENT
Start: 2017-12-15 | End: 2017-12-16 | Stop reason: HOSPADM

## 2017-12-15 RX ORDER — DICYCLOMINE HCL 20 MG
20 TABLET ORAL
Status: DISCONTINUED | OUTPATIENT
Start: 2017-12-15 | End: 2017-12-16 | Stop reason: HOSPADM

## 2017-12-15 RX ORDER — CLONIDINE HYDROCHLORIDE 0.1 MG/1
0.2 TABLET ORAL ONCE
Status: COMPLETED | OUTPATIENT
Start: 2017-12-15 | End: 2017-12-15

## 2017-12-15 RX ORDER — CLONIDINE HYDROCHLORIDE 0.1 MG/1
0.2 TABLET ORAL 2 TIMES DAILY
Status: DISCONTINUED | OUTPATIENT
Start: 2017-12-15 | End: 2017-12-15

## 2017-12-15 RX ADMIN — LORAZEPAM 0.5 MG: 2 INJECTION INTRAMUSCULAR; INTRAVENOUS at 07:04

## 2017-12-15 RX ADMIN — HYDROMORPHONE HYDROCHLORIDE 0.2 MG: 1 INJECTION, SOLUTION INTRAMUSCULAR; INTRAVENOUS; SUBCUTANEOUS at 05:00

## 2017-12-15 RX ADMIN — LORAZEPAM 0.5 MG: 2 INJECTION INTRAMUSCULAR; INTRAVENOUS at 16:32

## 2017-12-15 RX ADMIN — PROPOFOL 30 MG: 10 INJECTION, EMULSION INTRAVENOUS at 13:49

## 2017-12-15 RX ADMIN — POTASSIUM CHLORIDE 40 MEQ: 1500 TABLET, EXTENDED RELEASE ORAL at 11:08

## 2017-12-15 RX ADMIN — SODIUM CHLORIDE 200 ML/HR: 0.9 INJECTION, SOLUTION INTRAVENOUS at 20:17

## 2017-12-15 RX ADMIN — SODIUM CHLORIDE 200 ML/HR: 0.9 INJECTION, SOLUTION INTRAVENOUS at 12:34

## 2017-12-15 RX ADMIN — NICOTINE 1 PATCH: 21 PATCH, EXTENDED RELEASE TRANSDERMAL at 08:29

## 2017-12-15 RX ADMIN — Medication 250 MG: at 08:28

## 2017-12-15 RX ADMIN — HYDROMORPHONE HYDROCHLORIDE 0.2 MG: 1 INJECTION, SOLUTION INTRAMUSCULAR; INTRAVENOUS; SUBCUTANEOUS at 08:28

## 2017-12-15 RX ADMIN — PANTOPRAZOLE SODIUM 40 MG: 40 INJECTION, POWDER, FOR SOLUTION INTRAVENOUS at 08:28

## 2017-12-15 RX ADMIN — HYDROMORPHONE HYDROCHLORIDE 0.2 MG: 1 INJECTION, SOLUTION INTRAMUSCULAR; INTRAVENOUS; SUBCUTANEOUS at 16:17

## 2017-12-15 RX ADMIN — PROPOFOL 100 MG: 10 INJECTION, EMULSION INTRAVENOUS at 13:46

## 2017-12-15 RX ADMIN — SODIUM CHLORIDE 200 ML/HR: 0.9 INJECTION, SOLUTION INTRAVENOUS at 01:51

## 2017-12-15 RX ADMIN — PANCRELIPASE 24000 UNITS: 24000; 76000; 120000 CAPSULE, DELAYED RELEASE PELLETS ORAL at 20:15

## 2017-12-15 RX ADMIN — SODIUM CHLORIDE, SODIUM LACTATE, POTASSIUM CHLORIDE, AND CALCIUM CHLORIDE: .6; .31; .03; .02 INJECTION, SOLUTION INTRAVENOUS at 13:36

## 2017-12-15 RX ADMIN — HYDROMORPHONE HYDROCHLORIDE 0.2 MG: 1 INJECTION, SOLUTION INTRAMUSCULAR; INTRAVENOUS; SUBCUTANEOUS at 23:14

## 2017-12-15 RX ADMIN — HYDROMORPHONE HYDROCHLORIDE 0.2 MG: 1 INJECTION, SOLUTION INTRAMUSCULAR; INTRAVENOUS; SUBCUTANEOUS at 00:38

## 2017-12-15 RX ADMIN — LORAZEPAM 0.5 MG: 2 INJECTION INTRAMUSCULAR; INTRAVENOUS at 23:25

## 2017-12-15 RX ADMIN — HYDROMORPHONE HYDROCHLORIDE 0.2 MG: 1 INJECTION, SOLUTION INTRAMUSCULAR; INTRAVENOUS; SUBCUTANEOUS at 20:14

## 2017-12-15 RX ADMIN — HYDROMORPHONE HYDROCHLORIDE 0.2 MG: 1 INJECTION, SOLUTION INTRAMUSCULAR; INTRAVENOUS; SUBCUTANEOUS at 12:28

## 2017-12-15 RX ADMIN — CLONIDINE HYDROCHLORIDE 0.2 MG: 0.1 TABLET ORAL at 11:35

## 2017-12-15 RX ADMIN — CLONIDINE HYDROCHLORIDE 0.3 MG: 0.1 TABLET ORAL at 23:14

## 2017-12-15 RX ADMIN — CLONIDINE HYDROCHLORIDE 0.1 MG: 0.1 TABLET ORAL at 08:28

## 2017-12-15 RX ADMIN — DICYCLOMINE HYDROCHLORIDE 20 MG: 20 TABLET ORAL at 16:21

## 2017-12-15 RX ADMIN — PANCRELIPASE 24000 UNITS: 24000; 76000; 120000 CAPSULE, DELAYED RELEASE PELLETS ORAL at 08:28

## 2017-12-15 RX ADMIN — PANTOPRAZOLE SODIUM 40 MG: 40 INJECTION, POWDER, FOR SOLUTION INTRAVENOUS at 20:15

## 2017-12-15 RX ADMIN — Medication 250 MG: at 18:45

## 2017-12-15 RX ADMIN — ONDANSETRON 4 MG: 2 INJECTION INTRAMUSCULAR; INTRAVENOUS at 20:22

## 2017-12-15 RX ADMIN — SODIUM CHLORIDE 200 ML/HR: 0.9 INJECTION, SOLUTION INTRAVENOUS at 07:06

## 2017-12-15 RX ADMIN — LORAZEPAM 0.5 MG: 2 INJECTION INTRAMUSCULAR; INTRAVENOUS at 01:19

## 2017-12-15 NOTE — OP NOTE
**** GI/ENDOSCOPY REPORT ****     PATIENT NAME: Pablo Viramontes - VISIT ID:  Patient ID: [id number]   YOB: 1975     INTRODUCTION: Esophagogastroduodenoscopy - A 40 female patient presents   for an inpatient Esophagogastroduodenoscopy at [address] [place] [address]  INDICATIONS: Abdominal pain  CONSENT: The benefits, risks, and alternatives to the procedure were   discussed and informed consent was obtained from the patient  PREPARATION:  EKG, pulse, pulse oximetry and blood pressure were monitored   throughout the procedure  ASA Classification: Class 2 - Patient has mild   to moderate systemic disturbance that may or may not be related to the   disorder requiring surgery  The patient was kept NPO for eight hours prior   to the procedure  MEDICATIONS: MAC anesthesia  PROCEDURE:  The endoscope was passed without difficulty through the mouth   under direct visualization and advanced to the 2nd portion of the   duodenum  The scope was withdrawn and the mucosa was carefully examined  FINDINGS:   Esophagus: A tongue of short-segment Castano's esophagus was   found  Multiple biopsies was taken  Stomach: Mild non-erosive gastritis   was found in the antrum and body of the stomach  Multiple biopsies was   taken  Duodenum: The duodenal bulb and 2nd portion of the duodenum   appeared to be normal      COMPLICATIONS: There were no complications  IMPRESSIONS: Castano's esophagus noted  Multiple biopsies taken  Mild   non-erosive gastritis found in the antrum and body of the stomach  Multiple biopsies taken  Normal duodenal bulb and 2nd portion of the   duodenum  RECOMMENDATIONS: PPI BID  Alcohol cessation  Anti-reflux measures: Raise   the head of the bed 4 to 6 inches  Avoid smoking  Avoid excess coffee, tea   or other caffeinated beverages  Avoid garments that fit tightly through   the abdomen  Avoid eating before bed   Follow-up on the results of the   biopsy specimens in 1 week  Continue Creon  ESTIMATED BLOOD LOSS: None  PATHOLOGY SPECIMENS: Multiple biopsies taken  Associated finding:   Castano's esophagus  Multiple biopsies taken  Associated finding:   Gastritis  Yes     PROCEDURE CODES: 85986 - EGD flexible; with biopsy     ICD-9 Codes: 789 00 Abdominal pain, unspecified site 530 85 Castano's   esophagus     ICD-10 Codes: R10 Abdominal and pelvic pain K22 7 Castano's esophagus K29   Gastritis and duodenitis     PERFORMED BY: CECILIA Delgado  on 12/15/2017  Version 1, electronically signed by CECILIA Powell  on   12/15/2017 at 13:54

## 2017-12-15 NOTE — CONSULTS
Consultation - [facility]  Jazzmineian Desiraebenito 40 y o  female MRN: [id number]  Unit/Bed#: -01 Encounter: [id number]        Inpatient consult to gastroenterology  Consult performed by: Freddy Pleitez  Consult ordered by: Last Hidalgo          Reason for Consult / Principal Problem: Abd pain accompanied by hemetemsis    HPI: Pavithra Hunter is a 40year old female with a known history of alcoholic cirrhosis and possible chronic pancreatitis that she takes Creon for with meals  Patient was admitted for sepsis due to significant leukocytosis and tachycardia  She reports that for the past 2 days she has been experiencing abdominal pain spanning from the epigastric to periumbilical regions with poor oral intake  She states the pain is similar to previous bouts of pancreatitis she has had in the past  Before she presented to the emergency department, she reports vomiting multiple times  There was one episode of hematemesis  She was unable to quantify the amount  Ethanol level was 157 on admission yesterday  She, however, denies binge drinking  The patient was seen at the bedside today  Patient has not had a BM since Wednesday  Normally her bowels are loose  She reports reflux  She denies NSAID use, fever, odynophagia, dysphagia, vomiting, melena, hematochezia or diarrhea  Last EGD: Patient reports it was performed 2 years ago with Dr Anjali Morales  To her knowledge, EGD was normal        Review of Systems:    CONSTITUTIONAL: Denies any fever, chills, or rigors  Good appetite, and no recent weight loss  HEENT: Positive for sore throat  No earache or tinnitus  Denies hearing loss or visual disturbances  CARDIOVASCULAR: No chest pain or palpitations  RESPIRATORY: Denies any cough, hemoptysis, shortness of breath or dyspnea on exertion  GASTROINTESTINAL: As noted in the History of Present Illness  GENITOURINARY: No problems with urination  Denies any hematuria or dysuria    NEUROLOGIC: No dizziness or vertigo, denies headaches  MUSCULOSKELETAL: Denies any muscle or joint pain  SKIN: Denies skin rashes or itching  ENDOCRINE: Denies excessive thirst  Denies intolerance to heat or cold  PSYCHOSOCIAL: Positive for anxiety  Denies any recent memory loss  Historical Information   Past Medical History:   Diagnosis Date    Alcohol abuse     Arthritis     GERD (gastroesophageal reflux disease)     Hypertension     Nicotine dependence     Obesity     Pancreatitis     Panic attack      Past Surgical History:   Procedure Laterality Date    ABDOMINAL SURGERY      C SECTION     Social History   History   Alcohol Use    Yes     Comment: [employer]OCK[employer]     History   Drug Use No     History   Smoking Status    Current Every Day Smoker    Packs/day: 1 00    Years: 29 00    Types: Cigarettes   Smokeless Tobacco    Never Used     Family History   Problem Relation Age of Onset    Cirrhosis Father     Alcohol abuse Father     Drug abuse Mother     To the patient's knowledge, patient denies family history of GI malignancies such as: esophogeal, gastric, hepatic, gallbladder, pancreatic or colorectal cancer      Meds/Allergies     Prescriptions Prior to Admission   Medication    cloNIDine (CATAPRES) 0 1 mg tablet    dicyclomine (BENTYL) 10 mg capsule    docusate sodium (COLACE) 100 mg capsule    Mometasone Furo-Formoterol Fum (DULERA) 100-5 MCG/ACT AERO    nicotine (NICODERM CQ) 21 mg/24 hr TD 24 hr patch    nystatin (MYCOSTATIN) 100,000 units/mL suspension    ondansetron (ZOFRAN) 4 mg tablet    pancrelipase, Lip-Prot-Amyl, (CREON) 24,000 units    pantoprazole (PROTONIX) 40 mg tablet    saccharomyces boulardii (FLORASTOR) 250 mg capsule    simethicone (MYLICON) 80 mg chewable tablet     Current Facility-Administered Medications   Medication Dose Route Frequency    acetaminophen (TYLENOL) tablet 650 mg  650 mg Oral Q6H PRN    cloNIDine (CATAPRES) tablet 0 3 mg  0 3 mg Oral BID    dicyclomine (BENTYL) tablet 20 mg  20 mg Oral TID AC    HYDROmorphone (DILAUDID) 1 mg/mL injection 0 2 mg  0 2 mg Intravenous Q3H PRN    LORazepam (ATIVAN) 2 mg/mL injection 0 5 mg  0 5 mg Intravenous Q6H PRN    nicotine (NICODERM CQ) 21 mg/24 hr TD 24 hr patch 1 patch  1 patch Transdermal Daily    ondansetron (ZOFRAN) injection 4 mg  4 mg Intravenous Q6H PRN    pancrelipase (Lip-Prot-Amyl) (CREON) delayed release capsule 24,000 Units  24,000 Units Oral TID With Meals    pantoprazole (PROTONIX) injection 40 mg  40 mg Intravenous Q12H [address]    saccharomyces boulardii (FLORASTOR) capsule 250 mg  250 mg Oral BID    simethicone (MYLICON) chewable tablet 80 mg  80 mg Oral Q6H PRN    sodium chloride 0 9 % infusion  200 mL/hr Intravenous Continuous       Allergies   Allergen Reactions    Morphine And Related Swelling           Objective     Blood pressure (!) 193/103, pulse 85, temperature 98 1 °F (36 7 °C), temperature source Oral, resp  rate 18, height 5' 4" (1 626 m), weight 82 kg (180 lb 12 4 oz), SpO2 99 %, not currently breastfeeding  Intake/Output Summary (Last 24 hours) at 12/15/17 1208  Last data filed at 12/15/17 0044   Gross per 24 hour   Intake          3193 33 ml   Output              850 ml   Net          2343 33 ml         PHYSICAL EXAM:      General Appearance:   Alert, cooperative, and in no distress   HEENT:   Normocephalic, atraumatic, anicteric      Neck:  Supple, symmetrical, trachea midline, no adenopathy;    thyroid: no enlargement/tenderness/nodules;    Lungs:   Clear to auscultation bilaterally; no rales, rhonchi or wheezing; respirations unlabored    Heart[de-identified]   S1 and S2 normal; regular rate and rhythm; no murmur, rub, or gallop     Abdomen:   Soft, tender in the epigastric and periumbilical regions, no guarding or rigidity, non-distended; normal bowel sounds; no masses, no organomegaly    Genitalia:   Deferred    Rectal:   Deferred    Extremities:  No cyanosis, clubbing or edema  Pulses:  2+ and symmetric all extremities    Skin:  Skin color, texture, turgor normal, no rashes or lesions    Lymph nodes:  No palpable cervical, axillary or inguinal lymphadenopathy        Lab Results:     Results from last 7 days  Lab Units 12/15/17  0606 12/14/17  1059   WBC Thousand/uL 8 46 16 48*   HEMOGLOBIN g/dL 13 4 13 6   HEMATOCRIT % 40 8 41 3   PLATELETS Thousands/uL 169 252   NEUTROS PCT %  --  72   LYMPHS PCT %  --  20   MONOS PCT %  --  7   EOS PCT %  --  0       Results from last 7 days  Lab Units 12/15/17  0606 12/14/17  1136   SODIUM mmol/L 138 142   POTASSIUM mmol/L 3 4* 3 9   CHLORIDE mmol/L 104 105   CO2 mmol/L 27 22   BUN mg/dL 5 3*   CREATININE mg/dL 0 57* 0 68   CALCIUM mg/dL 7 2* 7 9*   TOTAL PROTEIN g/dL  --  7 8   BILIRUBIN TOTAL mg/dL  --  0 40   ALK PHOS U/L  --  92   ALT U/L  --  27   AST U/L  --  34   GLUCOSE RANDOM mg/dL 100 152*           Results from last 7 days  Lab Units 12/14/17  0653   LIPASE u/L 69*       Imaging Studies: I have personally reviewed pertinent imaging studies  Ct Abdomen Pelvis With Contrast    Result Date: 12/14/2017  Impression: No findings to suggest acute pancreatitis  Stable mild dilatation of the pancreatic duct within the tail  See above for further details  Mild prominence of the wall of the gastric antrum, nondistended stomach  This is a low specificity finding and can be related to artifact from peristalsis  Given the presence of epigastric pain, mild gastritis or PUD may also be considered  Findings of hepatomegaly and cirrhosis appearing stable  Workstation performed: [id number]       ASSESSMENT and PLAN:    Principal Problem:    Sepsis ([facility] [address] )  Active Problems:    Pancreatitis    Abdominal pain    Essential hypertension    Nicotine dependence    Alcohol abuse    Epigastric pain    Nausea and vomiting    Hematemesis with nausea    Abdominal pain  - LFTs and janette WNL  - CT and lipase negative for acute pancreatitis     - CT did however show possible gastric thickening, which does correlate with where the patient's pain is on exam  Dilation of the pancreatic duct also noted on Ct, but recent MRCP in October ruled out masses  Dilation likely due to recent pancreatitis  - EGD this afternoon to evaluate whether this is gastritis, ulceration or malignancy  - Patient is asking for increase in pain medications, however we need EGD to evaluate the origin (stomach vs  Pancreas)  - Continue Creon TID with meals   - Continue Protonix IV BID    Hematemesis  - Will need to rule out bleeding varices vs  [place] tear vs  erosive esophagitis  - Stool occult ordered   - Last EGD 2 years ago  - Continue to trend Hgb  Currently stable at 13 4    - No further episodes  - Patient is NPO for EGD this afternoon, will consider changing to clear liquids after procedure as patient tolerates      Compensated alcoholic cirrhosis  - Grade 0 hepatic encephalopathy   - Child Haddad Class A    Constipation  - Likely from poor oral intake  - Laxative not needed, but will continue to monitor     Sepsis  - Pending blood cultures  - WBC now WNL, whereas during admission was in 16k  - Resolved anion gap       The patient was seen for a total of 35 minutes  More than 50% of the encounter involved counseling the patient on treatment plan  Patient will be seen and examined by Dr Destinee Ortega  Thank you for allowing us to participate in the care of this present patient  We will follow-up with you closely

## 2017-12-15 NOTE — ANESTHESIA PREPROCEDURE EVALUATION
Review of Systems/Medical History  Patient summary reviewed    No history of anesthetic complications     Cardiovascular  EKG reviewed, Negative cardio ROS Hypertension poorly controlled,    Pulmonary  Smoker 5-10 packs per year , Tobacco cessation counseling given, ,        GI/Hepatic    GERD poorly controlled,   Comment: N/V last episode of vomiting midnight     Negative  ROS        Endo/Other  Negative endo/other ROS Arthritis     GYN       Hematology  Negative hematology ROS      Musculoskeletal  Negative musculoskeletal ROS        Neurology  Negative neurology ROS      Psychology   Anxiety,   Comment: H/o ETOH pancreatitis         Physical Exam    Airway    Mallampati score: II  TM Distance: >3 FB  Neck ROM: full     Dental       Cardiovascular  Comment: Negative ROS, Rhythm: regular, Rate: normal, Cardiovascular exam normal    Pulmonary  Pulmonary exam normal Breath sounds clear to auscultation,     Other Findings  Poor dentition  Multiple missing and chipped  Nothing loose per patient      Anesthesia Plan  ASA Score- 2       Anesthesia Type- IV sedation with anesthesia with ASA Monitors  Additional Monitors:   Airway Plan: ETT  Comment: NPO verified with patient          Induction- intravenous  Informed Consent- Anesthetic plan and risks discussed with patient  I personally reviewed this patient with the CRNA  Discussed and agreed on the Anesthesia Plan with the CRNA  Georgette Ormond

## 2017-12-15 NOTE — ANESTHESIA POSTPROCEDURE EVALUATION
Post-Op Assessment Note      CV Status:  Stable    Mental Status:  Awake and alert    Hydration Status:  Stable    PONV Controlled:  None    Airway Patency:  Patent and adequate    Post Op Vitals Reviewed: Yes          Staff: Anesthesiologist, CRNA           BP   169/94   Temp      Pulse  70   Resp   18   SpO2  100%

## 2017-12-15 NOTE — CASE MANAGEMENT
[facility] in the [facility] by Dk Rodríguez for 2017  Network Utilization Review Department  Phone: [phone]; Fax [phone]  ATTENTION: The Network Utilization Review Department is now centralized for our 7 Facilities  Make a note that we have a new phone and fax numbers for our Department  Please call with any questions or concerns to [phone] and carefully follow the prompts so that you are directed to the right person  All voicemails are confidential  Fax any determinations, approvals, denials, and requests for initial or continue stay review clinical to [phone]  Due to HIGH CALL volume, it would be easier if you could please send faxed requests to expedite your requests and in part, help us provide discharge notifications faster  Initial Clinical Review    Admission: Date/Time/Statement: 12/14/17 @ 0949     Orders Placed This Encounter   Procedures    Inpatient Admission (expected length of stay for this patient is greater than two midnights)     Standing Status:   Standing     Number of Occurrences:   1     Order Specific Question:   Admitting Physician     Answer:   Dianna Fonseca [[id number]]     Order Specific Question:   Level of Care     Answer:   Med Surg [16]     Order Specific Question:   Bed request comments     Answer:   tele     Order Specific Question:   Estimated length of stay     Answer:   More than 2 Midnights     Order Specific Question:   Certification     Answer:   I certify that inpatient services are medically necessary for this patient for a duration of greater than two midnights  See H&P and MD Progress Notes for additional information about the patient's course of treatment           ED: Date/Time/Mode of Arrival:   ED Arrival Information     Expected Arrival Acuity Means of Arrival Escorted By Service Admission Type    - 12/14/2017 06:40 Urgent Ambulance [facility] Medicine Urgent    Arrival Complaint    vomiting          Chief Complaint:   Chief Complaint   Patient presents with    Abdominal Pain     c/o mid upper abdominal pain and vomiting for past 2 days       History of Illness: Isaac Mercado is a 40 y o  female who presents with known history of chronic pancreatitis, alcohol abuse, nicotine abuse, hypertension, and panic attacks who presents with abdominal pain with nausea and vomiting at home after consuming alcohol  Patient reports with her alcohol abuse she has been dry over the past month however decided to have a drink in and started with vomiting noted blood abdominal pain, patient reports she has a history of IBS and her bowel movements are not above her baseline however does note they are now dark in color    Patient came to ED with further evaluation feeling that her pancreatitis had flared acutely in requesting further workup      ED Vital Signs:   ED Triage Vitals   Temperature Pulse Respirations Blood Pressure SpO2   12/14/17 0654 12/14/17 0654 12/14/17 0654 12/14/17 0654 12/14/17 0654   97 7 °F (36 5 °C) 103 18 (!) 195/128 100 %      Temp Source Heart Rate Source Patient Position - Orthostatic VS BP Location FiO2 (%)   12/14/17 0654 12/14/17 0654 12/14/17 0654 12/14/17 0654 --   Oral Monitor Lying Right arm       Pain Score       12/14/17 0650       Worst Possible Pain        Wt Readings from Last 1 Encounters:   12/15/17 82 kg (180 lb 12 4 oz)       Vital Signs (abnormal):   12/14/17 0718  --  100  19   179/90  --  100 %  None (Room air)  Lying   12/14/17 0715  --  102  --   181/106  --  100 %  --  --   12/14/17 0700  --  103  --   195/128  --  100 %  --  --   12/14/17 0654  97 7 °F (36 5 °C)  103  18   195/128  --  100 %  None (Room air)  Lying     Abnormal Labs/Diagnostic Test Results: an gap  15, BUN creat   3   068, gluc 152, sangeetha  7 9, lactic acid 3 7, wbc  16 48  VBG-   pH, Lyle 7 322 7 300 - 7 400     pCO2, Lyle 17 1 (L) 42 0 - 50 0 mm Hg     pO2, Lyle 69 9 (H) 35 0 - 45 0 mm Hg HCO3, Lyle 8 7 (L) 24 - 30 mmol/L     Base Excess, Lyle -15 7 mmol/L     O2 Content, Lyle 10 0 ml/dL     O2 HGB, VENOUS 90 2 (H) 60 0 - 80 0 %    Lactic acid 4 0, lactic acid   6 1, lipase 69, sangeetha  1 10, Co2  19, an gap  21, BUN creat  4 0 90, gluc   212, alk phos  119, total prot 9 7, ethanol 157, wbc 16 30, H&H   17 7  52 6  CT abd-    No findings to suggest acute pancreatitis   Stable mild dilatation of the pancreatic duct within the tail   See above for further details  Mild prominence of the wall of the gastric antrum, nondistended stomach   This is a low specificity finding and can be related to artifact from peristalsis   Given the presence of epigastric pain, mild gastritis or PUD may also be considered  Findings of hepatomegaly and cirrhosis appearing stable          ED Treatment:   Medication Administration from 12/14/2017 0640 to 12/14/2017 1656       Date/Time Order Dose Route Action Action by Comments     12/14/2017 0650 HYDROmorphone (DILAUDID) 1 mg/mL injection 1 mg 1 mg Intravenous Given Chapis De Jesus RN      12/14/2017 0650 ondansetron (ZOFRAN) injection 4 mg 4 mg Intravenous Given Chapis De Jesus RN      12/14/2017 0859 sodium chloride 0 9 % bolus 2,000 mL 0 mL Intravenous Stopped Pallavi Luo RN      12/14/2017 6401 sodium chloride 0 9 % bolus 2,000 mL 2,000 mL Intravenous [address] Chapis De Jesus RN      12/14/2017 0913 multi-electrolyte (ISOLYTE-S PH 7 4) bolus 1,000 mL   Intravenous Canceled Entry Pallavi Luo RN      12/14/2017 0723 HYDROmorphone (DILAUDID) 1 mg/mL injection 1 mg 1 mg Intravenous Given Pallavi Luo RN      12/14/2017 0723 ondansetron (ZOFRAN) injection 4 mg 4 mg Intravenous Given Pallavi Luo RN      12/14/2017 1401 multi-electrolyte (ISOLYTE-S PH 7 4 equivalent) IV solution 0 mL/hr Intravenous Stopped Pallavi Luo RN      12/14/2017 1256 multi-electrolyte (ISOLYTE-S PH 7 4 equivalent) IV solution 200 mL/hr Intravenous New Bag Kwesi Holden RN      12/14/2017 0735 iohexol (OMNIPAQUE) 350 MG/ML injection (MULTI-DOSE) 100 mL 100 mL Intravenous Given Guillermina Batch      12/14/2017 1256 multi-electrolyte (ISOLYTE-S PH 7 4) bolus 1,000 mL 0 mL Intravenous Stopped Kwesi Holden RN      12/14/2017 0859 multi-electrolyte (ISOLYTE-S PH 7 4) bolus 1,000 mL 1,000 mL Intravenous [address] Kwesi Holden RN      12/14/2017 0819 ondansetron (ZOFRAN) injection 4 mg 4 mg Intravenous Given Kwesi Holden RN      12/14/2017 0836 metoclopramide (REGLAN) injection 10 mg 10 mg Intravenous Given Kwesi Holden RN      12/14/2017 0900 HYDROmorphone (DILAUDID) 1 mg/mL injection 1 mg 1 mg Intravenous Given Kwesi Holden RN      12/14/2017 1053 promethazine (PHENERGAN) injection 25 mg 25 mg Intramuscular Given Kwesi Holden RN      12/14/2017 1105 LORazepam (ATIVAN) 2 mg/mL injection 0 5 mg 0 5 mg Intravenous Given Kwesi Holden RN      12/14/2017 1356 nicotine (NICODERM CQ) 21 mg/24 hr TD 24 hr patch 1 patch 1 patch Transdermal Medication Applied Maame Stoll RN      12/14/2017 1418 sodium chloride 0 9 % infusion 200 mL/hr Intravenous New Nika Holden RN      12/14/2017 1528 pantoprazole (PROTONIX) injection 40 mg 40 mg Intravenous Given Maame Stoll RN      12/14/2017 1357 LORazepam (ATIVAN) 2 mg/mL injection 0 5 mg 0 5 mg Intravenous Given Maame Stoll RN      12/14/2017 1418 HYDROmorphone (DILAUDID) 1 mg/mL injection 0 2 mg 0 2 mg Intravenous Given Kwesi Holden RN           Past Medical/Surgical History:    Active Ambulatory Problems     Diagnosis Date Noted    Pancreatitis 10/06/2016    Abdominal pain 10/06/2016    Essential hypertension 10/06/2016    Nicotine dependence 10/06/2016    Colitis 10/27/2017    Pain, dental 11/22/2017    Alcohol abuse 11/22/2017    Tylenol overdose, accidental or unintentional, initial encounter 11/22/2017    Elevated glucose 11/22/2017     Resolved Ambulatory Problems     Diagnosis Date Noted    Hypokalemia 03/07/2017     Past Medical History:   Diagnosis Date    Alcohol abuse     Arthritis     GERD (gastroesophageal reflux disease)     Hypertension     Nicotine dependence     Obesity     Pancreatitis     Panic attack        Admitting Diagnosis: Dehydration [E86 0]  Vomiting [R11 10]  Epigastric pain [R10 13]  Nausea and vomiting [R11 2]  Elevated lactic acid level [R79 89]  Acute on chronic pancreatitis (HCC) [K85 90, K86 1]    Age/Sex: 40 y o  female    Assessment/Plan:   Alcohol abuse   Assessment & Plan     · Patient reports she has been dry for the past 2 months, however had a relapse and reports only drinking 1 drink when made aware of her ethanol level  · Monitor closely for withdrawal will put on IV Ativan p r n  along with CIWA protocol       Nicotine dependence   Assessment & Plan     · Sensation encourage  · Nicotine patch ordered       Essential hypertension   Assessment & Plan     · Patient with urgency/crisis on admission will continue patient's Catapres and also add p r n  hydralazine for systolic blood pressure greater than 170       Pancreatitis   Assessment & Plan     · Chronic in nature CT does not show any acute pancreatitis lipase is negative  · GI consult given GI symptoms appreciate recommendations       * Sepsis ([facility] [address] )   Assessment & Plan     · Present on admission evident by tachycardia, leukocytosis, suspected source viral gastroenteritis will monitor off antibiotics as patient is afebrile and presenting more of a viral nature  · Will make patient NPO  · Give aggressive IV hydration as patient has an anion gap lactic acidosis, lactic level continues to improve will repeat every 4 hours x4  · Patient reporting her mad emesis and dark stools will check occult stool give PPI IV and consult GI  · Check blood cultures x2 CMP in a m    · Hemoglobin stable/normal will check a CBC in the morning sooner if patient shows further signs of active bleeding           VTE Prophylaxis: Pharmacologic VTE Prophylaxis contraindicated due to patient reporting hematemesis  / sequential compression device   Code Status:  Full code  POLST: POLST is not applicable to this patient  Discussion with family:  None at bedside   Anticipated Length of Stay:  Patient will be admitted on an Inpatient basis with an anticipated length of stay of  > 2 midnights     Justification for Hospital Stay:  Presenting with ongoing abdominal pain sepsis likely secondary to gastroenteritis with need for further diagnostic evaluation      Admission Orders:  Scheduled Meds:   cloNIDine 0 2 mg Oral BID   nicotine 1 patch Transdermal Daily   pancrelipase (Lip-Prot-Amyl) 24,000 Units Oral TID With Meals   pantoprazole 40 mg Intravenous Q12H [facility]   potassium chloride 40 mEq Oral Once   saccharomyces boulardii 250 mg Oral BID     Continuous Infusions:   sodium chloride 200 mL/hr Last Rate: 200 mL/hr (12/15/17 0706)     PRN Meds:   acetaminophen    HYDROmorphone   x6    LORazepam   x3    ondansetron    simethicone    NPO   Up w/ assist   CIWA   Daily  Weight   I&O   Occult bld stool   GI consult   SCD  Tele

## 2017-12-15 NOTE — PROGRESS NOTES
[facility] Internal Medicine Progress Note  Patient: Randa Gutierrez 40 y o  female   MRN: [id number]  PCP: Favio Eric MD  Unit/Bed#: MS Melly-01 Encounter: [id number]  Date Of Visit: 12/15/17    Assessment:    Principal Problem:    Sepsis ([facility] [address] )  Active Problems:    Pancreatitis    Abdominal pain    Essential hypertension    Nicotine dependence    Alcohol abuse      Plan:    Hematemesis ? gastritis on ct-gi consult appreciated for egd today    Alcohol abuse   Assessment & Plan     · Patient reports she has been dry for the past 2 months, however had a relapse and reports only drinking 1 drink when made aware of her ethanol level  · CIWA protocol  · On clonidine       Nicotine dependence   Assessment & Plan     · Sensation encourage  · Nicotine patch ordered       Essential hypertension   Assessment & Plan     · Increase dose clonidine to home dose 0 3 mg po bid       Pancreatitis   Assessment & Plan     · Chronic in nature CT does not show any acute pancreatitis lipase is negative  · GI consult given GI symptoms appreciate recommendations       * Sepsis ([facility] [address] )   Assessment & Plan     · Leucocytosis resolved, lactate normal  · Did not start abx                   VTE Pharmacologic Prophylaxis:   Pharmacologic: Pharmacologic VTE Prophylaxis contraindicated due to hematemesis  Mechanical VTE Prophylaxis in Place: Yes    Patient Centered Rounds: I have performed bedside rounds with nursing staff today  Discussions with Specialists or Other Care Team Provider:     Education and Discussions with Family / Patient:     Time Spent for Care: 30 minutes  More than 50% of total time spent on counseling and coordination of care as described above      Current Length of Stay: 1 day(s)    Current Patient Status: Inpatient   Certification Statement: The patient will continue to require additional inpatient hospital stay due to GI eval    Discharge Plan: home    Code Status: Level 1 - Full Code      Subjective:   C/o epigastric pain    Objective:     Vitals:   Temp (24hrs), Av 9 °F (36 6 °C), Min:97 7 °F (36 5 °C), Max:98 1 °F (36 7 °C)    HR:  [] 85  Resp:  [18-20] 18  BP: (166-196)/() 190/99  SpO2:  [91 %-100 %] 99 %  Body mass index is 31 03 kg/m²  Input and Output Summary (last 24 hours): Intake/Output Summary (Last 24 hours) at 12/15/17 0837  Last data filed at 12/15/17 6369   Gross per 24 hour   Intake          3193 33 ml   Output              850 ml   Net          2343 33 ml       Physical Exam:     Physical Exam   Constitutional: She appears well-developed and well-nourished  HENT:   Head: Normocephalic  Eyes: Pupils are equal, round, and reactive to light  Cardiovascular: Normal rate and regular rhythm  Pulmonary/Chest: Effort normal and breath sounds normal    Abdominal: Soft  She exhibits no distension  Musculoskeletal: She exhibits no edema  Additional Data:     Labs:      Results from last 7 days  Lab Units 12/15/17  0606 17  1059   WBC Thousand/uL 8 46 16 48*   HEMOGLOBIN g/dL 13 4 13 6   HEMATOCRIT % 40 8 41 3   PLATELETS Thousands/uL 169 252   NEUTROS PCT %  --  72   LYMPHS PCT %  --  20   MONOS PCT %  --  7   EOS PCT %  --  0       Results from last 7 days  Lab Units 12/15/17  0606 17  1136   SODIUM mmol/L 138 142   POTASSIUM mmol/L 3 4* 3 9   CHLORIDE mmol/L 104 105   CO2 mmol/L 27 22   BUN mg/dL 5 3*   CREATININE mg/dL 0 57* 0 68   CALCIUM mg/dL 7 2* 7 9*   TOTAL PROTEIN g/dL  --  7 8   BILIRUBIN TOTAL mg/dL  --  0 40   ALK PHOS U/L  --  92   ALT U/L  --  27   AST U/L  --  34   GLUCOSE RANDOM mg/dL 100 152*           * I Have Reviewed All Lab Data Listed Above  * Additional Pertinent Lab Tests Reviewed:  All Labs Within Last 24 Hours Reviewed    Imaging:    Imaging Reports Reviewed Today Include:   Imaging Personally Reviewed by Myself Includes:      Recent Cultures (last 7 days):           Last 24 Hours Medication List:     cloNIDine 0 2 mg Oral BID   nicotine 1 patch Transdermal Daily   pancrelipase (Lip-Prot-Amyl) 24,000 Units Oral TID With Meals   pantoprazole 40 mg Intravenous Q12H [address]   potassium chloride 40 mEq Oral Once   saccharomyces boulardii 250 mg Oral BID        Today, Patient Was Seen By: Blanka Wasserman MD    ** Please Note: Dragon 360 Dictation voice to text software may have been used in the creation of this document   **

## 2017-12-16 VITALS
HEART RATE: 46 BPM | OXYGEN SATURATION: 97 % | WEIGHT: 177.25 LBS | BODY MASS INDEX: 30.26 KG/M2 | RESPIRATION RATE: 17 BRPM | HEIGHT: 64 IN | SYSTOLIC BLOOD PRESSURE: 160 MMHG | TEMPERATURE: 98 F | DIASTOLIC BLOOD PRESSURE: 70 MMHG

## 2017-12-16 PROBLEM — R11.2 NAUSEA AND VOMITING: Status: RESOLVED | Noted: 2017-12-14 | Resolved: 2017-12-16

## 2017-12-16 PROBLEM — K92.0 HEMATEMESIS WITH NAUSEA: Status: RESOLVED | Noted: 2017-12-14 | Resolved: 2017-12-16

## 2017-12-16 PROBLEM — A41.9 SEPSIS (HCC): Status: RESOLVED | Noted: 2017-12-14 | Resolved: 2017-12-16

## 2017-12-16 LAB
ANION GAP SERPL CALCULATED.3IONS-SCNC: 7 MMOL/L (ref 4–13)
BASOPHILS # BLD AUTO: 0.04 THOUSANDS/ΜL (ref 0–0.1)
BASOPHILS NFR BLD AUTO: 1 % (ref 0–1)
BUN SERPL-MCNC: 4 MG/DL (ref 5–25)
CALCIUM SERPL-MCNC: 7.7 MG/DL (ref 8.3–10.1)
CHLORIDE SERPL-SCNC: 105 MMOL/L (ref 100–108)
CO2 SERPL-SCNC: 25 MMOL/L (ref 21–32)
CREAT SERPL-MCNC: 0.66 MG/DL (ref 0.6–1.3)
EOSINOPHIL # BLD AUTO: 0.06 THOUSAND/ΜL (ref 0–0.61)
EOSINOPHIL NFR BLD AUTO: 1 % (ref 0–6)
ERYTHROCYTE [DISTWIDTH] IN BLOOD BY AUTOMATED COUNT: 13.4 % (ref 11.6–15.1)
GFR SERPL CREATININE-BSD FRML MDRD: 110 ML/MIN/1.73SQ M
GLUCOSE SERPL-MCNC: 105 MG/DL (ref 65–140)
HCT VFR BLD AUTO: 39.1 % (ref 34.8–46.1)
HGB BLD-MCNC: 12.8 G/DL (ref 11.5–15.4)
INR PPP: 1.18 (ref 0.86–1.16)
LYMPHOCYTES # BLD AUTO: 2.6 THOUSANDS/ΜL (ref 0.6–4.47)
LYMPHOCYTES NFR BLD AUTO: 38 % (ref 14–44)
MCH RBC QN AUTO: 31.5 PG (ref 26.8–34.3)
MCHC RBC AUTO-ENTMCNC: 32.7 G/DL (ref 31.4–37.4)
MCV RBC AUTO: 96 FL (ref 82–98)
MONOCYTES # BLD AUTO: 0.94 THOUSAND/ΜL (ref 0.17–1.22)
MONOCYTES NFR BLD AUTO: 14 % (ref 4–12)
NEUTROPHILS # BLD AUTO: 3.27 THOUSANDS/ΜL (ref 1.85–7.62)
NEUTS SEG NFR BLD AUTO: 47 % (ref 43–75)
NRBC BLD AUTO-RTO: 0 /100 WBCS
PLATELET # BLD AUTO: 146 THOUSANDS/UL (ref 149–390)
PMV BLD AUTO: 9.8 FL (ref 8.9–12.7)
POTASSIUM SERPL-SCNC: 4.4 MMOL/L (ref 3.5–5.3)
PROTHROMBIN TIME: 15.3 SECONDS (ref 12.1–14.4)
RBC # BLD AUTO: 4.06 MILLION/UL (ref 3.81–5.12)
SODIUM SERPL-SCNC: 137 MMOL/L (ref 136–145)
WBC # BLD AUTO: 6.92 THOUSAND/UL (ref 4.31–10.16)

## 2017-12-16 PROCEDURE — 85025 COMPLETE CBC W/AUTO DIFF WBC: CPT | Performed by: GENERAL PRACTICE

## 2017-12-16 PROCEDURE — 85610 PROTHROMBIN TIME: CPT | Performed by: PHYSICIAN ASSISTANT

## 2017-12-16 PROCEDURE — 80048 BASIC METABOLIC PNL TOTAL CA: CPT | Performed by: GENERAL PRACTICE

## 2017-12-16 PROCEDURE — C9113 INJ PANTOPRAZOLE SODIUM, VIA: HCPCS | Performed by: GENERAL PRACTICE

## 2017-12-16 RX ORDER — AMITRIPTYLINE HYDROCHLORIDE 10 MG/1
10 TABLET, FILM COATED ORAL
Status: DISCONTINUED | OUTPATIENT
Start: 2017-12-16 | End: 2017-12-16 | Stop reason: HOSPADM

## 2017-12-16 RX ORDER — AMLODIPINE BESYLATE 2.5 MG/1
2.5 TABLET ORAL DAILY
Qty: 30 TABLET | Refills: 0 | Status: SHIPPED | OUTPATIENT
Start: 2017-12-17 | End: 2018-04-21 | Stop reason: ALTCHOICE

## 2017-12-16 RX ORDER — AMLODIPINE BESYLATE 2.5 MG/1
2.5 TABLET ORAL DAILY
Status: DISCONTINUED | OUTPATIENT
Start: 2017-12-16 | End: 2017-12-16 | Stop reason: HOSPADM

## 2017-12-16 RX ORDER — OXYCODONE HYDROCHLORIDE AND ACETAMINOPHEN 5; 325 MG/1; MG/1
1 TABLET ORAL EVERY 4 HOURS PRN
Qty: 10 TABLET | Refills: 0 | Status: SHIPPED | OUTPATIENT
Start: 2017-12-16 | End: 2017-12-16 | Stop reason: HOSPADM

## 2017-12-16 RX ORDER — OXYCODONE HYDROCHLORIDE 10 MG/1
10 TABLET ORAL EVERY 6 HOURS PRN
Qty: 10 TABLET | Refills: 0 | Status: SHIPPED | OUTPATIENT
Start: 2017-12-16 | End: 2017-12-26

## 2017-12-16 RX ORDER — SUCRALFATE 1 G/1
1 TABLET ORAL
Status: DISCONTINUED | OUTPATIENT
Start: 2017-12-16 | End: 2017-12-16 | Stop reason: HOSPADM

## 2017-12-16 RX ORDER — ONDANSETRON 4 MG/1
4 TABLET, FILM COATED ORAL EVERY 8 HOURS PRN
Qty: 21 TABLET | Refills: 0 | Status: SHIPPED | OUTPATIENT
Start: 2017-12-16 | End: 2018-05-28

## 2017-12-16 RX ORDER — CLONIDINE HYDROCHLORIDE 0.1 MG/1
0.2 TABLET ORAL EVERY 8 HOURS SCHEDULED
Qty: 90 TABLET | Refills: 0 | Status: ON HOLD | OUTPATIENT
Start: 2017-12-16 | End: 2018-05-30

## 2017-12-16 RX ORDER — OXYCODONE HYDROCHLORIDE AND ACETAMINOPHEN 5; 325 MG/1; MG/1
1 TABLET ORAL EVERY 4 HOURS PRN
Status: DISCONTINUED | OUTPATIENT
Start: 2017-12-16 | End: 2017-12-16

## 2017-12-16 RX ORDER — OXYCODONE HYDROCHLORIDE 10 MG/1
10 TABLET ORAL EVERY 6 HOURS PRN
Status: DISCONTINUED | OUTPATIENT
Start: 2017-12-16 | End: 2017-12-16 | Stop reason: HOSPADM

## 2017-12-16 RX ORDER — AMITRIPTYLINE HYDROCHLORIDE 10 MG/1
10 TABLET, FILM COATED ORAL
Qty: 30 TABLET | Refills: 0 | Status: SHIPPED | OUTPATIENT
Start: 2017-12-16 | End: 2018-05-28

## 2017-12-16 RX ORDER — PANTOPRAZOLE SODIUM 40 MG/1
40 TABLET, DELAYED RELEASE ORAL 2 TIMES DAILY
Qty: 60 TABLET | Refills: 0 | Status: ON HOLD | OUTPATIENT
Start: 2017-12-16 | End: 2018-05-31

## 2017-12-16 RX ORDER — SUCRALFATE 1 G/1
1 TABLET ORAL
Qty: 120 TABLET | Refills: 0 | Status: SHIPPED | OUTPATIENT
Start: 2017-12-16 | End: 2018-04-21 | Stop reason: ALTCHOICE

## 2017-12-16 RX ADMIN — CLONIDINE HYDROCHLORIDE 0.3 MG: 0.1 TABLET ORAL at 11:57

## 2017-12-16 RX ADMIN — DICYCLOMINE HYDROCHLORIDE 20 MG: 20 TABLET ORAL at 12:03

## 2017-12-16 RX ADMIN — HYDROMORPHONE HYDROCHLORIDE 0.2 MG: 1 INJECTION, SOLUTION INTRAMUSCULAR; INTRAVENOUS; SUBCUTANEOUS at 13:01

## 2017-12-16 RX ADMIN — HYDROMORPHONE HYDROCHLORIDE 0.2 MG: 1 INJECTION, SOLUTION INTRAMUSCULAR; INTRAVENOUS; SUBCUTANEOUS at 03:05

## 2017-12-16 RX ADMIN — HYDROMORPHONE HYDROCHLORIDE 0.2 MG: 1 INJECTION, SOLUTION INTRAMUSCULAR; INTRAVENOUS; SUBCUTANEOUS at 06:28

## 2017-12-16 RX ADMIN — HYDROMORPHONE HYDROCHLORIDE 0.2 MG: 1 INJECTION, SOLUTION INTRAMUSCULAR; INTRAVENOUS; SUBCUTANEOUS at 09:42

## 2017-12-16 RX ADMIN — DICYCLOMINE HYDROCHLORIDE 20 MG: 20 TABLET ORAL at 06:22

## 2017-12-16 RX ADMIN — PANTOPRAZOLE SODIUM 40 MG: 40 INJECTION, POWDER, FOR SOLUTION INTRAVENOUS at 09:44

## 2017-12-16 RX ADMIN — PANCRELIPASE 24000 UNITS: 24000; 76000; 120000 CAPSULE, DELAYED RELEASE PELLETS ORAL at 09:44

## 2017-12-16 RX ADMIN — NICOTINE 1 PATCH: 21 PATCH, EXTENDED RELEASE TRANSDERMAL at 00:01

## 2017-12-16 RX ADMIN — SODIUM CHLORIDE 200 ML/HR: 0.9 INJECTION, SOLUTION INTRAVENOUS at 01:10

## 2017-12-16 RX ADMIN — AMLODIPINE BESYLATE 2.5 MG: 2.5 TABLET ORAL at 09:43

## 2017-12-16 RX ADMIN — LORAZEPAM 0.5 MG: 2 INJECTION INTRAMUSCULAR; INTRAVENOUS at 11:57

## 2017-12-16 RX ADMIN — PANCRELIPASE 24000 UNITS: 24000; 76000; 120000 CAPSULE, DELAYED RELEASE PELLETS ORAL at 13:37

## 2017-12-16 RX ADMIN — LORAZEPAM 0.5 MG: 2 INJECTION INTRAMUSCULAR; INTRAVENOUS at 05:44

## 2017-12-16 RX ADMIN — OXYCODONE HYDROCHLORIDE 10 MG: 10 TABLET ORAL at 15:25

## 2017-12-16 RX ADMIN — Medication 250 MG: at 11:57

## 2017-12-16 RX ADMIN — SODIUM CHLORIDE 200 ML/HR: 0.9 INJECTION, SOLUTION INTRAVENOUS at 06:23

## 2017-12-16 NOTE — DISCHARGE SUMMARY
Discharge Summary - [address] Internal Medicine    Patient Information: Favio Patel 40 y o  female MRN: [id number]  Unit/Bed#: -01 Encounter: [id number]    Discharging Physician / Practitioner: Howard Roque MD  PCP: Yobani Weiner MD  Admission Date: 12/14/2017  Discharge Date: 12/16/17    Reason for Admission:  Abdominal pain    Discharge Diagnoses:     Principal Problem (Resolved):    Sepsis [facility])  Active Problems:    Pancreatitis    Abdominal pain    Essential hypertension    Nicotine dependence    Alcohol abuse    Epigastric pain  Resolved Problems:    Nausea and vomiting    Hematemesis with nausea      Consultations During Hospital Stay:  · GI    Procedures Performed:     · EGD 12/15/17  IMPRESSIONS: Castano's esophagus noted  Multiple biopsies taken  Mild   non-erosive gastritis found in the antrum and body of the stomach  Multiple biopsies taken  Normal duodenal bulb and 2nd portion of the   duodenum  RECOMMENDATIONS: PPI BID  Alcohol cessation  Anti-reflux measures: Raise   the head of the bed 4 to 6 inches  Avoid smoking  Avoid excess coffee, tea   or other caffeinated beverages  Avoid garments that fit tightly through   the abdomen  Avoid eating before bed  Follow-up on the results of the   biopsy specimens in 1 week  Continue Creon  ·     Significant Findings:     · Barretts Esophagus    Incidental Findings:   ·      Test Results Pending at Discharge (will require follow up):   ·      Outpatient Tests Requested:  ·     Complications:      Hospital Course:     Favio Patel is a 40 y o  female patient who originally presented to the hospital on 12/14/2017 due to epigastric pain  Patient has a history of chronic pancreatitis and felt this was her pancreatitis acting up    She came to the emergency department her lipase was normal CT of the abdomen and pelvis showed possible gastritis she was evaluated by Gastroenterology an EGD was performed showing Castano's esophagus and gastritis  She was placed on Carafate and Protonix  She was also instrcuted to use Ensure with meals  She was discharged home in stable condition    Condition at Discharge: stable     Discharge Day Visit / Exam:     * Please refer to separate progress for these details *    Discharge instructions/Information to patient and family:   See after visit summary for information provided to patient and family  Provisions for Follow-Up Care:  See after visit summary for information related to follow-up care and any pertinent home health orders  Disposition:     Home    For Discharges to [facility] SNF:   · Not Applicable to this Patient - Not Applicable to this Patient    Planned Readmission:     Discharge Statement:  I spent 40 minutes discharging the patient  This time was spent on the day of discharge  I had direct contact with the patient on the day of discharge  Greater than 50% of the total time was spent examining patient, answering all patient questions, arranging and discussing plan of care with patient as well as directly providing post-discharge instructions  Additional time then spent on discharge activities  Discharge Medications:  See after visit summary for reconciled discharge medications provided to patient and family  ** Please Note: Dragon 360 Dictation voice to text software may have been used in the creation of this document   **

## 2017-12-16 NOTE — PROGRESS NOTES
[facility]  Progress Note - Nydia Reaves 40 y o  female MRN: [id number]    Unit/Bed#: -01 Encounter: [id number]    Assessment/Plan:    45-year-old female with a history of heavy alcohol abuse over the past, who reports that she now only drinks intermittently, continues to smoke, has uncontrolled and poorly treated anxiety, has had a persistently dilated pancreatic duct up to 7 mm on imaging since 2016, was admitted with nausea, vomiting and we have been asked to discuss with her continued abdominal pain  1   Continued chronic epigastric abdominal pain:  Suspect that this is likely due to chronic pancreatitis given the significant dilation of her pancreatic duct  -I had a long discussion with the patient with regards to management and treatment options and chronicity of her pain  -minimize but okay to be discharged on a short course of narcotic pain medicines  -please start her on amitriptyline 10 mg HS  -patient is aware that this will take 4 to 6 weeks to take affect  -recommended that she take her pancreatic enzyme supplementation 3 times daily  -continue with liquid supplementation such as boost or Ensure on a daily basis  -continue with Protonix and Carafate for gastric symptoms and inflammatory changes  -we will arrange for outpatient endoscopic ultrasound, to evaluate for chronic pancreatitis and consider celiac plexus block  -will clearly evaluate her pancreatic duct, she may also have a main duct IPMN which is also in the differential  -I discussed with her the importance of alcohol and tobacco cessation, low-fat diet        Subjective:   Patient is concerned about her continued abdominal pain  She was admitted with nausea vomiting, found to have esophagitis and Castano's esophagus as well as gastritis  Objective:     Vitals: Blood pressure 160/70, pulse (!) 46, temperature 98 °F (36 7 °C), temperature source Oral, resp   rate 17, height 5' 4" (1 626 m), weight 80 4 kg (177 lb 4 oz), last menstrual period 11/30/2017, SpO2 97 %, not currently breastfeeding  ,Body mass index is 30 42 kg/m²        Intake/Output Summary (Last 24 hours) at 12/16/17 1735  Last data filed at 12/16/17 0602   Gross per 24 hour   Intake          3616 67 ml   Output              800 ml   Net          2816 67 ml       Physical Exam:    GEN: wn/wd, NAD, overweight  HEENT: MMM, no cervical or supraclavicular LAD, anciteric  CV: RRR, no m/r/g  CHEST: CTA b/l, no w/r/r  ABD: +BS, soft, moderate epigastric tenderness on palpation, no rebound, no guarding no hepatosplenomegaly  EXT: no c/c/e  SKIN: no rashes, several tattoos  NEURO: aaox3      Invasive Devices          No matching active lines, drains, or airways                  Lab, Imaging and other studies:     Admission on 12/14/2017, Discharged on 12/16/2017   Component Date Value    WBC 12/14/2017 16 30*    RBC 12/14/2017 5 61*    Hemoglobin 12/14/2017 17 7*    Hematocrit 12/14/2017 52 6*    MCV 12/14/2017 94     MCH 12/14/2017 31 6     MCHC 12/14/2017 33 7     RDW 12/14/2017 14 1     MPV 12/14/2017 10 0     Platelets 93/39/8678 354     nRBC 12/14/2017 0     Neutrophils Relative 12/14/2017 67     Lymphocytes Relative 12/14/2017 24     Monocytes Relative 12/14/2017 8     Eosinophils Relative 12/14/2017 0     Basophils Relative 12/14/2017 1     Neutrophils Absolute 12/14/2017 10 98*    Lymphocytes Absolute 12/14/2017 3 90     Monocytes Absolute 12/14/2017 1 22     Eosinophils Absolute 12/14/2017 0 01     Basophils Absolute 12/14/2017 0 12*    Sodium 12/14/2017 144     Potassium 12/14/2017 3 7     Chloride 12/14/2017 104     CO2 12/14/2017 19*    Anion Gap 12/14/2017 21*    BUN 12/14/2017 4*    Creatinine 12/14/2017 0 90     Glucose 12/14/2017 212*    Calcium 12/14/2017 9 8     AST 12/14/2017 38     ALT 12/14/2017 33     Alkaline Phosphatase 12/14/2017 119*    Total Protein 12/14/2017 9 7*    Albumin 12/14/2017 4 5     Total Bilirubin 12/14/2017 0 60     eGFR 12/14/2017 80     Lipase 12/14/2017 69*    Color, UA 12/15/2017 Yellow     Clarity, UA 12/15/2017 Clear     Specific Gravity, UA 12/15/2017 1 010     pH, UA 12/15/2017 7 5     Leukocytes, UA 12/15/2017 Negative     Nitrite, UA 12/15/2017 Negative     Protein, UA 12/15/2017 Negative     Glucose, UA 12/15/2017 Negative     Ketones, UA 12/15/2017 Negative     Urobilinogen, UA 12/15/2017 1 0     Bilirubin, UA 12/15/2017 Negative     Blood, UA 12/15/2017 Negative     LACTIC ACID 12/14/2017 6 1*    Ethanol Lvl 12/14/2017 157*    Preg, Serum 12/14/2017 Negative     Calcium, Ionized 12/14/2017 1 10*    LACTIC ACID 12/14/2017 4 0*    WBC 12/14/2017 16 48*    RBC 12/14/2017 4 28     Hemoglobin 12/14/2017 13 6     Hematocrit 12/14/2017 41 3     MCV 12/14/2017 97     MCH 12/14/2017 31 8     MCHC 12/14/2017 32 9     RDW 12/14/2017 14 3     MPV 12/14/2017 9 5     Platelets 22/44/9427 252     nRBC 12/14/2017 0     Neutrophils Relative 12/14/2017 72     Lymphocytes Relative 12/14/2017 20     Monocytes Relative 12/14/2017 7     Eosinophils Relative 12/14/2017 0     Basophils Relative 12/14/2017 0     Neutrophils Absolute 12/14/2017 11 88*    Lymphocytes Absolute 12/14/2017 3 28     Monocytes Absolute 12/14/2017 1 17     Eosinophils Absolute 12/14/2017 0 00     Basophils Absolute 12/14/2017 0 07     Sodium 12/14/2017 142     Potassium 12/14/2017 3 9     Chloride 12/14/2017 105     CO2 12/14/2017 22     Anion Gap 12/14/2017 15*    BUN 12/14/2017 3*    Creatinine 12/14/2017 0 68     Glucose 12/14/2017 152*    Calcium 12/14/2017 7 9*    AST 12/14/2017 34     ALT 12/14/2017 27     Alkaline Phosphatase 12/14/2017 92     Total Protein 12/14/2017 7 8     Albumin 12/14/2017 3 5     Total Bilirubin 12/14/2017 0 40     eGFR 12/14/2017 109     LACTIC ACID 12/14/2017 3 7*    pH, Lyle 12/14/2017 7 322     pCO2, [facility] 12/14/2017 17 1*    pO2, Lyle 12/14/2017 69 9*    HCO3, Lyle 12/14/2017 8 7*    Base Excess, Lyle 12/14/2017 -15 7     O2 Content, Lyle 12/14/2017 10 0     O2 HGB, VENOUS 12/14/2017 90 2*    Acetone, Bld 12/14/2017 Negative     LACTIC ACID 12/14/2017 1 8     LACTIC ACID 12/14/2017 0 9     Blood Culture 12/15/2017 No Growth at 24 hrs   Blood Culture 12/15/2017 No Growth at 24 hrs       Sodium 12/15/2017 138     Potassium 12/15/2017 3 4*    Chloride 12/15/2017 104     CO2 12/15/2017 27     Anion Gap 12/15/2017 7     BUN 12/15/2017 5     Creatinine 12/15/2017 0 57*    Glucose 12/15/2017 100     Calcium 12/15/2017 7 2*    eGFR 12/15/2017 116     WBC 12/15/2017 8 46     RBC 12/15/2017 4 20     Hemoglobin 12/15/2017 13 4     Hematocrit 12/15/2017 40 8     MCV 12/15/2017 97     MCH 12/15/2017 31 9     MCHC 12/15/2017 32 8     RDW 12/15/2017 14 0     Platelets 70/73/1913 169     MPV 12/15/2017 10 4     WBC 12/16/2017 6 92     RBC 12/16/2017 4 06     Hemoglobin 12/16/2017 12 8     Hematocrit 12/16/2017 39 1     MCV 12/16/2017 96     MCH 12/16/2017 31 5     MCHC 12/16/2017 32 7     RDW 12/16/2017 13 4     MPV 12/16/2017 9 8     Platelets 50/70/4540 146*    nRBC 12/16/2017 0     Neutrophils Relative 12/16/2017 47     Lymphocytes Relative 12/16/2017 38     Monocytes Relative 12/16/2017 14*    Eosinophils Relative 12/16/2017 1     Basophils Relative 12/16/2017 1     Neutrophils Absolute 12/16/2017 3 27     Lymphocytes Absolute 12/16/2017 2 60     Monocytes Absolute 12/16/2017 0 94     Eosinophils Absolute 12/16/2017 0 06     Basophils Absolute 12/16/2017 0 04     Sodium 12/16/2017 137     Potassium 12/16/2017 4 4     Chloride 12/16/2017 105     CO2 12/16/2017 25     Anion Gap 12/16/2017 7     BUN 12/16/2017 4*    Creatinine 12/16/2017 0 66     Glucose 12/16/2017 105     Calcium 12/16/2017 7 7*    eGFR 12/16/2017 110     Protime 12/16/2017 15 3*    INR 12/16/2017 1 18*         I have personally reviewed pertinent reports        Current Facility-Administered Medications   Medication Dose Route Frequency    amitriptyline (ELAVIL) tablet 10 mg  10 mg Oral HS    amLODIPine (NORVASC) tablet 2 5 mg  2 5 mg Oral Daily    cloNIDine (CATAPRES) tablet 0 3 mg  0 3 mg Oral BID    dicyclomine (BENTYL) tablet 20 mg  20 mg Oral TID AC    HYDROmorphone (DILAUDID) 1 mg/mL injection 0 2 mg  0 2 mg Intravenous Q3H PRN    LORazepam (ATIVAN) 2 mg/mL injection 0 5 mg  0 5 mg Intravenous Q6H PRN    nicotine (NICODERM CQ) 21 mg/24 hr TD 24 hr patch 1 patch  1 patch Transdermal Daily    ondansetron (ZOFRAN) injection 4 mg  4 mg Intravenous Q6H PRN    oxyCODONE (ROXICODONE) immediate release tablet 10 mg  10 mg Oral Q6H PRN    pancrelipase (Lip-Prot-Amyl) (CREON) delayed release capsule 24,000 Units  24,000 Units Oral TID With Meals    pantoprazole (PROTONIX) injection 40 mg  40 mg Intravenous Q12H SHIVANI    saccharomyces boulardii (FLORASTOR) capsule 250 mg  250 mg Oral BID    simethicone (MYLICON) chewable tablet 80 mg  80 mg Oral Q6H PRN    sucralfate (CARAFATE) tablet 1 g  1 g Oral 4x Daily (AC & HS)

## 2017-12-16 NOTE — PROGRESS NOTES
[facility] Progress Note  Patient: Awanda Goldberg 40 y o  female   MRN: [id number]  PCP: Raji Carmen MD  Unit/Bed#: [facility] Encounter: [id number]  Date Of Visit: 12/16/17    Assessment:    Principal Problem:    Sepsis ([facility] )  Active Problems:    Pancreatitis    Abdominal pain    Essential hypertension    Nicotine dependence    Alcohol abuse    Epigastric pain    Nausea and vomiting    Hematemesis with nausea      Plan:    Hematemesis-egd with barretts esophagus and nonerosive gastritis will continue PPI         Alcohol abuse   Assessment & Plan     · stable       Nicotine dependence   Assessment & Plan     · Sensation encourage  · Nicotine patch ordered       Essential hypertension   Assessment & Plan     · Increase dose clonidine to home dose 0 3 mg po bid add norvasc       Pancreatitis   Assessment & Plan     · Chronic in nature CT does not show any acute pancreatitis lipase is negative  · GI consult given GI symptoms appreciate recommendations       * Sepsis ([facility] )   Assessment & Plan     · Leucocytosis resolved, lactate normal  · Did not start abx            HTN-add norvasc on clonidine    VTE Pharmacologic Prophylaxis:   Pharmacologic: Pharmacologic VTE Prophylaxis contraindicated due to hematemesis  Mechanical VTE Prophylaxis in Place: Yes    Patient Centered Rounds: I have performed bedside rounds with nursing staff today  Discussions with Specialists or Other Care Team Provider:     Education and Discussions with Family / Patient:     Time Spent for Care: 30 minutes  More than 50% of total time spent on counseling and coordination of care as described above      Current Length of Stay: 2 day(s)    Current Patient Status: Inpatient   Certification Statement: The patient will continue to require additional inpatient hospital stay due to possible d/c today    Discharge Plan: ome    Code Status: Level 1 - Full Code      Subjective:   C/o epigastric pain but improved since admission    Objective:     Vitals:   Temp (24hrs), Av 8 °F (36 6 °C), Min:97 1 °F (36 2 °C), Max:98 5 °F (36 9 °C)    HR:  [42-70] 42  Resp:  [17-20] 17  BP: (146-193)/() 188/101  SpO2:  [33 %-100 %] 100 %  Body mass index is 30 42 kg/m²  Input and Output Summary (last 24 hours): Intake/Output Summary (Last 24 hours) at 17 7158  Last data filed at 17 0110   Gross per 24 hour   Intake          3976 67 ml   Output             2050 ml   Net          1926 67 ml       Physical Exam:     Physical Exam   Constitutional: She is oriented to person, place, and time  She appears well-developed and well-nourished  HENT:   Head: Normocephalic and atraumatic  Eyes: Pupils are equal, round, and reactive to light  Cardiovascular: Normal rate and regular rhythm  Pulmonary/Chest: Effort normal and breath sounds normal    Abdominal: Soft  Bowel sounds are normal    Musculoskeletal: She exhibits no edema  Neurological: She is oriented to person, place, and time  Additional Data:     Labs:      Results from last 7 days  Lab Units 17  0452   WBC Thousand/uL 6 92   HEMOGLOBIN g/dL 12 8   HEMATOCRIT % 39 1   PLATELETS Thousands/uL 146*   NEUTROS PCT % 47   LYMPHS PCT % 38   MONOS PCT % 14*   EOS PCT % 1       Results from last 7 days  Lab Units 17  0452  17  1136   SODIUM mmol/L 137  < > 142   POTASSIUM mmol/L 4 4  < > 3 9   CHLORIDE mmol/L 105  < > 105   CO2 mmol/L 25  < > 22   BUN mg/dL 4*  < > 3*   CREATININE mg/dL 0 66  < > 0 68   CALCIUM mg/dL 7 7*  < > 7 9*   TOTAL PROTEIN g/dL  --   --  7 8   BILIRUBIN TOTAL mg/dL  --   --  0 40   ALK PHOS U/L  --   --  92   ALT U/L  --   --  27   AST U/L  --   --  34   GLUCOSE RANDOM mg/dL 105  < > 152*   < > = values in this interval not displayed  Results from last 7 days  Lab Units 17  0452   INR  1 18*       * I Have Reviewed All Lab Data Listed Above  * Additional Pertinent Lab Tests Reviewed:  All Labs Within Last 24 Hours Reviewed    Imaging:    Imaging Reports Reviewed Today Include:   Imaging Personally Reviewed by Myself Includes:      Recent Cultures (last 7 days):       Results from last 7 days  Lab Units 12/14/17  1431 12/14/17  1420   BLOOD CULTURE  No Growth at 24 hrs  No Growth at 24 hrs  Last 24 Hours Medication List:     cloNIDine 0 3 mg Oral BID   dicyclomine 20 mg Oral TID AC   nicotine 1 patch Transdermal Daily   pancrelipase (Lip-Prot-Amyl) 24,000 Units Oral TID With Meals   pantoprazole 40 mg Intravenous Q12H [address]   saccharomyces boulardii 250 mg Oral BID        Today, Patient Was Seen By: Margarita Teixeira MD    ** Please Note: Dragon 360 Dictation voice to text software may have been used in the creation of this document   **

## 2017-12-16 NOTE — PLAN OF CARE
Problem: PAIN - ADULT  Goal: Verbalizes/displays adequate comfort level or baseline comfort level  Interventions:  - Encourage patient to monitor pain and request assistance  - Assess pain using appropriate pain scale  - Administer analgesics based on type and severity of pain and evaluate response  - Implement non-pharmacological measures as appropriate and evaluate response  - Consider cultural and social influences on pain and pain management  - Notify physician/advanced practitioner if interventions unsuccessful or patient reports new pain   Outcome: Progressing      Problem: INFECTION - ADULT  Goal: Absence or prevention of progression during hospitalization  INTERVENTIONS:  - Assess and monitor for signs and symptoms of infection  - Monitor lab/diagnostic results  - Monitor all insertion sites, i e  indwelling lines, tubes, and drains  - Monitor endotracheal (as able) and nasal secretions for changes in amount and color  - [place] appropriate cooling/warming therapies per order  - Administer medications as ordered  - Instruct and encourage patient and family to use good hand hygiene technique  - Identify and instruct in appropriate isolation precautions for identified infection/condition   Outcome: Progressing    Goal: Absence of fever/infection during neutropenic period  INTERVENTIONS:  - Monitor WBC  - Implement neutropenic guidelines   Outcome: Progressing      Problem: SAFETY ADULT  Goal: Patient will remain free of falls  INTERVENTIONS:  - Assess patient frequently for physical needs  -  Identify cognitive and physical deficits and behaviors that affect risk of falls    -  [place] fall precautions as indicated by assessment   - Educate patient/family on patient safety including physical limitations  - Instruct patient to call for assistance with activity based on assessment  - Modify environment to reduce risk of injury  - Consider OT/PT consult to assist with strengthening/mobility    Outcome: Progressing    Goal: Maintain or return to baseline ADL function  INTERVENTIONS:  -  Assess patient's ability to carry out ADLs; assess patient's baseline for ADL function and identify physical deficits which impact ability to perform ADLs (bathing, care of mouth/teeth, toileting, grooming, dressing, etc )  - Assess/evaluate cause of self-care deficits   - Assess range of motion  - Assess patient's mobility; develop plan if impaired  - Assess patient's need for assistive devices and provide as appropriate  - Encourage maximum independence but intervene and supervise when necessary  ¯ Involve family in performance of ADLs  ¯ Assess for home care needs following discharge   ¯ Request OT consult to assist with ADL evaluation and planning for discharge  ¯ Provide patient education as appropriate   Outcome: Progressing    Goal: Maintain or return mobility status to optimal level  INTERVENTIONS:  - Assess patient's baseline mobility status (ambulation, transfers, stairs, etc )    - Identify cognitive and physical deficits and behaviors that affect mobility  - Identify mobility aids required to assist with transfers and/or ambulation (gait belt, sit-to-stand, lift, walker, cane, etc )  - [place] fall precautions as indicated by assessment  - Record patient progress and toleration of activity level on Mobility SBAR; progress patient to next Phase/Stage  - Instruct patient to call for assistance with activity based on assessment  - Request Rehabilitation consult to assist with strengthening/weightbearing, etc    Outcome: Progressing      Problem: DISCHARGE PLANNING  Goal: Discharge to home or other facility with appropriate resources  INTERVENTIONS:  - Identify barriers to discharge w/patient and caregiver  - Arrange for needed discharge resources and transportation as appropriate  - Identify discharge learning needs (meds, wound care, etc )  - Arrange for interpretive services to assist at discharge as needed  - Refer to Case Management Department for coordinating discharge planning if the patient needs post-hospital services based on physician/advanced practitioner order or complex needs related to functional status, cognitive ability, or social support system   Outcome: Progressing      Problem: Knowledge Deficit  Goal: Patient/family/caregiver demonstrates understanding of disease process, treatment plan, medications, and discharge instructions  Complete learning assessment and assess knowledge base  Interventions:  - Provide teaching at level of understanding  - Provide teaching via preferred learning methods   Outcome: Progressing      Problem: Potential for Falls  Goal: Patient will remain free of falls  INTERVENTIONS:  - Assess patient frequently for physical needs  -  Identify cognitive and physical deficits and behaviors that affect risk of falls    -  [place] fall precautions as indicated by assessment   - Educate patient/family on patient safety including physical limitations  - Instruct patient to call for assistance with activity based on assessment  - Modify environment to reduce risk of injury  - Consider OT/PT consult to assist with strengthening/mobility    Outcome: Progressing

## 2017-12-19 LAB
BACTERIA BLD CULT: NORMAL
BACTERIA BLD CULT: NORMAL

## 2017-12-20 ENCOUNTER — GENERIC CONVERSION - ENCOUNTER (OUTPATIENT)
Dept: OTHER | Facility: OTHER | Age: 42
End: 2017-12-20

## 2018-01-13 NOTE — RESULT NOTES
Verified Results  (1) IRON PANEL [id number] 10:25AM [facility]     Test Name Result Flag Reference   IRON 91 ug/dL     Patients treated with metal-binding drugs (ie  Deferoxamine) may have depressed iron values  FERRITIN 163 ng/mL  8-388   TOTAL IRON BINDING CAPACITY 470 ug/dL H 250-450   IRON SATURATION 19 %       (1) KYLER SCREEN W/REFLEX TO TITER/PATTERN 31Oct2017 10:25AM [facility]     Test Name Result Flag Reference   KYLER SCREEN  Negative  Negative     (1) MITOCHONDRIAL ANTIBODY 31Oct2017 10:25AM [facility]     Test Name Result Flag Reference   MITOCHONDR AB 3 1 Units  0 0 - 20 0   Negative    0 0 - 20 0                                  Equivocal  20 1 - 24 9                                  Positive         >24 9  Mitochondrial (M2) Antibodies are found in 90-96% of  patients with primary biliary cirrhosis  Performed at:  [facility] [address]  [id number]  : Janis Peña MD, Phone:  [phone]     (1) SMOOTH MUSCLE ANTIBODY 31Oct2017 10:25AM [employer]     Test Name Result Flag Reference   SMOOTH MUS AB 4 Units  0 - 19   Negative                     0 - 19                   Weak positive               20 - 30                   Moderate to strong positive     >30   Actin Antibodies are found in 52-85% of patients with   autoimmune hepatitis or chronic active hepatitis and   in 22% of patients with primary biliary cirrhosis    Performed at:  [facility] [address]  [id number]  : Janis Peña MD, Phone:  [phone]

## 2018-01-17 ENCOUNTER — APPOINTMENT (EMERGENCY)
Dept: CT IMAGING | Facility: HOSPITAL | Age: 43
End: 2018-01-17
Payer: COMMERCIAL

## 2018-01-17 ENCOUNTER — HOSPITAL ENCOUNTER (OUTPATIENT)
Facility: HOSPITAL | Age: 43
Setting detail: OBSERVATION
Discharge: LEFT BEFORE DISCHARGE | End: 2018-01-17
Attending: EMERGENCY MEDICINE | Admitting: INTERNAL MEDICINE
Payer: COMMERCIAL

## 2018-01-17 VITALS
HEART RATE: 87 BPM | BODY MASS INDEX: 29.02 KG/M2 | RESPIRATION RATE: 16 BRPM | SYSTOLIC BLOOD PRESSURE: 182 MMHG | OXYGEN SATURATION: 100 % | TEMPERATURE: 97.9 F | DIASTOLIC BLOOD PRESSURE: 92 MMHG | WEIGHT: 169.09 LBS

## 2018-01-17 DIAGNOSIS — I10 HYPERTENSION: ICD-10-CM

## 2018-01-17 DIAGNOSIS — R10.9 ABDOMINAL PAIN: Primary | ICD-10-CM

## 2018-01-17 PROBLEM — K70.30 ALCOHOLIC CIRRHOSIS (HCC): Chronic | Status: ACTIVE | Noted: 2018-01-17

## 2018-01-17 PROBLEM — I16.0 HYPERTENSIVE URGENCY: Status: ACTIVE | Noted: 2018-01-17

## 2018-01-17 LAB
ALBUMIN SERPL BCP-MCNC: 3.9 G/DL (ref 3.5–5)
ALP SERPL-CCNC: 115 U/L (ref 46–116)
ALT SERPL W P-5'-P-CCNC: 20 U/L (ref 12–78)
AMPHETAMINES SERPL QL SCN: NEGATIVE
ANION GAP SERPL CALCULATED.3IONS-SCNC: 15 MMOL/L (ref 4–13)
AST SERPL W P-5'-P-CCNC: 23 U/L (ref 5–45)
BARBITURATES UR QL: NEGATIVE
BASOPHILS # BLD AUTO: 0.1 THOUSANDS/ΜL (ref 0–0.1)
BASOPHILS NFR BLD AUTO: 1 % (ref 0–1)
BENZODIAZ UR QL: NEGATIVE
BILIRUB SERPL-MCNC: 1.2 MG/DL (ref 0.2–1)
BUN SERPL-MCNC: 10 MG/DL (ref 5–25)
CALCIUM SERPL-MCNC: 9.8 MG/DL (ref 8.3–10.1)
CHLORIDE SERPL-SCNC: 104 MMOL/L (ref 100–108)
CLARITY, POC: CLEAR
CO2 SERPL-SCNC: 22 MMOL/L (ref 21–32)
COCAINE UR QL: NEGATIVE
COLOR, POC: NORMAL
CREAT SERPL-MCNC: 0.79 MG/DL (ref 0.6–1.3)
EOSINOPHIL # BLD AUTO: 0.11 THOUSAND/ΜL (ref 0–0.61)
EOSINOPHIL NFR BLD AUTO: 1 % (ref 0–6)
ERYTHROCYTE [DISTWIDTH] IN BLOOD BY AUTOMATED COUNT: 13.6 % (ref 11.6–15.1)
ETHANOL SERPL-MCNC: <3 MG/DL (ref 0–3)
EXT BILIRUBIN, UA: NEGATIVE
EXT BLOOD URINE: NEGATIVE
EXT GLUCOSE, UA: NEGATIVE
EXT KETONES: 80
EXT NITRITE, UA: NEGATIVE
EXT PH, UA: 8.5
EXT PROTEIN, UA: NORMAL
EXT SPECIFIC GRAVITY, UA: 1
EXT UROBILINOGEN: 0.2
GFR SERPL CREATININE-BSD FRML MDRD: 93 ML/MIN/1.73SQ M
GLUCOSE SERPL-MCNC: 169 MG/DL (ref 65–140)
HCG SERPL QL: NEGATIVE
HCT VFR BLD AUTO: 52.4 % (ref 34.8–46.1)
HGB BLD-MCNC: 17.7 G/DL (ref 11.5–15.4)
HOLD SPECIMEN: NORMAL
LIPASE SERPL-CCNC: 80 U/L (ref 73–393)
LYMPHOCYTES # BLD AUTO: 2.55 THOUSANDS/ΜL (ref 0.6–4.47)
LYMPHOCYTES NFR BLD AUTO: 22 % (ref 14–44)
MCH RBC QN AUTO: 30.8 PG (ref 26.8–34.3)
MCHC RBC AUTO-ENTMCNC: 33.8 G/DL (ref 31.4–37.4)
MCV RBC AUTO: 91 FL (ref 82–98)
METHADONE UR QL: NEGATIVE
MONOCYTES # BLD AUTO: 0.78 THOUSAND/ΜL (ref 0.17–1.22)
MONOCYTES NFR BLD AUTO: 7 % (ref 4–12)
NEUTROPHILS # BLD AUTO: 8.03 THOUSANDS/ΜL (ref 1.85–7.62)
NEUTS SEG NFR BLD AUTO: 69 % (ref 43–75)
NRBC BLD AUTO-RTO: 0 /100 WBCS
OPIATES UR QL SCN: POSITIVE
PCP UR QL: NEGATIVE
PLATELET # BLD AUTO: 335 THOUSANDS/UL (ref 149–390)
PMV BLD AUTO: 10 FL (ref 8.9–12.7)
POTASSIUM SERPL-SCNC: 3.9 MMOL/L (ref 3.5–5.3)
PROT SERPL-MCNC: 8.5 G/DL (ref 6.4–8.2)
RBC # BLD AUTO: 5.74 MILLION/UL (ref 3.81–5.12)
SODIUM SERPL-SCNC: 141 MMOL/L (ref 136–145)
THC UR QL: POSITIVE
TROPONIN I SERPL-MCNC: <0.02 NG/ML
TROPONIN I SERPL-MCNC: <0.02 NG/ML
WBC # BLD AUTO: 11.61 THOUSAND/UL (ref 4.31–10.16)
WBC # BLD EST: NEGATIVE 10*3/UL

## 2018-01-17 PROCEDURE — 84703 CHORIONIC GONADOTROPIN ASSAY: CPT | Performed by: EMERGENCY MEDICINE

## 2018-01-17 PROCEDURE — 96375 TX/PRO/DX INJ NEW DRUG ADDON: CPT

## 2018-01-17 PROCEDURE — 74177 CT ABD & PELVIS W/CONTRAST: CPT

## 2018-01-17 PROCEDURE — C9113 INJ PANTOPRAZOLE SODIUM, VIA: HCPCS | Performed by: PHYSICIAN ASSISTANT

## 2018-01-17 PROCEDURE — 93005 ELECTROCARDIOGRAM TRACING: CPT | Performed by: PHYSICIAN ASSISTANT

## 2018-01-17 PROCEDURE — 80320 DRUG SCREEN QUANTALCOHOLS: CPT | Performed by: PHYSICIAN ASSISTANT

## 2018-01-17 PROCEDURE — 96361 HYDRATE IV INFUSION ADD-ON: CPT

## 2018-01-17 PROCEDURE — 96374 THER/PROPH/DIAG INJ IV PUSH: CPT

## 2018-01-17 PROCEDURE — 84484 ASSAY OF TROPONIN QUANT: CPT | Performed by: PHYSICIAN ASSISTANT

## 2018-01-17 PROCEDURE — 80307 DRUG TEST PRSMV CHEM ANLYZR: CPT | Performed by: EMERGENCY MEDICINE

## 2018-01-17 PROCEDURE — 96376 TX/PRO/DX INJ SAME DRUG ADON: CPT

## 2018-01-17 PROCEDURE — 85025 COMPLETE CBC W/AUTO DIFF WBC: CPT | Performed by: EMERGENCY MEDICINE

## 2018-01-17 PROCEDURE — 83690 ASSAY OF LIPASE: CPT | Performed by: EMERGENCY MEDICINE

## 2018-01-17 PROCEDURE — 81002 URINALYSIS NONAUTO W/O SCOPE: CPT

## 2018-01-17 PROCEDURE — 36415 COLL VENOUS BLD VENIPUNCTURE: CPT

## 2018-01-17 PROCEDURE — 80053 COMPREHEN METABOLIC PANEL: CPT | Performed by: EMERGENCY MEDICINE

## 2018-01-17 PROCEDURE — 99285 EMERGENCY DEPT VISIT HI MDM: CPT

## 2018-01-17 RX ORDER — ACETAMINOPHEN 325 MG/1
325 TABLET ORAL EVERY 6 HOURS PRN
Status: DISCONTINUED | OUTPATIENT
Start: 2018-01-17 | End: 2018-01-18 | Stop reason: HOSPADM

## 2018-01-17 RX ORDER — SODIUM CHLORIDE, SODIUM LACTATE, POTASSIUM CHLORIDE, CALCIUM CHLORIDE 600; 310; 30; 20 MG/100ML; MG/100ML; MG/100ML; MG/100ML
125 INJECTION, SOLUTION INTRAVENOUS CONTINUOUS
Status: DISCONTINUED | OUTPATIENT
Start: 2018-01-17 | End: 2018-01-18 | Stop reason: HOSPADM

## 2018-01-17 RX ORDER — SUCRALFATE ORAL 1 G/10ML
1000 SUSPENSION ORAL EVERY 6 HOURS SCHEDULED
Status: DISCONTINUED | OUTPATIENT
Start: 2018-01-17 | End: 2018-01-17 | Stop reason: SDUPTHER

## 2018-01-17 RX ORDER — SUCRALFATE ORAL 1 G/10ML
1000 SUSPENSION ORAL EVERY 6 HOURS SCHEDULED
Status: DISCONTINUED | OUTPATIENT
Start: 2018-01-17 | End: 2018-01-18 | Stop reason: HOSPADM

## 2018-01-17 RX ORDER — PANTOPRAZOLE SODIUM 40 MG/1
40 INJECTION, POWDER, FOR SOLUTION INTRAVENOUS EVERY 12 HOURS SCHEDULED
Status: DISCONTINUED | OUTPATIENT
Start: 2018-01-18 | End: 2018-01-18 | Stop reason: HOSPADM

## 2018-01-17 RX ORDER — CLONIDINE HYDROCHLORIDE 0.1 MG/1
0.2 TABLET ORAL EVERY 8 HOURS SCHEDULED
Status: DISCONTINUED | OUTPATIENT
Start: 2018-01-17 | End: 2018-01-18 | Stop reason: HOSPADM

## 2018-01-17 RX ORDER — NICOTINE 21 MG/24HR
1 PATCH, TRANSDERMAL 24 HOURS TRANSDERMAL DAILY
Status: DISCONTINUED | OUTPATIENT
Start: 2018-01-17 | End: 2018-01-18 | Stop reason: HOSPADM

## 2018-01-17 RX ORDER — HYDRALAZINE HYDROCHLORIDE 20 MG/ML
10 INJECTION INTRAMUSCULAR; INTRAVENOUS EVERY 6 HOURS PRN
Status: DISCONTINUED | OUTPATIENT
Start: 2018-01-17 | End: 2018-01-18 | Stop reason: HOSPADM

## 2018-01-17 RX ORDER — CLONIDINE HYDROCHLORIDE 0.1 MG/1
0.1 TABLET ORAL ONCE
Status: COMPLETED | OUTPATIENT
Start: 2018-01-17 | End: 2018-01-17

## 2018-01-17 RX ORDER — PANTOPRAZOLE SODIUM 40 MG/1
40 INJECTION, POWDER, FOR SOLUTION INTRAVENOUS
Status: DISCONTINUED | OUTPATIENT
Start: 2018-01-17 | End: 2018-01-17

## 2018-01-17 RX ORDER — SACCHAROMYCES BOULARDII 250 MG
250 CAPSULE ORAL 2 TIMES DAILY
Status: DISCONTINUED | OUTPATIENT
Start: 2018-01-17 | End: 2018-01-18 | Stop reason: HOSPADM

## 2018-01-17 RX ORDER — METOCLOPRAMIDE HYDROCHLORIDE 5 MG/ML
10 INJECTION INTRAMUSCULAR; INTRAVENOUS EVERY 6 HOURS PRN
Status: DISCONTINUED | OUTPATIENT
Start: 2018-01-17 | End: 2018-01-18 | Stop reason: HOSPADM

## 2018-01-17 RX ORDER — ONDANSETRON 2 MG/ML
4 INJECTION INTRAMUSCULAR; INTRAVENOUS ONCE
Status: COMPLETED | OUTPATIENT
Start: 2018-01-17 | End: 2018-01-17

## 2018-01-17 RX ORDER — AMLODIPINE BESYLATE 2.5 MG/1
2.5 TABLET ORAL DAILY
Status: DISCONTINUED | OUTPATIENT
Start: 2018-01-18 | End: 2018-01-18 | Stop reason: HOSPADM

## 2018-01-17 RX ORDER — MAGNESIUM HYDROXIDE/ALUMINUM HYDROXICE/SIMETHICONE 120; 1200; 1200 MG/30ML; MG/30ML; MG/30ML
30 SUSPENSION ORAL EVERY 6 HOURS PRN
Status: DISCONTINUED | OUTPATIENT
Start: 2018-01-17 | End: 2018-01-18 | Stop reason: HOSPADM

## 2018-01-17 RX ORDER — ONDANSETRON 2 MG/ML
4 INJECTION INTRAMUSCULAR; INTRAVENOUS EVERY 8 HOURS PRN
Status: DISCONTINUED | OUTPATIENT
Start: 2018-01-17 | End: 2018-01-18 | Stop reason: HOSPADM

## 2018-01-17 RX ORDER — DICYCLOMINE HYDROCHLORIDE 10 MG/1
20 CAPSULE ORAL EVERY 6 HOURS PRN
Status: DISCONTINUED | OUTPATIENT
Start: 2018-01-17 | End: 2018-01-18 | Stop reason: HOSPADM

## 2018-01-17 RX ORDER — DOCUSATE SODIUM 100 MG/1
100 CAPSULE, LIQUID FILLED ORAL 2 TIMES DAILY
Status: DISCONTINUED | OUTPATIENT
Start: 2018-01-17 | End: 2018-01-18 | Stop reason: HOSPADM

## 2018-01-17 RX ORDER — AMITRIPTYLINE HYDROCHLORIDE 10 MG/1
10 TABLET, FILM COATED ORAL
Status: DISCONTINUED | OUTPATIENT
Start: 2018-01-17 | End: 2018-01-18 | Stop reason: HOSPADM

## 2018-01-17 RX ADMIN — DOCUSATE SODIUM 100 MG: 100 CAPSULE, LIQUID FILLED ORAL at 20:26

## 2018-01-17 RX ADMIN — ONDANSETRON 4 MG: 2 INJECTION INTRAMUSCULAR; INTRAVENOUS at 14:01

## 2018-01-17 RX ADMIN — SODIUM CHLORIDE 500 ML: 0.9 INJECTION, SOLUTION INTRAVENOUS at 11:09

## 2018-01-17 RX ADMIN — ONDANSETRON 4 MG: 2 INJECTION INTRAMUSCULAR; INTRAVENOUS at 11:09

## 2018-01-17 RX ADMIN — HYDRALAZINE HYDROCHLORIDE 10 MG: 20 INJECTION INTRAMUSCULAR; INTRAVENOUS at 16:31

## 2018-01-17 RX ADMIN — CLONIDINE HYDROCHLORIDE 0.1 MG: 0.1 TABLET ORAL at 15:49

## 2018-01-17 RX ADMIN — NICOTINE 1 PATCH: 21 PATCH, EXTENDED RELEASE TRANSDERMAL at 22:21

## 2018-01-17 RX ADMIN — METOCLOPRAMIDE 10 MG: 5 INJECTION, SOLUTION INTRAMUSCULAR; INTRAVENOUS at 15:44

## 2018-01-17 RX ADMIN — HYDROMORPHONE HYDROCHLORIDE 1 MG: 1 INJECTION, SOLUTION INTRAMUSCULAR; INTRAVENOUS; SUBCUTANEOUS at 11:22

## 2018-01-17 RX ADMIN — IOHEXOL 100 ML: 350 INJECTION, SOLUTION INTRAVENOUS at 13:19

## 2018-01-17 RX ADMIN — ONDANSETRON 4 MG: 2 INJECTION INTRAMUSCULAR; INTRAVENOUS at 17:33

## 2018-01-17 RX ADMIN — PANTOPRAZOLE SODIUM 40 MG: 40 INJECTION, POWDER, FOR SOLUTION INTRAVENOUS at 15:45

## 2018-01-17 RX ADMIN — CLONIDINE HYDROCHLORIDE 0.2 MG: 0.1 TABLET ORAL at 20:25

## 2018-01-17 RX ADMIN — PANCRELIPASE 24000 UNITS: 24000; 76000; 120000 CAPSULE, DELAYED RELEASE PELLETS ORAL at 20:26

## 2018-01-17 RX ADMIN — Medication 250 MG: at 20:26

## 2018-01-17 RX ADMIN — SODIUM CHLORIDE, SODIUM LACTATE, POTASSIUM CHLORIDE, AND CALCIUM CHLORIDE 125 ML/HR: .6; .31; .03; .02 INJECTION, SOLUTION INTRAVENOUS at 17:30

## 2018-01-17 RX ADMIN — HYDROMORPHONE HYDROCHLORIDE 1 MG: 1 INJECTION, SOLUTION INTRAMUSCULAR; INTRAVENOUS; SUBCUTANEOUS at 11:57

## 2018-01-17 RX ADMIN — ALUMINUM HYDROXIDE, MAGNESIUM HYDROXIDE, AND SIMETHICONE 30 ML: 200; 200; 20 SUSPENSION ORAL at 17:33

## 2018-01-17 NOTE — ASSESSMENT & PLAN NOTE
Patient's past history of alcohol abuse, reports she only uses socially now with her job  Last drink was Monday, after which her symptoms did start  I do strongly feel that her abdominal pain is exacerbated secondary to alcohol use  Ethanol levels pending as above

## 2018-01-17 NOTE — H&P
H&P- Doug Safer 1975, 40 y o  female MRN: [id number]    Unit/Bed#: ED 18 Encounter: [id number]    Primary Care Provider: Mago Braswell MD   Date and time admitted to hospital: 1/17/2018 10:47 AM    * Abdominal pain   Assessment & Plan    Patient has recurrent abdominal pain, presents with similar episode to previous admissions  Three days abdominal pain, does report alcoholic beverage on Monday  Ethanol level is pending  Bring patient in under observation, NPO, aggressive IV fluid hydration  She does not have any signs of acute pancreatitis on imaging or labs  Gastroenterology evaluation the morning, they do feel she has a degree of IBS and functional abdominal pain  Continue tricyclic for this reason  CT abdomen no acute findings, with IV contrast no vascular abnormalities noted  Reglan as needed for nausea and vomiting, Zofran for any breakthrough symptoms  Docusate to keep bowels soft  I have not continued any narcotics at this time, secondary to drug-seeking behaviors  Patient did use old script of narcotic pain killers at home prior to admission, which she fell actually made the pain worse  CIWA protocol, lorazepam as needed per protocol  Continue Creon for chronic pancreatitis, Carafate, IV Protonix, Mylanta as needed, Bentyl as needed  Hypertensive urgency   Assessment & Plan    Patient does have hypertension, and has not taken any of her blood pressure medications in at least the past 24 hours secondary to nausea and vomiting  Has received clonidine already in the ER, will give IV hydralazine and resume her home medications  Continue Norvasc 2 5 daily, clonidine t i d  Continue telemetry monitor, close monitoring  Will get a baseline EKG and troponin, patient is not complaining of chest pain at this time  Alcohol abuse   Assessment & Plan    Patient's past history of alcohol abuse, reports she only uses socially now with her job    Last drink was Monday, after which her symptoms did start  I do strongly feel that her abdominal pain is exacerbated secondary to alcohol use  Ethanol levels pending as above  Alcoholic cirrhosis ([address] )   Assessment & Plan    Patient again counseled on the need to have complete cessation from alcohol  CT showing continued evidence of cirrhosis  VTE Prophylaxis: Not indicated, patient is ambulatory  / reason for no mechanical VTE prophylaxis Not indicated, patient is ambulatory and expected length of stay < 24 hours   Code Status:  Presumed full code, unable to assessed as patient in distress  POLST: There is no POLST form on file for this patient (pre-hospital)  Discussion with family:  No family members present at this time    Anticipated Length of Stay:  Patient will be admitted on an Observation basis with an anticipated length of stay of  < 2 midnights  Justification for Hospital Stay:  Acute on chronic abdominal pain, suspected withdrawal    Total Time for Visit, including Counseling / Coordination of Care: 45 minutes  Greater than 50% of this total time spent on direct patient counseling and coordination of care  Chief Complaint:   Abdominal pain    History of Present Illness:    Coty Whitehead is a 40 y o  female who presents with abdominal pain x3 days  Patient's past medical history significant for chronic pancreatitis, alcohol abuse with liver cirrhosis and Castano's esophagus/gastritis, possible IBS and functional abdominal pain, pain seeking behaviors, hypertension  Patient presents with report of 3 days of abdominal pain  She reports she had alcohol on Monday at her job, and believes pain started thereafter  She has been and able to keep down food or medications since that time  No report of hematemesis, bilious emesis only  No diarrhea at this time, (+) flatus  Review of Systems:    Review of Systems   Constitutional: Positive for appetite change  Negative for fever     Respiratory: Negative for cough  Cardiovascular: Negative for chest pain and palpitations  Gastrointestinal: Positive for abdominal pain, nausea and vomiting ([place])  Negative for blood in stool  Genitourinary: Negative for dysuria  Psychiatric/Behavioral: The patient is nervous/anxious  All other systems reviewed and are negative  Past Medical and Surgical History:     Past Medical History:   Diagnosis Date    Alcohol abuse     Arthritis     GERD (gastroesophageal reflux disease)     Hypertension     Nicotine dependence     Obesity     Pancreatitis     Panic attack        Past Surgical History:   Procedure Laterality Date    ABDOMINAL SURGERY      C SECTION    ESOPHAGOGASTRODUODENOSCOPY N/A 12/15/2017    Procedure: ESOPHAGOGASTRODUODENOSCOPY (EGD); Surgeon: Benedict Knapp MD;  Location: [facility]; Service: Gastroenterology       Meds/Allergies:    Prior to Admission medications    Medication Sig Start Date End Date Taking?  Authorizing Provider   amitriptyline (ELAVIL) 10 mg tablet Take 1 tablet by mouth daily at bedtime 12/16/17   Dorothea Rincon MD   amLODIPine (NORVASC) 2 5 mg tablet Take 1 tablet by mouth daily 12/17/17   Dorothea Rincon MD   cloNIDine (CATAPRES) 0 1 mg tablet Take 2 tablets by mouth every 8 (eight) hours 12/16/17   Dorothea Rincon MD   dicyclomine (BENTYL) 10 mg capsule Take 2 capsules by mouth every 6 (six) hours as needed (abd  spasm) 10/31/17   Allie Crenshaw MD   Mometasone Furo-Formoterol Fum (DULERA) 100-5 MCG/ACT AERO Inhale Unsure of dose    Historical Provider, MD   nicotine (NICODERM CQ) 21 mg/24 hr TD 24 hr patch Place 1 patch on the skin daily 11/24/17   Umang Morales MD   nystatin (MYCOSTATIN) 100,000 units/mL suspension Swish and swallow 5 mL 4 (four) times a day 10/31/17   Allie Crenshaw MD   ondansetron (ZOFRAN) 4 mg tablet Take 1 tablet by mouth every 8 (eight) hours as needed for nausea or vomiting for up to 7 days 12/16/17 12/23/17 Clemente Rincon MD   pancrelipase, Lip-Prot-Amyl, (CREON) 24,000 units Take 1 capsule by mouth 3 (three) times a day with meals 10/31/17   Clary Garcia MD   pantoprazole (PROTONIX) 40 mg tablet Take 1 tablet by mouth 2 (two) times a day 12/16/17   Clemente Rincon MD   saccharomyces boulardii (FLORASTOR) 250 mg capsule Take 1 capsule by mouth 2 (two) times a day 10/31/17   Clary Garcia MD   simethicone (MYLICON) 80 mg chewable tablet Chew 1 tablet every 6 (six) hours as needed for flatulence 10/31/17   Clary Garcia MD   sucralfate (CARAFATE) 1 g tablet Take 1 tablet by mouth 4 (four) times a day (before meals and at bedtime) 12/16/17   Fabi Starks MD     I have reviewed home medications with patient personally  Allergies: Allergies   Allergen Reactions    Morphine And Related Swelling       Social History:     Marital Status: Single   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  Patient Pre-hospital Living Situation:  Unclear  Patient Pre-hospital Level of Mobility:  Independent  Patient Pre-hospital Diet Restrictions:  Alcohol abstinence, low-fat -- unclear whether patient is following  Substance Use History:   History   Alcohol Use    Yes     Comment: [employer]     History   Smoking Status    Current Every Day Smoker    Packs/day: 1 00    Years: 29 00    Types: Cigarettes   Smokeless Tobacco    Never Used     History   Drug Use No       Family History:    non-contributory    Physical Exam:     Vitals:   Blood Pressure: (!) 212/126 (01/17/18 1402)  Pulse: 87 (01/17/18 1402)  Temperature: 97 6 °F (36 4 °C) (01/17/18 1209)  Temp Source: Oral (01/17/18 1209)  Respirations: 20 (01/17/18 1402)  Weight - Scale: 76 7 kg (169 lb 1 5 oz) (01/17/18 1159)  SpO2: 100 % (01/17/18 1402)    Physical Exam   Constitutional: She is oriented to person, place, and time  She appears well-developed and well-nourished  She appears distressed  HENT:   Head: Normocephalic and atraumatic     Mouth/Throat: Mucous membranes are dry  Neck: Neck supple  Cardiovascular: Normal rate, regular rhythm, S1 normal, S2 normal, normal heart sounds and intact distal pulses  No murmur heard  Pulmonary/Chest: Effort normal and breath sounds normal  No respiratory distress  She has no wheezes  She has no rhonchi  She has no rales  Abdominal: Soft  Normal appearance  She exhibits no distension  Bowel sounds are decreased  There is tenderness in the periumbilical area  There is no rigidity and no guarding  Musculoskeletal: Normal range of motion  She exhibits no edema  Lymphadenopathy:     She has no cervical adenopathy  Neurological: She is alert and oriented to person, place, and time  She displays no tremor  No cranial nerve deficit  She displays no seizure activity  Skin: Skin is warm, dry and intact  Psychiatric: Her mood appears anxious  Nursing note and vitals reviewed  Additional Data:     Lab Results: I have personally reviewed pertinent reports  Results from last 7 days  Lab Units 01/17/18  1108   WBC Thousand/uL 11 61*   HEMOGLOBIN g/dL 17 7*   HEMATOCRIT % 52 4*   PLATELETS Thousands/uL 335   NEUTROS PCT % 69   LYMPHS PCT % 22   MONOS PCT % 7   EOS PCT % 1       Results from last 7 days  Lab Units 01/17/18  1143   SODIUM mmol/L 141   POTASSIUM mmol/L 3 9   CHLORIDE mmol/L 104   CO2 mmol/L 22   BUN mg/dL 10   CREATININE mg/dL 0 79   CALCIUM mg/dL 9 8   TOTAL PROTEIN g/dL 8 5*   BILIRUBIN TOTAL mg/dL 1 20*   ALK PHOS U/L 115   ALT U/L 20   AST U/L 23   GLUCOSE RANDOM mg/dL 169*           Imaging: I have personally reviewed pertinent reports  CT abdomen pelvis with contrast   Final Result by Digna Quezada MD (01/17 1974)      No acute intra-abdominal abnormality  Findings again suggesting cirrhosis           Workstation performed: [id number]             EKG, Pathology, and Other Studies Reviewed on Admission:   · EKG:  Pending  · Labs reviewed:  Lipase and LFTs normal with mild elevation of bilirubin only 1 2  · CBC with mild leukocytosis 11 61  · Troponin pending  · UDS (+) THC, opiates    Allscripts / Epic Records Reviewed: Yes     ** Please Note: This note has been constructed using a voice recognition system   **

## 2018-01-17 NOTE — ASSESSMENT & PLAN NOTE
Patient has recurrent abdominal pain, presented with similar episode  Three days abdominal pain, does report alcoholic beverage on Monday  Ethanol level is pending  Bring patient in under observation, NPO, aggressive IV fluid hydration  She does not have any signs of acute pancreatitis on imaging or labs  Gastroenterology evaluation the morning, they do feel she has a degree of IBS and functional abdominal pain  Continue tricyclic for this reason  Reglan as needed for nausea and vomiting, Zofran for any breakthrough symptoms  Docusate to keep bowels soft  I have not continued any narcotics at this time, secondary to drug-seeking behaviors  Patient did use old script of narcotic pain killers at home prior to admission, which she fell actually made the pain worse  CIWA protocol, lorazepam as needed per protocol

## 2018-01-17 NOTE — ASSESSMENT & PLAN NOTE
Patient again counseled on the need to have complete cessation from alcohol  CT showing continued evidence of cirrhosis

## 2018-01-17 NOTE — ED PROVIDER NOTES
History  Chief Complaint   Patient presents with    Abdominal Pain     Pt presents to ER via EMS from home with c/o's lower abdominal pain that started yesterday & today started with nausea & vomitting  Sick appearly adult presents in pain distress - crying & unable to sit still, physician made aware  Patient brought to the emergency department by ambulance for management and evaluation of belly pain that began last evening and is worsened that is consistent with her chronic on acute pancreatitis  She does have nausea vomiting without diarrhea  She denies chest pain or sob  She denies trauma fall or injury  She denies back pain or urinary symptoms  She denies URI symptoms  Prior to Admission Medications   Prescriptions Last Dose Informant Patient Reported? Taking?    Mometasone Furo-Formoterol Fum (DULERA) 100-5 MCG/ACT AERO   Yes No   Sig: Inhale Unsure of dose   amLODIPine (NORVASC) 2 5 mg tablet   No No   Sig: Take 1 tablet by mouth daily   amitriptyline (ELAVIL) 10 mg tablet   No No   Sig: Take 1 tablet by mouth daily at bedtime   cloNIDine (CATAPRES) 0 1 mg tablet   No No   Sig: Take 2 tablets by mouth every 8 (eight) hours   dicyclomine (BENTYL) 10 mg capsule   No No   Sig: Take 2 capsules by mouth every 6 (six) hours as needed (abd  spasm)   nicotine (NICODERM CQ) 21 mg/24 hr TD 24 hr patch   No No   Sig: Place 1 patch on the skin daily   nystatin (MYCOSTATIN) 100,000 units/mL suspension   No No   Sig: Swish and swallow 5 mL 4 (four) times a day   ondansetron (ZOFRAN) 4 mg tablet   No No   Sig: Take 1 tablet by mouth every 8 (eight) hours as needed for nausea or vomiting for up to 7 days   pancrelipase, Lip-Prot-Amyl, (CREON) 24,000 units   No No   Sig: Take 1 capsule by mouth 3 (three) times a day with meals   pantoprazole (PROTONIX) 40 mg tablet   No No   Sig: Take 1 tablet by mouth 2 (two) times a day   saccharomyces boulardii (FLORASTOR) 250 mg capsule   No No   Sig: Take 1 capsule by mouth 2 (two) times a day   simethicone (MYLICON) 80 mg chewable tablet   No No   Sig: Chew 1 tablet every 6 (six) hours as needed for flatulence   sucralfate (CARAFATE) 1 g tablet   No No   Sig: Take 1 tablet by mouth 4 (four) times a day (before meals and at bedtime)      Facility-Administered Medications: None       Past Medical History:   Diagnosis Date    Alcohol abuse     Arthritis     GERD (gastroesophageal reflux disease)     Hypertension     Nicotine dependence     Obesity     Pancreatitis     Panic attack        Past Surgical History:   Procedure Laterality Date    ABDOMINAL SURGERY      C SECTION    ESOPHAGOGASTRODUODENOSCOPY N/A 12/15/2017    Procedure: ESOPHAGOGASTRODUODENOSCOPY (EGD); Surgeon: Lizz Danielson MD;  Location: [facility]; Service: Gastroenterology       Family History   Problem Relation Age of Onset    Cirrhosis Father     Alcohol abuse Father     Drug abuse Mother      I have reviewed and agree with the history as documented  Social History   Substance Use Topics    Smoking status: Current Every Day Smoker     Packs/day: 1 00     Years: 29 00     Types: Cigarettes    Smokeless tobacco: Never Used    Alcohol use Yes      Comment: [employer]        Review of Systems   Constitutional: Negative  Negative for activity change, appetite change, chills, diaphoresis, fatigue and fever  HENT: Negative  Negative for congestion, drooling, rhinorrhea, trouble swallowing and voice change  Eyes: Negative  Negative for photophobia and visual disturbance  Respiratory: Negative  Negative for chest tightness, shortness of breath, wheezing and stridor  Cardiovascular: Negative  Negative for chest pain, palpitations and leg swelling  Gastrointestinal: Positive for abdominal pain, nausea and vomiting  Negative for anal bleeding, constipation and diarrhea  Endocrine: Negative  Genitourinary: Negative    Negative for dysuria, frequency, hematuria and urgency  Musculoskeletal: Negative  Negative for back pain, neck pain and neck stiffness  Skin: Negative  Negative for rash and wound  Allergic/Immunologic: Negative  Neurological: Negative  Negative for dizziness, tremors, seizures, syncope, facial asymmetry, speech difficulty, weakness, light-headedness, numbness and headaches  Hematological: Negative  Does not bruise/bleed easily  Psychiatric/Behavioral: Negative  Physical Exam  ED Triage Vitals   Temperature Pulse Respirations Blood Pressure SpO2   01/17/18 1209 01/17/18 1103 01/17/18 1103 01/17/18 1103 01/17/18 1103   97 6 °F (36 4 °C) (!) 121 16 (!) 216/136 99 %      Temp Source Heart Rate Source Patient Position - Orthostatic VS BP Location FiO2 (%)   01/17/18 1209 01/17/18 1103 01/17/18 1103 01/17/18 1103 --   Oral Monitor Sitting Left arm       Pain Score       01/17/18 1103       Worst Possible Pain           Orthostatic Vital Signs  Vitals:    01/17/18 1103 01/17/18 1159 01/17/18 1402   BP: (!) 216/136 (!) 192/106 (!) 212/126   Pulse: (!) 121 69 87   Patient Position - Orthostatic VS: Sitting Lying Lying       Physical Exam   Constitutional: She is oriented to person, place, and time  She appears well-developed and well-nourished  Uncomfortable appearance without toxicity or respiratory distress  Patient is able to complete sentences and engage in normal conversation and follow simple commands  HENT:   Head: Normocephalic and atraumatic  Right Ear: External ear normal    Left Ear: External ear normal    Mouth/Throat: Oropharynx is clear and moist    Eyes: Conjunctivae and EOM are normal  Pupils are equal, round, and reactive to light  Neck: Normal range of motion  Neck supple  Cardiovascular: Normal rate, regular rhythm, normal heart sounds and intact distal pulses  Pulmonary/Chest: Effort normal and breath sounds normal  No respiratory distress  She has no wheezes  She has no rales  She exhibits no tenderness  Abdominal: Soft  Bowel sounds are normal  She exhibits no mass  There is tenderness  There is no rebound and no guarding  No hernia  Upper abdominal tenderness to palpation  No peritoneal signs  Musculoskeletal: Normal range of motion  She exhibits no edema, tenderness or deformity  Neurological: She is alert and oriented to person, place, and time  She has normal reflexes  She displays normal reflexes  No cranial nerve deficit or sensory deficit  She exhibits normal muscle tone  Coordination normal    Skin: Skin is warm and dry  No rash noted  No erythema  No pallor  Psychiatric: Her behavior is normal  Judgment and thought content normal    Anxious   Nursing note and vitals reviewed        ED Medications  Medications   cloNIDine (CATAPRES) tablet 0 1 mg (not administered)   ondansetron (ZOFRAN) injection 4 mg (4 mg Intravenous Given 1/17/18 1109)   sodium chloride 0 9 % bolus 500 mL (0 mL Intravenous Stopped 1/17/18 1201)   HYDROmorphone (DILAUDID) injection 1 mg (1 mg Intravenous Given 1/17/18 1122)   HYDROmorphone (DILAUDID) injection 1 mg (1 mg Intravenous Given 1/17/18 1157)   iohexol (OMNIPAQUE) 350 MG/ML injection (SINGLE-DOSE) 100 mL (100 mL Intravenous Given 1/17/18 1319)   ondansetron (ZOFRAN) injection 4 mg (4 mg Intravenous Given 1/17/18 1401)       Diagnostic Studies  Results Reviewed     Procedure Component Value Units Date/Time    POCT urinalysis dipstick [[id number]]  (Normal) Resulted:  01/17/18 1403    Lab Status:  Final result Specimen:  Urine from Urine, Other Updated:  01/17/18 1404     Color, UA vitaly     Clarity, UA clear     EXT Glucose, UA negative     EXT Bilirubin, UA (Ref: Negative) negative     EXT Ketones, UA (Ref: Negative) 80     EXT Spec Grav, UA 1 005     EXT Blood, UA (Ref: Negative) negative     EXT pH, UA 8 5     EXT Protein, UA (Ref: Negative) 30+     EXT Urobilinogen, UA (Ref: 0 2- 1 0) 0 2     EXT Leukocytes, UA (Ref: Negative) negative     EXT Nitrite, UA (Ref: Negative) negative    hCG, qualitative pregnancy [[id number]]  (Normal) Collected:  01/17/18 1137    Lab Status:  Final result Specimen:  Blood Updated:  01/17/18 1301     Preg, Serum Negative    Comprehensive metabolic panel [[id number]]  (Abnormal) Collected:  01/17/18 1143    Lab Status:  Final result Specimen:  Blood from Hand, Left Updated:  01/17/18 1203     Sodium 141 mmol/L      Potassium 3 9 mmol/L      Chloride 104 mmol/L      CO2 22 mmol/L      Anion Gap 15 (H) mmol/L      BUN 10 mg/dL      Creatinine 0 79 mg/dL      Glucose 169 (H) mg/dL      Calcium 9 8 mg/dL      AST 23 U/L      ALT 20 U/L      Alkaline Phosphatase 115 U/L      Total Protein 8 5 (H) g/dL      Albumin 3 9 g/dL      Total Bilirubin 1 20 (H) mg/dL      eGFR 93 ml/min/1 73sq m     Narrative:         National Kidney Disease Education Program recommendations are as follows:  GFR calculation is accurate only with a steady state creatinine  Chronic Kidney disease less than 60 ml/min/1 73 sq  meters  Kidney failure less than 15 ml/min/1 73 sq  meters      Lipase [[id number]]  (Normal) Collected:  01/17/18 1143    Lab Status:  Final result Specimen:  Blood from Hand, Left Updated:  01/17/18 1203     Lipase 80 u/L     CBC and differential [[id number]]  (Abnormal) Collected:  01/17/18 1108    Lab Status:  Final result Specimen:  Blood from Arm, Right Updated:  01/17/18 1116     WBC 11 61 (H) Thousand/uL      RBC 5 74 (H) Million/uL      Hemoglobin 17 7 (H) g/dL      Hematocrit 52 4 (H) %      MCV 91 fL      MCH 30 8 pg      MCHC 33 8 g/dL      RDW 13 6 %      MPV 10 0 fL      Platelets 375 Thousands/uL      nRBC 0 /100 WBCs      Neutrophils Relative 69 %      Lymphocytes Relative 22 %      Monocytes Relative 7 %      Eosinophils Relative 1 %      Basophils Relative 1 %      Neutrophils Absolute 8 03 (H) Thousands/µL      Lymphocytes Absolute 2 55 Thousands/µL      Monocytes Absolute 0 78 Thousand/µL      Eosinophils Absolute 0 11 Thousand/µL      Basophils Absolute 0 10 Thousands/µL                  CT abdomen pelvis with contrast   Final Result by Callie Jamil MD (01/17 0029)      No acute intra-abdominal abnormality  Findings again suggesting cirrhosis  Workstation performed: [id number]                    Procedures  Procedures       Phone Contacts  ED Phone Contact    ED Course  ED Course as of Jan 17 1443   Wed Jan 17, 2018   1423 Patient with unremarkable workup  She will be admitted to the hospitalist service for further evaluation and treatment  I suspect she may be in opiate withdrawal   She has not taken her antihypertensives so clonidine was given  MDM  CritCare Time    Disposition  Final diagnoses:   Abdominal pain   Hypertension     Time reflects when diagnosis was documented in both MDM as applicable and the Disposition within this note     Time User Action Codes Description Comment    1/17/2018  2:24 PM Tashi Alcaraz Add [R10 9] Abdominal pain     1/17/2018  2:24 PM [place], [id number],[place] [address] Hypertension       ED Disposition     ED Disposition Condition Comment    Admit  Case was discussed with Dr Bj Rocha and the patient's admission status was agreed to be Admission Status: observation status to the service of Dr Merly Soriano    None       Patient's Medications   Discharge Prescriptions    No medications on file     No discharge procedures on file      ED Provider  Electronically Signed by           Apurva Bray MD  01/17/18 8324

## 2018-01-17 NOTE — ED NOTES
Pt begging for more pain meds & also ativan  Pt continues to vomit bile       Luz Claudio RN  01/17/18 4315

## 2018-01-17 NOTE — ASSESSMENT & PLAN NOTE
Patient does have hypertension, and has not taken any of her blood pressure medications in at least the past 24 hours secondary to nausea and vomiting  Has received clonidine already in the ER, will give IV hydralazine and resume her home medications  Continue telemetry monitor, close monitoring  Will get a baseline EKG and troponin, patient is not complaining of chest pain at this time

## 2018-01-17 NOTE — Clinical Note
Case was discussed with Dr Patrick Brunner and the patient's admission status was agreed to be Admission Status: observation status to the service of Dr Patrick Brunner

## 2018-01-18 LAB
ATRIAL RATE: 84 BPM
P AXIS: 66 DEGREES
PR INTERVAL: 148 MS
QRS AXIS: 46 DEGREES
QRSD INTERVAL: 88 MS
QT INTERVAL: 414 MS
QTC INTERVAL: 489 MS
T WAVE AXIS: 31 DEGREES
VENTRICULAR RATE: 84 BPM

## 2018-01-18 NOTE — DISCHARGE SUMMARY
Discharge Summary - [address] [facility]    Patient Information: Rosy Sanford 40 y o  female MRN: [id number]  Unit/Bed#: ED 25 Encounter: [id number]    Discharging Physician / Practitioner: OMERO Shetty  PCP: Nikki Uribe MD  Admission Date: 1/17/2018  Discharge Date: 01/18/18    Reason for Admission:  Abdominal pain    Discharge Diagnoses:     Principal Problem:    Abdominal pain  Active Problems:    Alcohol abuse    Hypertensive urgency    Alcoholic cirrhosis ([address] )  Resolved Problems:    * No resolved hospital problems  *      Consultations During Hospital Stay:  · None    Procedures Performed:     · CT abdomen and pelvis: No acute intra-abdominal abnormality  Findings again suggesting cirrhosis  Significant Findings:     Abdominal pain  Alcohol abuse  Hypertensive urgency  Alcohol cirrhosis    Incidental Findings:   · None    Test Results Pending at Discharge (will require follow up):   · GI consult     Outpatient Tests Requested:  · None    Complications:  No    Hospital Course:     Rosy Sanford is a 40 y o  female patient who originally presented to the hospital on 1/17/2018 due to abdominal pain  CT was done No acute intra-abdominal abnormality  Findings again suggesting cirrhosis  Patient was IV fluids  UA done noted positive THC and opiates  On admission patient's blood pressure was elevated she was treated with IV hydralazine  CIWA protocol was initiated for history of alcohol abuse  Received counseled during admission secondary to alcoholic cirrhosis  At about 2300, I received a call from the primary nurse patient requesting to leave AMA  Explained to patient's risks and benefits  by them patient was already dressed getting ready to leave  Advise patient to follow up with PCP for further management  Reviewed CT and blood work resolved with  She refused to answer if she had any questions or concerns  Advise to continue previous home regiment        Condition at Discharge: good     Discharge Day Visit / Exam:     * Please refer to separate progress for these details *    Discharge instructions/Information to patient and family:   See after visit summary for information provided to patient and family  Provisions for Follow-Up Care:  See after visit summary for information related to follow-up care and any pertinent home health orders  Disposition:     Home  AMA    For Discharges to [address] [facility]:   · Not Applicable to this Patient - Spoke with the physician listed here  Planned Readmission:  More than likely    Discharge Statement:  I spent 20 minutes discharging the patient  This time was spent on the day of discharge  I had direct contact with the patient on the day of discharge  Greater than 50% of the total time was spent examining patient, answering all patient questions, arranging and discussing plan of care with patient as well as directly providing post-discharge instructions  Additional time then spent on discharge activities  Discharge Medications:  See after visit summary for reconciled discharge medications provided to patient and family  ** Please Note: Dragon 360 Dictation voice to text software may have been used in the creation of this document   **

## 2018-01-18 NOTE — CASE MANAGEMENT
Initial Clinical Review    Admission: Date/Time/Statement: 01/17/18 @ 1443 Observation Written     Orders Placed This Encounter   Procedures    Place in Observation (expected length of stay for this patient is less than two midnights)     Standing Status:   Standing     Number of Occurrences:   1     Order Specific Question:   Admitting Physician     Answer:   Ananya Faustin [[id number]]     Order Specific Question:   Level of Care     Answer:   Med Surg [16]         ED: Date/Time/Mode of Arrival:   ED Arrival Information     Expected Arrival Acuity Means of Arrival Escorted By Service Admission Type    - 1/17/2018 10:47 Emergent Ambulance [facility]    Arrival Complaint    Abdominal Pain          Chief Complaint:   Chief Complaint   Patient presents with    Abdominal Pain     Pt presents to ER via EMS from home with c/o's lower abdominal pain that started yesterday & today started with nausea & vomitting  Sick appearly adult presents in pain distress - crying & unable to sit still, physician made aware  History of Illness: Luis Quarles is a 40 y o  female who presents with abdominal pain x3 days  Patient's past medical history significant for chronic pancreatitis, alcohol abuse with liver cirrhosis and Castano's esophagus/gastritis, possible IBS and functional abdominal pain, pain seeking behaviors, hypertension      Patient presents with report of 3 days of abdominal pain  She reports she had alcohol on Monday at her job, and believes pain started thereafter  She has been and able to keep down food or medications since that time  No report of hematemesis, bilious emesis only  No diarrhea at this time, (+) flatus      ED Vital Signs:   ED Triage Vitals   Temperature Pulse Respirations Blood Pressure SpO2   01/17/18 1209 01/17/18 1103 01/17/18 1103 01/17/18 1103 01/17/18 1103   97 6 °F (36 4 °C) (!) 121 16 (!) 216/136 99 %      Temp Source Heart Rate Source Patient Position - Orthostatic VS BP Location FiO2 (%)   01/17/18 1209 01/17/18 1103 01/17/18 1103 01/17/18 1103 --   Oral Monitor Sitting Left arm       Pain Score       01/17/18 1103       Worst Possible Pain        Wt Readings from Last 1 Encounters:   01/17/18 76 7 kg (169 lb 1 5 oz)       Vital Signs (abnormal):   Date/Time  BP   01/17/18 2017   182/92   01/17/18 1634   196/94   01/17/18 1625   196/94   01/17/18 1402   212/126   01/17/18 1159   192/106     Abnormal Labs/Diagnostic Test Results:   WBC 11 61*   HEMOGLOBIN 17 7*   HEMATOCRIT 52 4*     TOTAL PROTEIN 8 5*   BILIRUBIN TOTAL 1 20*   GLUCOSE RANDOM 169*     CT Abd:  WNL  EKG, Pathology, and Other Studies Reviewed on Admission:   · EKG:  Pending  · Labs reviewed:  Lipase and LFTs normal with mild elevation of bilirubin only 1 2  · CBC with mild leukocytosis 11 61  · Troponin pending  · UDS (+) THC, opiates    ED Treatment:   Medication Administration from 01/17/2018 1047 to 01/18/2018 1433       Date/Time Order Dose Route Action     01/17/2018 1109 ondansetron (ZOFRAN) injection 4 mg 4 mg Intravenous Given     01/17/2018 1109 sodium chloride 0 9 % bolus 500 mL 500 mL Intravenous New Bag     01/17/2018 1122 HYDROmorphone (DILAUDID) injection 1 mg 1 mg Intravenous Given     01/17/2018 1157 HYDROmorphone (DILAUDID) injection 1 mg 1 mg Intravenous Given     01/17/2018 1319 iohexol (OMNIPAQUE) 350 MG/ML injection (SINGLE-DOSE) 100 mL 100 mL Intravenous Given     01/17/2018 1401 ondansetron (ZOFRAN) injection 4 mg 4 mg Intravenous Given     01/17/2018 1549 cloNIDine (CATAPRES) tablet 0 1 mg 0 1 mg Oral Given     01/17/2018 1544 metoclopramide (REGLAN) injection 10 mg 10 mg Intravenous Given     01/17/2018 1545 pantoprazole (PROTONIX) injection 40 mg 40 mg Intravenous Given     01/17/2018 2026 pancrelipase (Lip-Prot-Amyl) (CREON) delayed release capsule 24,000 Units 24,000 Units Oral Given     01/17/2018 2026 saccharomyces boulardii (FLORASTOR) capsule 250 mg 250 mg Oral Given     01/17/2018 2221 nicotine (NICODERM CQ) 21 mg/24 hr TD 24 hr patch 1 patch 1 patch Transdermal Medication Applied     01/17/2018 2025 cloNIDine (CATAPRES) tablet 0 2 mg 0 2 mg Oral Given     01/17/2018 1733 ondansetron (ZOFRAN) injection 4 mg 4 mg Intravenous Given     01/17/2018 1730 lactated ringers infusion 125 mL/hr Intravenous New Bag     01/17/2018 2026 docusate sodium (COLACE) capsule 100 mg 100 mg Oral Given     01/17/2018 1733 aluminum-magnesium hydroxide-simethicone (MYLANTA) 200-200-20 mg/5 mL oral suspension 30 mL 30 mL Oral Given     01/17/2018 1631 hydrALAZINE (APRESOLINE) injection 10 mg 10 mg Intravenous Given          Past Medical/Surgical History: Active Ambulatory Problems     Diagnosis Date Noted    Chronic pancreatitis ([facility] ) 10/06/2016    Abdominal pain 10/06/2016    Essential hypertension 10/06/2016    Nicotine dependence 10/06/2016    Colitis 10/27/2017    Pain, dental 11/22/2017    Alcohol abuse 11/22/2017    Tylenol overdose, accidental or unintentional, initial encounter 11/22/2017    Elevated glucose 11/22/2017    Epigastric pain 12/14/2017     Resolved Ambulatory Problems     Diagnosis Date Noted    Hypokalemia 03/07/2017    Sepsis ([facility] ) 12/14/2017    Nausea and vomiting 12/14/2017    Hematemesis with nausea 12/14/2017     Past Medical History:   Diagnosis Date    Alcohol abuse     Arthritis     GERD (gastroesophageal reflux disease)     Hypertension     Nicotine dependence     Obesity     Pancreatitis     Panic attack        Admitting Diagnosis: Abdominal pain [R10 9]    Age/Sex: 40 y o  female    Assessment/Plan:   * Abdominal pain   Assessment & Plan     Patient has recurrent abdominal pain, presents with similar episode to previous admissions  Three days abdominal pain, does report alcoholic beverage on Monday  Ethanol level is pending      Bring patient in under observation, NPO, aggressive IV fluid hydration    She does not have any signs of acute pancreatitis on imaging or labs  Gastroenterology evaluation the morning, they do feel she has a degree of IBS and functional abdominal pain  Continue tricyclic for this reason  CT abdomen no acute findings, with IV contrast no vascular abnormalities noted      Reglan as needed for nausea and vomiting, Zofran for any breakthrough symptoms  Docusate to keep bowels soft      I have not continued any narcotics at this time, secondary to drug-seeking behaviors  Patient did use old script of narcotic pain killers at home prior to admission, which she fell actually made the pain worse      CIWA protocol, lorazepam as needed per protocol      Continue Creon for chronic pancreatitis, Carafate, IV Protonix, Mylanta as needed, Bentyl as needed        Hypertensive urgency   Assessment & Plan     Patient does have hypertension, and has not taken any of her blood pressure medications in at least the past 24 hours secondary to nausea and vomiting  Has received clonidine already in the ER, will give IV hydralazine and resume her home medications      Continue Norvasc 2 5 daily, clonidine t i d      Continue telemetry monitor, close monitoring  Will get a baseline EKG and troponin, patient is not complaining of chest pain at this time        Alcohol abuse   Assessment & Plan     Patient's past history of alcohol abuse, reports she only uses socially now with her job  Last drink was Monday, after which her symptoms did start  I do strongly feel that her abdominal pain is exacerbated secondary to alcohol use  Ethanol levels pending as above        Alcoholic cirrhosis ([facility] [address] )   Assessment & Plan     Patient again counseled on the need to have complete cessation from alcohol  CT showing continued evidence of cirrhosis               VTE Prophylaxis: Not indicated, patient is ambulatory  / reason for no mechanical VTE prophylaxis Not indicated, patient is ambulatory and expected length of stay < 24 hours   Code Status: Presumed full code, unable to assessed as patient in distress  POLST: There is no POLST form on file for this patient (pre-hospital)  Discussion with family:  No family members present at this time     Anticipated Length of Stay:  Patient will be admitted on an Observation basis with an anticipated length of stay of  < 2 midnights     Justification for Hospital Stay:  Acute on chronic abdominal pain, suspected withdrawal    Admission Orders:  NPO  Consult gastroenterology  Trop q3h  Meds/Allergies:         Medication Sig   amitriptyline (ELAVIL) 10 mg tablet Take 1 tablet by mouth daily at bedtime   amLODIPine (NORVASC) 2 5 mg tablet Take 1 tablet by mouth daily   cloNIDine (CATAPRES) 0 1 mg tablet Take 2 tablets by mouth every 8 (eight) hours   dicyclomine (BENTYL) 10 mg capsule Take 2 capsules by mouth every 6 (six) hours as needed (abd  spasm)   Mometasone Furo-Formoterol Fum (DULERA) 100-5 MCG/ACT AERO Inhale Unsure of dose   nicotine (NICODERM CQ) 21 mg/24 hr TD 24 hr patch Place 1 patch on the skin daily   nystatin (MYCOSTATIN) 100,000 units/mL suspension Swish and swallow 5 mL 4 (four) times a day   ondansetron (ZOFRAN) 4 mg tablet Take 1 tablet by mouth every 8 (eight) hours as needed for nausea or vomiting for up to 7 days   pancrelipase, Lip-Prot-Amyl, (CREON) 24,000 units Take 1 capsule by mouth 3 (three) times a day with meals   pantoprazole (PROTONIX) 40 mg tablet Take 1 tablet by mouth 2 (two) times a day   saccharomyces boulardii (FLORASTOR) 250 mg capsule Take 1 capsule by mouth 2 (two) times a day   simethicone (MYLICON) 80 mg chewable tablet Chew 1 tablet every 6 (six) hours as needed for flatulence   sucralfate (CARAFATE) 1 g tablet Take 1 tablet by mouth 4 (four) times a day (before meals and at bedtime)

## 2018-01-18 NOTE — ED NOTES
Pt vomitted all meds just given  Pt has not vomitted since 1740 this evening       Raghu Devlin, JORGE  01/17/18 2037

## 2018-01-18 NOTE — ED NOTES
KVNG spoke with patient and patient [facility]       Albania Richardson RN  01/17/18 0586

## 2018-01-23 NOTE — RESULT NOTES
Discussion/Summary   Negative     Verified Results  (1) TISSUE EXAM [id number] 01:50PM Alcira Bello     Test Name Result Flag Reference   LAB AP CASE REPORT (Report)     Surgical Pathology Report             Case: [id number]                   Authorizing Provider: Adrienne Ramsey MD      Collected:      12/15/2017 1350        Ordering Location:   [address] [place] [address] Received:      12/15/2017 4018                    Operating Room                                 Pathologist:      Lola Cook MD                              Specimens:  A) - Stomach, stomach, r/o H pylori                                  B) - Esophagus, esophagus, r/o barretts esophagitis   LAB AP FINAL DIAGNOSIS (Report)     A  Stomach, biopsy:    - Antral mucosa with mild chronic inactive gastritis  - No Helicobacter pylori organisms identified on immunostaining     - No intestinal metaplasia, dysplasia or neoplasia identified  B  Esophagus, biopsy:    - Predominanty squamous mucosa with mild subacute and chronic   inflammation     - No squamous intraepithelial eosinophils noted  - Rare microscopic and unremarkable gastric foveolar gland fragments     - No intestinal metaplasia, dysplasia or neoplasia identified  Electronically signed by Lola Cook MD on 12/19/2017 at 10:52 AM   LAB AP SURGICAL ADDITIONAL INFORMATION (Report)     All controls performed with the immunohistochemical stains reported above   reacted appropriately  These tests were developed and their performance   characteristics determined by [facility] or   [facility]  They may not be cleared or approved by the [place]  Food and Drug Administration  The FDA has determined that such clearance   or approval is not necessary  These tests are used for clinical purposes  They should not be regarded as investigational or for research   This   laboratory has been approved by CLIA 88, designated as a high-complexity   laboratory and is qualified to perform these tests  LAB AP GROSS DESCRIPTION (Report)     A  The specimen is received in formalin, labeled with the patient's name   and hospital number, and is designated stomach  The specimen consists of   2 tan soft tissue fragments measuring 0 4 and 0 5 cm in greatest   dimension  Entirely submitted in one cassette  B  The specimen is received in formalin, labeled with the patient's name   and hospital number, and is designated esophagus biopsy  The specimen   consists of multiple tan-white soft tissue fragments measuring in loose   aggregate 1 0 x 0 2 x 0 1 cm  Entirely submitted in one cassette  Note: The estimated total formalin fixation time based upon information   provided by the submitting clinician and the standard processing schedule   is less than 72 hours      [facility]   LAB AP CLINICAL INFORMATION Cold bx r/o h pylori     Cold bx r/o h pylori

## 2018-04-21 ENCOUNTER — HOSPITAL ENCOUNTER (INPATIENT)
Facility: HOSPITAL | Age: 43
LOS: 2 days | Discharge: HOME | DRG: 282 | End: 2018-04-23
Attending: EMERGENCY MEDICINE | Admitting: INTERNAL MEDICINE
Payer: COMMERCIAL

## 2018-04-21 ENCOUNTER — APPOINTMENT (EMERGENCY)
Dept: CT IMAGING | Facility: HOSPITAL | Age: 43
DRG: 282 | End: 2018-04-21
Payer: COMMERCIAL

## 2018-04-21 DIAGNOSIS — K29.70 GASTRITIS: ICD-10-CM

## 2018-04-21 DIAGNOSIS — E86.0 MODERATE DEHYDRATION: ICD-10-CM

## 2018-04-21 DIAGNOSIS — R10.33 PERIUMBILICAL PAIN: ICD-10-CM

## 2018-04-21 DIAGNOSIS — R11.2 NAUSEA AND VOMITING: ICD-10-CM

## 2018-04-21 DIAGNOSIS — R10.9 ABDOMINAL PAIN: ICD-10-CM

## 2018-04-21 DIAGNOSIS — K86.1 ACUTE ON CHRONIC PANCREATITIS (HCC): Primary | ICD-10-CM

## 2018-04-21 DIAGNOSIS — K85.90 ACUTE ON CHRONIC PANCREATITIS (HCC): Primary | ICD-10-CM

## 2018-04-21 LAB
ALBUMIN SERPL BCP-MCNC: 3.9 G/DL (ref 3.5–5)
ALP SERPL-CCNC: 105 U/L (ref 46–116)
ALT SERPL W P-5'-P-CCNC: 15 U/L (ref 12–78)
ANION GAP SERPL CALCULATED.3IONS-SCNC: 16 MMOL/L (ref 4–13)
AST SERPL W P-5'-P-CCNC: 46 U/L (ref 5–45)
BACTERIA UR QL AUTO: ABNORMAL /HPF
BASOPHILS # BLD AUTO: 0.07 THOUSANDS/ΜL (ref 0–0.1)
BASOPHILS NFR BLD AUTO: 1 % (ref 0–1)
BILIRUB SERPL-MCNC: 1.6 MG/DL (ref 0.2–1)
BILIRUB UR QL STRIP: ABNORMAL
BUN SERPL-MCNC: 14 MG/DL (ref 5–25)
CALCIUM SERPL-MCNC: 9.4 MG/DL (ref 8.3–10.1)
CHLORIDE SERPL-SCNC: 99 MMOL/L (ref 100–108)
CLARITY UR: CLEAR
CO2 SERPL-SCNC: 19 MMOL/L (ref 21–32)
COLOR UR: YELLOW
CREAT SERPL-MCNC: 0.66 MG/DL (ref 0.6–1.3)
EOSINOPHIL # BLD AUTO: 0 THOUSAND/ΜL (ref 0–0.61)
EOSINOPHIL NFR BLD AUTO: 0 % (ref 0–6)
ERYTHROCYTE [DISTWIDTH] IN BLOOD BY AUTOMATED COUNT: 13.9 % (ref 11.6–15.1)
EXT PREG TEST URINE: NEGATIVE
GFR SERPL CREATININE-BSD FRML MDRD: 110 ML/MIN/1.73SQ M
GLUCOSE SERPL-MCNC: 158 MG/DL (ref 65–140)
GLUCOSE UR STRIP-MCNC: NEGATIVE MG/DL
HCT VFR BLD AUTO: 47.4 % (ref 34.8–46.1)
HGB BLD-MCNC: 16 G/DL (ref 11.5–15.4)
HGB UR QL STRIP.AUTO: ABNORMAL
KETONES UR STRIP-MCNC: ABNORMAL MG/DL
LACTATE SERPL-SCNC: 1.2 MMOL/L (ref 0.5–2)
LACTATE SERPL-SCNC: 2.2 MMOL/L (ref 0.5–2)
LEUKOCYTE ESTERASE UR QL STRIP: NEGATIVE
LIPASE SERPL-CCNC: 61 U/L (ref 73–393)
LYMPHOCYTES # BLD AUTO: 2.2 THOUSANDS/ΜL (ref 0.6–4.47)
LYMPHOCYTES NFR BLD AUTO: 16 % (ref 14–44)
MCH RBC QN AUTO: 30.5 PG (ref 26.8–34.3)
MCHC RBC AUTO-ENTMCNC: 33.8 G/DL (ref 31.4–37.4)
MCV RBC AUTO: 91 FL (ref 82–98)
MONOCYTES # BLD AUTO: 0.74 THOUSAND/ΜL (ref 0.17–1.22)
MONOCYTES NFR BLD AUTO: 5 % (ref 4–12)
MUCOUS THREADS UR QL AUTO: ABNORMAL
NEUTROPHILS # BLD AUTO: 10.54 THOUSANDS/ΜL (ref 1.85–7.62)
NEUTS SEG NFR BLD AUTO: 78 % (ref 43–75)
NITRITE UR QL STRIP: NEGATIVE
NON-SQ EPI CELLS URNS QL MICRO: ABNORMAL /HPF
NRBC BLD AUTO-RTO: 0 /100 WBCS
PH UR STRIP.AUTO: 6 [PH] (ref 4.5–8)
PLATELET # BLD AUTO: 313 THOUSANDS/UL (ref 149–390)
PMV BLD AUTO: 9.3 FL (ref 8.9–12.7)
POTASSIUM SERPL-SCNC: 5.5 MMOL/L (ref 3.5–5.3)
PROT SERPL-MCNC: 8.9 G/DL (ref 6.4–8.2)
PROT UR STRIP-MCNC: ABNORMAL MG/DL
RBC # BLD AUTO: 5.24 MILLION/UL (ref 3.81–5.12)
RBC #/AREA URNS AUTO: ABNORMAL /HPF
SODIUM SERPL-SCNC: 134 MMOL/L (ref 136–145)
SP GR UR STRIP.AUTO: >=1.03 (ref 1–1.03)
UROBILINOGEN UR QL STRIP.AUTO: 1 E.U./DL
WBC # BLD AUTO: 13.59 THOUSAND/UL (ref 4.31–10.16)
WBC #/AREA URNS AUTO: ABNORMAL /HPF

## 2018-04-21 PROCEDURE — 81001 URINALYSIS AUTO W/SCOPE: CPT | Performed by: EMERGENCY MEDICINE

## 2018-04-21 PROCEDURE — 96374 THER/PROPH/DIAG INJ IV PUSH: CPT

## 2018-04-21 PROCEDURE — 36415 COLL VENOUS BLD VENIPUNCTURE: CPT | Performed by: EMERGENCY MEDICINE

## 2018-04-21 PROCEDURE — 99285 EMERGENCY DEPT VISIT HI MDM: CPT

## 2018-04-21 PROCEDURE — 83690 ASSAY OF LIPASE: CPT | Performed by: EMERGENCY MEDICINE

## 2018-04-21 PROCEDURE — 83605 ASSAY OF LACTIC ACID: CPT | Performed by: EMERGENCY MEDICINE

## 2018-04-21 PROCEDURE — 80053 COMPREHEN METABOLIC PANEL: CPT | Performed by: EMERGENCY MEDICINE

## 2018-04-21 PROCEDURE — 96375 TX/PRO/DX INJ NEW DRUG ADDON: CPT

## 2018-04-21 PROCEDURE — 74177 CT ABD & PELVIS W/CONTRAST: CPT

## 2018-04-21 PROCEDURE — 96361 HYDRATE IV INFUSION ADD-ON: CPT

## 2018-04-21 PROCEDURE — 96376 TX/PRO/DX INJ SAME DRUG ADON: CPT

## 2018-04-21 PROCEDURE — 85025 COMPLETE CBC W/AUTO DIFF WBC: CPT | Performed by: EMERGENCY MEDICINE

## 2018-04-21 PROCEDURE — 81025 URINE PREGNANCY TEST: CPT | Performed by: EMERGENCY MEDICINE

## 2018-04-21 RX ORDER — FENTANYL CITRATE 50 UG/ML
50 INJECTION, SOLUTION INTRAMUSCULAR; INTRAVENOUS EVERY 4 HOURS PRN
Status: DISCONTINUED | OUTPATIENT
Start: 2018-04-21 | End: 2018-04-23

## 2018-04-21 RX ORDER — SIMETHICONE 80 MG
80 TABLET,CHEWABLE ORAL EVERY 6 HOURS PRN
Status: DISCONTINUED | OUTPATIENT
Start: 2018-04-21 | End: 2018-04-23 | Stop reason: HOSPADM

## 2018-04-21 RX ORDER — ONDANSETRON 2 MG/ML
4 INJECTION INTRAMUSCULAR; INTRAVENOUS EVERY 6 HOURS PRN
Status: DISCONTINUED | OUTPATIENT
Start: 2018-04-21 | End: 2018-04-23 | Stop reason: HOSPADM

## 2018-04-21 RX ORDER — ONDANSETRON 2 MG/ML
4 INJECTION INTRAMUSCULAR; INTRAVENOUS ONCE
Status: COMPLETED | OUTPATIENT
Start: 2018-04-21 | End: 2018-04-21

## 2018-04-21 RX ORDER — METOCLOPRAMIDE HYDROCHLORIDE 5 MG/ML
10 INJECTION INTRAMUSCULAR; INTRAVENOUS ONCE
Status: COMPLETED | OUTPATIENT
Start: 2018-04-21 | End: 2018-04-21

## 2018-04-21 RX ORDER — ONDANSETRON 2 MG/ML
INJECTION INTRAMUSCULAR; INTRAVENOUS
Status: COMPLETED
Start: 2018-04-21 | End: 2018-04-21

## 2018-04-21 RX ORDER — PANTOPRAZOLE SODIUM 40 MG/1
40 TABLET, DELAYED RELEASE ORAL
Status: DISCONTINUED | OUTPATIENT
Start: 2018-04-22 | End: 2018-04-22

## 2018-04-21 RX ORDER — NICOTINE 21 MG/24HR
1 PATCH, TRANSDERMAL 24 HOURS TRANSDERMAL DAILY
Status: DISCONTINUED | OUTPATIENT
Start: 2018-04-22 | End: 2018-04-21 | Stop reason: SDUPTHER

## 2018-04-21 RX ORDER — AMITRIPTYLINE HYDROCHLORIDE 10 MG/1
10 TABLET, FILM COATED ORAL
Status: DISCONTINUED | OUTPATIENT
Start: 2018-04-21 | End: 2018-04-23 | Stop reason: HOSPADM

## 2018-04-21 RX ORDER — SODIUM CHLORIDE 9 MG/ML
125 INJECTION, SOLUTION INTRAVENOUS CONTINUOUS
Status: DISCONTINUED | OUTPATIENT
Start: 2018-04-21 | End: 2018-04-23 | Stop reason: HOSPADM

## 2018-04-21 RX ORDER — CLONIDINE HYDROCHLORIDE 0.1 MG/1
0.2 TABLET ORAL EVERY 8 HOURS SCHEDULED
Status: DISCONTINUED | OUTPATIENT
Start: 2018-04-21 | End: 2018-04-23 | Stop reason: HOSPADM

## 2018-04-21 RX ORDER — HYDROXYZINE PAMOATE 25 MG/1
25 CAPSULE ORAL
COMMUNITY
End: 2018-05-28

## 2018-04-21 RX ORDER — HYDROXYZINE HYDROCHLORIDE 25 MG/1
25 TABLET, FILM COATED ORAL
Status: DISCONTINUED | OUTPATIENT
Start: 2018-04-21 | End: 2018-04-23 | Stop reason: HOSPADM

## 2018-04-21 RX ORDER — PANTOPRAZOLE SODIUM 40 MG/1
40 TABLET, DELAYED RELEASE ORAL DAILY
Status: DISCONTINUED | OUTPATIENT
Start: 2018-04-22 | End: 2018-04-21 | Stop reason: SDUPTHER

## 2018-04-21 RX ORDER — NICOTINE 21 MG/24HR
1 PATCH, TRANSDERMAL 24 HOURS TRANSDERMAL DAILY
Status: DISCONTINUED | OUTPATIENT
Start: 2018-04-22 | End: 2018-04-23 | Stop reason: HOSPADM

## 2018-04-21 RX ADMIN — HYDROMORPHONE HYDROCHLORIDE 1 MG: 1 INJECTION, SOLUTION INTRAMUSCULAR; INTRAVENOUS; SUBCUTANEOUS at 20:32

## 2018-04-21 RX ADMIN — SODIUM CHLORIDE 1000 ML: 0.9 INJECTION, SOLUTION INTRAVENOUS at 21:33

## 2018-04-21 RX ADMIN — IOHEXOL 100 ML: 350 INJECTION, SOLUTION INTRAVENOUS at 20:42

## 2018-04-21 RX ADMIN — SODIUM CHLORIDE 1000 ML: 0.9 INJECTION, SOLUTION INTRAVENOUS at 19:41

## 2018-04-21 RX ADMIN — HYDROMORPHONE HYDROCHLORIDE 1 MG: 1 INJECTION, SOLUTION INTRAMUSCULAR; INTRAVENOUS; SUBCUTANEOUS at 19:43

## 2018-04-21 RX ADMIN — ONDANSETRON 4 MG: 2 INJECTION INTRAMUSCULAR; INTRAVENOUS at 19:35

## 2018-04-21 RX ADMIN — METOCLOPRAMIDE 10 MG: 5 INJECTION, SOLUTION INTRAMUSCULAR; INTRAVENOUS at 20:28

## 2018-04-21 NOTE — ED PROVIDER NOTES
History  Chief Complaint   Patient presents with    Abdominal Pain     Pt arrived via EMS with c/o severe abdominal pain, nausea, and vomiting x 2 days  Pt with history of chronic pancreatitis  Patient is a 27-year-old female with past medical history of chronic pancreatitis secondary to alcohol use, hypertension, GERD, arthritis, , presents to the emergency department by ambulance for severe abdominal pain, nausea and vomiting  Patient states she has been having periumbilical pain that feels similar to prior attacks of pancreatitis for the past 3 days  Pain has been constant and getting progressively worse  She states she started with nausea and vomiting last night and has been vomiting nonstop today with inability to keep any fluids down  Vomitus is bilious but nonbloody  She denies fever, chills, headache, dizziness or near syncope, URI symptoms, cough, hemoptysis, chest pain, palpitations, dyspnea, diarrhea, constipation, blood per rectum or melena, dysuria, change in urinary frequency, hematuria, flank pain, skin rash or color change, extremity weakness or paresthesia or other focal neurologic deficits  She admits to drinking a half a beer yesterday when she was with her children but states she had quit for many months  Family history significant for father with history of alcohol abuse and cirrhosis  She smokes 1 pack of cigarettes daily  Denies drug use  History provided by:  Patient   used: No        Prior to Admission Medications   Prescriptions Last Dose Informant Patient Reported? Taking?    Mometasone Furo-Formoterol Fum (DULERA) 100-5 MCG/ACT AERO   Yes Yes   Sig: Inhale as needed Unsure of dose     amitriptyline (ELAVIL) 10 mg tablet   No Yes   Sig: Take 1 tablet by mouth daily at bedtime   cloNIDine (CATAPRES) 0 1 mg tablet   No Yes   Sig: Take 2 tablets by mouth every 8 (eight) hours   Patient taking differently: Take 0 3 mg by mouth every 8 (eight) hours     hydrOXYzine pamoate (VISTARIL) 25 mg capsule   Yes Yes   Sig: Take 25 mg by mouth daily at bedtime   nicotine (NICODERM CQ) 21 mg/24 hr TD 24 hr patch   No Yes   Sig: Place 1 patch on the skin daily   ondansetron (ZOFRAN) 4 mg tablet   No Yes   Sig: Take 1 tablet by mouth every 8 (eight) hours as needed for nausea or vomiting for up to 7 days   pancrelipase, Lip-Prot-Amyl, (CREON) 24,000 units   No Yes   Sig: Take 1 capsule by mouth 3 (three) times a day with meals   pantoprazole (PROTONIX) 40 mg tablet   No Yes   Sig: Take 1 tablet by mouth 2 (two) times a day   simethicone (MYLICON) 80 mg chewable tablet   No Yes   Sig: Chew 1 tablet every 6 (six) hours as needed for flatulence      Facility-Administered Medications: None       Past Medical History:   Diagnosis Date    Alcohol abuse     Arthritis     GERD (gastroesophageal reflux disease)     Hypertension     Nicotine dependence     Obesity     Pancreatitis     Panic attack        Past Surgical History:   Procedure Laterality Date    ABDOMINAL SURGERY      C SECTION    ESOPHAGOGASTRODUODENOSCOPY N/A 12/15/2017    Procedure: ESOPHAGOGASTRODUODENOSCOPY (EGD); Surgeon: Tonia Corbett MD;  Location: [facility]; Service: Gastroenterology       Family History   Problem Relation Age of Onset    Cirrhosis Father     Alcohol abuse Father     Drug abuse Mother      I have reviewed and agree with the history as documented  Social History   Substance Use Topics    Smoking status: Current Every Day Smoker     Packs/day: 1 00     Years: 29 00     Types: Cigarettes    Smokeless tobacco: Never Used    Alcohol use Yes      Comment: Last drink x 2 days ago 1/2 can of beer        Review of Systems   Constitutional: Negative for chills and fever  HENT: Negative for congestion, ear pain, rhinorrhea and sore throat  Eyes: Negative for pain and visual disturbance     Respiratory: Negative for cough, chest tightness, shortness of breath and wheezing  Cardiovascular: Negative for chest pain and palpitations  Gastrointestinal: Positive for abdominal pain, nausea and vomiting  Negative for abdominal distention, blood in stool, constipation and diarrhea  Genitourinary: Negative for dysuria, flank pain, frequency, hematuria and vaginal discharge  Musculoskeletal: Negative for back pain and neck pain  Skin: Negative for color change, pallor and rash  Allergic/Immunologic: Negative for immunocompromised state  Neurological: Negative for dizziness, syncope, weakness, light-headedness, numbness and headaches  Hematological: Negative for adenopathy  Psychiatric/Behavioral: Negative for confusion and decreased concentration  All other systems reviewed and are negative  Physical Exam  ED Triage Vitals   Temperature Pulse Respirations Blood Pressure SpO2   04/21/18 1919 04/21/18 1915 04/21/18 1930 04/21/18 1915 04/21/18 1915   (!) 96 9 °F (36 1 °C) 99 (!) 24 (!) 207/104 99 %      Temp Source Heart Rate Source Patient Position - Orthostatic VS BP Location FiO2 (%)   04/21/18 1919 04/21/18 1915 04/21/18 1915 04/21/18 1915 --   Axillary Monitor Sitting Right arm       Pain Score       04/21/18 1915       Worst Possible Pain         Vitals:    04/21/18 2129 04/21/18 2225 04/21/18 2254 04/22/18 0719   BP: 120/58 (!) 176/91 167/88 170/92   BP Location: Right arm Right arm Right arm Right arm   Pulse: 92 94 94 68   Resp: 20 18 18 18   Temp:   98 6 °F (37 °C) 98 8 °F (37 1 °C)   TempSrc:   Oral Oral   SpO2: 100% 100% 98% 96%   Weight:   77 1 kg (169 lb 15 6 oz)    Height:   5' 4" (1 626 m)      Physical Exam   Constitutional: She is oriented to person, place, and time  She appears well-developed and well-nourished  She appears distressed  Patient appears to be in mild distress secondary to pain  She is also actively retching and vomiting in exam room  HENT:   Head: Normocephalic and atraumatic     Mouth/Throat: Oropharynx is clear and moist  No oropharyngeal exudate  Eyes: Conjunctivae and EOM are normal  Pupils are equal, round, and reactive to light  Neck: Normal range of motion  Neck supple  No JVD present  Cardiovascular: Normal rate, regular rhythm, normal heart sounds and intact distal pulses  Exam reveals no gallop and no friction rub  No murmur heard  Pulmonary/Chest: Effort normal and breath sounds normal  No respiratory distress  She has no wheezes  She has no rales  She exhibits no tenderness  Abdominal: Soft  Bowel sounds are normal  She exhibits no distension  There is tenderness  There is no rebound and no guarding  Mild diffuse abdominal tenderness  Most significant tenderness in the periumbilical region  Musculoskeletal: Normal range of motion  She exhibits no edema or tenderness  Lymphadenopathy:     She has no cervical adenopathy  Neurological: She is alert and oriented to person, place, and time  No gross motor or sensory deficits  5/5 strength throughout  Skin: Skin is warm and dry  No rash noted  She is not diaphoretic  No erythema  No pallor  Psychiatric: She has a normal mood and affect  Her behavior is normal    Nursing note and vitals reviewed        ED Medications  Medications   pancrelipase (Lip-Prot-Amyl) (CREON) delayed release capsule 24,000 Units (24,000 Units Oral Not Given 4/22/18 1228)   simethicone (MYLICON) chewable tablet 80 mg (80 mg Oral Not Given 4/22/18 0826)   nicotine (NICODERM CQ) 21 mg/24 hr TD 24 hr patch 1 patch (1 patch Transdermal Medication Applied 4/22/18 0827)   amitriptyline (ELAVIL) tablet 10 mg (10 mg Oral Given 4/22/18 0022)   cloNIDine (CATAPRES) tablet 0 2 mg (0 2 mg Oral Given 4/22/18 0746)   hydrOXYzine HCL (ATARAX) tablet 25 mg (25 mg Oral Given 4/22/18 0002)   fluticasone-salmeterol (ADVAIR) 250-50 mcg/dose inhaler 1 puff (1 puff Inhalation Given 4/22/18 1053)   sodium chloride 0 9 % infusion (125 mL/hr Intravenous New Bag 4/22/18 0747)   ondansetron (ZOFRAN) injection 4 mg (not administered)   enoxaparin (LOVENOX) subcutaneous injection 40 mg (40 mg Subcutaneous Not Given 4/22/18 0953)   fentanyl citrate (PF) 100 MCG/2ML 50 mcg (50 mcg Intravenous Given 4/22/18 1229)   oxyCODONE (ROXICODONE) IR tablet 5 mg (5 mg Oral Given 4/22/18 1057)   pantoprazole (PROTONIX) injection 40 mg (40 mg Intravenous Not Given 4/22/18 1000)   sucralfate (CARAFATE) tablet 1 g (1 g Oral Given 4/22/18 1054)   ondansetron (ZOFRAN) 4 mg/2 mL injection **AcuDose Override Pull** (  Given to EMS 4/21/18 2151)   sodium chloride 0 9 % bolus 1,000 mL (0 mL Intravenous Stopped 4/21/18 2224)   ondansetron (ZOFRAN) injection 4 mg (4 mg Intravenous Given 4/21/18 1935)   HYDROmorphone (DILAUDID) injection 1 mg (1 mg Intravenous Given 4/21/18 1943)   sodium chloride 0 9 % bolus 1,000 mL (0 mL Intravenous Stopped 4/21/18 2245)   metoclopramide (REGLAN) injection 10 mg (10 mg Intravenous Given 4/21/18 2028)   HYDROmorphone (DILAUDID) injection 1 mg (1 mg Intravenous Given 4/21/18 2032)   iohexol (OMNIPAQUE) 350 MG/ML injection (MULTI-DOSE) 100 mL (100 mL Intravenous Given 4/21/18 2042)   fentanyl citrate (PF) 100 MCG/2ML 50 mcg (50 mcg Intravenous Given 4/22/18 0132)       Diagnostic Studies  Results Reviewed     Procedure Component Value Units Date/Time    Lactic acid, plasma [[id number]]  (Normal) Collected:  04/21/18 2219    Lab Status:  Final result Specimen:  Blood from Hand, Right Updated:  04/21/18 2250     LACTIC ACID 1 2 mmol/L     Narrative:         Result may be elevated if tourniquet was used during collection      Urine Microscopic [[id number]]  (Abnormal) Collected:  04/21/18 2025    Lab Status:  Final result Specimen:  Urine from Urine, Clean Catch Updated:  04/21/18 2053     RBC, UA 0-1 (A) /hpf      WBC, UA None Seen /hpf      Epithelial Cells Occasional /hpf      Bacteria, UA Occasional /hpf      MUCOUS THREADS Occasional (A)    UA w Reflex to Microscopic [[id number]]  (Abnormal) Collected: 04/21/18 2025    Lab Status:  Final result Specimen:  Urine from Urine, Clean Catch Updated:  04/21/18 2041     Color, UA Yellow     Clarity, UA Clear     Specific Gravity, UA >=1 030     pH, UA 6 0     Leukocytes, UA Negative     Nitrite, UA Negative     Protein,  (2+) (A) mg/dl      Glucose, UA Negative mg/dl      Ketones, UA >=80 (3+) (A) mg/dl      Urobilinogen, UA 1 0 E U /dl      Bilirubin, UA Moderate (A)     Blood, UA Large (A)    POCT pregnancy, urine [[id number]]  (Normal) Resulted:  04/21/18 2036    Lab Status:  Final result Updated:  04/21/18 2036     EXT PREG TEST UR (Ref: Negative) negative    Lactic acid, plasma [[id number]]  (Abnormal) Collected:  04/21/18 1947    Lab Status:  Final result Specimen:  Blood from Arm, Right Updated:  04/21/18 2018     LACTIC ACID 2 2 (HH) mmol/L     Narrative:         Result may be elevated if tourniquet was used during collection  Comprehensive metabolic panel [[id number]]  (Abnormal) Collected:  04/21/18 1937    Lab Status:  Final result Specimen:  Blood from Arm, Left Updated:  04/21/18 2003     Sodium 134 (L) mmol/L      Potassium 5 5 (H) mmol/L      Chloride 99 (L) mmol/L      CO2 19 (L) mmol/L      Anion Gap 16 (H) mmol/L      BUN 14 mg/dL      Creatinine 0 66 mg/dL      Glucose 158 (H) mg/dL      Calcium 9 4 mg/dL      AST 46 (H) U/L      ALT 15 U/L      Alkaline Phosphatase 105 U/L      Total Protein 8 9 (H) g/dL      Albumin 3 9 g/dL      Total Bilirubin 1 60 (H) mg/dL      eGFR 110 ml/min/1 73sq m     Narrative:         National Kidney Disease Education Program recommendations are as follows:  GFR calculation is accurate only with a steady state creatinine  Chronic Kidney disease less than 60 ml/min/1 73 sq  meters  Kidney failure less than 15 ml/min/1 73 sq  meters      Lipase [[id number]]  (Abnormal) Collected:  04/21/18 1937    Lab Status:  Final result Specimen:  Blood from Arm, Left Updated:  04/21/18 2003     Lipase 61 (L) u/L     CBC and differential [[id number]]  (Abnormal) Collected:  04/21/18 1937    Lab Status:  Final result Specimen:  Blood from Arm, Left Updated:  04/21/18 1943     WBC 13 59 (H) Thousand/uL      RBC 5 24 (H) Million/uL      Hemoglobin 16 0 (H) g/dL      Hematocrit 47 4 (H) %      MCV 91 fL      MCH 30 5 pg      MCHC 33 8 g/dL      RDW 13 9 %      MPV 9 3 fL      Platelets 557 Thousands/uL      nRBC 0 /100 WBCs      Neutrophils Relative 78 (H) %      Lymphocytes Relative 16 %      Monocytes Relative 5 %      Eosinophils Relative 0 %      Basophils Relative 1 %      Neutrophils Absolute 10 54 (H) Thousands/µL      Lymphocytes Absolute 2 20 Thousands/µL      Monocytes Absolute 0 74 Thousand/µL      Eosinophils Absolute 0 00 Thousand/µL      Basophils Absolute 0 07 Thousands/µL                  CT abdomen pelvis with contrast   Final Result by Librado Robledo MD (04/21 2150)      No acute abdominopelvic findings  Hepatic cirrhosis with stable heterogeneous central enhancement  No discrete masses demonstrated  Recommend screening per local protocol  Unchanged dilation of the main pancreatic duct at the tail, either related to stricturing from chronic pancreatitis or main duct type IPMN  Finding appears stable since October 2016  Workstation performed: [id number]         US right upper quadrant    (Results Pending)              Procedures  Procedures       Phone Contacts  ED Phone Contact    ED Course  ED Course as of Apr 22 1301   Sat Apr 21, 2018   1944 WBC: (!) 13 59   2014 Lab noted moderate hemolysis  Potassium: (!) 5 5   2014 According to records, patient's lipase has never been significantly elevated  This can be seen with chronic pancreatitis  Lipase: (!) 61   2022 Will repeat lactic acid 2 hours from initial blood draw  Will add 2nd L of IV fluids as blood work does show evidence of dehydration  Patient reassessed and still in significant pain and also still vomiting    Will give 10 mg of IV Reglan as she is not responding to Zofran as well as additional 1 mg of Dilaudid  LACTIC ACID: (!!) 2 2                               MDM  Number of Diagnoses or Management Options  Diagnosis management comments: 56-year-old female with history of chronic pancreatitis presents with acute periumbilical pain, nausea and vomiting similar to prior pancreatitis attacks  Most likely patient has acute on chronic pancreatitis likely secondary to recent alcohol intake  Differential also includes biliary colic, cholecystitis, bowel obstruction, diverticulitis, viral etiology such as gastroenteritis/enteritis  Will check abdominal labs including lactic acid, UA and urine pregnancy test   Will obtain CT scan of the abdomen and pelvis to assess for complications of chronic pancreatitis such as a pseudocyst or hemorrhagic conversion  Will give IV fluid bolus and 4 additional mg of Zofran as she already received 2 mg pre-hospital and is still vomiting  Will give Dilaudid for pain  Most likely will end up admitting, unless patient's symptoms drastically improved and she is able to tolerate oral fluids         Amount and/or Complexity of Data Reviewed  Clinical lab tests: reviewed and ordered  Tests in the radiology section of CPT®: ordered and reviewed  Review and summarize past medical records: yes  Independent visualization of images, tracings, or specimens: yes      CritCare Time    Disposition  Final diagnoses:   Acute on chronic pancreatitis (HCC)   Periumbilical pain   Nausea and vomiting   Moderate dehydration     Time reflects when diagnosis was documented in both MDM as applicable and the Disposition within this note     Time User Action Codes Description Comment    4/21/2018  9:12 PM Bo Segovia [K85 90,  K86 1] Acute on chronic pancreatitis ([facility] [address] )     4/21/2018  9:12 PM Bo Segovia [V73 24] Periumbilical pain     9/52/4356  9:12 PM Bo Segovia [R11 2] Nausea and vomiting     4/21/2018  9:12 PM Emily Diallo Add [E86 0] Moderate dehydration       ED Disposition     ED Disposition Condition Comment    Admit  Case was discussed with [facility] and the patient's admission status was agreed to be Admission Status: inpatient status to the service of Dr Zahra Esquivel   Follow-up Information    None       Current Discharge Medication List      CONTINUE these medications which have NOT CHANGED    Details   amitriptyline (ELAVIL) 10 mg tablet Take 1 tablet by mouth daily at bedtime  Qty: 30 tablet, Refills: 0      cloNIDine (CATAPRES) 0 1 mg tablet Take 2 tablets by mouth every 8 (eight) hours  Qty: 90 tablet, Refills: 0      hydrOXYzine pamoate (VISTARIL) 25 mg capsule Take 25 mg by mouth daily at bedtime      Mometasone Furo-Formoterol Fum (DULERA) 100-5 MCG/ACT AERO Inhale as needed Unsure of dose        nicotine (NICODERM CQ) 21 mg/24 hr TD 24 hr patch Place 1 patch on the skin daily  Qty: 28 patch, Refills: 0      ondansetron (ZOFRAN) 4 mg tablet Take 1 tablet by mouth every 8 (eight) hours as needed for nausea or vomiting for up to 7 days  Qty: 21 tablet, Refills: 0      pancrelipase, Lip-Prot-Amyl, (CREON) 24,000 units Take 1 capsule by mouth 3 (three) times a day with meals  Qty: 90 capsule, Refills: 0      pantoprazole (PROTONIX) 40 mg tablet Take 1 tablet by mouth 2 (two) times a day  Qty: 60 tablet, Refills: 0      simethicone (MYLICON) 80 mg chewable tablet Chew 1 tablet every 6 (six) hours as needed for flatulence  Qty: 30 tablet, Refills: 0           No discharge procedures on file      ED Provider  Electronically Signed by           Tonny Hubbard DO  04/22/18 6629

## 2018-04-22 PROBLEM — K86.1 ACUTE ON CHRONIC PANCREATITIS (HCC): Status: ACTIVE | Noted: 2018-04-22

## 2018-04-22 PROBLEM — K85.90 ACUTE ON CHRONIC PANCREATITIS (HCC): Status: ACTIVE | Noted: 2018-04-22

## 2018-04-22 PROBLEM — E86.0 MODERATE DEHYDRATION: Status: ACTIVE | Noted: 2018-04-22

## 2018-04-22 LAB
AMMONIA PLAS-SCNC: 13 UMOL/L (ref 11–35)
ANION GAP SERPL CALCULATED.3IONS-SCNC: 11 MMOL/L (ref 4–13)
BASOPHILS # BLD AUTO: 0.07 THOUSANDS/ΜL (ref 0–0.1)
BASOPHILS NFR BLD AUTO: 1 % (ref 0–1)
BUN SERPL-MCNC: 14 MG/DL (ref 5–25)
CALCIUM SERPL-MCNC: 8.3 MG/DL (ref 8.3–10.1)
CHLORIDE SERPL-SCNC: 105 MMOL/L (ref 100–108)
CO2 SERPL-SCNC: 22 MMOL/L (ref 21–32)
CREAT SERPL-MCNC: 0.66 MG/DL (ref 0.6–1.3)
EOSINOPHIL # BLD AUTO: 0 THOUSAND/ΜL (ref 0–0.61)
EOSINOPHIL NFR BLD AUTO: 0 % (ref 0–6)
ERYTHROCYTE [DISTWIDTH] IN BLOOD BY AUTOMATED COUNT: 14.2 % (ref 11.6–15.1)
GFR SERPL CREATININE-BSD FRML MDRD: 110 ML/MIN/1.73SQ M
GLUCOSE SERPL-MCNC: 114 MG/DL (ref 65–140)
HCT VFR BLD AUTO: 39.7 % (ref 34.8–46.1)
HGB BLD-MCNC: 13.1 G/DL (ref 11.5–15.4)
LIPASE SERPL-CCNC: 99 U/L (ref 73–393)
LYMPHOCYTES # BLD AUTO: 3.62 THOUSANDS/ΜL (ref 0.6–4.47)
LYMPHOCYTES NFR BLD AUTO: 27 % (ref 14–44)
MAGNESIUM SERPL-MCNC: 2 MG/DL (ref 1.6–2.6)
MCH RBC QN AUTO: 30.4 PG (ref 26.8–34.3)
MCHC RBC AUTO-ENTMCNC: 33 G/DL (ref 31.4–37.4)
MCV RBC AUTO: 92 FL (ref 82–98)
MONOCYTES # BLD AUTO: 1.24 THOUSAND/ΜL (ref 0.17–1.22)
MONOCYTES NFR BLD AUTO: 9 % (ref 4–12)
NEUTROPHILS # BLD AUTO: 8.54 THOUSANDS/ΜL (ref 1.85–7.62)
NEUTS SEG NFR BLD AUTO: 63 % (ref 43–75)
NRBC BLD AUTO-RTO: 0 /100 WBCS
PHOSPHATE SERPL-MCNC: 3.1 MG/DL (ref 2.7–4.5)
PLATELET # BLD AUTO: 234 THOUSANDS/UL (ref 149–390)
PMV BLD AUTO: 9.3 FL (ref 8.9–12.7)
POTASSIUM SERPL-SCNC: 4.1 MMOL/L (ref 3.5–5.3)
RBC # BLD AUTO: 4.31 MILLION/UL (ref 3.81–5.12)
SODIUM SERPL-SCNC: 138 MMOL/L (ref 136–145)
WBC # BLD AUTO: 13.5 THOUSAND/UL (ref 4.31–10.16)

## 2018-04-22 PROCEDURE — 99223 1ST HOSP IP/OBS HIGH 75: CPT | Performed by: INTERNAL MEDICINE

## 2018-04-22 PROCEDURE — 80048 BASIC METABOLIC PNL TOTAL CA: CPT | Performed by: NURSE PRACTITIONER

## 2018-04-22 PROCEDURE — 82140 ASSAY OF AMMONIA: CPT | Performed by: NURSE PRACTITIONER

## 2018-04-22 PROCEDURE — 84100 ASSAY OF PHOSPHORUS: CPT | Performed by: NURSE PRACTITIONER

## 2018-04-22 PROCEDURE — 83690 ASSAY OF LIPASE: CPT | Performed by: NURSE PRACTITIONER

## 2018-04-22 PROCEDURE — 83735 ASSAY OF MAGNESIUM: CPT | Performed by: NURSE PRACTITIONER

## 2018-04-22 PROCEDURE — 99254 IP/OBS CNSLTJ NEW/EST MOD 60: CPT | Performed by: INTERNAL MEDICINE

## 2018-04-22 PROCEDURE — C9113 INJ PANTOPRAZOLE SODIUM, VIA: HCPCS | Performed by: INTERNAL MEDICINE

## 2018-04-22 PROCEDURE — 85025 COMPLETE CBC W/AUTO DIFF WBC: CPT | Performed by: NURSE PRACTITIONER

## 2018-04-22 RX ORDER — SUCRALFATE 1 G/1
1 TABLET ORAL
Status: DISCONTINUED | OUTPATIENT
Start: 2018-04-22 | End: 2018-04-23 | Stop reason: HOSPADM

## 2018-04-22 RX ORDER — FENTANYL CITRATE 50 UG/ML
50 INJECTION, SOLUTION INTRAMUSCULAR; INTRAVENOUS ONCE
Status: COMPLETED | OUTPATIENT
Start: 2018-04-22 | End: 2018-04-22

## 2018-04-22 RX ORDER — PANTOPRAZOLE SODIUM 40 MG/1
40 INJECTION, POWDER, FOR SOLUTION INTRAVENOUS EVERY 12 HOURS SCHEDULED
Status: DISCONTINUED | OUTPATIENT
Start: 2018-04-22 | End: 2018-04-23

## 2018-04-22 RX ORDER — OXYCODONE HYDROCHLORIDE 5 MG/1
5 TABLET ORAL EVERY 4 HOURS PRN
Status: DISCONTINUED | OUTPATIENT
Start: 2018-04-22 | End: 2018-04-23

## 2018-04-22 RX ADMIN — OXYCODONE HYDROCHLORIDE 5 MG: 5 TABLET ORAL at 23:36

## 2018-04-22 RX ADMIN — PANCRELIPASE 24000 UNITS: 24000; 76000; 120000 CAPSULE, DELAYED RELEASE PELLETS ORAL at 16:34

## 2018-04-22 RX ADMIN — SUCRALFATE 1 G: 1 TABLET ORAL at 16:34

## 2018-04-22 RX ADMIN — NICOTINE 1 PATCH: 21 PATCH TRANSDERMAL at 08:27

## 2018-04-22 RX ADMIN — SODIUM CHLORIDE 125 ML/HR: 0.9 INJECTION, SOLUTION INTRAVENOUS at 07:47

## 2018-04-22 RX ADMIN — FENTANYL CITRATE 50 MCG: 50 INJECTION, SOLUTION INTRAMUSCULAR; INTRAVENOUS at 08:26

## 2018-04-22 RX ADMIN — FENTANYL CITRATE 50 MCG: 50 INJECTION, SOLUTION INTRAMUSCULAR; INTRAVENOUS at 01:32

## 2018-04-22 RX ADMIN — OXYCODONE HYDROCHLORIDE 5 MG: 5 TABLET ORAL at 15:00

## 2018-04-22 RX ADMIN — FLUTICASONE PROPIONATE AND SALMETEROL 1 PUFF: 50; 250 POWDER RESPIRATORY (INHALATION) at 10:53

## 2018-04-22 RX ADMIN — SUCRALFATE 1 G: 1 TABLET ORAL at 10:54

## 2018-04-22 RX ADMIN — AMITRIPTYLINE HYDROCHLORIDE 10 MG: 10 TABLET, FILM COATED ORAL at 00:22

## 2018-04-22 RX ADMIN — FLUTICASONE PROPIONATE AND SALMETEROL 1 PUFF: 50; 250 POWDER RESPIRATORY (INHALATION) at 21:29

## 2018-04-22 RX ADMIN — FENTANYL CITRATE 50 MCG: 50 INJECTION, SOLUTION INTRAMUSCULAR; INTRAVENOUS at 04:38

## 2018-04-22 RX ADMIN — CLONIDINE HYDROCHLORIDE 0.2 MG: 0.1 TABLET ORAL at 21:27

## 2018-04-22 RX ADMIN — PANTOPRAZOLE SODIUM 40 MG: 40 TABLET, DELAYED RELEASE ORAL at 07:47

## 2018-04-22 RX ADMIN — HYDROXYZINE HYDROCHLORIDE 25 MG: 25 TABLET, FILM COATED ORAL at 00:02

## 2018-04-22 RX ADMIN — FENTANYL CITRATE 50 MCG: 50 INJECTION, SOLUTION INTRAMUSCULAR; INTRAVENOUS at 16:22

## 2018-04-22 RX ADMIN — CLONIDINE HYDROCHLORIDE 0.2 MG: 0.1 TABLET ORAL at 00:02

## 2018-04-22 RX ADMIN — CLONIDINE HYDROCHLORIDE 0.2 MG: 0.1 TABLET ORAL at 07:46

## 2018-04-22 RX ADMIN — PANTOPRAZOLE SODIUM 40 MG: 40 INJECTION, POWDER, FOR SOLUTION INTRAVENOUS at 21:27

## 2018-04-22 RX ADMIN — PANCRELIPASE 24000 UNITS: 24000; 76000; 120000 CAPSULE, DELAYED RELEASE PELLETS ORAL at 07:47

## 2018-04-22 RX ADMIN — FENTANYL CITRATE 50 MCG: 50 INJECTION, SOLUTION INTRAMUSCULAR; INTRAVENOUS at 12:29

## 2018-04-22 RX ADMIN — FENTANYL CITRATE 50 MCG: 50 INJECTION, SOLUTION INTRAMUSCULAR; INTRAVENOUS at 00:01

## 2018-04-22 RX ADMIN — AMITRIPTYLINE HYDROCHLORIDE 10 MG: 10 TABLET, FILM COATED ORAL at 21:28

## 2018-04-22 RX ADMIN — HYDROXYZINE HYDROCHLORIDE 25 MG: 25 TABLET, FILM COATED ORAL at 21:27

## 2018-04-22 RX ADMIN — Medication 80 MG: at 16:22

## 2018-04-22 RX ADMIN — FENTANYL CITRATE 50 MCG: 50 INJECTION, SOLUTION INTRAMUSCULAR; INTRAVENOUS at 21:27

## 2018-04-22 RX ADMIN — OXYCODONE HYDROCHLORIDE 5 MG: 5 TABLET ORAL at 19:23

## 2018-04-22 RX ADMIN — SODIUM CHLORIDE 125 ML/HR: 0.9 INJECTION, SOLUTION INTRAVENOUS at 16:16

## 2018-04-22 RX ADMIN — OXYCODONE HYDROCHLORIDE 5 MG: 5 TABLET ORAL at 10:57

## 2018-04-22 RX ADMIN — CLONIDINE HYDROCHLORIDE 0.2 MG: 0.1 TABLET ORAL at 15:03

## 2018-04-22 RX ADMIN — SODIUM CHLORIDE 125 ML/HR: 0.9 INJECTION, SOLUTION INTRAVENOUS at 00:08

## 2018-04-22 NOTE — ASSESSMENT & PLAN NOTE
Acute on chronic pancreatitis alcohol induced  Patient reports had a few drinks last night  Pain management, comfort measures, GI consult for further management  Alcohol ETOH cessation reviewed with patient advise benefits of quitting  Clear liquids

## 2018-04-22 NOTE — PLAN OF CARE
DISCHARGE PLANNING     Discharge to home or other facility with appropriate resources Progressing        GASTROINTESTINAL - ADULT     Maintains or returns to baseline bowel function Progressing     Maintains adequate nutritional intake Progressing        PAIN - ADULT     Verbalizes/displays adequate comfort level or baseline comfort level Progressing        Potential for Falls     Patient will remain free of falls Progressing

## 2018-04-22 NOTE — ASSESSMENT & PLAN NOTE
Due to acute pancreatitis  Associated with nausea and vomiting  Patient does meet SIRS criteria  Lactate slightly elevated at 2 2  Elevated WBC  Patient is afebrile  Comfort measures with IV fluids, pain management, anti nausea  Clear liquids    UA pending

## 2018-04-22 NOTE — ED NOTES
Pt continues vomiting and denies pain relief, provider made aware       Laquita Rothman RN  04/21/18 2042

## 2018-04-22 NOTE — ASSESSMENT & PLAN NOTE
Intractable, In the setting of acute pancreatitis  Consult GI for further management  Comfort measures, antiemetic  IV fluids    Monitor electrolytes replete as needed

## 2018-04-22 NOTE — H&P
H&P- Jorge Thomas 1975, 40 y o  female MRN: [id number]    Unit/Bed#: -01 Encounter: [id number]    Primary Care Provider: Romulo Sky MD   Date and time admitted to hospital: 4/21/2018  7:12 PM        Acute on chronic pancreatitis [facility])   Assessment & Plan    Acute on chronic pancreatitis alcohol induced  Patient reports had a few drinks last night  Pain management, comfort measures, GI consult for further management  Alcohol ETOH cessation reviewed with patient advise benefits of quitting  Clear liquids  Moderate dehydration   Assessment & Plan    Due to nausea vomiting  IV fluids monitor        Nausea and vomiting   Assessment & Plan    Intractable, In the setting of acute pancreatitis  Consult GI for further management  Comfort measures, antiemetic  IV fluids  Monitor electrolytes replete as needed        Nicotine dependence   Assessment & Plan    Smoking cessation,  Nicotine patch        Essential hypertension   Assessment & Plan    Elevated on admission likely secondary to pain crisis  Continue Catapres  Will monitor  * Abdominal pain   Assessment & Plan    Due to acute pancreatitis  Associated with nausea and vomiting  Patient does meet SIRS criteria  Lactate slightly elevated at 2 2  Elevated WBC  Patient is afebrile  Comfort measures with IV fluids, pain management, anti nausea  Clear liquids  UA pending                VTE Prophylaxis: Enoxaparin (Lovenox)  / sequential compression device   Code Status:  Full code  POLST: POLST form is on file already (pre-hospital)  Discussion with family:  Family at bedside, plan of care reviewed with  Anticipated Length of Stay:  Patient will be admitted on an Inpatient basis with an anticipated length of stay of  greater than 2 midnights  Justification for Hospital Stay:  Abdominal pain, acute pancreatitis    Total Time for Visit, including Counseling / Coordination of Care: 1 hour    Greater than 50% of this total time spent on direct patient counseling and coordination of care  Chief Complaint:   Abdominal pain    History of Present Illness:    Ruchi Mercado is a 40 y o  female history of alcohol abuse, GERD, hypertension, pancreatitis, who presents with chief complaint of severe abdominal pain associated with nausea and vomiting onset about 2 days  Worsen yesterday after taking "half of a beer and a few drinks"  Patient denies fevers or chills  Patient reports pain to feel similar to previous acute attacks of pancreatitis  Report vomiting has been consistent not able to tolerate p o  intake  Patient denies constipation or diarrhea  Review of Systems:    Review of Systems   Constitutional: Positive for appetite change and fatigue  Gastrointestinal: Positive for abdominal distention, abdominal pain, nausea and vomiting  All other systems reviewed and are negative  Past Medical and Surgical History:     Past Medical History:   Diagnosis Date    Alcohol abuse     Arthritis     GERD (gastroesophageal reflux disease)     Hypertension     Nicotine dependence     Obesity     Pancreatitis     Panic attack        Past Surgical History:   Procedure Laterality Date    ABDOMINAL SURGERY      C SECTION    ESOPHAGOGASTRODUODENOSCOPY N/A 12/15/2017    Procedure: ESOPHAGOGASTRODUODENOSCOPY (EGD); Surgeon: Opal Fritz MD;  Location: [facility]; Service: Gastroenterology       Meds/Allergies:    Prior to Admission medications    Medication Sig Start Date End Date Taking?  Authorizing Provider   amitriptyline (ELAVIL) 10 mg tablet Take 1 tablet by mouth daily at bedtime 12/16/17  Yes Chandu Rincon MD   cloNIDine (CATAPRES) 0 1 mg tablet Take 2 tablets by mouth every 8 (eight) hours  Patient taking differently: Take 0 3 mg by mouth every 8 (eight) hours   12/16/17  Yes Chandu Rincon MD   hydrOXYzine pamoate (VISTARIL) 25 mg capsule Take 25 mg by mouth daily at bedtime   Yes Historical Provider, MD   Mometasone Furo-Formoterol Fum (DULERA) 100-5 MCG/ACT AERO Inhale as needed Unsure of dose     Yes Historical Provider, MD   nicotine (NICODERM CQ) 21 mg/24 hr TD 24 hr patch Place 1 patch on the skin daily 11/24/17  Yes Jenna Delaney MD   ondansetron [facility] 4 mg tablet Take 1 tablet by mouth every 8 (eight) hours as needed for nausea or vomiting for up to 7 days 12/16/17 4/21/18 Yes Santy Rincon MD   pancrelipase, Lip-Prot-Amyl, (CREON) 24,000 units Take 1 capsule by mouth 3 (three) times a day with meals 10/31/17  Yes Lukasz Pate MD   pantoprazole (PROTONIX) 40 mg tablet Take 1 tablet by mouth 2 (two) times a day 12/16/17  Yes Frieda Councilman Remaley-Walters, MD   simethicone (MYLICON) 80 mg chewable tablet Chew 1 tablet every 6 (six) hours as needed for flatulence 10/31/17  Yes Lukasz Pate MD     I have reviewed home medications with patient personally  Allergies:    Allergies   Allergen Reactions    Morphine And Related Swelling       Social History:     Marital Status: Single   Occupation:  Uncertain  Patient Pre-hospital Living Situation:  Home  Patient Pre-hospital Level of Mobility:  Independent  Patient Pre-hospital Diet Restrictions:  None  Substance Use History:   History   Alcohol Use    Yes     Comment: Last drink x 2 days ago 1/2 can of beer     History   Smoking Status    Current Every Day Smoker    Packs/day: 1 00    Years: 29 00    Types: Cigarettes   Smokeless Tobacco    Never Used     History   Drug Use No       Family History:    non-contributory    Physical Exam:     Vitals:   Blood Pressure: 170/92 (RN aware ) (04/22/18 0719)  Pulse: 68 (04/22/18 0719)  Temperature: 98 8 °F (37 1 °C) (04/22/18 0719)  Temp Source: Oral (04/22/18 0719)  Respirations: 18 (04/22/18 0719)  Height: 5' 4" (162 6 cm) (04/21/18 2254)  Weight - Scale: 77 1 kg (169 lb 15 6 oz) (04/21/18 2254)  SpO2: 96 % (04/22/18 0719)    Physical Exam   Constitutional: She is oriented to person, place, and time  She appears well-developed and well-nourished  She appears distressed (abdominal Pain)  HENT:   Head: Normocephalic and atraumatic  Neck: Normal range of motion  Neck supple  Cardiovascular: Normal rate, regular rhythm and normal heart sounds  No murmur heard  Pulmonary/Chest: Effort normal  No accessory muscle usage  No respiratory distress  She has decreased breath sounds  She has no wheezes  She has no rales  She exhibits no tenderness  Abdominal: Soft  Bowel sounds are normal  She exhibits no distension  There is no tenderness  Musculoskeletal: Normal range of motion  Neurological: She is alert and oriented to person, place, and time  Skin: Skin is warm and dry  She is not diaphoretic  Psychiatric: She has a normal mood and affect  Her behavior is normal    Nursing note and vitals reviewed  Additional Data:     Lab Results: I have personally reviewed pertinent reports  Results from last 7 days  Lab Units 04/22/18  0528   WBC Thousand/uL 13 50*   HEMOGLOBIN g/dL 13 1   HEMATOCRIT % 39 7   PLATELETS Thousands/uL 234   NEUTROS PCT % 63   LYMPHS PCT % 27   MONOS PCT % 9   EOS PCT % 0       Results from last 7 days  Lab Units 04/22/18  0528 04/21/18  1937   SODIUM mmol/L 138 134*   POTASSIUM mmol/L 4 1 5 5*   CHLORIDE mmol/L 105 99*   CO2 mmol/L 22 19*   BUN mg/dL 14 14   CREATININE mg/dL 0 66 0 66   CALCIUM mg/dL 8 3 9 4   TOTAL PROTEIN g/dL  --  8 9*   BILIRUBIN TOTAL mg/dL  --  1 60*   ALK PHOS U/L  --  105   ALT U/L  --  15   AST U/L  --  46*   GLUCOSE RANDOM mg/dL 114 158*           Imaging: I have personally reviewed pertinent reports  CT abdomen pelvis with contrast   Final Result by Jose Fuller MD (04/21 2150)      No acute abdominopelvic findings  Hepatic cirrhosis with stable heterogeneous central enhancement  No discrete masses demonstrated  Recommend screening per local protocol        Unchanged dilation of the main pancreatic duct at the tail, either related to stricturing from chronic pancreatitis or main duct type IPMN  Finding appears stable since October 2016  Workstation performed: [id number]             EKG, Pathology, and Other Studies Reviewed on Admission:   · EKG:  None    Allscripts / Epic Records Reviewed: Yes     ** Please Note: This note has been constructed using a voice recognition system   **

## 2018-04-22 NOTE — CONSULTS
Consultation - [address] [facility]  Steve Gaston 40 y o  female MRN: [id number]  Unit/Bed#: -01 Encounter: [id number]        Consults    ASSESSMENT/PLAN:   1  Epigastric abdominal pain/nausea and vomiting:  Likely combination of acute on chronic pancreatitis versus esophagitis/gastritis versus cholecystitis  Lipase was normal on admission  Hemoglobin is normal  Nausea and vomiting have resolved, pain has lessened but not resolved  -would continue clear liquid diet for now   -continue Creon 06495 units with meals and snacks   -continue pain control and antiemetics  -continue IV fluids at 150 cc an hour   -continue Protonix 40 mg b i d  in addition to Carafate   -discussed importance of alcohol abstinence   -she will need endoscopic ultrasound as an outpatient to evaluate chronically dilated pancreatic duct, suspect this may be secondary to IPMN versus stricturing due to chronic pancreatitis  -obtain right upper quadrant ultrasound to assess for choledocholithiasis/cholelithiasis  -monitor WBC  2   Cirrhosis 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, clinically, no stigmata of cirrhosis on exam   No ascites  Platelets normal   MELD of 10 based on most recent labs, please check INR today  Chronic hepatitis panel was negative   -monitor meld labs daily  -will need EGD in outpatient setting to assess for esophageal varices  -stable heterogeneous central enhancement of the liver, would recommend MRI for further evaluation  Check AFP  -no evidence of hepatic encephalopathy, ascites  -monitor LFTs   -discussed importance of alcohol abstinence                   ______________________________________________________________________    Reason for Consult / Principal Problem: Abdominal pain    HPI: Steve Gaston is a 40y o  year old female who presents with Abdominal pain    42-year-old female with history of chronic pancreatitis secondary to alcohol, hypertension, GERD who presents with severe abdominal pain, nausea and vomiting for the past 3 days  She was admitted here in December with similar presentation  Patient does endorse drinking recently  Denies any change in medications or any herbal supplementation  She denies hematemesis, fever, chills, but does endorse nausea and vomiting which has now subsided at the time of this consult  She states that she was pain-free for the past several months  Denies diarrhea  Imaging previously showed dilated pancreatic duct up to 7 mm which has been present since 2016 and is unchanged on CT scan yesterday  She also has evidence of cirrhosis along with hyper enhancement centrally within the liver  Lipase on admission was 61, lactic acid of 2 2  AST 46, total bilirubin 1 6 and leukocytosis with WBC of 13 5  Platelets are normal at 313  INR is 1 18  Patient normally follows up with Dr Jace Harmon, and was recommended endoscopic ultrasound due to persistent dilation of PD  Review of Systems: The remainder of the review of systems was negative except for the pertinent positives noted in HPI  Historical Information   Past Medical History:   Diagnosis Date    Alcohol abuse     Arthritis     GERD (gastroesophageal reflux disease)     Hypertension     Nicotine dependence     Obesity     Pancreatitis     Panic attack      Past Surgical History:   Procedure Laterality Date    ABDOMINAL SURGERY      C SECTION    ESOPHAGOGASTRODUODENOSCOPY N/A 12/15/2017    Procedure: ESOPHAGOGASTRODUODENOSCOPY (EGD); Surgeon: Jasmnye Quesada MD;  Location: [facility];   Service: Gastroenterology     Social History   History   Alcohol Use    Yes     Comment: Last drink x 2 days ago 1/2 can of beer     History   Drug Use No     History   Smoking Status    Current Every Day Smoker    Packs/day: 1 00    Years: 29 00    Types: Cigarettes   Smokeless Tobacco    Never Used     Family History   Problem Relation Age of Onset    Cirrhosis Father     Alcohol abuse Father     Drug abuse Mother        Meds/Allergies     Prescriptions Prior to Admission   Medication    amitriptyline (ELAVIL) 10 mg tablet    cloNIDine (CATAPRES) 0 1 mg tablet    hydrOXYzine pamoate (VISTARIL) 25 mg capsule    Mometasone Furo-Formoterol Fum (DULERA) 100-5 MCG/ACT AERO    nicotine (NICODERM CQ) 21 mg/24 hr TD 24 hr patch    ondansetron (ZOFRAN) 4 mg tablet    pancrelipase, Lip-Prot-Amyl, (CREON) 24,000 units    pantoprazole (PROTONIX) 40 mg tablet    simethicone (MYLICON) 80 mg chewable tablet     Current Facility-Administered Medications   Medication Dose Route Frequency    amitriptyline (ELAVIL) tablet 10 mg  10 mg Oral HS    cloNIDine (CATAPRES) tablet 0 2 mg  0 2 mg Oral Q8H [address]    enoxaparin (LOVENOX) subcutaneous injection 40 mg  40 mg Subcutaneous Daily    fentanyl citrate (PF) 100 MCG/2ML 50 mcg  50 mcg Intravenous Q4H PRN    fluticasone-salmeterol (ADVAIR) 250-50 mcg/dose inhaler 1 puff  1 puff Inhalation Q12H [address]    hydrOXYzine HCL (ATARAX) tablet 25 mg  25 mg Oral HS    nicotine (NICODERM CQ) 21 mg/24 hr TD 24 hr patch 1 patch  1 patch Transdermal Daily    ondansetron (ZOFRAN) injection 4 mg  4 mg Intravenous Q6H PRN    oxyCODONE (ROXICODONE) IR tablet 5 mg  5 mg Oral Q4H PRN    pancrelipase (Lip-Prot-Amyl) (CREON) delayed release capsule 24,000 Units  24,000 Units Oral TID With Meals    pantoprazole (PROTONIX) injection 40 mg  40 mg Intravenous Q12H SHIVANI    simethicone (MYLICON) chewable tablet 80 mg  80 mg Oral Q6H PRN    sodium chloride 0 9 % infusion  125 mL/hr Intravenous Continuous    sucralfate (CARAFATE) tablet 1 g  1 g Oral BID AC       Allergies   Allergen Reactions    Morphine And Related Swelling       Objective     Blood pressure 170/92, pulse 68, temperature 98 8 °F (37 1 °C), temperature source Oral, resp   rate 18, height 5' 4" (1 626 m), weight 77 1 kg (169 lb 15 6 oz), last menstrual period 04/21/2018, SpO2 96 %, not currently breastfeeding        Intake/Output Summary (Last 24 hours) at 04/22/18 1119  Last data filed at 04/22/18 0747   Gross per 24 hour   Intake          3156 25 ml   Output                0 ml   Net          3156 25 ml       PHYSICAL EXAM     GEN: well nourished, well developed, no acute distress  HEENT: anicteric, MMM, no cervical or supraclavicular lymphadenopathy  CV: RRR, no m/r/g  CHEST: CTA b/l, no WRR  ABD: +BS, soft, NT/ND, no hepatosplenomegaly  EXT: no c/c/e  SKIN: no rashes,  NEURO: aaox3    Lab Results:   Admission on 04/21/2018   Component Date Value    WBC 04/21/2018 13 59*    RBC 04/21/2018 5 24*    Hemoglobin 04/21/2018 16 0*    Hematocrit 04/21/2018 47 4*    MCV 04/21/2018 91     MCH 04/21/2018 30 5     MCHC 04/21/2018 33 8     RDW 04/21/2018 13 9     MPV 04/21/2018 9 3     Platelets 23/34/4042 313     nRBC 04/21/2018 0     Neutrophils Relative 04/21/2018 78*    Lymphocytes Relative 04/21/2018 16     Monocytes Relative 04/21/2018 5     Eosinophils Relative 04/21/2018 0     Basophils Relative 04/21/2018 1     Neutrophils Absolute 04/21/2018 10 54*    Lymphocytes Absolute 04/21/2018 2 20     Monocytes Absolute 04/21/2018 0 74     Eosinophils Absolute 04/21/2018 0 00     Basophils Absolute 04/21/2018 0 07     Sodium 04/21/2018 134*    Potassium 04/21/2018 5 5*    Chloride 04/21/2018 99*    CO2 04/21/2018 19*    Anion Gap 04/21/2018 16*    BUN 04/21/2018 14     Creatinine 04/21/2018 0 66     Glucose 04/21/2018 158*    Calcium 04/21/2018 9 4     AST 04/21/2018 46*    ALT 04/21/2018 15     Alkaline Phosphatase 04/21/2018 105     Total Protein 04/21/2018 8 9*    Albumin 04/21/2018 3 9     Total Bilirubin 04/21/2018 1 60*    eGFR 04/21/2018 110     Lipase 04/21/2018 61*    Color, UA 04/21/2018 Yellow     Clarity, UA 04/21/2018 Clear     Specific Gravity, UA 04/21/2018 >=1 030     pH, UA 04/21/2018 6 0     Leukocytes, UA 04/21/2018 Negative     Nitrite, UA 04/21/2018 Negative     Protein, UA 04/21/2018 100 (2+)*    Glucose, UA 04/21/2018 Negative     Ketones, UA 04/21/2018 >=80 (3+)*    Urobilinogen, UA 04/21/2018 1 0     Bilirubin, UA 04/21/2018 Moderate*    Blood, UA 04/21/2018 Large*    EXT PREG TEST UR (Ref: N* 04/21/2018 negative     LACTIC ACID 04/21/2018 2 2*    LACTIC ACID 04/21/2018 1 2     RBC, UA 04/21/2018 0-1*    WBC, UA 04/21/2018 None Seen     Epithelial Cells 04/21/2018 Occasional     Bacteria, UA 04/21/2018 Occasional     MUCOUS THREADS 04/21/2018 Occasional*    Lipase 04/22/2018 99     Ammonia 04/22/2018 13     Sodium 04/22/2018 138     Potassium 04/22/2018 4 1     Chloride 04/22/2018 105     CO2 04/22/2018 22     Anion Gap 04/22/2018 11     BUN 04/22/2018 14     Creatinine 04/22/2018 0 66     Glucose 04/22/2018 114     Calcium 04/22/2018 8 3     eGFR 04/22/2018 110     Magnesium 04/22/2018 2 0     Phosphorus 04/22/2018 3 1     WBC 04/22/2018 13 50*    RBC 04/22/2018 4 31     Hemoglobin 04/22/2018 13 1     Hematocrit 04/22/2018 39 7     MCV 04/22/2018 92     MCH 04/22/2018 30 4     MCHC 04/22/2018 33 0     RDW 04/22/2018 14 2     MPV 04/22/2018 9 3     Platelets 04/08/4286 234     nRBC 04/22/2018 0     Neutrophils Relative 04/22/2018 63     Lymphocytes Relative 04/22/2018 27     Monocytes Relative 04/22/2018 9     Eosinophils Relative 04/22/2018 0     Basophils Relative 04/22/2018 1     Neutrophils Absolute 04/22/2018 8 54*    Lymphocytes Absolute 04/22/2018 3 62     Monocytes Absolute 04/22/2018 1 24*    Eosinophils Absolute 04/22/2018 0 00     Basophils Absolute 04/22/2018 0 07      Imaging Studies: I have personally reviewed pertinent films in PACS

## 2018-04-22 NOTE — CASE MANAGEMENT
Thank you,  [facility] in the [facility] by Dk Rodríguez for 2017  Network Utilization Review Department  Phone: [phone]; Fax [phone]  ATTENTION: The Network Utilization Review Department is now centralized for our 7 Facilities  Make a note that we have a new phone and fax numbers for our Department  Please call with any questions or concerns to [phone] and carefully follow the prompts so that you are directed to the right person  All voicemails are confidential  Fax any determinations, approvals, denials, and requests for initial or continue stay review clinical to [phone]  Due to HIGH CALL volume, it would be easier if you could please send faxed requests to expedite your requests and in part, help us provide discharge notifications faster  Initial Clinical Review    Admission: Date/Time/Statement: 4/21/18  AT 2113     Orders Placed This Encounter   Procedures    Inpatient Admission (expected length of stay for this patient is greater than two midnights)     Standing Status:   Standing     Number of Occurrences:   1     Order Specific Question:   Admitting Physician     Answer:   Benito Meek [[id number]]     Order Specific Question:   Level of Care     Answer:   Med Surg [16]     Order Specific Question:   Estimated length of stay     Answer:   More than 2 Midnights     Order Specific Question:   Certification     Answer:   I certify that inpatient services are medically necessary for this patient for a duration of greater than two midnights  See H&P and MD Progress Notes for additional information about the patient's course of treatment         ED: Date/Time/Mode of Arrival:   ED Arrival Information     Expected Arrival Acuity Means of Arrival Escorted By Service Admission Type    - 4/21/2018 19:11 Emergent Ambulance [facility] [facility] Medicine Emergency    Arrival Complaint    ABDOMINAL PAIN        Chief Complaint:   Chief Complaint   Patient presents with    Abdominal Pain     Pt arrived via EMS with c/o severe abdominal pain, nausea, and vomiting x 2 days  Pt with history of chronic pancreatitis  Chief Complaint:   Abdominal pain       History of Present Illness:   Jeanette Ryder is a 40 y o  female history of alcohol abuse, GERD, hypertension, pancreatitis, who presents with chief complaint of severe abdominal pain associated with nausea and vomiting onset about 2 days  Worsen yesterday after taking "half of a beer and a few drinks"  Patient denies fevers or chills  Patient reports pain to feel similar to previous acute attacks of pancreatitis  Report vomiting has been consistent not able to tolerate p o  intake  Patient denies constipation or diarrhea      Review of Systems:   Constitutional: Positive for appetite change and fatigue  Gastrointestinal: Positive for abdominal distention, abdominal pain, nausea and vomiting     All other systems reviewed and are negative      ED Vital Signs:   ED Triage Vitals   Temperature Pulse Respirations Blood Pressure SpO2   04/21/18 1919 04/21/18 1915 04/21/18 1930 04/21/18 1915 04/21/18 1915   (!) 96 9 °F (36 1 °C) 99 (!) 24 (!) 207/104 99 %      Temp Source Heart Rate Source Patient Position - Orthostatic VS BP Location FiO2 (%)   04/21/18 1919 04/21/18 1915 04/21/18 1915 04/21/18 1915 --   Axillary Monitor Sitting Right arm       Pain Score       04/21/18 1915       Worst Possible Pain        Wt Readings from Last 1 Encounters:   04/21/18 77 1 kg (169 lb 15 6 oz)      04/21 0701  04/22 0700 04/22 0701  04/22 1403  Most Recent    Temperature (°F) 96 9-98 6 98 8  98 8 (37 1)    Pulse 87-99 68  68    Respirations 18-24 18 18    Blood Pressure 120//104 170/92  170/92    SpO2 (%)  96  96          LABS/Diagnostic Test Results:  Results from last 7 days  Lab Units 04/22/18  0528   WBC Thousand/uL 13 50*   HEMOGLOBIN g/dL 13 1   HEMATOCRIT % 39 7   PLATELETS Thousands/uL 234   NEUTROS PCT % 63   LYMPHS PCT % 27   MONOS PCT % 9   EOS PCT % 0       Results from last 7 days  Lab Units 04/22/18  0528 04/21/18 1937   SODIUM mmol/L 138 134*   POTASSIUM mmol/L 4 1 5 5*   CHLORIDE mmol/L 105 99*   CO2 mmol/L 22 19*   BUN mg/dL 14 14   CREATININE mg/dL 0 66 0 66   CALCIUM mg/dL 8 3 9 4   TOTAL PROTEIN g/dL  --  8 9*   BILIRUBIN TOTAL mg/dL  --  1 60*   ALK PHOS U/L  --  105   ALT U/L  --  15   AST U/L  --  46*   GLUCOSE RANDOM mg/dL 114 158*     Lipase [[id number]] (Abnormal) Collected: 04/21/18 1937   Lab Status: Final result Specimen: Blood from Arm, Left Updated: 04/21/18 2003    Lipase 61 (L) 73 - 393 u/L      Lactic acid, plasma [[id number]] (Abnormal) Collected: 04/21/18 1947   Lab Status: Final result Specimen: Blood from Arm, Right Updated: 04/21/18 2018    LACTIC ACID 2 2 (HH) 0 5 - 2 0 mmol/L        CT ABDOMEN PELVIS -  No acute abdominopelvic findings  Hepatic cirrhosis with stable heterogeneous central enhancement   No discrete masses demonstrated   Recommend screening per local protocol          ED Treatment:   Medication Administration from 04/21/2018 1911 to 04/21/2018 2254       Date/Time Order Dose Route Action Action by Comments     04/21/2018 2151 ondansetron (ZOFRAN) 4 mg/2 mL injection **AcuDose Override Pull** 0   Given to EMS Tammy Castillo RN      04/21/2018 2224 sodium chloride 0 9 % bolus 1,000 mL 0 mL Intravenous Stopped Tammy Castillo RN      04/21/2018 1941 sodium chloride 0 9 % bolus 1,000 mL 1,000 mL Intravenous New Bag Tammy Castillo RN      04/21/2018 1935 ondansetron (ZOFRAN) injection 4 mg 4 mg Intravenous Given Tammy Castillo RN      04/21/2018 1943 HYDROmorphone (DILAUDID) injection 1 mg 1 mg Intravenous Given Tammy Castillo RN      04/21/2018 2245 sodium chloride 0 9 % bolus 1,000 mL 0 mL Intravenous Stopped Tammy Castillo RN      04/21/2018 2133 sodium chloride 0 9 % bolus 1,000 mL 1,000 mL Intravenous Kira 37 Tammy Castillo RN      04/21/2018 2028 metoclopramide (REGLAN) injection 10 mg 10 mg Intravenous Given Manan Gregory RN      04/21/2018 2032 HYDROmorphone (DILAUDID) injection 1 mg 1 mg Intravenous Given Manan Gregory RN      04/21/2018 2042 iohexol (OMNIPAQUE) 350 MG/ML injection (MULTI-DOSE) 100 mL 100 mL Intravenous Given Kita Gloria           Past Medical/Surgical History: Active Ambulatory Problems     Diagnosis Date Noted    Chronic pancreatitis ([facility] [facility] ) 10/06/2016    Abdominal pain 10/06/2016    Essential hypertension 10/06/2016    Nicotine dependence 10/06/2016    Colitis 10/27/2017    Pain, dental 11/22/2017    Alcohol abuse 11/22/2017    Tylenol overdose, accidental or unintentional, initial encounter 11/22/2017    Elevated glucose 11/22/2017    Epigastric pain 12/14/2017    Nausea and vomiting 12/14/2017    Hypertensive urgency 54/79/2327    Alcoholic cirrhosis ([facility] ) 77/00/0113     Resolved Ambulatory Problems     Diagnosis Date Noted    Hypokalemia 03/07/2017    Sepsis ([facility] ) 12/14/2017    Hematemesis with nausea 12/14/2017     Past Medical History:   Diagnosis Date    Alcohol abuse     Arthritis     GERD (gastroesophageal reflux disease)     Hypertension     Nicotine dependence     Obesity     Pancreatitis     Panic attack        Admitting Diagnosis: Abdominal pain [R10 9]  Nausea and vomiting [R11 2]  Moderate dehydration [W51 5]  Periumbilical pain [W19 22]  Acute on chronic pancreatitis ([facility] [place] [facility] ) [K85 90, K86 1]    Age/Sex: 40 y o  female      Assessment/Plan:        Acute on chronic pancreatitis ([facility] )   Assessment & Plan     Acute on chronic pancreatitis alcohol induced  Patient reports had a few drinks last night  Pain management, comfort measures, GI consult for further management  Alcohol ETOH cessation reviewed with patient advise benefits of quitting    Clear liquids           Moderate dehydration   Assessment & Plan     Due to nausea vomiting  IV fluids monitor          Nausea and vomiting   Assessment & Plan     Intractable, In the setting of acute pancreatitis  Consult GI for further management  Comfort measures, antiemetic  IV fluids  Monitor electrolytes replete as needed          Nicotine dependence   Assessment & Plan     Smoking cessation,  Nicotine patch          Essential hypertension   Assessment & Plan     Elevated on admission likely secondary to pain crisis  Continue Catapres  Will monitor           * Abdominal pain   Assessment & Plan     Due to acute pancreatitis  Associated with nausea and vomiting  Patient does meet SIRS criteria  Lactate slightly elevated at 2 2  Elevated WBC  Patient is afebrile  Comfort measures with IV fluids, pain management, anti nausea  Clear liquids  UA pending                 VTE Prophylaxis: Enoxaparin (Lovenox)  / sequential compression device     Anticipated Length of Stay:  Patient will be admitted on an Inpatient basis with an anticipated length of stay of  greater than 2 midnights       Justification for Hospital Stay:  Abdominal pain, acute pancreatitis        Admission Orders:  4/21/18  AT 2113   ADMIT INPATIENT    Up + OOB as Tolerated  SCD    Clear Liquid    Continuous IV Infusions:   sodium chloride 125 mL/hr Last Rate: 125 mL/hr (04/22/18 0747)     Scheduled Meds:   Current Facility-Administered Medications:  amitriptyline 10 mg Oral HS [facility]    cloNIDine 0 2 mg Oral Q8H [facility]    enoxaparin 40 mg Subcutaneous Daily [facility]    fentanyl citrate (PF) 50 mcg Intravenous Q4H PRN [facility]    fluticasone-salmeterol 1 puff Inhalation Q12H [facility]    hydrOXYzine HCL 25 mg Oral HS [facility]    nicotine 1 patch Transdermal Daily [facility]    ondansetron 4 mg Intravenous Q6H PRN [facility]    oxyCODONE 5 mg Oral Q4H PRN Leta Fraire MD    pancrelipase (Lip-Prot-Amyl) 24,000 Units Oral TID With Meals [facility]    pantoprazole 40 mg Intravenous Q12H [id number] [facility], [place]    simethicone 80 mg Oral Q6H PRN [facility]    sodium chloride 125 mL/hr Intravenous Continuous [facility] Last Rate: 125 mL/hr (04/22/18 0747)   sucralfate 1 g Oral BID AC Leta Fraire MD        PRN Meds:       IV fentanyl citrate (PF) 50 mcg q4hrs prn given x 4    ondansetron    oxyCODONE 5 mg q4hrs prn given x 1    Simethicone (MYLICON) 80 mg L1GNM prn given x 1    CONSULT GI

## 2018-04-23 ENCOUNTER — APPOINTMENT (INPATIENT)
Dept: ULTRASOUND IMAGING | Facility: HOSPITAL | Age: 43
DRG: 282 | End: 2018-04-23
Payer: COMMERCIAL

## 2018-04-23 VITALS
BODY MASS INDEX: 29.02 KG/M2 | OXYGEN SATURATION: 98 % | RESPIRATION RATE: 20 BRPM | SYSTOLIC BLOOD PRESSURE: 148 MMHG | TEMPERATURE: 98.1 F | WEIGHT: 169.97 LBS | HEART RATE: 56 BPM | HEIGHT: 64 IN | DIASTOLIC BLOOD PRESSURE: 79 MMHG

## 2018-04-23 LAB
AFP-TM SERPL-MCNC: 4.2 NG/ML (ref 0.5–8)
ALBUMIN SERPL BCP-MCNC: 2.8 G/DL (ref 3.5–5)
ALP SERPL-CCNC: 64 U/L (ref 46–116)
ALT SERPL W P-5'-P-CCNC: 12 U/L (ref 12–78)
ANION GAP SERPL CALCULATED.3IONS-SCNC: 6 MMOL/L (ref 4–13)
AST SERPL W P-5'-P-CCNC: 16 U/L (ref 5–45)
BILIRUB DIRECT SERPL-MCNC: 0.19 MG/DL (ref 0–0.2)
BILIRUB SERPL-MCNC: 0.6 MG/DL (ref 0.2–1)
BUN SERPL-MCNC: 10 MG/DL (ref 5–25)
CALCIUM SERPL-MCNC: 7.8 MG/DL (ref 8.3–10.1)
CHLORIDE SERPL-SCNC: 108 MMOL/L (ref 100–108)
CO2 SERPL-SCNC: 24 MMOL/L (ref 21–32)
CREAT SERPL-MCNC: 0.62 MG/DL (ref 0.6–1.3)
ERYTHROCYTE [DISTWIDTH] IN BLOOD BY AUTOMATED COUNT: 13.8 % (ref 11.6–15.1)
GFR SERPL CREATININE-BSD FRML MDRD: 112 ML/MIN/1.73SQ M
GLUCOSE SERPL-MCNC: 99 MG/DL (ref 65–140)
HCT VFR BLD AUTO: 37.7 % (ref 34.8–46.1)
HGB BLD-MCNC: 12.3 G/DL (ref 11.5–15.4)
MCH RBC QN AUTO: 30.5 PG (ref 26.8–34.3)
MCHC RBC AUTO-ENTMCNC: 32.6 G/DL (ref 31.4–37.4)
MCV RBC AUTO: 94 FL (ref 82–98)
PLATELET # BLD AUTO: 186 THOUSANDS/UL (ref 149–390)
PMV BLD AUTO: 9.1 FL (ref 8.9–12.7)
POTASSIUM SERPL-SCNC: 3.6 MMOL/L (ref 3.5–5.3)
PROT SERPL-MCNC: 6.1 G/DL (ref 6.4–8.2)
RBC # BLD AUTO: 4.03 MILLION/UL (ref 3.81–5.12)
SODIUM SERPL-SCNC: 138 MMOL/L (ref 136–145)
WBC # BLD AUTO: 6.27 THOUSAND/UL (ref 4.31–10.16)

## 2018-04-23 PROCEDURE — 85027 COMPLETE CBC AUTOMATED: CPT | Performed by: INTERNAL MEDICINE

## 2018-04-23 PROCEDURE — 99238 HOSP IP/OBS DSCHRG MGMT 30/<: CPT | Performed by: INTERNAL MEDICINE

## 2018-04-23 PROCEDURE — 80076 HEPATIC FUNCTION PANEL: CPT | Performed by: PHYSICIAN ASSISTANT

## 2018-04-23 PROCEDURE — C9113 INJ PANTOPRAZOLE SODIUM, VIA: HCPCS | Performed by: INTERNAL MEDICINE

## 2018-04-23 PROCEDURE — 76705 ECHO EXAM OF ABDOMEN: CPT

## 2018-04-23 PROCEDURE — 82105 ALPHA-FETOPROTEIN SERUM: CPT | Performed by: INTERNAL MEDICINE

## 2018-04-23 PROCEDURE — 99232 SBSQ HOSP IP/OBS MODERATE 35: CPT | Performed by: INTERNAL MEDICINE

## 2018-04-23 PROCEDURE — 80048 BASIC METABOLIC PNL TOTAL CA: CPT | Performed by: INTERNAL MEDICINE

## 2018-04-23 RX ORDER — FENTANYL CITRATE 50 UG/ML
50 INJECTION, SOLUTION INTRAMUSCULAR; INTRAVENOUS EVERY 6 HOURS PRN
Status: DISCONTINUED | OUTPATIENT
Start: 2018-04-23 | End: 2018-04-23 | Stop reason: HOSPADM

## 2018-04-23 RX ORDER — SUCRALFATE 1 G/1
1 TABLET ORAL 4 TIMES DAILY
Qty: 40 TABLET | Refills: 0 | Status: ON HOLD | OUTPATIENT
Start: 2018-04-23 | End: 2018-05-31

## 2018-04-23 RX ORDER — DICYCLOMINE HYDROCHLORIDE 10 MG/1
20 CAPSULE ORAL
Status: DISCONTINUED | OUTPATIENT
Start: 2018-04-23 | End: 2018-04-23 | Stop reason: HOSPADM

## 2018-04-23 RX ORDER — DICYCLOMINE HYDROCHLORIDE 10 MG/1
10 CAPSULE ORAL
Qty: 30 CAPSULE | Refills: 0 | Status: CANCELLED | OUTPATIENT
Start: 2018-04-23

## 2018-04-23 RX ORDER — OXYCODONE HYDROCHLORIDE 5 MG/1
5 TABLET ORAL EVERY 6 HOURS PRN
Status: DISCONTINUED | OUTPATIENT
Start: 2018-04-23 | End: 2018-04-23 | Stop reason: HOSPADM

## 2018-04-23 RX ORDER — DICYCLOMINE HYDROCHLORIDE 10 MG/1
20 CAPSULE ORAL
Qty: 30 CAPSULE | Refills: 0 | Status: SHIPPED | OUTPATIENT
Start: 2018-04-23 | End: 2018-05-28

## 2018-04-23 RX ORDER — PANTOPRAZOLE SODIUM 40 MG/1
40 TABLET, DELAYED RELEASE ORAL
Status: DISCONTINUED | OUTPATIENT
Start: 2018-04-23 | End: 2018-04-23 | Stop reason: HOSPADM

## 2018-04-23 RX ORDER — DICYCLOMINE HYDROCHLORIDE 10 MG/1
10 CAPSULE ORAL
Status: DISCONTINUED | OUTPATIENT
Start: 2018-04-23 | End: 2018-04-23

## 2018-04-23 RX ADMIN — PANCRELIPASE 24000 UNITS: 24000; 76000; 120000 CAPSULE, DELAYED RELEASE PELLETS ORAL at 09:08

## 2018-04-23 RX ADMIN — SUCRALFATE 1 G: 1 TABLET ORAL at 06:13

## 2018-04-23 RX ADMIN — PANCRELIPASE 24000 UNITS: 24000; 76000; 120000 CAPSULE, DELAYED RELEASE PELLETS ORAL at 12:45

## 2018-04-23 RX ADMIN — FENTANYL CITRATE 50 MCG: 50 INJECTION, SOLUTION INTRAMUSCULAR; INTRAVENOUS at 06:13

## 2018-04-23 RX ADMIN — PANTOPRAZOLE SODIUM 40 MG: 40 INJECTION, POWDER, FOR SOLUTION INTRAVENOUS at 09:06

## 2018-04-23 RX ADMIN — FENTANYL CITRATE 50 MCG: 50 INJECTION, SOLUTION INTRAMUSCULAR; INTRAVENOUS at 01:44

## 2018-04-23 RX ADMIN — SODIUM CHLORIDE 125 ML/HR: 0.9 INJECTION, SOLUTION INTRAVENOUS at 01:44

## 2018-04-23 RX ADMIN — SODIUM CHLORIDE 125 ML/HR: 0.9 INJECTION, SOLUTION INTRAVENOUS at 09:16

## 2018-04-23 RX ADMIN — Medication 80 MG: at 09:06

## 2018-04-23 RX ADMIN — CLONIDINE HYDROCHLORIDE 0.2 MG: 0.1 TABLET ORAL at 15:41

## 2018-04-23 RX ADMIN — CLONIDINE HYDROCHLORIDE 0.2 MG: 0.1 TABLET ORAL at 05:24

## 2018-04-23 RX ADMIN — OXYCODONE HYDROCHLORIDE 5 MG: 5 TABLET ORAL at 04:13

## 2018-04-23 RX ADMIN — FENTANYL CITRATE 50 MCG: 50 INJECTION, SOLUTION INTRAMUSCULAR; INTRAVENOUS at 12:42

## 2018-04-23 RX ADMIN — NICOTINE 1 PATCH: 21 PATCH TRANSDERMAL at 09:09

## 2018-04-23 NOTE — PROGRESS NOTES
The pantoprazole has / have been converted to Oral per [facility]PO [facility] for Adults as approved by the Pharmacy and Therapeutics Committee  The patient met all eligible criteria:  3 Age = 25years old   2) Received at least one dose of the IV form   3) Receiving at least one other scheduled oral/enteral medication   4) Tolerating an oral/enteral diet   and did not have any exclusions:   1) Critical care patient   2) Active GI bleed (IF assessing H2RAs or PPIs)   3) Continuous tube feeding (IF assessing cipro, doxycycline, levofloxacin, minocycline, rifampin, or voriconazole)   4) Receiving PO vancomycin (IF assessing metronidazole)   5) Persistent nausea and/or vomiting   6) Ileus or gastrointestinal obstruction   7) Nanci/nasogastric tube set for continuous suction   8) Specific order not to automatically convert to PO (in the order's comments or if discussed in the most recent Infectious Disease or primary team's progress notes)

## 2018-04-23 NOTE — PROGRESS NOTES
GI Progress Note - Randa Gutierrez 40 y o  female MRN: [id number]    Unit/Bed#: -01 Encounter: [id number]    Subjective: Ms Pallavi Carbajal still is having abdominal pain which is located below the umbilicus  She denies any further nausea or vomiting  She     Objective:     Vitals: Blood pressure 148/79, pulse 56, temperature 98 1 °F (36 7 °C), temperature source Oral, resp  rate 20, height 5' 4" (1 626 m), weight 77 1 kg (169 lb 15 6 oz), last menstrual period 04/21/2018, SpO2 98 %, not currently breastfeeding  ,Body mass index is 29 18 kg/m²  Intake/Output Summary (Last 24 hours) at 04/23/18 1321  Last data filed at 04/23/18 0144   Gross per 24 hour   Intake          2320 42 ml   Output                0 ml   Net          2320 42 ml       Physical Exam:     General Appearance: Alert, oriented x3, no acute distress  Lungs: CTA bilaterally, no respiratory distress  Heart: RRR, no murmur  Abdomen: Non-distended, soft, BS active, (+) mild TTP generalized  Extremities: No cyanosis or LE edema    Invasive Devices     Peripheral Intravenous Line            Peripheral IV 04/22/18 Right Wrist less than 1 day                Lab Results:    Results from last 7 days  Lab Units 04/23/18  0515 04/22/18  0528   WBC Thousand/uL 6 27 13 50*   HEMOGLOBIN g/dL 12 3 13 1   HEMATOCRIT % 37 7 39 7   PLATELETS Thousands/uL 186 234   NEUTROS PCT %  --  63   LYMPHS PCT %  --  27   MONOS PCT %  --  9   EOS PCT %  --  0       Results from last 7 days  Lab Units 04/23/18  0515   SODIUM mmol/L 138   POTASSIUM mmol/L 3 6   CHLORIDE mmol/L 108   CO2 mmol/L 24   BUN mg/dL 10   CREATININE mg/dL 0 62   CALCIUM mg/dL 7 8*   TOTAL PROTEIN g/dL 6 1*   BILIRUBIN TOTAL mg/dL 0 60   ALK PHOS U/L 64   ALT U/L 12   AST U/L 16   GLUCOSE RANDOM mg/dL 99           Results from last 7 days  Lab Units 04/22/18  0046   LIPASE u/L 99       Imaging Studies: I have personally reviewed pertinent imaging studies        Ct Abdomen Pelvis With Contrast  Result Date: 4/21/2018  Impression: No acute abdominopelvic findings  Hepatic cirrhosis with stable heterogeneous central enhancement  No discrete masses demonstrated  Recommend screening per local protocol  Unchanged dilation of the main pancreatic duct at the tail, either related to stricturing from chronic pancreatitis or main duct type IPMN  Finding appears stable since October 2016  Assessment and Plan:     Abdominal Pain  N/V  - Her nausea/vomiting have resolved but her abdominal pain is persistent  - She has not follow up for the EUS yet to evaluate for chronic pancreatitis and possible celiac nerve block for pain control; discussed importance of following up for this because it will help with further management; she has stable PD dilatation which likely represents changes of chronic pancreatitis  - RUQ US shows no biliary disease, only stable appearing liver cirrhosis  - LFTs normal  - This could be pain related to chronic pancreatitis and her recent alcohol intake on Friday; discussed importance of complete abstinence from alcohol  - The differential also includes esophagitis/gastritis due to findings of Castano's and gastritis on her recent EGD in December  - This could also be functional pain due to her uncontrolled depression/anxiety  - Try to limit narcotics; utilize bentyl 10 mg QID as well as PPI and carafate      Cirrhosis 2/2 Alcohol  - No clinical stigmata of cirrhosis, low MELD score of 10  - No varices noted on EGD in December   - No hepatic encephalopathy  - No suspicious masses on RUQ US or CT  - No ascites or LE edema to warrant treatment with diuretics      The patient will be seen by Dr Kelly Luna

## 2018-04-23 NOTE — DISCHARGE SUMMARY
Discharge- Jorge Thomas 1975, 40 y o  female MRN: [id number]    Unit/Bed#: -01 Encounter: [id number]    Primary Care Provider: Romulo Sky MD   Date and time admitted to hospital: 4/21/2018  7:12 PM        * Abdominal pain   Assessment & Plan    Differential diagnosis gastritis/esophagitis/acute on chronic pancreatitis  Patient would need endoscopic ultrasound as an outpatient to evaluate chronically dilated pancreatic duct, suspect this may be secondary to IPMN versus stricturing due to chronic pancreatitis  Continue Protonix, sucralfate  Bentyl p r n  for abdominal pain  Close Outpatient GI follow            Alcoholic cirrhosis ([facility] )   Assessment & Plan    CT scan showed evidence of hepatic cirrhosis  Patient would need outpatient EGD to look for esophageal varices  CT scan showed stable heterogeneous enhancement of the liver, GI recommended MRI as outpatient for further evaluation  F/u with results of AFP  Currently no signs of hepatic encephalopathy, ascites  LFTs within normal limits  Again counseled on importance of complete alcohol cessation        Nicotine dependence   Assessment & Plan    Smoking cessation,  Nicotine patch        Essential hypertension   Assessment & Plan    Blood pressure is stable  Continue home medication         Nausea and vomiting   Assessment & Plan    Resolved    Patient able to tolerate soft surgical diet without any difficulty          Discharging Physician / Practitioner: Jolly Hugo MD  PCP: Romulo Sky MD  Admission Date:   Admission Orders     Ordered        04/21/18 2113  Inpatient Admission (expected length of stay for this patient is greater than two midnights)  Once             Discharge Date: 04/23/18    Resolved Problems  Date Reviewed: 4/23/2018    None          Consultations During Hospital Stay:  · Gastroenterology    Procedures Performed:     None    Significant Findings / Test Results:     CT of the abdomen  No acute abdominopelvic findings  Hepatic cirrhosis with stable heterogeneous central enhancement  No discrete masses demonstrated  Recommend screening per local protocol    Unchanged dilation of the main pancreatic duct at the tail, either related to stricturing from chronic pancreatitis or main duct type IPMN  Finding appears stable since October 2016  Ultrasound right upper quadrant  Nodular surface contour of the liver consistent with cirrhosis  Otherwise unremarkable right upper quadrant abdominal ultrasound    Incidental Findings:   · None    Test Results Pending at Discharge (will require follow up):   · AF P ordered, results pending at the time of discharge     Outpatient Tests Requested:  · None    Complications:  None    Reason for Admission:  Abdominal pain, nausea, vomiting    Hospital Course:     Teresita Enamorado is a 40 y o  female patient with past medical history of acute on chronic pancreatitis secondary to alcohol use, gastritis, GERD, hypertension who originally presented to the hospital on 4/21/2018 due to severe abdominal pain, nausea and vomiting for the past 3 days  Patient had prior multiple admissions for pancreatitis secondary to alcohol use, gastritis  Emergency department patient underwent CT scan of the abdomen which showed a dilated pancreatic duct which has been present since 2016, unchanged  It also showed evidence of cirrhosis along with hyper enhancement centrally within the liver  Lipase on admission was 61, lactic acid of 2 2, WBC of 13 5  Patient provided with supportive treatment including IV fluid hydration, pain management to which her pain improved and was able to tolerate to tolerate diet well  Gastroenterology followed the patient as well in consultation, recommended to have close outpatient follow-up, and have endoscopic ultrasound due to persistent dilation of pancreatic duct    GI also recommended to have an outpatient MRI for evaluation of hyper enhancement in the liver   As patient has evidence of cirrhosis patient should have an outpatient EGD to assess for esophageal varices  While in the hospital patient is hemodynamically stable, hemoglobin remained stable at the baseline, did have any episodes of hematemesis/melena, diarrhea deemed stable for discharge  Please see above list of diagnoses and related plan for additional information  Condition at Discharge: stable     Discharge Day Visit / Exam:     Subjective:  Patient seen and examined  She does notes having mild abdominal pain in the upper abdomen  I did discuss with her giving narcotics would be tapered and discontinued the time of discharge and she agrees to that  Vitals: Blood Pressure: 148/79 (04/23/18 0700)  Pulse: 56 (04/23/18 0700)  Temperature: 98 1 °F (36 7 °C) (04/23/18 0700)  Temp Source: Oral (04/23/18 0700)  Respirations: 20 (04/23/18 0700)  Height: 5' 4" (162 6 cm) (04/21/18 2254)  Weight - Scale: 77 1 kg (169 lb 15 6 oz) (04/21/18 2254)  SpO2: 98 % (04/23/18 0700)  Exam:   Physical Exam   Constitutional: She is oriented to person, place, and time  She appears well-developed and well-nourished  HENT:   Head: Normocephalic and atraumatic  Mouth/Throat: Oropharynx is clear and moist    Eyes: EOM are normal  Pupils are equal, round, and reactive to light  Neck: Normal range of motion  Neck supple  Cardiovascular: Normal rate and regular rhythm  Pulmonary/Chest: Effort normal and breath sounds normal    Abdominal: Soft  Bowel sounds are normal  There is tenderness  Musculoskeletal: Normal range of motion  Neurological: She is alert and oriented to person, place, and time  Skin: Skin is warm and dry  Discussion with Family:  Plan of care discussed with patient    Discharge instructions/Information to patient and family:   See after visit summary for information provided to patient and family        Provisions for Follow-Up Care:  See after visit summary for information related to follow-up care and any pertinent home health orders  Disposition:     Home    For Discharges to [facility] SNF:   · Not Applicable to this Patient - Not Applicable to this Patient    Planned Readmission:none     Discharge Statement:  I spent 29 minutes discharging the patient  This time was spent on the day of discharge  I had direct contact with the patient on the day of discharge  Greater than 50% of the total time was spent examining patient, answering all patient questions, arranging and discussing plan of care with patient as well as directly providing post-discharge instructions  Additional time then spent on discharge activities  Discharge Medications:  See after visit summary for reconciled discharge medications provided to patient and family        ** Please Note: This note has been constructed using a voice recognition system **

## 2018-04-23 NOTE — ASSESSMENT & PLAN NOTE
CT scan showed evidence of hepatic cirrhosis  Patient would need outpatient EGD to look for esophageal varices  CT scan showed stable heterogeneous enhancement of the liver, GI recommended MRI as outpatient for further evaluation  F/u with results of AFP    Currently no signs of hepatic encephalopathy, ascites  LFTs within normal limits  Again counseled on importance of complete alcohol cessation

## 2018-04-23 NOTE — ASSESSMENT & PLAN NOTE
Differential diagnosis gastritis/esophagitis/acute on chronic pancreatitis  Patient would need endoscopic ultrasound as an outpatient to evaluate chronically dilated pancreatic duct, suspect this may be secondary to IPMN versus stricturing due to chronic pancreatitis      Continue Protonix, sucralfate  Bentyl p r n  for abdominal pain  Close Outpatient GI follow

## 2018-04-23 NOTE — DISCHARGE INSTRUCTIONS
Sucralfate (By mouth)   Sucralfate ([id number])  Treats ulcers  Brand Name(s): Carafate   There may be other brand names for this medicine  When This Medicine Should Not Be Used: You should not use this medicine if you have had an allergic reaction to it  How to Use This Medicine:   Tablet, Liquid  · Your doctor will tell you how much to take and how often  · Do not stop taking the medicine unless your doctor tells you to, even if you feel better  · Take your medicine on an empty stomach  Swallow the tablet with a glass of water  · Unless your doctor tells you otherwise, take the medicine 1 hour before meals and at bedtime  · Measure the oral liquid medicine using a measuring spoon or medicine cup  · Shake the oral liquid medicine well before using  If a dose is missed:   · Take your medicine as soon as you remember that you missed your dose  · If it is nearly time for your next dose, wait until then to take your medicine and skip the missed dose  · You should not use two doses at one time  How to Store and Dispose of This Medicine:   · Keep the medicine at room temperature, away from heat, light, and moisture  Do not freeze the oral liquid  · Keep all medicine out of the reach of children  Drugs and Foods to Avoid:   Ask your doctor or pharmacist before using any other medicine, including over-the-counter medicines, vitamins, and herbal products  · Antacids may be taken one-half hour before or after taking sucralfate  · You should not take other medicines within 2 hours before or after taking sucralfate  If you need help deciding the best times to take your other medicines, ask your doctor or pharmacist   Warnings While Using This Medicine:   · If you are pregnant or breastfeeding, talk to your doctor before taking this medicine  · Check with your doctor before taking if you have kidney problems or are on dialysis    Possible Side Effects While Using This Medicine:   Call your doctor right away if you notice any of these side effects:  · Rash, hives, or itching  · Swelling of the face or mouth  If you notice these less serious side effects, talk with your doctor:   · Constipation  · Dry mouth  · Mild stomach cramps, diarrhea  · Upset stomach, gas  · Dizziness  If you notice other side effects that you think are caused by this medicine, tell your doctor  Call your doctor for medical advice about side effects  You may report side effects to FDA at 6-687-HPY-0498  © 2017 [facility] Information is for End User's use only and may not be sold, redistributed or otherwise used for commercial purposes  The above information is an  only  It is not intended as medical advice for individual conditions or treatments  Talk to your doctor, nurse or pharmacist before following any medical regimen to see if it is safe and effective for you  Dicyclomine (By mouth)   Dicyclomine ([id number])  Treats irritable bowel syndrome  Brand Name(s): Bentyl   There may be other brand names for this medicine  When This Medicine Should Not Be Used: You should not use this medicine if you have had an allergic reaction to dicyclomine  Do not use this medicine if you have glaucoma, myasthenia gravis, or trouble urinating because of a blockage (such as an enlarged prostate)  Make sure your doctor knows about all digestion problems you have, including reflux esophagitis (GERD), or severe ulcerative colitis  You should not use this medicine if you are breast feeding  This medicine should not be given to infants less than 6 months old  How to Use This Medicine:   Capsule, Tablet, Long Acting Tablet, Liquid  · Take your medicine as directed  Your dose may need to be changed several times to find what works best for you  · Swallow the extended-release tablet whole  Do not break, crush or chew    · Measure the oral liquid medicine with a marked measuring spoon, oral syringe, or medicine cup  If a dose is missed:   · Take a dose as soon as you remember  If it is almost time for your next dose, wait until then and take a regular dose  Do not take extra medicine to make up for a missed dose  How to Store and Dispose of This Medicine:   · Store the medicine in a closed container at room temperature, away from heat, moisture, and direct light  Do not freeze the oral liquid  · Ask your pharmacist, doctor, or health caregiver about the best way to dispose of any outdated medicine or medicine no longer needed  · Keep all medicine out of the reach of children  Never share your medicine with anyone  Drugs and Foods to Avoid:   Ask your doctor or pharmacist before using any other medicine, including over-the-counter medicines, vitamins, and herbal products  · Make sure your doctor knows if you are using amantadine (Symmetrel®), digoxin (Lanoxin®), or a belladonna medicine such as atropine, hyoscyamine, scopolamine, Anaspaz®, Arco-Lase® Plus, or Donnatal®  Tell your doctor if you are using an MAO inhibitor such as Eldepryl®, Marplan®, [place], or Parnate®  · Tell your doctor if you are also using narcotic pain medicine, medicine for heart rhythm problems, an antihistamine, medicine to treat depression, phenothiazines (medicines to treat certain mental problems or severe nausea or vomiting), or a steroid medicine  Meperidine (Demerol®) is a narcotic pain medicine  Some medicines for heart rhythm problems are disopyramide, procainamide, quinidine, Cardioquin®, Norpace®, Procanbid®, or Sallyann Stamp  Diphenhydramine (Benadryl®) is an antihistamine  Some phenothiazine medicines are Compazine®, Mellaril®, Phenergan®, Serentil®, Tacaryl®, Thorazine®, or Trilafon®  Some medicines to treat depression are amitriptyline, nortriptyline, Norpramin®, or Vivactil®  Cortisone and prednisone are steroid medicines    · You should not use dicyclomine within 2 to 3 hours of taking antacids (Maalox®, Mylanta®) or medicine to stop diarrhea  Warnings While Using This Medicine:   · Make sure your doctor knows if you are pregnant, if you have liver disease, kidney disease, or overactive thyroid  Tell your doctor about any heart or blood vessel problems you have, including heart rhythm problems, congestive heart failure, or high blood pressure  Make sure your doctor knows if you have ongoing diarrhea, or an ileostomy or colostomy  Tell your doctor if you have autonomic neuropathy (a nerve problem), or a hiatal hernia (problems with your esophagus)  · This medicine may make you sweat less  Your body could get too hot if you do not sweat enough  Stay out of hot places  Try to stay indoors or somewhere cool during hot weather  If you have a fever, call your doctor for advice  If your body gets too hot, you might feel dizzy, weak, tired, or confused  You might have an upset stomach or vomit  Call your doctor if you are too hot and cannot cool down  · This medicine may make you drowsy  Avoid driving, using machines, or doing anything else that could be dangerous if you are not alert  · Your eyes may be more sensitive to bright light while you are using this medicine  You may want to wear sunglasses in bright sunlight  Possible Side Effects While Using This Medicine:   Call your doctor right away if you notice any of these side effects:  · Allergic reaction: Itching or hives, swelling in your face or hands, swelling or tingling in your mouth or throat, chest tightness, trouble breathing  · Fast or uneven heartbeat  · Restlessness, agitation, or confusion  · Severe dizziness or light-headedness  If you notice these less serious side effects, talk with your doctor:   · Decrease in how much or how often you urinate  · Drowsiness or weakness  · Dry mouth  · Nausea, vomiting, constipation, or stomach pain  · Trouble focusing or other vision changes    If you notice other side effects that you think are caused by this medicine, tell your doctor  Call your doctor for medical advice about side effects  You may report side effects to FDA at 5-873-FDA-3791  © 2017 [address] Information is for End User's use only and may not be sold, redistributed or otherwise used for commercial purposes  The above information is an  only  It is not intended as medical advice for individual conditions or treatments  Talk to your doctor, nurse or pharmacist before following any medical regimen to see if it is safe and effective for you  Cigarette Smoking and Your Health   AMBULATORY CARE:   Risks to your health if you smoke:  Nicotine and other chemicals found in tobacco damage every cell in your body  Even if you are a light smoker, you have an increased risk for cancer, heart disease, and lung disease  If you are pregnant or have diabetes, smoking increases your risk for complications  Benefits to your health if you stop smoking:   · You decrease respiratory symptoms such as coughing, wheezing, and shortness of breath  · You reduce your risk for cancers of the lung, mouth, throat, kidney, bladder, pancreas, stomach, and cervix  If you already have cancer, you increase the benefits of chemotherapy  You also reduce your risk for cancer returning or a second cancer from developing  · You reduce your risk for heart disease, blood clots, heart attack, and stroke  · You reduce your risk for lung infections, and diseases such as pneumonia, asthma, chronic bronchitis, and emphysema  · Your circulation improves  More oxygen can be delivered to your body  If you have diabetes, you lower your risk for complications, such as kidney, artery, and eye diseases  You also lower your risk for nerve damage  Nerve damage can lead to amputations, poor vision, and blindness  · You improve your body's ability to heal and to fight infections    Benefits to the health of others if you stop smoking:  Tobacco is harmful to nonsmokers who breathe in your secondhand smoke  The following are ways the health of others around you may improve when you stop smoking:  · You lower the risks for lung cancer and heart disease in nonsmoking adults  · If you are pregnant, you lower the risk for miscarriage, early delivery, low birth weight, and stillbirth  You also lower your baby's risk for SIDS, obesity, developmental delay, and neurobehavioral problems, such as ADHD  · If you have children, you lower their risk for ear infections, colds, pneumonia, bronchitis, and asthma  For more information and support to stop smoking:   · [employer]  [place]  Phone: 4- 531 - 814-2102  Web Address: www [employer]  Follow up with your healthcare provider as directed:  Write down your questions so you remember to ask them during your visits  © 2017 [address] Information is for End User's use only and may not be sold, redistributed or otherwise used for commercial purposes  All illustrations and images included in CareNotes® are the copyrighted property of A D A M , Inc  or Dk Anne  The above information is an  only  It is not intended as medical advice for individual conditions or treatments  Talk to your doctor, nurse or pharmacist before following any medical regimen to see if it is safe and effective for you  How to Stop Smoking   WHAT YOU NEED TO KNOW:   You will improve your health and the health of others around you if you stop smoking  Your risk for heart and lung disease, cancer, stroke, heart attack, and vision problems will also decrease  You can benefit from quitting no matter how long you have smoked  DISCHARGE INSTRUCTIONS:   Prepare to stop smoking:  Nicotine is a highly addictive drug found in cigarettes  Withdrawal symptoms can happen when you stop smoking and make it hard to quit   These include anxiety, depression, irritability, trouble sleeping, and increased appetite  You increase your chances of success if you prepare to quit  · Set a quit date  Susy Lizarraga a date that is within the next 2 weeks  Do not pick a day that you think may be stressful or busy  Write down the day or [place] it on your calender  · Tell friends and family that you plan to quit  Explain that you may have withdrawal symptoms when you try to quit  Ask them to support you  They may be able to encourage you and help reduce your stress to make it easier for you to quit  · Make a list of your reasons for quitting  Put the list somewhere you will see it every day, such as your refrigerator  You can look at the list when you have a craving  · Remove all tobacco and nicotine products from your home, car, and workplace  Also, remove anything else that will tempt you to smoke, such as lighters, matches, or ashtrays  Clean your car, home, and places at work that smell like smoke  The smell of smoke can trigger a craving  · Identify triggers that make you want to smoke  This may include activities, feelings, or people  Also write down 1 way you can deal with each of your triggers  For example, if you want to smoke as soon as you wake up, plan another activity during this time, such as exercise  · Make a plan for how you will quit  Learn about the tools that can help you quit, such as medicine, counseling, or nicotine replacement therapy  Choose at least 2 options to help you quit  Tools to help you stop smoking:   · Counseling  from a trained healthcare provider can provide you with support and skills to quit smoking  The provider will also teach you to manage your withdrawal symptoms and cravings  You may receive counseling from one counselor, in group therapy, or through phone therapy called a quit line  · Nicotine replacement therapy (NRT)  such as nicotine patches, gum, or lozenges may help reduce your nicotine cravings  You may get these without a doctor's order   Do not use e-cigarettes or smokeless tobacco in place of cigarettes or to help you quit  They still contain nicotine  · Prescription medicines  such as nasal sprays or nicotine inhalers may help reduce your withdrawal symptoms  Other medicines may also be used to reduce your urge to smoke  Ask your healthcare provider about these medicines  You may need to start certain medicines 2 weeks before your quit date for them to work well  · Hypnosis  is a practice that helps guide you through thoughts and feelings  Hypnosis may help decrease your cravings and make you more willing to quit  · Acupuncture therapy  uses very thin needles to balance energy channels in the body  This is thought to help decrease cravings and symptoms of nicotine withdrawal      · Support groups  let you talk to others who are trying to quit or have already quit  It may be helpful to speak with others about how they quit  Manage your cravings:   · Avoid situations, people, and places that tempt you to smoke  Go to nonsmoking places, such as libraries or restaurants  Understand what tempts you and try to avoid these things  · Keep your hands busy  Hold things such as a stress ball or pen  · Put candy or toothpicks in your mouth  Keep lollipops, sugarless gum, or toothpicks with you at all times  · Do not have alcohol or caffeine  These drinks may tempt you to smoke  Drink healthy liquids such as water or juice instead  · Reward yourself when you resist your cravings  Rewards will motivate you and help you stay positive  · Do an activity that distracts you from your craving  Examples include going for a walk, exercising, or cleaning  Prevent weight gain after you quit:  You may gain a few pounds after you quit smoking  It is healthier for you to gain a few pounds than to continue to smoke  The following can help you prevent weight gain:  · Eat healthy foods    These include fruits, vegetables, whole-grain breads, low-fat dairy products, beans, lean meats, and fish  Eat healthy snacks, such as low-fat yogurt, if you get hungry between meals  · Drink water before, during, and between meals  This will make your stomach feel full and help prevent you from overeating  Ask your healthcare provider how much liquid to drink each day and which liquids are best for you  · Exercise  Take a walk or do some kind of exercise every day  Ask your healthcare provider what exercise is right for you  This may help reduce your cravings and reduce stress  For support and more information:   · [employer]  gov  Phone: 2- 472 - 012-8514  Web Address: www smokefree  gov  © 2017 [address]  Information is for End User's use only and may not be sold, redistributed or otherwise used for commercial purposes  All illustrations and images included in CareNotes® are the copyrighted property of A D A M , Inc  or Dk Rodríguez  The above information is an  only  It is not intended as medical advice for individual conditions or treatments  Talk to your doctor, nurse or pharmacist before following any medical regimen to see if it is safe and effective for you  How to Stop Smoking, Ambulatory Care   Centers for Disease Control and Prevention (CDC): Smoking cessation  Centers for Disease Control and Prevention (CDC)  [place], [facility]  Available from URL: [employer]  As accessed 2013-03-29  Uzair PARSONS & Derek SI: Smoking cessation  Chest 2010; 137(2):428-435  [address] ([address]): Clearing the air: quit smoking today  [address] ([address])  MD Dex  2011  Available from URL: [employer] pt  pdf  As accessed 2013-03-29  Geeta MACHADO: Strategies to help a smoker who is struggling to quit  JD 2012; 308(15):1368-5915    © 2014 [address] is for End User's use only and may not be sold, redistributed or otherwise used for commercial purposes  All illustrations and images included in CareNotes® are the copyrighted property of A D A M , Inc  or Dk Rodríguez  The above information is an  only  It is not intended as medical advice for individual conditions or treatments  Talk to your doctor, nurse or pharmacist before following any medical regimen to see if it is safe and effective for you

## 2018-05-28 ENCOUNTER — HOSPITAL ENCOUNTER (INPATIENT)
Facility: HOSPITAL | Age: 43
LOS: 3 days | Discharge: HOME | DRG: 720 | End: 2018-05-31
Attending: EMERGENCY MEDICINE | Admitting: INTERNAL MEDICINE
Payer: COMMERCIAL

## 2018-05-28 ENCOUNTER — APPOINTMENT (EMERGENCY)
Dept: CT IMAGING | Facility: HOSPITAL | Age: 43
DRG: 720 | End: 2018-05-28
Payer: COMMERCIAL

## 2018-05-28 DIAGNOSIS — D72.829 LEUKOCYTOSIS: ICD-10-CM

## 2018-05-28 DIAGNOSIS — R11.10 INTRACTABLE VOMITING: ICD-10-CM

## 2018-05-28 DIAGNOSIS — R10.13 INTRACTABLE EPIGASTRIC ABDOMINAL PAIN: Primary | ICD-10-CM

## 2018-05-28 DIAGNOSIS — R10.84 GENERALIZED ABDOMINAL PAIN: ICD-10-CM

## 2018-05-28 DIAGNOSIS — F10.10 ALCOHOL ABUSE: ICD-10-CM

## 2018-05-28 DIAGNOSIS — K52.9 COLITIS: ICD-10-CM

## 2018-05-28 DIAGNOSIS — K29.70 GASTRITIS: ICD-10-CM

## 2018-05-28 DIAGNOSIS — K22.70 BARRETT'S ESOPHAGUS: ICD-10-CM

## 2018-05-28 LAB
ALBUMIN SERPL BCP-MCNC: 4.1 G/DL (ref 3.5–5)
ALP SERPL-CCNC: 108 U/L (ref 46–116)
ALT SERPL W P-5'-P-CCNC: 20 U/L (ref 12–78)
ANION GAP SERPL CALCULATED.3IONS-SCNC: 14 MMOL/L (ref 4–13)
AST SERPL W P-5'-P-CCNC: 38 U/L (ref 5–45)
BACTERIA UR QL AUTO: ABNORMAL /HPF
BASOPHILS # BLD AUTO: 0.11 THOUSANDS/ΜL (ref 0–0.1)
BASOPHILS NFR BLD AUTO: 0 % (ref 0–1)
BILIRUB DIRECT SERPL-MCNC: 0.04 MG/DL (ref 0–0.2)
BILIRUB SERPL-MCNC: 0.6 MG/DL (ref 0.2–1)
BILIRUB UR QL STRIP: ABNORMAL
BUN SERPL-MCNC: 8 MG/DL (ref 5–25)
CALCIUM SERPL-MCNC: 8.7 MG/DL (ref 8.3–10.1)
CHLORIDE SERPL-SCNC: 104 MMOL/L (ref 100–108)
CLARITY UR: ABNORMAL
CO2 SERPL-SCNC: 23 MMOL/L (ref 21–32)
COLOR UR: YELLOW
CREAT SERPL-MCNC: 0.66 MG/DL (ref 0.6–1.3)
EOSINOPHIL # BLD AUTO: 0.01 THOUSAND/ΜL (ref 0–0.61)
EOSINOPHIL NFR BLD AUTO: 0 % (ref 0–6)
ERYTHROCYTE [DISTWIDTH] IN BLOOD BY AUTOMATED COUNT: 14.4 % (ref 11.6–15.1)
EXT PREG TEST URINE: NEGATIVE
GFR SERPL CREATININE-BSD FRML MDRD: 110 ML/MIN/1.73SQ M
GLUCOSE SERPL-MCNC: 143 MG/DL (ref 65–140)
GLUCOSE UR STRIP-MCNC: NEGATIVE MG/DL
HCT VFR BLD AUTO: 47.9 % (ref 34.8–46.1)
HGB BLD-MCNC: 15.9 G/DL (ref 11.5–15.4)
HGB UR QL STRIP.AUTO: ABNORMAL
IMM GRANULOCYTES # BLD AUTO: 0.15 THOUSAND/UL (ref 0–0.2)
IMM GRANULOCYTES NFR BLD AUTO: 1 % (ref 0–2)
KETONES UR STRIP-MCNC: ABNORMAL MG/DL
LEUKOCYTE ESTERASE UR QL STRIP: NEGATIVE
LIPASE SERPL-CCNC: 106 U/L (ref 73–393)
LYMPHOCYTES # BLD AUTO: 3.58 THOUSANDS/ΜL (ref 0.6–4.47)
LYMPHOCYTES NFR BLD AUTO: 15 % (ref 14–44)
MCH RBC QN AUTO: 29.9 PG (ref 26.8–34.3)
MCHC RBC AUTO-ENTMCNC: 33.2 G/DL (ref 31.4–37.4)
MCV RBC AUTO: 90 FL (ref 82–98)
MONOCYTES # BLD AUTO: 1.01 THOUSAND/ΜL (ref 0.17–1.22)
MONOCYTES NFR BLD AUTO: 4 % (ref 4–12)
MUCOUS THREADS UR QL AUTO: ABNORMAL
NEUTROPHILS # BLD AUTO: 19.64 THOUSANDS/ΜL (ref 1.85–7.62)
NEUTS SEG NFR BLD AUTO: 80 % (ref 43–75)
NITRITE UR QL STRIP: NEGATIVE
NON-SQ EPI CELLS URNS QL MICRO: ABNORMAL /HPF
NRBC BLD AUTO-RTO: 0 /100 WBCS
PH UR STRIP.AUTO: 6 [PH] (ref 4.5–8)
PLATELET # BLD AUTO: 236 THOUSANDS/UL (ref 149–390)
PLATELET # BLD AUTO: 250 THOUSANDS/UL (ref 149–390)
PMV BLD AUTO: 9.1 FL (ref 8.9–12.7)
PMV BLD AUTO: 9.6 FL (ref 8.9–12.7)
POTASSIUM SERPL-SCNC: 5.1 MMOL/L (ref 3.5–5.3)
PROT SERPL-MCNC: 8.9 G/DL (ref 6.4–8.2)
PROT UR STRIP-MCNC: ABNORMAL MG/DL
RBC # BLD AUTO: 5.32 MILLION/UL (ref 3.81–5.12)
RBC #/AREA URNS AUTO: ABNORMAL /HPF
SODIUM SERPL-SCNC: 141 MMOL/L (ref 136–145)
SP GR UR STRIP.AUTO: >=1.03 (ref 1–1.03)
UROBILINOGEN UR QL STRIP.AUTO: 1 E.U./DL
WBC # BLD AUTO: 24.5 THOUSAND/UL (ref 4.31–10.16)
WBC #/AREA URNS AUTO: ABNORMAL /HPF

## 2018-05-28 PROCEDURE — 81025 URINE PREGNANCY TEST: CPT | Performed by: PHYSICIAN ASSISTANT

## 2018-05-28 PROCEDURE — 85025 COMPLETE CBC W/AUTO DIFF WBC: CPT | Performed by: PHYSICIAN ASSISTANT

## 2018-05-28 PROCEDURE — 83690 ASSAY OF LIPASE: CPT | Performed by: PHYSICIAN ASSISTANT

## 2018-05-28 PROCEDURE — 80076 HEPATIC FUNCTION PANEL: CPT | Performed by: PHYSICIAN ASSISTANT

## 2018-05-28 PROCEDURE — 99285 EMERGENCY DEPT VISIT HI MDM: CPT

## 2018-05-28 PROCEDURE — 96375 TX/PRO/DX INJ NEW DRUG ADDON: CPT

## 2018-05-28 PROCEDURE — 85049 AUTOMATED PLATELET COUNT: CPT | Performed by: PHYSICIAN ASSISTANT

## 2018-05-28 PROCEDURE — C9113 INJ PANTOPRAZOLE SODIUM, VIA: HCPCS | Performed by: PHYSICIAN ASSISTANT

## 2018-05-28 PROCEDURE — 96374 THER/PROPH/DIAG INJ IV PUSH: CPT

## 2018-05-28 PROCEDURE — 87040 BLOOD CULTURE FOR BACTERIA: CPT | Performed by: PHYSICIAN ASSISTANT

## 2018-05-28 PROCEDURE — 94762 N-INVAS EAR/PLS OXIMTRY CONT: CPT

## 2018-05-28 PROCEDURE — 80048 BASIC METABOLIC PNL TOTAL CA: CPT | Performed by: PHYSICIAN ASSISTANT

## 2018-05-28 PROCEDURE — 99223 1ST HOSP IP/OBS HIGH 75: CPT | Performed by: INTERNAL MEDICINE

## 2018-05-28 PROCEDURE — 81001 URINALYSIS AUTO W/SCOPE: CPT | Performed by: PHYSICIAN ASSISTANT

## 2018-05-28 PROCEDURE — 96376 TX/PRO/DX INJ SAME DRUG ADON: CPT

## 2018-05-28 PROCEDURE — 96361 HYDRATE IV INFUSION ADD-ON: CPT

## 2018-05-28 PROCEDURE — 36415 COLL VENOUS BLD VENIPUNCTURE: CPT | Performed by: PHYSICIAN ASSISTANT

## 2018-05-28 PROCEDURE — 74177 CT ABD & PELVIS W/CONTRAST: CPT

## 2018-05-28 RX ORDER — DICYCLOMINE HCL 20 MG
20 TABLET ORAL 3 TIMES DAILY
Status: DISCONTINUED | OUTPATIENT
Start: 2018-05-28 | End: 2018-05-31 | Stop reason: HOSPADM

## 2018-05-28 RX ORDER — AMITRIPTYLINE HYDROCHLORIDE 10 MG/1
10 TABLET, FILM COATED ORAL
COMMUNITY
End: 2018-10-10

## 2018-05-28 RX ORDER — HYDROXYZINE HYDROCHLORIDE 25 MG/1
25 TABLET, FILM COATED ORAL
COMMUNITY
End: 2018-10-10

## 2018-05-28 RX ORDER — LORAZEPAM 2 MG/ML
1 INJECTION INTRAMUSCULAR EVERY 8 HOURS PRN
Status: DISCONTINUED | OUTPATIENT
Start: 2018-05-28 | End: 2018-05-29

## 2018-05-28 RX ORDER — DICYCLOMINE HCL 20 MG
20 TABLET ORAL
Status: ON HOLD | COMMUNITY
End: 2018-05-31

## 2018-05-28 RX ORDER — CLONIDINE HYDROCHLORIDE 0.1 MG/1
0.3 TABLET ORAL EVERY 12 HOURS SCHEDULED
Status: DISCONTINUED | OUTPATIENT
Start: 2018-05-28 | End: 2018-05-31 | Stop reason: HOSPADM

## 2018-05-28 RX ORDER — AMITRIPTYLINE HYDROCHLORIDE 10 MG/1
10 TABLET, FILM COATED ORAL
Status: DISCONTINUED | OUTPATIENT
Start: 2018-05-28 | End: 2018-05-31 | Stop reason: HOSPADM

## 2018-05-28 RX ORDER — SUCRALFATE 1 G/1
1 TABLET ORAL 4 TIMES DAILY
Status: DISCONTINUED | OUTPATIENT
Start: 2018-05-28 | End: 2018-05-31 | Stop reason: HOSPADM

## 2018-05-28 RX ORDER — NICOTINE 21 MG/24HR
1 PATCH, TRANSDERMAL 24 HOURS TRANSDERMAL DAILY
Status: DISCONTINUED | OUTPATIENT
Start: 2018-05-29 | End: 2018-05-31 | Stop reason: HOSPADM

## 2018-05-28 RX ORDER — METRONIDAZOLE 500 MG/1
500 TABLET ORAL EVERY 8 HOURS SCHEDULED
Status: DISCONTINUED | OUTPATIENT
Start: 2018-05-28 | End: 2018-05-28

## 2018-05-28 RX ORDER — CIPROFLOXACIN 2 MG/ML
400 INJECTION, SOLUTION INTRAVENOUS EVERY 12 HOURS
Status: DISCONTINUED | OUTPATIENT
Start: 2018-05-28 | End: 2018-05-31 | Stop reason: HOSPADM

## 2018-05-28 RX ORDER — LORAZEPAM 2 MG/ML
2 INJECTION INTRAMUSCULAR ONCE
Status: DISCONTINUED | OUTPATIENT
Start: 2018-05-28 | End: 2018-05-31 | Stop reason: HOSPADM

## 2018-05-28 RX ORDER — METOPROLOL TARTRATE 5 MG/5ML
5 INJECTION INTRAVENOUS EVERY 6 HOURS PRN
Status: DISCONTINUED | OUTPATIENT
Start: 2018-05-28 | End: 2018-05-31 | Stop reason: HOSPADM

## 2018-05-28 RX ORDER — PANTOPRAZOLE SODIUM 40 MG/1
40 INJECTION, POWDER, FOR SOLUTION INTRAVENOUS EVERY 12 HOURS SCHEDULED
Status: DISCONTINUED | OUTPATIENT
Start: 2018-05-28 | End: 2018-05-31 | Stop reason: HOSPADM

## 2018-05-28 RX ORDER — THIAMINE MONONITRATE (VIT B1) 100 MG
100 TABLET ORAL DAILY
Status: DISCONTINUED | OUTPATIENT
Start: 2018-05-29 | End: 2018-05-31 | Stop reason: HOSPADM

## 2018-05-28 RX ORDER — SODIUM CHLORIDE 9 MG/ML
125 INJECTION, SOLUTION INTRAVENOUS CONTINUOUS
Status: DISCONTINUED | OUTPATIENT
Start: 2018-05-28 | End: 2018-05-31

## 2018-05-28 RX ORDER — FOLIC ACID 1 MG/1
1 TABLET ORAL DAILY
Status: DISCONTINUED | OUTPATIENT
Start: 2018-05-29 | End: 2018-05-31 | Stop reason: HOSPADM

## 2018-05-28 RX ORDER — LORAZEPAM 1 MG/1
1 TABLET ORAL EVERY 8 HOURS PRN
Status: DISCONTINUED | OUTPATIENT
Start: 2018-05-28 | End: 2018-05-28

## 2018-05-28 RX ORDER — ONDANSETRON 2 MG/ML
4 INJECTION INTRAMUSCULAR; INTRAVENOUS ONCE
Status: DISCONTINUED | OUTPATIENT
Start: 2018-05-28 | End: 2018-05-28

## 2018-05-28 RX ORDER — SODIUM CHLORIDE 9 MG/ML
75 INJECTION, SOLUTION INTRAVENOUS CONTINUOUS
Status: DISCONTINUED | OUTPATIENT
Start: 2018-05-28 | End: 2018-05-28

## 2018-05-28 RX ORDER — MECLIZINE HYDROCHLORIDE 25 MG/1
25 TABLET ORAL ONCE
Status: DISCONTINUED | OUTPATIENT
Start: 2018-05-28 | End: 2018-05-28

## 2018-05-28 RX ORDER — METOCLOPRAMIDE HYDROCHLORIDE 5 MG/ML
10 INJECTION INTRAMUSCULAR; INTRAVENOUS ONCE
Status: COMPLETED | OUTPATIENT
Start: 2018-05-28 | End: 2018-05-28

## 2018-05-28 RX ORDER — ONDANSETRON 2 MG/ML
4 INJECTION INTRAMUSCULAR; INTRAVENOUS EVERY 6 HOURS PRN
Status: DISCONTINUED | OUTPATIENT
Start: 2018-05-28 | End: 2018-05-31 | Stop reason: HOSPADM

## 2018-05-28 RX ADMIN — HYDROMORPHONE HYDROCHLORIDE 0.2 MG: 1 INJECTION, SOLUTION INTRAMUSCULAR; INTRAVENOUS; SUBCUTANEOUS at 16:32

## 2018-05-28 RX ADMIN — HYDROMORPHONE HYDROCHLORIDE 1 MG: 1 INJECTION, SOLUTION INTRAMUSCULAR; INTRAVENOUS; SUBCUTANEOUS at 14:30

## 2018-05-28 RX ADMIN — CIPROFLOXACIN 400 MG: 2 INJECTION, SOLUTION INTRAVENOUS at 19:26

## 2018-05-28 RX ADMIN — ONDANSETRON 4 MG: 2 INJECTION INTRAMUSCULAR; INTRAVENOUS at 17:37

## 2018-05-28 RX ADMIN — IOHEXOL 100 ML: 350 INJECTION, SOLUTION INTRAVENOUS at 13:30

## 2018-05-28 RX ADMIN — LORAZEPAM 1 MG: 2 INJECTION INTRAMUSCULAR; INTRAVENOUS at 18:01

## 2018-05-28 RX ADMIN — CLONIDINE HYDROCHLORIDE 0.3 MG: 0.1 TABLET ORAL at 21:03

## 2018-05-28 RX ADMIN — PANTOPRAZOLE SODIUM 40 MG: 40 INJECTION, POWDER, FOR SOLUTION INTRAVENOUS at 20:58

## 2018-05-28 RX ADMIN — HYDROMORPHONE HYDROCHLORIDE 1 MG: 1 INJECTION, SOLUTION INTRAMUSCULAR; INTRAVENOUS; SUBCUTANEOUS at 12:36

## 2018-05-28 RX ADMIN — SUCRALFATE 1 G: 1 TABLET ORAL at 21:03

## 2018-05-28 RX ADMIN — AMITRIPTYLINE HYDROCHLORIDE 10 MG: 10 TABLET, FILM COATED ORAL at 21:09

## 2018-05-28 RX ADMIN — ONDANSETRON 4 MG: 2 INJECTION INTRAMUSCULAR; INTRAVENOUS at 21:56

## 2018-05-28 RX ADMIN — SODIUM CHLORIDE 75 ML/HR: 0.9 INJECTION, SOLUTION INTRAVENOUS at 16:22

## 2018-05-28 RX ADMIN — SODIUM CHLORIDE 125 ML/HR: 0.9 INJECTION, SOLUTION INTRAVENOUS at 19:36

## 2018-05-28 RX ADMIN — SODIUM CHLORIDE 1000 ML: 0.9 INJECTION, SOLUTION INTRAVENOUS at 12:38

## 2018-05-28 RX ADMIN — HYDROMORPHONE HYDROCHLORIDE 0.2 MG: 1 INJECTION, SOLUTION INTRAMUSCULAR; INTRAVENOUS; SUBCUTANEOUS at 20:38

## 2018-05-28 RX ADMIN — METRONIDAZOLE 500 MG: 500 INJECTION, SOLUTION INTRAVENOUS at 20:54

## 2018-05-28 RX ADMIN — METOPROLOL TARTRATE 5 MG: 5 INJECTION, SOLUTION INTRAVENOUS at 23:10

## 2018-05-28 RX ADMIN — METOPROLOL TARTRATE 5 MG: 5 INJECTION, SOLUTION INTRAVENOUS at 16:03

## 2018-05-28 RX ADMIN — METOCLOPRAMIDE 10 MG: 5 INJECTION, SOLUTION INTRAMUSCULAR; INTRAVENOUS at 13:07

## 2018-05-28 RX ADMIN — DICYCLOMINE HYDROCHLORIDE 20 MG: 20 TABLET ORAL at 21:02

## 2018-05-28 NOTE — NURSING NOTE
ciwa score repeated and reported to oncoming RN next due assessment  Pt mildly anxious  nausea still mild, but improved  In bed, refused bed alarm  Signed form  Family at bedside  Safety measures intact  Call bell in reach   Nausea stil

## 2018-05-28 NOTE — ASSESSMENT & PLAN NOTE
· Plan as above  · Monitor for withdrawal symptoms  · Pt did admit to alcohol use a few days ago   · CIWA protocol and PRN ativan ordered

## 2018-05-28 NOTE — ED PROVIDER NOTES
History  Chief Complaint   Patient presents with    Abdominal Pain     generalized abdominal pain, nausea and vomiting since last night  This is a 41-year-old female patient presents to the ER for evaluation of abdominal pain  Sudden onset last night  She was drinking alcohol, admits to 2 drinks, when she started to developed epigastric pain  She has had constant pain since then getting worse  Feels similar to prior exacerbations of pancreatitis  She has nausea and vomiting, multiple episodes of vomiting  Nonbloody nonbilious  She has had normal bowel movements normal urination  She has had nothing to eat since she started vomiting last night  Denies fevers or chills  No chest pain or shortness of breath  She states it feels exactly like prior exacerbations and there is nothing different on this occasion  Prior abdominal surgeries include   She has been admitted here in the past for this problem          History provided by:  Patient   used: No    Abdominal Pain   Pain location:  Epigastric and periumbilical  Pain quality: sharp and stabbing    Pain radiates to:  Does not radiate  Pain severity:  Severe  Onset quality:  Sudden  Duration:  18 hours  Timing:  Constant  Progression:  Unchanged  Chronicity:  Recurrent  Context: alcohol use    Context comment:  History of pancreatitis  Relieved by:  Nothing  Worsened by:  Palpation and movement  Ineffective treatments:  None tried  Associated symptoms: nausea and vomiting    Associated symptoms: no anorexia, no belching, no chest pain, no chills, no constipation, no cough, no diarrhea, no dysuria, no fatigue, no fever, no flatus, no hematemesis, no hematochezia, no hematuria, no melena, no shortness of breath, no sore throat, no vaginal bleeding and no vaginal discharge    Nausea:     Severity:  Severe    Onset quality:  Sudden    Duration:  18 hours    Timing:  Constant    Progression:  Unchanged  Vomiting:     Quality: Stomach contents    Number of occurrences:  Multiple    Severity:  Severe    Duration:  18 hours    Timing:  Constant    Progression:  Unchanged  Risk factors: no alcohol abuse, no aspirin use, not elderly, has not had multiple surgeries, no NSAID use, not obese, not pregnant and no recent hospitalization        Prior to Admission Medications   Prescriptions Last Dose Informant Patient Reported? Taking?    Mometasone Furo-Formoterol Fum (DULERA) 100-5 MCG/ACT AERO   Yes No   Sig: Inhale as needed Unsure of dose     amitriptyline (ELAVIL) 10 mg tablet   No No   Sig: Take 1 tablet by mouth daily at bedtime   cloNIDine (CATAPRES) 0 1 mg tablet   No No   Sig: Take 2 tablets by mouth every 8 (eight) hours   Patient taking differently: Take 0 3 mg by mouth every 8 (eight) hours     dicyclomine (BENTYL) 10 mg capsule   No No   Sig: Take 2 capsules (20 mg total) by mouth 3 (three) times a day before meals   hydrOXYzine pamoate (VISTARIL) 25 mg capsule   Yes No   Sig: Take 25 mg by mouth daily at bedtime   nicotine (NICODERM CQ) 21 mg/24 hr TD 24 hr patch   No No   Sig: Place 1 patch on the skin daily   ondansetron (ZOFRAN) 4 mg tablet   No No   Sig: Take 1 tablet by mouth every 8 (eight) hours as needed for nausea or vomiting for up to 7 days   pancrelipase, Lip-Prot-Amyl, (CREON) 24,000 units   No No   Sig: Take 1 capsule by mouth 3 (three) times a day with meals   pantoprazole (PROTONIX) 40 mg tablet   No No   Sig: Take 1 tablet by mouth 2 (two) times a day   simethicone (MYLICON) 80 mg chewable tablet   No No   Sig: Chew 1 tablet every 6 (six) hours as needed for flatulence   sucralfate (CARAFATE) 1 g tablet   No No   Sig: Take 1 tablet (1 g total) by mouth 4 (four) times a day      Facility-Administered Medications: None       Past Medical History:   Diagnosis Date    Alcohol abuse     Arthritis     GERD (gastroesophageal reflux disease)     Hypertension     Nicotine dependence     Obesity     Pancreatitis     Panic attack        Past Surgical History:   Procedure Laterality Date    ABDOMINAL SURGERY      C SECTION    ESOPHAGOGASTRODUODENOSCOPY N/A 12/15/2017    Procedure: ESOPHAGOGASTRODUODENOSCOPY (EGD); Surgeon: Marlea Sacks, MD;  Location: [facility]; Service: Gastroenterology       Family History   Problem Relation Age of Onset    Cirrhosis Father     Alcohol abuse Father     Drug abuse Mother      I have reviewed and agree with the history as documented  Social History   Substance Use Topics    Smoking status: Current Every Day Smoker     Packs/day: 1 00     Years: 29 00     Types: Cigarettes    Smokeless tobacco: Never Used    Alcohol use Yes        Review of Systems   Constitutional: Negative for activity change, appetite change, chills, diaphoresis, fatigue, fever and unexpected weight change  HENT: Negative for congestion, rhinorrhea, sinus pressure, sore throat and trouble swallowing  Eyes: Negative for photophobia and visual disturbance  Respiratory: Negative for apnea, cough, choking, chest tightness, shortness of breath, wheezing and stridor  Cardiovascular: Negative for chest pain, palpitations and leg swelling  Gastrointestinal: Positive for abdominal pain, nausea and vomiting  Negative for abdominal distention, anorexia, blood in stool, constipation, diarrhea, flatus, hematemesis, hematochezia and melena  Genitourinary: Negative for decreased urine volume, difficulty urinating, dysuria, enuresis, flank pain, frequency, hematuria, urgency, vaginal bleeding and vaginal discharge  Musculoskeletal: Negative for arthralgias, myalgias, neck pain and neck stiffness  Skin: Negative for color change, pallor, rash and wound  Allergic/Immunologic: Negative  Neurological: Negative for dizziness, tremors, syncope, weakness, light-headedness, numbness and headaches  Hematological: Negative  Psychiatric/Behavioral: Negative      All other systems reviewed and are negative  Physical Exam  Physical Exam   Constitutional: She is oriented to person, place, and time  She appears well-developed and well-nourished  Non-toxic appearance  She does not have a sickly appearance  She does not appear ill  No distress  Patient appears uncomfortable, however not toxic or ill-appearing   HENT:   Head: Normocephalic and atraumatic  Eyes: EOM and lids are normal  Pupils are equal, round, and reactive to light  Neck: Normal range of motion  Neck supple  Cardiovascular: Normal rate, regular rhythm, S1 normal, S2 normal, normal heart sounds, intact distal pulses and normal pulses  Exam reveals no gallop, no distant heart sounds, no friction rub and no decreased pulses  No murmur heard  Pulses:       Radial pulses are 2+ on the right side, and 2+ on the left side  Pulmonary/Chest: Effort normal and breath sounds normal  No accessory muscle usage  No apnea, no tachypnea and no bradypnea  No respiratory distress  She has no decreased breath sounds  She has no wheezes  She has no rhonchi  She has no rales  Abdominal: Soft  Normal appearance and bowel sounds are normal  She exhibits no distension and no mass  There is generalized tenderness and tenderness in the epigastric area and periumbilical area  There is rebound and guarding  There is no rigidity  No hernia  Musculoskeletal: Normal range of motion  She exhibits no edema, tenderness or deformity  Neurological: She is alert and oriented to person, place, and time  No cranial nerve deficit  GCS eye subscore is 4  GCS verbal subscore is 5  GCS motor subscore is 6  GCS 15  AAOx3  Ambulating in department without difficulty  CN II-XII grossly intact  No focal neuro deficits  Skin: Skin is warm, dry and intact  No rash noted  She is not diaphoretic  No erythema  No pallor  Psychiatric: Her speech is normal    Nursing note and vitals reviewed        Vital Signs  ED Triage Vitals   Temperature Pulse Respirations Blood Pressure SpO2   05/28/18 1218 05/28/18 1218 05/28/18 1218 05/28/18 1221 05/28/18 1218   98 1 °F (36 7 °C) 76 16 169/97 100 %      Temp Source Heart Rate Source Patient Position - Orthostatic VS BP Location FiO2 (%)   05/28/18 1218 05/28/18 1218 05/28/18 1400 05/28/18 1400 --   Oral Monitor Sitting Left arm       Pain Score       05/28/18 1218       Worst Possible Pain           Vitals:    05/28/18 1218 05/28/18 1221 05/28/18 1400   BP:  169/97 (!) 220/115   Pulse: 76  95   Patient Position - Orthostatic VS:   Sitting       Visual Acuity      ED Medications  Medications   sodium chloride 0 9 % bolus 1,000 mL (not administered)    EMS REPLENISHMENT MED ( Does not apply Given to EMS 5/28/18 1226)   sodium chloride 0 9 % bolus 1,000 mL (1,000 mL Intravenous New Bag 5/28/18 1238)   HYDROmorphone (DILAUDID) injection 1 mg (1 mg Intravenous Given 5/28/18 1236)   metoclopramide (REGLAN) injection 10 mg (10 mg Intravenous Given 5/28/18 1307)   iohexol (OMNIPAQUE) 350 MG/ML injection (MULTI-DOSE) 100 mL (100 mL Intravenous Given 5/28/18 1330)   HYDROmorphone (DILAUDID) injection 1 mg (1 mg Intravenous Given 5/28/18 1430)       Diagnostic Studies  Results Reviewed     Procedure Component Value Units Date/Time    Urine Microscopic [[id number]]  (Abnormal) Collected:  05/28/18 1240    Lab Status:  Final result Specimen:  Urine from Urine, Clean Catch Updated:  05/28/18 1307     RBC, UA 2-4 (A) /hpf      WBC, UA 2-4 (A) /hpf      Epithelial Cells Occasional /hpf      Bacteria, UA Occasional /hpf      MUCOUS THREADS Moderate (A)    Basic metabolic panel [[id number]]  (Abnormal) Collected:  05/28/18 1240    Lab Status:  Final result Specimen:  Blood from Arm, Right Updated:  05/28/18 1305     Sodium 141 mmol/L      Potassium 5 1 mmol/L      Chloride 104 mmol/L      CO2 23 mmol/L      Anion Gap 14 (H) mmol/L      BUN 8 mg/dL      Creatinine 0 66 mg/dL      Glucose 143 (H) mg/dL      Calcium 8 7 mg/dL      eGFR 110 ml/min/1 73sq m Narrative:         National Kidney Disease Education Program recommendations are as follows:  GFR calculation is accurate only with a steady state creatinine  Chronic Kidney disease less than 60 ml/min/1 73 sq  meters  Kidney failure less than 15 ml/min/1 73 sq  meters      Hepatic function panel [[id number]]  (Abnormal) Collected:  05/28/18 1240    Lab Status:  Final result Specimen:  Blood from Arm, Right Updated:  05/28/18 1305     Total Bilirubin 0 60 mg/dL      Bilirubin, Direct 0 04 mg/dL      Alkaline Phosphatase 108 U/L      AST 38 U/L      ALT 20 U/L      Total Protein 8 9 (H) g/dL      Albumin 4 1 g/dL     Lipase [[id number]]  (Normal) Collected:  05/28/18 1240    Lab Status:  Final result Specimen:  Blood from Arm, Right Updated:  05/28/18 1305     Lipase 106 u/L     CBC and differential [[id number]]  (Abnormal) Collected:  05/28/18 1240    Lab Status:  Final result Specimen:  Blood from Arm, Right Updated:  05/28/18 1249     WBC 24 50 (H) Thousand/uL      RBC 5 32 (H) Million/uL      Hemoglobin 15 9 (H) g/dL      Hematocrit 47 9 (H) %      MCV 90 fL      MCH 29 9 pg      MCHC 33 2 g/dL      RDW 14 4 %      MPV 9 6 fL      Platelets 630 Thousands/uL      nRBC 0 /100 WBCs      Neutrophils Relative 80 (H) %      Immat GRANS % 1 %      Lymphocytes Relative 15 %      Monocytes Relative 4 %      Eosinophils Relative 0 %      Basophils Relative 0 %      Neutrophils Absolute 19 64 (H) Thousands/µL      Immature Grans Absolute 0 15 Thousand/uL      Lymphocytes Absolute 3 58 Thousands/µL      Monocytes Absolute 1 01 Thousand/µL      Eosinophils Absolute 0 01 Thousand/µL      Basophils Absolute 0 11 (H) Thousands/µL     UA w Reflex to Microscopic w Reflex to Culture [[id number]]  (Abnormal) Collected:  05/28/18 1240    Lab Status:  Final result Specimen:  Urine from Urine, Clean Catch Updated:  05/28/18 1249     Color, UA Yellow     Clarity, UA Cloudy     Specific Gravity, UA >=1 030     pH, UA 6 0     Leukocytes, UA Negative     Nitrite, UA Negative     Protein,  (2+) (A) mg/dl      Glucose, UA Negative mg/dl      Ketones, UA >=80 (3+) (A) mg/dl      Urobilinogen, UA 1 0 E U /dl      Bilirubin, UA Small (A)     Blood, UA Trace-Intact (A)    POCT pregnancy, urine [[id number]]  (Normal) Resulted:  05/28/18 1230    Lab Status:  Final result Updated:  05/28/18 1239     EXT PREG TEST UR (Ref: Negative) negative                 CT abdomen pelvis with contrast   Final Result by Rozetta Nissen, MD (05/28 1343)      Thickening of the wall the sigmoid colon raising the possibility of colitis  No secondary complications seen  Stable appearance of cirrhotic liver with scarring, dilated pancreatic duct in the tail of the pancreas, subcutaneous nodularity in the buttocks, and chronic superior endplate compression fracture at T12            Workstation performed: [id number]                    Procedures  Procedures       Phone Contacts  ED Phone Contact    ED Course  ED Course as of May 28 1443   Mon May 28, 2018   1400 Pt having increasing pain again  Will give another dose of dilaudid  MDM  Number of Diagnoses or Management Options  Colitis: new and requires workup  Intractable epigastric abdominal pain: new and requires workup  Intractable vomiting: new and requires workup  Leukocytosis: new and requires workup  Diagnosis management comments: DDx including but not limited to: appendicitis, gastroenteritis, gastritis, PUD, GERD, gastroparesis, hepatitis, pancreatitis, colitis, enteritis, diverticulitis, food poisoning, mesenteric adenitis, mesenteric ischemia, IBD, IBS, ileus, bowel obstruction, volvulus, internal hernia, cholecystitis, biliary colic, choledocholithiasis, perforated viscus, splenic etiology, constipation, AAA, testicular torsion, renal colic, pyelonephritis, UTI; doubt cardiac etiology  Plan: analgesia  Antiemetic  CT a/p  Lipase  CBC, BMP, LFTs  dispo pending          Amount and/or Complexity of Data Reviewed  Clinical lab tests: ordered and reviewed  Tests in the radiology section of CPT®: ordered and reviewed  Review and summarize past medical records: yes (Previous hospitalization  )  Discuss the patient with other providers: yes (Internal Medicine)  Independent visualization of images, tracings, or specimens: yes    Risk of Complications, Morbidity, and/or Mortality  Presenting problems: moderate  Management options: low  General comments: 30-year-old female with abdominal pain  Lipase normal  She does have significant leukocytosis of 24  Seems to be significantly elevated to be completely reactive to her vomiting  She does have signs of colitis on CT scan  She does have risk factors for C diff including recent hospitalization  She has had intractable pain, nausea and vomiting  Could represent her chronic pancreatitis  Clinically suspicious for acute pancreatitis, particularly as she had triggering factor of drinking alcohol last night  Given her intractable nausea vomiting and pain will admit for continued evaluation, IV hydration, analgesia  Patient agrees the plan  I spoke with Internal Medicine who agrees  She is stable at time of admission      Patient Progress  Patient progress: stable    CritCare Time    Disposition  Final diagnoses:   Intractable epigastric abdominal pain   Intractable vomiting   Leukocytosis   Colitis     Time reflects when diagnosis was documented in both MDM as applicable and the Disposition within this note     Time User Action Codes Description Comment    5/28/2018  2:42 PM Veto Liner Add [R10 13] Intractable epigastric abdominal pain     5/28/2018  2:42 PM Dyane Jono L Add [R11 10] Intractable vomiting     5/28/2018  2:42 PM Veto Liner Add [X72 601] Leukocytosis     5/28/2018  2:42 PM Veto Liner Add [K52 9] Colitis       ED Disposition     ED Disposition Condition Comment    Admit  Case was discussed with Dr Virginia Melchor and the patient's admission status was agreed to be Admission Status: inpatient status to the service of Dr Florin Lim  Follow-up Information    None         Patient's Medications   Discharge Prescriptions    No medications on file     No discharge procedures on file      ED Provider  Electronically Signed by           Porsha Xiong PA-C  05/28/18 5874

## 2018-05-28 NOTE — ED NOTES
Pt observed sticking her finger down her throat and vomiting  Despite instructions to stop pt stating "These pain meds aren't working"  Pt also visualized getting out of bed to fill up a cup of water        Yessi Christian RN  05/28/18 [id number]

## 2018-05-28 NOTE — ASSESSMENT & PLAN NOTE
· Hypertensive urgency noted on admission, likely related to pain response  · Most recent /95  · Continue Catapres 0 3 mg bid (confirmed with patient)  · Will add PRN IV lopressor for SBP >180

## 2018-05-28 NOTE — NURSING NOTE
Spoke to dr Mona Wall regarding her order that was entered for 1mg po ativan   Not appropriate at this time, pt actively vomiting and treated with dilaudid and zofran  Not effective  Okay per md to order and administer 1mg ativan IV  Continue monitoring ciwa  Pt safety measures intact  call bell in reach

## 2018-05-28 NOTE — ASSESSMENT & PLAN NOTE
· Lower quadrant abdominal pain with associated nausea and vomiting  Denies diarrhea  Differential includes pancreatitis vs  Colitis  Lipase WNL  · CT with evidence of thickening of the wall the sigmoid colon raising the possibility of colitis    · Pt with leukocytosis on admission at 24 50 with tachycardia, meeting sepsis criteria with possible source of infection from colitis  · Will place patient on cipro/flagyl  · GI consult, pt was supposed to follow up with GI after last admission for MRI/ERCP and EGD to evaluate for esophageal varices, patient did not follow up  · Appreciate recommendations  · IVF  · Clear liquid diet, however if patient with continued vomiting will have to scale back to NPO, monitor  · PRN antiemetics and analgesics with dilaudid 0 2 mg Q4h (pt with allergy to morphine)   · Continue patient's protonix (switch to IV due to vomiting), dicyclomine and sucralfate  · Pt does have history of chronically dilated pancreatic duct as well

## 2018-05-28 NOTE — ASSESSMENT & PLAN NOTE
· Pt with history of, pt will need EGD to evaluate for esophageal varices, likely as an outpatient  · Pt reports that she did have 2 alcoholic beverages 2 days ago, this was her last drink  · Pt with some tremors on exam, could be related to acute infection, but will add PRN ativan   · CIWA protocol with thiamine and folate supplementation   · Encourage alcoholic cessation  · LFTs WNL, monitor

## 2018-05-28 NOTE — ED NOTES
Pt requesting more pain medication, provider Ari Cardoso made aware       Josselin Johnson, JORGE  05/28/18 [id number]

## 2018-05-28 NOTE — H&P
H&P- Julia Jorgensen 1975, 40 y o  female MRN: [id number]    Unit/Bed#: -01 Encounter: [id number]    Primary Care Provider: Sveta Farrell MD   Date and time admitted to hospital: 5/28/2018 12:15 PM       DOS: 5/28/2018      * Abdominal pain   Assessment & Plan    · Lower quadrant abdominal pain with associated nausea and vomiting  Denies diarrhea  Differential includes recurrent pancreatitis vs  Colitis  Lipase WNL  · CT with evidence of thickening of the wall the sigmoid colon raising the possibility of colitis    · Pt with leukocytosis on admission at 24 50 with tachycardia, meeting sepsis criteria with possible source of infection from colitis  · Will place patient on cipro/flagyl  · GI consult, pt was supposed to follow up with GI after last admission for MRI/ERCP and EGD to evaluate for esophageal varices, patient did not follow up  · Appreciate recommendations, needs close follow up as an outpatient, will get CM involved as well to help facilitate   · IVF  · Clear liquid diet, however if patient with continued vomiting will have to scale back to NPO, monitor  · PRN antiemetics and analgesics with dilaudid 0 2 mg Q4h (pt with allergy to morphine)   · Continue patient's protonix (switch to IV due to vomiting), dicyclomine and sucralfate (pt reports compliance with these as an outpatient)  · Pt does have history of chronically dilated pancreatic duct as well, stable per CT scan          Alcoholic cirrhosis ([facility] [address] )   Assessment & Plan    · Pt with history of, pt will need EGD to evaluate for esophageal varices, likely as an outpatient  · Pt reports that she did have 2 alcoholic beverages 2 days ago, this was her last drink  · Pt with some tremors on exam, could be related to acute infection, but will add PRN ativan   · CIWA protocol with thiamine and folate supplementation   · Encourage alcoholic cessation  · LFTs WNL, monitor        Alcohol abuse   Assessment & Plan    · Plan as above  · Monitor for withdrawal symptoms  · Pt did admit to alcohol use a few days ago   · CIWA protocol and PRN ativan ordered        Nicotine dependence   Assessment & Plan    · Strongly encourage cessation  · Nicotine patch         Essential hypertension   Assessment & Plan    · Hypertensive urgency noted on admission, likely related to pain response  · Most recent /95  · Continue Catapres 0 3 mg bid (confirmed with patient)  · Will add PRN IV lopressor for SBP >180          VTE Prophylaxis: Enoxaparin (Lovenox)  / reason for no mechanical VTE prophylaxis ambulate patient   Code Status: Level I - Full code, discussed with patient at bedside  POLST: There is no POLST form on file for this patient (pre-hospital)  Discussion with family: Discussed with patient at bedside  Anticipated Length of Stay:  Patient will be admitted on an Inpatient basis with an anticipated length of stay of  > 2 midnights  Justification for Hospital Stay: evaluation of abdominal pain with GI consult, possible colitis found on imaging requiring IV antibiotics  Need to control abdominal pain  Total Time for Visit, including Counseling / Coordination of Care: 45 minutes  Greater than 50% of this total time spent on direct patient counseling and coordination of care  Chief Complaint:   "My stomach hurts so much, can I have pain medication?"    History of Present Illness:    Bernarda Wang is a 40 y o  female with significant past medical history of alcoholic cirrhosis, chronic pancreatitis, tobacco abuse, hypertension, alcohol abuse who presents with abdominal pain, nausea and vomiting x1 day  Patient reports that starting last night she began to have severe abdominal pain with multiple episodes of vomiting  Nonbloody  Denies any diarrhea and reports last bowel movement was 2 days ago  Per ED notes patient had been drinking alcohol at the time when she developed epigastric pain   However patient reported to this provider that her last drink was 2 days ago and she had 2 alcoholic beverages  She reports the pain is around her belly button and lower quadrants  Lying on her side makes it feel somewhat improved, states she cannot lie on her back due to the pain  Reports the pain feels like "labor pains, cramping " States this does feel similar to her pain when she has an acute pancreatitis flare  She has had decreased appetite and nothing to eat today due to the vomiting  Patient reports that she has been "helping herself vomit because it gets stuck " She denies any fevers or chills  Does have history of a  about 23 years ago, no additional abdominal surgeries noted  No difficulties with urination noted  Pt reports that she was compliant with her medications at home after discharge from the hospital      Review of Systems:    Review of Systems   Constitutional: Positive for appetite change  Negative for chills, diaphoresis and fever  HENT: Negative for congestion, postnasal drip, rhinorrhea, sinus pain, sinus pressure, sneezing, sore throat, tinnitus and trouble swallowing  Eyes: Negative  Respiratory: Negative for cough, chest tightness, shortness of breath and wheezing  Cardiovascular: Negative for chest pain and leg swelling  Gastrointestinal: Positive for abdominal pain, nausea and vomiting  Negative for diarrhea  Genitourinary: Negative for difficulty urinating, dysuria, frequency and hematuria  Musculoskeletal: Negative for back pain, joint swelling, myalgias and neck pain  Skin: Negative for color change, pallor and rash  Neurological: Negative for dizziness, syncope, light-headedness and headaches         Past Medical and Surgical History:     Past Medical History:   Diagnosis Date    Alcohol abuse     Arthritis     GERD (gastroesophageal reflux disease)     Hypertension     Nicotine dependence     Obesity     Pancreatitis     Panic attack        Past Surgical History:   Procedure Laterality Date    ABDOMINAL SURGERY      C SECTION    ESOPHAGOGASTRODUODENOSCOPY N/A 12/15/2017    Procedure: ESOPHAGOGASTRODUODENOSCOPY (EGD); Surgeon: Jasmyne Quesada MD;  Location: [facility]; Service: Gastroenterology       Meds/Allergies:    Prior to Admission medications    Medication Sig Start Date End Date Taking?  Authorizing Provider   amitriptyline (ELAVIL) 10 mg tablet Take 10 mg by mouth daily at bedtime   Yes Historical Provider, MD   cloNIDine (CATAPRES) 0 1 mg tablet Take 2 tablets by mouth every 8 (eight) hours  Patient taking differently: Take 0 3 mg by mouth 2 (two) times a day   12/16/17  Yes Charmaine Rincon MD   dicyclomine (BENTYL) 20 mg tablet Take 20 mg by mouth 3 (three) times a day   Yes Historical Provider, MD   hydrOXYzine HCL (ATARAX) 25 mg tablet Take 25 mg by mouth daily at bedtime   Yes Historical Provider, MD   Mometasone Furo-Formoterol Fum (DULERA) 100-5 MCG/ACT AERO Inhale as needed Unsure of dose     Yes Historical Provider, MD   pancrelipase, Lip-Prot-Amyl, (CREON) 24,000 units Take 1 capsule by mouth 3 (three) times a day with meals 10/31/17  Yes Hui Echevarria MD   pantoprazole (PROTONIX) 40 mg tablet Take 1 tablet by mouth 2 (two) times a day 12/16/17  Yes Charmaine Rincon MD   sucralfate (CARAFATE) 1 g tablet Take 1 tablet (1 g total) by mouth 4 (four) times a day 4/23/18  Yes Bruno Britton MD   amitriptyline (ELAVIL) 10 mg tablet Take 1 tablet by mouth daily at bedtime 12/16/17 5/28/18  Charmaine Rincon MD   dicyclomine (BENTYL) 10 mg capsule Take 2 capsules (20 mg total) by mouth 3 (three) times a day before meals 4/23/18 5/28/18  Bruno Britton MD   hydrOXYzine pamoate (VISTARIL) 25 mg capsule Take 25 mg by mouth daily at bedtime  5/28/18  Historical Provider, MD   nicotine (NICODERM CQ) 21 mg/24 hr TD 24 hr patch Place 1 patch on the skin daily 11/24/17 5/28/18  Hina Ortega MD   ondansetron (ZOFRAN) 4 mg tablet Take 1 tablet by mouth every 8 (eight) hours as needed for nausea or vomiting for up to 7 days 12/16/17 5/28/18  Dewayne Rincon MD   simethicone (MYLICON) 80 mg chewable tablet Chew 1 tablet every 6 (six) hours as needed for flatulence 10/31/17 5/28/18  Guero Diallo MD     I have reviewed home medications with patient personally  As well as allscripts records from last admission (discharge medication list)    Allergies: Allergies   Allergen Reactions    Morphine And Related Swelling       Social History:     Marital Status: Single   Occupation:   Patient Pre-hospital Living Situation: Pt lives at home with her boyfriend  Patient Pre-hospital Level of Mobility: Full mobility   Patient Pre-hospital Diet Restrictions: None   Substance Use History:   History   Alcohol Use    Yes     History   Smoking Status    Current Every Day Smoker    Packs/day: 1 00    Years: 29 00    Types: Cigarettes   Smokeless Tobacco    Never Used     History   Drug Use No       Family History:    non-contributory    Physical Exam:     Vitals:   Blood Pressure: (!) 190/90 (05/28/18 1531)  Pulse: 76 (05/28/18 1531)  Temperature: 97 5 °F (36 4 °C) (05/28/18 1531)  Temp Source: Oral (05/28/18 1531)  Respirations: 17 (05/28/18 1531)  Height: 5' 4" (162 6 cm) (05/28/18 1531)  Weight - Scale: 75 kg (165 lb 5 5 oz) (05/28/18 1531)  SpO2: 98 % (05/28/18 1531)    Physical Exam   Constitutional: She appears distressed (mild due to pain )  Pt is in mild distress due to pain  Continually rolls around in bed and is clutching her abdomen asking for pain medication  Is tearful at times as well  Pt also found to be sticking her hand down her throat to facilitate vomiting  She states that the vomiting gets stuck and she needs to help it out  HENT:   Head: Normocephalic and atraumatic  Eyes: Conjunctivae are normal  No scleral icterus  Cardiovascular: Normal rate, regular rhythm and intact distal pulses  Pulmonary/Chest: Effort normal and breath sounds normal  No respiratory distress  She has no wheezes  She has no rales  Abdominal: Soft  Bowel sounds are normal  She exhibits no distension  There is tenderness (generalized to palpation, however more localized to the lower quadrants )  There is guarding  There is no rebound  Musculoskeletal: She exhibits edema  Neurological: She is alert  Skin: Skin is warm and dry  She is not diaphoretic  No erythema  Psychiatric:   Appears anxious    Vitals reviewed  Additional Data:     Lab Results: I have personally reviewed pertinent reports  Results from last 7 days  Lab Units 05/28/18  1240   WBC Thousand/uL 24 50*   HEMOGLOBIN g/dL 15 9*   HEMATOCRIT % 47 9*   PLATELETS Thousands/uL 236   NEUTROS PCT % 80*   LYMPHS PCT % 15   MONOS PCT % 4   EOS PCT % 0       Results from last 7 days  Lab Units 05/28/18  1240   SODIUM mmol/L 141   POTASSIUM mmol/L 5 1   CHLORIDE mmol/L 104   CO2 mmol/L 23   BUN mg/dL 8   CREATININE mg/dL 0 66   CALCIUM mg/dL 8 7   TOTAL PROTEIN g/dL 8 9*   BILIRUBIN TOTAL mg/dL 0 60   ALK PHOS U/L 108   ALT U/L 20   AST U/L 38   GLUCOSE RANDOM mg/dL 143*                   Imaging: I have personally reviewed pertinent reports  CT abdomen pelvis with contrast   Final Result by Jasson Li MD (05/28 1343)      Thickening of the wall the sigmoid colon raising the possibility of colitis  No secondary complications seen  Stable appearance of cirrhotic liver with scarring, dilated pancreatic duct in the tail of the pancreas, subcutaneous nodularity in the buttocks, and chronic superior endplate compression fracture at T12            Workstation performed: [id number]             EKG, Pathology, and Other Studies Reviewed on Admission:   · CT abdomen results reviewed    [facility] / [facility] Records Reviewed: Yes     ** Please Note: This note has been constructed using a voice recognition system   **

## 2018-05-29 PROBLEM — E83.42 HYPOMAGNESEMIA: Status: ACTIVE | Noted: 2018-05-29

## 2018-05-29 LAB
ANION GAP SERPL CALCULATED.3IONS-SCNC: 12 MMOL/L (ref 4–13)
BASOPHILS # BLD AUTO: 0.06 THOUSANDS/ΜL (ref 0–0.1)
BASOPHILS NFR BLD AUTO: 0 % (ref 0–1)
BUN SERPL-MCNC: 13 MG/DL (ref 5–25)
CALCIUM SERPL-MCNC: 8.1 MG/DL (ref 8.3–10.1)
CHLORIDE SERPL-SCNC: 100 MMOL/L (ref 100–108)
CO2 SERPL-SCNC: 24 MMOL/L (ref 21–32)
CREAT SERPL-MCNC: 0.66 MG/DL (ref 0.6–1.3)
EOSINOPHIL # BLD AUTO: 0.01 THOUSAND/ΜL (ref 0–0.61)
EOSINOPHIL NFR BLD AUTO: 0 % (ref 0–6)
ERYTHROCYTE [DISTWIDTH] IN BLOOD BY AUTOMATED COUNT: 14.1 % (ref 11.6–15.1)
GFR SERPL CREATININE-BSD FRML MDRD: 110 ML/MIN/1.73SQ M
GLUCOSE SERPL-MCNC: 123 MG/DL (ref 65–140)
HCT VFR BLD AUTO: 42.5 % (ref 34.8–46.1)
HGB BLD-MCNC: 13.9 G/DL (ref 11.5–15.4)
IMM GRANULOCYTES # BLD AUTO: 0.07 THOUSAND/UL (ref 0–0.2)
IMM GRANULOCYTES NFR BLD AUTO: 0 % (ref 0–2)
LYMPHOCYTES # BLD AUTO: 4.08 THOUSANDS/ΜL (ref 0.6–4.47)
LYMPHOCYTES NFR BLD AUTO: 23 % (ref 14–44)
MAGNESIUM SERPL-MCNC: 1.4 MG/DL (ref 1.6–2.6)
MCH RBC QN AUTO: 29.8 PG (ref 26.8–34.3)
MCHC RBC AUTO-ENTMCNC: 32.7 G/DL (ref 31.4–37.4)
MCV RBC AUTO: 91 FL (ref 82–98)
MONOCYTES # BLD AUTO: 1.78 THOUSAND/ΜL (ref 0.17–1.22)
MONOCYTES NFR BLD AUTO: 10 % (ref 4–12)
NEUTROPHILS # BLD AUTO: 11.91 THOUSANDS/ΜL (ref 1.85–7.62)
NEUTS SEG NFR BLD AUTO: 67 % (ref 43–75)
NRBC BLD AUTO-RTO: 0 /100 WBCS
PHOSPHATE SERPL-MCNC: 2.7 MG/DL (ref 2.7–4.5)
PLATELET # BLD AUTO: 187 THOUSANDS/UL (ref 149–390)
PMV BLD AUTO: 10.1 FL (ref 8.9–12.7)
POTASSIUM SERPL-SCNC: 3.4 MMOL/L (ref 3.5–5.3)
RBC # BLD AUTO: 4.67 MILLION/UL (ref 3.81–5.12)
SODIUM SERPL-SCNC: 136 MMOL/L (ref 136–145)
WBC # BLD AUTO: 17.91 THOUSAND/UL (ref 4.31–10.16)

## 2018-05-29 PROCEDURE — 83735 ASSAY OF MAGNESIUM: CPT | Performed by: INTERNAL MEDICINE

## 2018-05-29 PROCEDURE — 99232 SBSQ HOSP IP/OBS MODERATE 35: CPT | Performed by: PHYSICIAN ASSISTANT

## 2018-05-29 PROCEDURE — C9113 INJ PANTOPRAZOLE SODIUM, VIA: HCPCS | Performed by: PHYSICIAN ASSISTANT

## 2018-05-29 PROCEDURE — 85025 COMPLETE CBC W/AUTO DIFF WBC: CPT | Performed by: PHYSICIAN ASSISTANT

## 2018-05-29 PROCEDURE — 80048 BASIC METABOLIC PNL TOTAL CA: CPT | Performed by: PHYSICIAN ASSISTANT

## 2018-05-29 PROCEDURE — 84100 ASSAY OF PHOSPHORUS: CPT | Performed by: INTERNAL MEDICINE

## 2018-05-29 PROCEDURE — 99254 IP/OBS CNSLTJ NEW/EST MOD 60: CPT | Performed by: INTERNAL MEDICINE

## 2018-05-29 PROCEDURE — 94762 N-INVAS EAR/PLS OXIMTRY CONT: CPT

## 2018-05-29 RX ORDER — MAGNESIUM SULFATE HEPTAHYDRATE 40 MG/ML
2 INJECTION, SOLUTION INTRAVENOUS ONCE
Status: COMPLETED | OUTPATIENT
Start: 2018-05-29 | End: 2018-05-29

## 2018-05-29 RX ORDER — POTASSIUM CHLORIDE 20 MEQ/1
40 TABLET, EXTENDED RELEASE ORAL ONCE
Status: COMPLETED | OUTPATIENT
Start: 2018-05-29 | End: 2018-05-29

## 2018-05-29 RX ORDER — LORAZEPAM 2 MG/ML
1 INJECTION INTRAMUSCULAR EVERY 6 HOURS PRN
Status: DISCONTINUED | OUTPATIENT
Start: 2018-05-29 | End: 2018-05-31 | Stop reason: HOSPADM

## 2018-05-29 RX ADMIN — METRONIDAZOLE 500 MG: 500 INJECTION, SOLUTION INTRAVENOUS at 21:28

## 2018-05-29 RX ADMIN — DICYCLOMINE HYDROCHLORIDE 20 MG: 20 TABLET ORAL at 17:41

## 2018-05-29 RX ADMIN — SUCRALFATE 1 G: 1 TABLET ORAL at 11:05

## 2018-05-29 RX ADMIN — NICOTINE 1 PATCH: 21 PATCH, EXTENDED RELEASE TRANSDERMAL at 11:03

## 2018-05-29 RX ADMIN — HYDROMORPHONE HYDROCHLORIDE 0.2 MG: 1 INJECTION, SOLUTION INTRAMUSCULAR; INTRAVENOUS; SUBCUTANEOUS at 15:14

## 2018-05-29 RX ADMIN — Medication 1 TABLET: at 11:06

## 2018-05-29 RX ADMIN — ONDANSETRON 4 MG: 2 INJECTION INTRAMUSCULAR; INTRAVENOUS at 15:13

## 2018-05-29 RX ADMIN — SUCRALFATE 1 G: 1 TABLET ORAL at 17:41

## 2018-05-29 RX ADMIN — LORAZEPAM 1 MG: 2 INJECTION INTRAMUSCULAR; INTRAVENOUS at 13:54

## 2018-05-29 RX ADMIN — FOLIC ACID 1 MG: 1 TABLET ORAL at 11:03

## 2018-05-29 RX ADMIN — CLONIDINE HYDROCHLORIDE 0.3 MG: 0.1 TABLET ORAL at 08:18

## 2018-05-29 RX ADMIN — HYDROMORPHONE HYDROCHLORIDE 0.2 MG: 1 INJECTION, SOLUTION INTRAMUSCULAR; INTRAVENOUS; SUBCUTANEOUS at 08:51

## 2018-05-29 RX ADMIN — ONDANSETRON 4 MG: 2 INJECTION INTRAMUSCULAR; INTRAVENOUS at 07:35

## 2018-05-29 RX ADMIN — Medication 100 MG: at 11:03

## 2018-05-29 RX ADMIN — PANTOPRAZOLE SODIUM 40 MG: 40 INJECTION, POWDER, FOR SOLUTION INTRAVENOUS at 20:39

## 2018-05-29 RX ADMIN — MAGNESIUM SULFATE HEPTAHYDRATE 2 G: 40 INJECTION, SOLUTION INTRAVENOUS at 11:04

## 2018-05-29 RX ADMIN — CIPROFLOXACIN 400 MG: 2 INJECTION, SOLUTION INTRAVENOUS at 07:36

## 2018-05-29 RX ADMIN — HYDROMORPHONE HYDROCHLORIDE 0.2 MG: 1 INJECTION, SOLUTION INTRAMUSCULAR; INTRAVENOUS; SUBCUTANEOUS at 04:48

## 2018-05-29 RX ADMIN — METOPROLOL TARTRATE 5 MG: 5 INJECTION, SOLUTION INTRAVENOUS at 11:03

## 2018-05-29 RX ADMIN — PANCRELIPASE 24000 UNITS: 24000; 76000; 120000 CAPSULE, DELAYED RELEASE PELLETS ORAL at 17:41

## 2018-05-29 RX ADMIN — METRONIDAZOLE 500 MG: 500 INJECTION, SOLUTION INTRAVENOUS at 13:54

## 2018-05-29 RX ADMIN — PANTOPRAZOLE SODIUM 40 MG: 40 INJECTION, POWDER, FOR SOLUTION INTRAVENOUS at 07:35

## 2018-05-29 RX ADMIN — METRONIDAZOLE 500 MG: 500 INJECTION, SOLUTION INTRAVENOUS at 04:01

## 2018-05-29 RX ADMIN — CLONIDINE HYDROCHLORIDE 0.3 MG: 0.1 TABLET ORAL at 20:39

## 2018-05-29 RX ADMIN — HYDROMORPHONE HYDROCHLORIDE 0.2 MG: 1 INJECTION, SOLUTION INTRAMUSCULAR; INTRAVENOUS; SUBCUTANEOUS at 21:29

## 2018-05-29 RX ADMIN — CIPROFLOXACIN 400 MG: 2 INJECTION, SOLUTION INTRAVENOUS at 20:38

## 2018-05-29 RX ADMIN — POTASSIUM CHLORIDE 40 MEQ: 1500 TABLET, EXTENDED RELEASE ORAL at 11:03

## 2018-05-29 RX ADMIN — SUCRALFATE 1 G: 1 TABLET ORAL at 21:28

## 2018-05-29 RX ADMIN — DICYCLOMINE HYDROCHLORIDE 20 MG: 20 TABLET ORAL at 21:28

## 2018-05-29 RX ADMIN — AMITRIPTYLINE HYDROCHLORIDE 10 MG: 10 TABLET, FILM COATED ORAL at 21:30

## 2018-05-29 RX ADMIN — LORAZEPAM 1 MG: 2 INJECTION INTRAMUSCULAR; INTRAVENOUS at 20:39

## 2018-05-29 RX ADMIN — HYDROMORPHONE HYDROCHLORIDE 0.2 MG: 1 INJECTION, SOLUTION INTRAMUSCULAR; INTRAVENOUS; SUBCUTANEOUS at 00:41

## 2018-05-29 RX ADMIN — PANCRELIPASE 24000 UNITS: 24000; 76000; 120000 CAPSULE, DELAYED RELEASE PELLETS ORAL at 11:06

## 2018-05-29 NOTE — NURSING NOTE
Rechecked BP  Auscultated  Still elevated despite meds administered  Iv lopressor on board and will give  Monitor for effectiveness

## 2018-05-29 NOTE — PROGRESS NOTES
Requesting to have ice chips even tho orders says sips with meds  [facility] who says okay to give patient Ice chips    Deb Willard, RN

## 2018-05-29 NOTE — ASSESSMENT & PLAN NOTE
· Pt with history of, pt will need EGD to evaluate for esophageal varices, likely as an outpatient  · Does not appear to have hepatic encephalopathy   · Pt reports that she did have 2 alcoholic beverages 2 days ago, this was her last drink, however differing stories  · PRN ativan 1 mg Q6H   · CIWA protocol with thiamine and folate supplementation   · Encourage alcoholic cessation  · LFTs WNL, monitor

## 2018-05-29 NOTE — PLAN OF CARE
Skin integrity is maintained or improved Progressing      Nutrient/Hydration intake appropriate for improving, restoring or maintaining nutritional needs Progressing      Discharge to post-acute care or home with appropriate resources Progressing

## 2018-05-29 NOTE — PROGRESS NOTES
Progress Note - Jeanette Ryder 1975, 40 y o  female MRN: [id number]    Unit/Bed#: -01 Encounter: [id number]    Primary Care Provider: Mari Shelton MD   Date and time admitted to hospital: 5/28/2018 12:15 PM       DOS: 5/29/2018      * Abdominal pain   Assessment & Plan    · Lower quadrant abdominal pain with associated nausea and vomiting  Denies diarrhea  Lipase WNL  · Pt still with nausea/vomiting and abdominal pain, slightly improved from this morning   · CT with evidence of thickening of the wall of the sigmoid colon raising the possibility of colitis  · Pt meeting sepsis criteria on admission with leukocytosis and tachycardia, improving today, WBC 17 91    · Continue IV cipro and flagyl   · Stool studies ordered, however unlikely as patient has not had a BM in a few days, could be contributing to abdominal pain  If no BM later today can take off c  Diff and contact precautions and add bowel regimen    · GI consult,  · Recommending continued supportive care, pt may need flex sig, appreciated continued recs  · Continue aggressive IVF  · Continue NPO as patient still with vomiting   · PRN antiemetics and analgesics with dilaudid 0 2 mg Q4h (pt with allergy to morphine), appears to have some pain seeking behaviors, would attempt to wean as able   · Continue patient's protonix, dicyclomine and sucralfate for pain management as well   · Pt does have history of chronically dilated pancreatic duct, stable from imaging         Hypomagnesemia   Assessment & Plan    · Mag 1 4   · Will replete with 2 g mag sulfate now  · Could be related to acute vomiting  · Also with hypokalemia of 3 4, replete as well   · Monitor electrolytes         Alcoholic cirrhosis ([facility] [address] )   Assessment & Plan    · Pt with history of, pt will need EGD to evaluate for esophageal varices, likely as an outpatient  · Does not appear to have hepatic encephalopathy   · Pt reports that she did have 2 alcoholic beverages 2 days ago, this was her last drink, however differing stories  · PRN ativan 1 mg Q6H   · CIWA protocol with thiamine and folate supplementation   · Encourage alcoholic cessation  · LFTs WNL, monitor        Alcohol abuse   Assessment & Plan    · Plan as above  · Monitor for withdrawal symptoms, pt less tremulous today  · Last CIWA score 4  · Pt did admit to alcohol use a few days ago but differing stories to different providers  · CIWA protocol and PRN ativan 1 mg Q6H        Nicotine dependence   Assessment & Plan    · Strongly encourage cessation  · Nicotine patch         Essential hypertension   Assessment & Plan    · Hypertensive urgency noted on admission with some elevations, likely related to pain response and anxiety   · Most recent /100  · Continue Catapres 0 3 mg bid (confirmed with patient)  · Continue PRN IV lopressor for SBP >180          VTE Pharmacologic Prophylaxis:   Pharmacologic: Enoxaparin (Lovenox)  Mechanical VTE Prophylaxis in Place: No    Patient Centered Rounds: I have evaluated patient without nursing staff present due to speaking to nurse outside patient's room [id number] with Specialists or Other Care Team Provider: Discussed with RN, CM, GI and reviewed previous notes  Education and Discussions with Family / Patient: Discussed with patient at bedside  Time Spent for Care: 20 minutes  More than 50% of total time spent on counseling and coordination of care as described above  Current Length of Stay: 1 day(s)    Current Patient Status: Inpatient   Certification Statement: The patient will continue to require additional inpatient hospital stay due to GI evaluation, pain management, IVF hydration due to recurrent episodes of vomiting    Discharge Plan: Pending GI clearance and symptomatic improvement  Not medically stable for discharge at this time  Patient unable to tolerate a diet       Code Status: Level 1 - Full Code      Subjective:   Pt reports that she is still having the abdominal pain  Reports that it is slightly better than this morning but states she does not remember receiving any pain medication  Reported to patient that she received pain medication at 8:50AM  She currently denies lightheadedness, dizziness, chest pain, shortness of breath, urinary difficulties  Reports that she still has not had a bowel movement in the last few days but reports passing flatus  Still has nausea and vomiting  Objective:     Vitals:   Temp (24hrs), Av °F (36 7 °C), Min:97 5 °F (36 4 °C), Max:98 4 °F (36 9 °C)    HR:  [75-95] 84  Resp:  [16-20] 20  BP: (144-241)/() 200/100  SpO2:  [96 %-100 %] 100 %  Body mass index is 28 38 kg/m²  Input and Output Summary (last 24 hours): Intake/Output Summary (Last 24 hours) at 18 1102  Last data filed at 18 0815   Gross per 24 hour   Intake             2200 ml   Output              450 ml   Net             1750 ml       Physical Exam:     Physical Exam   Constitutional: She appears distressed (mild due to pain )  Pt is in mild distress due to pain, lying on her left side  Family member present sleeping on the couch  HENT:   Head: Normocephalic and atraumatic  Eyes: Conjunctivae are normal  No scleral icterus  Cardiovascular: Normal rate, regular rhythm and intact distal pulses  Pulmonary/Chest: Effort normal and breath sounds normal  No respiratory distress  She has no wheezes  She has no rales  Abdominal: Soft  Bowel sounds are normal  She exhibits no distension  There is tenderness (generalized discomfort to palpation, less than yesterday )  Musculoskeletal: She exhibits no edema  Neurological: She is alert  Skin: Skin is warm and dry  She is not diaphoretic  No erythema  Psychiatric:   Anxious, less tremulous today    Vitals reviewed        Additional Data:     Labs:      Results from last 7 days  Lab Units 18  0423   WBC Thousand/uL 17 91*   HEMOGLOBIN g/dL 13 9   HEMATOCRIT % 42 5   PLATELETS Thousands/uL 187   NEUTROS PCT % 67   LYMPHS PCT % 23   MONOS PCT % 10   EOS PCT % 0       Results from last 7 days  Lab Units 05/29/18  0423 05/28/18  1240   SODIUM mmol/L 136 141   POTASSIUM mmol/L 3 4* 5 1   CHLORIDE mmol/L 100 104   CO2 mmol/L 24 23   BUN mg/dL 13 8   CREATININE mg/dL 0 66 0 66   CALCIUM mg/dL 8 1* 8 7   TOTAL PROTEIN g/dL  --  8 9*   BILIRUBIN TOTAL mg/dL  --  0 60   ALK PHOS U/L  --  108   ALT U/L  --  20   AST U/L  --  38   GLUCOSE RANDOM mg/dL 123 143*           * I Have Reviewed All Lab Data Listed Above  * Additional Pertinent Lab Tests Reviewed:  All Labs Within Last 24 Hours Reviewed    Imaging:    Imaging Reports Reviewed Today Include: CT abdomen  Imaging Personally Reviewed by Myself Includes:  None     Recent Cultures (last 7 days):           Last 24 Hours Medication List:     Current Facility-Administered Medications:  amitriptyline 10 mg Oral HS Cathleen Lawrence PA-C    ciprofloxacin 400 mg Intravenous Q12H Aloysius Schilder, PA-C Last Rate: 400 mg (05/29/18 0736)   cloNIDine 0 3 mg Oral Q12H [facility] Cathleen Lawrence PA-C    dicyclomine 20 mg Oral TID Cathleen Lawrence PA-C    enoxaparin 40 mg Subcutaneous Daily Cathleen Lawrence PA-C    folic acid 1 mg Oral Daily Cathleen Lawrence PA-C    HYDROmorphone 0 2 mg Intravenous Q4H PRN Aloysius Schilder, PA-C    LORazepam 1 mg Intravenous Q6H PRN Aloysius Schilder, PA-C    LORazepam 2 mg Intravenous Once Laurie Thapa,     magnesium sulfate 2 g Intravenous Once Cathleen Lawrence PA-C    metoprolol 5 mg Intravenous Q6H PRN Aloysius Schilder, PA-C    metroNIDAZOLE 500 mg Intravenous Q8H Laurie Russian, DO Last Rate: 500 mg (05/29/18 0401)   multivitamin-minerals 1 tablet Oral Daily Cathleen Lawrence PA-C    nicotine 1 patch Transdermal Daily Cathleen Lawrence PA-C    ondansetron 4 mg Intravenous Q6H PRN Aloysius Schilder, PA-C    pancrelipase (Lip-Prot-Amyl) 24,000 Units Oral TID With Meals Cathleen Lawrence PA-C    pantoprazole 40 mg Intravenous Q12H [address] Zaheer Mcmillan PA-C    potassium chloride 40 mEq Oral Once Zaheer Mcmillan PA-C    sodium chloride 1,000 mL Intravenous Once Yeny Rocha PA-C    sodium chloride 125 mL/hr Intravenous Continuous Edgard Sinks, DO Last Rate: 125 mL/hr (05/28/18 1936)   sucralfate 1 g Oral 4x Daily Cathleen Lawrence PA-C    thiamine 100 mg Oral Daily Zaheer Mcmillan PA-C         Today, Patient Was Seen By: Zaheer Mcmillan PA-C    ** Please Note: Dictation voice to text software may have been used in the creation of this document   **

## 2018-05-29 NOTE — NURSING NOTE
Pt resting in bed  Much more calm and relaxed  Able to arouse when spoken to  Fiance at bedside   Continue to monitor

## 2018-05-29 NOTE — CONSULTS
Consultation -  [facility]  Theresa Turner 40 y o  female MRN: [id number]  Unit/Bed#: -01 Encounter: [id number]         Reason for Consult / Principal Problem: Abdominal pain, nausea, vomiting; colon wall thickening on CT    HPI: Kathy is a 40year old female with a history of alcoholic cirrhosis, chronic pancreatitis and  opoid dependence  Patient presented to the emergency room yesterday for mid abdominal pain with nausea and vomiting  She reports the pain woke her from sleeping on Sunday night/early morning Monday  When asked about her last drink, she relays having two  Vodka cocktails on Sunday  Otherwise, she reports drinking alcohol on an occasional basis  She has history of chronic abdominal pain  Patient reports her symptoms are usually better with Bentyl, Carafate and Protonix  She denies hematemesis, melena, hematochezia, diarrhea, constipation, fevers or chills  Labs on admission revealed a leukocytosis of 24k  Lipase and LFTs WNL  CT of the abdomen revealed thickening of the sigmoid colon  Also noted was known PD dilation in the tail of the pancreas that appeared stable  There is stable pancreatic tail thickening as well  Patient's last EGD was in December 2017  This showed Castano's esophagus and non erosive gastritis  Biopsies negative  Review of Systems:    CONSTITUTIONAL: Denies any fever, chills, or rigors  HEENT: No earache or tinnitus  Denies hearing loss or visual disturbances  CARDIOVASCULAR: No chest pain or palpitations  RESPIRATORY: Denies any cough, hemoptysis, shortness of breath or dyspnea on exertion  GASTROINTESTINAL: As noted in the History of Present Illness  GENITOURINARY: No problems with urination  Denies any hematuria or dysuria  NEUROLOGIC: No dizziness or vertigo, denies headaches  MUSCULOSKELETAL: Denies any muscle or joint pain  SKIN: Denies skin rashes or itching     ENDOCRINE: Denies excessive thirst  Denies intolerance to heat or cold  PSYCHOSOCIAL: Denies depression or anxiety  Denies any recent memory loss  Historical Information   Past Medical History:   Diagnosis Date    Alcohol abuse     Arthritis     GERD (gastroesophageal reflux disease)     Hypertension     Nicotine dependence     Obesity     Pancreatitis     Panic attack      Past Surgical History:   Procedure Laterality Date    ABDOMINAL SURGERY      C SECTION    ESOPHAGOGASTRODUODENOSCOPY N/A 12/15/2017    Procedure: ESOPHAGOGASTRODUODENOSCOPY (EGD); Surgeon: Castro Wilder MD;  Location: [facility];   Service: Gastroenterology     Social History   History   Alcohol Use    Yes     History   Drug Use No     History   Smoking Status    Current Every Day Smoker    Packs/day: 1 00    Years: 29 00    Types: Cigarettes   Smokeless Tobacco    Never Used     Family History   Problem Relation Age of Onset    Cirrhosis Father     Alcohol abuse Father     Drug abuse Mother         Meds/Allergies     Current Facility-Administered Medications   Medication Dose Route Frequency    amitriptyline (ELAVIL) tablet 10 mg  10 mg Oral HS    ciprofloxacin (CIPRO) IVPB (premix) 400 mg  400 mg Intravenous Q12H    cloNIDine (CATAPRES) tablet 0 3 mg  0 3 mg Oral Q12H [address]    dicyclomine (BENTYL) tablet 20 mg  20 mg Oral TID    enoxaparin (LOVENOX) subcutaneous injection 40 mg  40 mg Subcutaneous Daily    folic acid (FOLVITE) tablet 1 mg  1 mg Oral Daily    HYDROmorphone (DILAUDID) injection 0 2 mg  0 2 mg Intravenous Q6H PRN    LORazepam (ATIVAN) 2 mg/mL injection 1 mg  1 mg Intravenous Q6H PRN    LORazepam (ATIVAN) 2 mg/mL injection 2 mg  2 mg Intravenous Once    metoprolol (LOPRESSOR) injection 5 mg  5 mg Intravenous Q6H PRN    metroNIDAZOLE (FLAGYL) IVPB (premix) 500 mg  500 mg Intravenous Q8H    multivitamin-minerals (CENTRUM) tablet 1 tablet  1 tablet Oral Daily    nicotine (NICODERM CQ) 21 mg/24 hr TD 24 hr patch 1 patch  1 patch Transdermal Daily    ondansetron (ZOFRAN) injection 4 mg  4 mg Intravenous Q6H PRN    pancrelipase (Lip-Prot-Amyl) (CREON) delayed release capsule 24,000 Units  24,000 Units Oral TID With Meals    pantoprazole (PROTONIX) injection 40 mg  40 mg Intravenous Q12H [address]    sodium chloride 0 9 % bolus 1,000 mL  1,000 mL Intravenous Once    sodium chloride 0 9 % infusion  125 mL/hr Intravenous Continuous    sucralfate (CARAFATE) tablet 1 g  1 g Oral 4x Daily    thiamine (VITAMIN B1) tablet 100 mg  100 mg Oral Daily       Allergies   Allergen Reactions    Morphine And Related Swelling         Objective     Blood pressure (!) 200/100, pulse 84, temperature 98 2 °F (36 8 °C), temperature source Oral, resp  rate 20, height 5' 4" (1 626 m), weight 75 kg (165 lb 5 5 oz), last menstrual period 05/15/2018, SpO2 100 %, not currently breastfeeding  Intake/Output Summary (Last 24 hours) at 05/29/18 1109  Last data filed at 05/29/18 0815   Gross per 24 hour   Intake             2200 ml   Output              450 ml   Net             1750 ml         PHYSICAL EXAM:      General Appearance:   Alert and oriented x 3   Cooperative, and in no respiratory distress   HEENT:   Normocephalic, atraumatic, anicteric      Neck:  Supple, symmetrical, trachea midline   Lungs:   Clear to auscultation bilaterally   Heart[de-identified]   Regular rate and rhythm    Abdomen:   Soft, reported mid abdominal tenderness with deep palpation, non-distended; normal bowel sounds; no masses, no organomegaly    Genitalia:   Deferred    Rectal:   Deferred    Extremities:  No cyanosis, clubbing or edema    Pulses:  2+ and symmetric all extremities    Skin:  Skin color, texture, turgor normal, no rashes or lesions    Lymph nodes:  No palpable cervical or supraclavicular lymphadenopathy        Lab Results:     Results from last 7 days  Lab Units 05/29/18  0423   WBC Thousand/uL 17 91*   HEMOGLOBIN g/dL 13 9   HEMATOCRIT % 42 5   PLATELETS Thousands/uL 187   NEUTROS PCT % 67   LYMPHS PCT % 23 MONOS PCT % 10   EOS PCT % 0       Results from last 7 days  Lab Units 05/29/18  0423 05/28/18  1240   SODIUM mmol/L 136 141   POTASSIUM mmol/L 3 4* 5 1   CHLORIDE mmol/L 100 104   CO2 mmol/L 24 23   BUN mg/dL 13 8   CREATININE mg/dL 0 66 0 66   CALCIUM mg/dL 8 1* 8 7   TOTAL PROTEIN g/dL  --  8 9*   BILIRUBIN TOTAL mg/dL  --  0 60   ALK PHOS U/L  --  108   ALT U/L  --  20   AST U/L  --  38   GLUCOSE RANDOM mg/dL 123 143*           Results from last 7 days  Lab Units 05/28/18  1240   LIPASE u/L 106       Imaging Studies: I have personally reviewed pertinent imaging studies  Ct Abdomen Pelvis With Contrast    Result Date: 5/28/2018  Impression: Thickening of the wall the sigmoid colon raising the possibility of colitis  No secondary complications seen  Stable appearance of cirrhotic liver with scarring, dilated pancreatic duct in the tail of the pancreas, subcutaneous nodularity in the buttocks, and chronic superior endplate compression fracture at T12 Workstation performed: [id number]       ASSESSMENT and PLAN:      1) Nausea, vomiting and abdominal pain - Discussed with Edgardo Garza on SLIM  It was reported the patient was seen self inducing vomiting  She does have history of chronic pancreatitis  She has not abstained from alcohol  She was also recommended on multiple admissions to have outpatient EUS to evaluate her dilated pancreatic duct and have a possible celiac plexus block in the future  She reports issues with transportation  Likely her nausea and vomiting are secondary to recent alcohol intake   There is also new CT sigmoid thickening    - Supportive care, IV hydration    - Continue Creon with meals and snacks   - Continue PPI BID and Carafate QID   - Continue Bentyl TID  - Again, reiterated the importance of abstinence from alcohol   - Outpatient EUS to evaluate pancreatic duct dilatation and to rule out IPMN   - Follow-up stool studies   - Inpatient flex sig vs colonoscopy if symptoms do not improve 2) Sigmoid thickening on CT - Plan as above  Cipro and Flagyl for now  Follow-up stool studies  Possible flex sig vs colonoscopy  3) Cirrhosis secondary to alcohol - Patient denies alcohol abuse  Unable to calculate MELD as her INR was not drawn today  LFTs WNL  AFP and U/S negative in April 2018  No varices noted on EGD in December    - Continue to monitor LFTs and INR  - [facility] protocol   - Outpatient follow-up       The patient was seen and examined by Dr Harley Noguera, all jordan medical decisions were made with Dr Harley Noguera  Thank you for allowing us to participate in the care of this pleasant patient  We will follow up with you closely

## 2018-05-29 NOTE — NURSING NOTE
Pt anxious and tearful  Frequently asking about pain medications  Exhibits pain seeking behavior  Reviewed plan of care and educated her on meds and pain control and inflammatory process  Safety measures in tact  Call bell in reach  Continue to monitor

## 2018-05-29 NOTE — ASSESSMENT & PLAN NOTE
· Lower quadrant abdominal pain with associated nausea and vomiting  Denies diarrhea  Lipase WNL  · Pt still with nausea/vomiting and abdominal pain, slightly improved from this morning   · CT with evidence of thickening of the wall of the sigmoid colon raising the possibility of colitis  · Pt meeting sepsis criteria on admission with leukocytosis and tachycardia, improving today, WBC 17 91    · Continue IV cipro and flagyl   · Stool studies ordered, however unlikely as patient has not had a BM in a few days, could be contributing to abdominal pain  If no BM later today can take off c  Diff and contact precautions and add bowel regimen    · GI consult,  · Recommending continued supportive care, pt may need flex sig, appreciated continued recs  · Continue aggressive IVF  · Continue NPO as patient still with vomiting   · PRN antiemetics and analgesics with dilaudid 0 2 mg Q4h (pt with allergy to morphine), appears to have some pain seeking behaviors, would attempt to wean as able   · Continue patient's protonix, dicyclomine and sucralfate for pain management as well   · Pt does have history of chronically dilated pancreatic duct, stable from imaging

## 2018-05-29 NOTE — NURSING NOTE
Pt in bed resting  Appears comfortable  Denies nausea, vomiting  Safety measures in tact  Call bell in reach  Continue to monitor

## 2018-05-29 NOTE — ASSESSMENT & PLAN NOTE
· Plan as above  · Monitor for withdrawal symptoms, pt less tremulous today  · Last CIWA score 4  · Pt did admit to alcohol use a few days ago but differing stories to different providers  · CIWA protocol and PRN ativan 1 mg Q6H

## 2018-05-29 NOTE — CASE MANAGEMENT
Initial Clinical Review    Admission: Date/Time/Statement: 5/28/18 @ 1442     Orders Placed This Encounter   Procedures    Inpatient Admission (expected length of stay for this patient is greater than two midnights)     Standing Status:   Standing     Number of Occurrences:   1     Order Specific Question:   Admitting Physician     Answer:   Shilpa Lipoma [[id number]]     Order Specific Question:   Level of Care     Answer:   Med Surg [[id number]]     Order Specific Question:   Estimated length of stay     Answer:   More than 2 Midnights     Order Specific Question:   Certification     Answer:   I certify that inpatient services are medically necessary for this patient for a duration of greater than two midnights  See H&P and MD Progress Notes for additional information about the patient's course of treatment  ED: Date/Time/Mode of Arrival:   ED Arrival Information     Expected Arrival Acuity Means of Arrival Escorted By Service Admission Type    - 5/28/2018 12:15 Urgent Ambulance [facility] Medicine Urgent    Arrival Complaint    Abdominal Pain          Chief Complaint:   Chief Complaint   Patient presents with    Abdominal Pain     generalized abdominal pain, nausea and vomiting since last night  History of Illness: Jose Sultana is a 40 y o  female with significant past medical history of alcoholic cirrhosis, chronic pancreatitis, tobacco abuse, hypertension, alcohol abuse who presents with abdominal pain, nausea and vomiting x1 day  Patient reports that starting last night she began to have severe abdominal pain with multiple episodes of vomiting  Nonbloody  Denies any diarrhea and reports last bowel movement was 2 days ago  Per ED notes patient had been drinking alcohol at the time when she developed epigastric pain  However patient reported to this provider that her last drink was 2 days ago and she had 2 alcoholic beverages    She reports the pain is around her belly button and lower quadrants  Lying on her side makes it feel somewhat improved, states she cannot lie on her back due to the pain  Reports the pain feels like "labor pains, cramping " States this does feel similar to her pain when she has an acute pancreatitis flare  She has had decreased appetite and nothing to eat today due to the vomiting  Patient reports that she has been "helping herself vomit because it gets stuck " She denies any fevers or chills  Does have history of a  about 23 years ago, no additional abdominal surgeries noted  No difficulties with urination noted  Pt reports that she was compliant with her medications at home after discharge from the hospital         ED Vital Signs:   ED Triage Vitals   Temperature Pulse Respirations Blood Pressure SpO2   18 1218 18 1218 18 1218 18 1221 18 1218   98 1 °F (36 7 °C) 76 16 169/97 100 %      Temp Source Heart Rate Source Patient Position - Orthostatic VS BP Location FiO2 (%)   18 1218 18 1218 18 1400 18 1400 --   Oral Monitor Sitting Left arm       Pain Score       18 1218       Worst Possible Pain        Wt Readings from Last 1 Encounters:   18 75 kg (165 lb 5 5 oz)       Vital Signs (abnormal):   18 1452 -- 93 16  188/95 96 % -- NEETA   18 1400 -- 95 16  220/115 98 %       Abnormal Labs/Diagnostic Test Results: an gap   14, gluc 143, total prot  8 9, wbc  24 50, H&H   15 9  47 9  CT abd- Thickening of the wall the sigmoid colon raising the possibility of colitis   No secondary complications seen    Stable appearance of cirrhotic liver with scarring, dilated pancreatic duct in the tail of the pancreas, subcutaneous nodularity in the buttocks, and chronic superior endplate compression fracture at T12      ED Treatment:   Medication Administration from 2018 1215 to 2018 1531       Date/Time Order Dose Route Action Action by Comments     2018 1226  EMS REPLENISHMENT MED 0 Does not apply Given to EMS Jimmy Farias RN      05/28/2018 1238 sodium chloride 0 9 % bolus 1,000 mL 1,000 mL Intravenous New Bag Jimmy Farias RN      05/28/2018 1236 HYDROmorphone (DILAUDID) injection 1 mg 1 mg Intravenous Given Jimmy Farias RN      05/28/2018 1248 ondansetron (ZOFRAN) injection 4 mg   Intravenous Canceled Entry Jimmy Farias RN      05/28/2018 1307 metoclopramide (REGLAN) injection 10 mg 10 mg Intravenous Given Jimmy Farias RN      05/28/2018 1330 iohexol (OMNIPAQUE) 350 MG/ML injection (MULTI-DOSE) 100 mL 100 mL Intravenous Given Lei Charles      05/28/2018 1430 HYDROmorphone (DILAUDID) injection 1 mg 1 mg Intravenous Given Leno Delgadillo RN           Past Medical/Surgical History:    Active Ambulatory Problems     Diagnosis Date Noted    Chronic pancreatitis ([facility] ) 10/06/2016    Abdominal pain 10/06/2016    Essential hypertension 10/06/2016    Nicotine dependence 10/06/2016    Colitis 10/27/2017    Pain, dental 11/22/2017    Alcohol abuse 11/22/2017    Tylenol overdose, accidental or unintentional, initial encounter 11/22/2017    Elevated glucose 11/22/2017    Epigastric pain 12/14/2017    Nausea and vomiting 12/14/2017    Hypertensive urgency 29/72/6547    Alcoholic cirrhosis ([facility] [address] ) 25/96/7837    Moderate dehydration 04/22/2018    Acute on chronic pancreatitis ([facility] ) 04/22/2018     Resolved Ambulatory Problems     Diagnosis Date Noted    Hypokalemia 03/07/2017    Sepsis ([facility] ) 12/14/2017    Hematemesis with nausea 12/14/2017     Past Medical History:   Diagnosis Date    Alcohol abuse     Arthritis     GERD (gastroesophageal reflux disease)     Hypertension     Nicotine dependence     Obesity     Pancreatitis     Panic attack        Admitting Diagnosis: Colitis [K52 9]  Leukocytosis [D72 829]  Abdominal pain [R10 9]  Intractable vomiting [R11 10]  Intractable epigastric abdominal pain [R10 13]    Age/Sex: 40 y o  female    Assessment/Plan:   * Abdominal pain   Assessment & Plan     · Lower quadrant abdominal pain with associated nausea and vomiting  Denies diarrhea  Differential includes recurrent pancreatitis vs  Colitis  Lipase WNL  · CT with evidence of thickening of the wall the sigmoid colon raising the possibility of colitis  · Pt with leukocytosis on admission at 24 50 with tachycardia, meeting sepsis criteria with possible source of infection from colitis  · Will place patient on cipro/flagyl  · GI consult, pt was supposed to follow up with GI after last admission for MRI/ERCP and EGD to evaluate for esophageal varices, patient did not follow up  ?  Appreciate recommendations, needs close follow up as an outpatient, will get CM involved as well to help facilitate   · IVF  · Clear liquid diet, however if patient with continued vomiting will have to scale back to NPO, monitor  · PRN antiemetics and analgesics with dilaudid 0 2 mg Q4h (pt with allergy to morphine)   · Continue patient's protonix (switch to IV due to vomiting), dicyclomine and sucralfate (pt reports compliance with these as an outpatient)  · Pt does have history of chronically dilated pancreatic duct as well, stable per CT scan            Alcoholic cirrhosis ([address] )   Assessment & Plan     · Pt with history of, pt will need EGD to evaluate for esophageal varices, likely as an outpatient  · Pt reports that she did have 2 alcoholic beverages 2 days ago, this was her last drink  · Pt with some tremors on exam, could be related to acute infection, but will add PRN ativan   · CIWA protocol with thiamine and folate supplementation   · Encourage alcoholic cessation  · LFTs WNL, monitor          Alcohol abuse   Assessment & Plan     · Plan as above  · Monitor for withdrawal symptoms  · Pt did admit to alcohol use a few days ago   · CIWA protocol and PRN ativan ordered          Nicotine dependence   Assessment & Plan     · Strongly encourage cessation  · Nicotine patch           Essential hypertension   Assessment & Plan     · Hypertensive urgency noted on admission, likely related to pain response  · Most recent /95  · Continue Catapres 0 3 mg bid (confirmed with patient)  · Will add PRN IV lopressor for SBP >180             VTE Prophylaxis: Enoxaparin (Lovenox)  / reason for no mechanical VTE prophylaxis ambulate patient   Code Status: Level I - Full code, discussed with patient at bedside  POLST: There is no POLST form on file for this patient (pre-hospital)  Discussion with family: Discussed with patient at bedside       Anticipated Length of Stay:  Patient will be admitted on an Inpatient basis with an anticipated length of stay of  > 2 midnights  Justification for Hospital Stay: evaluation of abdominal pain with GI consult, possible colitis found on imaging requiring IV antibiotics  Need to control abdominal pain      Admission Orders:  Scheduled Meds:   Current Facility-Administered Medications:  amitriptyline 10 mg Oral HS Cathleen Lawrence PA-C    ciprofloxacin 400 mg Intravenous Q12H Sushila Pea, PA-C Last Rate: 400 mg (05/29/18 0736)   cloNIDine 0 3 mg Oral Q12H [facility] Cathleen Lawrence PA-C    dicyclomine 20 mg Oral TID Cathleen Lawrence PA-C    enoxaparin 40 mg Subcutaneous Daily Cathleen Lawrence PA-C    folic acid 1 mg Oral Daily Cathleen Lawrence PA-C    HYDROmorphone 0 2 mg Intravenous Q4H PRN Sushila Pea, PA-C    LORazepam 1 mg Intravenous Q8H PRN Launie Rho, DO    LORazepam 2 mg Intravenous Once Launie Rho, DO    metoprolol 5 mg Intravenous Q6H PRN Sushila Pea, PA-C    metroNIDAZOLE 500 mg Intravenous Q8H Launie Rho, DO Last Rate: 500 mg (05/29/18 0401)   multivitamin-minerals 1 tablet Oral Daily Cathleen Lawrence PA-C    nicotine 1 patch Transdermal Daily Cathleen Lawrence PA-C    ondansetron 4 mg Intravenous Q6H PRN Sushila Pea, PA-C    pancrelipase (Lip-Prot-Amyl) 24,000 Units Oral TID With Meals Cathleen Lawrence PA-C    pantoprazole 40 mg Intravenous Q12H [address] Cathleen Lawrence PA-C    sodium chloride 1,000 mL Intravenous Once Sarah Fletcher PA-C    sodium chloride 125 mL/hr Intravenous Continuous Evorn DO Kate Last Rate: 125 mL/hr (05/28/18 1936)   sucralfate 1 g Oral 4x Daily Cathleen Lawrence PA-C    thiamine 100 mg Oral Daily Cathleen Lawrence PA-C      Continuous Infusions:   sodium chloride 125 mL/hr Last Rate: 125 mL/hr (05/28/18 1936)     PRN Meds: HYDROmorphone   x5    LORazepam   x1    Metoprolol  q6h prn x2    Ondansetron  x3    CIWA q4hr   GI consult   Clear liq diet   To NPO   5/29  Phos, mg , cbc, bmp   K   3 4, sangeetha   8 1, wbc  17 91, mg 1 4  Ova and parasites, c-diff , stool bacterial panel

## 2018-05-29 NOTE — ASSESSMENT & PLAN NOTE
· Hypertensive urgency noted on admission with some elevations, likely related to pain response and anxiety   · Most recent /100  · Continue Catapres 0 3 mg bid (confirmed with patient)  · Continue PRN IV lopressor for SBP >180

## 2018-05-29 NOTE — NURSING NOTE
Pt anxious  Reviewed prn meds  Spoke with Guerda Hutson and okay to give IV ativan that is ordered and not correlated with ciwa protocol, just that patient is extrememely tearful and anxious  Given and monitor for effectiveness  Pt refusing to have iv changed that was placed by ems  Pt stated that it was too painful when attempted from previous shift    Encouraged, still refused

## 2018-05-29 NOTE — SOCIAL WORK
LOS 1  Patient is not a bundle and not a readmission  CM me with pt to discuss DCP  Pt states she lives in [place] with her boyfriend in a two-story home with three FÉLIX  She reports she does not use any assistive devices and is independent with ADLs  She denies prior VNA or STR  She reports prior MH including anxiety and history of ETOH but denies prior drug use  She states she does not have AD/LW, declines info  Her emergency contact is her daughter Brianna Crew  She is currently unemployed and her boyfriend drives her  She reports her boyfriend will transport her at d/c  Patient reports she has a PCP she follows with regularly and denies d/c needs at this time  CM will continue to follow through d/c

## 2018-05-29 NOTE — PLAN OF CARE
Problem: DISCHARGE PLANNING - CARE MANAGEMENT  Goal: Discharge to post-acute care or home with appropriate resources  INTERVENTIONS:  - Conduct assessment to determine patient/family and health care team treatment goals, and need for post-acute services based on payer coverage, community resources, and patient preferences, and barriers to discharge  - Address psychosocial, clinical, and financial barriers to discharge as identified in assessment in conjunction with the patient/family and health care team  - Arrange appropriate level of post-acute services according to patients   needs and preference and payer coverage in collaboration with the physician and health care team  - Communicate with and update the patient/family, physician, and health care team regarding progress on the discharge plan  - Arrange appropriate transportation to post-acute venues  Outcome: Progressing  LOS 1  Patient is not a bundle and not a readmission  CM me with pt to discuss DCP  Pt states she lives in [place] with her boyfriend in a two-story home with three FÉLIX  She reports she does not use any assistive devices and is independent with ADLs  She denies prior VNA or STR  She reports prior MH including anxiety and history of ETOH but denies prior drug use  She states she does not have AD/LW, declines info  Her emergency contact is her daughter Brianna Crew  She is currently unemployed and her boyfriend drives her  She reports her boyfriend will transport her at d/c  Patient reports she has a PCP she follows with regularly and denies d/c needs at this time  CM will continue to follow through d/c

## 2018-05-29 NOTE — ASSESSMENT & PLAN NOTE
· Mag 1 4   · Will replete with 2 g mag sulfate now  · Could be related to acute vomiting  · Also with hypokalemia of 3 4, replete as well   · Monitor electrolytes

## 2018-05-30 LAB
ANION GAP SERPL CALCULATED.3IONS-SCNC: 9 MMOL/L (ref 4–13)
BUN SERPL-MCNC: 11 MG/DL (ref 5–25)
CALCIUM SERPL-MCNC: 8.2 MG/DL (ref 8.3–10.1)
CHLORIDE SERPL-SCNC: 103 MMOL/L (ref 100–108)
CO2 SERPL-SCNC: 25 MMOL/L (ref 21–32)
CREAT SERPL-MCNC: 0.63 MG/DL (ref 0.6–1.3)
ERYTHROCYTE [DISTWIDTH] IN BLOOD BY AUTOMATED COUNT: 13.5 % (ref 11.6–15.1)
GFR SERPL CREATININE-BSD FRML MDRD: 112 ML/MIN/1.73SQ M
GLUCOSE SERPL-MCNC: 107 MG/DL (ref 65–140)
HCT VFR BLD AUTO: 40.8 % (ref 34.8–46.1)
HGB BLD-MCNC: 13.5 G/DL (ref 11.5–15.4)
MAGNESIUM SERPL-MCNC: 1.8 MG/DL (ref 1.6–2.6)
MCH RBC QN AUTO: 29.8 PG (ref 26.8–34.3)
MCHC RBC AUTO-ENTMCNC: 33.1 G/DL (ref 31.4–37.4)
MCV RBC AUTO: 90 FL (ref 82–98)
PLATELET # BLD AUTO: 165 THOUSANDS/UL (ref 149–390)
PMV BLD AUTO: 9.4 FL (ref 8.9–12.7)
POTASSIUM SERPL-SCNC: 3.4 MMOL/L (ref 3.5–5.3)
RBC # BLD AUTO: 4.53 MILLION/UL (ref 3.81–5.12)
SODIUM SERPL-SCNC: 137 MMOL/L (ref 136–145)
WBC # BLD AUTO: 9.59 THOUSAND/UL (ref 4.31–10.16)

## 2018-05-30 PROCEDURE — 99232 SBSQ HOSP IP/OBS MODERATE 35: CPT | Performed by: INTERNAL MEDICINE

## 2018-05-30 PROCEDURE — 94762 N-INVAS EAR/PLS OXIMTRY CONT: CPT

## 2018-05-30 PROCEDURE — 99232 SBSQ HOSP IP/OBS MODERATE 35: CPT | Performed by: NURSE PRACTITIONER

## 2018-05-30 PROCEDURE — 83735 ASSAY OF MAGNESIUM: CPT | Performed by: PHYSICIAN ASSISTANT

## 2018-05-30 PROCEDURE — C9113 INJ PANTOPRAZOLE SODIUM, VIA: HCPCS | Performed by: PHYSICIAN ASSISTANT

## 2018-05-30 PROCEDURE — 85027 COMPLETE CBC AUTOMATED: CPT | Performed by: PHYSICIAN ASSISTANT

## 2018-05-30 PROCEDURE — 80048 BASIC METABOLIC PNL TOTAL CA: CPT | Performed by: PHYSICIAN ASSISTANT

## 2018-05-30 RX ORDER — CLONIDINE HYDROCHLORIDE 0.3 MG/1
0.3 TABLET ORAL 2 TIMES DAILY
COMMUNITY
End: 2018-12-18 | Stop reason: ALTCHOICE

## 2018-05-30 RX ORDER — POTASSIUM CHLORIDE 20 MEQ/1
40 TABLET, EXTENDED RELEASE ORAL ONCE
Status: COMPLETED | OUTPATIENT
Start: 2018-05-30 | End: 2018-05-30

## 2018-05-30 RX ADMIN — LIDOCAINE HYDROCHLORIDE 10 ML: 20 SOLUTION ORAL; TOPICAL at 15:42

## 2018-05-30 RX ADMIN — PANTOPRAZOLE SODIUM 40 MG: 40 INJECTION, POWDER, FOR SOLUTION INTRAVENOUS at 10:09

## 2018-05-30 RX ADMIN — LORAZEPAM 1 MG: 2 INJECTION INTRAMUSCULAR; INTRAVENOUS at 21:38

## 2018-05-30 RX ADMIN — HYDROMORPHONE HYDROCHLORIDE 0.2 MG: 1 INJECTION, SOLUTION INTRAMUSCULAR; INTRAVENOUS; SUBCUTANEOUS at 10:36

## 2018-05-30 RX ADMIN — AMITRIPTYLINE HYDROCHLORIDE 10 MG: 10 TABLET, FILM COATED ORAL at 21:50

## 2018-05-30 RX ADMIN — POTASSIUM CHLORIDE 40 MEQ: 1500 TABLET, EXTENDED RELEASE ORAL at 11:58

## 2018-05-30 RX ADMIN — CLONIDINE HYDROCHLORIDE 0.3 MG: 0.1 TABLET ORAL at 10:06

## 2018-05-30 RX ADMIN — Medication 100 MG: at 10:45

## 2018-05-30 RX ADMIN — Medication 1 TABLET: at 10:08

## 2018-05-30 RX ADMIN — LORAZEPAM 1 MG: 2 INJECTION INTRAMUSCULAR; INTRAVENOUS at 15:42

## 2018-05-30 RX ADMIN — HYDROMORPHONE HYDROCHLORIDE 0.2 MG: 1 INJECTION, SOLUTION INTRAMUSCULAR; INTRAVENOUS; SUBCUTANEOUS at 18:55

## 2018-05-30 RX ADMIN — DICYCLOMINE HYDROCHLORIDE 20 MG: 20 TABLET ORAL at 21:50

## 2018-05-30 RX ADMIN — HYDROMORPHONE HYDROCHLORIDE 0.2 MG: 1 INJECTION, SOLUTION INTRAMUSCULAR; INTRAVENOUS; SUBCUTANEOUS at 01:32

## 2018-05-30 RX ADMIN — HYDROMORPHONE HYDROCHLORIDE 0.2 MG: 1 INJECTION, SOLUTION INTRAMUSCULAR; INTRAVENOUS; SUBCUTANEOUS at 14:25

## 2018-05-30 RX ADMIN — METRONIDAZOLE 500 MG: 500 INJECTION, SOLUTION INTRAVENOUS at 19:41

## 2018-05-30 RX ADMIN — ONDANSETRON 4 MG: 2 INJECTION INTRAMUSCULAR; INTRAVENOUS at 10:45

## 2018-05-30 RX ADMIN — SUCRALFATE 1 G: 1 TABLET ORAL at 21:49

## 2018-05-30 RX ADMIN — ONDANSETRON 4 MG: 2 INJECTION INTRAMUSCULAR; INTRAVENOUS at 18:55

## 2018-05-30 RX ADMIN — PANTOPRAZOLE SODIUM 40 MG: 40 INJECTION, POWDER, FOR SOLUTION INTRAVENOUS at 21:49

## 2018-05-30 RX ADMIN — PANCRELIPASE 24000 UNITS: 24000; 76000; 120000 CAPSULE, DELAYED RELEASE PELLETS ORAL at 15:41

## 2018-05-30 RX ADMIN — CIPROFLOXACIN 400 MG: 2 INJECTION, SOLUTION INTRAVENOUS at 19:40

## 2018-05-30 RX ADMIN — SODIUM CHLORIDE 125 ML/HR: 0.9 INJECTION, SOLUTION INTRAVENOUS at 23:28

## 2018-05-30 RX ADMIN — CIPROFLOXACIN 400 MG: 2 INJECTION, SOLUTION INTRAVENOUS at 10:03

## 2018-05-30 RX ADMIN — DICYCLOMINE HYDROCHLORIDE 20 MG: 20 TABLET ORAL at 15:41

## 2018-05-30 RX ADMIN — DICYCLOMINE HYDROCHLORIDE 20 MG: 20 TABLET ORAL at 11:52

## 2018-05-30 RX ADMIN — PANCRELIPASE 24000 UNITS: 24000; 76000; 120000 CAPSULE, DELAYED RELEASE PELLETS ORAL at 10:09

## 2018-05-30 RX ADMIN — FOLIC ACID 1 MG: 1 TABLET ORAL at 10:07

## 2018-05-30 RX ADMIN — SUCRALFATE 1 G: 1 TABLET ORAL at 17:21

## 2018-05-30 RX ADMIN — SUCRALFATE 1 G: 1 TABLET ORAL at 11:58

## 2018-05-30 RX ADMIN — HYDROMORPHONE HYDROCHLORIDE 0.2 MG: 1 INJECTION, SOLUTION INTRAMUSCULAR; INTRAVENOUS; SUBCUTANEOUS at 23:13

## 2018-05-30 RX ADMIN — CLONIDINE HYDROCHLORIDE 0.3 MG: 0.1 TABLET ORAL at 21:49

## 2018-05-30 RX ADMIN — NICOTINE 1 PATCH: 21 PATCH, EXTENDED RELEASE TRANSDERMAL at 10:09

## 2018-05-30 RX ADMIN — PANCRELIPASE 24000 UNITS: 24000; 76000; 120000 CAPSULE, DELAYED RELEASE PELLETS ORAL at 11:59

## 2018-05-30 RX ADMIN — SODIUM CHLORIDE 125 ML/HR: 0.9 INJECTION, SOLUTION INTRAVENOUS at 01:32

## 2018-05-30 RX ADMIN — METRONIDAZOLE 500 MG: 500 INJECTION, SOLUTION INTRAVENOUS at 04:11

## 2018-05-30 RX ADMIN — HYDROMORPHONE HYDROCHLORIDE 0.2 MG: 1 INJECTION, SOLUTION INTRAMUSCULAR; INTRAVENOUS; SUBCUTANEOUS at 05:42

## 2018-05-30 RX ADMIN — SUCRALFATE 1 G: 1 TABLET ORAL at 10:07

## 2018-05-30 RX ADMIN — METRONIDAZOLE 500 MG: 500 INJECTION, SOLUTION INTRAVENOUS at 12:00

## 2018-05-30 NOTE — ASSESSMENT & PLAN NOTE
· Lower quadrant abdominal pain with associated nausea and vomiting  Denies diarrhea  Lipase WNL  Continues to improve  · CT with evidence of thickening of the wall of the sigmoid colon raising the possibility of colitis  · Pt meeting sepsis criteria on admission with leukocytosis and tachycardia, both have resolved  · Continue IV cipro and flagyl  GI is following, appreciate ongoing recommendations  Possible Flex Sig versus Colonoscopy, however unlikely to be done inpatient as patient continues to improve but will defer to GI  · Will d/c stool studies as patient has not had BM in a few days  · Continue IVF  · Will advance diet to clear liquids today  · PRN antiemetics and analgesics with dilaudid 0 2 mg Q4h (pt with allergy to morphine), appears to have some pain seeking behaviors, continue to wean  · Continue patient's protonix, dicyclomine and sucralfate for pain management as well   · Pt does have history of chronically dilated pancreatic duct, stable from imaging  GI recommending outpatient EUS

## 2018-05-30 NOTE — PROGRESS NOTES
[facility]  Progress Note - Ailyn Figueredo 1975, 40 y o  female MRN: [id number]    Unit/Bed#: -01 Encounter: [id number]    Primary Care Provider: Tamanna Newman MD   Date and time admitted to hospital: 5/28/2018 12:15 PM    * Abdominal pain   Assessment & Plan    · Lower quadrant abdominal pain with associated nausea and vomiting  Denies diarrhea  Lipase WNL  Continues to improve  · CT with evidence of thickening of the wall of the sigmoid colon raising the possibility of colitis  · Pt meeting sepsis criteria on admission with leukocytosis and tachycardia, both have resolved  · Continue IV cipro and flagyl  GI is following, appreciate ongoing recommendations  Possible Flex Sig versus Colonoscopy, however unlikely to be done inpatient as patient continues to improve but will defer to GI  · Will d/c stool studies as patient has not had BM in a few days  · Continue IVF  · Will advance diet to clear liquids today  · PRN antiemetics and analgesics with dilaudid 0 2 mg Q4h (pt with allergy to morphine), appears to have some pain seeking behaviors, continue to wean  · Continue patient's protonix, dicyclomine and sucralfate for pain management as well   · Pt does have history of chronically dilated pancreatic duct, stable from imaging  GI recommending outpatient EUS  Alcohol abuse   Assessment & Plan    · Plan as above  · Monitor for withdrawal symptoms, CIWA protocol  · Pt did admit to alcohol use a few days ago but differing stories to different providers  · CIWA protocol and PRN ativan 1 mg X7F        Alcoholic cirrhosis ([address] )   Assessment & Plan    · Pt with history of  Had EGD in April 2018 without evidence of varices     · Pt reports that she did have 2 alcoholic beverages 2 days ago, this was her last drink, however differing stories  · PRN ativan 1 mg Q6H   · CIWA protocol with thiamine and folate supplementation   · Encourage alcoholic cessation  · LFTs WNL, monitor        Essential hypertension   Assessment & Plan    · Hypertensive urgency noted on admission, overall improved but still with some intermittent elevations  · Continue Catapres 0 3 mg bid (confirmed with patient)  · Continue PRN IV lopressor for SBP >180  · Monitor  Hypokalemia   Assessment & Plan    · Replete  · F/u repeat BMP in AM          Nicotine dependence   Assessment & Plan    · Strongly encourage cessation  · Nicotine patch           Pharmacologic: Enoxaparin (Lovenox)  Mechanical VTE Prophylaxis in Place: Yes    Patient Centered Rounds: I have performed bedside rounds with nursing staff today  Discussions with Specialists or Other Care Team Provider: nursing, case management     Education and Discussions with Family / Patient: Patient and family member at bedside  Time Spent for Care: 30 minutes  More than 50% of total time spent on counseling and coordination of care as described above  Current Length of Stay: 2 day(s)    Current Patient Status: Inpatient   Certification Statement: The patient will continue to require additional inpatient hospital stay due to IV abx, additional GI recommendations, not currently tolerating regular diet  Discharge Plan / Estimated Discharge Date: Pending symptomatic improvement and GI clearance  Likely within the next 48 hours  Code Status: Level 1 - Full Code      Subjective:   Patient states that her pain has improved  She states that she does have some intermittent nausea but no vomiting  She states she has not had a bowel movement  She is requesting that we upgrade her diet at this time  Denies any fevers or chills  Objective:     Vitals:   Temp (24hrs), Av 1 °F (36 7 °C), Min:97 8 °F (36 6 °C), Max:98 6 °F (37 °C)    HR:  [58-82] 61  Resp:  [16-20] 18  BP: (130-170)/() 160/82  SpO2:  [98 %-100 %] 98 %  Body mass index is 28 38 kg/m²  Input and Output Summary (last 24 hours):        Intake/Output Summary (Last 24 hours) at 05/30/18 1110  Last data filed at 05/30/18 1020   Gross per 24 hour   Intake          2640 42 ml   Output             1100 ml   Net          1540 42 ml       Physical Exam:     Physical Exam   Constitutional: She is oriented to person, place, and time  No distress  HENT:   Head: Normocephalic  Eyes: Pupils are equal, round, and reactive to light  Neck: Normal range of motion  Cardiovascular: Normal rate, regular rhythm and intact distal pulses  No murmur heard  Pulmonary/Chest: Effort normal and breath sounds normal    Abdominal: Soft  She exhibits no distension  Bowel sounds are increased  There is tenderness  Musculoskeletal: Normal range of motion  She exhibits no edema  Neurological: She is alert and oriented to person, place, and time  Skin: Skin is warm and dry  Psychiatric: She has a normal mood and affect  Nursing note and vitals reviewed  Additional Data:     Labs:      Results from last 7 days  Lab Units 05/30/18  0537 05/29/18  0423   WBC Thousand/uL 9 59 17 91*   HEMOGLOBIN g/dL 13 5 13 9   HEMATOCRIT % 40 8 42 5   PLATELETS Thousands/uL 165 187   NEUTROS PCT %  --  67   LYMPHS PCT %  --  23   MONOS PCT %  --  10   EOS PCT %  --  0       Results from last 7 days  Lab Units 05/30/18  0537  05/28/18  1240   SODIUM mmol/L 137  < > 141   POTASSIUM mmol/L 3 4*  < > 5 1   CHLORIDE mmol/L 103  < > 104   CO2 mmol/L 25  < > 23   BUN mg/dL 11  < > 8   CREATININE mg/dL 0 63  < > 0 66   CALCIUM mg/dL 8 2*  < > 8 7   TOTAL PROTEIN g/dL  --   --  8 9*   BILIRUBIN TOTAL mg/dL  --   --  0 60   ALK PHOS U/L  --   --  108   ALT U/L  --   --  20   AST U/L  --   --  38   GLUCOSE RANDOM mg/dL 107  < > 143*   < > = values in this interval not displayed  Recent Cultures (last 7 days):       Results from last 7 days  Lab Units 05/28/18  1651   BLOOD CULTURE  No Growth at 24 hrs  No Growth at 24 hrs         Last 24 Hours Medication List:     Current Facility-Administered Medications:  amitriptyline 10 mg Oral HS Cathleen Lawrence PA-C    ciprofloxacin 400 mg Intravenous Q12H Steven Palomo PA-C Last Rate: 400 mg (05/30/18 1003)   cloNIDine 0 3 mg Oral Q12H [facility] Cathleen Lawrence PA-C    dicyclomine 20 mg Oral TID Cathleen Lawrence PA-C    enoxaparin 40 mg Subcutaneous Daily Cathleen Lawrence PA-C    folic acid 1 mg Oral Daily Cathleen Lawrence PA-C    HYDROmorphone 0 2 mg Intravenous Q4H PRN Shae GrisNATALIIA domínguez    LORazepam 1 mg Intravenous Q6H PRN Steven Palomo PA-C    LORazepam 2 mg Intravenous Once Norma Jose Miguel, DO    metoprolol 5 mg Intravenous Q6H PRN Steven Palomo PA-C    metroNIDAZOLE 500 mg Intravenous Q8H Norma Jose Miguel, DO Last Rate: 500 mg (05/30/18 0411)   multivitamin-minerals 1 tablet Oral Daily Cathleen Lawrence PA-C    nicotine 1 patch Transdermal Daily Cathleen Lawrence PA-C    ondansetron 4 mg Intravenous Q6H PRN Steven Palomo PA-C    pancrelipase (Lip-Prot-Amyl) 24,000 Units Oral TID With Meals Steven Palomo PA-C    pantoprazole 40 mg Intravenous Q12H [facility] Cathleen Lawrence PA-C    potassium chloride 40 mEq Oral Once OMERO Means    sodium chloride 1,000 mL Intravenous Once Kailey Mcmillan PA-C    sodium chloride 125 mL/hr Intravenous Continuous Norma Jose Miguel, DO Last Rate: 125 mL/hr (05/30/18 0132)   sucralfate 1 g Oral 4x Daily Cathleen Lawrence PA-C    thiamine 100 mg Oral Daily Steven Palomo PA-C         Today, Patient Was Seen By: OMERO Jarvis    ** Please Note: Dragon 360 Dictation voice to text software may have been used in the creation of this document   **

## 2018-05-30 NOTE — ASSESSMENT & PLAN NOTE
· Hypertensive urgency noted on admission, overall improved but still with some intermittent elevations  · Continue Catapres 0 3 mg bid (confirmed with patient)  · Continue PRN IV lopressor for SBP >180  · Monitor

## 2018-05-30 NOTE — SOCIAL WORK
Complex CM followed up with pt to discuss substance abuse  Pt declined resources  CM provided contact information if pt changes her mind

## 2018-05-30 NOTE — ASSESSMENT & PLAN NOTE
· Pt with history of  Had EGD in April 2018 without evidence of varices     · Pt reports that she did have 2 alcoholic beverages 2 days ago, this was her last drink, however differing stories  · PRN ativan 1 mg Q6H   · CIWA protocol with thiamine and folate supplementation   · Encourage alcoholic cessation  · LFTs WNL, monitor

## 2018-05-30 NOTE — PROGRESS NOTES
GI Progress Note - Su Montejo 40 y o  female MRN: [id number]    Unit/Bed#: -01 Encounter: [id number]    Subjective: She is having lower abdominal cramping associated with nausea, no vomiting today  She is mildly improved  No diarrhea today  She has baseline loose BMs and denies any changes prior to admission  She has never had a colonoscopy  She is requesting ativan with dilaudid  Objective:     Vitals: Blood pressure 164/83, pulse 55, temperature 97 9 °F (36 6 °C), temperature source Oral, resp  rate 17, height 5' 4" (1 626 m), weight 75 kg (165 lb 5 5 oz), last menstrual period 05/15/2018, SpO2 100 %, not currently breastfeeding  ,Body mass index is 28 38 kg/m²        Intake/Output Summary (Last 24 hours) at 05/30/18 1631  Last data filed at 05/30/18 1300   Gross per 24 hour   Intake          2640 42 ml   Output             1575 ml   Net          1065 42 ml       Physical Exam:     General Appearance: Alert, oriented x3, appears chronically ill and older than stated age  Lungs: Clear to auscultation bilaterally, no respiratory distress  Heart: RRR, no respiratory distress  Abdomen: Non-distended, soft, BS active, (+) generalized TTP  Extremities: No cyanosis or LE edema    Invasive Devices     Peripheral Intravenous Line            Peripheral IV 05/29/18 Right Hand less than 1 day                Lab Results:    Results from last 7 days  Lab Units 05/30/18  0537 05/29/18  0423   WBC Thousand/uL 9 59 17 91*   HEMOGLOBIN g/dL 13 5 13 9   HEMATOCRIT % 40 8 42 5   PLATELETS Thousands/uL 165 187   NEUTROS PCT %  --  67   LYMPHS PCT %  --  23   MONOS PCT %  --  10   EOS PCT %  --  0       Results from last 7 days  Lab Units 05/30/18  0537  05/28/18  1240   SODIUM mmol/L 137  < > 141   POTASSIUM mmol/L 3 4*  < > 5 1   CHLORIDE mmol/L 103  < > 104   CO2 mmol/L 25  < > 23   BUN mg/dL 11  < > 8   CREATININE mg/dL 0 63  < > 0 66   CALCIUM mg/dL 8 2*  < > 8 7   TOTAL PROTEIN g/dL  --   --  8 9*   BILIRUBIN TOTAL mg/dL  --   --  0 60   ALK PHOS U/L  --   --  108   ALT U/L  --   --  20   AST U/L  --   --  38   GLUCOSE RANDOM mg/dL 107  < > 143*   < > = values in this interval not displayed  Results from last 7 days  Lab Units 05/28/18  1240   LIPASE u/L 106       Imaging Studies: I have personally reviewed pertinent imaging studies  Ct Abdomen Pelvis With Contrast  Result Date: 5/28/2018  Impression: Thickening of the wall the sigmoid colon raising the possibility of colitis  No secondary complications seen   Stable appearance of cirrhotic liver with scarring, dilated pancreatic duct in the tail of the pancreas, subcutaneous nodularity in the buttocks, and chronic superior endplate compression fracture at T12      Assessment and Plan:     Infectious Colitis  - CT A/P on admission shows thickening of the sigmoid colon concerning for colitis  - Continue cipro/flagyl, would complete 7 day course total; leukocytosis has resolved and responded to antibiotics  - She should have a colonoscopy as outpatient due to reported episodes of perforated diverticulitis >5 years ago without ever having a colonoscopy; will defer doing this as inpatient as she does report mild improvement over the past 24 hours but she does have nausea as well which will lower quality of the prep  - Continue bentyl PRN  - Serial abdominal exams  - Supportive care  - Clear liquid diet; advance to low fiber/low residue diet for the next few weeks while inflammation is healing      Castano's Esophagus  - Continue protonix 40 mg BID  - Carafate 1 G QID  - Smoking cessation, alcohol cessation  - Last EGD 12/2017 with Dr Kerry Alvarez      Dilated Pancreatic Duct  - Stable in appearance since October 2016;   - Likely IPMN but pt needs EUS for further evaluation which she has been told multiple times and has not followed up      Compensated Cirrhosis  - Low MELD; likely 2/2 alcohol use which is chronic  - Hepatitis panel neg in the past  - No encephalopathy, ascites, LE edema  - No varices on her last EGD 12/2017, will need repeat varices screening in December 2018  - No suspicious liver mass on admission CT  - Alcohol cessation counseling      The patient will be seen by Dr Hina Hi

## 2018-05-30 NOTE — ASSESSMENT & PLAN NOTE
· Plan as above  · Monitor for withdrawal symptoms, CIWA protocol     · Pt did admit to alcohol use a few days ago but differing stories to different providers  · CIWA protocol and PRN ativan 1 mg Q6H

## 2018-05-31 VITALS
RESPIRATION RATE: 18 BRPM | SYSTOLIC BLOOD PRESSURE: 144 MMHG | TEMPERATURE: 98.2 F | OXYGEN SATURATION: 100 % | BODY MASS INDEX: 28.23 KG/M2 | WEIGHT: 165.34 LBS | HEART RATE: 59 BPM | DIASTOLIC BLOOD PRESSURE: 80 MMHG | HEIGHT: 64 IN

## 2018-05-31 PROBLEM — E87.6 HYPOKALEMIA: Status: RESOLVED | Noted: 2017-03-07 | Resolved: 2018-05-31

## 2018-05-31 PROBLEM — E83.42 HYPOMAGNESEMIA: Status: RESOLVED | Noted: 2018-05-29 | Resolved: 2018-05-31

## 2018-05-31 LAB
ALBUMIN SERPL BCP-MCNC: 2.9 G/DL (ref 3.5–5)
ALP SERPL-CCNC: 63 U/L (ref 46–116)
ALT SERPL W P-5'-P-CCNC: 14 U/L (ref 12–78)
ANION GAP SERPL CALCULATED.3IONS-SCNC: 7 MMOL/L (ref 4–13)
AST SERPL W P-5'-P-CCNC: 16 U/L (ref 5–45)
BASOPHILS # BLD AUTO: 0.06 THOUSANDS/ΜL (ref 0–0.1)
BASOPHILS NFR BLD AUTO: 1 % (ref 0–1)
BILIRUB SERPL-MCNC: 0.8 MG/DL (ref 0.2–1)
BUN SERPL-MCNC: 10 MG/DL (ref 5–25)
CALCIUM SERPL-MCNC: 7.9 MG/DL (ref 8.3–10.1)
CHLORIDE SERPL-SCNC: 107 MMOL/L (ref 100–108)
CO2 SERPL-SCNC: 26 MMOL/L (ref 21–32)
CREAT SERPL-MCNC: 0.65 MG/DL (ref 0.6–1.3)
EOSINOPHIL # BLD AUTO: 0.04 THOUSAND/ΜL (ref 0–0.61)
EOSINOPHIL NFR BLD AUTO: 1 % (ref 0–6)
ERYTHROCYTE [DISTWIDTH] IN BLOOD BY AUTOMATED COUNT: 13.3 % (ref 11.6–15.1)
GFR SERPL CREATININE-BSD FRML MDRD: 111 ML/MIN/1.73SQ M
GLUCOSE SERPL-MCNC: 109 MG/DL (ref 65–140)
HCT VFR BLD AUTO: 37.4 % (ref 34.8–46.1)
HGB BLD-MCNC: 12.4 G/DL (ref 11.5–15.4)
IMM GRANULOCYTES # BLD AUTO: 0.01 THOUSAND/UL (ref 0–0.2)
IMM GRANULOCYTES NFR BLD AUTO: 0 % (ref 0–2)
LYMPHOCYTES # BLD AUTO: 3.05 THOUSANDS/ΜL (ref 0.6–4.47)
LYMPHOCYTES NFR BLD AUTO: 44 % (ref 14–44)
MCH RBC QN AUTO: 29.8 PG (ref 26.8–34.3)
MCHC RBC AUTO-ENTMCNC: 33.2 G/DL (ref 31.4–37.4)
MCV RBC AUTO: 90 FL (ref 82–98)
MONOCYTES # BLD AUTO: 0.64 THOUSAND/ΜL (ref 0.17–1.22)
MONOCYTES NFR BLD AUTO: 10 % (ref 4–12)
NEUTROPHILS # BLD AUTO: 2.96 THOUSANDS/ΜL (ref 1.85–7.62)
NEUTS SEG NFR BLD AUTO: 44 % (ref 43–75)
NRBC BLD AUTO-RTO: 0 /100 WBCS
PLATELET # BLD AUTO: 152 THOUSANDS/UL (ref 149–390)
PMV BLD AUTO: 9.8 FL (ref 8.9–12.7)
POTASSIUM SERPL-SCNC: 3.5 MMOL/L (ref 3.5–5.3)
PROT SERPL-MCNC: 6.2 G/DL (ref 6.4–8.2)
RBC # BLD AUTO: 4.16 MILLION/UL (ref 3.81–5.12)
SODIUM SERPL-SCNC: 140 MMOL/L (ref 136–145)
WBC # BLD AUTO: 6.76 THOUSAND/UL (ref 4.31–10.16)

## 2018-05-31 PROCEDURE — 99239 HOSP IP/OBS DSCHRG MGMT >30: CPT | Performed by: NURSE PRACTITIONER

## 2018-05-31 PROCEDURE — C9113 INJ PANTOPRAZOLE SODIUM, VIA: HCPCS | Performed by: PHYSICIAN ASSISTANT

## 2018-05-31 PROCEDURE — 85025 COMPLETE CBC W/AUTO DIFF WBC: CPT | Performed by: NURSE PRACTITIONER

## 2018-05-31 PROCEDURE — 94762 N-INVAS EAR/PLS OXIMTRY CONT: CPT

## 2018-05-31 PROCEDURE — 80053 COMPREHEN METABOLIC PANEL: CPT | Performed by: NURSE PRACTITIONER

## 2018-05-31 RX ORDER — METRONIDAZOLE 500 MG/1
500 TABLET ORAL EVERY 8 HOURS SCHEDULED
Qty: 12 TABLET | Refills: 0 | Status: SHIPPED | OUTPATIENT
Start: 2018-05-31 | End: 2018-06-04

## 2018-05-31 RX ORDER — DICYCLOMINE HCL 20 MG
20 TABLET ORAL 3 TIMES DAILY
Qty: 90 TABLET | Refills: 0 | Status: ON HOLD | OUTPATIENT
Start: 2018-05-31 | End: 2020-04-17

## 2018-05-31 RX ORDER — FOLIC ACID 1 MG/1
1 TABLET ORAL DAILY
Qty: 30 TABLET | Refills: 0 | Status: SHIPPED | OUTPATIENT
Start: 2018-06-01 | End: 2018-10-10

## 2018-05-31 RX ORDER — PANTOPRAZOLE SODIUM 40 MG/1
40 TABLET, DELAYED RELEASE ORAL 2 TIMES DAILY
Qty: 60 TABLET | Refills: 0 | Status: SHIPPED | OUTPATIENT
Start: 2018-05-31 | End: 2018-10-10

## 2018-05-31 RX ORDER — OXYCODONE HYDROCHLORIDE 5 MG/1
5 TABLET ORAL EVERY 4 HOURS PRN
Qty: 7 TABLET | Refills: 0 | Status: SHIPPED | OUTPATIENT
Start: 2018-05-31 | End: 2018-06-10

## 2018-05-31 RX ORDER — CIPROFLOXACIN 500 MG/1
500 TABLET, FILM COATED ORAL EVERY 12 HOURS SCHEDULED
Qty: 8 TABLET | Refills: 0 | Status: SHIPPED | OUTPATIENT
Start: 2018-05-31 | End: 2018-06-04

## 2018-05-31 RX ORDER — LANOLIN ALCOHOL/MO/W.PET/CERES
100 CREAM (GRAM) TOPICAL DAILY
Qty: 30 TABLET | Refills: 0 | Status: SHIPPED | OUTPATIENT
Start: 2018-06-01 | End: 2018-10-10

## 2018-05-31 RX ORDER — SUCRALFATE 1 G/1
1 TABLET ORAL 4 TIMES DAILY
Qty: 120 TABLET | Refills: 0 | Status: ON HOLD | OUTPATIENT
Start: 2018-05-31 | End: 2020-04-17

## 2018-05-31 RX ADMIN — LORAZEPAM 1 MG: 2 INJECTION INTRAMUSCULAR; INTRAVENOUS at 11:07

## 2018-05-31 RX ADMIN — METRONIDAZOLE 500 MG: 500 INJECTION, SOLUTION INTRAVENOUS at 03:25

## 2018-05-31 RX ADMIN — FOLIC ACID 1 MG: 1 TABLET ORAL at 08:13

## 2018-05-31 RX ADMIN — CLONIDINE HYDROCHLORIDE 0.3 MG: 0.1 TABLET ORAL at 08:12

## 2018-05-31 RX ADMIN — Medication 100 MG: at 08:13

## 2018-05-31 RX ADMIN — HYDROMORPHONE HYDROCHLORIDE 0.2 MG: 1 INJECTION, SOLUTION INTRAMUSCULAR; INTRAVENOUS; SUBCUTANEOUS at 03:24

## 2018-05-31 RX ADMIN — PANCRELIPASE 24000 UNITS: 24000; 76000; 120000 CAPSULE, DELAYED RELEASE PELLETS ORAL at 08:13

## 2018-05-31 RX ADMIN — HYDROMORPHONE HYDROCHLORIDE 0.2 MG: 1 INJECTION, SOLUTION INTRAMUSCULAR; INTRAVENOUS; SUBCUTANEOUS at 08:51

## 2018-05-31 RX ADMIN — LORAZEPAM 1 MG: 2 INJECTION INTRAMUSCULAR; INTRAVENOUS at 04:35

## 2018-05-31 RX ADMIN — METRONIDAZOLE 500 MG: 500 INJECTION, SOLUTION INTRAVENOUS at 11:14

## 2018-05-31 RX ADMIN — SUCRALFATE 1 G: 1 TABLET ORAL at 11:14

## 2018-05-31 RX ADMIN — Medication 1 TABLET: at 08:13

## 2018-05-31 RX ADMIN — PANTOPRAZOLE SODIUM 40 MG: 40 INJECTION, POWDER, FOR SOLUTION INTRAVENOUS at 08:04

## 2018-05-31 RX ADMIN — SUCRALFATE 1 G: 1 TABLET ORAL at 08:13

## 2018-05-31 RX ADMIN — CIPROFLOXACIN 400 MG: 2 INJECTION, SOLUTION INTRAVENOUS at 08:04

## 2018-05-31 RX ADMIN — NICOTINE 1 PATCH: 21 PATCH, EXTENDED RELEASE TRANSDERMAL at 08:11

## 2018-05-31 RX ADMIN — SODIUM CHLORIDE 125 ML/HR: 0.9 INJECTION, SOLUTION INTRAVENOUS at 08:03

## 2018-05-31 RX ADMIN — PANCRELIPASE 24000 UNITS: 24000; 76000; 120000 CAPSULE, DELAYED RELEASE PELLETS ORAL at 11:20

## 2018-05-31 RX ADMIN — DICYCLOMINE HYDROCHLORIDE 20 MG: 20 TABLET ORAL at 08:13

## 2018-05-31 NOTE — DISCHARGE INSTRUCTIONS
Low Fiber Diet   WHAT YOU NEED TO KNOW:   A low-fiber diet limits foods that are high in fiber  Fiber is the part of fruits, vegetables, and grains that is not broken down by your body  You may need to follow this diet after surgery on your intestines  You may also need to follow this diet for certain conditions such as Crohn disease or ulcerative colitis  Ask your healthcare provider or dietitian how much fiber you can have each day  DISCHARGE INSTRUCTIONS:   Foods to include:  Read food labels to check the amount of fiber that are found in foods  Some foods that are low in fiber include the following:  · Grains:  Choose grains that have less than 2 grams of fiber in each serving   Examples include the following:     ¨ Cream of wheat and finely ground grits    ¨ Dry cereal made from rice     ¨ White bread, white pasta, and white rice    ¨ Crackers, bagels, and rolls made from white or refined flour    · Other foods:      ¨ Canned and well-cooked fruit without skins or seeds, and juice without pulp    ¨ Ripe bananas and melons    ¨ Canned and well-cooked vegetables without skins or seeds, and vegetable juice    ¨ Cow's milk, lactose-free milk, soy milk, and rice milk    ¨ Yogurt without nuts, fruit, or granola    ¨ Eggs, poultry (such as chicken and turkey), fish, and tender, ground, well-cooked beef     ¨ Tofu and smooth peanut butter    ¨ Broth and strained soups made of low-fiber foods  Foods to avoid:   · Breads, cereals, crackers, and pasta made with whole wheat or whole grains (such as whole oats)    · [place] [employer] [place], wild rice, quinoa, kasha, and barley    · All fresh fruit with skin, except banana and melons     · Dried fruits and fruit juice with pulp    · Canned pineapple    · Raw vegetables    · Nuts, seeds, and popcorn    · Beans, nuts, peas, and lentils    · Tough meats    · Coconut and avocado  What else you should know about a low-fiber diet:  A low-fiber diet can decrease the amount of bowel movements you have  Drink liquids as directed to avoid constipation  Ask how much liquid to drink each day and which liquids are best for you  © 2017 [employer] [employer] Information is for End User's use only and may not be sold, redistributed or otherwise used for commercial purposes  All illustrations and images included in CareNotes® are the copyrighted property of [employer]  or [employer]  The above information is an  only  It is not intended as medical advice for individual conditions or treatments  Talk to your doctor, nurse or pharmacist before following any medical regimen to see if it is safe and effective for you

## 2018-05-31 NOTE — DISCHARGE SUMMARY
Discharge Summary - [address] [facility]    Patient Information: Clari Ramos 40 y o  female MRN: [id number]  Unit/Bed#: -01 Encounter: [id number]    Discharging Physician / Practitioner: OMERO Castro  PCP: Sabino Holland MD  Admission Date: 5/28/2018  Discharge Date: 05/31/18    Reason for Admission: Abdominal pain, nausea and vomiting  Diagnosis at d/c:  Acute infectious gastroenteritis/colitis versus diverticulitis    Discharge Diagnoses:     Principal Problem:    Abdominal pain  Active Problems:    Alcohol abuse    Alcoholic cirrhosis ([address] )    Essential hypertension    Nicotine dependence  Resolved Problems:    Hypokalemia    Hypomagnesemia      Consultations During Hospital Stay:  · Gastroenterology    Procedures Performed:     · None    Significant Findings / Test Results:     · CT abdomen and pelvis with contrast: Thickening of the wall the sigmoid colon raising the possibility of colitis  No secondary complications seen  Stable appearance of cirrhotic liver with scarring, dilated pancreatic duct in the tail of the pancreas, subcutaneous nodularity in the buttocks, and chronic superior endplate compression fracture at T12  · White blood cell count on admission 24,000, improved to 6 76 on day of discharge  · Urinalysis positive ketones, blood, protein, bilirubin  · Blood cultures negative to date at 48 hours  · Pregnancy test negative    Incidental Findings:   · None *     Test Results Pending at Discharge (will require follow up): · End points of blood cultures     Outpatient Tests Requested:  · Patient is to follow up with gastroenterologist for outpatient colonoscopy, discussed with patient in detail x2      Complications:  none    Hospital Course:     Clari Ramos is a 40 y o  female patient with past medical history of alcoholic cirrhosis, chronic pancreatitis, hypertension, breath esophagus, diverticular disease, nicotine dependence, who originally presented to the hospital on 5/28/2018 due to abdominal pain, nausea and vomiting  CT of the abdomen showed colitis  Given patient's history of diverticular disease, diverticulitis cannot be excluded  Patient was kept NPO and treated with intravenous fluids as well as intravenous Cipro and Flagyl  Patient did improved  Gastroenterology was following, the plan was to proceed with a flex sigmoidoscopy versus colonoscopy if patient did not improve with empiric antibiotics but she did  Patient is still reporting slight abdominal tenderness but is tolerating a clear liquid diet  I will advance her to a low-fiber/low residue diet and if she tolerates this she will be discharged  The patient denies eating any solid food however nursing staff reports to me that the patient was eating pizza without any difficulty, nausea or vomiting  The patient's leukocytosis has resolved  She has been afebrile  The patient has a longstanding history of alcohol abuse with alcoholic cirrhosis and is to follow up with [facility]  She also had an episode of diverticulitis a few years ago and never followed up with outpatient colonoscopy  I did discuss this with our gastroenterology team, they are recommending a colonoscopy for this patient after inflammation resolution  I did discuss this with the patient she is aware of this  The patient is stable for discharge  Her vital signs are stable  She has improved with intravenous antibiotics  I will discharge the patient home on the remaining 7 day course of Cipro and Flagyl  I did strongly encourage alcohol cessation with the patient especially given Flagyl treatment  Patient verbalized understanding  She also asked me to refill her Bentyl, Carafate and Protonix which I will do  I will give her several pills of oxycodone for pain control  Patient is to follow up with her primary care physician within 1 week      Condition at Discharge: stable     Discharge Day Visit / Exam: Subjective:  Patient offers no complaints  States she tolerated a clear liquid diet without any nausea, vomiting or worsening abdominal pain  Denies any fevers or chills  Agreeable to go home  Verbalized understanding of all discharge instructions including necessary follow ups  Vitals: Blood Pressure: 154/81 (05/31/18 0700)  Pulse: (!) 52 (05/31/18 0700)  Temperature: 97 9 °F (36 6 °C) (05/31/18 0300)  Temp Source: Oral (05/31/18 0300)  Respirations: 16 (05/31/18 0700)  Height: 5' 4" (162 6 cm) (05/28/18 1531)  Weight - Scale: 75 kg (165 lb 5 5 oz) (05/28/18 1531)  SpO2: 99 % (05/31/18 0700)     Exam:   Physical Exam   Constitutional: She is oriented to person, place, and time  No distress  HENT:   Head: Normocephalic  Eyes: Pupils are equal, round, and reactive to light  Neck: Normal range of motion  No JVD present  Cardiovascular: Normal rate, regular rhythm and intact distal pulses  No murmur heard  Pulmonary/Chest: Effort normal and breath sounds normal    Abdominal: Soft  Bowel sounds are normal  She exhibits no distension  There is tenderness (slight, improved  )  Musculoskeletal: Normal range of motion  She exhibits no edema  Neurological: She is alert and oriented to person, place, and time  Skin: Skin is warm and dry  Psychiatric: She has a normal mood and affect  Nursing note and vitals reviewed  Discussion with Family:  Patient and  at bedside    Discharge instructions/Information to patient and family:   See after visit summary for information provided to patient and family  Provisions for Follow-Up Care:  See after visit summary for information related to follow-up care and any pertinent home health orders  Disposition:     Home    For Discharges to [facility]:   · Not Applicable to this Patient - Not Applicable to this Patient    Planned Readmission: no     Discharge Statement:  I spent 35 minutes discharging the patient   This time was spent on the day of discharge  I had direct contact with the patient on the day of discharge  Greater than 50% of the total time was spent examining patient, answering all patient questions, arranging and discussing plan of care with patient as well as directly providing post-discharge instructions  Additional time then spent on discharge activities  Discharge Medications:  See after visit summary for reconciled discharge medications provided to patient and family        ** Please Note: This note has been constructed using a voice recognition system **

## 2018-06-02 LAB
BACTERIA BLD CULT: NORMAL
BACTERIA BLD CULT: NORMAL

## 2018-08-23 ENCOUNTER — HOSPITAL ENCOUNTER (EMERGENCY)
Facility: HOSPITAL | Age: 43
Discharge: HOME | End: 2018-08-24
Attending: EMERGENCY MEDICINE | Admitting: EMERGENCY MEDICINE
Payer: COMMERCIAL

## 2018-08-23 ENCOUNTER — APPOINTMENT (EMERGENCY)
Dept: CT IMAGING | Facility: HOSPITAL | Age: 43
End: 2018-08-23
Payer: COMMERCIAL

## 2018-08-23 DIAGNOSIS — K52.9 COLITIS: Primary | ICD-10-CM

## 2018-08-23 LAB
ALBUMIN SERPL BCP-MCNC: 4.3 G/DL (ref 3.5–5)
ALP SERPL-CCNC: 112 U/L (ref 46–116)
ALT SERPL W P-5'-P-CCNC: 28 U/L (ref 12–78)
ANION GAP SERPL CALCULATED.3IONS-SCNC: 15 MMOL/L (ref 4–13)
AST SERPL W P-5'-P-CCNC: 44 U/L (ref 5–45)
BASOPHILS # BLD AUTO: 0.07 THOUSANDS/ΜL (ref 0–0.1)
BASOPHILS NFR BLD AUTO: 1 % (ref 0–1)
BILIRUB DIRECT SERPL-MCNC: 0.48 MG/DL (ref 0–0.2)
BILIRUB SERPL-MCNC: 1.4 MG/DL (ref 0.2–1)
BUN SERPL-MCNC: 15 MG/DL (ref 5–25)
CALCIUM SERPL-MCNC: 10 MG/DL (ref 8.3–10.1)
CHLORIDE SERPL-SCNC: 101 MMOL/L (ref 100–108)
CO2 SERPL-SCNC: 22 MMOL/L (ref 21–32)
CREAT SERPL-MCNC: 0.87 MG/DL (ref 0.6–1.3)
EOSINOPHIL # BLD AUTO: 0 THOUSAND/ΜL (ref 0–0.61)
EOSINOPHIL NFR BLD AUTO: 0 % (ref 0–6)
ERYTHROCYTE [DISTWIDTH] IN BLOOD BY AUTOMATED COUNT: 14.4 % (ref 11.6–15.1)
GFR SERPL CREATININE-BSD FRML MDRD: 82 ML/MIN/1.73SQ M
GLUCOSE SERPL-MCNC: 184 MG/DL (ref 65–140)
HCG SERPL QL: NEGATIVE
HCT VFR BLD AUTO: 49.7 % (ref 34.8–46.1)
HGB BLD-MCNC: 17 G/DL (ref 11.5–15.4)
IMM GRANULOCYTES # BLD AUTO: 0.04 THOUSAND/UL (ref 0–0.2)
IMM GRANULOCYTES NFR BLD AUTO: 0 % (ref 0–2)
LACTATE SERPL-SCNC: 2.9 MMOL/L (ref 0.5–2)
LIPASE SERPL-CCNC: 108 U/L (ref 73–393)
LYMPHOCYTES # BLD AUTO: 2.4 THOUSANDS/ΜL (ref 0.6–4.47)
LYMPHOCYTES NFR BLD AUTO: 17 % (ref 14–44)
MCH RBC QN AUTO: 30.4 PG (ref 26.8–34.3)
MCHC RBC AUTO-ENTMCNC: 34.2 G/DL (ref 31.4–37.4)
MCV RBC AUTO: 89 FL (ref 82–98)
MONOCYTES # BLD AUTO: 0.86 THOUSAND/ΜL (ref 0.17–1.22)
MONOCYTES NFR BLD AUTO: 6 % (ref 4–12)
NEUTROPHILS # BLD AUTO: 11.2 THOUSANDS/ΜL (ref 1.85–7.62)
NEUTS SEG NFR BLD AUTO: 76 % (ref 43–75)
NRBC BLD AUTO-RTO: 0 /100 WBCS
PLATELET # BLD AUTO: 327 THOUSANDS/UL (ref 149–390)
PMV BLD AUTO: 9.6 FL (ref 8.9–12.7)
POTASSIUM SERPL-SCNC: 3.8 MMOL/L (ref 3.5–5.3)
PROT SERPL-MCNC: 8.8 G/DL (ref 6.4–8.2)
RBC # BLD AUTO: 5.59 MILLION/UL (ref 3.81–5.12)
SODIUM SERPL-SCNC: 138 MMOL/L (ref 136–145)
WBC # BLD AUTO: 14.57 THOUSAND/UL (ref 4.31–10.16)

## 2018-08-23 PROCEDURE — 80048 BASIC METABOLIC PNL TOTAL CA: CPT | Performed by: PHYSICIAN ASSISTANT

## 2018-08-23 PROCEDURE — 96361 HYDRATE IV INFUSION ADD-ON: CPT

## 2018-08-23 PROCEDURE — 80076 HEPATIC FUNCTION PANEL: CPT | Performed by: PHYSICIAN ASSISTANT

## 2018-08-23 PROCEDURE — 96374 THER/PROPH/DIAG INJ IV PUSH: CPT

## 2018-08-23 PROCEDURE — 84703 CHORIONIC GONADOTROPIN ASSAY: CPT | Performed by: PHYSICIAN ASSISTANT

## 2018-08-23 PROCEDURE — 87040 BLOOD CULTURE FOR BACTERIA: CPT | Performed by: PHYSICIAN ASSISTANT

## 2018-08-23 PROCEDURE — 96375 TX/PRO/DX INJ NEW DRUG ADDON: CPT

## 2018-08-23 PROCEDURE — 83690 ASSAY OF LIPASE: CPT | Performed by: PHYSICIAN ASSISTANT

## 2018-08-23 PROCEDURE — 74177 CT ABD & PELVIS W/CONTRAST: CPT

## 2018-08-23 PROCEDURE — 36415 COLL VENOUS BLD VENIPUNCTURE: CPT | Performed by: PHYSICIAN ASSISTANT

## 2018-08-23 PROCEDURE — 83605 ASSAY OF LACTIC ACID: CPT | Performed by: PHYSICIAN ASSISTANT

## 2018-08-23 PROCEDURE — 85025 COMPLETE CBC W/AUTO DIFF WBC: CPT | Performed by: PHYSICIAN ASSISTANT

## 2018-08-23 RX ORDER — DIPHENHYDRAMINE HYDROCHLORIDE 50 MG/ML
50 INJECTION INTRAMUSCULAR; INTRAVENOUS ONCE
Status: COMPLETED | OUTPATIENT
Start: 2018-08-23 | End: 2018-08-23

## 2018-08-23 RX ORDER — ONDANSETRON 2 MG/ML
INJECTION INTRAMUSCULAR; INTRAVENOUS
Status: DISCONTINUED
Start: 2018-08-23 | End: 2018-08-24 | Stop reason: HOSPADM

## 2018-08-23 RX ORDER — METOCLOPRAMIDE HYDROCHLORIDE 5 MG/ML
10 INJECTION INTRAMUSCULAR; INTRAVENOUS ONCE
Status: COMPLETED | OUTPATIENT
Start: 2018-08-23 | End: 2018-08-23

## 2018-08-23 RX ADMIN — SODIUM CHLORIDE 1000 ML: 0.9 INJECTION, SOLUTION INTRAVENOUS at 21:48

## 2018-08-23 RX ADMIN — IOHEXOL 100 ML: 350 INJECTION, SOLUTION INTRAVENOUS at 23:04

## 2018-08-23 RX ADMIN — DIPHENHYDRAMINE HYDROCHLORIDE 50 MG: 50 INJECTION, SOLUTION INTRAMUSCULAR; INTRAVENOUS at 21:35

## 2018-08-23 RX ADMIN — METOCLOPRAMIDE 10 MG: 5 INJECTION, SOLUTION INTRAMUSCULAR; INTRAVENOUS at 21:36

## 2018-08-23 RX ADMIN — HYDROMORPHONE HYDROCHLORIDE 1 MG: 1 INJECTION, SOLUTION INTRAMUSCULAR; INTRAVENOUS; SUBCUTANEOUS at 21:38

## 2018-08-24 VITALS
DIASTOLIC BLOOD PRESSURE: 67 MMHG | SYSTOLIC BLOOD PRESSURE: 111 MMHG | OXYGEN SATURATION: 97 % | RESPIRATION RATE: 15 BRPM | TEMPERATURE: 98 F | HEART RATE: 89 BPM

## 2018-08-24 LAB — LACTATE SERPL-SCNC: 1 MMOL/L (ref 0.5–2)

## 2018-08-24 PROCEDURE — 99285 EMERGENCY DEPT VISIT HI MDM: CPT

## 2018-08-24 PROCEDURE — 83605 ASSAY OF LACTIC ACID: CPT | Performed by: PHYSICIAN ASSISTANT

## 2018-08-24 PROCEDURE — 36415 COLL VENOUS BLD VENIPUNCTURE: CPT | Performed by: PHYSICIAN ASSISTANT

## 2018-08-24 PROCEDURE — 96374 THER/PROPH/DIAG INJ IV PUSH: CPT

## 2018-08-24 PROCEDURE — 96361 HYDRATE IV INFUSION ADD-ON: CPT

## 2018-08-24 RX ORDER — METRONIDAZOLE 500 MG/1
500 TABLET ORAL EVERY 8 HOURS SCHEDULED
Qty: 21 TABLET | Refills: 0 | Status: SHIPPED | OUTPATIENT
Start: 2018-08-24 | End: 2018-08-31

## 2018-08-24 RX ORDER — CIPROFLOXACIN 500 MG/1
500 TABLET, FILM COATED ORAL EVERY 12 HOURS SCHEDULED
Qty: 14 TABLET | Refills: 0 | Status: SHIPPED | OUTPATIENT
Start: 2018-08-24 | End: 2018-08-31

## 2018-08-24 RX ADMIN — SODIUM CHLORIDE 1000 ML: 0.9 INJECTION, SOLUTION INTRAVENOUS at 01:05

## 2018-08-24 RX ADMIN — HYDROMORPHONE HYDROCHLORIDE 1 MG: 1 INJECTION, SOLUTION INTRAMUSCULAR; INTRAVENOUS; SUBCUTANEOUS at 01:05

## 2018-08-24 NOTE — DISCHARGE INSTRUCTIONS
Return to the Emergency Department sooner if increased pain, fever, vomiting, diarrhea, difficulty breathing or urinating, bleeding  Clear fluids for 1-2 days and then advance diet as tolerated  Colitis   WHAT YOU NEED TO KNOW:   Colitis is swelling and irritation of your colon  Colitis may be caused by ulcers or a problem with your immune system  Bacteria, a virus, or a parasite may also cause colitis  The cause may not be known  You may have diarrhea, abdominal pain, fever, or blood or mucus in your bowel movement  DISCHARGE INSTRUCTIONS:   Return to the emergency department if:   · You have sudden trouble breathing  · Your bowel movements are black or have blood in them  · You have blood in your vomit  · You have severe abdominal pain or your abdomen is swollen and feels hard  · You have any of the following signs of dehydration:     ¨ Dizziness or weakness    ¨ Dry mouth, cracked lips, or severe thirst    ¨ Fast heartbeat or breathing    ¨ Urinating very little or not at all  Contact your healthcare provider if:   · Your symptoms get worse or do not go away  · You have a fever, chills, cough, or feel weak and achy  · You suddenly lose weight without trying  · You have questions or concerns about your condition or care  Medicines:   · Medicines  may be given to decrease inflammation in your colon and treat diarrhea  · Take your medicine as directed  Contact your healthcare provider if you think your medicine is not helping or if you have side effects  Tell him of her if you are allergic to any medicine  Keep a list of the medicines, vitamins, and herbs you take  Include the amounts, and when and why you take them  Bring the list or the pill bottles to follow-up visits  Carry your medicine list with you in case of an emergency  Manage your symptoms:   · Drink liquids as directed  to help prevent dehydration  Good liquids to drink include water, juice, and broth   Ask how much liquid to drink each day  You may need to drink an oral rehydration solution (ORS)  An ORS contains a balance of water, salt, and sugar to replace body fluids lost during diarrhea  · Eat a variety of healthy foods  Healthy foods include fruits, vegetables, whole-grain breads, beans, low-fat dairy products, lean meats, and fish  You may need to eat several small meals throughout the day instead of large meals  Avoid spicy foods, caffeine, chocolate, and foods high in fat  · Talk to your healthcare provider before you take NSAIDs  NSAIDs can cause worsen your symptoms if ulcers are causing your colitis  · Start to exercise when you feel better  Regular exercise helps your bowels work normally  Ask about the best exercise plan for you  Follow up with your healthcare provider as directed: You may need to return for a colonoscopy or other tests  Write down how often you have a bowel movements and what they look like  Bring this to your follow-up visits  Write down your questions so you remember to ask them during your visits  © 2017 [address] [facility] Information is for End User's use only and may not be sold, redistributed or otherwise used for commercial purposes  All illustrations and images included in CareNotes® are the copyrighted property of [employer]  or [employer]  The above information is an  only  It is not intended as medical advice for individual conditions or treatments  Talk to your doctor, nurse or pharmacist before following any medical regimen to see if it is safe and effective for you

## 2018-08-24 NOTE — ED PROVIDER NOTES
History  Chief Complaint   Patient presents with    Abdominal Pain     Pt reports severe abdominal pain starting at 0300 today  Pt reports "going on binder" hx of pancreatitis  Last drink was yesterday 1500, reports drinking for couple days  81-year-old female here for abdominal pain nausea and vomiting  Onset of symptoms was around 0300 today  She has multiple ER visits for pancreatitis  It was thought to be alcohol induced  She does note going on recent drinking binge  She thinks this is the cause of her symptoms  Pain is in the epigastric region and shoots directly to the back  She states the pain is more severe than she has had in the past   Nausea vomiting no hematemesis  No coffee-ground emesis  Normal bowel movements and normal urination  History provided by:  Patient   used: No    Abdominal Pain   Pain location:  Epigastric  Pain quality: stabbing    Pain radiation: straight to back  Pain severity:  Severe  Onset quality:  Sudden  Duration:  19 hours  Timing:  Constant  Progression:  Worsening  Chronicity:  Recurrent  Context: alcohol use    Relieved by:  Nothing  Worsened by:  Nothing  Ineffective treatments:  None tried  Associated symptoms: anorexia, nausea and vomiting    Associated symptoms: no belching, no chest pain, no chills, no constipation, no cough, no diarrhea, no dysuria, no fatigue, no fever, no flatus, no hematemesis, no hematochezia, no hematuria, no melena, no shortness of breath, no sore throat, no vaginal bleeding and no vaginal discharge    Risk factors: alcohol abuse        Prior to Admission Medications   Prescriptions Last Dose Informant Patient Reported? Taking?    Mometasone Furo-Formoterol Fum (DULERA) 100-5 MCG/ACT AERO   Yes No   Sig: Inhale as needed Unsure of dose     al mag oxide-diphenhydramine-lidocaine viscous (MAGIC MOUTHWASH) 1:1:1 suspension   No No   Sig: Swish and swallow 10 mL every 6 (six) hours as needed for mouth pain or discomfort (esophageal discomfort)   amitriptyline (ELAVIL) 10 mg tablet   Yes No   Sig: Take 10 mg by mouth daily at bedtime   cloNIDine (CATAPRES) 0 3 mg tablet   Yes No   Sig: Take 0 3 mg by mouth 2 (two) times a day   dicyclomine (BENTYL) 20 mg tablet   No No   Sig: Take 1 tablet (20 mg total) by mouth 3 (three) times a day   folic acid (FOLVITE) 1 mg tablet   No No   Sig: Take 1 tablet (1 mg total) by mouth daily   hydrOXYzine HCL (ATARAX) 25 mg tablet   Yes No   Sig: Take 25 mg by mouth daily at bedtime   pancrelipase, Lip-Prot-Amyl, (CREON) 24,000 units   No No   Sig: Take 1 capsule by mouth 3 (three) times a day with meals   pantoprazole (PROTONIX) 40 mg tablet   No No   Sig: Take 1 tablet (40 mg total) by mouth 2 (two) times a day   sucralfate (CARAFATE) 1 g tablet   No No   Sig: Take 1 tablet (1 g total) by mouth 4 (four) times a day   thiamine 100 MG tablet   No No   Sig: Take 1 tablet (100 mg total) by mouth daily      Facility-Administered Medications: None       Past Medical History:   Diagnosis Date    Alcohol abuse     Arthritis     GERD (gastroesophageal reflux disease)     Hypertension     Nicotine dependence     Obesity     Pancreatitis     Panic attack        Past Surgical History:   Procedure Laterality Date    ABDOMINAL SURGERY      C SECTION    ESOPHAGOGASTRODUODENOSCOPY N/A 12/15/2017    Procedure: ESOPHAGOGASTRODUODENOSCOPY (EGD); Surgeon: Elodia Coulter MD;  Location: [facility]; Service: Gastroenterology       Family History   Problem Relation Age of Onset    Cirrhosis Father     Alcohol abuse Father     Drug abuse Mother      I have reviewed and agree with the history as documented      Social History   Substance Use Topics    Smoking status: Current Every Day Smoker     Packs/day: 1 00     Years: 29 00     Types: Cigarettes    Smokeless tobacco: Never Used    Alcohol use Yes      Comment: 1 pint daily        Review of Systems   Constitutional: Negative for activity change, appetite change, chills, diaphoresis, fatigue, fever and unexpected weight change  HENT: Negative for congestion, rhinorrhea, sinus pressure, sore throat and trouble swallowing  Eyes: Negative for photophobia and visual disturbance  Respiratory: Negative for apnea, cough, choking, chest tightness, shortness of breath, wheezing and stridor  Cardiovascular: Negative for chest pain, palpitations and leg swelling  Gastrointestinal: Positive for abdominal pain, anorexia, nausea and vomiting  Negative for abdominal distention, anal bleeding, blood in stool, constipation, diarrhea, flatus, hematemesis, hematochezia and melena  Genitourinary: Negative for decreased urine volume, difficulty urinating, dysuria, enuresis, flank pain, frequency, hematuria, urgency, vaginal bleeding and vaginal discharge  Musculoskeletal: Negative for arthralgias, myalgias, neck pain and neck stiffness  Skin: Negative for color change, pallor, rash and wound  Allergic/Immunologic: Negative  Neurological: Negative for dizziness, tremors, syncope, weakness, light-headedness, numbness and headaches  Hematological: Negative  Psychiatric/Behavioral: Negative  All other systems reviewed and are negative  Physical Exam  Physical Exam   Constitutional: She is oriented to person, place, and time  She appears well-developed and well-nourished  Non-toxic appearance  She does not have a sickly appearance  She does not appear ill  No distress  HENT:   Head: Normocephalic and atraumatic  Eyes: EOM and lids are normal  Pupils are equal, round, and reactive to light  Neck: Normal range of motion  Neck supple  Cardiovascular: Normal rate, regular rhythm, S1 normal, S2 normal, normal heart sounds, intact distal pulses and normal pulses  Exam reveals no gallop, no distant heart sounds, no friction rub and no decreased pulses  No murmur heard    Pulses:       Radial pulses are 2+ on the right side, and 2+ on the left side  Pulmonary/Chest: Effort normal and breath sounds normal  No accessory muscle usage  No apnea, no tachypnea and no bradypnea  No respiratory distress  She has no decreased breath sounds  She has no wheezes  She has no rhonchi  She has no rales  Abdominal: Soft  Normal appearance  She exhibits no distension  There is tenderness in the epigastric area  There is no rigidity, no rebound and no guarding  Musculoskeletal: Normal range of motion  She exhibits no edema, tenderness or deformity  Neurological: She is alert and oriented to person, place, and time  No cranial nerve deficit  GCS eye subscore is 4  GCS verbal subscore is 5  GCS motor subscore is 6  Skin: Skin is warm, dry and intact  No rash noted  She is not diaphoretic  No erythema  No pallor  Psychiatric: Her speech is normal    Nursing note and vitals reviewed        Vital Signs  ED Triage Vitals   Temperature Pulse Respirations Blood Pressure SpO2   08/23/18 2130 08/23/18 2130 08/23/18 2130 08/23/18 2130 08/23/18 2130   98 °F (36 7 °C) 95 (!) 24 (!) 184/127 98 %      Temp Source Heart Rate Source Patient Position - Orthostatic VS BP Location FiO2 (%)   08/23/18 2130 08/23/18 2130 08/23/18 2130 08/23/18 2130 --   Oral Monitor Sitting Left arm       Pain Score       08/24/18 0201       No Pain           Vitals:    08/23/18 2130 08/23/18 2200 08/23/18 2230 08/24/18 0145   BP: (!) 184/127 129/79 111/67    Pulse: 95 68 72 89   Patient Position - Orthostatic VS: Sitting Lying Lying        Visual Acuity      ED Medications  Medications   sodium chloride 0 9 % bolus 1,000 mL (0 mL Intravenous Stopped 8/24/18 0221)   metoclopramide (REGLAN) injection 10 mg (10 mg Intravenous Given 8/23/18 2136)   diphenhydrAMINE (BENADRYL) injection 50 mg (50 mg Intravenous Given 8/23/18 2135)   HYDROmorphone (DILAUDID) injection 1 mg (1 mg Intravenous Given 8/23/18 2138)   sodium chloride 0 9 % bolus 1,000 mL (0 mL Intravenous Stopped 8/24/18 0057)   iohexol (OMNIPAQUE) 350 MG/ML injection (MULTI-DOSE) 100 mL (100 mL Intravenous Given 8/23/18 2304)   HYDROmorphone (DILAUDID) injection 1 mg (1 mg Intravenous Given 8/24/18 0105)       Diagnostic Studies  Results Reviewed     Procedure Component Value Units Date/Time    Blood culture #1 [[id number]] Collected:  08/23/18 2145    Lab Status:  Final result Specimen:  Blood from Arm, Left Updated:  08/29/18 0801     Blood Culture No Growth After 5 Days  Blood culture #2 [[id number]] Collected:  08/23/18 2145    Lab Status:  Final result Specimen:  Blood from Arm, Left Updated:  08/29/18 0801     Blood Culture No Growth After 5 Days  Lactic acid, plasma [[id number]]  (Normal) Collected:  08/24/18 0126    Lab Status:  Final result Specimen:  Blood from Hand, Left Updated:  08/24/18 0204     LACTIC ACID 1 0 mmol/L     Narrative:         Result may be elevated if tourniquet was used during collection  Hepatic function panel [[id number]]  (Abnormal) Collected:  08/23/18 2145    Lab Status:  Final result Specimen:  Blood from Arm, Left Updated:  08/23/18 2215     Total Bilirubin 1 40 (H) mg/dL      Bilirubin, Direct 0 48 (H) mg/dL      Alkaline Phosphatase 112 U/L      AST 44 U/L      ALT 28 U/L      Total Protein 8 8 (H) g/dL      Albumin 4 3 g/dL     Lipase [[id number]]  (Normal) Collected:  08/23/18 2145    Lab Status:  Final result Specimen:  Blood from Arm, Left Updated:  08/23/18 2215     Lipase 108 u/L     hCG, qualitative pregnancy [[id number]]  (Normal) Collected:  08/23/18 2145    Lab Status:  Final result Specimen:  Blood from Arm, Left Updated:  08/23/18 2215     Preg, Serum Negative    Lactic acid, plasma [[id number]]  (Abnormal) Collected:  08/23/18 2145    Lab Status:  Final result Specimen:  Blood from Arm, Left Updated:  08/23/18 2214     LACTIC ACID 2 9 (HH) mmol/L     Narrative:         Result may be elevated if tourniquet was used during collection      Basic metabolic panel [[id number]]  (Abnormal) Collected:  08/23/18 2145    Lab Status:  Final result Specimen:  Blood from Arm, Left Updated:  08/23/18 2210     Sodium 138 mmol/L      Potassium 3 8 mmol/L      Chloride 101 mmol/L      CO2 22 mmol/L      ANION GAP 15 (H) mmol/L      BUN 15 mg/dL      Creatinine 0 87 mg/dL      Glucose 184 (H) mg/dL      Calcium 10 0 mg/dL      eGFR 82 ml/min/1 73sq m     Narrative:         National Kidney Disease Education Program recommendations are as follows:  GFR calculation is accurate only with a steady state creatinine  Chronic Kidney disease less than 60 ml/min/1 73 sq  meters  Kidney failure less than 15 ml/min/1 73 sq  meters  CBC and differential [[id number]]  (Abnormal) Collected:  08/23/18 2145    Lab Status:  Final result Specimen:  Blood from Arm, Left Updated:  08/23/18 2152     WBC 14 57 (H) Thousand/uL      RBC 5 59 (H) Million/uL      Hemoglobin 17 0 (H) g/dL      Hematocrit 49 7 (H) %      MCV 89 fL      MCH 30 4 pg      MCHC 34 2 g/dL      RDW 14 4 %      MPV 9 6 fL      Platelets 093 Thousands/uL      nRBC 0 /100 WBCs      Neutrophils Relative 76 (H) %      Immat GRANS % 0 %      Lymphocytes Relative 17 %      Monocytes Relative 6 %      Eosinophils Relative 0 %      Basophils Relative 1 %      Neutrophils Absolute 11 20 (H) Thousands/µL      Immature Grans Absolute 0 04 Thousand/uL      Lymphocytes Absolute 2 40 Thousands/µL      Monocytes Absolute 0 86 Thousand/µL      Eosinophils Absolute 0 00 Thousand/µL      Basophils Absolute 0 07 Thousands/µL                  CT abdomen pelvis with contrast   Final Result by Meir Rollins DO (08/24 0017)      Colonic wall thickening throughout the majority of the colon, possibly exaggerated by underdistention although a mild colitis is considered as well  There is also gastric wall thickening which could also be exaggerated by underdistention although an acute versus chronic gastritis could also have this appearance        Moderate circumferential bladder wall thickening probably exaggerated by underdistention  Consider a component of infection in the appropriate clinical setting  Hepatic cirrhosis, pancreatic atrophy, renal cortical scarring, and other nonacute findings as above  Workstation performed: [id number]                    Procedures  Procedures       Phone Contacts  ED Phone Contact    ED Course  ED Course as of Aug 29 1405   Fri Aug 24, 2018   0030 Will give additional dose of dilaudid  I discussed admission with patient  Patient states if theres a chance to go home she would like to  Will repeat lactic  Patient would like to go home  States typically in past when treated with abx she feels better  0037 PO challenge and lactic for dispo  If patient tolerated PO and has improved lactic she can be d/c'd home as patient would prefer to go home  Admission was offered to patient  MDM  Number of Diagnoses or Management Options  Colitis: new and requires workup  Diagnosis management comments: DDx including but not limited to: appendicitis, gastroenteritis, gastritis, PUD, GERD, gastroparesis, hepatitis, pancreatitis, colitis, enteritis, diverticulitis, food poisoning, mesenteric adenitis, mesenteric ischemia, IBD, IBS, ileus, bowel obstruction, volvulus, internal hernia, AAA, cholecystitis, biliary colic, choledocholithiasis, perforated viscus, splenic etiology, constipation, pelvic pathology, renal colic, pyelonephritis, UTI; doubt cardiac etiology  Plan: pt here frequently for the same  Will evaluate for other possible etiologies  Analgesia  dispo pending  Amount and/or Complexity of Data Reviewed  Clinical lab tests: ordered and reviewed  Tests in the radiology section of CPT®: ordered and reviewed  Independent visualization of images, tracings, or specimens: yes    Risk of Complications, Morbidity, and/or Mortality  Presenting problems: moderate  Management options: low  General comments: 43 y/o female with abd pain  CT consistent with colitis  Labs overall unremarkable  Leukocytosis consistent with colitis  She is afebrile  Lactic acidosis likely dehydration/vomiting  Resolved after fluids  Low suspicion for mesenteric ischemia  Offered patient admission for her recurrent abd pain and n/v  She states if there is any chance that she could go home she would prefer to do that  Will tx with abx  Pt tolerated PO and would like to go home  Return parameters provided  Pt understands and agrees with plan  Discharge instructions were given by overnight attending after signout  Patient Progress  Patient progress: stable    CritCare Time    Disposition  Final diagnoses:   Colitis     Time reflects when diagnosis was documented in both MDM as applicable and the Disposition within this note     Time User Action Codes Description Comment    8/24/2018 12:28 AM Veto Liner Add [K52 9] Colitis       ED Disposition     ED Disposition Condition Comment    Discharge  Ruchi Mercado discharge to home/self care      Condition at discharge: Good        Follow-up Information     Follow up With Specialties Details Why Contact Info Additional [address], [place] Family Medicine   [place] [address] [place] [id number] [phone]       Your gastroenterologist         Atul Galvan [facility] Emergency Medicine Go to If symptoms worsen [address] [place] [address]  [phone] [facility], [address], [place], [facility]          Discharge Medication List as of 8/24/2018 12:29 AM      START taking these medications    Details   ciprofloxacin (CIPRO) 500 mg tablet Take 1 tablet (500 mg total) by mouth every 12 (twelve) hours for 7 days, Starting Fri 8/24/2018, Until Fri 8/31/2018, Print      metroNIDAZOLE (FLAGYL) 500 mg tablet Take 1 tablet (500 mg total) by mouth every 8 (eight) hours for 7 days, Starting Fri 8/24/2018, Until Fri 8/31/2018, Print CONTINUE these medications which have NOT CHANGED    Details   al mag oxide-diphenhydramine-lidocaine viscous (MAGIC MOUTHWASH) 1:1:1 suspension Swish and swallow 10 mL every 6 (six) hours as needed for mouth pain or discomfort (esophageal discomfort), Starting Thu 5/31/2018, Print      amitriptyline (ELAVIL) 10 mg tablet Take 10 mg by mouth daily at bedtime, Historical Med      cloNIDine (CATAPRES) 0 3 mg tablet Take 0 3 mg by mouth 2 (two) times a day, Historical Med      dicyclomine (BENTYL) 20 mg tablet Take 1 tablet (20 mg total) by mouth 3 (three) times a day, Starting Thu 2/02/9580, Print      folic acid (FOLVITE) 1 mg tablet Take 1 tablet (1 mg total) by mouth daily, Starting Fri 6/1/2018, Print      hydrOXYzine HCL (ATARAX) 25 mg tablet Take 25 mg by mouth daily at bedtime, Historical Med      Mometasone Furo-Formoterol Fum (DULERA) 100-5 MCG/ACT AERO Inhale as needed Unsure of dose  , Historical Med      pancrelipase, Lip-Prot-Amyl, (CREON) 24,000 units Take 1 capsule by mouth 3 (three) times a day with meals, Starting Tue 10/31/2017, Normal      pantoprazole (PROTONIX) 40 mg tablet Take 1 tablet (40 mg total) by mouth 2 (two) times a day, Starting Thu 5/31/2018, Print      sucralfate (CARAFATE) 1 g tablet Take 1 tablet (1 g total) by mouth 4 (four) times a day, Starting Thu 5/31/2018, Print      thiamine 100 MG tablet Take 1 tablet (100 mg total) by mouth daily, Starting Fri 6/1/2018, Print           No discharge procedures on file      ED Provider  Electronically Signed by           Casey Luis PA-C  08/29/18 [facility]  08/29/18 6045

## 2018-08-29 LAB
BACTERIA BLD CULT: NORMAL
BACTERIA BLD CULT: NORMAL

## 2018-09-20 ENCOUNTER — HOSPITAL ENCOUNTER (EMERGENCY)
Facility: HOSPITAL | Age: 43
Discharge: HOME | End: 2018-09-20
Attending: EMERGENCY MEDICINE
Payer: COMMERCIAL

## 2018-09-20 ENCOUNTER — APPOINTMENT (EMERGENCY)
Dept: CT IMAGING | Facility: HOSPITAL | Age: 43
End: 2018-09-20
Payer: COMMERCIAL

## 2018-09-20 VITALS
OXYGEN SATURATION: 99 % | HEART RATE: 86 BPM | TEMPERATURE: 97.8 F | DIASTOLIC BLOOD PRESSURE: 97 MMHG | BODY MASS INDEX: 27.31 KG/M2 | SYSTOLIC BLOOD PRESSURE: 167 MMHG | WEIGHT: 160 LBS | HEIGHT: 64 IN | RESPIRATION RATE: 16 BRPM

## 2018-09-20 DIAGNOSIS — N39.0 URINARY TRACT INFECTION: ICD-10-CM

## 2018-09-20 DIAGNOSIS — F10.929 ACUTE ALCOHOL INTOXICATION (HCC): ICD-10-CM

## 2018-09-20 DIAGNOSIS — R11.2 NAUSEA & VOMITING: ICD-10-CM

## 2018-09-20 DIAGNOSIS — R10.9 ABDOMINAL PAIN: Primary | ICD-10-CM

## 2018-09-20 LAB
ALBUMIN SERPL BCP-MCNC: 3.6 G/DL (ref 3.5–5)
ALP SERPL-CCNC: 87 U/L (ref 46–116)
ALT SERPL W P-5'-P-CCNC: 42 U/L (ref 12–78)
AMPHETAMINES SERPL QL SCN: NEGATIVE
ANION GAP SERPL CALCULATED.3IONS-SCNC: 15 MMOL/L (ref 4–13)
AST SERPL W P-5'-P-CCNC: 42 U/L (ref 5–45)
ATRIAL RATE: 93 BPM
BACTERIA UR QL AUTO: ABNORMAL /HPF
BARBITURATES UR QL: NEGATIVE
BASOPHILS # BLD AUTO: 0.14 THOUSANDS/ΜL (ref 0–0.1)
BASOPHILS NFR BLD AUTO: 1 % (ref 0–1)
BENZODIAZ UR QL: NEGATIVE
BILIRUB SERPL-MCNC: 0.6 MG/DL (ref 0.2–1)
BILIRUB UR QL STRIP: ABNORMAL
BUN SERPL-MCNC: 9 MG/DL (ref 5–25)
CALCIUM SERPL-MCNC: 7.9 MG/DL (ref 8.3–10.1)
CHLORIDE SERPL-SCNC: 109 MMOL/L (ref 100–108)
CLARITY UR: ABNORMAL
CO2 SERPL-SCNC: 22 MMOL/L (ref 21–32)
COCAINE UR QL: NEGATIVE
COLOR UR: YELLOW
CREAT SERPL-MCNC: 0.69 MG/DL (ref 0.6–1.3)
EOSINOPHIL # BLD AUTO: 0.04 THOUSAND/ΜL (ref 0–0.61)
EOSINOPHIL NFR BLD AUTO: 0 % (ref 0–6)
ERYTHROCYTE [DISTWIDTH] IN BLOOD BY AUTOMATED COUNT: 15.9 % (ref 11.6–15.1)
EXT PREG TEST URINE: NEGATIVE
GFR SERPL CREATININE-BSD FRML MDRD: 108 ML/MIN/1.73SQ M
GLUCOSE SERPL-MCNC: 159 MG/DL (ref 65–140)
GLUCOSE UR STRIP-MCNC: NEGATIVE MG/DL
HCT VFR BLD AUTO: 47.2 % (ref 34.8–46.1)
HGB BLD-MCNC: 15.8 G/DL (ref 11.5–15.4)
HGB UR QL STRIP.AUTO: NEGATIVE
IMM GRANULOCYTES # BLD AUTO: 0.03 THOUSAND/UL (ref 0–0.2)
IMM GRANULOCYTES NFR BLD AUTO: 0 % (ref 0–2)
KETONES UR STRIP-MCNC: NEGATIVE MG/DL
LEUKOCYTE ESTERASE UR QL STRIP: NEGATIVE
LIPASE SERPL-CCNC: 169 U/L (ref 73–393)
LYMPHOCYTES # BLD AUTO: 4.3 THOUSANDS/ΜL (ref 0.6–4.47)
LYMPHOCYTES NFR BLD AUTO: 36 % (ref 14–44)
MCH RBC QN AUTO: 30.3 PG (ref 26.8–34.3)
MCHC RBC AUTO-ENTMCNC: 33.5 G/DL (ref 31.4–37.4)
MCV RBC AUTO: 91 FL (ref 82–98)
METHADONE UR QL: NEGATIVE
MONOCYTES # BLD AUTO: 0.9 THOUSAND/ΜL (ref 0.17–1.22)
MONOCYTES NFR BLD AUTO: 8 % (ref 4–12)
NEUTROPHILS # BLD AUTO: 6.55 THOUSANDS/ΜL (ref 1.85–7.62)
NEUTS SEG NFR BLD AUTO: 55 % (ref 43–75)
NITRITE UR QL STRIP: NEGATIVE
NON-SQ EPI CELLS URNS QL MICRO: ABNORMAL /HPF
NRBC BLD AUTO-RTO: 0 /100 WBCS
OPIATES UR QL SCN: NEGATIVE
P AXIS: 75 DEGREES
PCP UR QL: NEGATIVE
PH UR STRIP.AUTO: 5.5 [PH] (ref 4.5–8)
PLATELET # BLD AUTO: 357 THOUSANDS/UL (ref 149–390)
PMV BLD AUTO: 9 FL (ref 8.9–12.7)
POTASSIUM SERPL-SCNC: 3.6 MMOL/L (ref 3.5–5.3)
PR INTERVAL: 140 MS
PROT SERPL-MCNC: 7.7 G/DL (ref 6.4–8.2)
PROT UR STRIP-MCNC: ABNORMAL MG/DL
QRS AXIS: 24 DEGREES
QRSD INTERVAL: 84 MS
QT INTERVAL: 384 MS
QTC INTERVAL: 477 MS
RBC # BLD AUTO: 5.21 MILLION/UL (ref 3.81–5.12)
RBC #/AREA URNS AUTO: ABNORMAL /HPF
SODIUM SERPL-SCNC: 146 MMOL/L (ref 136–145)
SP GR UR STRIP.AUTO: >=1.03 (ref 1–1.03)
T WAVE AXIS: 81 DEGREES
THC UR QL: POSITIVE
UROBILINOGEN UR QL STRIP.AUTO: 0.2 E.U./DL
VENTRICULAR RATE: 93 BPM
WBC # BLD AUTO: 11.96 THOUSAND/UL (ref 4.31–10.16)
WBC #/AREA URNS AUTO: ABNORMAL /HPF

## 2018-09-20 PROCEDURE — 81001 URINALYSIS AUTO W/SCOPE: CPT | Performed by: EMERGENCY MEDICINE

## 2018-09-20 PROCEDURE — 93010 ELECTROCARDIOGRAM REPORT: CPT | Performed by: INTERNAL MEDICINE

## 2018-09-20 PROCEDURE — 80307 DRUG TEST PRSMV CHEM ANLYZR: CPT | Performed by: EMERGENCY MEDICINE

## 2018-09-20 PROCEDURE — 71260 CT THORAX DX C+: CPT

## 2018-09-20 PROCEDURE — 96374 THER/PROPH/DIAG INJ IV PUSH: CPT

## 2018-09-20 PROCEDURE — 93005 ELECTROCARDIOGRAM TRACING: CPT

## 2018-09-20 PROCEDURE — 85025 COMPLETE CBC W/AUTO DIFF WBC: CPT | Performed by: EMERGENCY MEDICINE

## 2018-09-20 PROCEDURE — 83690 ASSAY OF LIPASE: CPT | Performed by: EMERGENCY MEDICINE

## 2018-09-20 PROCEDURE — 74177 CT ABD & PELVIS W/CONTRAST: CPT

## 2018-09-20 PROCEDURE — 81025 URINE PREGNANCY TEST: CPT | Performed by: EMERGENCY MEDICINE

## 2018-09-20 PROCEDURE — 96361 HYDRATE IV INFUSION ADD-ON: CPT

## 2018-09-20 PROCEDURE — 96365 THER/PROPH/DIAG IV INF INIT: CPT

## 2018-09-20 PROCEDURE — 99284 EMERGENCY DEPT VISIT MOD MDM: CPT

## 2018-09-20 PROCEDURE — 36415 COLL VENOUS BLD VENIPUNCTURE: CPT | Performed by: EMERGENCY MEDICINE

## 2018-09-20 PROCEDURE — C9113 INJ PANTOPRAZOLE SODIUM, VIA: HCPCS | Performed by: EMERGENCY MEDICINE

## 2018-09-20 PROCEDURE — 96375 TX/PRO/DX INJ NEW DRUG ADDON: CPT

## 2018-09-20 PROCEDURE — 80053 COMPREHEN METABOLIC PANEL: CPT | Performed by: EMERGENCY MEDICINE

## 2018-09-20 PROCEDURE — 96372 THER/PROPH/DIAG INJ SC/IM: CPT

## 2018-09-20 RX ORDER — DIPHENHYDRAMINE HYDROCHLORIDE 50 MG/ML
50 INJECTION INTRAMUSCULAR; INTRAVENOUS ONCE
Status: COMPLETED | OUTPATIENT
Start: 2018-09-20 | End: 2018-09-20

## 2018-09-20 RX ORDER — CEPHALEXIN 500 MG/1
500 CAPSULE ORAL EVERY 12 HOURS SCHEDULED
Qty: 14 CAPSULE | Refills: 0 | Status: SHIPPED | OUTPATIENT
Start: 2018-09-20 | End: 2018-09-27

## 2018-09-20 RX ORDER — ONDANSETRON 4 MG/1
4 TABLET, ORALLY DISINTEGRATING ORAL EVERY 8 HOURS PRN
Qty: 12 TABLET | Refills: 0 | Status: SHIPPED | OUTPATIENT
Start: 2018-09-20 | End: 2018-10-10

## 2018-09-20 RX ORDER — MAGNESIUM HYDROXIDE/ALUMINUM HYDROXICE/SIMETHICONE 120; 1200; 1200 MG/30ML; MG/30ML; MG/30ML
15 SUSPENSION ORAL ONCE
Status: COMPLETED | OUTPATIENT
Start: 2018-09-20 | End: 2018-09-20

## 2018-09-20 RX ORDER — HALOPERIDOL 5 MG/ML
5 INJECTION INTRAMUSCULAR ONCE
Status: COMPLETED | OUTPATIENT
Start: 2018-09-20 | End: 2018-09-20

## 2018-09-20 RX ORDER — ONDANSETRON 2 MG/ML
4 INJECTION INTRAMUSCULAR; INTRAVENOUS ONCE
Status: DISCONTINUED | OUTPATIENT
Start: 2018-09-20 | End: 2018-09-20

## 2018-09-20 RX ORDER — SUCRALFATE ORAL 1 G/10ML
1 SUSPENSION ORAL 4 TIMES DAILY
Qty: 420 ML | Refills: 0 | Status: SHIPPED | OUTPATIENT
Start: 2018-09-20 | End: 2018-10-10

## 2018-09-20 RX ORDER — PANTOPRAZOLE SODIUM 40 MG/1
40 INJECTION, POWDER, FOR SOLUTION INTRAVENOUS ONCE
Status: COMPLETED | OUTPATIENT
Start: 2018-09-20 | End: 2018-09-20

## 2018-09-20 RX ORDER — PANTOPRAZOLE SODIUM 20 MG/1
20 TABLET, DELAYED RELEASE ORAL DAILY
Qty: 20 TABLET | Refills: 0 | Status: SHIPPED | OUTPATIENT
Start: 2018-09-20

## 2018-09-20 RX ORDER — METOCLOPRAMIDE HYDROCHLORIDE 5 MG/ML
5 INJECTION INTRAMUSCULAR; INTRAVENOUS ONCE
Status: COMPLETED | OUTPATIENT
Start: 2018-09-20 | End: 2018-09-20

## 2018-09-20 RX ORDER — SUCRALFATE ORAL 1 G/10ML
1000 SUSPENSION ORAL ONCE
Status: COMPLETED | OUTPATIENT
Start: 2018-09-20 | End: 2018-09-20

## 2018-09-20 RX ADMIN — SODIUM CHLORIDE 1000 ML: 0.9 INJECTION, SOLUTION INTRAVENOUS at 11:28

## 2018-09-20 RX ADMIN — ALUMINUM HYDROXIDE, MAGNESIUM HYDROXIDE, AND SIMETHICONE 15 ML: 200; 200; 20 SUSPENSION ORAL at 13:18

## 2018-09-20 RX ADMIN — PANTOPRAZOLE SODIUM 40 MG: 40 INJECTION, POWDER, FOR SOLUTION INTRAVENOUS at 13:19

## 2018-09-20 RX ADMIN — SUCRALFATE 1000 MG: 1 SUSPENSION ORAL at 15:22

## 2018-09-20 RX ADMIN — DIPHENHYDRAMINE HYDROCHLORIDE 50 MG: 50 INJECTION INTRAMUSCULAR; INTRAVENOUS at 11:34

## 2018-09-20 RX ADMIN — HALOPERIDOL LACTATE 5 MG: 5 INJECTION, SOLUTION INTRAMUSCULAR at 11:34

## 2018-09-20 RX ADMIN — CEFTRIAXONE 1000 MG: 1 INJECTION, POWDER, FOR SOLUTION INTRAMUSCULAR; INTRAVENOUS at 14:58

## 2018-09-20 RX ADMIN — METOCLOPRAMIDE 5 MG: 5 INJECTION, SOLUTION INTRAMUSCULAR; INTRAVENOUS at 11:34

## 2018-09-20 RX ADMIN — IOHEXOL 100 ML: 350 INJECTION, SOLUTION INTRAVENOUS at 13:49

## 2018-09-20 NOTE — DISCHARGE INSTRUCTIONS
Acute Nausea and Vomiting, Ambulatory Care   GENERAL INFORMATION:   Acute nausea and vomiting  starts suddenly, gets worse quickly, and lasts a short time  Nausea and vomiting may be caused by pregnancy, alcohol, infection, or medicines  Common related symptoms include the following:   · Fever    · Abdominal swelling    · Pain, tenderness, or a lump in the abdomen    · Splashing sounds heard in your stomach when you move  Seek immediate care for the following symptoms:   · Blood in your vomit or bowel movements    · Sudden, severe pain in your chest and upper abdomen after hard vomiting    · Dizziness, dry mouth, and thirst    · Urinating very little or not at all    · Muscle weakness, leg cramps, and trouble breathing    · A heart beat that is faster than normal    · Vomiting for more than 48 hours  Treatment for acute nausea and vomiting  may include medicines to calm your stomach and stop the vomiting  You may need IV fluids if you are dehydrated  Manage your nausea and vomiting:   · Drink liquids as directed to prevent dehydration  Ask how much liquid to drink each day and which liquids are best for you  You may need to drink an oral rehydration solution (ORS)  ORS contains water, salts, and sugar that are needed to replace the lost body fluids  Ask what kind of ORS to use, how much to drink, and where to get it  · Eat smaller meals, more often  Eat small amounts of food every 2 to 3 hours, even if you are not hungry  Food in your stomach may help decrease your nausea  · Avoid stress  Find ways to relax and manage your stress  Headaches due to stress may cause nausea and vomiting  Get more rest and sleep  Follow up with your healthcare provider as directed:  Write down your questions so you remember to ask them during your visits  CARE AGREEMENT:   You have the right to help plan your care  Learn about your health condition and how it may be treated   Discuss treatment options with your caregivers to decide what care you want to receive  You always have the right to refuse treatment  The above information is an  only  It is not intended as medical advice for individual conditions or treatments  Talk to your doctor, nurse or pharmacist before following any medical regimen to see if it is safe and effective for you  © 2014 [address] is for End User's use only and may not be sold, redistributed or otherwise used for commercial purposes  All illustrations and images included in CareNotes® are the copyrighted property of [employer]  or [employer]  Alcohol Intoxication   WHAT YOU NEED TO KNOW:   Alcohol intoxication is a harmful physical condition caused when you drink more alcohol than your body can handle  It is also called ethanol poisoning, or being drunk  DISCHARGE INSTRUCTIONS:   Medicine: You may be given medicine to manage the signs and symptoms of alcohol intoxication  Take your medicine as directed  Contact your healthcare provider if you think your medicine is not helping or if you have side effects  Tell him if you are allergic to any medicine  Keep a list of the medicines, vitamins, and herbs you take  Include the amounts, and when and why you take them  Bring the list or the pill bottles to follow-up visits  Keep the list with you in case of emergency  Follow up with your healthcare provider as directed:  Write down your questions so you remember to ask them during your visits  Limit or avoid alcohol:  Men should not have more than 2 drinks per day  Women should not have more than 1 drink per day  A drink is 12 ounces of beer, 5 ounces of wine, or 1½ ounces of liquor  Do not drive or operate machines when you drink alcohol:  Make sure you always have someone to drive you when you drink alcohol     For more information:   · Alcoholics Anonymous  Web Address: http://www mckeon info/  Contact your healthcare provider if:   · You need help to stop drinking alcohol  · You have trouble with work or school because you drink too much alcohol  · You have physical or verbal fights because of alcohol  · You have questions or concerns about your condition or care  Return to the emergency department if:   · You have sudden trouble breathing or chest pain  · You have a seizure  · You feel sad enough to harm yourself or others  · You have hallucinations (you see or hear things that are not real)  · You cannot stop vomiting  · You were in an accident because of alcohol  © 2017 [facility] Information is for End User's use only and may not be sold, redistributed or otherwise used for commercial purposes  All illustrations and images included in CareNotes® are the copyrighted property of A [employer] A M [employer]  or [employer]  The above information is an  only  It is not intended as medical advice for individual conditions or treatments  Talk to your doctor, nurse or pharmacist before following any medical regimen to see if it is safe and effective for you  Alcohol Use Disorder   AMBULATORY CARE:   Alcohol use disorder (AUD)  is problem drinking  AUD includes alcohol abuse and alcohol dependency  Common signs and symptoms of AUD:   · You have tried to decrease or stop drinking more than once  You are not able to control your drinking habits  You keep going back to drinking even after you quit  · You put extra effort and time into drinking alcohol  You may often go to events or activities that will include drinking  You may also spend much of your time drinking alcohol or being with people who also drink  You may also spend a lot of time recovering from the effects of drinking  · You keep drinking alcohol even if you know it increases your risk for health problems  Health problems include liver problems, stomach ulcers, high blood pressure, and stroke  · You develop alcohol tolerance  Tolerance means the amount of alcohol you usually drink no longer causes the effects you may desire  You may need to drink even more alcohol to get the same effects  · You have withdrawal (physical or mental) symptoms after not drinking for a short period  The same amount of alcohol may be needed to relieve or prevent withdrawal symptoms such as tremors (shakes)  You may also have to drink to stop withdrawal symptoms or to cure a hangover  · You crave alcohol  You may have a desire to drink more often and to drink larger amounts of alcohol  · You spend less time doing more important things  You have trouble taking part in social or daily activities at school, work, or home  · You continue to drink even when it causes problems  You may have problems with your family, friends, or coworkers but still want to drink  · You have given up activities that you like  You would rather drink instead  · You have put yourself in physically dangerous settings while drinking  These may include driving a car or having unprotected sex after drinking  Seek care immediately if:   · Your heart is beating faster than usual     · You have hallucinations  · You cannot remember what happens while you are drinking  · You have seizures  Contact your healthcare provider if:   · You are anxious and have nausea  · Your hands are shaky and you are sweating heavily  · You have questions or concerns about your condition or care  Treatment:  Your healthcare provider may admit you to the hospital to help you withdraw from alcohol safely  Then you may need any of the following:  · Medicines to decrease your craving for alcohol    · Support groups such as Alcoholics Anonymous     · Therapy services from a psychiatrist or psychologist     · Admission to an inpatient facility for treatment for severe dependence  Risks of AUD:  Alcohol can damage your brain, heart, kidneys, lungs, and liver   Your risk of stroke is greater if you have 5 or more drinks each day  If you are pregnant, you and your baby are at risk for serious health problems  Follow up with your healthcare provider as directed:  Write down your questions so you remember to ask them during your visits  For support and more information:   · Substance Abuse and [address] , [address] [place]  Web Address: https://ClearEdge3D/  · Alcoholics Anonymous  Web Address: http://www mckeon info/  © 2017 [address] Information is for End User's use only and may not be sold, redistributed or otherwise used for commercial purposes  All illustrations and images included in CareNotes® are the copyrighted property of A D A M [employer]  or [employer]  The above information is an  only  It is not intended as medical advice for individual conditions or treatments  Talk to your doctor, nurse or pharmacist before following any medical regimen to see if it is safe and effective for you

## 2018-09-20 NOTE — ED PROVIDER NOTES
History  Chief Complaint   Patient presents with    Vomiting     vomiting started this morning, hx of pancreatitis, last drink was yesterday       History provided by:  Patient  Vomiting   Severity:  Moderate  Duration:  1 day  Timing:  Constant  Number of daily episodes:  3  Quality:  Stomach contents  Progression:  Unchanged  Chronicity:  New  Recent urination:  Normal  Context: not post-tussive and not self-induced    Relieved by:  Nothing  Worsened by:  Nothing  Ineffective treatments:  None tried  Associated symptoms: abdominal pain    Associated symptoms: no diarrhea and no fever    Risk factors: alcohol use (Pt is a chronic alcoholic, states she was sober or abstinent for a period but was binge drinking again yesterday )    Risk factors: no prior abdominal surgery        Prior to Admission Medications   Prescriptions Last Dose Informant Patient Reported? Taking?    Mometasone Furo-Formoterol Fum (DULERA) 100-5 MCG/ACT AERO   Yes No   Sig: Inhale as needed Unsure of dose     al mag oxide-diphenhydramine-lidocaine viscous (MAGIC MOUTHWASH) 1:1:1 suspension   No No   Sig: Swish and swallow 10 mL every 6 (six) hours as needed for mouth pain or discomfort (esophageal discomfort)   amitriptyline (ELAVIL) 10 mg tablet   Yes No   Sig: Take 10 mg by mouth daily at bedtime   cloNIDine (CATAPRES) 0 3 mg tablet   Yes No   Sig: Take 0 3 mg by mouth 2 (two) times a day   dicyclomine (BENTYL) 20 mg tablet   No No   Sig: Take 1 tablet (20 mg total) by mouth 3 (three) times a day   folic acid (FOLVITE) 1 mg tablet   No No   Sig: Take 1 tablet (1 mg total) by mouth daily   hydrOXYzine HCL (ATARAX) 25 mg tablet   Yes No   Sig: Take 25 mg by mouth daily at bedtime   pancrelipase, Lip-Prot-Amyl, (CREON) 24,000 units   No No   Sig: Take 1 capsule by mouth 3 (three) times a day with meals   pantoprazole (PROTONIX) 40 mg tablet   No No   Sig: Take 1 tablet (40 mg total) by mouth 2 (two) times a day   sucralfate (CARAFATE) 1 g tablet   No No   Sig: Take 1 tablet (1 g total) by mouth 4 (four) times a day   thiamine 100 MG tablet   No No   Sig: Take 1 tablet (100 mg total) by mouth daily      Facility-Administered Medications: None       Past Medical History:   Diagnosis Date    Alcohol abuse     Arthritis     GERD (gastroesophageal reflux disease)     Hypertension     Nicotine dependence     Obesity     Pancreatitis     Panic attack        Past Surgical History:   Procedure Laterality Date    ABDOMINAL SURGERY      C SECTION    ESOPHAGOGASTRODUODENOSCOPY N/A 12/15/2017    Procedure: ESOPHAGOGASTRODUODENOSCOPY (EGD); Surgeon: Maribell Byrd MD;  Location: [facility]; Service: Gastroenterology       Family History   Problem Relation Age of Onset    Cirrhosis Father     Alcohol abuse Father     Drug abuse Mother      I have reviewed and agree with the history as documented  Social History   Substance Use Topics    Smoking status: Current Every Day Smoker     Packs/day: 1 00     Years: 29 00     Types: Cigarettes    Smokeless tobacco: Never Used    Alcohol use Yes      Comment: 1 pint daily        Review of Systems   Constitutional: Negative for fever  Gastrointestinal: Positive for abdominal pain and vomiting  Negative for diarrhea  All other systems reviewed and are negative  Physical Exam  Physical Exam   Constitutional: She appears well-developed and well-nourished  HENT:   Head: Normocephalic and atraumatic  Eyes: EOM are normal  Pupils are equal, round, and reactive to light  Neck: Neck supple  Cardiovascular: Regular rhythm  Tachycardia present  Pulmonary/Chest: Effort normal and breath sounds normal  No respiratory distress  She has no wheezes  Abdominal: Soft  Bowel sounds are normal  She exhibits no distension  There is tenderness (epigastric)  Musculoskeletal: She exhibits no edema  Neurological: She is alert  No cranial nerve deficit  Coordination normal    Skin: Skin is warm  Capillary refill takes less than 2 seconds  No erythema  Psychiatric: Her mood appears anxious  Vitals reviewed        Vital Signs  ED Triage Vitals [09/20/18 1108]   Temperature Pulse Respirations Blood Pressure SpO2   97 8 °F (36 6 °C) 104 22 (!) 175/113 98 %      Temp src Heart Rate Source Patient Position - Orthostatic VS BP Location FiO2 (%)   -- -- -- -- --      Pain Score       --           Vitals:    09/20/18 1108 09/20/18 1145 09/20/18 1430   BP: (!) 175/113 (!) 178/105 167/97   Pulse: 104 98 86       Visual Acuity      ED Medications  Medications    EMS REPLENISHMENT MED (not administered)   sodium chloride 0 9 % bolus 1,000 mL (0 mL Intravenous Stopped 9/20/18 1524)   metoclopramide (REGLAN) injection 5 mg (5 mg Intravenous Given 9/20/18 1134)   diphenhydrAMINE (BENADRYL) injection 50 mg (50 mg Intravenous Given 9/20/18 1134)   haloperidol lactate (HALDOL) injection 5 mg (5 mg Intramuscular Given 9/20/18 1134)   pantoprazole (PROTONIX) injection 40 mg (40 mg Intravenous Given 9/20/18 1319)   aluminum-magnesium hydroxide-simethicone (MYLANTA) 200-200-20 mg/5 mL oral suspension 15 mL (15 mL Oral Given 9/20/18 1318)   iohexol (OMNIPAQUE) 350 MG/ML injection (MULTI-DOSE) 100 mL (100 mL Intravenous Given 9/20/18 1349)   cefTRIAXone (ROCEPHIN) 1,000 mg in dextrose 5 % 50 mL IVPB (0 mg Intravenous Stopped 9/20/18 1524)   sucralfate (CARAFATE) oral suspension 1,000 mg (1,000 mg Oral Given 9/20/18 1522)       Diagnostic Studies  Results Reviewed     Procedure Component Value Units Date/Time    POCT pregnancy, urine [[id number]]  (Normal) Resulted:  09/20/18 1310    Lab Status:  Final result Updated:  09/20/18 1310     EXT PREG TEST UR (Ref: Negative) negative    Urine Microscopic [[id number]]  (Abnormal) Collected:  09/20/18 1207    Lab Status:  Final result Specimen:  Urine from Urine, Clean Catch Updated:  09/20/18 1308     RBC, UA 0-1 (A) /hpf      WBC, UA 0-1 (A) /hpf      Epithelial Cells Occasional /hpf Bacteria, UA Innumerable (A) /hpf     Comprehensive metabolic panel [[id number]]  (Abnormal) Collected:  09/20/18 1207    Lab Status:  Final result Specimen:  Blood from Hand, Left Updated:  09/20/18 1240     Sodium 146 (H) mmol/L      Potassium 3 6 mmol/L      Chloride 109 (H) mmol/L      CO2 22 mmol/L      ANION GAP 15 (H) mmol/L      BUN 9 mg/dL      Creatinine 0 69 mg/dL      Glucose 159 (H) mg/dL      Calcium 7 9 (L) mg/dL      AST 42 U/L      ALT 42 U/L      Alkaline Phosphatase 87 U/L      Total Protein 7 7 g/dL      Albumin 3 6 g/dL      Total Bilirubin 0 60 mg/dL      eGFR 108 ml/min/1 73sq m     Narrative:         National Kidney Disease Education Program recommendations are as follows:  GFR calculation is accurate only with a steady state creatinine  Chronic Kidney disease less than 60 ml/min/1 73 sq  meters  Kidney failure less than 15 ml/min/1 73 sq  meters  Lipase [[id number]]  (Normal) Collected:  09/20/18 1207    Lab Status:  Final result Specimen:  Blood from Hand, Left Updated:  09/20/18 1240     Lipase 169 u/L     Rapid drug screen, urine [[id number]]  (Abnormal) Collected:  09/20/18 1207    Lab Status:  Final result Specimen:  Urine from Urine, Clean Catch Updated:  09/20/18 1224     Amph/Meth UR Negative     Barbiturate Ur Negative     Benzodiazepine Urine Negative     Cocaine Urine Negative     Methadone Urine Negative     Opiate Urine Negative     PCP Ur Negative     THC Urine Positive (A)    Narrative:         Presumptive report  If requested, specimen will be sent to reference lab for confirmation  FOR MEDICAL PURPOSES ONLY  IF CONFIRMATION NEEDED PLEASE CONTACT THE LAB WITHIN 5 DAYS      Drug Screen Cutoff Levels:  AMPHETAMINE/METHAMPHETAMINES  1000 ng/mL  BARBITURATES     200 ng/mL  BENZODIAZEPINES     200 ng/mL  COCAINE      300 ng/mL  METHADONE      300 ng/mL  OPIATES      300 ng/mL  PHENCYCLIDINE     25 ng/mL  THC       50 ng/mL    UA w Reflex to Microscopic [[id number]]  (Abnormal) Collected:  09/20/18 1207    Lab Status:  Final result Specimen:  Urine from Urine, Clean Catch Updated:  09/20/18 1217     Color, UA Yellow     Clarity, UA Slightly Cloudy     Specific Gravity, UA >=1 030     pH, UA 5 5     Leukocytes, UA Negative     Nitrite, UA Negative     Protein,  (2+) (A) mg/dl      Glucose, UA Negative mg/dl      Ketones, UA Negative mg/dl      Urobilinogen, UA 0 2 E U /dl      Bilirubin, UA Small (A)     Blood, UA Negative    CBC and differential [[id number]]  (Abnormal) Collected:  09/20/18 1128    Lab Status:  Final result Specimen:  Blood from Hand, Left Updated:  09/20/18 1135     WBC 11 96 (H) Thousand/uL      RBC 5 21 (H) Million/uL      Hemoglobin 15 8 (H) g/dL      Hematocrit 47 2 (H) %      MCV 91 fL      MCH 30 3 pg      MCHC 33 5 g/dL      RDW 15 9 (H) %      MPV 9 0 fL      Platelets 280 Thousands/uL      nRBC 0 /100 WBCs      Neutrophils Relative 55 %      Immat GRANS % 0 %      Lymphocytes Relative 36 %      Monocytes Relative 8 %      Eosinophils Relative 0 %      Basophils Relative 1 %      Neutrophils Absolute 6 55 Thousands/µL      Immature Grans Absolute 0 03 Thousand/uL      Lymphocytes Absolute 4 30 Thousands/µL      Monocytes Absolute 0 90 Thousand/µL      Eosinophils Absolute 0 04 Thousand/µL      Basophils Absolute 0 14 (H) Thousands/µL                  CT chest abdomen pelvis w contrast   Final Result by Baker Osgood, MD (09/20 1413)      No acute findings in the chest   No pulmonary nodules in this patient with a history of smoking  No acute findings in the abdomen or pelvis      Stable hepatic cirrhosis  Chronic colonic thickening likely secondary to the cirrhosis  Stable dilation of the pancreatic duct within the tail and distal body previously evaluated on MRI abdomen 10/3/2017          Workstation performed: [id number]                    Procedures  ECG 12 Lead Documentation  Date/Time: 9/20/2018 11:28 AM  Performed by: Antonella Garcia CECILIA  Authorized by: Lavon Pritchard     Indications / Diagnosis:  Vomiting/abd pain  ECG reviewed by me, the ED Provider: yes    Patient location:  ED  Rate:     ECG rate:  93    ECG rate assessment: normal    Rhythm:     Rhythm: sinus rhythm    ST segments:     ST segments:  Non-specific           Phone Contacts  ED Phone Contact    ED Course  ED Course as of Sep 20 1603   Thu Sep 20, 2018   1333 Pt no longer vomiting, GI cocktail and protonix for pain  1534 Pt sx improving  Asking for something to drink  D/w pt if she wanted to speak with crisis to get treatment/evaluation for her alcoholism  Her CT and labs, along with improving sx pt does not need inpatient medical admission at this time  D/w her outpatient                                MDM  Number of Diagnoses or Management Options  Abdominal pain: new and requires workup  Acute alcohol intoxication ([facility] [address] ): new and requires workup  Nausea & vomiting: new and requires workup     Amount and/or Complexity of Data Reviewed  Clinical lab tests: ordered and reviewed  Tests in the radiology section of CPT®: ordered and reviewed  Independent visualization of images, tracings, or specimens: yes    Risk of Complications, Morbidity, and/or Mortality  Presenting problems: high    Patient Progress  Patient progress: improved    CritCare Time    Disposition  Final diagnoses:   Abdominal pain   Nausea & vomiting   Acute alcohol intoxication ([facility] [address] )     Time reflects when diagnosis was documented in both MDM as applicable and the Disposition within this note     Time User Action Codes Description Comment    9/20/2018  3:59 PM [address] [R10 9] Abdominal pain     9/20/2018  4:00 PM [address] [R11 2] Nausea & vomiting     9/20/2018  4:00 PM [address] [F10 929] Acute alcohol intoxication [facility])       ED Disposition     ED Disposition Condition Comment    Discharge  Bernarda Wang discharge to home/self care      Condition at discharge: Good Follow-up Information     Follow up With Specialties Details Why Contact Info    Bon Escoto MD Family Medicine   [address] [place] [address] DR Joaquin Archer [address] [phone]            Patient's Medications   Discharge Prescriptions    ONDANSETRON (ZOFRAN-ODT) 4 MG DISINTEGRATING TABLET    Take 1 tablet (4 mg total) by mouth every 8 (eight) hours as needed for nausea or vomiting for up to 30 days       Start Date: 9/20/2018 End Date: 10/20/2018       Order Dose: 4 mg       Quantity: 12 tablet    Refills: 0    PANTOPRAZOLE (PROTONIX) 20 MG TABLET    Take 1 tablet (20 mg total) by mouth daily       Start Date: 9/20/2018 End Date: --       Order Dose: 20 mg       Quantity: 20 tablet    Refills: 0    SUCRALFATE (CARAFATE) 1 G/10 ML SUSPENSION    Take 10 mL (1 g total) by mouth 4 (four) times a day       Start Date: 9/20/2018 End Date: --       Order Dose: 1 g       Quantity: 420 mL    Refills: 0     No discharge procedures on file      ED Provider  Electronically Signed by           Nilda Kaur MD  09/20/18 5224

## 2018-10-10 ENCOUNTER — HOSPITAL ENCOUNTER (EMERGENCY)
Facility: HOSPITAL | Age: 43
Discharge: HOME | End: 2018-10-11
Attending: EMERGENCY MEDICINE | Admitting: EMERGENCY MEDICINE
Payer: COMMERCIAL

## 2018-10-10 ENCOUNTER — APPOINTMENT (EMERGENCY)
Dept: RADIOLOGY | Facility: HOSPITAL | Age: 43
End: 2018-10-10
Payer: COMMERCIAL

## 2018-10-10 DIAGNOSIS — R06.00 DYSPNEA: ICD-10-CM

## 2018-10-10 DIAGNOSIS — J18.9 PNEUMONIA: Primary | ICD-10-CM

## 2018-10-10 DIAGNOSIS — R05.9 COUGH: ICD-10-CM

## 2018-10-10 DIAGNOSIS — J98.01 BRONCHOSPASM: ICD-10-CM

## 2018-10-10 LAB
ALBUMIN SERPL BCP-MCNC: 3.2 G/DL (ref 3.5–5)
ALP SERPL-CCNC: 90 U/L (ref 46–116)
ALT SERPL W P-5'-P-CCNC: 21 U/L (ref 12–78)
ANION GAP SERPL CALCULATED.3IONS-SCNC: 10 MMOL/L (ref 4–13)
AST SERPL W P-5'-P-CCNC: 29 U/L (ref 5–45)
BASOPHILS # BLD AUTO: 0.05 THOUSANDS/ΜL (ref 0–0.1)
BASOPHILS NFR BLD AUTO: 0 % (ref 0–1)
BILIRUB SERPL-MCNC: 1.2 MG/DL (ref 0.2–1)
BUN SERPL-MCNC: 11 MG/DL (ref 5–25)
CALCIUM SERPL-MCNC: 8.5 MG/DL (ref 8.3–10.1)
CHLORIDE SERPL-SCNC: 102 MMOL/L (ref 100–108)
CO2 SERPL-SCNC: 23 MMOL/L (ref 21–32)
CREAT SERPL-MCNC: 0.59 MG/DL (ref 0.6–1.3)
EOSINOPHIL # BLD AUTO: 0.03 THOUSAND/ΜL (ref 0–0.61)
EOSINOPHIL NFR BLD AUTO: 0 % (ref 0–6)
ERYTHROCYTE [DISTWIDTH] IN BLOOD BY AUTOMATED COUNT: 16.3 % (ref 11.6–15.1)
GFR SERPL CREATININE-BSD FRML MDRD: 113 ML/MIN/1.73SQ M
GLUCOSE SERPL-MCNC: 133 MG/DL (ref 65–140)
HCT VFR BLD AUTO: 44.1 % (ref 34.8–46.1)
HGB BLD-MCNC: 14.7 G/DL (ref 11.5–15.4)
IMM GRANULOCYTES # BLD AUTO: 0.05 THOUSAND/UL (ref 0–0.2)
IMM GRANULOCYTES NFR BLD AUTO: 0 % (ref 0–2)
LACTATE SERPL-SCNC: 1.3 MMOL/L (ref 0.5–2)
LYMPHOCYTES # BLD AUTO: 1.6 THOUSANDS/ΜL (ref 0.6–4.47)
LYMPHOCYTES NFR BLD AUTO: 13 % (ref 14–44)
MCH RBC QN AUTO: 30.8 PG (ref 26.8–34.3)
MCHC RBC AUTO-ENTMCNC: 33.3 G/DL (ref 31.4–37.4)
MCV RBC AUTO: 93 FL (ref 82–98)
MONOCYTES # BLD AUTO: 0.64 THOUSAND/ΜL (ref 0.17–1.22)
MONOCYTES NFR BLD AUTO: 5 % (ref 4–12)
NEUTROPHILS # BLD AUTO: 10.02 THOUSANDS/ΜL (ref 1.85–7.62)
NEUTS SEG NFR BLD AUTO: 82 % (ref 43–75)
NRBC BLD AUTO-RTO: 0 /100 WBCS
PLATELET # BLD AUTO: 218 THOUSANDS/UL (ref 149–390)
PMV BLD AUTO: 9.6 FL (ref 8.9–12.7)
POTASSIUM SERPL-SCNC: 4 MMOL/L (ref 3.5–5.3)
PROT SERPL-MCNC: 7.4 G/DL (ref 6.4–8.2)
RBC # BLD AUTO: 4.77 MILLION/UL (ref 3.81–5.12)
SODIUM SERPL-SCNC: 135 MMOL/L (ref 136–145)
TROPONIN I SERPL-MCNC: <0.02 NG/ML
WBC # BLD AUTO: 12.39 THOUSAND/UL (ref 4.31–10.16)

## 2018-10-10 PROCEDURE — 87631 RESP VIRUS 3-5 TARGETS: CPT | Performed by: EMERGENCY MEDICINE

## 2018-10-10 PROCEDURE — 93005 ELECTROCARDIOGRAM TRACING: CPT

## 2018-10-10 PROCEDURE — 36415 COLL VENOUS BLD VENIPUNCTURE: CPT | Performed by: EMERGENCY MEDICINE

## 2018-10-10 PROCEDURE — 83605 ASSAY OF LACTIC ACID: CPT | Performed by: EMERGENCY MEDICINE

## 2018-10-10 PROCEDURE — 85025 COMPLETE CBC W/AUTO DIFF WBC: CPT | Performed by: EMERGENCY MEDICINE

## 2018-10-10 PROCEDURE — 80053 COMPREHEN METABOLIC PANEL: CPT | Performed by: EMERGENCY MEDICINE

## 2018-10-10 PROCEDURE — 94640 AIRWAY INHALATION TREATMENT: CPT

## 2018-10-10 PROCEDURE — 99285 EMERGENCY DEPT VISIT HI MDM: CPT

## 2018-10-10 PROCEDURE — 84484 ASSAY OF TROPONIN QUANT: CPT | Performed by: EMERGENCY MEDICINE

## 2018-10-10 PROCEDURE — 71046 X-RAY EXAM CHEST 2 VIEWS: CPT

## 2018-10-10 PROCEDURE — 96374 THER/PROPH/DIAG INJ IV PUSH: CPT

## 2018-10-10 PROCEDURE — 96361 HYDRATE IV INFUSION ADD-ON: CPT

## 2018-10-10 RX ORDER — PREDNISONE 20 MG/1
40 TABLET ORAL DAILY
Qty: 10 TABLET | Refills: 0 | Status: SHIPPED | OUTPATIENT
Start: 2018-10-10 | End: 2018-10-15

## 2018-10-10 RX ORDER — AZITHROMYCIN 250 MG/1
500 TABLET, FILM COATED ORAL ONCE
Status: COMPLETED | OUTPATIENT
Start: 2018-10-11 | End: 2018-10-11

## 2018-10-10 RX ORDER — ALBUTEROL SULFATE 90 UG/1
2 AEROSOL, METERED RESPIRATORY (INHALATION) ONCE
Status: COMPLETED | OUTPATIENT
Start: 2018-10-11 | End: 2018-10-11

## 2018-10-10 RX ORDER — 0.9 % SODIUM CHLORIDE 0.9 %
3 VIAL (ML) INJECTION AS NEEDED
Status: DISCONTINUED | OUTPATIENT
Start: 2018-10-10 | End: 2018-10-11 | Stop reason: HOSPADM

## 2018-10-10 RX ORDER — ALBUTEROL SULFATE 90 UG/1
2 AEROSOL, METERED RESPIRATORY (INHALATION) EVERY 4 HOURS PRN
Qty: 1 INHALER | Refills: 0 | Status: SHIPPED | OUTPATIENT
Start: 2018-10-10

## 2018-10-10 RX ORDER — KETOROLAC TROMETHAMINE 30 MG/ML
15 INJECTION, SOLUTION INTRAMUSCULAR; INTRAVENOUS ONCE
Status: COMPLETED | OUTPATIENT
Start: 2018-10-10 | End: 2018-10-10

## 2018-10-10 RX ORDER — ALBUTEROL SULFATE 2.5 MG/3ML
5 SOLUTION RESPIRATORY (INHALATION) ONCE
Status: COMPLETED | OUTPATIENT
Start: 2018-10-10 | End: 2018-10-10

## 2018-10-10 RX ORDER — AZITHROMYCIN 250 MG/1
250 TABLET, FILM COATED ORAL DAILY
Qty: 4 TABLET | Refills: 0 | Status: SHIPPED | OUTPATIENT
Start: 2018-10-10 | End: 2018-10-14

## 2018-10-10 RX ADMIN — SODIUM CHLORIDE 1000 ML: 0.9 INJECTION, SOLUTION INTRAVENOUS at 21:48

## 2018-10-10 RX ADMIN — IPRATROPIUM BROMIDE 0.5 MG: 0.5 SOLUTION RESPIRATORY (INHALATION) at 21:48

## 2018-10-10 RX ADMIN — ALBUTEROL SULFATE 5 MG: 2.5 SOLUTION RESPIRATORY (INHALATION) at 21:48

## 2018-10-10 RX ADMIN — KETOROLAC TROMETHAMINE 15 MG: 30 INJECTION, SOLUTION INTRAMUSCULAR at 21:47

## 2018-10-11 VITALS
SYSTOLIC BLOOD PRESSURE: 141 MMHG | WEIGHT: 160 LBS | HEART RATE: 91 BPM | RESPIRATION RATE: 18 BRPM | BODY MASS INDEX: 27.31 KG/M2 | TEMPERATURE: 99.2 F | DIASTOLIC BLOOD PRESSURE: 95 MMHG | OXYGEN SATURATION: 96 % | HEIGHT: 64 IN

## 2018-10-11 LAB
ATRIAL RATE: 82 BPM
FLUAV AG SPEC QL: NORMAL
FLUBV AG SPEC QL: NORMAL
P AXIS: 55 DEGREES
PR INTERVAL: 138 MS
QRS AXIS: 59 DEGREES
QRSD INTERVAL: 86 MS
QT INTERVAL: 404 MS
QTC INTERVAL: 472 MS
RSV B RNA SPEC QL NAA+PROBE: NORMAL
T WAVE AXIS: 49 DEGREES
VENTRICULAR RATE: 82 BPM

## 2018-10-11 PROCEDURE — 93010 ELECTROCARDIOGRAM REPORT: CPT | Performed by: INTERNAL MEDICINE

## 2018-10-11 RX ADMIN — AZITHROMYCIN 500 MG: 250 TABLET, FILM COATED ORAL at 00:01

## 2018-10-11 RX ADMIN — ALBUTEROL SULFATE 2 PUFF: 90 AEROSOL, METERED RESPIRATORY (INHALATION) at 00:01

## 2018-10-11 NOTE — DISCHARGE INSTRUCTIONS
Bronchospasm   WHAT YOU NEED TO KNOW:   Bronchospasm is a narrowing of the airway that usually comes and goes  You may be at risk for bronchospasm if you have a chest cold or allergies  You may also be at risk if you are bothered by air pollution, certain medicines, cold, dry air, smoke, or strong odors  Exercise may worsen your symptoms  Bronchospasms may make it hard for you to breathe  DISCHARGE INSTRUCTIONS:   Medicines: You may need any of the following:  · Bronchodilators  help expand your airway for easier breathing  Some of these medicines may help prevent future spasms  · Inhaled steroids  help reduce swelling in your airway and soothe your breathing  These are used for long-term control  · Anticholinergics  help relax and open your airway  · Take your medicine as directed  Contact your healthcare provider if you think your medicine is not helping or if you have side effects  Tell him of her if you are allergic to any medicine  Keep a list of the medicines, vitamins, and herbs you take  Include the amounts, and when and why you take them  Bring the list or the pill bottles to follow-up visits  Carry your medicine list with you in case of an emergency  Follow up with your healthcare provider as directed: You may need more tests to find the cause of your condition  Write down your questions so you remember to ask them during your visits  Self-care:   · Avoid triggers  · Warm up before you exercise  Ask your healthcare provider about the best exercise plan for you  · Try to avoid people who are sick  Ask your healthcare provider if you need a flu or pneumonia vaccine  · Breathe through your nose when you are in cold, dry air or weather  This may help reduce lung irritation by warming the air before it reaches your lungs  Contact your healthcare provider if:   · You have a fever  · You have a cough that will not go away  · Your wheezing worsens      · You have questions or concerns about your condition or care  Return to the emergency department if:   · You cough or spit up blood  · You have trouble breathing  · You have blue fingernails or toenails  · You have chest pain  · You have a fast or uneven heartbeat  © 2017 [employer] Information is for End User's use only and may not be sold, redistributed or otherwise used for commercial purposes  All illustrations and images included in CareNotes® are the copyrighted property of [employer]  or [employer]  The above information is an  only  It is not intended as medical advice for individual conditions or treatments  Talk to your doctor, nurse or pharmacist before following any medical regimen to see if it is safe and effective for you  Community Acquired Pneumonia   WHAT YOU NEED TO KNOW:   Community-acquired pneumonia (CAP) is a lung infection that you get outside of a hospital or nursing home setting  Your lungs become inflamed and cannot work well  CAP may be caused by bacteria, viruses, or fungi  DISCHARGE INSTRUCTIONS:   Return to the emergency department if:   · You are confused and cannot think clearly  · You have increased trouble breathing  · Your lips or fingernails turn gray or blue  Contact your healthcare provider if:   · Your symptoms do not get better, or they get worse  · You are urinating less, or not at all  · You have questions or concerns about your condition or care  Medicines:   · Medicines  may be given to treat a bacterial, viral, or fungal infection  You may also be given medicines to dilate your bronchial tubes to help you breathe more easily  · Take your medicine as directed  Contact your healthcare provider if you think your medicine is not helping or if you have side effects  Tell him or her if you are allergic to any medicine  Keep a list of the medicines, vitamins, and herbs you take   Include the amounts, and when and why you take them  Bring the list or the pill bottles to follow-up visits  Carry your medicine list with you in case of an emergency  Follow up with your healthcare provider within 3 days or as directed: You may need another x-ray  Write down your questions so you remember to ask them during your visits  Deep breathing and coughing:  Deep breathing helps open the air passages in your lungs  Coughing helps bring up mucus from your lungs  Take a deep breath and hold the breath as long as you can  Then push the air out of your lungs with a deep, strong cough  Spit out any mucus you have coughed up  Take 10 deep breaths in a row every hour that you are awake  Remember to follow each deep breath with a cough  Do not smoke or allow others to smoke around you:  Nicotine and other chemicals in cigarettes and cigars can cause lung damage  Ask your healthcare provider for information if you currently smoke and need help to quit  E-cigarettes or smokeless tobacco still contain nicotine  Talk to your healthcare provider before you use these products  Manage CAP at home:   · Breathe warm, moist air  This helps loosen mucus  Loosely place a warm, wet washcloth over your nose and mouth  A room humidifier may also help make the air moist     · Drink liquids as directed  Ask your healthcare provider how much liquid to drink each day and which liquids to drink  Liquids help make mucus thin and easier to get out of your body  · Gently tap your chest   This helps loosen mucus so it is easier to cough  Lie with your head lower than your chest several times a day and tap your chest      · Get plenty of rest   Rest helps your body heal   Prevent CAP:   · Wash your hands often with soap and water  Carry germ-killing hand gel with you  You can use the gel to clean your hands when soap and water are not available  Do not touch your eyes, nose, or mouth unless you have washed your hands first      · Clean surfaces often    Clean doorknobs, countertops, cell phones, and other surfaces that are touched often  · Always cover your mouth when you cough  Cough into a tissue or your shirtsleeve so you do not spread germs from your hands  · Try to avoid people who have a cold or the flu  If you are sick, stay away from others as much as possible  · Ask about vaccines  You may need a vaccine to help prevent pneumonia  Get an influenza (flu) vaccine every year as soon as it becomes available  © 2017 [address] [facility] Information is for End User's use only and may not be sold, redistributed or otherwise used for commercial purposes  All illustrations and images included in CareNotes® are the copyrighted property of [employer]  or [employer]  The above information is an  only  It is not intended as medical advice for individual conditions or treatments  Talk to your doctor, nurse or pharmacist before following any medical regimen to see if it is safe and effective for you

## 2018-10-11 NOTE — ED PROVIDER NOTES
History  Chief Complaint   Patient presents with    Shortness of Breath     Brought in by EMS for sob with cough rhonchi and wheezing since Sunday  EMS placed 20G LAC with solumedrol 125mg and 1 duoneb  56-year-old female presents with 3 days of progressive and worsening dyspnea associated with cough, fever, and wheezing  Patient notes that the symptoms improved with nebulized medication administered by EMS on the way to the emergency room  EMS initiated corticosteroids however patient has no history of COPD or asthma  Patient does have a significant smoking history and is wheezing on my evaluation  Impression and plan:  Dyspnea with a broad differential   Based on patient's history and physical, concerns for likely infectious process  Possibly secondary to upper or lower respiratory infection  As such, will obtain infectious evaluation including chest x-ray to evaluate for lower respiratory infection  Cardiac evaluation to evaluate for potential alternative diagnoses of these are less likely based upon initial evaluation  Patient likely has undiagnosed COPD which I discussed in detail with the patient the need for close follow-up with primary care for pulmonary function testing  Discussed symptomatic treatment in detail with the patient including risks and benefits of medication            History provided by:  Patient  Shortness of Breath   Severity:  Severe  Onset quality:  Gradual  Duration:  3 days  Timing:  Constant  Progression:  Worsening  Chronicity:  New  Context: URI    Relieved by:  Nothing  Worsened by:  Exertion and coughing  Ineffective treatments:  None tried  Associated symptoms: cough, fever, headaches, sputum production and wheezing    Associated symptoms: no abdominal pain, no chest pain, no claudication, no diaphoresis, no ear pain, no hemoptysis, no neck pain, no PND, no rash, no sore throat, no syncope, no swollen glands and no vomiting    Risk factors: tobacco use    Risk factors: no hx of cancer, no hx of PE/DVT, no oral contraceptive use, no prolonged immobilization and no recent surgery        Prior to Admission Medications   Prescriptions Last Dose Informant Patient Reported? Taking? Mometasone Furo-Formoterol Fum (DULERA) 100-5 MCG/ACT AERO   Yes Yes   Sig: Inhale as needed Unsure of dose     cloNIDine (CATAPRES) 0 3 mg tablet   Yes No   Sig: Take 0 3 mg by mouth 2 (two) times a day   dicyclomine (BENTYL) 20 mg tablet   No Yes   Sig: Take 1 tablet (20 mg total) by mouth 3 (three) times a day   pancrelipase, Lip-Prot-Amyl, (CREON) 24,000 units   No Yes   Sig: Take 1 capsule by mouth 3 (three) times a day with meals   pantoprazole (PROTONIX) 20 mg tablet   No Yes   Sig: Take 1 tablet (20 mg total) by mouth daily   sucralfate (CARAFATE) 1 g tablet   No Yes   Sig: Take 1 tablet (1 g total) by mouth 4 (four) times a day      Facility-Administered Medications: None       Past Medical History:   Diagnosis Date    Alcohol abuse     Arthritis     GERD (gastroesophageal reflux disease)     Hypertension     Nicotine dependence     Obesity     Pancreatitis     Panic attack        Past Surgical History:   Procedure Laterality Date    ABDOMINAL SURGERY      C SECTION    ESOPHAGOGASTRODUODENOSCOPY N/A 12/15/2017    Procedure: ESOPHAGOGASTRODUODENOSCOPY (EGD); Surgeon: Efren Ayala MD;  Location: [facility]; Service: Gastroenterology       Family History   Problem Relation Age of Onset    Cirrhosis Father     Alcohol abuse Father     Drug abuse Mother      I have reviewed and agree with the history as documented  Social History   Substance Use Topics    Smoking status: Current Every Day Smoker     Packs/day: 0 50     Years: 29 00     Types: Cigarettes    Smokeless tobacco: Never Used    Alcohol use No      Comment: 1 pint daily        Review of Systems   Constitutional: Positive for fever  Negative for diaphoresis  HENT: Negative for ear pain and sore throat  Respiratory: Positive for cough, sputum production, shortness of breath and wheezing  Negative for hemoptysis  Cardiovascular: Negative for chest pain, claudication, syncope and PND  Gastrointestinal: Negative for abdominal pain and vomiting  Musculoskeletal: Negative for neck pain  Skin: Negative for rash  Neurological: Positive for headaches  All other systems reviewed and are negative  Physical Exam  Physical Exam   Constitutional: She appears well-developed and well-nourished  She appears distressed  HENT:   Mouth/Throat: Oropharynx is clear and moist    Eyes: Pupils are equal, round, and reactive to light  Neck: Neck supple  Cardiovascular: Normal rate and regular rhythm  Pulmonary/Chest: Effort normal  No respiratory distress  She has wheezes (faint at he mid level)  She has rales (right lower)  She exhibits no tenderness  Abdominal: Soft  Bowel sounds are normal  She exhibits no distension  There is no tenderness  There is no guarding  Musculoskeletal: She exhibits no edema or tenderness  No asymetry, no clinical signs of DVT  Neurological: She is alert  Skin: Skin is warm  She is not diaphoretic  Psychiatric: She has a normal mood and affect  Vitals reviewed        Vital Signs  ED Triage Vitals [10/10/18 2051]   Temperature Pulse Respirations Blood Pressure SpO2   99 2 °F (37 3 °C) 89 (!) 28 141/95 98 %      Temp src Heart Rate Source Patient Position - Orthostatic VS BP Location FiO2 (%)   -- -- Lying Right arm --      Pain Score       5           Vitals:    10/10/18 2300 10/10/18 2345 10/11/18 0000 10/11/18 0015   BP:       Pulse: 93 95 90 91   Patient Position - Orthostatic VS:           Visual Acuity      ED Medications  Medications   albuterol inhalation solution 5 mg (5 mg Nebulization Given 10/10/18 2148)   ipratropium (ATROVENT) 0 02 % inhalation solution 0 5 mg (0 5 mg Nebulization Given 10/10/18 2148)   sodium chloride 0 9 % bolus 1,000 mL (0 mL Intravenous Stopped 10/11/18 0001)   ketorolac (TORADOL) injection 15 mg (15 mg Intravenous Given 10/10/18 2147)   azithromycin (ZITHROMAX) tablet 500 mg (500 mg Oral Given 10/11/18 0001)   albuterol (PROVENTIL HFA,VENTOLIN HFA) inhaler 2 puff (2 puffs Inhalation Given 10/11/18 0001)       Diagnostic Studies  Results Reviewed     Procedure Component Value Units Date/Time    Influenza A/B and RSV by PCR (indicated for patients >2 mo of age) [[id number]]  (Normal) Collected:  10/10/18 2146    Lab Status:  Final result Specimen:  Nasopharyngeal from Nasopharyngeal Swab Updated:  10/11/18 1043     INFLU A PCR None Detected     INFLU B PCR None Detected     RSV PCR None Detected    Comprehensive metabolic panel [[id number]]  (Abnormal) Collected:  10/10/18 2146    Lab Status:  Final result Specimen:  Blood from Arm, Left Updated:  10/10/18 2232     Sodium 135 (L) mmol/L      Potassium 4 0 mmol/L      Chloride 102 mmol/L      CO2 23 mmol/L      ANION GAP 10 mmol/L      BUN 11 mg/dL      Creatinine 0 59 (L) mg/dL      Glucose 133 mg/dL      Calcium 8 5 mg/dL      AST 29 U/L      ALT 21 U/L      Alkaline Phosphatase 90 U/L      Total Protein 7 4 g/dL      Albumin 3 2 (L) g/dL      Total Bilirubin 1 20 (H) mg/dL      eGFR 113 ml/min/1 73sq m     Narrative:         National Kidney Disease Education Program recommendations are as follows:  GFR calculation is accurate only with a steady state creatinine  Chronic Kidney disease less than 60 ml/min/1 73 sq  meters  Kidney failure less than 15 ml/min/1 73 sq  meters  Lactic acid, plasma [[id number]]  (Normal) Collected:  10/10/18 2146    Lab Status:  Final result Specimen:  Blood from Arm, Left Updated:  10/10/18 2219     LACTIC ACID 1 3 mmol/L     Narrative:         Result may be elevated if tourniquet was used during collection      Troponin I [[id number]]  (Normal) Collected:  10/10/18 2146    Lab Status:  Final result Specimen:  Blood from Arm, Left Updated:  10/10/18 2218     Troponin I <0 02 ng/mL     CBC and differential [[id number]]  (Abnormal) Collected:  10/10/18 2146    Lab Status:  Final result Specimen:  Blood from Arm, Left Updated:  10/10/18 2158     WBC 12 39 (H) Thousand/uL      RBC 4 77 Million/uL      Hemoglobin 14 7 g/dL      Hematocrit 44 1 %      MCV 93 fL      MCH 30 8 pg      MCHC 33 3 g/dL      RDW 16 3 (H) %      MPV 9 6 fL      Platelets 623 Thousands/uL      nRBC 0 /100 WBCs      Neutrophils Relative 82 (H) %      Immat GRANS % 0 %      Lymphocytes Relative 13 (L) %      Monocytes Relative 5 %      Eosinophils Relative 0 %      Basophils Relative 0 %      Neutrophils Absolute 10 02 (H) Thousands/µL      Immature Grans Absolute 0 05 Thousand/uL      Lymphocytes Absolute 1 60 Thousands/µL      Monocytes Absolute 0 64 Thousand/µL      Eosinophils Absolute 0 03 Thousand/µL      Basophils Absolute 0 05 Thousands/µL                  XR chest 2 views   ED Interpretation by Sergio Winter MD (10/10 2213)   No acute findings  Final Result by Deandre Landry MD (10/10 2250)      Viral pneumonia versus inflammatory small airways disease            Workstation performed: [id number]                    Procedures  Procedures       Phone Contacts  ED Phone Contact    ED Course  ED Course as of Oct 15 0230   Wed Oct 10, 2018   2316 EKG demonstrates normal sinus rhythm with no acute ST segment changes  2345 Patient's x-ray with viral appearance, patient does not work presently  No concerns for potential continued exposure  Discussed outstanding flu testing  Patient is outside the window for antiretrovirals  Discussed symptomatic treatment  Patient's symptoms did improve with nebulized medications  Reauscultation without signs of wheezing or increased expiratory phase  Discussed continued albuterol  Patient states she has had severe anxiety when taking prednisone previously  Discussed the need for PFTs with the patient in detail    Will defer prednisone at this point but provide patient with prescription of symptoms worsen after discussion of risks and benefits  Will treat patient with azithromycin for potential anti-inflammatory effect considering patient's history  Discussed return precautions in detail the need for follow-up with primary care for pulmonary function testing and reassessment  [facility]  CritCare Time    Disposition  Final diagnoses:   Pneumonia   Dyspnea   Cough   Bronchospasm     Time reflects when diagnosis was documented in both MDM as applicable and the Disposition within this note     Time User Action Codes Description Comment    10/10/2018 11:47 PM Yaneth Hire Add [J18 9] Pneumonia     10/10/2018 11:47 PM Yaneth Hire Add [R06 00] Dyspnea     10/10/2018 11:47 PM Yaneth Hire Add [R05] Cough     10/10/2018 11:47 PM Yaneth Hire Add [J98 01] Bronchospasm       ED Disposition     ED Disposition Condition Comment    Discharge  Abdullahi Mathew discharge to home/self care  Condition at discharge: Stable        Follow-up Information     Follow up With Specialties Details Why Contact Info Additional Information    Dominick Langford MD Family Medicine Schedule an appointment as soon as possible for a visit in 3 days Follow-up and reassessment  Discussed pulmonary function testing   [facility] Emergency Department Emergency Medicine Go to If symptoms worsen [address] [facility] [address] [phone] [facility], [address] [place] [address], [facility]          Discharge Medication List as of 10/10/2018 11:50 PM      START taking these medications    Details   albuterol (PROVENTIL HFA,VENTOLIN HFA) 90 mcg/act inhaler Inhale 2 puffs every 4 (four) hours as needed for wheezing, Starting Wed 10/10/2018, Print      azithromycin (ZITHROMAX) 250 mg tablet Take 1 tablet (250 mg total) by mouth daily for 4 days, Starting Wed 10/10/2018, Until Sun 10/14/2018, Print      predniSONE 20 mg tablet Take 2 tablets (40 mg total) by mouth daily for 5 days, Starting Wed 10/10/2018, Until Mon 10/15/2018, Print         CONTINUE these medications which have NOT CHANGED    Details   dicyclomine (BENTYL) 20 mg tablet Take 1 tablet (20 mg total) by mouth 3 (three) times a day, Starting Thu 5/31/2018, Print      Mometasone Furo-Formoterol Fum (DULERA) 100-5 MCG/ACT AERO Inhale as needed Unsure of dose  , Historical Med      pancrelipase, Lip-Prot-Amyl, (CREON) 24,000 units Take 1 capsule by mouth 3 (three) times a day with meals, Starting Tue 10/31/2017, Normal      pantoprazole (PROTONIX) 20 mg tablet Take 1 tablet (20 mg total) by mouth daily, Starting Thu 9/20/2018, Normal      sucralfate (CARAFATE) 1 g tablet Take 1 tablet (1 g total) by mouth 4 (four) times a day, Starting Thu 5/31/2018, Print      cloNIDine (CATAPRES) 0 3 mg tablet Take 0 3 mg by mouth 2 (two) times a day, Historical Med           No discharge procedures on file      ED Provider  Electronically Signed by           Kelly Jennings MD  10/15/18 2201

## 2018-12-18 ENCOUNTER — APPOINTMENT (EMERGENCY)
Dept: CT IMAGING | Facility: HOSPITAL | Age: 43
DRG: 282 | End: 2018-12-18
Payer: COMMERCIAL

## 2018-12-18 ENCOUNTER — HOSPITAL ENCOUNTER (INPATIENT)
Facility: HOSPITAL | Age: 43
LOS: 1 days | Discharge: HOME | DRG: 282 | End: 2018-12-19
Attending: EMERGENCY MEDICINE | Admitting: INTERNAL MEDICINE
Payer: COMMERCIAL

## 2018-12-18 DIAGNOSIS — E87.2 LACTIC ACIDOSIS: ICD-10-CM

## 2018-12-18 DIAGNOSIS — R10.9 ACUTE ABDOMINAL PAIN: ICD-10-CM

## 2018-12-18 DIAGNOSIS — K86.1 CHRONIC PANCREATITIS (HCC): Primary | ICD-10-CM

## 2018-12-18 DIAGNOSIS — N17.9 AKI (ACUTE KIDNEY INJURY) (HCC): ICD-10-CM

## 2018-12-18 DIAGNOSIS — E86.0 DEHYDRATION: ICD-10-CM

## 2018-12-18 PROBLEM — D72.829 LEUKOCYTOSIS: Status: ACTIVE | Noted: 2018-12-18

## 2018-12-18 PROBLEM — Z72.89 ALCOHOL USE: Status: ACTIVE | Noted: 2017-11-22

## 2018-12-18 LAB
ALBUMIN SERPL BCP-MCNC: 4.1 G/DL (ref 3.5–5)
ALP SERPL-CCNC: 110 U/L (ref 46–116)
ALT SERPL W P-5'-P-CCNC: 41 U/L (ref 12–78)
ANION GAP SERPL CALCULATED.3IONS-SCNC: 22 MMOL/L (ref 4–13)
AST SERPL W P-5'-P-CCNC: 51 U/L (ref 5–45)
BASOPHILS # BLD AUTO: 0.08 THOUSANDS/ΜL (ref 0–0.1)
BASOPHILS NFR BLD AUTO: 0 % (ref 0–1)
BILIRUB SERPL-MCNC: 1 MG/DL (ref 0.2–1)
BUN SERPL-MCNC: 17 MG/DL (ref 5–25)
CALCIUM SERPL-MCNC: 8.8 MG/DL (ref 8.3–10.1)
CHLORIDE SERPL-SCNC: 100 MMOL/L (ref 100–108)
CO2 SERPL-SCNC: 21 MMOL/L (ref 21–32)
CREAT SERPL-MCNC: 1.4 MG/DL (ref 0.6–1.3)
CRP SERPL QL: 4.1 MG/L
EOSINOPHIL # BLD AUTO: 0 THOUSAND/ΜL (ref 0–0.61)
EOSINOPHIL NFR BLD AUTO: 0 % (ref 0–6)
ERYTHROCYTE [DISTWIDTH] IN BLOOD BY AUTOMATED COUNT: 15.1 % (ref 11.6–15.1)
EST. AVERAGE GLUCOSE BLD GHB EST-MCNC: 160 MG/DL
ETHANOL SERPL-MCNC: <3 MG/DL (ref 0–3)
GFR SERPL CREATININE-BSD FRML MDRD: 46 ML/MIN/1.73SQ M
GLUCOSE SERPL-MCNC: 111 MG/DL (ref 65–140)
GLUCOSE SERPL-MCNC: 114 MG/DL (ref 65–140)
GLUCOSE SERPL-MCNC: 139 MG/DL (ref 65–140)
GLUCOSE SERPL-MCNC: 165 MG/DL (ref 65–140)
GLUCOSE SERPL-MCNC: 221 MG/DL (ref 65–140)
HBA1C MFR BLD: 7.2 % (ref 4.2–6.3)
HCG SERPL QL: NEGATIVE
HCT VFR BLD AUTO: 48 % (ref 34.8–46.1)
HGB BLD-MCNC: 15.9 G/DL (ref 11.5–15.4)
IMM GRANULOCYTES # BLD AUTO: 0.11 THOUSAND/UL (ref 0–0.2)
IMM GRANULOCYTES NFR BLD AUTO: 1 % (ref 0–2)
LACTATE SERPL-SCNC: 1.4 MMOL/L (ref 0.5–2)
LACTATE SERPL-SCNC: 3.9 MMOL/L (ref 0.5–2)
LACTATE SERPL-SCNC: 5 MMOL/L (ref 0.5–2)
LIPASE SERPL-CCNC: 62 U/L (ref 73–393)
LYMPHOCYTES # BLD AUTO: 2.78 THOUSANDS/ΜL (ref 0.6–4.47)
LYMPHOCYTES NFR BLD AUTO: 13 % (ref 14–44)
MCH RBC QN AUTO: 30.1 PG (ref 26.8–34.3)
MCHC RBC AUTO-ENTMCNC: 33.1 G/DL (ref 31.4–37.4)
MCV RBC AUTO: 91 FL (ref 82–98)
MONOCYTES # BLD AUTO: 2.17 THOUSAND/ΜL (ref 0.17–1.22)
MONOCYTES NFR BLD AUTO: 10 % (ref 4–12)
NEUTROPHILS # BLD AUTO: 16.8 THOUSANDS/ΜL (ref 1.85–7.62)
NEUTS SEG NFR BLD AUTO: 76 % (ref 43–75)
NRBC BLD AUTO-RTO: 0 /100 WBCS
PLATELET # BLD AUTO: 210 THOUSANDS/UL (ref 149–390)
PLATELET # BLD AUTO: 338 THOUSANDS/UL (ref 149–390)
PMV BLD AUTO: 9 FL (ref 8.9–12.7)
PMV BLD AUTO: 9.1 FL (ref 8.9–12.7)
POTASSIUM SERPL-SCNC: 4.3 MMOL/L (ref 3.5–5.3)
PROT SERPL-MCNC: 8.7 G/DL (ref 6.4–8.2)
RBC # BLD AUTO: 5.28 MILLION/UL (ref 3.81–5.12)
SODIUM SERPL-SCNC: 143 MMOL/L (ref 136–145)
WBC # BLD AUTO: 21.94 THOUSAND/UL (ref 4.31–10.16)

## 2018-12-18 PROCEDURE — 83605 ASSAY OF LACTIC ACID: CPT | Performed by: INTERNAL MEDICINE

## 2018-12-18 PROCEDURE — 87040 BLOOD CULTURE FOR BACTERIA: CPT | Performed by: INTERNAL MEDICINE

## 2018-12-18 PROCEDURE — 85025 COMPLETE CBC W/AUTO DIFF WBC: CPT | Performed by: EMERGENCY MEDICINE

## 2018-12-18 PROCEDURE — 83036 HEMOGLOBIN GLYCOSYLATED A1C: CPT | Performed by: INTERNAL MEDICINE

## 2018-12-18 PROCEDURE — 96375 TX/PRO/DX INJ NEW DRUG ADDON: CPT

## 2018-12-18 PROCEDURE — 83690 ASSAY OF LIPASE: CPT | Performed by: EMERGENCY MEDICINE

## 2018-12-18 PROCEDURE — 84703 CHORIONIC GONADOTROPIN ASSAY: CPT | Performed by: EMERGENCY MEDICINE

## 2018-12-18 PROCEDURE — 36415 COLL VENOUS BLD VENIPUNCTURE: CPT | Performed by: EMERGENCY MEDICINE

## 2018-12-18 PROCEDURE — C9113 INJ PANTOPRAZOLE SODIUM, VIA: HCPCS | Performed by: INTERNAL MEDICINE

## 2018-12-18 PROCEDURE — 80053 COMPREHEN METABOLIC PANEL: CPT | Performed by: EMERGENCY MEDICINE

## 2018-12-18 PROCEDURE — 82948 REAGENT STRIP/BLOOD GLUCOSE: CPT

## 2018-12-18 PROCEDURE — 99291 CRITICAL CARE FIRST HOUR: CPT

## 2018-12-18 PROCEDURE — 74177 CT ABD & PELVIS W/CONTRAST: CPT

## 2018-12-18 PROCEDURE — 99222 1ST HOSP IP/OBS MODERATE 55: CPT | Performed by: INTERNAL MEDICINE

## 2018-12-18 PROCEDURE — 86140 C-REACTIVE PROTEIN: CPT | Performed by: PHYSICIAN ASSISTANT

## 2018-12-18 PROCEDURE — 80320 DRUG SCREEN QUANTALCOHOLS: CPT | Performed by: INTERNAL MEDICINE

## 2018-12-18 PROCEDURE — 99254 IP/OBS CNSLTJ NEW/EST MOD 60: CPT | Performed by: INTERNAL MEDICINE

## 2018-12-18 PROCEDURE — 85049 AUTOMATED PLATELET COUNT: CPT | Performed by: INTERNAL MEDICINE

## 2018-12-18 PROCEDURE — 83605 ASSAY OF LACTIC ACID: CPT | Performed by: EMERGENCY MEDICINE

## 2018-12-18 PROCEDURE — 96361 HYDRATE IV INFUSION ADD-ON: CPT

## 2018-12-18 PROCEDURE — 96365 THER/PROPH/DIAG IV INF INIT: CPT

## 2018-12-18 RX ORDER — PANTOPRAZOLE SODIUM 40 MG/1
40 INJECTION, POWDER, FOR SOLUTION INTRAVENOUS EVERY 12 HOURS SCHEDULED
Status: DISCONTINUED | OUTPATIENT
Start: 2018-12-18 | End: 2018-12-19 | Stop reason: HOSPADM

## 2018-12-18 RX ORDER — ONDANSETRON 2 MG/ML
4 INJECTION INTRAMUSCULAR; INTRAVENOUS ONCE
Status: COMPLETED | OUTPATIENT
Start: 2018-12-18 | End: 2018-12-18

## 2018-12-18 RX ORDER — ONDANSETRON 2 MG/ML
INJECTION INTRAMUSCULAR; INTRAVENOUS
Status: COMPLETED
Start: 2018-12-18 | End: 2018-12-18

## 2018-12-18 RX ORDER — ACETAMINOPHEN 325 MG/1
650 TABLET ORAL EVERY 8 HOURS PRN
Status: DISCONTINUED | OUTPATIENT
Start: 2018-12-18 | End: 2018-12-19 | Stop reason: HOSPADM

## 2018-12-18 RX ORDER — HYDROMORPHONE HCL/PF 1 MG/ML
0.5 SYRINGE (ML) INJECTION ONCE
Status: COMPLETED | OUTPATIENT
Start: 2018-12-18 | End: 2018-12-18

## 2018-12-18 RX ORDER — ALBUTEROL SULFATE 90 UG/1
2 AEROSOL, METERED RESPIRATORY (INHALATION) EVERY 4 HOURS PRN
Status: DISCONTINUED | OUTPATIENT
Start: 2018-12-18 | End: 2018-12-19 | Stop reason: HOSPADM

## 2018-12-18 RX ORDER — CLONIDINE HYDROCHLORIDE 0.3 MG/1
0.3 TABLET ORAL 2 TIMES DAILY
COMMUNITY

## 2018-12-18 RX ORDER — CLONIDINE HYDROCHLORIDE 0.1 MG/1
0.3 TABLET ORAL EVERY 12 HOURS SCHEDULED
Status: DISCONTINUED | OUTPATIENT
Start: 2018-12-18 | End: 2018-12-19 | Stop reason: HOSPADM

## 2018-12-18 RX ORDER — SODIUM CHLORIDE, SODIUM GLUCONATE, SODIUM ACETATE, POTASSIUM CHLORIDE, MAGNESIUM CHLORIDE, SODIUM PHOSPHATE, DIBASIC, AND POTASSIUM PHOSPHATE .53; .5; .37; .037; .03; .012; .00082 G/100ML; G/100ML; G/100ML; G/100ML; G/100ML; G/100ML; G/100ML
1000 INJECTION, SOLUTION INTRAVENOUS ONCE
Status: COMPLETED | OUTPATIENT
Start: 2018-12-18 | End: 2018-12-18

## 2018-12-18 RX ORDER — DEXTROSE AND SODIUM CHLORIDE 5; .9 G/100ML; G/100ML
100 INJECTION, SOLUTION INTRAVENOUS CONTINUOUS
Status: CANCELLED | OUTPATIENT
Start: 2018-12-18

## 2018-12-18 RX ORDER — HYDRALAZINE HYDROCHLORIDE 20 MG/ML
10 INJECTION INTRAMUSCULAR; INTRAVENOUS EVERY 6 HOURS PRN
Status: DISCONTINUED | OUTPATIENT
Start: 2018-12-18 | End: 2018-12-19 | Stop reason: HOSPADM

## 2018-12-18 RX ORDER — LORAZEPAM 1 MG/1
2 TABLET ORAL ONCE
Status: COMPLETED | OUTPATIENT
Start: 2018-12-18 | End: 2018-12-18

## 2018-12-18 RX ORDER — DICYCLOMINE HCL 20 MG
20 TABLET ORAL 3 TIMES DAILY PRN
Status: DISCONTINUED | OUTPATIENT
Start: 2018-12-18 | End: 2018-12-19 | Stop reason: HOSPADM

## 2018-12-18 RX ORDER — ONDANSETRON 2 MG/ML
4 INJECTION INTRAMUSCULAR; INTRAVENOUS EVERY 6 HOURS PRN
Status: DISCONTINUED | OUTPATIENT
Start: 2018-12-18 | End: 2018-12-19 | Stop reason: HOSPADM

## 2018-12-18 RX ORDER — SODIUM CHLORIDE 9 MG/ML
75 INJECTION, SOLUTION INTRAVENOUS CONTINUOUS
Status: DISCONTINUED | OUTPATIENT
Start: 2018-12-18 | End: 2018-12-19 | Stop reason: HOSPADM

## 2018-12-18 RX ORDER — NICOTINE 21 MG/24HR
1 PATCH, TRANSDERMAL 24 HOURS TRANSDERMAL DAILY
Status: DISCONTINUED | OUTPATIENT
Start: 2018-12-18 | End: 2018-12-19 | Stop reason: HOSPADM

## 2018-12-18 RX ORDER — SODIUM CHLORIDE, SODIUM GLUCONATE, SODIUM ACETATE, POTASSIUM CHLORIDE, MAGNESIUM CHLORIDE, SODIUM PHOSPHATE, DIBASIC, AND POTASSIUM PHOSPHATE .53; .5; .37; .037; .03; .012; .00082 G/100ML; G/100ML; G/100ML; G/100ML; G/100ML; G/100ML; G/100ML
2000 INJECTION, SOLUTION INTRAVENOUS ONCE
Status: COMPLETED | OUTPATIENT
Start: 2018-12-18 | End: 2018-12-18

## 2018-12-18 RX ORDER — TRAMADOL HYDROCHLORIDE 50 MG/1
50 TABLET ORAL EVERY 6 HOURS PRN
Status: DISCONTINUED | OUTPATIENT
Start: 2018-12-18 | End: 2018-12-19 | Stop reason: HOSPADM

## 2018-12-18 RX ORDER — FLUTICASONE FUROATE AND VILANTEROL 100; 25 UG/1; UG/1
1 POWDER RESPIRATORY (INHALATION) DAILY
Status: DISCONTINUED | OUTPATIENT
Start: 2018-12-18 | End: 2018-12-19 | Stop reason: HOSPADM

## 2018-12-18 RX ORDER — HYDROMORPHONE HCL/PF 1 MG/ML
1 SYRINGE (ML) INJECTION
Status: DISCONTINUED | OUTPATIENT
Start: 2018-12-18 | End: 2018-12-19 | Stop reason: HOSPADM

## 2018-12-18 RX ADMIN — CLONIDINE HYDROCHLORIDE 0.3 MG: 0.1 TABLET ORAL at 21:19

## 2018-12-18 RX ADMIN — HYDROMORPHONE HYDROCHLORIDE 1 MG: 1 INJECTION, SOLUTION INTRAMUSCULAR; INTRAVENOUS; SUBCUTANEOUS at 12:40

## 2018-12-18 RX ADMIN — IOHEXOL 92 ML: 350 INJECTION, SOLUTION INTRAVENOUS at 06:23

## 2018-12-18 RX ADMIN — FLUTICASONE FUROATE AND VILANTEROL TRIFENATATE 1 PUFF: 100; 25 POWDER RESPIRATORY (INHALATION) at 09:25

## 2018-12-18 RX ADMIN — HYDROMORPHONE HYDROCHLORIDE 0.5 MG: 1 INJECTION, SOLUTION INTRAMUSCULAR; INTRAVENOUS; SUBCUTANEOUS at 07:29

## 2018-12-18 RX ADMIN — SODIUM CHLORIDE 75 ML/HR: 0.9 INJECTION, SOLUTION INTRAVENOUS at 08:27

## 2018-12-18 RX ADMIN — ONDANSETRON 4 MG: 2 INJECTION INTRAMUSCULAR; INTRAVENOUS at 04:23

## 2018-12-18 RX ADMIN — ONDANSETRON 4 MG: 2 INJECTION INTRAMUSCULAR; INTRAVENOUS at 07:31

## 2018-12-18 RX ADMIN — HYDROMORPHONE HYDROCHLORIDE 0.5 MG: 1 INJECTION, SOLUTION INTRAMUSCULAR; INTRAVENOUS; SUBCUTANEOUS at 04:37

## 2018-12-18 RX ADMIN — PANTOPRAZOLE SODIUM 40 MG: 40 INJECTION, POWDER, FOR SOLUTION INTRAVENOUS at 08:27

## 2018-12-18 RX ADMIN — CLONIDINE HYDROCHLORIDE 0.3 MG: 0.1 TABLET ORAL at 08:26

## 2018-12-18 RX ADMIN — SODIUM CHLORIDE 75 ML/HR: 0.9 INJECTION, SOLUTION INTRAVENOUS at 21:19

## 2018-12-18 RX ADMIN — NICOTINE 1 PATCH: 14 PATCH TRANSDERMAL at 08:26

## 2018-12-18 RX ADMIN — SODIUM CHLORIDE, SODIUM GLUCONATE, SODIUM ACETATE, POTASSIUM CHLORIDE AND MAGNESIUM CHLORIDE 1000 ML: 526; 502; 368; 37; 30 INJECTION, SOLUTION INTRAVENOUS at 07:30

## 2018-12-18 RX ADMIN — SODIUM CHLORIDE, SODIUM GLUCONATE, SODIUM ACETATE, POTASSIUM CHLORIDE, MAGNESIUM CHLORIDE, SODIUM PHOSPHATE, DIBASIC, AND POTASSIUM PHOSPHATE 2000 ML: .53; .5; .37; .037; .03; .012; .00082 INJECTION, SOLUTION INTRAVENOUS at 05:51

## 2018-12-18 RX ADMIN — SODIUM CHLORIDE 1000 ML: 0.9 INJECTION, SOLUTION INTRAVENOUS at 05:07

## 2018-12-18 RX ADMIN — HYDROMORPHONE HYDROCHLORIDE 1 MG: 1 INJECTION, SOLUTION INTRAMUSCULAR; INTRAVENOUS; SUBCUTANEOUS at 21:08

## 2018-12-18 RX ADMIN — ONDANSETRON: 2 INJECTION INTRAMUSCULAR; INTRAVENOUS at 04:30

## 2018-12-18 RX ADMIN — LORAZEPAM 2 MG: 1 TABLET ORAL at 08:26

## 2018-12-18 RX ADMIN — HYDROMORPHONE HYDROCHLORIDE 1 MG: 1 INJECTION, SOLUTION INTRAMUSCULAR; INTRAVENOUS; SUBCUTANEOUS at 17:03

## 2018-12-18 RX ADMIN — PANTOPRAZOLE SODIUM 40 MG: 40 INJECTION, POWDER, FOR SOLUTION INTRAVENOUS at 21:08

## 2018-12-18 NOTE — ASSESSMENT & PLAN NOTE
· POA, WBC 21 94  · Likely secondary to possible enteritis vs  Acute on chronic pancreatitis  · Blood cultures pending  · Obtain ESR/CRP  · Monitor CBC in the AM  · Pt remains afebrile, no need for treatment with abx at this time, continue to monitor

## 2018-12-18 NOTE — PROGRESS NOTES
POST ADMISSION CHECK     Nargis Baptiste 1975, 44 y o  female MRN: [id number]    Unit/Bed#: -01 Encounter: [id number]    Primary Care Provider: Darrin Chadwick MD   Date and time admitted to hospital: 12/18/2018  4:15 AM       DOS: 12/18/2018      * Abdominal pain   Assessment & Plan    · Most likely secondary to acute on chronic pancreatitis vs  Enteritis   · Lipase negative  · Likely secondary to ETOH use, pt has history of non-compliance after discussion with GI  · CT scan without significant changes from prior  · GI following,  · Advance to clear liquid diet  · Continue IVF hydration, PRN antiemetics and pain control  · Alcohol cessation  · Pt will need colonoscopy as an outpatient with GI     Acute kidney injury ([facility] [address] )   Assessment & Plan    · POA, cr  1 4, baseline around 0 8 per review of records  · Likely secondary to volume and GI losses  · Continue IVF hydration   · Continue to trend BMP  · If no improvement, consider nephrology consultation      Leukocytosis   Assessment & Plan    · POA, WBC 21 94  · Likely secondary to possible enteritis vs  Acute on chronic pancreatitis  · Blood cultures pending  · Obtain ESR/CRP  · Monitor CBC in the AM  · Pt remains afebrile, no need for treatment with abx at this time, continue to monitor     Elevated glucose   Assessment & Plan    · Most likely secondary to chronic pancreatitis  · HgbA1c pending  · Place on QID glucose checks and SSI coverage  · Advanced to clear liquids per GI     Alcohol use   Assessment & Plan    · Patient states that she had 1 drink over the weekend    · Alcohol level negative  · Continue CIWA protocol, last score 4  · Continue thiamin and folic acid supplementation  · Continue to monitor, does not appear tremulous on exam     Essential hypertension   Assessment & Plan    · Uncontrolled hypertension secondary to patient not taking her medications due to N/V, improvement noted  · Continue clonidine 0 3 mg Q12H  · PRN hydralazine  · Continue to monitor     Lactic acidosis-resolved as of 12/18/2018   Assessment & Plan    · POA, likely secondary to above  · Now resolved with IVF hydration       Subjective:  Pt reports that she feels okay, states her abdominal pain is starting to come back  She reports the pain is right at her belly button  She reports last episode of vomiting was prior to coming up to the floor  Denies any nausea, chest pain, shortness of breath, lightheadedness, diarrhea, constipation or urinary difficulties at this time  Objective:  Pt is in no acute distress lying in her hospital bed, appears somnolent and fatigued  HEENT: Head is normocephalic and atraumatic  Conjunctiva WNL, no scleral icterus noted  Lungs: CTA bilaterally, no wheezes, rales or rhonchi noted  Cardio: Regular rate and rhythm, no murmur heard  Abdomen: Normoactive bowel sounds heard, no distension, no tenderness  Extremities: Distal pulses intact lower extremities bilaterally  Skin: warm and dry, no erythema noted  Psych: Normal affect and mood, appears fatigued

## 2018-12-18 NOTE — ASSESSMENT & PLAN NOTE
Secondary to above and acute kidney injury  Continue IV fluids, trend lactic acid levels every 3 on detail normalization

## 2018-12-18 NOTE — ASSESSMENT & PLAN NOTE
· Most likely secondary to chronic pancreatitis    · HgbA1c pending  · Place on QID glucose checks and SSI coverage  · Advanced to clear liquids per GI

## 2018-12-18 NOTE — ASSESSMENT & PLAN NOTE
· POA, cr  1 4, baseline around 0 8 per review of records  · Likely secondary to volume and GI losses  · Continue IVF hydration   · Continue to trend BMP  · If no improvement, consider nephrology consultation

## 2018-12-18 NOTE — ASSESSMENT & PLAN NOTE
Uncontrolled hypertension secondary to patient not taking her medications and nausea vomiting  Patient will resume back on clonidine, add hydralazine as needed  Continue to monitor closely

## 2018-12-18 NOTE — ASSESSMENT & PLAN NOTE
Patient states that she had 1 drink over the weekend  She states that she has been trying to cut down on alcohol intake for the last 10 months  Check alcohol level  Initiate CIWA protocol  Supplement thiamine folic acid

## 2018-12-18 NOTE — ED PROVIDER NOTES
History  Chief Complaint   Patient presents with    Abdominal Pain     Pt presents to ED w c/o severe abdominal pain  Pt has hx of pancreatitis  N/V  Pt presents in distress  44year-old female with a history of chronic pancreatitis presents with 2 days of epigastric abdominal pain with radiation to the back that she states is similar to prior episodes of pancreatitis  Patient describes severe pain "it hurts" that she has difficulty further characterizing that came on suddenly and worsened, continuing in the ER  Patient states nothing aggravates the pain and nothing alleviates it  ROS: Patient associates nausea with multiple episodes of nonbloody, nonbilius emesis; denies fever/chills, vaginal bleeding or dscharge, diarrhea, dyspnea, anorexia, constipation, diaphoresis, chest pain, groin pain, dysuria, hematuria, melena, or back/neck pain  All other systems reviewed and negative  Patient denies any recent illnesses  Patient denies any recent use of antibiotics, international travel, or trauma  Objective:   Vital signs reviewed  Constitutional: severe acute distress  Eyes: No scleral icterus  HENT: Head normocephalic  Pharynx moist    CV: Tachycardic rate and regular rhythm  Respiratory: Lungs clear to auscultation bilaterally without adventitious sounds  Abdomen: Inspection of an obese abdomen without previous abdominal surgical incisions noted without erythema, rashes or ecchymosis noted  No abdominal pulsations noted  Normal bowel sounds with no bruit auscultated  Soft abdomen  Palpitation noted tenderness mainly periumbilical with lesser on the left side, none on the right; tenderness not over McBurney's point  No masses or pulsatile aorta noted on examination  No rebound or guarding noted on examination, non-peritoneal exam    Back: Normal inspection with no rash or signs of herpes zoster  Costovertebral tenderness not present     Skin: No ecchymosis of the umbilicus (negative Cerrillos's sign) or flank (negative Grey Tenorio's sign)  Warm and dry  Extremities: Non-tender lower extremities without asymmetry  Neuro: Alert  Answers questions appropriately  Psych: Normal mood and affect  Medical Decision Making   Abdominal pain with a broad differential, highly concerning for acute on chronic pancreatitis  Will establish IV access and make patient NPO considering possibility of surgical intervention required  Will administer hydromorphone for pain control  Will initiate fluids at this point in the assessment  Differential includes significant likelihood of intra-abdominal pathology and based on patient's exam findings and history  Will obtain CBC to evaluate for anemia and leukocytosis  Will obtain CMP to evaluate electrolytes, renal and hepatic function  Will obtain lipase to evaluate for potential pancreatitis though patient's lipase is likely unremarkable considering the history of chronic pancreatitis  Will obtain lactate to evaluate for mesenteric ischemia and sepsis  Will obtain urinalysis to evaluate for possible urinary infectious sources and ketones to evaluate potential hydration state  Based on patient's history, physical exam and presenting complaint, will obtain contrast enhanced CT imaging of patient's abdomen and pelvis to further evaluate for possible intraabdominal pathology including pancreatic complications  Abdominal Pain       Prior to Admission Medications   Prescriptions Last Dose Informant Patient Reported? Taking?    Mometasone Furo-Formoterol Fum (DULERA) 100-5 MCG/ACT AERO   Yes No   Sig: Inhale as needed Unsure of dose     albuterol (PROVENTIL HFA,VENTOLIN HFA) 90 mcg/act inhaler   No No   Sig: Inhale 2 puffs every 4 (four) hours as needed for wheezing   cloNIDine (CATAPRES) 0 3 mg tablet   Yes Yes   Sig: Take 0 3 mg by mouth 2 (two) times a day   dicyclomine (BENTYL) 20 mg tablet   No No   Sig: Take 1 tablet (20 mg total) by mouth 3 (three) times a day   pancrelipase, Lip-Prot-Amyl, (CREON) 24,000 units   No No   Sig: Take 1 capsule by mouth 3 (three) times a day with meals   pantoprazole (PROTONIX) 20 mg tablet   No No   Sig: Take 1 tablet (20 mg total) by mouth daily   sucralfate (CARAFATE) 1 g tablet   No No   Sig: Take 1 tablet (1 g total) by mouth 4 (four) times a day      Facility-Administered Medications: None       Past Medical History:   Diagnosis Date    Alcohol abuse     Arthritis     GERD (gastroesophageal reflux disease)     Hypertension     Nicotine dependence     Obesity     Pancreatitis     Panic attack        Past Surgical History:   Procedure Laterality Date    ABDOMINAL SURGERY      C SECTION    ESOPHAGOGASTRODUODENOSCOPY N/A 12/15/2017    Procedure: ESOPHAGOGASTRODUODENOSCOPY (EGD); Surgeon: Miriam Rader MD;  Location: [facility]; Service: Gastroenterology       Family History   Problem Relation Age of Onset    Cirrhosis Father     Alcohol abuse Father     Drug abuse Mother      I have reviewed and agree with the history as documented  Social History   Substance Use Topics    Smoking status: Current Every Day Smoker     Packs/day: 0 50     Years: 29 00     Types: Cigarettes    Smokeless tobacco: Never Used    Alcohol use Yes      Comment: occasional         Review of Systems   Gastrointestinal: Positive for abdominal pain  All other systems reviewed and are negative        Physical Exam  Physical Exam    Vital Signs  ED Triage Vitals [12/18/18 0417]   Temperature Pulse Respirations Blood Pressure SpO2   98 4 °F (36 9 °C) (!) 125 22 (!) 186/128 97 %      Temp Source Heart Rate Source Patient Position - Orthostatic VS BP Location FiO2 (%)   Oral Monitor Sitting Right arm --      Pain Score       Worst Possible Pain           Vitals:    12/18/18 1324 12/18/18 1725 12/18/18 1730 12/18/18 2119   BP: 160/84 158/98 157/92 138/92   Pulse: 88 91 84    Patient Position - Orthostatic VS:   Lying        Visual Acuity      ED Medications  Medications   albuterol (PROVENTIL HFA,VENTOLIN HFA) inhaler 2 puff (not administered)   fluticasone-vilanterol (BREO ELLIPTA) 100-25 mcg/inh inhaler 1 puff (1 puff Inhalation Given 12/18/18 0925)   pancrelipase (Lip-Prot-Amyl) (CREON) delayed release capsule 24,000 Units (24,000 Units Oral Not Given 12/18/18 1730)   nicotine (NICODERM CQ) 14 mg/24hr TD 24 hr patch 1 patch (1 patch Transdermal Medication Applied 12/18/18 0826)   enoxaparin (LOVENOX) subcutaneous injection 40 mg (40 mg Subcutaneous Not Given 12/18/18 0825)   acetaminophen (TYLENOL) tablet 650 mg (not administered)   HYDROmorphone (DILAUDID) injection 1 mg (1 mg Intravenous Given 12/18/18 2108)   ondansetron (ZOFRAN) injection 4 mg (4 mg Intravenous Given 12/18/18 0731)   cloNIDine (CATAPRES) tablet 0 3 mg (0 3 mg Oral Given 12/18/18 2119)   pantoprazole (PROTONIX) injection 40 mg (40 mg Intravenous Given 12/18/18 2108)   hydrALAZINE (APRESOLINE) injection 10 mg (not administered)   insulin lispro (HumaLOG) 100 units/mL subcutaneous injection 1-5 Units (0 Units Subcutaneous Not Given 12/18/18 1702)   sodium chloride 0 9 % infusion (75 mL/hr Intravenous New Bag 12/18/18 2119)   dicyclomine (BENTYL) tablet 20 mg (not administered)   traMADol (ULTRAM) tablet 50 mg (not administered)   ondansetron (ZOFRAN) 4 mg/2 mL injection **ADS Override Pull** (  Given 12/18/18 0430)   HYDROmorphone (DILAUDID) injection 0 5 mg (0 5 mg Intravenous Given 12/18/18 0437)   ondansetron (ZOFRAN) injection 4 mg (4 mg Intravenous Given 12/18/18 0423)   sodium chloride 0 9 % bolus 1,000 mL (0 mL Intravenous Stopped 12/18/18 0607)   multi-electrolyte (ISOLYTE-S PH 7 4) bolus 2,000 mL (0 mL Intravenous Stopped 12/18/18 0708)   iohexol (OMNIPAQUE) 350 MG/ML injection (MULTI-DOSE) 100 mL (92 mL Intravenous Given 12/18/18 0623)   multi-electrolyte (ISOLYTE-S PH 7 4) bolus 1,000 mL (1,000 mL Intravenous New Bag 12/18/18 0715)   HYDROmorphone (DILAUDID) injection 0 5 mg (0 5 mg Intravenous Given 12/18/18 0729)   LORazepam (ATIVAN) tablet 2 mg (2 mg Oral Given 12/18/18 0826)       Diagnostic Studies  Results Reviewed     Procedure Component Value Units Date/Time    Ethanol [[id number]]  (Normal) Collected:  12/18/18 1000    Lab Status:  Final result Specimen:  Blood from Arm, Right Updated:  12/18/18 1038     Ethanol Lvl <3 mg/dL     Lactic acid, plasma [[id number]]  (Abnormal) Collected:  12/18/18 0708    Lab Status:  Final result Specimen:  Blood from Arm, Left Updated:  12/18/18 0756     LACTIC ACID 3 9 (HH) mmol/L     Narrative:         Result may be elevated if tourniquet was used during collection  hCG, qualitative pregnancy [[id number]]  (Normal) Collected:  12/18/18 0423    Lab Status:  Final result Specimen:  Blood from Arm, Right Updated:  12/18/18 0549     Preg, Serum Negative    Lactic acid, plasma [[id number]]  (Abnormal) Collected:  12/18/18 0507    Lab Status:  Final result Specimen:  Blood from Arm, Right Updated:  12/18/18 0549     LACTIC ACID 5 0 (HH) mmol/L     Narrative:         Result may be elevated if tourniquet was used during collection  CMP [[id number]]  (Abnormal) Collected:  12/18/18 0423    Lab Status:  Final result Specimen:  Blood from Arm, Right Updated:  12/18/18 0444     Sodium 143 mmol/L      Potassium 4 3 mmol/L      Chloride 100 mmol/L      CO2 21 mmol/L      ANION GAP 22 (H) mmol/L      BUN 17 mg/dL      Creatinine 1 40 (H) mg/dL      Glucose 221 (H) mg/dL      Calcium 8 8 mg/dL      AST 51 (H) U/L      ALT 41 U/L      Alkaline Phosphatase 110 U/L      Total Protein 8 7 (H) g/dL      Albumin 4 1 g/dL      Total Bilirubin 1 00 mg/dL      eGFR 46 ml/min/1 73sq m     Narrative:         National Kidney Disease Education Program recommendations are as follows:  GFR calculation is accurate only with a steady state creatinine  Chronic Kidney disease less than 60 ml/min/1 73 sq  meters  Kidney failure less than 15 ml/min/1 73 sq  meters  Lipase [[id number]]  (Abnormal) Collected:  12/18/18 0423    Lab Status:  Final result Specimen:  Blood from Arm, Right Updated:  12/18/18 0444     Lipase 62 (L) u/L     CBC and differential [[id number]]  (Abnormal) Collected:  12/18/18 0423    Lab Status:  Final result Specimen:  Blood from Arm, Right Updated:  12/18/18 0429     WBC 21 94 (H) Thousand/uL      RBC 5 28 (H) Million/uL      Hemoglobin 15 9 (H) g/dL      Hematocrit 48 0 (H) %      MCV 91 fL      MCH 30 1 pg      MCHC 33 1 g/dL      RDW 15 1 %      MPV 9 0 fL      Platelets 538 Thousands/uL      nRBC 0 /100 WBCs      Neutrophils Relative 76 (H) %      Immat GRANS % 1 %      Lymphocytes Relative 13 (L) %      Monocytes Relative 10 %      Eosinophils Relative 0 %      Basophils Relative 0 %      Neutrophils Absolute 16 80 (H) Thousands/µL      Immature Grans Absolute 0 11 Thousand/uL      Lymphocytes Absolute 2 78 Thousands/µL      Monocytes Absolute 2 17 (H) Thousand/µL      Eosinophils Absolute 0 00 Thousand/µL      Basophils Absolute 0 08 Thousands/µL                  CT abdomen pelvis with contrast   Final Result by Davion Pan MD (12/18 0700)      Apparent mild bowel wall thickening involving the distal stomach, duodenum and proximal jejunum, as described above  Clinical correlation regarding the possibility of gastritis, duodenitis and/or enteritis is suggested  There is no evidence of bowel    obstruction  There is mild colonic diverticulosis without evidence of acute diverticulitis  Findings suggesting hepatic cirrhosis, as described above  Please see discussion  Clinical and laboratory correlation is recommended  This appearance is similar to the prior study  The pancreatic head and proximal to mid pancreatic body appear somewhat atrophic  There is dilatation of the pancreatic duct in the region of the distal pancreatic body and tail  These findings are similar to the prior study        There are extensive nodular densities within the subcutaneous fat of the posterior lower back and buttocks, similar to the previous examination  This may be due to prior liposuction or other procedure  Correlation with patient's past medical    history/past surgical history is suggested  There appears to be a moderate to large disc bulge at L5-S1  This appears similar to the prior study  Workstation performed: [id number]                    Procedures  Procedures       Phone Contacts  ED Phone Contact    ED Course  ED Course as of Dec 18 2323   Tue Dec 18, 2018   9835 Patient's CT scan with signs of enteritis and gastritis  Patient with an atrophic pancreas  Patient's symptoms clinically most likely represent exacerbation of known chronic pancreatitis  Patient's laboratory evaluation reflects this with her low lipase  No signs of necrotizing pancreatitis the patient does have a significant lactic acidosis, patient has been fluid hydrated with multiple boluses of fluids  Repeating lactic acidosis at present  Will defer antibiotics at this point as there is no indication of any bacterial infection  Will admit for continued pain control and monitoring with fluid hydration                                  MDM  CritCare Time    Disposition  Final diagnoses:   Chronic pancreatitis ([facility] [address] )   Acute abdominal pain   Dehydration   Lactic acidosis   GIANA (acute kidney injury) ([facility] [address] )     Time reflects when diagnosis was documented in both MDM as applicable and the Disposition within this note     Time User Action Codes Description Comment    12/18/2018  7:16 AM Kourtney Frankel Add [K86 1] Chronic pancreatitis ([facility] [address] )     12/18/2018  7:17 AM Kourtney Frankel Add [R10 9] Acute abdominal pain     12/18/2018  7:17 AM Kourtney Frankel Add [E86 0] Dehydration     12/18/2018  7:17 AM Kourtney Frankel Add [E87 2] Lactic acidosis     12/18/2018  7:17 AM Kourtney Frankel Add [N17 9] GIANA (acute kidney injury) [facility])       ED Disposition     None      Follow-up Information None         Current Discharge Medication List      CONTINUE these medications which have NOT CHANGED    Details   cloNIDine (CATAPRES) 0 3 mg tablet Take 0 3 mg by mouth 2 (two) times a day      albuterol (PROVENTIL HFA,VENTOLIN HFA) 90 mcg/act inhaler Inhale 2 puffs every 4 (four) hours as needed for wheezing  Qty: 1 Inhaler, Refills: 0    Associated Diagnoses: Bronchospasm      dicyclomine (BENTYL) 20 mg tablet Take 1 tablet (20 mg total) by mouth 3 (three) times a day  Qty: 90 tablet, Refills: 0    Associated Diagnoses: Generalized abdominal pain      Mometasone Furo-Formoterol Fum (DULERA) 100-5 MCG/ACT AERO Inhale as needed Unsure of dose        pancrelipase, Lip-Prot-Amyl, (CREON) 24,000 units Take 1 capsule by mouth 3 (three) times a day with meals  Qty: 90 capsule, Refills: 0      pantoprazole (PROTONIX) 20 mg tablet Take 1 tablet (20 mg total) by mouth daily  Qty: 20 tablet, Refills: 0    Associated Diagnoses: Abdominal pain; Nausea & vomiting      sucralfate (CARAFATE) 1 g tablet Take 1 tablet (1 g total) by mouth 4 (four) times a day  Qty: 120 tablet, Refills: 0    Associated Diagnoses: Gastritis           No discharge procedures on file      ED Provider  Electronically Signed by           Howard Juarez MD  12/18/18 2918

## 2018-12-18 NOTE — ASSESSMENT & PLAN NOTE
· Most likely secondary to acute on chronic pancreatitis vs  Enteritis   · Lipase negative  · Likely secondary to ETOH use, pt has history of non-compliance after discussion with GI  · CT scan without significant changes from prior  · GI following,  · Advance to clear liquid diet  · Continue IVF hydration, PRN antiemetics and pain control  · Alcohol cessation  · Pt will need colonoscopy as an outpatient with GI

## 2018-12-18 NOTE — ASSESSMENT & PLAN NOTE
· Patient states that she had 1 drink over the weekend    · Alcohol level negative  · Continue CIWA protocol, last score 4  · Continue thiamin and folic acid supplementation  · Continue to monitor, does not appear tremulous on exam

## 2018-12-18 NOTE — ASSESSMENT & PLAN NOTE
Elevated creatinine secondary to nausea vomiting dehydration  Continue IV fluids, trend BMP  If creatinine fails to come down, consider Nephrology input, ultrasound renals

## 2018-12-18 NOTE — CONSULTS
Consultation -  [facility]  Rosy Claribel 44 y o  female MRN: [id number]  Unit/Bed#: -01 Encounter: [id number]        Consults    Reason for Consult / Principal Problem: Abdominal Pain    HPI: Ms Stefani Rivas is a 45 yo F with a PMH of chronic pancreatitis 2/2 alcohol abuse, compensated cirrhosis from alcohol abuse, depression/anxiety, GERD, Castano's esophagus, HTN, presenting with periumbilical abdominal pain 2 days ago associated with nausea and vomiting  She denies any associated fevers, diarrhea, or sick contacts  This pain feels similar to her prior episodes requiring hospitalization  She did have an alcoholic beverage this weekend and this has been a pattern for her in the past where she develops pain after ingesting alcohol  She had an EGD in December 2017 which showed Castano's esophagus and mild gastritis but biopsies were neg for Castano's esophagus  She has been on protonix 40 mg BID which generally controls her symptoms  She has been told to get an EUS for the past year with [facility] to evaluate her pancreas and PD due to chronic findings of PD dilation but she has not completed this yet due to social issues  She reports she has never had a colonoscopy  REVIEW OF SYSTEMS: Negative except for as stated above    Historical Information   Past Medical History:   Diagnosis Date    Alcohol abuse     Arthritis     GERD (gastroesophageal reflux disease)     Hypertension     Nicotine dependence     Obesity     Pancreatitis     Panic attack      Past Surgical History:   Procedure Laterality Date    ABDOMINAL SURGERY      C SECTION    ESOPHAGOGASTRODUODENOSCOPY N/A 12/15/2017    Procedure: ESOPHAGOGASTRODUODENOSCOPY (EGD); Surgeon: Hugo Luna MD;  Location: [facility];   Service: Gastroenterology     Social History   History   Alcohol Use    Yes     Comment: occasional      History   Drug Use No     History   Smoking Status    Current Every Day Smoker    Packs/day: 0 50    Years: 29 00    Types: Cigarettes   Smokeless Tobacco    Never Used     Family History   Problem Relation Age of Onset    Cirrhosis Father     Alcohol abuse Father     Drug abuse Mother        Meds/Allergies     Prescriptions Prior to Admission   Medication    cloNIDine (CATAPRES) 0 3 mg tablet    albuterol (PROVENTIL HFA,VENTOLIN HFA) 90 mcg/act inhaler    dicyclomine (BENTYL) 20 mg tablet    Mometasone Furo-Formoterol Fum (DULERA) 100-5 MCG/ACT AERO    pancrelipase, Lip-Prot-Amyl, (CREON) 24,000 units    pantoprazole (PROTONIX) 20 mg tablet    sucralfate (CARAFATE) 1 g tablet     Current Facility-Administered Medications   Medication Dose Route Frequency    acetaminophen (TYLENOL) tablet 650 mg  650 mg Oral Q8H PRN    albuterol (PROVENTIL HFA,VENTOLIN HFA) inhaler 2 puff  2 puff Inhalation Q4H PRN    cloNIDine (CATAPRES) tablet 0 3 mg  0 3 mg Oral Q12H [address]    enoxaparin (LOVENOX) subcutaneous injection 40 mg  40 mg Subcutaneous Daily    fluticasone-vilanterol (BREO ELLIPTA) 100-25 mcg/inh inhaler 1 puff  1 puff Inhalation Daily    hydrALAZINE (APRESOLINE) injection 10 mg  10 mg Intravenous Q6H PRN    HYDROmorphone (DILAUDID) injection 1 mg  1 mg Intravenous Q3H PRN    insulin lispro (HumaLOG) 100 units/mL subcutaneous injection 1-5 Units  1-5 Units Subcutaneous Q6H [address]    nicotine (NICODERM CQ) 14 mg/24hr TD 24 hr patch 1 patch  1 patch Transdermal Daily    ondansetron (ZOFRAN) injection 4 mg  4 mg Intravenous Q6H PRN    pancrelipase (Lip-Prot-Amyl) (CREON) delayed release capsule 24,000 Units  24,000 Units Oral TID With Meals    pantoprazole (PROTONIX) injection 40 mg  40 mg Intravenous Q12H SHIVANI    sodium chloride 0 9 % infusion  75 mL/hr Intravenous Continuous       Allergies   Allergen Reactions    Morphine And Related Swelling           Objective     Blood pressure 160/84, pulse 88, temperature 98 4 °F (36 9 °C), temperature source Oral, resp   rate 18, height 5' 4" (1 626 m), weight 73 5 kg (162 lb 0 6 oz), last menstrual period 12/04/2018, SpO2 94 %, not currently breastfeeding  Intake/Output Summary (Last 24 hours) at 12/18/18 1447  Last data filed at 12/18/18 0708   Gross per 24 hour   Intake             3000 ml   Output                0 ml   Net             3000 ml         PHYSICAL EXAM:      General Appearance:   Alert, oriented x3, no acute distress, falling in and out of sleep during exam    HEENT:   Normocephalic, atraumatic, anicteric      Neck:  Supple, symmetrical, trachea midline   Lungs:   Clear to auscultation bilaterally, no respiratory distress   Heart[de-identified]   RRR, no murmur   Abdomen:   Non-distended, soft, BS active, NTTP   Rectal:   Deferred    Extremities:  No cyanosis or LE edema    Pulses:  2+ and symmetric all extremities    Skin:  No jaundice or pallor      Lab Results:     Results from last 7 days  Lab Units 12/18/18  1000 12/18/18  0423   WBC Thousand/uL  --  21 94*   HEMOGLOBIN g/dL  --  15 9*   HEMATOCRIT %  --  48 0*   PLATELETS Thousands/uL 210 338   NEUTROS PCT %  --  76*   LYMPHS PCT %  --  13*   MONOS PCT %  --  10   EOS PCT %  --  0       Results from last 7 days  Lab Units 12/18/18  0423   POTASSIUM mmol/L 4 3   CHLORIDE mmol/L 100   CO2 mmol/L 21   BUN mg/dL 17   CREATININE mg/dL 1 40*   CALCIUM mg/dL 8 8   ALK PHOS U/L 110   ALT U/L 41   AST U/L 51*           Results from last 7 days  Lab Units 12/18/18  0423   LIPASE u/L 62*       Imaging Studies: I have personally reviewed pertinent imaging studies  Ct Abdomen Pelvis With Contrast  Result Date: 12/18/2018  Impression: Apparent mild bowel wall thickening involving the distal stomach, duodenum and proximal jejunum, as described above  Clinical correlation regarding the possibility of gastritis, duodenitis and/or enteritis is suggested  There is no evidence of bowel obstruction  There is mild colonic diverticulosis without evidence of acute diverticulitis   Findings suggesting hepatic cirrhosis, as described above  Please see discussion  Clinical and laboratory correlation is recommended  This appearance is similar to the prior study  The pancreatic head and proximal to mid pancreatic body appear somewhat atrophic  There is dilatation of the pancreatic duct in the region of the distal pancreatic body and tail  These findings are similar to the prior study  There are extensive nodular densities within the subcutaneous fat of the posterior lower back and buttocks, similar to the previous examination  This may be due to prior liposuction or other procedure  Correlation with patient's past medical history/past surgical history is suggested  There appears to be a moderate to large disc bulge at L5-S1  This appears similar to the prior study         ASSESSMENT and PLAN:      Abdominal Pain  Enteritis  Leukocytosis  - Suspect her pain is 2/2 acute on chronic pancreatitis due to continued alcohol ingestion despite our recommendations over the past year to completely abstain from alcohol  - CT A/P on admission showed multiple chronic findings as well as mild bowel bowel thickening in the distal stomach, duodenum, and proximal jejunum which may indicate an enteritis  - Pt with significant leukcytosis and lactic acidosis on admission of unclear etiology and partly may be due to dehydration given her Hct of 48 0; no fever prior to admission and no associated diarrhea so would hold off on antibiotics for now  - Follow up blood cultures  - Continue supportive care with IVF hydration, antiemetics  - Bentyl PRN  - Trial clear liquid diet and advance as tolerated  - Protonix 40 mg BID for history of GERD; she may also have gastritis due to continued alcohol intake  - Consider adding carafate for symptomatic relief  - Serial abdominal exams  - Would recommend colonoscopy as outpatient due to findings of sigmoid colitis this year and then enteritis this admission to rule out any IBD but suspect her pain is 2/2 chronic pancreatitis    PD Dilatation  Atrophic Pancreas  - Pt has chronic findings of atrophic pancreatic body and dilatation of the pancreatic duct in the body and tail  - Recommendation has been made for the past year for her to get an EUS as an outpatient and this has not been completed yet; pt states she will be scheduling this soon with [facility] [place] [address] [place] supplementation only if taking PO    Compensated Alcoholic Cirrhosis  - Low MELD score, Child Class A  - Currently no encephalopathy, ascites, LE edema  - No HCC or suspicious mass noted on imaging      Patient will be seen and examined by Dr Micaela Hope  All jordan medical decisions were made by Dr Micaela Hope  Thank you for allowing us to participate in the care of this patient  We will follow with you closely

## 2018-12-18 NOTE — PLAN OF CARE
DISCHARGE PLANNING     Discharge to home or other facility with appropriate resources Progressing        INFECTION - ADULT     Absence or prevention of progression during hospitalization Progressing     Absence of fever/infection during neutropenic period Progressing        Nutrition/Hydration-ADULT     Nutrient/Hydration intake appropriate for improving, restoring or maintaining nutritional needs Progressing        PAIN - ADULT     Verbalizes/displays adequate comfort level or baseline comfort level Progressing        SAFETY ADULT     Patient will remain free of falls Progressing     Maintain or return to baseline ADL function Progressing     Maintain or return mobility status to optimal level Progressing

## 2018-12-18 NOTE — H&P
H&P- Charanjit Plunkett 1975, 44 y o  female MRN: [id number]  Unit/Bed#: ED 24 Encounter: [id number]  Primary Care Provider: Monica Mccurdy MD   Date and time admitted to hospital: 12/18/2018  4:15 AM        * Abdominal pain   Assessment & Plan    Most likely secondary to acute on chronic pancreatitis  CT abdomen pelvis reveals -- Apparent mild bowel wall thickening involving the distal stomach, duodenum and proximal jejunum, as described above  Clinical correlation regarding the possibility of gastritis, duodenitis and/or enteritis is suggested  There is no evidence of bowel   obstruction  There is mild colonic diverticulosis without evidence of acute diverticulitis  Findings suggesting hepatic cirrhosis, as described above  Please see discussion  Clinical and laboratory correlation is recommended  This appearance is similar to the prior study  The pancreatic head and proximal to mid pancreatic body appear somewhat atrophic  There is dilatation of the pancreatic duct in the region of the distal pancreatic body and tail  These findings are similar to the prior study  Keep NPO, IV fluids, IV pain management, IV antiemetics  Consult GI  Lactic acidosis   Assessment & Plan    Secondary to above and acute kidney injury  Continue IV fluids, trend lactic acid levels every 3 on detail normalization  Leukocytosis   Assessment & Plan    Secondary to above  Some blood culture x2  Trend CBCD  Acute kidney injury [facility])   Assessment & Plan    Elevated creatinine secondary to nausea vomiting dehydration  Continue IV fluids, trend BMP  If creatinine fails to come down, consider Nephrology input, ultrasound renals  Alcohol use   Assessment & Plan    Patient states that she had 1 drink over the weekend  She states that she has been trying to cut down on alcohol intake for the last 10 months  Check alcohol level  Initiate [facility] protocol  Supplement thiamine folic acid       Essential hypertension Assessment & Plan    Uncontrolled hypertension secondary to patient not taking her medications and nausea vomiting  Patient will resume back on clonidine, add hydralazine as needed  Continue to monitor closely  Elevated glucose   Assessment & Plan    Most likely secondary to chronic pancreatitis  Check hemoglobin A1c  Acute check q 6 hours, initiate sliding scale insulin  VTE PROPHYLAXIS:  Enoxaparin + SCDs    CODE STATUS: FULL    Anticipated Length of Stay:  Patient will be admitted on an Inpatient basis with an anticipated length of stay of  more than 2 midnights  Justification for Hospital Stay:  Abdominal pain      CHIEF COMPLAINT   · Abdominal pain since 2 days  HISTORY OF PRESENT ILLNESS  Charanjit Plunkett is a pleasant 79-year-old lady who looks much older than stated age, past medical history as below came to the hospital for complaints of abdominal pain since 2 days  She states that she had only 1 alcoholic drink over the weekend and since then she has been experiencing epigastric discomfort     Since yesterday afternoon she has been having severe nausea vomiting, she took over-the-counter medications to help her symptoms however did not improved  As she has been have severe nausea and vomiting she has not been able to take her clonidine  Due to worsening symptomatologies, she came to the hospital for further evaluation  Unfortunately, patient is severely dehydrated, found to have acute on chronic pancreatitis, acute kidney injury, severe dehydration, lactic acidosis, uncontrolled hypertension and leukocytosis  Patient was last admitted to this hospital back in May 2018 for similar complaints  Patient denies chest pain, PND, constipation, blood in urine, fevers, chills, dysuria, new onset weakness, slurred speech, seizure-like activity,dizziness, syncope, fall, trauma to the head, recent travel or recent sick contacts        REVIEW OF SYSTEMS  · A comprehensive 10 point review system conducted all negative except as mentioned in HPI       PMH/PSH    Past Medical History:   Diagnosis Date    Alcohol abuse     Arthritis     GERD (gastroesophageal reflux disease)     Hypertension     Nicotine dependence     Obesity     Pancreatitis     Panic attack        Past Surgical History:   Procedure Laterality Date    ABDOMINAL SURGERY      C SECTION    ESOPHAGOGASTRODUODENOSCOPY N/A 12/15/2017    Procedure: ESOPHAGOGASTRODUODENOSCOPY (EGD); Surgeon: Ruth Ann Prescott MD;  Location: [facility]; Service: Gastroenterology       ALLERGIES  Allergies   Allergen Reactions    Morphine And Related Swelling       HOME MEDICATIONS  No current facility-administered medications on file prior to encounter        Current Outpatient Prescriptions on File Prior to Encounter   Medication Sig    albuterol (PROVENTIL HFA,VENTOLIN HFA) 90 mcg/act inhaler Inhale 2 puffs every 4 (four) hours as needed for wheezing    dicyclomine (BENTYL) 20 mg tablet Take 1 tablet (20 mg total) by mouth 3 (three) times a day    Mometasone Furo-Formoterol Fum (DULERA) 100-5 MCG/ACT AERO Inhale as needed Unsure of dose      pancrelipase, Lip-Prot-Amyl, (CREON) 24,000 units Take 1 capsule by mouth 3 (three) times a day with meals    pantoprazole (PROTONIX) 20 mg tablet Take 1 tablet (20 mg total) by mouth daily    sucralfate (CARAFATE) 1 g tablet Take 1 tablet (1 g total) by mouth 4 (four) times a day    [DISCONTINUED] cloNIDine (CATAPRES) 0 3 mg tablet Take 0 3 mg by mouth 2 (two) times a day         SOCIAL HISTORY     Marital Status: Single   Substance Use History:   History   Alcohol Use    Yes     Comment: occasional      History   Smoking Status    Current Every Day Smoker    Packs/day: 0 50    Years: 29 00    Types: Cigarettes   Smokeless Tobacco    Never Used     History   Drug Use No       FAMILY HISTORY  · Alcohol use/abuse, drug use, hypertension, diabetes cirrhosis of liver    OBJECTIVE    Vitals:   Blood Pressure: (!) 189/138 (12/18/18 0730)  Pulse: 104 (12/18/18 0730)  Temperature: 98 4 °F (36 9 °C) (12/18/18 0417)  Temp Source: Oral (12/18/18 0417)  Respirations: 22 (12/18/18 0730)  Height: 5' 4" (162 6 cm) (12/18/18 0417)  Weight - Scale: 73 5 kg (162 lb 0 6 oz) (12/18/18 0417)  SpO2: 98 % (12/18/18 0730)    GENERAL: AAO x 3, dehydrated  HEENT: atraumatic, normocephalic  Oral mucosa dry, no icterus, pallor  PERRLA +  Neck supple, no JVD, no lymphadenopathy, no thryomegaly  CHEST: B/L breath sounds heard  CVS: S1, S2   ABDOMEN: Soft/obese/tenderness in epigastric region on palpation/bowel sounds heard  NEUROLOGICAL: CN II -XI grossly intact  No focal motor or sensory deficits  No signs of meningeal irritation or cerebellar dysfunction  EXTREMITIES: No cyanosis/clubbing or edema  LAB DATA   12/18/2018 04:23 12/18/2018 05:07   eGFR 46    Sodium 143    Potassium 4 3    Chloride 100    CO2 21    Anion Gap 22 (H)    BUN 17    Creatinine 1 40 (H)    Glucose, Random 221 (H)    Calcium 8 8    AST 51 (H)    ALT 41    Alkaline Phosphatase 110    Total Protein 8 7 (H)    Albumin 4 1    TOTAL BILIRUBIN 1 00    Lipase 62 (L)    LACTIC ACID  5 0 (HH)   WBC 21 94 (H)    Red Blood Cell Count 5 28 (H)    Hemoglobin 15 9 (H)    HCT 48 0 (H)    MCV 91    MCH 30 1    MCHC 33 1    RDW 15 1    Platelet Count 834    MPV 9 0    nRBC 0    Neutrophils % 76 (H)    Immat GRANS % 1    Lymphocytes Relative 13 (L)    Monocytes Relative 10    Eosinophils 0    Basophils Relative 0    Immature Grans Absolute 0 11    Absolute Neutrophils 16 80 (H)    Lymphocytes Absolute 2 78    Absolute Monocytes 2 17 (H)    Absolute Eosinophils 0 00    Basophils Absolute 0 08    PREGNANCY, SERUM Negative        Ct Abdomen Pelvis With Contrast    Result Date: 12/18/2018  Narrative: CT ABDOMEN AND PELVIS WITH IV CONTRAST INDICATION:   pancreatitis?    Epigastric abdominal pain, nausea, vomiting  Prior history of pancreatitis    History of chronic pancreatitis  History of alcohol abuse  COMPARISON:  September 20, 2018  TECHNIQUE:  CT examination of the abdomen and pelvis was performed  Axial, sagittal, and coronal 2D reformatted images were created from the source data and submitted for interpretation  Radiation dose length product (DLP) for this visit:  394 7 mGy-cm   This examination, like all CT scans performed in the [facility], was performed utilizing techniques to minimize radiation dose exposure, including the use of iterative reconstruction and automated exposure control  IV Contrast:  92 mL of iohexol (OMNIPAQUE) Enteric Contrast:  Enteric contrast was not administered  FINDINGS: ABDOMEN LOWER CHEST:  No clinically significant abnormality identified in the visualized lower chest  LIVER/BILIARY TREE:  There is mild fatty infiltration of the liver  The left lobe of the liver is prominent and the surface contour of the liver appears somewhat nodular  This is similar to the prior study and is suggestive of cirrhosis  Clinical and laboratory correlation is suggested  GALLBLADDER:  No calcified gallstones  No pericholecystic inflammatory change  SPLEEN:  Unremarkable  PANCREAS:  The pancreatic head and proximal to mid body appears somewhat atrophic  There is dilatation of the pancreatic duct in the distal body and tail  These findings are similar to the prior study  ADRENAL GLANDS:  Unremarkable  KIDNEYS/URETERS:  Mild cortical scarring upper pole left kidney unchanged  No hydronephrosis bilaterally  STOMACH AND BOWEL:  There is no evidence of bowel obstruction  The wall of the distal stomach and duodenum appears somewhat thickened, however, evaluation is limited by underdistention and lack of oral contrast   Clinical correlation regarding the possibility of gastritis/duodenitis is suggested  There is also some mild bowel wall thickening involving the proximal jejunum  This could be due to enteritis    Clinical correlation is recommended  There is mild colonic diverticulosis without evidence  of acute diverticulitis  APPENDIX:  No findings to suggest appendicitis  ABDOMINOPELVIC CAVITY:  No ascites or free intraperitoneal air  No lymphadenopathy  VESSELS:  There is mild atherosclerosis of the distal abdominal aorta  There is no evidence of abdominal aortic aneurysm  PELVIS REPRODUCTIVE ORGANS:  Unremarkable for patient's age  URINARY BLADDER:  Unremarkable  ABDOMINAL WALL/INGUINAL REGIONS:  Extensive nodular densities are again seen in the subcutaneous tissues of the posterior lower back and buttocks, similar to the prior study  This may be due to prior liposuction or other procedure  Correlation with patient's past medical/past surgical history is suggested  OSSEOUS STRUCTURES:  No acute fracture or destructive osseous lesion  There appears to be a moderate to large disc bulge at L5-S1  This appears similar to the prior study  Impression: Apparent mild bowel wall thickening involving the distal stomach, duodenum and proximal jejunum, as described above  Clinical correlation regarding the possibility of gastritis, duodenitis and/or enteritis is suggested  There is no evidence of bowel obstruction  There is mild colonic diverticulosis without evidence of acute diverticulitis  Findings suggesting hepatic cirrhosis, as described above  Please see discussion  Clinical and laboratory correlation is recommended  This appearance is similar to the prior study  The pancreatic head and proximal to mid pancreatic body appear somewhat atrophic  There is dilatation of the pancreatic duct in the region of the distal pancreatic body and tail  These findings are similar to the prior study  There are extensive nodular densities within the subcutaneous fat of the posterior lower back and buttocks, similar to the previous examination  This may be due to prior liposuction or other procedure    Correlation with patient's past medical history/past surgical history is suggested  There appears to be a moderate to large disc bulge at L5-S1  This appears similar to the prior study  Workstation performed: [id number]       EKG, Pathology, and Other Studies Reviewed on Admission:  EKG will be ordered for today morning  Total time spent in the process of admission, completion of records, counseling, coordination of care, discussion with patient/family was approximately 26 minutes  Jayshree Clarke MD  HOSPITALIST SERVICES  12/18/2018      PLEASE NOTE:  This encounter was completed utilizing the [employer] Direct Speech Voice Recognition Software  Grammatical errors, random word insertions, pronoun errors and incomplete sentences are occasional consequences of the system due to software limitations, ambient noise and hardware issues  These may be missed by proof reading prior to affixing electronic signature  Any questions or concerns about the content, text or information contained within the body of this dictation should be directly addressed to the physician for clarification  Please do not hesitate to call me directly if you have any any questions or concerns

## 2018-12-18 NOTE — UTILIZATION REVIEW
[address]  Utilization Review Department  Phone: [phone]; Fax [phone]  [email or web address]  ATTENTION: Please call with any questions or concerns to [phone]  and carefully listen to the prompts so that you are directed to the right person  Send all requests for admission clinical reviews, approved or denied determinations and any other requests to fax [phone]  All voicemails are confidential     Initial Clinical Review    Admission: Date/Time/Statement: inpatient 12/18/18 @ 0718     Orders Placed This Encounter   Procedures    Inpatient Admission (expected length of stay for this patient is greater than two midnights)     Standing Status:   Standing     Number of Occurrences:   1     Order Specific Question:   Admitting Physician     Answer:   Joseph Gutiérrez [[id number]]     Order Specific Question:   Level of Care     Answer:   Med Surg [16]     Order Specific Question:   Estimated length of stay     Answer:   More than 2 Midnights     Order Specific Question:   Certification     Answer:   I certify that inpatient services are medically necessary for this patient for a duration of greater than two midnights  See H&P and MD Progress Notes for additional information about the patient's course of treatment  ED: Date/Time/Mode of Arrival:   ED Arrival Information     Expected Arrival Acuity Means of Arrival Escorted By Service Admission Type    - 12/18/2018 04:15 Urgent Ambulance [facility] Urgent    Arrival Complaint    -          Chief Complaint:   Chief Complaint   Patient presents with    Abdominal Pain     Pt presents to ED w c/o severe abdominal pain  Pt has hx of pancreatitis  N/V  Pt presents in distress  History of Illness: 45 yo fem to ED from home by EMS c/o abd pain x 2 days  Severe n/v 1d pta  OTC meds didn't help  Unable to take her clonidine   Found severely dehydrated with acute on chronic pancreatitis, GIANA, lactic acidosis, and htn  Similar admit back in May  ED Vital Signs:   ED Triage Vitals [12/18/18 0417]   Temperature Pulse Respirations Blood Pressure SpO2   98 4 °F (36 9 °C) (!) 125 22 (!) 186/128 97 %      Temp Source Heart Rate Source Patient Position - Orthostatic VS BP Location FiO2 (%)   Oral Monitor Sitting Right arm --      Pain Score       Worst Possible Pain        Wt Readings from Last 1 Encounters:   12/18/18 73 5 kg (162 lb 0 6 oz)     CIWA 8, 3, 4    Vital Signs (abnormal): 223/109   189/138   186/95     HR 89, 104, 92, 97, 88      Abnormal Labs/Diagnostic Test Results:   A gap 22   Creat 1 4   Gluc 221, 165   ast 51   t prot 8 7   Lipase 62      Lactic acid 5, 3 9, 1 4  Wbc 21 94   hgb 15 9  hct 48  crp 4 1  CT a&P: Apparent mild bowel wall thickening involving the distal stomach, duodenum and proximal jejunum, as described above    There is no evidence of bowel obstruction   There is mild colonic diverticulosis without evidence of acute diverticulitis    Findings suggesting hepatic cirrhosis    The pancreatic head and proximal to mid pancreatic body appear somewhat atrophic  Verta Jennifer is dilatation of the pancreatic duct in the region of the distal pancreatic body and tail     There are extensive nodular densities within the subcutaneous fat of the posterior lower back and buttocks  This may be due to prior liposuction or other procedure     There appears to be a moderate to large disc bulge at L5-S1       ED Treatment:   Medication Administration from 12/18/2018 0415 to 12/18/2018 0804       Date/Time Order Dose Route Action Action by Comments     12/18/2018 0430 ondansetron (ZOFRAN) 4 mg/2 mL injection **ADS Override Pull**    Given Theresa Salinas RN      12/18/2018 2937 HYDROmorphone (DILAUDID) injection 0 5 mg 0 5 mg Intravenous Given Malou Penn RN      12/18/2018 9837 ondansetron (ZOFRAN) injection 4 mg 4 mg Intravenous Given Malou Penn RN      12/18/2018 9463 sodium chloride 0 9 % bolus 1,000 mL 1,000 mL Intravenous [address] Sammy Grullon, JORGE      12/18/2018 0551 multi-electrolyte (ISOLYTE-S PH 7 4) bolus 2,000 mL 2,000 mL Intravenous New Bag Sammy Grullon, JORGE      12/18/2018 0730 multi-electrolyte (ISOLYTE-S PH 7 4) bolus 1,000 mL 1,000 mL Intravenous New Bag Kierra Varghese [facility]      12/18/2018 1734 HYDROmorphone (DILAUDID) injection 0 5 mg 0 5 mg Intravenous Given Earna Gutting, RN      12/18/2018 0731 ondansetron (ZOFRAN) injection 4 mg 4 mg Intravenous Given Earna Gutting, RN           Past Medical/Surgical History:    Active Ambulatory Problems     Diagnosis Date Noted    Chronic pancreatitis ([facility] ) 10/06/2016    Abdominal pain 10/06/2016    Essential hypertension 10/06/2016    Nicotine dependence 10/06/2016    Colitis 10/27/2017    Pain, dental 11/22/2017    Alcohol use 11/22/2017    Tylenol overdose, accidental or unintentional, initial encounter 11/22/2017    Elevated glucose 11/22/2017    Epigastric pain 12/14/2017    Nausea and vomiting 12/14/2017    Hypertensive urgency 71/59/2774    Alcoholic cirrhosis ([facility] ) 71/19/6897    Moderate dehydration 04/22/2018    [facility] on [facility] ([facility] ) 04/22/2018     Resolved Ambulatory Problems     Diagnosis Date Noted    Hypokalemia 03/07/2017    Sepsis ([facility] ) 12/14/2017    Hematemesis with nausea 12/14/2017    Hypomagnesemia 05/29/2018     Past Medical History:   Diagnosis Date    Alcohol abuse     Arthritis     GERD (gastroesophageal reflux disease)     Hypertension     Nicotine dependence     Obesity     Pancreatitis     Panic attack        Admitting Diagnosis: Chronic pancreatitis (HCC) [K86 1]  Dehydration [E86 0]  Lactic acidosis [E87 2]  Acute abdominal pain [R10 9]  Abdominal pain [R10 9]  GIANA (acute kidney injury) ([facility] ) [N17 9]    Age/Sex: 44 y o  female    Assessment/Plan: 43 yo fem to ED from home by EMS admitted due to acute pancreatitis with lactic acidosis, leukocytosis, severe dehydration, uncontrolled HTN, and GIANA  Has epigastric abd pain with severe n/v unrelieved by OTC meds  Unable to take bp meds  Consult GI, IVF, analgesics, antimemetics  Consult renal if creat does not improve  Has been cutting down on etoh intake over last 10 months-check CIWA protocol and provide vitamin supplementation       Admission Orders:  Scheduled Meds:   Current Facility-Administered Medications:  acetaminophen 650 mg Oral Q8H PRN   albuterol 2 puff Inhalation Q4H PRN   cloNIDine 0 3 mg Oral Q12H SHIVANI   dicyclomine 20 mg Oral TID PRN   enoxaparin 40 mg Subcutaneous Daily   fluticasone-vilanterol 1 puff Inhalation Daily   hydrALAZINE 10 mg Intravenous Q6H PRN   HYDROmorphone 1 mg Intravenous Q3H PRN  X 1   insulin lispro 1-5 Units Subcutaneous Q6H SHIVANI   nicotine 1 patch Transdermal Daily   ondansetron 4 mg Intravenous Q6H PRN  X 1   pancrelipase (Lip-Prot-Amyl) 24,000 Units Oral TID With Meals   pantoprazole 40 mg Intravenous Q12H SHIVANI   sodium chloride 75 mL/hr Intravenous Continuous     CIWA protocol  Gluc checks q6h  scd's  Up w/assist  Bmp, mg, phos, cbc, pt, inr in am  bld c7s x 2  Sed rate x 1  a1c x 1  Clear liquids  Cons GI

## 2018-12-18 NOTE — ASSESSMENT & PLAN NOTE
· Uncontrolled hypertension secondary to patient not taking her medications due to N/V, improvement noted  · Continue clonidine 0 3 mg Q12H  · PRN hydralazine  · Continue to monitor

## 2018-12-18 NOTE — ASSESSMENT & PLAN NOTE
Most likely secondary to chronic pancreatitis  Check hemoglobin A1c  Acute check q 6 hours, initiate sliding scale insulin

## 2018-12-19 VITALS
DIASTOLIC BLOOD PRESSURE: 90 MMHG | HEART RATE: 60 BPM | WEIGHT: 162.04 LBS | TEMPERATURE: 98.2 F | BODY MASS INDEX: 27.66 KG/M2 | HEIGHT: 64 IN | RESPIRATION RATE: 12 BRPM | SYSTOLIC BLOOD PRESSURE: 160 MMHG | OXYGEN SATURATION: 96 %

## 2018-12-19 LAB
ALBUMIN SERPL BCP-MCNC: 3 G/DL (ref 3.5–5)
ALP SERPL-CCNC: 80 U/L (ref 46–116)
ALT SERPL W P-5'-P-CCNC: 31 U/L (ref 12–78)
ANION GAP SERPL CALCULATED.3IONS-SCNC: 9 MMOL/L (ref 4–13)
AST SERPL W P-5'-P-CCNC: 59 U/L (ref 5–45)
BILIRUB DIRECT SERPL-MCNC: 0.19 MG/DL (ref 0–0.2)
BILIRUB SERPL-MCNC: 0.7 MG/DL (ref 0.2–1)
BUN SERPL-MCNC: 14 MG/DL (ref 5–25)
CALCIUM SERPL-MCNC: 7.2 MG/DL (ref 8.3–10.1)
CHLORIDE SERPL-SCNC: 102 MMOL/L (ref 100–108)
CO2 SERPL-SCNC: 26 MMOL/L (ref 21–32)
CREAT SERPL-MCNC: 0.69 MG/DL (ref 0.6–1.3)
ERYTHROCYTE [DISTWIDTH] IN BLOOD BY AUTOMATED COUNT: 14.9 % (ref 11.6–15.1)
ERYTHROCYTE [SEDIMENTATION RATE] IN BLOOD: 5 MM/HOUR (ref 0–20)
GFR SERPL CREATININE-BSD FRML MDRD: 108 ML/MIN/1.73SQ M
GLUCOSE SERPL-MCNC: 147 MG/DL (ref 65–140)
GLUCOSE SERPL-MCNC: 150 MG/DL (ref 65–140)
HCT VFR BLD AUTO: 40.7 % (ref 34.8–46.1)
HGB BLD-MCNC: 13.1 G/DL (ref 11.5–15.4)
INR PPP: 1.11 (ref 0.86–1.17)
MAGNESIUM SERPL-MCNC: 1.7 MG/DL (ref 1.6–2.6)
MCH RBC QN AUTO: 30.4 PG (ref 26.8–34.3)
MCHC RBC AUTO-ENTMCNC: 32.2 G/DL (ref 31.4–37.4)
MCV RBC AUTO: 94 FL (ref 82–98)
PHOSPHATE SERPL-MCNC: 2.3 MG/DL (ref 2.7–4.5)
PLATELET # BLD AUTO: 172 THOUSANDS/UL (ref 149–390)
PMV BLD AUTO: 8.9 FL (ref 8.9–12.7)
POTASSIUM SERPL-SCNC: 3.5 MMOL/L (ref 3.5–5.3)
PROT SERPL-MCNC: 6.5 G/DL (ref 6.4–8.2)
PROTHROMBIN TIME: 14.2 SECONDS (ref 11.8–14.2)
RBC # BLD AUTO: 4.31 MILLION/UL (ref 3.81–5.12)
SODIUM SERPL-SCNC: 137 MMOL/L (ref 136–145)
WBC # BLD AUTO: 7.85 THOUSAND/UL (ref 4.31–10.16)

## 2018-12-19 PROCEDURE — 80076 HEPATIC FUNCTION PANEL: CPT | Performed by: PHYSICIAN ASSISTANT

## 2018-12-19 PROCEDURE — 85610 PROTHROMBIN TIME: CPT | Performed by: INTERNAL MEDICINE

## 2018-12-19 PROCEDURE — C9113 INJ PANTOPRAZOLE SODIUM, VIA: HCPCS | Performed by: INTERNAL MEDICINE

## 2018-12-19 PROCEDURE — 83735 ASSAY OF MAGNESIUM: CPT | Performed by: INTERNAL MEDICINE

## 2018-12-19 PROCEDURE — 80048 BASIC METABOLIC PNL TOTAL CA: CPT | Performed by: INTERNAL MEDICINE

## 2018-12-19 PROCEDURE — 85027 COMPLETE CBC AUTOMATED: CPT | Performed by: INTERNAL MEDICINE

## 2018-12-19 PROCEDURE — 82948 REAGENT STRIP/BLOOD GLUCOSE: CPT

## 2018-12-19 PROCEDURE — 84100 ASSAY OF PHOSPHORUS: CPT | Performed by: INTERNAL MEDICINE

## 2018-12-19 PROCEDURE — 85652 RBC SED RATE AUTOMATED: CPT | Performed by: PHYSICIAN ASSISTANT

## 2018-12-19 PROCEDURE — 99238 HOSP IP/OBS DSCHRG MGMT 30/<: CPT | Performed by: NURSE PRACTITIONER

## 2018-12-19 RX ADMIN — HYDROMORPHONE HYDROCHLORIDE 1 MG: 1 INJECTION, SOLUTION INTRAMUSCULAR; INTRAVENOUS; SUBCUTANEOUS at 03:57

## 2018-12-19 RX ADMIN — HYDROMORPHONE HYDROCHLORIDE 1 MG: 1 INJECTION, SOLUTION INTRAMUSCULAR; INTRAVENOUS; SUBCUTANEOUS at 00:53

## 2018-12-19 RX ADMIN — FLUTICASONE FUROATE AND VILANTEROL TRIFENATATE 1 PUFF: 100; 25 POWDER RESPIRATORY (INHALATION) at 08:49

## 2018-12-19 RX ADMIN — HYDROMORPHONE HYDROCHLORIDE 1 MG: 1 INJECTION, SOLUTION INTRAMUSCULAR; INTRAVENOUS; SUBCUTANEOUS at 11:06

## 2018-12-19 RX ADMIN — PANTOPRAZOLE SODIUM 40 MG: 40 INJECTION, POWDER, FOR SOLUTION INTRAVENOUS at 08:48

## 2018-12-19 RX ADMIN — HYDROMORPHONE HYDROCHLORIDE 1 MG: 1 INJECTION, SOLUTION INTRAMUSCULAR; INTRAVENOUS; SUBCUTANEOUS at 07:22

## 2018-12-19 RX ADMIN — CLONIDINE HYDROCHLORIDE 0.3 MG: 0.1 TABLET ORAL at 08:48

## 2018-12-19 RX ADMIN — SODIUM CHLORIDE 75 ML/HR: 0.9 INJECTION, SOLUTION INTRAVENOUS at 11:08

## 2018-12-19 RX ADMIN — PANCRELIPASE 24000 UNITS: 24000; 76000; 120000 CAPSULE, DELAYED RELEASE PELLETS ORAL at 08:47

## 2018-12-19 RX ADMIN — NICOTINE 1 PATCH: 14 PATCH TRANSDERMAL at 08:49

## 2018-12-19 NOTE — ASSESSMENT & PLAN NOTE
· POA, WBC 21 94, improved to 7 85  · Likely secondary to possible enteritis vs  Acute on chronic pancreatitis  · Blood cultures pending, will likely be negative given spontaneous resolution of leukocytosis  · ESR was normal, CRP was elevated which is consistent with acute on chronic pancreatitis versus enteritis

## 2018-12-19 NOTE — ASSESSMENT & PLAN NOTE
· Most likely secondary to chronic pancreatitis    · HgbA1c 7 2  · Encouraged patient to follow up with her primary care provider  · Educated patient on a carb controlled diet, and lifestyle modifications she verbalized understanding

## 2018-12-19 NOTE — PROGRESS NOTES
GI Progress Note - Sharee Banegas 44 y o  female MRN: [id number]    Unit/Bed#: -01 Encounter: [id number]    Subjective: Marshal Patterson reports her symptoms are mildly improved  Tolerated tea this morning  Passing gas but no BMs while here  No further nausea or vomiting  Abdominal pain is the most persistent symptom  Objective:     Vitals: Blood pressure 160/90, pulse 60, temperature 98 2 °F (36 8 °C), temperature source Oral, resp  rate 12, height 5' 4" (1 626 m), weight 73 5 kg (162 lb 0 6 oz), last menstrual period 12/04/2018, SpO2 96 %, not currently breastfeeding  ,Body mass index is 27 81 kg/m²  Intake/Output Summary (Last 24 hours) at 12/19/18 1420  Last data filed at 12/19/18 0843   Gross per 24 hour   Intake              220 ml   Output              600 ml   Net             -380 ml       Physical Exam:     General Appearance: Alert, oriented x3, no acute distress  Lungs: Clear to auscultation bilaterally, no respiratory distress  Heart: RRR, no murmur  Abdomen: Non-distended, soft, BS active, NTTP  Extremities: No cyanosis or LE edema    Invasive Devices          No matching active lines, drains, or airways          Lab Results:    Results from last 7 days  Lab Units 12/19/18  0551  12/18/18  0423   WBC Thousand/uL 7 85  --  21 94*   HEMOGLOBIN g/dL 13 1  --  15 9*   HEMATOCRIT % 40 7  --  48 0*   PLATELETS Thousands/uL 172  < > 338   NEUTROS PCT %  --   --  76*   LYMPHS PCT %  --   --  13*   MONOS PCT %  --   --  10   EOS PCT %  --   --  0   < > = values in this interval not displayed      Results from last 7 days  Lab Units 12/19/18  0551   POTASSIUM mmol/L 3 5   CHLORIDE mmol/L 102   CO2 mmol/L 26   BUN mg/dL 14   CREATININE mg/dL 0 69   CALCIUM mg/dL 7 2*   ALK PHOS U/L 80   ALT U/L 31   AST U/L 59*       Results from last 7 days  Lab Units 12/19/18  0551   INR  1 11       Results from last 7 days  Lab Units 12/18/18  0423   LIPASE u/L 62*       Imaging Studies: I have personally reviewed pertinent imaging studies  Ct Abdomen Pelvis With Contrast  Result Date: 12/18/2018  Impression: Apparent mild bowel wall thickening involving the distal stomach, duodenum and proximal jejunum, as described above  Clinical correlation regarding the possibility of gastritis, duodenitis and/or enteritis is suggested  There is no evidence of bowel obstruction  There is mild colonic diverticulosis without evidence of acute diverticulitis  Findings suggesting hepatic cirrhosis, as described above  Please see discussion  Clinical and laboratory correlation is recommended  This appearance is similar to the prior study  The pancreatic head and proximal to mid pancreatic body appear somewhat atrophic  There is dilatation of the pancreatic duct in the region of the distal pancreatic body and tail  These findings are similar to the prior study  There are extensive nodular densities within the subcutaneous fat of the posterior lower back and buttocks, similar to the previous examination  This may be due to prior liposuction or other procedure  Correlation with patient's past medical history/past surgical history is suggested  There appears to be a moderate to large disc bulge at L5-S1  This appears similar to the prior study         Assessment and Plan:     Abdominal Pain  Enteritis  Leukocytosis  - Suspect her pain is 2/2 acute on chronic pancreatitis due to continued alcohol ingestion despite our recommendations over the past year to completely abstain from alcohol  - No evidence of acute pancreatitis on imaging  - CT A/P on admission showed multiple chronic findings as well as mild bowel bowel thickening in the distal stomach, duodenum, and proximal jejunum which may indicate an enteritis  - Leukocytosis and lactic acidosis resolved with IVF hydration, low suspicion for infectious etiology given complete resolution of his leukocytosis overnight  - Blood culture results still pending  - Continue supportive care with IVF hydration, antiemetics  - Bentyl PRN  - Low fiber diet as tolerated  - Protonix 40 mg BID for history of GERD; she may also have gastritis due to continued alcohol intake  - Consider repeat EGD and also a colonoscopy as outpatient given her history of GERD and findings of sigmoid colitis and enteritis on CTs this year to rule out IBD though low suspicion for IBD given normal SED rate, she did have a mildly elevated CRP     PD Dilatation  Atrophic Pancreas  - Pt has chronic findings of atrophic pancreatic body and dilatation of the pancreatic duct in the body and tail  - Recommendation has been made for the past year for her to get an EUS as an outpatient and this has not been completed yet; pt states she will be scheduling this soon with [facility] in [place]  - Continue creon supplementation TID AC    Compensated Alcoholic Cirrhosis  - Low MELD score, Child Class A  - Currently no encephalopathy, ascites, LE edema  - No HCC or suspicious mass noted on imaging   - No history of varices on EGD 1 year ago, she should have a repeat EGD as outpatient for continued varices screening      The patient's care will be discussed with Dr Mynor Trinidad  She is stable for discharge from GI standpoint   GI will sign off, call with questions

## 2018-12-19 NOTE — DISCHARGE INSTRUCTIONS
Abdominal Pain, Ambulatory Care   GENERAL INFORMATION:   Abdominal pain  can be dull, achy, or sharp  You may have pain in one area of your abdomen, or in your entire abdomen  Your pain may be caused by constipation, food sensitivity or poisoning, infection, or a blockage  Abdominal pain can also be caused by a hernia, appendicitis, or an ulcer  The cause of your abdominal pain may be unknown  Seek immediate care for the following symptoms:   · New chest pain or shortness of breath    · Pulsing pain in your upper abdomen or lower back that suddenly becomes constant    · Pain in the right lower abdominal area that worsens with movement    · Fever over 100 4°F (38°C) or shaking chills    · Vomiting and you cannot keep food or fluids down    · Pain does not improve or gets worse over the next 8 to 12 hours    · Blood in your vomit or bowel movements, or they look black and tarry    · Skin or the whites of your eyes turn yellow    · Large amount of vaginal bleeding that is not your monthly period  Treatment for abdominal pain  may include medicine to calm your stomach, prevent vomiting, or decrease pain  Follow up with your healthcare provider as directed:  Write down your questions so you remember to ask them during your visits  CARE AGREEMENT:   You have the right to help plan your care  Learn about your health condition and how it may be treated  Discuss treatment options with your caregivers to decide what care you want to receive  You always have the right to refuse treatment  The above information is an  only  It is not intended as medical advice for individual conditions or treatments  Talk to your doctor, nurse or pharmacist before following any medical regimen to see if it is safe and effective for you  © 2014 [address] is for End User's use only and may not be sold, redistributed or otherwise used for commercial purposes   All illustrations and images included in [facility] are the copyrighted property of [employer] M [employer]  or [employer]  Acute Abdominal Pain   WHAT YOU NEED TO KNOW:   What do I need to know about acute abdominal pain? Acute abdominal pain usually starts suddenly and gets worse quickly  What are minor causes of acute abdominal pain? · An allergic reaction to food, or food poisoning    · Stress    · Acid reflux    · Constipation    · Monthly period pain in females  What are serious causes of acute abdominal pain? · Inflammation or rupture of your appendix    · Swelling or an infection in your abdomen or organ    · A blockage in your bowels    · An ulcer or a tear in your esophagus, stomach, or intestines    · Bleeding in your abdomen or an organ    · Stones in your kidney or gallbladder    · Diseases of the fallopian tubes or ovaries    · An ectopic pregnancy  How is the cause of acute abdominal pain diagnosed? Your healthcare provider will ask about your signs and symptoms  Tell the provider when your symptoms started and about any recent travel or surgery  Also tell him what makes the pain better or worse, and what treatments you have tried  The provider will examine you  Based on what your provider finds from the exam, and your symptoms, you may need other tests  Examples include blood or urine tests, an ultrasound, a CT scan, or an endoscopy  How is acute abdominal pain treated? Treatment may depend on the cause of your abdominal pain  You may need any of the following:  · Medicines  may be given to decrease pain, treat an infection, and manage your symptoms, such as constipation  · Surgery  may be needed to treat a serious cause of abdominal pain  Examples include surgery to treat appendicitis or a blockage in your bowels  How can I manage my symptoms? · Apply heat  on your abdomen for 20 to 30 minutes every 2 hours for as many days as directed  Heat helps decrease pain and muscle spasms       · Make changes to the food you eat as directed  Do not eat foods that cause abdominal pain or other symptoms  Eat small meals more often  ¨ Eat more high-fiber foods if you are constipated  High-fiber foods include fruits, vegetables, whole-grain foods, and legumes  ¨ Do not eat foods that cause gas if you have bloating  Examples include broccoli, cabbage, and cauliflower  Do not drink soda or carbonated drinks, because these may also cause gas  ¨ Do not eat foods or drinks that contain sorbitol or fructose if you have diarrhea and bloating  Some examples are fruit juices, candy, jelly, and sugar-free gum  ¨ Do not eat high-fat foods, such as fried foods, cheeseburgers, hot dogs, and desserts  ¨ Limit or do not drink caffeine  Caffeine may make symptoms, such as heart burn or nausea, worse  ¨ Drink plenty of liquids to prevent dehydration from diarrhea or vomiting  Ask your healthcare provider how much liquid to drink each day and which liquids are best for you  · Manage your stress  Stress may cause abdominal pain  Your healthcare provider may recommend relaxation techniques and deep breathing exercises to help decrease your stress  Your healthcare provider may recommend you talk to someone about your stress or anxiety, such as a counselor or a trusted friend  Get plenty of sleep and exercise regularly  · Limit or do not drink alcohol  Alcohol can make your abdominal pain worse  Ask your healthcare provider if it is safe for you to drink alcohol  Also ask how much is safe for you to drink  · Do not smoke  Nicotine and other chemicals in cigarettes can damage your esophagus and stomach  Ask your healthcare provider for information if you currently smoke and need help to quit  E-cigarettes or smokeless tobacco still contain nicotine  Talk to your healthcare provider before you use these products  When should I seek immediate care? · You vomit blood or cannot stop vomiting      · You have blood in your bowel movement or it looks like tar  · You have bleeding from your rectum  · Your abdomen is larger than usual, more painful, and hard  · You have severe pain in your abdomen  · You stop passing gas and having bowel movements  · You feel weak, dizzy, or faint  When should I contact my healthcare provider? · You have a fever  · You have new signs and symptoms  · Your symptoms do not get better with treatment  · You have questions or concerns about your condition or care  CARE AGREEMENT:   You have the right to help plan your care  Learn about your health condition and how it may be treated  Discuss treatment options with your caregivers to decide what care you want to receive  You always have the right to refuse treatment  The above information is an  only  It is not intended as medical advice for individual conditions or treatments  Talk to your doctor, nurse or pharmacist before following any medical regimen to see if it is safe and effective for you  © 2017 [address] Information is for End User's use only and may not be sold, redistributed or otherwise used for commercial purposes  All illustrations and images included in CareNotes® are the copyrighted property of A D [employer] M , Inc  or [employer]  Pancreatitis   WHAT YOU NEED TO KNOW:   Pancreatitis is inflammation of your pancreas  The pancreas is an organ that makes insulin  It also makes enzymes (digestive juices) that help your body digest food  Pancreatitis may be an acute (short-term) problem that happens only once  It may become a chronic (long-term) problem that comes and goes over time  DISCHARGE INSTRUCTIONS:   Seek care immediately if:   · You have severe pain in your abdomen and you are vomiting  Contact your healthcare provider if:   · You have a fever  · You continue to lose weight without trying      · Your skin or the whites of your eyes turn yellow  · You have questions or concerns about your condition or care  Medicines: You may need any of the following:  · Antibiotics  treat a bacterial infection  · Prescription pain medicine  may be given  Ask your healthcare provider how to take this medicine safely  Some prescription pain medicines contain acetaminophen  Do not take other medicines that contain acetaminophen without talking to your healthcare provider  Too much acetaminophen may cause liver damage  Prescription pain medicine may cause constipation  Ask your healthcare provider how to prevent or treat constipation  · Take your medicine as directed  Contact your healthcare provider if you think your medicine is not helping or if you have side effects  Tell him or her if you are allergic to any medicine  Keep a list of the medicines, vitamins, and herbs you take  Include the amounts, and when and why you take them  Bring the list or the pill bottles to follow-up visits  Carry your medicine list with you in case of an emergency  Self-care:   · Rest  when you feel it is needed  Slowly start to do more each day  Return to your usual activities as directed  · Do not drink any alcohol  If you need help to stop drinking, contact the following organization:   [employer]  Web Address: http://www [employer]/      · Ask your healthcare provider or dietitian about the best foods to eat  You may need to eat foods that are low in fat if you have chronic pancreatitis  Follow up with your healthcare provider as directed:  Write down your questions so you remember to ask them during your visits  © 2017 [address] Information is for End User's use only and may not be sold, redistributed or otherwise used for commercial purposes  All illustrations and images included in CareNotes® are the copyrighted property of [employer]  or [employer]  The above information is an  only   It is not intended as medical advice for individual conditions or treatments  Talk to your doctor, nurse or pharmacist before following any medical regimen to see if it is safe and effective for you

## 2018-12-19 NOTE — PLAN OF CARE
DISCHARGE PLANNING     Discharge to home or other facility with appropriate resources Adequate for Discharge        DISCHARGE PLANNING - CARE MANAGEMENT     Discharge to post-acute care or home with appropriate resources Adequate for Discharge        INFECTION - ADULT     Absence or prevention of progression during hospitalization Adequate for Discharge     Absence of fever/infection during neutropenic period Adequate for Discharge        Nutrition/Hydration-ADULT     Nutrient/Hydration intake appropriate for improving, restoring or maintaining nutritional needs Adequate for Discharge        PAIN - ADULT     Verbalizes/displays adequate comfort level or baseline comfort level Adequate for Discharge        Prexisting or High Potential for Compromised Skin Integrity     Skin integrity is maintained or improved Adequate for Discharge        SAFETY ADULT     Patient will remain free of falls Adequate for Discharge     Maintain or return to baseline ADL function Adequate for Discharge     Maintain or return mobility status to optimal level Adequate for Discharge

## 2018-12-19 NOTE — ASSESSMENT & PLAN NOTE
· POA, cr  1 4, baseline around 0 8 per review of records  · Likely secondary to volume and GI losses  · Improved to baseline at 0 69  · Continue to trend BMP

## 2018-12-19 NOTE — ASSESSMENT & PLAN NOTE
· Patient states that she had 1 drink over the weekend    · Alcohol level negative  · Continue CIWA protocol, last score 4  · Continue thiamin and folic acid supplementation

## 2018-12-19 NOTE — ASSESSMENT & PLAN NOTE
· Uncontrolled hypertension secondary to patient not taking her medications due to N/V, improvement noted  · Continue clonidine 0 3 mg Q12H  · Encouraged compliance with medications

## 2018-12-19 NOTE — DISCHARGE SUMMARY
[address] [facility]  Discharge- Gia Davison 1975, 44 y o  female MRN: [id number]    Unit/Bed#: -01 Encounter: [id number]    Primary Care Provider: Klaus Rosa MD   Date and time admitted to hospital: 12/18/2018  4:15 AM    * Abdominal pain   Assessment & Plan    · Most likely secondary to acute on chronic pancreatitis vs  Enteritis   · Lipase negative  · Likely secondary to ETOH use, pt has history of non-compliance after discussion with GI  · CT scan without significant changes from prior  · GI following,  · Patient is tolerating a regular diet  · Encouraged Alcohol cessation  · Pt will need colonoscopy as an outpatient with GI     Alcohol use   Assessment & Plan    · Patient states that she had 1 drink over the weekend  · Alcohol level negative  · Continue CIWA protocol, last score 4  · Continue thiamin and folic acid supplementation       Essential hypertension   Assessment & Plan    · Uncontrolled hypertension secondary to patient not taking her medications due to N/V, improvement noted  · Continue clonidine 0 3 mg Q12H  · Encouraged compliance with medications     Leukocytosis   Assessment & Plan    · POA, WBC 21 94, improved to 7 85  · Likely secondary to possible enteritis vs  Acute on chronic pancreatitis  · Blood cultures pending, will likely be negative given spontaneous resolution of leukocytosis  · ESR was normal, CRP was elevated which is consistent with acute on chronic pancreatitis versus enteritis  Acute kidney injury ([facility] [address] )   Assessment & Plan    · POA, cr  1 4, baseline around 0 8 per review of records  · Likely secondary to volume and GI losses  · Improved to baseline at 0 69  · Continue to trend BMP       Elevated glucose   Assessment & Plan    · Most likely secondary to chronic pancreatitis    · HgbA1c 7 2  · Encouraged patient to follow up with her primary care provider  · Educated patient on a carb controlled diet, and lifestyle modifications she verbalized understanding        Discharging Physician / Practitioner: Goerge Rodrigues, [address]  PCP: Vickie Garcia MD  Admission Date:   Admission Orders     Ordered        12/18/18 0718  Inpatient Admission (expected length of stay for this patient is greater than two midnights)  Once             Discharge Date: 12/19/18    Resolved Problems  Date Reviewed: 12/18/2018          Resolved    Lactic acidosis 12/18/2018     Resolved by  Zachary Posey PA-C          Consultations During Hospital Stay:  · GI    Procedures Performed:     · CT of abdomen and pelvis    Significant Findings / Test Results:     · Mild bowel wall thickening consistent with gastritis and enteritis no bowel obstruction and diverticulosis other findings suggestive of hepatic cirrhosis given patient's alcohol history findings are correlated  Pancreatic atrophy consistent with chronic pancreatitis    Incidental Findings:   · None     Test Results Pending at Discharge (will require follow up): · Blood cultures     Outpatient Tests Requested:  · None    Complications:  None    Reason for Admission:  Abdominal pain nausea vomiting    Hospital Course:     Yayo Klein is a 44 y o  female patient who originally presented to the hospital on 12/18/2018 due to abdominal pain with nausea and vomiting  Patient has a chronic history of pancreatitis  However she does continue to drink  She has of alcoholic beverages over the weekend and presented to the ED with abdominal pain  Abdominal pain is improved  GI has followed her during this hospitalization and encouraged her to be compliant with her medications and compliant with lifestyle modifications that include not consume alcohol  Please see above list of diagnoses and related plan for additional information  Condition at Discharge: good     Discharge Day Visit / Exam:     Subjective:  Patient denies any chest pain chest tightness shortness of breath or difficulty breathing    She denies any nausea or vomiting  She does report her abdominal pain has improved she has been able to tolerate light portions of her regular diet today  Discussed not drinking any alcohol  She does not believe that her symptoms are related to that however  Encouraged patient to be compliant with medications and follow up outpatient with MAMADOU PACK  She verbalized understanding   Vitals: Blood Pressure: 160/90 (12/19/18 1300)  Pulse: 60 (12/19/18 1300)  Temperature: 98 2 °F (36 8 °C) (12/19/18 1245)  Temp Source: Oral (12/19/18 1245)  Respirations: 12 (12/19/18 1245)  Height: 5' 4" (162 6 cm) (12/18/18 0417)  Weight - Scale: 73 5 kg (162 lb 0 6 oz) (12/18/18 0417)  SpO2: 96 % (12/19/18 1245)  Exam:   Physical Exam   Constitutional: She is oriented to person, place, and time  She appears well-developed  She is sleeping and cooperative  She is easily aroused  She appears ill  HENT:   Head: Normocephalic  Eyes: Pupils are equal, round, and reactive to light  Neck: Normal range of motion  Neck supple  Cardiovascular: Normal rate  Pulmonary/Chest: Effort normal and breath sounds normal    Abdominal: Soft  Bowel sounds are normal    Neurological: She is alert, oriented to person, place, and time and easily aroused  Skin: Skin is warm and dry  Psychiatric: She has a normal mood and affect  Her behavior is normal    Nursing note and vitals reviewed  Discussion with Family:  Lenore Speaks on couch    Discharge instructions/Information to patient and family:   See after visit summary for information provided to patient and family  Provisions for Follow-Up Care:  See after visit summary for information related to follow-up care and any pertinent home health orders  Disposition:     Home    For Discharges to [facility] SNF:   · Not Applicable to this Patient - Not Applicable to this Patient    Planned Readmission:  No     Discharge Statement:  I spent 30 minutes discharging the patient   This time was spent on the day of discharge  I had direct contact with the patient on the day of discharge  Greater than 50% of the total time was spent examining patient, answering all patient questions, arranging and discussing plan of care with patient as well as directly providing post-discharge instructions  Additional time then spent on discharge activities  Discharge Medications:  See after visit summary for reconciled discharge medications provided to patient and family        ** Please Note: This note has been constructed using a voice recognition system **

## 2018-12-19 NOTE — PLAN OF CARE
Problem: DISCHARGE PLANNING - CARE MANAGEMENT  Goal: Discharge to post-acute care or home with appropriate resources  INTERVENTIONS:  - Conduct assessment to determine patient/family and health care team treatment goals, and need for post-acute services based on payer coverage, community resources, and patient preferences, and barriers to discharge  - Address psychosocial, clinical, and financial barriers to discharge as identified in assessment in conjunction with the patient/family and health care team  - Arrange appropriate level of post-acute services according to patients   needs and preference and payer coverage in collaboration with the physician and health care team  - Communicate with and update the patient/family, physician, and health care team regarding progress on the discharge plan  - Arrange appropriate transportation to post-acute venues  Outcome: [address]  Not readmission  Medical criteria not met for HRR  Cm met w pt and her fianceEvangelist  They reside together in [facility] and he is supportive  Pt ind w adls  No dme  Drives  Suffers from anxiety, has a psychologist  Name unknown  Uses [facility] [address] for RXs  No VNA hx  Has ride home at d c no other reported needs at this time    CM reviewed discharge planning process including the following: identifying help at home, patient preference for discharge planning needs, pharmacy preference, and availability of treatment team to discuss questions or concerns patient and/or family may have regarding understanding medications and recognizing signs and symptoms once discharged  CM also encouraged patient to follow up with all recommended appointments after discharge  Patient advised of importance for patient and family to participate in managing patients medical well being  CM name and role reviewed  Discharge Checklist reviewed and CM will continue to monitor for progress toward discharge goals in nursing and provider rounds

## 2018-12-19 NOTE — SOCIAL WORK
[address]  Not readmission  Medical criteria not met for HRR  Cm met w pt and her fiance, Tom Noland  They reside together in [facility] and he is supportive  Pt has no POA or advanced directive and denies need for resources  Pt ind w adls  No dme  Drives  Suffers from anxiety, has a psychologist  Name unknown  Uses [facility] [address] for RXs  No VNA hx  Has ride home at d c no other reported needs at this time    CM reviewed discharge planning process including the following: identifying help at home, patient preference for discharge planning needs, pharmacy preference, and availability of treatment team to discuss questions or concerns patient and/or family may have regarding understanding medications and recognizing signs and symptoms once discharged  CM also encouraged patient to follow up with all recommended appointments after discharge  Patient advised of importance for patient and family to participate in managing patients medical well being  CM name and role reviewed  Discharge Checklist reviewed and CM will continue to monitor for progress toward discharge goals in nursing and provider rounds

## 2018-12-19 NOTE — ASSESSMENT & PLAN NOTE
· Most likely secondary to acute on chronic pancreatitis vs  Enteritis   · Lipase negative  · Likely secondary to ETOH use, pt has history of non-compliance after discussion with GI  · CT scan without significant changes from prior  · GI following,  · Patient is tolerating a regular diet  · Encouraged Alcohol cessation  · Pt will need colonoscopy as an outpatient with GI

## 2018-12-19 NOTE — PLAN OF CARE
DISCHARGE PLANNING     Discharge to home or other facility with appropriate resources Progressing        INFECTION - ADULT     Absence or prevention of progression during hospitalization Progressing     Absence of fever/infection during neutropenic period Progressing        Nutrition/Hydration-ADULT     Nutrient/Hydration intake appropriate for improving, restoring or maintaining nutritional needs Progressing        PAIN - ADULT     Verbalizes/displays adequate comfort level or baseline comfort level Progressing        Prexisting or High Potential for Compromised Skin Integrity     Skin integrity is maintained or improved Progressing        SAFETY ADULT     Patient will remain free of falls Progressing     Maintain or return to baseline ADL function Progressing     Maintain or return mobility status to optimal level Progressing

## 2018-12-23 LAB
BACTERIA BLD CULT: NORMAL
BACTERIA BLD CULT: NORMAL

## 2019-02-19 ENCOUNTER — APPOINTMENT (EMERGENCY)
Dept: CT IMAGING | Facility: HOSPITAL | Age: 44
End: 2019-02-19
Payer: COMMERCIAL

## 2019-02-19 ENCOUNTER — HOSPITAL ENCOUNTER (EMERGENCY)
Facility: HOSPITAL | Age: 44
Discharge: HOME | End: 2019-02-20
Attending: EMERGENCY MEDICINE | Admitting: EMERGENCY MEDICINE
Payer: COMMERCIAL

## 2019-02-19 DIAGNOSIS — R11.0 NAUSEA: ICD-10-CM

## 2019-02-19 DIAGNOSIS — E87.6 HYPOKALEMIA: ICD-10-CM

## 2019-02-19 DIAGNOSIS — R10.84 GENERALIZED ABDOMINAL PAIN: Primary | ICD-10-CM

## 2019-02-19 DIAGNOSIS — K86.1 CHRONIC PANCREATITIS, UNSPECIFIED PANCREATITIS TYPE (HCC): ICD-10-CM

## 2019-02-19 LAB
ALBUMIN SERPL BCP-MCNC: 4.2 G/DL (ref 3.5–5)
ALP SERPL-CCNC: 116 U/L (ref 46–116)
ALT SERPL W P-5'-P-CCNC: 55 U/L (ref 12–78)
ANION GAP SERPL CALCULATED.3IONS-SCNC: 18 MMOL/L (ref 4–13)
AST SERPL W P-5'-P-CCNC: 76 U/L (ref 5–45)
BASOPHILS # BLD AUTO: 0.1 THOUSANDS/ΜL (ref 0–0.1)
BASOPHILS NFR BLD AUTO: 1 % (ref 0–1)
BILIRUB SERPL-MCNC: 0.6 MG/DL (ref 0.2–1)
BUN SERPL-MCNC: 7 MG/DL (ref 5–25)
CALCIUM SERPL-MCNC: 9 MG/DL (ref 8.3–10.1)
CHLORIDE SERPL-SCNC: 99 MMOL/L (ref 100–108)
CO2 SERPL-SCNC: 22 MMOL/L (ref 21–32)
CREAT SERPL-MCNC: 0.7 MG/DL (ref 0.6–1.3)
EOSINOPHIL # BLD AUTO: 0.14 THOUSAND/ΜL (ref 0–0.61)
EOSINOPHIL NFR BLD AUTO: 1 % (ref 0–6)
ERYTHROCYTE [DISTWIDTH] IN BLOOD BY AUTOMATED COUNT: 15.8 % (ref 11.6–15.1)
GFR SERPL CREATININE-BSD FRML MDRD: 107 ML/MIN/1.73SQ M
GLUCOSE SERPL-MCNC: 179 MG/DL (ref 65–140)
HCG SERPL QL: NEGATIVE
HCT VFR BLD AUTO: 48.5 % (ref 34.8–46.1)
HGB BLD-MCNC: 16.2 G/DL (ref 11.5–15.4)
IMM GRANULOCYTES # BLD AUTO: 0.03 THOUSAND/UL (ref 0–0.2)
IMM GRANULOCYTES NFR BLD AUTO: 0 % (ref 0–2)
LIPASE SERPL-CCNC: 91 U/L (ref 73–393)
LYMPHOCYTES # BLD AUTO: 2.54 THOUSANDS/ΜL (ref 0.6–4.47)
LYMPHOCYTES NFR BLD AUTO: 24 % (ref 14–44)
MCH RBC QN AUTO: 29.7 PG (ref 26.8–34.3)
MCHC RBC AUTO-ENTMCNC: 33.4 G/DL (ref 31.4–37.4)
MCV RBC AUTO: 89 FL (ref 82–98)
MONOCYTES # BLD AUTO: 0.72 THOUSAND/ΜL (ref 0.17–1.22)
MONOCYTES NFR BLD AUTO: 7 % (ref 4–12)
NEUTROPHILS # BLD AUTO: 7 THOUSANDS/ΜL (ref 1.85–7.62)
NEUTS SEG NFR BLD AUTO: 67 % (ref 43–75)
NRBC BLD AUTO-RTO: 0 /100 WBCS
PLATELET # BLD AUTO: 222 THOUSANDS/UL (ref 149–390)
PMV BLD AUTO: 9.1 FL (ref 8.9–12.7)
POTASSIUM SERPL-SCNC: 2.9 MMOL/L (ref 3.5–5.3)
PROT SERPL-MCNC: 8.7 G/DL (ref 6.4–8.2)
RBC # BLD AUTO: 5.46 MILLION/UL (ref 3.81–5.12)
SODIUM SERPL-SCNC: 139 MMOL/L (ref 136–145)
TROPONIN I SERPL-MCNC: <0.02 NG/ML
WBC # BLD AUTO: 10.53 THOUSAND/UL (ref 4.31–10.16)

## 2019-02-19 PROCEDURE — 85025 COMPLETE CBC W/AUTO DIFF WBC: CPT | Performed by: PHYSICIAN ASSISTANT

## 2019-02-19 PROCEDURE — 36415 COLL VENOUS BLD VENIPUNCTURE: CPT | Performed by: PHYSICIAN ASSISTANT

## 2019-02-19 PROCEDURE — 96374 THER/PROPH/DIAG INJ IV PUSH: CPT

## 2019-02-19 PROCEDURE — 80053 COMPREHEN METABOLIC PANEL: CPT | Performed by: PHYSICIAN ASSISTANT

## 2019-02-19 PROCEDURE — 84703 CHORIONIC GONADOTROPIN ASSAY: CPT | Performed by: PHYSICIAN ASSISTANT

## 2019-02-19 PROCEDURE — 99285 EMERGENCY DEPT VISIT HI MDM: CPT

## 2019-02-19 PROCEDURE — 93005 ELECTROCARDIOGRAM TRACING: CPT

## 2019-02-19 PROCEDURE — 84484 ASSAY OF TROPONIN QUANT: CPT | Performed by: PHYSICIAN ASSISTANT

## 2019-02-19 PROCEDURE — 96361 HYDRATE IV INFUSION ADD-ON: CPT

## 2019-02-19 PROCEDURE — 96375 TX/PRO/DX INJ NEW DRUG ADDON: CPT

## 2019-02-19 PROCEDURE — 83690 ASSAY OF LIPASE: CPT | Performed by: PHYSICIAN ASSISTANT

## 2019-02-19 RX ORDER — METOCLOPRAMIDE HYDROCHLORIDE 5 MG/ML
10 INJECTION INTRAMUSCULAR; INTRAVENOUS ONCE
Status: COMPLETED | OUTPATIENT
Start: 2019-02-19 | End: 2019-02-19

## 2019-02-19 RX ORDER — DICYCLOMINE HCL 20 MG
20 TABLET ORAL ONCE
Status: COMPLETED | OUTPATIENT
Start: 2019-02-19 | End: 2019-02-19

## 2019-02-19 RX ORDER — KETOROLAC TROMETHAMINE 30 MG/ML
15 INJECTION, SOLUTION INTRAMUSCULAR; INTRAVENOUS ONCE
Status: COMPLETED | OUTPATIENT
Start: 2019-02-19 | End: 2019-02-19

## 2019-02-19 RX ADMIN — METOCLOPRAMIDE 10 MG: 5 INJECTION, SOLUTION INTRAMUSCULAR; INTRAVENOUS at 22:23

## 2019-02-19 RX ADMIN — FAMOTIDINE 20 MG: 10 INJECTION, SOLUTION INTRAVENOUS at 22:32

## 2019-02-19 RX ADMIN — DICYCLOMINE HYDROCHLORIDE 20 MG: 20 TABLET ORAL at 22:23

## 2019-02-19 RX ADMIN — KETOROLAC TROMETHAMINE 15 MG: 30 INJECTION, SOLUTION INTRAMUSCULAR at 22:22

## 2019-02-19 RX ADMIN — SODIUM CHLORIDE 1000 ML: 0.9 INJECTION, SOLUTION INTRAVENOUS at 22:30

## 2019-02-20 ENCOUNTER — APPOINTMENT (EMERGENCY)
Dept: CT IMAGING | Facility: HOSPITAL | Age: 44
End: 2019-02-20
Payer: COMMERCIAL

## 2019-02-20 VITALS
OXYGEN SATURATION: 100 % | HEART RATE: 74 BPM | SYSTOLIC BLOOD PRESSURE: 206 MMHG | RESPIRATION RATE: 17 BRPM | WEIGHT: 166.67 LBS | BODY MASS INDEX: 28.61 KG/M2 | DIASTOLIC BLOOD PRESSURE: 106 MMHG | TEMPERATURE: 97.8 F

## 2019-02-20 LAB
ATRIAL RATE: 102 BPM
P AXIS: 78 DEGREES
PR INTERVAL: 148 MS
QRS AXIS: 38 DEGREES
QRSD INTERVAL: 86 MS
QT INTERVAL: 376 MS
QTC INTERVAL: 490 MS
T WAVE AXIS: 75 DEGREES
VENTRICULAR RATE: 102 BPM

## 2019-02-20 PROCEDURE — 74177 CT ABD & PELVIS W/CONTRAST: CPT

## 2019-02-20 PROCEDURE — 96375 TX/PRO/DX INJ NEW DRUG ADDON: CPT

## 2019-02-20 PROCEDURE — 93010 ELECTROCARDIOGRAM REPORT: CPT | Performed by: INTERNAL MEDICINE

## 2019-02-20 RX ORDER — SENNOSIDES 8.6 MG
650 CAPSULE ORAL EVERY 8 HOURS PRN
Qty: 30 TABLET | Refills: 0 | Status: SHIPPED | OUTPATIENT
Start: 2019-02-20 | End: 2019-02-25

## 2019-02-20 RX ORDER — IBUPROFEN 400 MG/1
400 TABLET ORAL EVERY 6 HOURS PRN
Qty: 20 TABLET | Refills: 0 | Status: ON HOLD | OUTPATIENT
Start: 2019-02-20 | End: 2019-06-25 | Stop reason: ALTCHOICE

## 2019-02-20 RX ORDER — ONDANSETRON 4 MG/1
4 TABLET, ORALLY DISINTEGRATING ORAL ONCE
Status: DISCONTINUED | OUTPATIENT
Start: 2019-02-20 | End: 2019-02-20

## 2019-02-20 RX ORDER — ONDANSETRON 2 MG/ML
4 INJECTION INTRAMUSCULAR; INTRAVENOUS ONCE
Status: COMPLETED | OUTPATIENT
Start: 2019-02-20 | End: 2019-02-20

## 2019-02-20 RX ORDER — LORAZEPAM 2 MG/ML
0.5 INJECTION INTRAMUSCULAR ONCE
Status: COMPLETED | OUTPATIENT
Start: 2019-02-20 | End: 2019-02-20

## 2019-02-20 RX ORDER — DIPHENHYDRAMINE HYDROCHLORIDE 50 MG/ML
25 INJECTION INTRAMUSCULAR; INTRAVENOUS ONCE
Status: COMPLETED | OUTPATIENT
Start: 2019-02-20 | End: 2019-02-20

## 2019-02-20 RX ORDER — OXYCODONE HYDROCHLORIDE AND ACETAMINOPHEN 5; 325 MG/1; MG/1
1 TABLET ORAL ONCE
Status: COMPLETED | OUTPATIENT
Start: 2019-02-20 | End: 2019-02-20

## 2019-02-20 RX ORDER — OXYCODONE HYDROCHLORIDE AND ACETAMINOPHEN 5; 325 MG/1; MG/1
1 TABLET ORAL ONCE
Status: DISCONTINUED | OUTPATIENT
Start: 2019-02-20 | End: 2019-02-20

## 2019-02-20 RX ORDER — FAMOTIDINE 20 MG/1
20 TABLET, FILM COATED ORAL 2 TIMES DAILY
Qty: 10 TABLET | Refills: 0 | Status: SHIPPED | OUTPATIENT
Start: 2019-02-20 | End: 2019-06-25

## 2019-02-20 RX ORDER — MORPHINE SULFATE 4 MG/ML
4 INJECTION, SOLUTION INTRAMUSCULAR; INTRAVENOUS ONCE
Status: COMPLETED | OUTPATIENT
Start: 2019-02-20 | End: 2019-02-20

## 2019-02-20 RX ORDER — METOCLOPRAMIDE 10 MG/1
10 TABLET ORAL EVERY 6 HOURS
Qty: 20 TABLET | Refills: 0 | Status: SHIPPED | OUTPATIENT
Start: 2019-02-20 | End: 2019-02-25

## 2019-02-20 RX ADMIN — DIPHENHYDRAMINE HYDROCHLORIDE 25 MG: 50 INJECTION, SOLUTION INTRAMUSCULAR; INTRAVENOUS at 00:56

## 2019-02-20 RX ADMIN — OXYCODONE HYDROCHLORIDE AND ACETAMINOPHEN 1 TABLET: 5; 325 TABLET ORAL at 03:08

## 2019-02-20 RX ADMIN — ONDANSETRON 4 MG: 4 TABLET, ORALLY DISINTEGRATING ORAL at 02:04

## 2019-02-20 RX ADMIN — LORAZEPAM 0.5 MG: 2 INJECTION, SOLUTION INTRAMUSCULAR; INTRAVENOUS at 03:09

## 2019-02-20 RX ADMIN — ONDANSETRON 4 MG: 2 INJECTION INTRAMUSCULAR; INTRAVENOUS at 00:55

## 2019-02-20 RX ADMIN — MORPHINE SULFATE 4 MG: 4 INJECTION INTRAVENOUS at 00:55

## 2019-02-20 RX ADMIN — IOHEXOL 100 ML: 350 INJECTION, SOLUTION INTRAVENOUS at 01:18

## 2019-02-20 NOTE — DISCHARGE INSTRUCTIONS
Take Reglan, Pepcid, Tylenol, and Motrin as indicated  Start brat diet and gradually to baseline diet as tolerated  Consume plenty of fluids electrolytes  Follow-up with GI  Follow-up with PCP  Follow up with emergency department if symptoms persist or exacerbate

## 2019-02-20 NOTE — ED PROVIDER NOTES
History  Chief Complaint   Patient presents with    Abdominal Pain     Pt states lower abd pain for the last week  Worse over the last few hours  States it hurts like this when she has problems with her pancreas  States she had vodka to try to help her throat feel better  Zofran en route  Patient is a 81-year-old female with history of alcohol abuse, GERD, and pancreatitis that presents emergency department with worsening persistent generalized nonradiating abdominal pain for 1 day  Patient confirms having presenting ED abdominal pain in the past but present ED presentation of abdominal pain higher in severity  Patient states her presenting ED symptomatology was abrupt in onset  Patient denies unknown food consumption  Patient denies palliative factors with provocative factors of pressure to abdomen  Patient affirms tea as ineffective treatment  Patient denies fevers, and chills  Patient affirms nausea with 3 bouts of vomiting  Vomitus undigested food containing no blood or bilious material   Patient denies headache, dizziness, tinnitus, meningeal, and vertiginous symptoms  Patient denies numbness, tingling, and loss of power  Patient denies recent fall or trauma  Patient affirms decrease in appetite and dietary pattern from baseline  14 5 pack/year cigarette smoker and consumes alcohol daily  Patient denies chest pain, shortness of breath  Patient is not in acute distress  History provided by:  Patient   used: No    Abdominal Pain   Pain location:  Generalized  Pain quality: aching    Pain radiates to:  Does not radiate  Pain severity:  Moderate  Onset quality:  Sudden  Duration:  1 day  Timing:  Constant  Progression:  Worsening  Chronicity:  Chronic  Context: alcohol use    Relieved by:  Nothing  Worsened by:   Movement, palpation and position changes  Ineffective treatments:  None tried  Associated symptoms: nausea and vomiting    Associated symptoms: no chest pain, no chills, no constipation, no cough, no diarrhea, no dysuria, no fever, no shortness of breath, no vaginal bleeding and no vaginal discharge    Nausea:     Severity:  Mild    Onset quality:  Gradual    Duration:  1 day    Timing:  Constant  Risk factors: alcohol abuse    Risk factors: not obese, not pregnant and no recent hospitalization        Prior to Admission Medications   Prescriptions Last Dose Informant Patient Reported? Taking? Mometasone Furo-Formoterol Fum (DULERA) 100-5 MCG/ACT AERO   Yes No   Sig: Inhale as needed Unsure of dose     albuterol (PROVENTIL HFA,VENTOLIN HFA) 90 mcg/act inhaler   No No   Sig: Inhale 2 puffs every 4 (four) hours as needed for wheezing   cloNIDine (CATAPRES) 0 3 mg tablet   Yes No   Sig: Take 0 3 mg by mouth 2 (two) times a day   dicyclomine (BENTYL) 20 mg tablet   No No   Sig: Take 1 tablet (20 mg total) by mouth 3 (three) times a day   pancrelipase, Lip-Prot-Amyl, (CREON) 24,000 units   No No   Sig: Take 1 capsule by mouth 3 (three) times a day with meals   pantoprazole (PROTONIX) 20 mg tablet   No No   Sig: Take 1 tablet (20 mg total) by mouth daily   sucralfate (CARAFATE) 1 g tablet   No No   Sig: Take 1 tablet (1 g total) by mouth 4 (four) times a day      Facility-Administered Medications: None       Past Medical History:   Diagnosis Date    Alcohol abuse     Arthritis     GERD (gastroesophageal reflux disease)     Hypertension     Nicotine dependence     Obesity     Pancreatitis     Panic attack        Past Surgical History:   Procedure Laterality Date    ABDOMINAL SURGERY      C SECTION    ESOPHAGOGASTRODUODENOSCOPY N/A 12/15/2017    Procedure: ESOPHAGOGASTRODUODENOSCOPY (EGD); Surgeon: Shilo Dorman MD;  Location: [facility]; Service: Gastroenterology       Family History   Problem Relation Age of Onset    Cirrhosis Father     Alcohol abuse Father     Drug abuse Mother      I have reviewed and agree with the history as documented      Social History     Tobacco Use    Smoking status: Current Every Day Smoker     Packs/day: 0 50     Years: 29 00     Pack years: 14 50     Types: Cigarettes    Smokeless tobacco: Never Used   Substance Use Topics    Alcohol use: Yes     Comment: occasional     Drug use: No        Review of Systems   Constitutional: Negative for activity change, appetite change, chills and fever  HENT: Negative for congestion, hearing loss, mouth sores and nosebleeds  Eyes: Negative for photophobia and visual disturbance  Respiratory: Negative for cough, chest tightness, shortness of breath and wheezing  Cardiovascular: Negative for chest pain and palpitations  Gastrointestinal: Positive for abdominal pain, nausea and vomiting  Negative for abdominal distention, constipation and diarrhea  Genitourinary: Negative for difficulty urinating, dysuria, frequency, vaginal bleeding, vaginal discharge and vaginal pain  Musculoskeletal: Negative for back pain, gait problem, neck pain and neck stiffness  Skin: Negative for pallor and rash  Allergic/Immunologic: Negative for environmental allergies and food allergies  Neurological: Negative for dizziness and headaches  Psychiatric/Behavioral: Negative for confusion  All other systems reviewed and are negative  Physical Exam  Physical Exam   Constitutional: She is oriented to person, place, and time  She appears well-developed and well-nourished  She is active and cooperative  Non-toxic appearance  She does not have a sickly appearance  She does not appear ill  No distress  HENT:   Head: Normocephalic and atraumatic  Right Ear: Hearing, tympanic membrane, external ear and ear canal normal  No drainage, swelling or tenderness  No mastoid tenderness  No decreased hearing is noted  Left Ear: Hearing, tympanic membrane, external ear and ear canal normal  No drainage, swelling or tenderness  No mastoid tenderness  No decreased hearing is noted     Nose: Nose normal  Mouth/Throat: Uvula is midline, oropharynx is clear and moist and mucous membranes are normal    Eyes: Pupils are equal, round, and reactive to light  Conjunctivae, EOM and lids are normal  Right eye exhibits no discharge  Left eye exhibits no discharge  Neck: Trachea normal, normal range of motion, full passive range of motion without pain and phonation normal  Neck supple  No JVD present  No tracheal tenderness, no spinous process tenderness and no muscular tenderness present  No neck rigidity  No tracheal deviation and normal range of motion present  Cardiovascular: Normal rate, regular rhythm, normal heart sounds, intact distal pulses and normal pulses  Pulses:       Radial pulses are 2+ on the right side, and 2+ on the left side  Posterior tibial pulses are 2+ on the right side, and 2+ on the left side  Pulmonary/Chest: Effort normal and breath sounds normal  No stridor  She has no decreased breath sounds  She has no wheezes  She has no rhonchi  She has no rales  She exhibits no tenderness, no bony tenderness and no crepitus  Abdominal: Soft  Normal appearance and bowel sounds are normal  She exhibits no distension  There is tenderness in the right upper quadrant and right lower quadrant  There is no rigidity, no rebound, no guarding, no CVA tenderness, no tenderness at McBurney's point and negative Robertson's sign  Musculoskeletal: Normal range of motion  Passive ROM intact  Upper and lower extremity 5/5 bilaterally  Neurovascularly intact  No grinding or clicking of joints       Lymphadenopathy:        Head (right side): No submental, no submandibular, no tonsillar, no preauricular, no posterior auricular and no occipital adenopathy present  Head (left side): No submental, no submandibular, no tonsillar, no preauricular, no posterior auricular and no occipital adenopathy present  She has no cervical adenopathy          Right cervical: No superficial cervical, no deep cervical and no posterior cervical adenopathy present  Left cervical: No superficial cervical, no deep cervical and no posterior cervical adenopathy present  Neurological: She is alert and oriented to person, place, and time  She has normal strength and normal reflexes  No sensory deficit  GCS eye subscore is 4  GCS verbal subscore is 5  GCS motor subscore is 6  Reflex Scores:       Patellar reflexes are 2+ on the right side and 2+ on the left side  Skin: Skin is warm and intact  Capillary refill takes less than 2 seconds  She is not diaphoretic  Psychiatric: She has a normal mood and affect  Her speech is normal and behavior is normal  Judgment and thought content normal  Cognition and memory are normal    Nursing note and vitals reviewed        Vital Signs  ED Triage Vitals   Temperature Pulse Respirations Blood Pressure SpO2   02/19/19 2141 02/19/19 2141 02/19/19 2141 02/19/19 2141 02/19/19 2141   97 8 °F (36 6 °C) 91 20 (!) 174/110 100 %      Temp src Heart Rate Source Patient Position - Orthostatic VS BP Location FiO2 (%)   -- 02/20/19 0100 02/20/19 0100 02/20/19 0100 --    Monitor Sitting Right arm       Pain Score       02/19/19 2141       Worst Possible Pain           Vitals:    02/19/19 2141 02/19/19 2300 02/20/19 0100 02/20/19 0313   BP: (!) 174/110  (!) 207/124 (!) 206/106   Pulse: 91 90 86 74   Patient Position - Orthostatic VS:   Sitting Sitting       Visual Acuity      ED Medications  Medications    EMS REPLENISHMENT MED ( Does not apply Given to EMS 2/19/19 2145)   metoclopramide (REGLAN) injection 10 mg (10 mg Intravenous Given 2/19/19 2223)   dicyclomine (BENTYL) tablet 20 mg (20 mg Oral Given 2/19/19 2223)   famotidine (PEPCID) injection 20 mg (20 mg Intravenous Given 2/19/19 2232)   ketorolac (TORADOL) injection 15 mg (15 mg Intravenous Given 2/19/19 2222)   sodium chloride 0 9 % bolus 1,000 mL (0 mL Intravenous Stopped 2/19/19 2330)   oxyCODONE-acetaminophen (PERCOCET) 5-325 mg per tablet 1 tablet (1 tablet Oral Given 2/20/19 0308)   morphine (PF) 4 mg/mL injection 4 mg (4 mg Intravenous Given 2/20/19 0055)   diphenhydrAMINE (BENADRYL) injection 25 mg (25 mg Intravenous Given 2/20/19 0056)   ondansetron (ZOFRAN) injection 4 mg (4 mg Intravenous Given 2/20/19 0055)   iohexol (OMNIPAQUE) 350 MG/ML injection (MULTI-DOSE) 100 mL (100 mL Intravenous Given 2/20/19 0118)   LORazepam (ATIVAN) 2 mg/mL injection 0 5 mg (0 5 mg Intravenous Given 2/20/19 0309)       Diagnostic Studies  Results Reviewed     Procedure Component Value Units Date/Time    hCG, qualitative pregnancy [[id number]]  (Normal) Collected:  02/19/19 2205    Lab Status:  Final result Specimen:  Blood from Arm, Right Updated:  02/19/19 2329     Preg, Serum Negative    Troponin I [[id number]]  (Normal) Collected:  02/19/19 2205    Lab Status:  Final result Specimen:  Blood from Arm, Right Updated:  02/19/19 2234     Troponin I <0 02 ng/mL     CMP [[id number]]  (Abnormal) Collected:  02/19/19 2205    Lab Status:  Final result Specimen:  Blood from Arm, Right Updated:  02/19/19 2228     Sodium 139 mmol/L      Potassium 2 9 mmol/L      Chloride 99 mmol/L      CO2 22 mmol/L      ANION GAP 18 mmol/L      BUN 7 mg/dL      Creatinine 0 70 mg/dL      Glucose 179 mg/dL      Calcium 9 0 mg/dL      AST 76 U/L      ALT 55 U/L      Alkaline Phosphatase 116 U/L      Total Protein 8 7 g/dL      Albumin 4 2 g/dL      Total Bilirubin 0 60 mg/dL      eGFR 107 ml/min/1 73sq m     Narrative:       National Kidney Disease Education Program recommendations are as follows:  GFR calculation is accurate only with a steady state creatinine  Chronic Kidney disease less than 60 ml/min/1 73 sq  meters  Kidney failure less than 15 ml/min/1 73 sq  meters      Lipase [[id number]]  (Normal) Collected:  02/19/19 2205    Lab Status:  Final result Specimen:  Blood from Arm, Right Updated:  02/19/19 2228     Lipase 91 u/L     CBC and differential [[id number]]  (Abnormal) Collected:  02/19/19 2205    Lab Status:  Final result Specimen:  Blood from Arm, Right Updated:  02/19/19 2213     WBC 10 53 Thousand/uL      RBC 5 46 Million/uL      Hemoglobin 16 2 g/dL      Hematocrit 48 5 %      MCV 89 fL      MCH 29 7 pg      MCHC 33 4 g/dL      RDW 15 8 %      MPV 9 1 fL      Platelets 555 Thousands/uL      nRBC 0 /100 WBCs      Neutrophils Relative 67 %      Immat GRANS % 0 %      Lymphocytes Relative 24 %      Monocytes Relative 7 %      Eosinophils Relative 1 %      Basophils Relative 1 %      Neutrophils Absolute 7 00 Thousands/µL      Immature Grans Absolute 0 03 Thousand/uL      Lymphocytes Absolute 2 54 Thousands/µL      Monocytes Absolute 0 72 Thousand/µL      Eosinophils Absolute 0 14 Thousand/µL      Basophils Absolute 0 10 Thousands/µL                  CT abdomen pelvis with contrast   Final Result by Chan Gallegos MD (02/20 0148)      Cirrhotic appearance of the liver, as described above  Please see discussion  Consider nonemergent outpatient MRI for further evaluation, if there are no contraindications  Mild colonic diverticulosis  No evidence of acute diverticulitis  No evidence of bowel obstruction  Findings of chronic pancreatitis, as described above, without significant change compared with December 18, 2018  Other findings as described above, please see discussion              Workstation performed: [id number]                    Procedures  ECG 12 Lead Documentation  Date/Time: 2/19/2019 10:37 PM  Performed by: Bri Martel PA-C  Authorized by: Bri Martel PA-C     Indications / Diagnosis:  Generalized abdominal pain   ECG reviewed by me, the ED Provider: yes    Patient location:  ED  Previous ECG:     Previous ECG:  Unavailable    Comparison to cardiac monitor: Yes    Interpretation:     Interpretation: normal    Rate:     ECG rate:  102    ECG rate assessment: normal    Rhythm:     Rhythm: sinus rhythm    Ectopy:     Ectopy: none    QRS:     QRS axis:  Normal    QRS intervals:  Normal  Conduction:     Conduction: normal    ST segments:     ST segments:  Normal  T waves:     T waves: normal             Phone Contacts  ED Phone Contact    ED Course  ED Course as of Feb 21 0012 Tue Feb 19, 2019   2236 Lipase: 91   2236 Troponin I: <0 02   Wed Feb 20, 2019   Barry Hart Patient's boyfriend is in the emergency department; ordered percocet and Davviji Christianon Upon serial reassessment, patient refusing to go to CAT scan before pain medication delivery on board  Patient had refused Percocet given the fact that she was not able to take it p o  Delivered IV Zofran and IV morphine for pain control  0153 Waiting CT scan results                                  MDM  Number of Diagnoses or Management Options  Chronic pancreatitis, unspecified pancreatitis type ([facility] [address] ): established and worsening  Generalized abdominal pain: established and worsening  Hypokalemia: new and does not require workup  Nausea: minor  Diagnosis management comments: Delivered morphine, Bentyl, Toradol, Benadryl, Pepcid, and Reglan; patient verbalizes medication in presenting abdominal pain symptomatology status post medication delivery  Patient was able to provide more of patient history status post medication delivery, patient verbalized decrease in abdominal pain as presented to the emergency department initially  PO challenge of crackers and water performed with no difficulty  Patient stated that she had not taken her clonidine, or other daily medications today due to her current symptomatology of nausea and vomiting; patient blood pressure in systolic 292F; patient asymptomatic, further patient was in persistent pain and stated that she was going through "alcohol withdrawal"  Patient stated that she is having some personal problems and "a few drinks and "helps her cope "  I had counseled patient on medication of alcoholic beverage consumption and possible follow-up with psychology  Patient verbalized that she is to look into counselors herself  ECG with normal sinus rhythm ventricular rate of 102; patient asymptomatic; patient with negative troponin  Patient normal lipase  Chloride 99, Potassium 2 9 Anion gap of 18, BUN 7; patient with nausea and vomiting for one day; patient refused offered potassium tablet  Counseled patient on consuming potatoes, avocados, and bananas to elevate potassium with close follow up with PCP  Patient's friend came to visit patient with her verbal approval   CT abdomen pelvis with contrast with cirrhotic appearance of the liver, mild colonic diverticulosis, findings of chronic pancreatitis  Delivered Percocet and Ativan before patient had exited the emergency department  Patient stated that Ativan helps with her alcohol withdrawal   Patient was accompanied by her friend who is able to drive her to her home safely, thereby Percocet and Ativan was delivered in the emergency department  Upon patient reassessment status post medication delivery, patient verbalized abatement of presenting pain symptomatology status post medication delivery    Patient further verbalized her "shakes from not having alcohol in a while " had abated status post Ativan delivery  Consume daily bananas, potatoes, avocados, fluids and electrolytes to improve patient potassium levels and electrolytes   Take clonidine as prescribed by PCP   Follow-up with GI to provide management of patient chronic pancreatitis  Follow-up with PCP to follow and reassess patient potassium levels  Follow up with emergency department if symptoms persist or exacerbate  Patient demonstrates verbal understanding of all laboratory and imaging findings, discharge instructions, and follow-up  Patient exited the emergency department with her visiting friend, hemodynamically stable and in no acute distress       Amount and/or Complexity of Data Reviewed  Clinical lab tests: ordered and reviewed  Tests in the radiology section of CPT®: ordered and reviewed  Review and summarize past medical records: yes    Risk of Complications, Morbidity, and/or Mortality  Presenting problems: low  Diagnostic procedures: moderate    Patient Progress  Patient progress: stable      Disposition  Final diagnoses:   Generalized abdominal pain   Chronic pancreatitis, unspecified pancreatitis type ([facility] [address] )   Nausea   Hypokalemia     Time reflects when diagnosis was documented in both MDM as applicable and the Disposition within this note     Time User Action Codes Description Comment    2/20/2019  2:55 AM Faythe Amy Add [R10 84] Generalized abdominal pain     2/20/2019  2:56 AM Faythe Amy Add [K86 1] Chronic pancreatitis, unspecified pancreatitis type ([facility] [address] )     2/20/2019  3:00 AM Faythe Amy Add [R11 0] Nausea     2/20/2019 10:13 PM Faythe Amy Add [E87 6] Hypokalemia       ED Disposition     ED Disposition Condition Date/Time Comment    Discharge Stable Wed Feb 20, 2019  2:55 AM Jose Sites discharge to home/self care              Follow-up Information     Follow up With Specialties Details Why Contact Info Additional Ruby Allan [facility] Gastroenterology In 2 days for further evaluation of symptoms [address]  [address]  [place] [id number]  [address] [facility], [address] [facility]  [address], [facility], [place], Heiðjessica Reynolds, MD Family Medicine Call in 1 week for further evaluation of symptoms [address] [facility]  [address] [facility] [address]  [address] Emergency Department Emergency Medicine Go to  for further evaluation of symptoms [address] [facility] [address] Mar [address] ED, [address] [place], [place], [address]          Discharge Medication List as of 2/20/2019  3:02 AM      START taking these medications    Details   acetaminophen (TYLENOL) 650 mg CR tablet Take 1 tablet (650 mg total) by mouth every 8 (eight) hours as needed for mild pain for up to 5 days, Starting Wed 2/20/2019, Until Mon 2/25/2019, Print      famotidine (PEPCID) 20 mg tablet Take 1 tablet (20 mg total) by mouth 2 (two) times a day for 5 days, Starting Wed 2/20/2019, Until Mon 2/25/2019, Print      ibuprofen (MOTRIN) 400 mg tablet Take 1 tablet (400 mg total) by mouth every 6 (six) hours as needed for mild pain for up to 5 days, Starting Wed 2/20/2019, Until Mon 2/25/2019, Print      metoclopramide (REGLAN) 10 mg tablet Take 1 tablet (10 mg total) by mouth every 6 (six) hours for 5 days, Starting Wed 2/20/2019, Until Mon 2/25/2019, Print         CONTINUE these medications which have NOT CHANGED    Details   albuterol (PROVENTIL HFA,VENTOLIN HFA) 90 mcg/act inhaler Inhale 2 puffs every 4 (four) hours as needed for wheezing, Starting Wed 10/10/2018, Print      cloNIDine (CATAPRES) 0 3 mg tablet Take 0 3 mg by mouth 2 (two) times a day, Historical Med      dicyclomine (BENTYL) 20 mg tablet Take 1 tablet (20 mg total) by mouth 3 (three) times a day, Starting Thu 5/31/2018, Print      Mometasone Furo-Formoterol Fum (DULERA) 100-5 MCG/ACT AERO Inhale as needed Unsure of dose  , Historical Med      pancrelipase, Lip-Prot-Amyl, (CREON) 24,000 units Take 1 capsule by mouth 3 (three) times a day with meals, Starting Tue 10/31/2017, Normal      pantoprazole (PROTONIX) 20 mg tablet Take 1 tablet (20 mg total) by mouth daily, Starting Thu 9/20/2018, Normal      sucralfate (CARAFATE) 1 g tablet Take 1 tablet (1 g total) by mouth 4 (four) times a day, Starting Thu 5/31/2018, Print           No discharge procedures on file      ED Provider  Electronically Signed by           Melia Connor PA-C  02/20/19 116 Dev Maya PA-C  02/20/19 116 [facility]  02/20/19 [id number]       [facility]  02/21/19 [id number]

## 2019-03-16 ENCOUNTER — APPOINTMENT (EMERGENCY)
Dept: CT IMAGING | Facility: HOSPITAL | Age: 44
End: 2019-03-16
Payer: COMMERCIAL

## 2019-03-16 ENCOUNTER — APPOINTMENT (EMERGENCY)
Dept: RADIOLOGY | Facility: HOSPITAL | Age: 44
End: 2019-03-16
Payer: COMMERCIAL

## 2019-03-16 ENCOUNTER — HOSPITAL ENCOUNTER (EMERGENCY)
Facility: HOSPITAL | Age: 44
Discharge: HOME | End: 2019-03-16
Attending: EMERGENCY MEDICINE | Admitting: EMERGENCY MEDICINE
Payer: COMMERCIAL

## 2019-03-16 VITALS
DIASTOLIC BLOOD PRESSURE: 79 MMHG | TEMPERATURE: 97.9 F | SYSTOLIC BLOOD PRESSURE: 199 MMHG | WEIGHT: 168.43 LBS | BODY MASS INDEX: 28.91 KG/M2 | OXYGEN SATURATION: 98 % | HEART RATE: 88 BPM | RESPIRATION RATE: 16 BRPM

## 2019-03-16 DIAGNOSIS — K86.1 CHRONIC PANCREATITIS (HCC): ICD-10-CM

## 2019-03-16 DIAGNOSIS — K29.90 GASTRITIS AND DUODENITIS: Primary | ICD-10-CM

## 2019-03-16 DIAGNOSIS — F10.10 ALCOHOL ABUSE: ICD-10-CM

## 2019-03-16 DIAGNOSIS — K20.90 ESOPHAGITIS: ICD-10-CM

## 2019-03-16 DIAGNOSIS — R11.2 NAUSEA & VOMITING: ICD-10-CM

## 2019-03-16 LAB
ALBUMIN SERPL BCP-MCNC: 3.8 G/DL (ref 3.5–5)
ALP SERPL-CCNC: 125 U/L (ref 46–116)
ALT SERPL W P-5'-P-CCNC: 56 U/L (ref 12–78)
ANION GAP SERPL CALCULATED.3IONS-SCNC: 14 MMOL/L (ref 4–13)
AST SERPL W P-5'-P-CCNC: 110 U/L (ref 5–45)
BACTERIA UR QL AUTO: ABNORMAL /HPF
BASOPHILS # BLD AUTO: 0.07 THOUSANDS/ΜL (ref 0–0.1)
BASOPHILS NFR BLD AUTO: 1 % (ref 0–1)
BILIRUB DIRECT SERPL-MCNC: 0.32 MG/DL (ref 0–0.2)
BILIRUB SERPL-MCNC: 0.9 MG/DL (ref 0.2–1)
BILIRUB UR QL STRIP: ABNORMAL
BUN SERPL-MCNC: 9 MG/DL (ref 5–25)
CALCIUM SERPL-MCNC: 8.6 MG/DL (ref 8.3–10.1)
CHLORIDE SERPL-SCNC: 101 MMOL/L (ref 100–108)
CLARITY UR: CLEAR
CO2 SERPL-SCNC: 27 MMOL/L (ref 21–32)
COLOR UR: YELLOW
CREAT SERPL-MCNC: 0.78 MG/DL (ref 0.6–1.3)
EOSINOPHIL # BLD AUTO: 0 THOUSAND/ΜL (ref 0–0.61)
EOSINOPHIL NFR BLD AUTO: 0 % (ref 0–6)
ERYTHROCYTE [DISTWIDTH] IN BLOOD BY AUTOMATED COUNT: 17 % (ref 11.6–15.1)
ETHANOL SERPL-MCNC: 59 MG/DL (ref 0–3)
EXT PREG TEST URINE: NEGATIVE
GFR SERPL CREATININE-BSD FRML MDRD: 94 ML/MIN/1.73SQ M
GLUCOSE SERPL-MCNC: 154 MG/DL (ref 65–140)
GLUCOSE UR STRIP-MCNC: NEGATIVE MG/DL
HCT VFR BLD AUTO: 44.7 % (ref 34.8–46.1)
HGB BLD-MCNC: 14.6 G/DL (ref 11.5–15.4)
HGB UR QL STRIP.AUTO: ABNORMAL
IMM GRANULOCYTES # BLD AUTO: 0.04 THOUSAND/UL (ref 0–0.2)
IMM GRANULOCYTES NFR BLD AUTO: 0 % (ref 0–2)
KETONES UR STRIP-MCNC: ABNORMAL MG/DL
LEUKOCYTE ESTERASE UR QL STRIP: NEGATIVE
LIPASE SERPL-CCNC: 121 U/L (ref 73–393)
LYMPHOCYTES # BLD AUTO: 1.87 THOUSANDS/ΜL (ref 0.6–4.47)
LYMPHOCYTES NFR BLD AUTO: 17 % (ref 14–44)
MCH RBC QN AUTO: 29.7 PG (ref 26.8–34.3)
MCHC RBC AUTO-ENTMCNC: 32.7 G/DL (ref 31.4–37.4)
MCV RBC AUTO: 91 FL (ref 82–98)
MONOCYTES # BLD AUTO: 0.92 THOUSAND/ΜL (ref 0.17–1.22)
MONOCYTES NFR BLD AUTO: 8 % (ref 4–12)
MUCOUS THREADS UR QL AUTO: ABNORMAL
NEUTROPHILS # BLD AUTO: 8.13 THOUSANDS/ΜL (ref 1.85–7.62)
NEUTS SEG NFR BLD AUTO: 74 % (ref 43–75)
NITRITE UR QL STRIP: NEGATIVE
NON-SQ EPI CELLS URNS QL MICRO: ABNORMAL /HPF
NRBC BLD AUTO-RTO: 0 /100 WBCS
PH UR STRIP.AUTO: 6 [PH]
PLATELET # BLD AUTO: 258 THOUSANDS/UL (ref 149–390)
PMV BLD AUTO: 9 FL (ref 8.9–12.7)
POTASSIUM SERPL-SCNC: 3.7 MMOL/L (ref 3.5–5.3)
PROT SERPL-MCNC: 8.1 G/DL (ref 6.4–8.2)
PROT UR STRIP-MCNC: >=300 MG/DL
RBC # BLD AUTO: 4.92 MILLION/UL (ref 3.81–5.12)
RBC #/AREA URNS AUTO: ABNORMAL /HPF
SODIUM SERPL-SCNC: 142 MMOL/L (ref 136–145)
SP GR UR STRIP.AUTO: >=1.03 (ref 1–1.03)
UROBILINOGEN UR QL STRIP.AUTO: 0.2 E.U./DL
WBC # BLD AUTO: 11.03 THOUSAND/UL (ref 4.31–10.16)
WBC #/AREA URNS AUTO: ABNORMAL /HPF

## 2019-03-16 PROCEDURE — 74177 CT ABD & PELVIS W/CONTRAST: CPT

## 2019-03-16 PROCEDURE — 36415 COLL VENOUS BLD VENIPUNCTURE: CPT | Performed by: EMERGENCY MEDICINE

## 2019-03-16 PROCEDURE — 81001 URINALYSIS AUTO W/SCOPE: CPT | Performed by: EMERGENCY MEDICINE

## 2019-03-16 PROCEDURE — 96375 TX/PRO/DX INJ NEW DRUG ADDON: CPT

## 2019-03-16 PROCEDURE — 80048 BASIC METABOLIC PNL TOTAL CA: CPT | Performed by: EMERGENCY MEDICINE

## 2019-03-16 PROCEDURE — 96376 TX/PRO/DX INJ SAME DRUG ADON: CPT

## 2019-03-16 PROCEDURE — C9113 INJ PANTOPRAZOLE SODIUM, VIA: HCPCS | Performed by: EMERGENCY MEDICINE

## 2019-03-16 PROCEDURE — 85025 COMPLETE CBC W/AUTO DIFF WBC: CPT | Performed by: EMERGENCY MEDICINE

## 2019-03-16 PROCEDURE — 71046 X-RAY EXAM CHEST 2 VIEWS: CPT

## 2019-03-16 PROCEDURE — 83690 ASSAY OF LIPASE: CPT | Performed by: EMERGENCY MEDICINE

## 2019-03-16 PROCEDURE — 96361 HYDRATE IV INFUSION ADD-ON: CPT

## 2019-03-16 PROCEDURE — 80320 DRUG SCREEN QUANTALCOHOLS: CPT | Performed by: EMERGENCY MEDICINE

## 2019-03-16 PROCEDURE — 81025 URINE PREGNANCY TEST: CPT | Performed by: EMERGENCY MEDICINE

## 2019-03-16 PROCEDURE — 80076 HEPATIC FUNCTION PANEL: CPT | Performed by: EMERGENCY MEDICINE

## 2019-03-16 PROCEDURE — 96374 THER/PROPH/DIAG INJ IV PUSH: CPT

## 2019-03-16 PROCEDURE — 99284 EMERGENCY DEPT VISIT MOD MDM: CPT

## 2019-03-16 RX ORDER — KETOROLAC TROMETHAMINE 30 MG/ML
15 INJECTION, SOLUTION INTRAMUSCULAR; INTRAVENOUS ONCE
Status: COMPLETED | OUTPATIENT
Start: 2019-03-16 | End: 2019-03-16

## 2019-03-16 RX ORDER — PANTOPRAZOLE SODIUM 40 MG/1
40 INJECTION, POWDER, FOR SOLUTION INTRAVENOUS ONCE
Status: COMPLETED | OUTPATIENT
Start: 2019-03-16 | End: 2019-03-16

## 2019-03-16 RX ORDER — METOCLOPRAMIDE HYDROCHLORIDE 5 MG/ML
10 INJECTION INTRAMUSCULAR; INTRAVENOUS ONCE
Status: COMPLETED | OUTPATIENT
Start: 2019-03-16 | End: 2019-03-16

## 2019-03-16 RX ORDER — MAGNESIUM HYDROXIDE/ALUMINUM HYDROXICE/SIMETHICONE 120; 1200; 1200 MG/30ML; MG/30ML; MG/30ML
30 SUSPENSION ORAL ONCE
Status: COMPLETED | OUTPATIENT
Start: 2019-03-16 | End: 2019-03-16

## 2019-03-16 RX ORDER — ONDANSETRON 4 MG/1
4 TABLET, ORALLY DISINTEGRATING ORAL EVERY 6 HOURS PRN
Qty: 20 TABLET | Refills: 0 | Status: SHIPPED | OUTPATIENT
Start: 2019-03-16 | End: 2019-06-29 | Stop reason: HOSPADM

## 2019-03-16 RX ORDER — ONDANSETRON 2 MG/ML
4 INJECTION INTRAMUSCULAR; INTRAVENOUS ONCE
Status: COMPLETED | OUTPATIENT
Start: 2019-03-16 | End: 2019-03-16

## 2019-03-16 RX ADMIN — MORPHINE SULFATE 2 MG: 2 INJECTION, SOLUTION INTRAMUSCULAR; INTRAVENOUS at 05:09

## 2019-03-16 RX ADMIN — PANTOPRAZOLE SODIUM 40 MG: 40 INJECTION, POWDER, FOR SOLUTION INTRAVENOUS at 05:15

## 2019-03-16 RX ADMIN — SODIUM CHLORIDE 1000 ML: 0.9 INJECTION, SOLUTION INTRAVENOUS at 05:15

## 2019-03-16 RX ADMIN — ONDANSETRON 4 MG: 2 INJECTION INTRAMUSCULAR; INTRAVENOUS at 03:37

## 2019-03-16 RX ADMIN — LIDOCAINE HYDROCHLORIDE 15 ML: 20 SOLUTION ORAL; TOPICAL at 08:21

## 2019-03-16 RX ADMIN — METOCLOPRAMIDE 10 MG: 5 INJECTION, SOLUTION INTRAMUSCULAR; INTRAVENOUS at 05:09

## 2019-03-16 RX ADMIN — PANTOPRAZOLE SODIUM 40 MG: 40 INJECTION, POWDER, FOR SOLUTION INTRAVENOUS at 08:08

## 2019-03-16 RX ADMIN — KETOROLAC TROMETHAMINE 15 MG: 30 INJECTION, SOLUTION INTRAMUSCULAR at 05:12

## 2019-03-16 RX ADMIN — SODIUM CHLORIDE 500 ML: 0.9 INJECTION, SOLUTION INTRAVENOUS at 03:38

## 2019-03-16 RX ADMIN — ALUMINUM HYDROXIDE, MAGNESIUM HYDROXIDE, AND SIMETHICONE 30 ML: 200; 200; 20 SUSPENSION ORAL at 08:22

## 2019-03-16 RX ADMIN — IOHEXOL 100 ML: 350 INJECTION, SOLUTION INTRAVENOUS at 06:19

## 2019-03-16 NOTE — DISCHARGE INSTRUCTIONS
Continue to take your medications as prescribed  Restart the Carafate  Avoid drinking alcohol  Drink plenty of fluids, and soft foods for the next several days  Gradually increase your diet as you are able to tolerate  Follow up with your primary care doctor and your GI doctor for further evaluation of your symptoms, and further management of your gastritis

## 2019-03-16 NOTE — ED PROVIDER NOTES
History  Chief Complaint   Patient presents with    Vomiting     hx pancrreatitis, abd pain, vomitting 2 days, reports having 1 gallon of vodka this past week, actively vomitting  HPI    Prior to Admission Medications   Prescriptions Last Dose Informant Patient Reported? Taking? Mometasone Furo-Formoterol Fum (DULERA) 100-5 MCG/ACT AERO   Yes No   Sig: Inhale as needed Unsure of dose     albuterol (PROVENTIL HFA,VENTOLIN HFA) 90 mcg/act inhaler   No No   Sig: Inhale 2 puffs every 4 (four) hours as needed for wheezing   cloNIDine (CATAPRES) 0 3 mg tablet   Yes No   Sig: Take 0 3 mg by mouth 2 (two) times a day   dicyclomine (BENTYL) 20 mg tablet   No No   Sig: Take 1 tablet (20 mg total) by mouth 3 (three) times a day   famotidine (PEPCID) 20 mg tablet   No No   Sig: Take 1 tablet (20 mg total) by mouth 2 (two) times a day for 5 days   ibuprofen (MOTRIN) 400 mg tablet   No No   Sig: Take 1 tablet (400 mg total) by mouth every 6 (six) hours as needed for mild pain for up to 5 days   pancrelipase, Lip-Prot-Amyl, (CREON) 24,000 units   No No   Sig: Take 1 capsule by mouth 3 (three) times a day with meals   pantoprazole (PROTONIX) 20 mg tablet   No No   Sig: Take 1 tablet (20 mg total) by mouth daily   sucralfate (CARAFATE) 1 g tablet   No No   Sig: Take 1 tablet (1 g total) by mouth 4 (four) times a day      Facility-Administered Medications: None       Past Medical History:   Diagnosis Date    Alcohol abuse     Arthritis     GERD (gastroesophageal reflux disease)     Hypertension     Nicotine dependence     Obesity     Pancreatitis     Panic attack        Past Surgical History:   Procedure Laterality Date    ABDOMINAL SURGERY      C SECTION    ESOPHAGOGASTRODUODENOSCOPY N/A 12/15/2017    Procedure: ESOPHAGOGASTRODUODENOSCOPY (EGD); Surgeon: Zac Carrillo MD;  Location: [facility];   Service: Gastroenterology       Family History   Problem Relation Age of Onset    Cirrhosis Father     Alcohol abuse Father     Drug abuse Mother      I have reviewed and agree with the history as documented  Social History     Tobacco Use    Smoking status: Current Every Day Smoker     Packs/day: 0 50     Years: 29 00     Pack years: 14 50     Types: Cigarettes    Smokeless tobacco: Never Used   Substance Use Topics    Alcohol use: Yes    Drug use: No        Review of Systems    Physical Exam  Physical Exam   Constitutional: She is oriented to person, place, and time  She appears well-developed and well-nourished  No distress  Dry heaving during exam, appears uncomfortable   HENT:   Head: Normocephalic and atraumatic  Mouth/Throat: Oropharynx is clear and moist    Eyes: Pupils are equal, round, and reactive to light  Conjunctivae are normal    Neck: Normal range of motion  No tracheal deviation present  Cardiovascular: Normal rate, regular rhythm, normal heart sounds and intact distal pulses  Pulmonary/Chest: Effort normal and breath sounds normal  No respiratory distress  Abdominal: Soft  Bowel sounds are normal  She exhibits no distension  There is generalized tenderness and tenderness in the right upper quadrant, epigastric area and left upper quadrant  There is no rigidity, no rebound, no guarding and negative Robertson's sign  Diffuse, worst upper   Neurological: She is alert and oriented to person, place, and time  She has normal strength  GCS eye subscore is 4  GCS verbal subscore is 5  GCS motor subscore is 6  Skin: Skin is warm and dry  Psychiatric: She has a normal mood and affect  Her behavior is normal    Nursing note and vitals reviewed        Vital Signs  ED Triage Vitals [03/16/19 0120]   Temperature Pulse Respirations Blood Pressure SpO2   97 9 °F (36 6 °C) (!) 130 22 (!) 184/116 98 %      Temp Source Heart Rate Source Patient Position - Orthostatic VS BP Location FiO2 (%)   Oral Monitor Sitting Right arm --      Pain Score       Worst Possible Pain           Vitals:    03/16/19 0308 03/16/19 0519 03/16/19 0628 03/16/19 0823   BP: (!) 175/104 (!) 181/111 153/77 (!) 199/79   Pulse: (!) 106 101 89 88   Patient Position - Orthostatic VS: Lying Lying Lying Sitting       qSOFA     Row Name 03/16/19 0823 03/16/19 0628 03/16/19 0519 03/16/19 0308 03/16/19 0120    Altered mental status GCS < 15  --  --  --  --  --    Respiratory Rate > / =22  0  0  0  0  1    Systolic BP < / =598  0  0  0  0  0    Q Sofa Score  0  0  0  0  1          Visual Acuity      ED Medications  Medications   ondansetron (ZOFRAN) injection 4 mg (4 mg Intravenous Given 3/16/19 0337)   sodium chloride 0 9 % bolus 500 mL (0 mL Intravenous Stopped 3/16/19 0515)   sodium chloride 0 9 % bolus 1,000 mL (0 mL Intravenous Stopped 3/16/19 0810)   pantoprazole (PROTONIX) injection 40 mg (40 mg Intravenous Given 3/16/19 0515)   metoclopramide (REGLAN) injection 10 mg (10 mg Intravenous Given 3/16/19 0509)   ketorolac (TORADOL) injection 15 mg (15 mg Intravenous Given 3/16/19 0512)   morphine injection 2 mg (2 mg Intravenous Given 3/16/19 0509)   iohexol (OMNIPAQUE) 350 MG/ML injection (MULTI-DOSE) 100 mL (100 mL Intravenous Given 3/16/19 0619)   pantoprazole (PROTONIX) injection 40 mg (40 mg Intravenous Given 3/16/19 0808)   aluminum-magnesium hydroxide-simethicone (MYLANTA) 200-200-20 mg/5 mL oral suspension 30 mL (30 mL Oral Given 3/16/19 0822)   lidocaine viscous (XYLOCAINE) 2 % mucosal solution 15 mL (15 mL Swish & Swallow Given 3/16/19 0821)       Diagnostic Studies  Results Reviewed     Procedure Component Value Units Date/Time    Urine Microscopic [[id number]]  (Abnormal) Collected:  03/16/19 0432    Lab Status:  Final result Specimen:  Urine, Clean Catch Updated:  03/16/19 0512     RBC, UA 0-1 /hpf      WBC, UA 1-2 /hpf      Epithelial Cells Occasional /hpf      Bacteria, UA None Seen /hpf      MUCUS THREADS Moderate    Basic metabolic panel [[id number]]  (Abnormal) Collected:  03/16/19 0408    Lab Status:  Final result Specimen:  Blood from Arm, Right Updated:  03/16/19 0444     Sodium 142 mmol/L      Potassium 3 7 mmol/L      Chloride 101 mmol/L      CO2 27 mmol/L      ANION GAP 14 mmol/L      BUN 9 mg/dL      Creatinine 0 78 mg/dL      Glucose 154 mg/dL      Calcium 8 6 mg/dL      eGFR 94 ml/min/1 73sq m     Narrative:       National Kidney Disease Education Program recommendations are as follows:  GFR calculation is accurate only with a steady state creatinine  Chronic Kidney disease less than 60 ml/min/1 73 sq  meters  Kidney failure less than 15 ml/min/1 73 sq  meters      Hepatic function panel [[id number]]  (Abnormal) Collected:  03/16/19 0408    Lab Status:  Final result Specimen:  Blood from Arm, Right Updated:  03/16/19 0444     Total Bilirubin 0 90 mg/dL      Bilirubin, Direct 0 32 mg/dL      Alkaline Phosphatase 125 U/L       U/L      ALT 56 U/L      Total Protein 8 1 g/dL      Albumin 3 8 g/dL     Lipase [[id number]]  (Normal) Collected:  03/16/19 0408    Lab Status:  Final result Specimen:  Blood from Arm, Right Updated:  03/16/19 0444     Lipase 121 u/L     UA w Reflex to Microscopic w Reflex to Culture [[id number]]  (Abnormal) Collected:  03/16/19 0432    Lab Status:  Final result Specimen:  Urine, Clean Catch Updated:  03/16/19 0439     Color, UA Yellow     Clarity, UA Clear     Specific Gravity, UA >=1 030     pH, UA 6 0     Leukocytes, UA Negative     Nitrite, UA Negative     Protein, UA >=300 mg/dl      Glucose, UA Negative mg/dl      Ketones, UA 40 (2+) mg/dl      Urobilinogen, UA 0 2 E U /dl      Bilirubin, UA Small     Blood, UA Trace-Intact    POCT pregnancy, urine [[id number]]  (Normal) Resulted:  03/16/19 0433    Lab Status:  Final result Updated:  03/16/19 0433     EXT PREG TEST UR (Ref: Negative) negative    Ethanol [[id number]]  (Abnormal) Collected:  03/16/19 0408    Lab Status:  Final result Specimen:  Blood from Arm, Right Updated:  03/16/19 0428     Ethanol Lvl 59 mg/dL     CBC and differential [[id number]] (Abnormal) Collected:  03/16/19 0409    Lab Status:  Final result Specimen:  Blood from Arm, Right Updated:  03/16/19 0415     WBC 11 03 Thousand/uL      RBC 4 92 Million/uL      Hemoglobin 14 6 g/dL      Hematocrit 44 7 %      MCV 91 fL      MCH 29 7 pg      MCHC 32 7 g/dL      RDW 17 0 %      MPV 9 0 fL      Platelets 516 Thousands/uL      nRBC 0 /100 WBCs      Neutrophils Relative 74 %      Immat GRANS % 0 %      Lymphocytes Relative 17 %      Monocytes Relative 8 %      Eosinophils Relative 0 %      Basophils Relative 1 %      Neutrophils Absolute 8 13 Thousands/µL      Immature Grans Absolute 0 04 Thousand/uL      Lymphocytes Absolute 1 87 Thousands/µL      Monocytes Absolute 0 92 Thousand/µL      Eosinophils Absolute 0 00 Thousand/µL      Basophils Absolute 0 07 Thousands/µL                  CT abdomen pelvis with contrast   Final Result by Maurice Mariee DO (03/16 6039)   1  Mild abnormal appearance of the pylorus  Correlate for gastritis  2   Hiatal hernia with thickening of the distal esophagus  Correlate for esophagitis  3   Abnormal appearance of the liver, most compatible with cirrhosis and portal hypertension  4   Stable changes of the pancreas, most compatible with chronic pancreatitis  Workstation performed: [id number]         XR chest 2 views    (Results Pending)              Procedures  Procedures       Phone Contacts  ED Phone Contact    ED Course                               MDM  Number of Diagnoses or Management Options  Alcohol abuse: new and requires workup  Chronic pancreatitis ([facility] [address] ): new and requires workup  Esophagitis: new and requires workup  Gastritis and duodenitis: new and requires workup  Nausea & vomiting: new and requires workup  Diagnosis management comments: This is a 45-year-old female who presents here today with nausea, vomiting, epigastric abdominal pain  She has a history of chronic pancreatitis, presumably due to alcohol use, and severe GERD   She has had progressive symptoms for the past two days  She states she is unable to keep down fluids at home  She denies any fevers, diarrhea, known sick contacts, bad food exposures  She has been taking her prescribed medications at home without relief  She states her pain is similar to her usual pancreatitis pain, but is more localized to the right side than it normally is  She endorses trouble breathing, because it hurts in her abdomen to do so  She denies any URI symptoms, changes in her urine, chest pain  Her last normal menstrual period was about a week ago  She had a  but denies any other abdominal surgeries  She denies any prior problems with her gallbladder  She does endorse drinking a significant amount of alcohol over the past week  ROS: Otherwise negative, unless stated as above  She is well-appearing, in no acute distress, but does appear uncomfortable  She has diffuse abdominal tenderness, worse in her upper abdomen  There are no peritoneal signs  She does not have a Robertson sign  She is dry heaving  The remainder of her exam is unremarkable  Concern is for exacerbation of her chronic pancreatitis or gastritis, possible cholelithiasis or cholecystitis, or unrelated viral GI illness  We will check lab work and CT scan to evaluate, and treat her symptoms  She states her pain has somewhat improved, though is still endorsing some pancreatitis related pain  Her nausea has resolved, and she is able to tolerate fluids without recurrent vomiting  I discussed with her that given the degree of pyloric inflammation, there may be worsening of her symptoms with additional narcotics, and did offer her other medication options but she declined these  She states that as she is otherwise feeling better and tolerating fluids would like to be discharged home    I discussed the treatment at home, need for follow-up with her GI doctor for further management of her reflux and her pancreatitis, avoidance of alcohol, and indications for return, and she expresses understanding with this plan  Amount and/or Complexity of Data Reviewed  Clinical lab tests: reviewed and ordered  Tests in the radiology section of CPT®: ordered and reviewed  Independent visualization of images, tracings, or specimens: yes        Disposition  Final diagnoses:   Gastritis and duodenitis   Esophagitis   Alcohol abuse   Nausea & vomiting   Chronic pancreatitis ([facility] [address] )     Time reflects when diagnosis was documented in both MDM as applicable and the Disposition within this note     Time User Action Codes Description Comment    3/16/2019  8:41 AM Marva Clement [K29 90] Gastritis and duodenitis     3/16/2019  8:41 AM Marva Clement [K20 9] Esophagitis     3/16/2019  8:41 AM Marva Clement [F10 10] Alcohol abuse     3/16/2019  8:41 AM Marva Clement [R11 2] Nausea & vomiting     3/16/2019  8:41 AM Marva Clement [K86 1] Chronic pancreatitis [facility])       ED Disposition     ED Disposition Condition Date/Time Comment    Discharge Fair Sat Mar 16, 2019  8:41 AM Sharee Banegas discharge to home/self care  Follow-up Information     Follow up With Specialties Details Why Contact Info    Felipe Diaz MD Family Medicine Schedule an appointment as soon as possible for a visit  to follow up on your symptoms Nuris Denneyrachel [address] [place] [phone]  DO [facility] Schedule an appointment as soon as possible for a visit  to follow up on your symptoms [address] [place] [address] Avenue            Patient's Medications   Discharge Prescriptions    ONDANSETRON (ZOFRAN-ODT) 4 MG DISINTEGRATING TABLET    Take 1 tablet (4 mg total) by mouth every 6 (six) hours as needed for nausea or vomiting       Start Date: 3/16/2019 End Date: --       Order Dose: 4 mg       Quantity: 20 tablet    Refills: 0     No discharge procedures on file      ED Provider  Electronically Signed by           Valentin Bright MD  03/16/19 6595

## 2019-05-24 ENCOUNTER — HOSPITAL ENCOUNTER (INPATIENT)
Facility: HOSPITAL | Age: 44
LOS: 2 days | Discharge: HOME | DRG: 282 | End: 2019-05-26
Attending: EMERGENCY MEDICINE | Admitting: GENERAL PRACTICE
Payer: COMMERCIAL

## 2019-05-24 ENCOUNTER — APPOINTMENT (EMERGENCY)
Dept: CT IMAGING | Facility: HOSPITAL | Age: 44
DRG: 282 | End: 2019-05-24
Payer: COMMERCIAL

## 2019-05-24 DIAGNOSIS — R10.9 ABDOMINAL PAIN: ICD-10-CM

## 2019-05-24 DIAGNOSIS — R10.13 EPIGASTRIC PAIN: ICD-10-CM

## 2019-05-24 DIAGNOSIS — K86.0 ALCOHOL-INDUCED CHRONIC PANCREATITIS (HCC): ICD-10-CM

## 2019-05-24 DIAGNOSIS — F10.239 ALCOHOL WITHDRAWAL (HCC): ICD-10-CM

## 2019-05-24 DIAGNOSIS — I10 HYPERTENSION: Primary | ICD-10-CM

## 2019-05-24 PROBLEM — R73.9 BLOOD GLUCOSE ELEVATED: Status: ACTIVE | Noted: 2019-05-24

## 2019-05-24 LAB
ALBUMIN SERPL BCP-MCNC: 3.9 G/DL (ref 3.5–5)
ALP SERPL-CCNC: 138 U/L (ref 46–116)
ALT SERPL W P-5'-P-CCNC: 32 U/L (ref 12–78)
ANION GAP SERPL CALCULATED.3IONS-SCNC: 14 MMOL/L (ref 4–13)
AST SERPL W P-5'-P-CCNC: 70 U/L (ref 5–45)
BASOPHILS # BLD AUTO: 0.08 THOUSANDS/ΜL (ref 0–0.1)
BASOPHILS NFR BLD AUTO: 1 % (ref 0–1)
BILIRUB DIRECT SERPL-MCNC: 0.14 MG/DL (ref 0–0.2)
BILIRUB SERPL-MCNC: 0.9 MG/DL (ref 0.2–1)
BUN SERPL-MCNC: 8 MG/DL (ref 5–25)
CALCIUM SERPL-MCNC: 9.1 MG/DL (ref 8.3–10.1)
CHLORIDE SERPL-SCNC: 102 MMOL/L (ref 100–108)
CO2 SERPL-SCNC: 26 MMOL/L (ref 21–32)
CREAT SERPL-MCNC: 0.58 MG/DL (ref 0.6–1.3)
EOSINOPHIL # BLD AUTO: 0.05 THOUSAND/ΜL (ref 0–0.61)
EOSINOPHIL NFR BLD AUTO: 0 % (ref 0–6)
ERYTHROCYTE [DISTWIDTH] IN BLOOD BY AUTOMATED COUNT: 18.2 % (ref 11.6–15.1)
ETHANOL SERPL-MCNC: <3 MG/DL (ref 0–3)
GFR SERPL CREATININE-BSD FRML MDRD: 114 ML/MIN/1.73SQ M
GLUCOSE SERPL-MCNC: 128 MG/DL (ref 65–140)
GLUCOSE SERPL-MCNC: 190 MG/DL (ref 65–140)
HCG SERPL QL: NEGATIVE
HCT VFR BLD AUTO: 45 % (ref 34.8–46.1)
HGB BLD-MCNC: 14.8 G/DL (ref 11.5–15.4)
IMM GRANULOCYTES # BLD AUTO: 0.05 THOUSAND/UL (ref 0–0.2)
IMM GRANULOCYTES NFR BLD AUTO: 0 % (ref 0–2)
INR PPP: 1.01 (ref 0.86–1.17)
LIPASE SERPL-CCNC: 83 U/L (ref 73–393)
LYMPHOCYTES # BLD AUTO: 1.98 THOUSANDS/ΜL (ref 0.6–4.47)
LYMPHOCYTES NFR BLD AUTO: 15 % (ref 14–44)
MCH RBC QN AUTO: 29.1 PG (ref 26.8–34.3)
MCHC RBC AUTO-ENTMCNC: 32.9 G/DL (ref 31.4–37.4)
MCV RBC AUTO: 89 FL (ref 82–98)
MONOCYTES # BLD AUTO: 1.11 THOUSAND/ΜL (ref 0.17–1.22)
MONOCYTES NFR BLD AUTO: 8 % (ref 4–12)
NEUTROPHILS # BLD AUTO: 9.88 THOUSANDS/ΜL (ref 1.85–7.62)
NEUTS SEG NFR BLD AUTO: 76 % (ref 43–75)
NRBC BLD AUTO-RTO: 0 /100 WBCS
PLATELET # BLD AUTO: 259 THOUSANDS/UL (ref 149–390)
PMV BLD AUTO: 9.7 FL (ref 8.9–12.7)
POTASSIUM SERPL-SCNC: 3.7 MMOL/L (ref 3.5–5.3)
PROT SERPL-MCNC: 8.6 G/DL (ref 6.4–8.2)
PROTHROMBIN TIME: 13.2 SECONDS (ref 11.8–14.2)
RBC # BLD AUTO: 5.08 MILLION/UL (ref 3.81–5.12)
SODIUM SERPL-SCNC: 142 MMOL/L (ref 136–145)
TROPONIN I SERPL-MCNC: <0.02 NG/ML
WBC # BLD AUTO: 13.15 THOUSAND/UL (ref 4.31–10.16)

## 2019-05-24 PROCEDURE — 36415 COLL VENOUS BLD VENIPUNCTURE: CPT | Performed by: EMERGENCY MEDICINE

## 2019-05-24 PROCEDURE — 99285 EMERGENCY DEPT VISIT HI MDM: CPT

## 2019-05-24 PROCEDURE — 74177 CT ABD & PELVIS W/CONTRAST: CPT

## 2019-05-24 PROCEDURE — 96374 THER/PROPH/DIAG INJ IV PUSH: CPT

## 2019-05-24 PROCEDURE — 96361 HYDRATE IV INFUSION ADD-ON: CPT

## 2019-05-24 PROCEDURE — 99223 1ST HOSP IP/OBS HIGH 75: CPT | Performed by: GENERAL PRACTICE

## 2019-05-24 PROCEDURE — 96375 TX/PRO/DX INJ NEW DRUG ADDON: CPT

## 2019-05-24 PROCEDURE — 80320 DRUG SCREEN QUANTALCOHOLS: CPT | Performed by: EMERGENCY MEDICINE

## 2019-05-24 PROCEDURE — 80048 BASIC METABOLIC PNL TOTAL CA: CPT | Performed by: EMERGENCY MEDICINE

## 2019-05-24 PROCEDURE — 83036 HEMOGLOBIN GLYCOSYLATED A1C: CPT | Performed by: GENERAL PRACTICE

## 2019-05-24 PROCEDURE — 84484 ASSAY OF TROPONIN QUANT: CPT | Performed by: EMERGENCY MEDICINE

## 2019-05-24 PROCEDURE — 99285 EMERGENCY DEPT VISIT HI MDM: CPT | Performed by: EMERGENCY MEDICINE

## 2019-05-24 PROCEDURE — 85610 PROTHROMBIN TIME: CPT | Performed by: EMERGENCY MEDICINE

## 2019-05-24 PROCEDURE — 80076 HEPATIC FUNCTION PANEL: CPT | Performed by: EMERGENCY MEDICINE

## 2019-05-24 PROCEDURE — 85025 COMPLETE CBC W/AUTO DIFF WBC: CPT | Performed by: EMERGENCY MEDICINE

## 2019-05-24 PROCEDURE — 93005 ELECTROCARDIOGRAM TRACING: CPT

## 2019-05-24 PROCEDURE — 84703 CHORIONIC GONADOTROPIN ASSAY: CPT | Performed by: EMERGENCY MEDICINE

## 2019-05-24 PROCEDURE — 82948 REAGENT STRIP/BLOOD GLUCOSE: CPT

## 2019-05-24 PROCEDURE — 83690 ASSAY OF LIPASE: CPT | Performed by: EMERGENCY MEDICINE

## 2019-05-24 PROCEDURE — 96376 TX/PRO/DX INJ SAME DRUG ADON: CPT

## 2019-05-24 RX ORDER — DICYCLOMINE HCL 20 MG
20 TABLET ORAL 3 TIMES DAILY
Status: DISCONTINUED | OUTPATIENT
Start: 2019-05-24 | End: 2019-05-26 | Stop reason: HOSPADM

## 2019-05-24 RX ORDER — CHLORDIAZEPOXIDE HYDROCHLORIDE 5 MG/1
10 CAPSULE, GELATIN COATED ORAL EVERY 8 HOURS SCHEDULED
Status: DISCONTINUED | OUTPATIENT
Start: 2019-05-24 | End: 2019-05-24 | Stop reason: SDUPTHER

## 2019-05-24 RX ORDER — ACETAMINOPHEN 325 MG/1
650 TABLET ORAL EVERY 6 HOURS PRN
Status: DISCONTINUED | OUTPATIENT
Start: 2019-05-24 | End: 2019-05-26 | Stop reason: HOSPADM

## 2019-05-24 RX ORDER — CHLORDIAZEPOXIDE HYDROCHLORIDE 5 MG/1
10 CAPSULE, GELATIN COATED ORAL EVERY 8 HOURS SCHEDULED
Status: DISCONTINUED | OUTPATIENT
Start: 2019-05-24 | End: 2019-05-24

## 2019-05-24 RX ORDER — HEPARIN SODIUM 5000 [USP'U]/ML
5000 INJECTION, SOLUTION INTRAVENOUS; SUBCUTANEOUS EVERY 8 HOURS SCHEDULED
Status: DISCONTINUED | OUTPATIENT
Start: 2019-05-24 | End: 2019-05-24

## 2019-05-24 RX ORDER — ONDANSETRON 2 MG/ML
4 INJECTION INTRAMUSCULAR; INTRAVENOUS EVERY 6 HOURS PRN
Status: DISCONTINUED | OUTPATIENT
Start: 2019-05-24 | End: 2019-05-26 | Stop reason: HOSPADM

## 2019-05-24 RX ORDER — CLONIDINE HYDROCHLORIDE 0.1 MG/1
0.3 TABLET ORAL 2 TIMES DAILY
Status: DISCONTINUED | OUTPATIENT
Start: 2019-05-24 | End: 2019-05-26 | Stop reason: HOSPADM

## 2019-05-24 RX ORDER — HYDROMORPHONE HCL/PF 1 MG/ML
0.5 SYRINGE (ML) INJECTION EVERY 4 HOURS PRN
Status: DISCONTINUED | OUTPATIENT
Start: 2019-05-24 | End: 2019-05-26 | Stop reason: HOSPADM

## 2019-05-24 RX ORDER — FLUTICASONE FUROATE AND VILANTEROL 200; 25 UG/1; UG/1
1 POWDER RESPIRATORY (INHALATION)
Status: DISCONTINUED | OUTPATIENT
Start: 2019-05-25 | End: 2019-05-26 | Stop reason: HOSPADM

## 2019-05-24 RX ORDER — LORAZEPAM 2 MG/ML
0.5 INJECTION INTRAMUSCULAR ONCE
Status: COMPLETED | OUTPATIENT
Start: 2019-05-24 | End: 2019-05-24

## 2019-05-24 RX ORDER — HEPARIN SODIUM 5000 [USP'U]/ML
5000 INJECTION, SOLUTION INTRAVENOUS; SUBCUTANEOUS EVERY 8 HOURS SCHEDULED
Status: DISCONTINUED | OUTPATIENT
Start: 2019-05-24 | End: 2019-05-26 | Stop reason: HOSPADM

## 2019-05-24 RX ORDER — ONDANSETRON 2 MG/ML
4 INJECTION INTRAMUSCULAR; INTRAVENOUS ONCE
Status: COMPLETED | OUTPATIENT
Start: 2019-05-24 | End: 2019-05-24

## 2019-05-24 RX ORDER — SUCRALFATE 1 G/1
1 TABLET ORAL 4 TIMES DAILY
Status: DISCONTINUED | OUTPATIENT
Start: 2019-05-24 | End: 2019-05-26 | Stop reason: HOSPADM

## 2019-05-24 RX ORDER — HYDROMORPHONE HCL/PF 1 MG/ML
0.5 SYRINGE (ML) INJECTION ONCE
Status: COMPLETED | OUTPATIENT
Start: 2019-05-24 | End: 2019-05-24

## 2019-05-24 RX ORDER — ONDANSETRON 2 MG/ML
4 INJECTION INTRAMUSCULAR; INTRAVENOUS EVERY 6 HOURS PRN
Status: DISCONTINUED | OUTPATIENT
Start: 2019-05-24 | End: 2019-05-24

## 2019-05-24 RX ORDER — ALBUTEROL SULFATE 90 UG/1
2 AEROSOL, METERED RESPIRATORY (INHALATION) EVERY 4 HOURS PRN
Status: DISCONTINUED | OUTPATIENT
Start: 2019-05-24 | End: 2019-05-26 | Stop reason: HOSPADM

## 2019-05-24 RX ORDER — LABETALOL 20 MG/4 ML (5 MG/ML) INTRAVENOUS SYRINGE
10 EVERY 4 HOURS PRN
Status: DISCONTINUED | OUTPATIENT
Start: 2019-05-24 | End: 2019-05-26 | Stop reason: HOSPADM

## 2019-05-24 RX ORDER — SODIUM CHLORIDE, SODIUM GLUCONATE, SODIUM ACETATE, POTASSIUM CHLORIDE, MAGNESIUM CHLORIDE, SODIUM PHOSPHATE, DIBASIC, AND POTASSIUM PHOSPHATE .53; .5; .37; .037; .03; .012; .00082 G/100ML; G/100ML; G/100ML; G/100ML; G/100ML; G/100ML; G/100ML
125 INJECTION, SOLUTION INTRAVENOUS CONTINUOUS
Status: DISCONTINUED | OUTPATIENT
Start: 2019-05-24 | End: 2019-05-24

## 2019-05-24 RX ORDER — LABETALOL 20 MG/4 ML (5 MG/ML) INTRAVENOUS SYRINGE
20 ONCE
Status: COMPLETED | OUTPATIENT
Start: 2019-05-24 | End: 2019-05-24

## 2019-05-24 RX ORDER — SODIUM CHLORIDE, SODIUM GLUCONATE, SODIUM ACETATE, POTASSIUM CHLORIDE, MAGNESIUM CHLORIDE, SODIUM PHOSPHATE, DIBASIC, AND POTASSIUM PHOSPHATE .53; .5; .37; .037; .03; .012; .00082 G/100ML; G/100ML; G/100ML; G/100ML; G/100ML; G/100ML; G/100ML
75 INJECTION, SOLUTION INTRAVENOUS CONTINUOUS
Status: DISCONTINUED | OUTPATIENT
Start: 2019-05-24 | End: 2019-05-26 | Stop reason: HOSPADM

## 2019-05-24 RX ORDER — NICOTINE 21 MG/24HR
1 PATCH, TRANSDERMAL 24 HOURS TRANSDERMAL DAILY
Status: DISCONTINUED | OUTPATIENT
Start: 2019-05-25 | End: 2019-05-24

## 2019-05-24 RX ORDER — FAMOTIDINE 20 MG/1
20 TABLET, FILM COATED ORAL 2 TIMES DAILY
Status: DISCONTINUED | OUTPATIENT
Start: 2019-05-24 | End: 2019-05-26 | Stop reason: HOSPADM

## 2019-05-24 RX ORDER — NICOTINE 21 MG/24HR
1 PATCH, TRANSDERMAL 24 HOURS TRANSDERMAL DAILY
Status: DISCONTINUED | OUTPATIENT
Start: 2019-05-25 | End: 2019-05-26 | Stop reason: HOSPADM

## 2019-05-24 RX ORDER — PANTOPRAZOLE SODIUM 20 MG/1
20 TABLET, DELAYED RELEASE ORAL DAILY
Status: DISCONTINUED | OUTPATIENT
Start: 2019-05-25 | End: 2019-05-26 | Stop reason: HOSPADM

## 2019-05-24 RX ADMIN — FAMOTIDINE 20 MG: 20 TABLET ORAL at 21:28

## 2019-05-24 RX ADMIN — HYDROMORPHONE HYDROCHLORIDE 0.5 MG: 1 INJECTION, SOLUTION INTRAMUSCULAR; INTRAVENOUS; SUBCUTANEOUS at 16:48

## 2019-05-24 RX ADMIN — HYDROMORPHONE HYDROCHLORIDE 0.5 MG: 1 INJECTION, SOLUTION INTRAMUSCULAR; INTRAVENOUS; SUBCUTANEOUS at 19:22

## 2019-05-24 RX ADMIN — SODIUM CHLORIDE 1000 ML: 0.9 INJECTION, SOLUTION INTRAVENOUS at 16:48

## 2019-05-24 RX ADMIN — SODIUM CHLORIDE, SODIUM GLUCONATE, SODIUM ACETATE, POTASSIUM CHLORIDE, MAGNESIUM CHLORIDE, SODIUM PHOSPHATE, DIBASIC, AND POTASSIUM PHOSPHATE 125 ML/HR: .53; .5; .37; .037; .03; .012; .00082 INJECTION, SOLUTION INTRAVENOUS at 21:09

## 2019-05-24 RX ADMIN — ONDANSETRON 4 MG: 2 INJECTION INTRAMUSCULAR; INTRAVENOUS at 16:48

## 2019-05-24 RX ADMIN — LORAZEPAM 0.5 MG: 2 INJECTION INTRAMUSCULAR; INTRAVENOUS at 19:42

## 2019-05-24 RX ADMIN — SUCRALFATE 1 G: 1 TABLET ORAL at 21:28

## 2019-05-24 RX ADMIN — HYDROMORPHONE HYDROCHLORIDE 0.5 MG: 1 INJECTION, SOLUTION INTRAMUSCULAR; INTRAVENOUS; SUBCUTANEOUS at 21:25

## 2019-05-24 RX ADMIN — LORAZEPAM 0.5 MG: 2 INJECTION INTRAMUSCULAR; INTRAVENOUS at 16:48

## 2019-05-24 RX ADMIN — LABETALOL 20 MG/4 ML (5 MG/ML) INTRAVENOUS SYRINGE 20 MG: at 19:45

## 2019-05-24 RX ADMIN — HEPARIN SODIUM 5000 UNITS: 5000 INJECTION INTRAVENOUS; SUBCUTANEOUS at 21:28

## 2019-05-24 RX ADMIN — DICYCLOMINE HYDROCHLORIDE 20 MG: 20 TABLET ORAL at 21:28

## 2019-05-24 RX ADMIN — CLONIDINE HYDROCHLORIDE 0.3 MG: 0.1 TABLET ORAL at 21:28

## 2019-05-24 RX ADMIN — IOHEXOL 100 ML: 350 INJECTION, SOLUTION INTRAVENOUS at 18:27

## 2019-05-25 PROBLEM — R10.10 PAIN OF UPPER ABDOMEN: Status: ACTIVE | Noted: 2018-04-22

## 2019-05-25 PROBLEM — F12.90 MARIJUANA USE: Status: ACTIVE | Noted: 2019-05-25

## 2019-05-25 LAB
ALBUMIN SERPL BCP-MCNC: 3.1 G/DL (ref 3.5–5)
ALP SERPL-CCNC: 111 U/L (ref 46–116)
ALT SERPL W P-5'-P-CCNC: 21 U/L (ref 12–78)
AMPHETAMINES SERPL QL SCN: NEGATIVE
ANION GAP SERPL CALCULATED.3IONS-SCNC: 11 MMOL/L (ref 4–13)
AST SERPL W P-5'-P-CCNC: 47 U/L (ref 5–45)
ATRIAL RATE: 98 BPM
BARBITURATES UR QL: NEGATIVE
BASOPHILS # BLD AUTO: 0.07 THOUSANDS/ΜL (ref 0–0.1)
BASOPHILS NFR BLD AUTO: 1 % (ref 0–1)
BENZODIAZ UR QL: NEGATIVE
BILIRUB SERPL-MCNC: 1 MG/DL (ref 0.2–1)
BUN SERPL-MCNC: 7 MG/DL (ref 5–25)
CALCIUM SERPL-MCNC: 7.7 MG/DL (ref 8.3–10.1)
CHLORIDE SERPL-SCNC: 100 MMOL/L (ref 100–108)
CO2 SERPL-SCNC: 26 MMOL/L (ref 21–32)
COCAINE UR QL: NEGATIVE
CREAT SERPL-MCNC: 0.6 MG/DL (ref 0.6–1.3)
EOSINOPHIL # BLD AUTO: 0.06 THOUSAND/ΜL (ref 0–0.61)
EOSINOPHIL NFR BLD AUTO: 1 % (ref 0–6)
ERYTHROCYTE [DISTWIDTH] IN BLOOD BY AUTOMATED COUNT: 18.3 % (ref 11.6–15.1)
EST. AVERAGE GLUCOSE BLD GHB EST-MCNC: 160 MG/DL
GFR SERPL CREATININE-BSD FRML MDRD: 113 ML/MIN/1.73SQ M
GLUCOSE SERPL-MCNC: 112 MG/DL (ref 65–140)
GLUCOSE SERPL-MCNC: 128 MG/DL (ref 65–140)
GLUCOSE SERPL-MCNC: 130 MG/DL (ref 65–140)
GLUCOSE SERPL-MCNC: 141 MG/DL (ref 65–140)
HBA1C MFR BLD: 7.2 % (ref 4.2–6.3)
HCT VFR BLD AUTO: 39.4 % (ref 34.8–46.1)
HGB BLD-MCNC: 12.8 G/DL (ref 11.5–15.4)
IMM GRANULOCYTES # BLD AUTO: 0.03 THOUSAND/UL (ref 0–0.2)
IMM GRANULOCYTES NFR BLD AUTO: 0 % (ref 0–2)
LYMPHOCYTES # BLD AUTO: 2.86 THOUSANDS/ΜL (ref 0.6–4.47)
LYMPHOCYTES NFR BLD AUTO: 28 % (ref 14–44)
MAGNESIUM SERPL-MCNC: 1.4 MG/DL (ref 1.6–2.6)
MCH RBC QN AUTO: 29.6 PG (ref 26.8–34.3)
MCHC RBC AUTO-ENTMCNC: 32.5 G/DL (ref 31.4–37.4)
MCV RBC AUTO: 91 FL (ref 82–98)
METHADONE UR QL: NEGATIVE
MONOCYTES # BLD AUTO: 0.8 THOUSAND/ΜL (ref 0.17–1.22)
MONOCYTES NFR BLD AUTO: 8 % (ref 4–12)
NEUTROPHILS # BLD AUTO: 6.44 THOUSANDS/ΜL (ref 1.85–7.62)
NEUTS SEG NFR BLD AUTO: 62 % (ref 43–75)
NRBC BLD AUTO-RTO: 0 /100 WBCS
OPIATES UR QL SCN: POSITIVE
P AXIS: 81 DEGREES
PCP UR QL: NEGATIVE
PLATELET # BLD AUTO: 185 THOUSANDS/UL (ref 149–390)
PMV BLD AUTO: 9.3 FL (ref 8.9–12.7)
POTASSIUM SERPL-SCNC: 3.4 MMOL/L (ref 3.5–5.3)
PR INTERVAL: 156 MS
PROT SERPL-MCNC: 6.8 G/DL (ref 6.4–8.2)
QRS AXIS: 53 DEGREES
QRSD INTERVAL: 78 MS
QT INTERVAL: 380 MS
QTC INTERVAL: 485 MS
RBC # BLD AUTO: 4.33 MILLION/UL (ref 3.81–5.12)
SODIUM SERPL-SCNC: 137 MMOL/L (ref 136–145)
T WAVE AXIS: 61 DEGREES
THC UR QL: POSITIVE
VENTRICULAR RATE: 98 BPM
WBC # BLD AUTO: 10.26 THOUSAND/UL (ref 4.31–10.16)

## 2019-05-25 PROCEDURE — 99233 SBSQ HOSP IP/OBS HIGH 50: CPT | Performed by: INTERNAL MEDICINE

## 2019-05-25 PROCEDURE — 80307 DRUG TEST PRSMV CHEM ANLYZR: CPT | Performed by: GENERAL PRACTICE

## 2019-05-25 PROCEDURE — 85025 COMPLETE CBC W/AUTO DIFF WBC: CPT | Performed by: GENERAL PRACTICE

## 2019-05-25 PROCEDURE — 99222 1ST HOSP IP/OBS MODERATE 55: CPT | Performed by: INTERNAL MEDICINE

## 2019-05-25 PROCEDURE — 82948 REAGENT STRIP/BLOOD GLUCOSE: CPT

## 2019-05-25 PROCEDURE — 83735 ASSAY OF MAGNESIUM: CPT | Performed by: GENERAL PRACTICE

## 2019-05-25 PROCEDURE — 93010 ELECTROCARDIOGRAM REPORT: CPT | Performed by: INTERNAL MEDICINE

## 2019-05-25 PROCEDURE — 80053 COMPREHEN METABOLIC PANEL: CPT | Performed by: GENERAL PRACTICE

## 2019-05-25 RX ORDER — SIMETHICONE 80 MG
80 TABLET,CHEWABLE ORAL EVERY 6 HOURS PRN
Status: DISCONTINUED | OUTPATIENT
Start: 2019-05-25 | End: 2019-05-26 | Stop reason: HOSPADM

## 2019-05-25 RX ORDER — LORAZEPAM 1 MG/1
1 TABLET ORAL ONCE
Status: COMPLETED | OUTPATIENT
Start: 2019-05-25 | End: 2019-05-25

## 2019-05-25 RX ORDER — POTASSIUM CHLORIDE 20 MEQ/1
40 TABLET, EXTENDED RELEASE ORAL
Status: COMPLETED | OUTPATIENT
Start: 2019-05-25 | End: 2019-05-26

## 2019-05-25 RX ORDER — MAGNESIUM SULFATE HEPTAHYDRATE 40 MG/ML
4 INJECTION, SOLUTION INTRAVENOUS ONCE
Status: COMPLETED | OUTPATIENT
Start: 2019-05-25 | End: 2019-05-26

## 2019-05-25 RX ADMIN — FLUTICASONE FUROATE AND VILANTEROL TRIFENATATE 1 PUFF: 200; 25 POWDER RESPIRATORY (INHALATION) at 08:43

## 2019-05-25 RX ADMIN — SODIUM CHLORIDE, SODIUM GLUCONATE, SODIUM ACETATE, POTASSIUM CHLORIDE, MAGNESIUM CHLORIDE, SODIUM PHOSPHATE, DIBASIC, AND POTASSIUM PHOSPHATE 125 ML/HR: .53; .5; .37; .037; .03; .012; .00082 INJECTION, SOLUTION INTRAVENOUS at 05:06

## 2019-05-25 RX ADMIN — Medication 400 MG: at 18:17

## 2019-05-25 RX ADMIN — SODIUM CHLORIDE, SODIUM GLUCONATE, SODIUM ACETATE, POTASSIUM CHLORIDE, MAGNESIUM CHLORIDE, SODIUM PHOSPHATE, DIBASIC, AND POTASSIUM PHOSPHATE 125 ML/HR: .53; .5; .37; .037; .03; .012; .00082 INJECTION, SOLUTION INTRAVENOUS at 23:17

## 2019-05-25 RX ADMIN — HYDROMORPHONE HYDROCHLORIDE 0.5 MG: 1 INJECTION, SOLUTION INTRAMUSCULAR; INTRAVENOUS; SUBCUTANEOUS at 18:22

## 2019-05-25 RX ADMIN — ACETAMINOPHEN 650 MG: 325 TABLET, FILM COATED ORAL at 08:32

## 2019-05-25 RX ADMIN — FAMOTIDINE 20 MG: 20 TABLET ORAL at 18:17

## 2019-05-25 RX ADMIN — PANCRELIPASE 24000 UNITS: 24000; 76000; 120000 CAPSULE, DELAYED RELEASE PELLETS ORAL at 18:18

## 2019-05-25 RX ADMIN — ONDANSETRON 4 MG: 2 INJECTION INTRAMUSCULAR; INTRAVENOUS at 15:17

## 2019-05-25 RX ADMIN — SUCRALFATE 1 G: 1 TABLET ORAL at 22:15

## 2019-05-25 RX ADMIN — SUCRALFATE 1 G: 1 TABLET ORAL at 13:13

## 2019-05-25 RX ADMIN — CLONIDINE HYDROCHLORIDE 0.3 MG: 0.1 TABLET ORAL at 08:34

## 2019-05-25 RX ADMIN — FOLIC ACID: 5 INJECTION, SOLUTION INTRAMUSCULAR; INTRAVENOUS; SUBCUTANEOUS at 08:42

## 2019-05-25 RX ADMIN — LORAZEPAM 1 MG: 1 TABLET ORAL at 04:49

## 2019-05-25 RX ADMIN — POTASSIUM CHLORIDE 40 MEQ: 1500 TABLET, EXTENDED RELEASE ORAL at 08:45

## 2019-05-25 RX ADMIN — HEPARIN SODIUM 5000 UNITS: 5000 INJECTION INTRAVENOUS; SUBCUTANEOUS at 22:15

## 2019-05-25 RX ADMIN — SUCRALFATE 1 G: 1 TABLET ORAL at 08:32

## 2019-05-25 RX ADMIN — POTASSIUM CHLORIDE 40 MEQ: 1500 TABLET, EXTENDED RELEASE ORAL at 18:17

## 2019-05-25 RX ADMIN — CLONIDINE HYDROCHLORIDE 0.3 MG: 0.1 TABLET ORAL at 18:17

## 2019-05-25 RX ADMIN — PANTOPRAZOLE SODIUM 20 MG: 20 TABLET, DELAYED RELEASE ORAL at 08:34

## 2019-05-25 RX ADMIN — POTASSIUM CHLORIDE 40 MEQ: 1500 TABLET, EXTENDED RELEASE ORAL at 13:13

## 2019-05-25 RX ADMIN — ONDANSETRON 4 MG: 2 INJECTION INTRAMUSCULAR; INTRAVENOUS at 03:48

## 2019-05-25 RX ADMIN — HYDROMORPHONE HYDROCHLORIDE 0.5 MG: 1 INJECTION, SOLUTION INTRAMUSCULAR; INTRAVENOUS; SUBCUTANEOUS at 13:13

## 2019-05-25 RX ADMIN — HYDROMORPHONE HYDROCHLORIDE 0.5 MG: 1 INJECTION, SOLUTION INTRAMUSCULAR; INTRAVENOUS; SUBCUTANEOUS at 22:22

## 2019-05-25 RX ADMIN — SODIUM CHLORIDE, SODIUM GLUCONATE, SODIUM ACETATE, POTASSIUM CHLORIDE, MAGNESIUM CHLORIDE, SODIUM PHOSPHATE, DIBASIC, AND POTASSIUM PHOSPHATE 125 ML/HR: .53; .5; .37; .037; .03; .012; .00082 INJECTION, SOLUTION INTRAVENOUS at 13:18

## 2019-05-25 RX ADMIN — PANCRELIPASE 24000 UNITS: 24000; 76000; 120000 CAPSULE, DELAYED RELEASE PELLETS ORAL at 13:15

## 2019-05-25 RX ADMIN — LABETALOL 20 MG/4 ML (5 MG/ML) INTRAVENOUS SYRINGE 10 MG: at 05:01

## 2019-05-25 RX ADMIN — SUCRALFATE 1 G: 1 TABLET ORAL at 18:17

## 2019-05-25 RX ADMIN — HYDROMORPHONE HYDROCHLORIDE 0.5 MG: 1 INJECTION, SOLUTION INTRAMUSCULAR; INTRAVENOUS; SUBCUTANEOUS at 09:50

## 2019-05-25 RX ADMIN — NICOTINE 1 PATCH: 21 PATCH TRANSDERMAL at 08:33

## 2019-05-25 RX ADMIN — DICYCLOMINE HYDROCHLORIDE 20 MG: 20 TABLET ORAL at 18:17

## 2019-05-25 RX ADMIN — DICYCLOMINE HYDROCHLORIDE 20 MG: 20 TABLET ORAL at 22:15

## 2019-05-25 RX ADMIN — FAMOTIDINE 20 MG: 20 TABLET ORAL at 08:34

## 2019-05-25 RX ADMIN — HYDROMORPHONE HYDROCHLORIDE 0.5 MG: 1 INJECTION, SOLUTION INTRAMUSCULAR; INTRAVENOUS; SUBCUTANEOUS at 05:33

## 2019-05-25 RX ADMIN — MAGNESIUM SULFATE HEPTAHYDRATE 4 G: 40 INJECTION, SOLUTION INTRAVENOUS at 13:14

## 2019-05-25 RX ADMIN — Medication 400 MG: at 08:45

## 2019-05-25 RX ADMIN — HYDROMORPHONE HYDROCHLORIDE 0.5 MG: 1 INJECTION, SOLUTION INTRAMUSCULAR; INTRAVENOUS; SUBCUTANEOUS at 01:25

## 2019-05-25 RX ADMIN — DICYCLOMINE HYDROCHLORIDE 20 MG: 20 TABLET ORAL at 08:34

## 2019-05-26 VITALS
OXYGEN SATURATION: 100 % | RESPIRATION RATE: 18 BRPM | WEIGHT: 170 LBS | HEART RATE: 63 BPM | DIASTOLIC BLOOD PRESSURE: 112 MMHG | BODY MASS INDEX: 29.02 KG/M2 | SYSTOLIC BLOOD PRESSURE: 178 MMHG | HEIGHT: 64 IN | TEMPERATURE: 97.5 F

## 2019-05-26 LAB
ALBUMIN SERPL BCP-MCNC: 3.1 G/DL (ref 3.5–5)
ALP SERPL-CCNC: 110 U/L (ref 46–116)
ALT SERPL W P-5'-P-CCNC: 26 U/L (ref 12–78)
ANION GAP SERPL CALCULATED.3IONS-SCNC: 9 MMOL/L (ref 4–13)
AST SERPL W P-5'-P-CCNC: 60 U/L (ref 5–45)
BILIRUB SERPL-MCNC: 0.7 MG/DL (ref 0.2–1)
BUN SERPL-MCNC: 8 MG/DL (ref 5–25)
CALCIUM SERPL-MCNC: 7.8 MG/DL (ref 8.3–10.1)
CHLORIDE SERPL-SCNC: 100 MMOL/L (ref 100–108)
CO2 SERPL-SCNC: 24 MMOL/L (ref 21–32)
CREAT SERPL-MCNC: 0.56 MG/DL (ref 0.6–1.3)
ERYTHROCYTE [DISTWIDTH] IN BLOOD BY AUTOMATED COUNT: 18.2 % (ref 11.6–15.1)
GFR SERPL CREATININE-BSD FRML MDRD: 115 ML/MIN/1.73SQ M
GLUCOSE SERPL-MCNC: 125 MG/DL (ref 65–140)
GLUCOSE SERPL-MCNC: 141 MG/DL (ref 65–140)
HCT VFR BLD AUTO: 38.3 % (ref 34.8–46.1)
HGB BLD-MCNC: 12.2 G/DL (ref 11.5–15.4)
MAGNESIUM SERPL-MCNC: 2.3 MG/DL (ref 1.6–2.6)
MCH RBC QN AUTO: 29.5 PG (ref 26.8–34.3)
MCHC RBC AUTO-ENTMCNC: 31.9 G/DL (ref 31.4–37.4)
MCV RBC AUTO: 93 FL (ref 82–98)
PLATELET # BLD AUTO: 153 THOUSANDS/UL (ref 149–390)
PMV BLD AUTO: 9.5 FL (ref 8.9–12.7)
POTASSIUM SERPL-SCNC: 4.5 MMOL/L (ref 3.5–5.3)
PROT SERPL-MCNC: 7 G/DL (ref 6.4–8.2)
RBC # BLD AUTO: 4.14 MILLION/UL (ref 3.81–5.12)
SODIUM SERPL-SCNC: 133 MMOL/L (ref 136–145)
WBC # BLD AUTO: 8.76 THOUSAND/UL (ref 4.31–10.16)

## 2019-05-26 PROCEDURE — 99239 HOSP IP/OBS DSCHRG MGMT >30: CPT | Performed by: INTERNAL MEDICINE

## 2019-05-26 PROCEDURE — 99232 SBSQ HOSP IP/OBS MODERATE 35: CPT | Performed by: INTERNAL MEDICINE

## 2019-05-26 PROCEDURE — 80053 COMPREHEN METABOLIC PANEL: CPT | Performed by: INTERNAL MEDICINE

## 2019-05-26 PROCEDURE — 85027 COMPLETE CBC AUTOMATED: CPT | Performed by: INTERNAL MEDICINE

## 2019-05-26 PROCEDURE — 82948 REAGENT STRIP/BLOOD GLUCOSE: CPT

## 2019-05-26 PROCEDURE — 83735 ASSAY OF MAGNESIUM: CPT | Performed by: INTERNAL MEDICINE

## 2019-05-26 RX ORDER — LORAZEPAM 1 MG/1
2 TABLET ORAL ONCE
Status: COMPLETED | OUTPATIENT
Start: 2019-05-26 | End: 2019-05-26

## 2019-05-26 RX ORDER — OXYCODONE HYDROCHLORIDE AND ACETAMINOPHEN 5; 325 MG/1; MG/1
1 TABLET ORAL EVERY 8 HOURS PRN
Qty: 9 TABLET | Refills: 0 | Status: SHIPPED | OUTPATIENT
Start: 2019-05-26 | End: 2019-05-29

## 2019-05-26 RX ORDER — LANOLIN ALCOHOL/MO/W.PET/CERES
6 CREAM (GRAM) TOPICAL
Status: DISCONTINUED | OUTPATIENT
Start: 2019-05-26 | End: 2019-05-26 | Stop reason: HOSPADM

## 2019-05-26 RX ORDER — HYDRALAZINE HYDROCHLORIDE 20 MG/ML
10 INJECTION INTRAMUSCULAR; INTRAVENOUS ONCE
Status: COMPLETED | OUTPATIENT
Start: 2019-05-26 | End: 2019-05-26

## 2019-05-26 RX ADMIN — HYDROMORPHONE HYDROCHLORIDE 0.5 MG: 1 INJECTION, SOLUTION INTRAMUSCULAR; INTRAVENOUS; SUBCUTANEOUS at 07:27

## 2019-05-26 RX ADMIN — POTASSIUM CHLORIDE 40 MEQ: 1500 TABLET, EXTENDED RELEASE ORAL at 08:52

## 2019-05-26 RX ADMIN — HYDRALAZINE HYDROCHLORIDE 10 MG: 20 INJECTION INTRAMUSCULAR; INTRAVENOUS at 09:44

## 2019-05-26 RX ADMIN — PANTOPRAZOLE SODIUM 20 MG: 20 TABLET, DELAYED RELEASE ORAL at 08:52

## 2019-05-26 RX ADMIN — DICYCLOMINE HYDROCHLORIDE 20 MG: 20 TABLET ORAL at 08:52

## 2019-05-26 RX ADMIN — LORAZEPAM 2 MG: 1 TABLET ORAL at 05:45

## 2019-05-26 RX ADMIN — SODIUM CHLORIDE, SODIUM GLUCONATE, SODIUM ACETATE, POTASSIUM CHLORIDE, MAGNESIUM CHLORIDE, SODIUM PHOSPHATE, DIBASIC, AND POTASSIUM PHOSPHATE 125 ML/HR: .53; .5; .37; .037; .03; .012; .00082 INJECTION, SOLUTION INTRAVENOUS at 07:58

## 2019-05-26 RX ADMIN — LABETALOL 20 MG/4 ML (5 MG/ML) INTRAVENOUS SYRINGE 10 MG: at 01:26

## 2019-05-26 RX ADMIN — FOLIC ACID: 5 INJECTION, SOLUTION INTRAMUSCULAR; INTRAVENOUS; SUBCUTANEOUS at 08:56

## 2019-05-26 RX ADMIN — MELATONIN 6 MG: at 00:58

## 2019-05-26 RX ADMIN — FAMOTIDINE 20 MG: 20 TABLET ORAL at 08:52

## 2019-05-26 RX ADMIN — FLUTICASONE FUROATE AND VILANTEROL TRIFENATATE 1 PUFF: 200; 25 POWDER RESPIRATORY (INHALATION) at 08:55

## 2019-05-26 RX ADMIN — PANCRELIPASE 24000 UNITS: 24000; 76000; 120000 CAPSULE, DELAYED RELEASE PELLETS ORAL at 08:56

## 2019-05-26 RX ADMIN — CLONIDINE HYDROCHLORIDE 0.3 MG: 0.1 TABLET ORAL at 08:55

## 2019-05-26 RX ADMIN — NICOTINE 1 PATCH: 21 PATCH TRANSDERMAL at 08:53

## 2019-05-26 RX ADMIN — SUCRALFATE 1 G: 1 TABLET ORAL at 08:52

## 2019-05-26 RX ADMIN — HYDROMORPHONE HYDROCHLORIDE 0.5 MG: 1 INJECTION, SOLUTION INTRAMUSCULAR; INTRAVENOUS; SUBCUTANEOUS at 02:44

## 2019-05-26 RX ADMIN — Medication 400 MG: at 08:53

## 2019-06-18 ENCOUNTER — APPOINTMENT (EMERGENCY)
Dept: CT IMAGING | Facility: HOSPITAL | Age: 44
End: 2019-06-18
Payer: COMMERCIAL

## 2019-06-18 ENCOUNTER — HOSPITAL ENCOUNTER (OUTPATIENT)
Facility: HOSPITAL | Age: 44
Setting detail: OBSERVATION
Discharge: HOME | End: 2019-06-19
Attending: EMERGENCY MEDICINE | Admitting: NURSE ANESTHETIST, CERTIFIED REGISTERED
Payer: COMMERCIAL

## 2019-06-18 DIAGNOSIS — K85.90 ACUTE ON CHRONIC PANCREATITIS (HCC): Primary | ICD-10-CM

## 2019-06-18 DIAGNOSIS — K86.1 ACUTE ON CHRONIC PANCREATITIS (HCC): Primary | ICD-10-CM

## 2019-06-18 DIAGNOSIS — R11.2 NAUSEA AND VOMITING: ICD-10-CM

## 2019-06-18 DIAGNOSIS — R10.9 INTRACTABLE ABDOMINAL PAIN: ICD-10-CM

## 2019-06-18 PROBLEM — R79.89 ABNORMAL LFTS: Status: ACTIVE | Noted: 2019-06-18

## 2019-06-18 LAB
ALBUMIN SERPL BCP-MCNC: 3.4 G/DL (ref 3.5–5)
ALP SERPL-CCNC: 143 U/L (ref 46–116)
ALT SERPL W P-5'-P-CCNC: 29 U/L (ref 12–78)
ANION GAP SERPL CALCULATED.3IONS-SCNC: 12 MMOL/L (ref 4–13)
AST SERPL W P-5'-P-CCNC: 85 U/L (ref 5–45)
BACTERIA UR QL AUTO: ABNORMAL /HPF
BASOPHILS # BLD AUTO: 0.09 THOUSANDS/ΜL (ref 0–0.1)
BASOPHILS NFR BLD AUTO: 1 % (ref 0–1)
BILIRUB DIRECT SERPL-MCNC: 0.19 MG/DL (ref 0–0.2)
BILIRUB SERPL-MCNC: 0.5 MG/DL (ref 0.2–1)
BILIRUB UR QL STRIP: ABNORMAL
BUN SERPL-MCNC: 11 MG/DL (ref 5–25)
CALCIUM SERPL-MCNC: 8.2 MG/DL (ref 8.3–10.1)
CHLORIDE SERPL-SCNC: 97 MMOL/L (ref 100–108)
CLARITY UR: CLEAR
CO2 SERPL-SCNC: 28 MMOL/L (ref 21–32)
COLOR UR: YELLOW
CREAT SERPL-MCNC: 0.67 MG/DL (ref 0.6–1.3)
EOSINOPHIL # BLD AUTO: 0.04 THOUSAND/ΜL (ref 0–0.61)
EOSINOPHIL NFR BLD AUTO: 0 % (ref 0–6)
ERYTHROCYTE [DISTWIDTH] IN BLOOD BY AUTOMATED COUNT: 17.6 % (ref 11.6–15.1)
EXT PREG TEST URINE: NEGATIVE
EXT. CONTROL ED NAV: NORMAL
GFR SERPL CREATININE-BSD FRML MDRD: 109 ML/MIN/1.73SQ M
GLUCOSE SERPL-MCNC: 270 MG/DL (ref 65–140)
GLUCOSE UR STRIP-MCNC: ABNORMAL MG/DL
HCT VFR BLD AUTO: 43.6 % (ref 34.8–46.1)
HGB BLD-MCNC: 14.5 G/DL (ref 11.5–15.4)
HGB UR QL STRIP.AUTO: NEGATIVE
IMM GRANULOCYTES # BLD AUTO: 0.04 THOUSAND/UL (ref 0–0.2)
IMM GRANULOCYTES NFR BLD AUTO: 0 % (ref 0–2)
KETONES UR STRIP-MCNC: ABNORMAL MG/DL
LEUKOCYTE ESTERASE UR QL STRIP: NEGATIVE
LIPASE SERPL-CCNC: 107 U/L (ref 73–393)
LYMPHOCYTES # BLD AUTO: 2.59 THOUSANDS/ΜL (ref 0.6–4.47)
LYMPHOCYTES NFR BLD AUTO: 27 % (ref 14–44)
MCH RBC QN AUTO: 29.5 PG (ref 26.8–34.3)
MCHC RBC AUTO-ENTMCNC: 33.3 G/DL (ref 31.4–37.4)
MCV RBC AUTO: 89 FL (ref 82–98)
MONOCYTES # BLD AUTO: 0.72 THOUSAND/ΜL (ref 0.17–1.22)
MONOCYTES NFR BLD AUTO: 8 % (ref 4–12)
MUCOUS THREADS UR QL AUTO: ABNORMAL
NEUTROPHILS # BLD AUTO: 6.06 THOUSANDS/ΜL (ref 1.85–7.62)
NEUTS SEG NFR BLD AUTO: 64 % (ref 43–75)
NITRITE UR QL STRIP: NEGATIVE
NON-SQ EPI CELLS URNS QL MICRO: ABNORMAL /HPF
NRBC BLD AUTO-RTO: 0 /100 WBCS
PH UR STRIP.AUTO: 6.5 [PH]
PLATELET # BLD AUTO: 236 THOUSANDS/UL (ref 149–390)
PMV BLD AUTO: 9.6 FL (ref 8.9–12.7)
POTASSIUM SERPL-SCNC: 3.3 MMOL/L (ref 3.5–5.3)
PROT SERPL-MCNC: 7.9 G/DL (ref 6.4–8.2)
PROT UR STRIP-MCNC: ABNORMAL MG/DL
RBC # BLD AUTO: 4.91 MILLION/UL (ref 3.81–5.12)
RBC #/AREA URNS AUTO: ABNORMAL /HPF
SODIUM SERPL-SCNC: 137 MMOL/L (ref 136–145)
SP GR UR STRIP.AUTO: >=1.03 (ref 1–1.03)
UROBILINOGEN UR QL STRIP.AUTO: 1 E.U./DL
WBC # BLD AUTO: 9.54 THOUSAND/UL (ref 4.31–10.16)
WBC #/AREA URNS AUTO: ABNORMAL /HPF

## 2019-06-18 PROCEDURE — 81025 URINE PREGNANCY TEST: CPT | Performed by: EMERGENCY MEDICINE

## 2019-06-18 PROCEDURE — 80076 HEPATIC FUNCTION PANEL: CPT | Performed by: EMERGENCY MEDICINE

## 2019-06-18 PROCEDURE — 96375 TX/PRO/DX INJ NEW DRUG ADDON: CPT

## 2019-06-18 PROCEDURE — 36415 COLL VENOUS BLD VENIPUNCTURE: CPT | Performed by: EMERGENCY MEDICINE

## 2019-06-18 PROCEDURE — 96374 THER/PROPH/DIAG INJ IV PUSH: CPT

## 2019-06-18 PROCEDURE — 96361 HYDRATE IV INFUSION ADD-ON: CPT

## 2019-06-18 PROCEDURE — 81001 URINALYSIS AUTO W/SCOPE: CPT | Performed by: EMERGENCY MEDICINE

## 2019-06-18 PROCEDURE — 99284 EMERGENCY DEPT VISIT MOD MDM: CPT | Performed by: EMERGENCY MEDICINE

## 2019-06-18 PROCEDURE — 83690 ASSAY OF LIPASE: CPT | Performed by: EMERGENCY MEDICINE

## 2019-06-18 PROCEDURE — 99220 PR INITIAL OBSERVATION CARE/DAY 70 MINUTES: CPT | Performed by: INTERNAL MEDICINE

## 2019-06-18 PROCEDURE — 85025 COMPLETE CBC W/AUTO DIFF WBC: CPT | Performed by: EMERGENCY MEDICINE

## 2019-06-18 PROCEDURE — 80048 BASIC METABOLIC PNL TOTAL CA: CPT | Performed by: EMERGENCY MEDICINE

## 2019-06-18 PROCEDURE — 96376 TX/PRO/DX INJ SAME DRUG ADON: CPT

## 2019-06-18 PROCEDURE — 74177 CT ABD & PELVIS W/CONTRAST: CPT

## 2019-06-18 PROCEDURE — 99285 EMERGENCY DEPT VISIT HI MDM: CPT

## 2019-06-18 RX ORDER — FAMOTIDINE 20 MG/1
20 TABLET, FILM COATED ORAL 2 TIMES DAILY
Status: DISCONTINUED | OUTPATIENT
Start: 2019-06-18 | End: 2019-06-19 | Stop reason: HOSPADM

## 2019-06-18 RX ORDER — SUCRALFATE 1 G/1
1 TABLET ORAL 4 TIMES DAILY
Status: DISCONTINUED | OUTPATIENT
Start: 2019-06-18 | End: 2019-06-19 | Stop reason: HOSPADM

## 2019-06-18 RX ORDER — FLUTICASONE FUROATE AND VILANTEROL 100; 25 UG/1; UG/1
1 POWDER RESPIRATORY (INHALATION)
Status: DISCONTINUED | OUTPATIENT
Start: 2019-06-19 | End: 2019-06-19 | Stop reason: HOSPADM

## 2019-06-18 RX ORDER — ONDANSETRON 2 MG/ML
4 INJECTION INTRAMUSCULAR; INTRAVENOUS ONCE
Status: COMPLETED | OUTPATIENT
Start: 2019-06-18 | End: 2019-06-18

## 2019-06-18 RX ORDER — POTASSIUM CHLORIDE 20 MEQ/1
20 TABLET, EXTENDED RELEASE ORAL ONCE
Status: COMPLETED | OUTPATIENT
Start: 2019-06-18 | End: 2019-06-18

## 2019-06-18 RX ORDER — SODIUM CHLORIDE, SODIUM LACTATE, POTASSIUM CHLORIDE, CALCIUM CHLORIDE 600; 310; 30; 20 MG/100ML; MG/100ML; MG/100ML; MG/100ML
125 INJECTION, SOLUTION INTRAVENOUS CONTINUOUS
Status: DISCONTINUED | OUTPATIENT
Start: 2019-06-18 | End: 2019-06-19 | Stop reason: HOSPADM

## 2019-06-18 RX ORDER — ONDANSETRON 2 MG/ML
4 INJECTION INTRAMUSCULAR; INTRAVENOUS EVERY 6 HOURS PRN
Status: DISCONTINUED | OUTPATIENT
Start: 2019-06-18 | End: 2019-06-19 | Stop reason: HOSPADM

## 2019-06-18 RX ORDER — DICYCLOMINE HCL 20 MG
20 TABLET ORAL 3 TIMES DAILY
Status: DISCONTINUED | OUTPATIENT
Start: 2019-06-18 | End: 2019-06-19 | Stop reason: HOSPADM

## 2019-06-18 RX ORDER — HYDROMORPHONE HCL/PF 1 MG/ML
0.5 SYRINGE (ML) INJECTION ONCE
Status: COMPLETED | OUTPATIENT
Start: 2019-06-18 | End: 2019-06-18

## 2019-06-18 RX ORDER — DIPHENHYDRAMINE HYDROCHLORIDE 50 MG/ML
25 INJECTION INTRAMUSCULAR; INTRAVENOUS ONCE
Status: COMPLETED | OUTPATIENT
Start: 2019-06-18 | End: 2019-06-18

## 2019-06-18 RX ORDER — HEPARIN SODIUM 5000 [USP'U]/ML
5000 INJECTION, SOLUTION INTRAVENOUS; SUBCUTANEOUS EVERY 8 HOURS SCHEDULED
Status: DISCONTINUED | OUTPATIENT
Start: 2019-06-18 | End: 2019-06-19 | Stop reason: HOSPADM

## 2019-06-18 RX ORDER — PANTOPRAZOLE SODIUM 20 MG/1
20 TABLET, DELAYED RELEASE ORAL DAILY
Status: DISCONTINUED | OUTPATIENT
Start: 2019-06-19 | End: 2019-06-19 | Stop reason: HOSPADM

## 2019-06-18 RX ORDER — NICOTINE 21 MG/24HR
14 PATCH, TRANSDERMAL 24 HOURS TRANSDERMAL DAILY
Status: DISCONTINUED | OUTPATIENT
Start: 2019-06-18 | End: 2019-06-19 | Stop reason: HOSPADM

## 2019-06-18 RX ORDER — METOCLOPRAMIDE HYDROCHLORIDE 5 MG/ML
10 INJECTION INTRAMUSCULAR; INTRAVENOUS ONCE
Status: COMPLETED | OUTPATIENT
Start: 2019-06-18 | End: 2019-06-18

## 2019-06-18 RX ORDER — CLONIDINE HYDROCHLORIDE 0.1 MG/1
0.3 TABLET ORAL 2 TIMES DAILY
Status: DISCONTINUED | OUTPATIENT
Start: 2019-06-18 | End: 2019-06-19 | Stop reason: HOSPADM

## 2019-06-18 RX ORDER — HYDROMORPHONE HCL/PF 1 MG/ML
1 SYRINGE (ML) INJECTION
Status: DISCONTINUED | OUTPATIENT
Start: 2019-06-18 | End: 2019-06-19 | Stop reason: HOSPADM

## 2019-06-18 RX ORDER — ALBUTEROL SULFATE 90 UG/1
2 AEROSOL, METERED RESPIRATORY (INHALATION) EVERY 4 HOURS PRN
Status: DISCONTINUED | OUTPATIENT
Start: 2019-06-18 | End: 2019-06-19 | Stop reason: HOSPADM

## 2019-06-18 RX ADMIN — SUCRALFATE 1 G: 1 TABLET ORAL at 22:03

## 2019-06-18 RX ADMIN — SODIUM CHLORIDE, SODIUM LACTATE, POTASSIUM CHLORIDE, AND CALCIUM CHLORIDE 125 ML/HR: .6; .31; .03; .02 INJECTION, SOLUTION INTRAVENOUS at 21:03

## 2019-06-18 RX ADMIN — SODIUM CHLORIDE 1000 ML: 0.9 INJECTION, SOLUTION INTRAVENOUS at 18:34

## 2019-06-18 RX ADMIN — FAMOTIDINE 20 MG: 20 TABLET ORAL at 22:03

## 2019-06-18 RX ADMIN — DIPHENHYDRAMINE HYDROCHLORIDE 25 MG: 50 INJECTION, SOLUTION INTRAMUSCULAR; INTRAVENOUS at 18:30

## 2019-06-18 RX ADMIN — NICOTINE 14 MG: 14 PATCH TRANSDERMAL at 21:04

## 2019-06-18 RX ADMIN — HYDROMORPHONE HYDROCHLORIDE 0.5 MG: 1 INJECTION, SOLUTION INTRAMUSCULAR; INTRAVENOUS; SUBCUTANEOUS at 18:30

## 2019-06-18 RX ADMIN — IOHEXOL 100 ML: 350 INJECTION, SOLUTION INTRAVENOUS at 18:58

## 2019-06-18 RX ADMIN — CLONIDINE HYDROCHLORIDE 0.3 MG: 0.1 TABLET ORAL at 22:03

## 2019-06-18 RX ADMIN — FOLIC ACID: 5 INJECTION, SOLUTION INTRAMUSCULAR; INTRAVENOUS; SUBCUTANEOUS at 22:00

## 2019-06-18 RX ADMIN — HYDROMORPHONE HYDROCHLORIDE 0.5 MG: 1 INJECTION, SOLUTION INTRAMUSCULAR; INTRAVENOUS; SUBCUTANEOUS at 17:40

## 2019-06-18 RX ADMIN — HYDROMORPHONE HYDROCHLORIDE 1 MG: 1 INJECTION, SOLUTION INTRAMUSCULAR; INTRAVENOUS; SUBCUTANEOUS at 21:11

## 2019-06-18 RX ADMIN — POTASSIUM CHLORIDE 20 MEQ: 1500 TABLET, EXTENDED RELEASE ORAL at 19:50

## 2019-06-18 RX ADMIN — DICYCLOMINE HYDROCHLORIDE 20 MG: 20 TABLET ORAL at 22:03

## 2019-06-18 RX ADMIN — SODIUM CHLORIDE 1000 ML: 0.9 INJECTION, SOLUTION INTRAVENOUS at 17:40

## 2019-06-18 RX ADMIN — METOCLOPRAMIDE 10 MG: 5 INJECTION, SOLUTION INTRAMUSCULAR; INTRAVENOUS at 18:30

## 2019-06-18 RX ADMIN — ONDANSETRON 4 MG: 2 INJECTION INTRAMUSCULAR; INTRAVENOUS at 17:40

## 2019-06-19 ENCOUNTER — TELEPHONE (OUTPATIENT)
Dept: GASTROENTEROLOGY | Facility: CLINIC | Age: 44
End: 2019-06-19

## 2019-06-19 ENCOUNTER — PREP FOR PROCEDURE (OUTPATIENT)
Dept: GASTROENTEROLOGY | Facility: CLINIC | Age: 44
End: 2019-06-19

## 2019-06-19 ENCOUNTER — APPOINTMENT (OUTPATIENT)
Dept: ULTRASOUND IMAGING | Facility: HOSPITAL | Age: 44
End: 2019-06-19
Payer: COMMERCIAL

## 2019-06-19 VITALS
WEIGHT: 173.8 LBS | SYSTOLIC BLOOD PRESSURE: 140 MMHG | HEART RATE: 55 BPM | TEMPERATURE: 97.3 F | BODY MASS INDEX: 29.67 KG/M2 | OXYGEN SATURATION: 98 % | RESPIRATION RATE: 17 BRPM | HEIGHT: 64 IN | DIASTOLIC BLOOD PRESSURE: 92 MMHG

## 2019-06-19 DIAGNOSIS — K86.0 ALCOHOL-INDUCED CHRONIC PANCREATITIS (HCC): Primary | ICD-10-CM

## 2019-06-19 LAB — LIPASE SERPL-CCNC: 99 U/L (ref 73–393)

## 2019-06-19 PROCEDURE — 83690 ASSAY OF LIPASE: CPT | Performed by: INTERNAL MEDICINE

## 2019-06-19 PROCEDURE — 94762 N-INVAS EAR/PLS OXIMTRY CONT: CPT

## 2019-06-19 PROCEDURE — 76705 ECHO EXAM OF ABDOMEN: CPT

## 2019-06-19 PROCEDURE — 99217 PR OBSERVATION CARE DISCHARGE MANAGEMENT: CPT | Performed by: NURSE PRACTITIONER

## 2019-06-19 PROCEDURE — 99214 OFFICE O/P EST MOD 30 MIN: CPT | Performed by: PHYSICIAN ASSISTANT

## 2019-06-19 RX ORDER — SIMETHICONE 80 MG
80 TABLET,CHEWABLE ORAL EVERY 6 HOURS PRN
Status: DISCONTINUED | OUTPATIENT
Start: 2019-06-19 | End: 2019-06-19 | Stop reason: HOSPADM

## 2019-06-19 RX ORDER — OXYCODONE HYDROCHLORIDE 10 MG/1
10 TABLET ORAL EVERY 6 HOURS PRN
Qty: 10 TABLET | Refills: 0 | Status: SHIPPED | OUTPATIENT
Start: 2019-06-19 | End: 2019-06-29 | Stop reason: HOSPADM

## 2019-06-19 RX ORDER — OXYCODONE HYDROCHLORIDE 10 MG/1
10 TABLET ORAL EVERY 4 HOURS PRN
Status: DISCONTINUED | OUTPATIENT
Start: 2019-06-19 | End: 2019-06-19 | Stop reason: HOSPADM

## 2019-06-19 RX ADMIN — HYDROMORPHONE HYDROCHLORIDE 1 MG: 1 INJECTION, SOLUTION INTRAMUSCULAR; INTRAVENOUS; SUBCUTANEOUS at 03:00

## 2019-06-19 RX ADMIN — FOLIC ACID: 5 INJECTION, SOLUTION INTRAMUSCULAR; INTRAVENOUS; SUBCUTANEOUS at 09:27

## 2019-06-19 RX ADMIN — FLUTICASONE FUROATE AND VILANTEROL TRIFENATATE 1 PUFF: 100; 25 POWDER RESPIRATORY (INHALATION) at 08:27

## 2019-06-19 RX ADMIN — NICOTINE 14 MG: 14 PATCH TRANSDERMAL at 09:23

## 2019-06-19 RX ADMIN — CLONIDINE HYDROCHLORIDE 0.3 MG: 0.1 TABLET ORAL at 09:23

## 2019-06-19 RX ADMIN — FAMOTIDINE 20 MG: 20 TABLET ORAL at 09:23

## 2019-06-19 RX ADMIN — SIMETHICONE 80 MG: 80 TABLET, CHEWABLE ORAL at 11:23

## 2019-06-19 RX ADMIN — SUCRALFATE 1 G: 1 TABLET ORAL at 13:00

## 2019-06-19 RX ADMIN — SODIUM CHLORIDE, SODIUM LACTATE, POTASSIUM CHLORIDE, AND CALCIUM CHLORIDE 125 ML/HR: .6; .31; .03; .02 INJECTION, SOLUTION INTRAVENOUS at 13:51

## 2019-06-19 RX ADMIN — DICYCLOMINE HYDROCHLORIDE 20 MG: 20 TABLET ORAL at 09:23

## 2019-06-19 RX ADMIN — SUCRALFATE 1 G: 1 TABLET ORAL at 09:23

## 2019-06-19 RX ADMIN — OXYCODONE HYDROCHLORIDE 10 MG: 10 TABLET ORAL at 14:55

## 2019-06-19 RX ADMIN — HYDROMORPHONE HYDROCHLORIDE 1 MG: 1 INJECTION, SOLUTION INTRAMUSCULAR; INTRAVENOUS; SUBCUTANEOUS at 12:46

## 2019-06-19 RX ADMIN — SODIUM CHLORIDE, SODIUM LACTATE, POTASSIUM CHLORIDE, AND CALCIUM CHLORIDE 125 ML/HR: .6; .31; .03; .02 INJECTION, SOLUTION INTRAVENOUS at 06:04

## 2019-06-19 RX ADMIN — PANTOPRAZOLE SODIUM 20 MG: 20 TABLET, DELAYED RELEASE ORAL at 09:23

## 2019-06-19 RX ADMIN — HYDROMORPHONE HYDROCHLORIDE 1 MG: 1 INJECTION, SOLUTION INTRAMUSCULAR; INTRAVENOUS; SUBCUTANEOUS at 00:03

## 2019-06-19 RX ADMIN — HYDROMORPHONE HYDROCHLORIDE 1 MG: 1 INJECTION, SOLUTION INTRAMUSCULAR; INTRAVENOUS; SUBCUTANEOUS at 06:03

## 2019-06-19 RX ADMIN — HYDROMORPHONE HYDROCHLORIDE 1 MG: 1 INJECTION, SOLUTION INTRAMUSCULAR; INTRAVENOUS; SUBCUTANEOUS at 09:21

## 2019-06-24 ENCOUNTER — HOSPITAL ENCOUNTER (INPATIENT)
Facility: HOSPITAL | Age: 44
LOS: 4 days | Discharge: HOME | DRG: 052 | End: 2019-06-29
Attending: EMERGENCY MEDICINE | Admitting: FAMILY MEDICINE
Payer: COMMERCIAL

## 2019-06-24 ENCOUNTER — APPOINTMENT (EMERGENCY)
Dept: CT IMAGING | Facility: HOSPITAL | Age: 44
DRG: 052 | End: 2019-06-24
Payer: COMMERCIAL

## 2019-06-24 DIAGNOSIS — F17.200 NICOTINE DEPENDENCE: Chronic | ICD-10-CM

## 2019-06-24 DIAGNOSIS — F41.9 ANXIETY: ICD-10-CM

## 2019-06-24 DIAGNOSIS — G93.41 ACUTE METABOLIC ENCEPHALOPATHY: ICD-10-CM

## 2019-06-24 DIAGNOSIS — M54.9 BACK PAIN: ICD-10-CM

## 2019-06-24 DIAGNOSIS — R73.9 BLOOD GLUCOSE ELEVATED: ICD-10-CM

## 2019-06-24 DIAGNOSIS — F23 BRIEF PSYCHOTIC DISORDER (HCC): ICD-10-CM

## 2019-06-24 DIAGNOSIS — R44.3 HALLUCINATIONS: ICD-10-CM

## 2019-06-24 DIAGNOSIS — R10.11 RIGHT UPPER QUADRANT ABDOMINAL PAIN: ICD-10-CM

## 2019-06-24 DIAGNOSIS — K85.90 ACUTE ON CHRONIC PANCREATITIS (HCC): Primary | ICD-10-CM

## 2019-06-24 DIAGNOSIS — R16.0 LIVER MASS: ICD-10-CM

## 2019-06-24 DIAGNOSIS — K86.1 ACUTE ON CHRONIC PANCREATITIS (HCC): Primary | ICD-10-CM

## 2019-06-24 DIAGNOSIS — R11.10 VOMITING: ICD-10-CM

## 2019-06-24 DIAGNOSIS — E11.69 TYPE 2 DIABETES MELLITUS WITH OTHER SPECIFIED COMPLICATION, WITHOUT LONG-TERM CURRENT USE OF INSULIN (HCC): ICD-10-CM

## 2019-06-24 DIAGNOSIS — K70.30 ALCOHOLIC CIRRHOSIS OF LIVER WITHOUT ASCITES (HCC): Chronic | ICD-10-CM

## 2019-06-24 PROBLEM — R22.1 NECK SWELLING: Status: ACTIVE | Noted: 2019-06-24

## 2019-06-24 LAB
ALBUMIN SERPL BCP-MCNC: 3.4 G/DL (ref 3.5–5)
ALP SERPL-CCNC: 150 U/L (ref 46–116)
ALT SERPL W P-5'-P-CCNC: 36 U/L (ref 12–78)
AMPHETAMINES SERPL QL SCN: NEGATIVE
ANION GAP SERPL CALCULATED.3IONS-SCNC: 12 MMOL/L (ref 4–13)
AST SERPL W P-5'-P-CCNC: 69 U/L (ref 5–45)
BACTERIA UR QL AUTO: ABNORMAL /HPF
BARBITURATES UR QL: NEGATIVE
BASOPHILS # BLD AUTO: 0.07 THOUSANDS/ΜL (ref 0–0.1)
BASOPHILS NFR BLD AUTO: 1 % (ref 0–1)
BENZODIAZ UR QL: NEGATIVE
BILIRUB SERPL-MCNC: 0.4 MG/DL (ref 0.2–1)
BILIRUB UR QL STRIP: ABNORMAL
BUN SERPL-MCNC: 8 MG/DL (ref 5–25)
CALCIUM SERPL-MCNC: 8.4 MG/DL (ref 8.3–10.1)
CHLORIDE SERPL-SCNC: 103 MMOL/L (ref 100–108)
CLARITY UR: CLEAR
CO2 SERPL-SCNC: 24 MMOL/L (ref 21–32)
COCAINE UR QL: NEGATIVE
COLOR UR: YELLOW
CREAT SERPL-MCNC: 0.71 MG/DL (ref 0.6–1.3)
EOSINOPHIL # BLD AUTO: 0.06 THOUSAND/ΜL (ref 0–0.61)
EOSINOPHIL NFR BLD AUTO: 1 % (ref 0–6)
ERYTHROCYTE [DISTWIDTH] IN BLOOD BY AUTOMATED COUNT: 17.4 % (ref 11.6–15.1)
ETHANOL SERPL-MCNC: 22 MG/DL (ref 0–3)
EXT PREG TEST URINE: NEGATIVE
EXT. CONTROL ED NAV: NORMAL
GFR SERPL CREATININE-BSD FRML MDRD: 105 ML/MIN/1.73SQ M
GLUCOSE SERPL-MCNC: 280 MG/DL (ref 65–140)
GLUCOSE UR STRIP-MCNC: ABNORMAL MG/DL
HCT VFR BLD AUTO: 43.2 % (ref 34.8–46.1)
HGB BLD-MCNC: 14 G/DL (ref 11.5–15.4)
HGB UR QL STRIP.AUTO: NEGATIVE
IMM GRANULOCYTES # BLD AUTO: 0.03 THOUSAND/UL (ref 0–0.2)
IMM GRANULOCYTES NFR BLD AUTO: 0 % (ref 0–2)
KETONES UR STRIP-MCNC: NEGATIVE MG/DL
LEUKOCYTE ESTERASE UR QL STRIP: NEGATIVE
LIPASE SERPL-CCNC: 120 U/L (ref 73–393)
LYMPHOCYTES # BLD AUTO: 2.83 THOUSANDS/ΜL (ref 0.6–4.47)
LYMPHOCYTES NFR BLD AUTO: 28 % (ref 14–44)
MCH RBC QN AUTO: 29 PG (ref 26.8–34.3)
MCHC RBC AUTO-ENTMCNC: 32.4 G/DL (ref 31.4–37.4)
MCV RBC AUTO: 90 FL (ref 82–98)
METHADONE UR QL: NEGATIVE
MONOCYTES # BLD AUTO: 0.98 THOUSAND/ΜL (ref 0.17–1.22)
MONOCYTES NFR BLD AUTO: 10 % (ref 4–12)
MUCOUS THREADS UR QL AUTO: ABNORMAL
NEUTROPHILS # BLD AUTO: 6.32 THOUSANDS/ΜL (ref 1.85–7.62)
NEUTS SEG NFR BLD AUTO: 60 % (ref 43–75)
NITRITE UR QL STRIP: NEGATIVE
NON-SQ EPI CELLS URNS QL MICRO: ABNORMAL /HPF
NRBC BLD AUTO-RTO: 0 /100 WBCS
OPIATES UR QL SCN: POSITIVE
PCP UR QL: NEGATIVE
PH UR STRIP.AUTO: 6 [PH]
PLATELET # BLD AUTO: 213 THOUSANDS/UL (ref 149–390)
PMV BLD AUTO: 9.9 FL (ref 8.9–12.7)
POTASSIUM SERPL-SCNC: 3.4 MMOL/L (ref 3.5–5.3)
PROT SERPL-MCNC: 7.8 G/DL (ref 6.4–8.2)
PROT UR STRIP-MCNC: ABNORMAL MG/DL
RBC # BLD AUTO: 4.82 MILLION/UL (ref 3.81–5.12)
RBC #/AREA URNS AUTO: ABNORMAL /HPF
SODIUM SERPL-SCNC: 139 MMOL/L (ref 136–145)
SP GR UR STRIP.AUTO: 1.02 (ref 1–1.03)
THC UR QL: POSITIVE
UROBILINOGEN UR QL STRIP.AUTO: 0.2 E.U./DL
WBC # BLD AUTO: 10.29 THOUSAND/UL (ref 4.31–10.16)
WBC #/AREA URNS AUTO: ABNORMAL /HPF

## 2019-06-24 PROCEDURE — 96361 HYDRATE IV INFUSION ADD-ON: CPT

## 2019-06-24 PROCEDURE — 80307 DRUG TEST PRSMV CHEM ANLYZR: CPT | Performed by: PHYSICIAN ASSISTANT

## 2019-06-24 PROCEDURE — 36415 COLL VENOUS BLD VENIPUNCTURE: CPT | Performed by: PHYSICIAN ASSISTANT

## 2019-06-24 PROCEDURE — 99219 PR INITIAL OBSERVATION CARE/DAY 50 MINUTES: CPT | Performed by: INTERNAL MEDICINE

## 2019-06-24 PROCEDURE — 81025 URINE PREGNANCY TEST: CPT | Performed by: PHYSICIAN ASSISTANT

## 2019-06-24 PROCEDURE — 80053 COMPREHEN METABOLIC PANEL: CPT | Performed by: PHYSICIAN ASSISTANT

## 2019-06-24 PROCEDURE — 96374 THER/PROPH/DIAG INJ IV PUSH: CPT

## 2019-06-24 PROCEDURE — 99285 EMERGENCY DEPT VISIT HI MDM: CPT | Performed by: EMERGENCY MEDICINE

## 2019-06-24 PROCEDURE — 99285 EMERGENCY DEPT VISIT HI MDM: CPT

## 2019-06-24 PROCEDURE — 85025 COMPLETE CBC W/AUTO DIFF WBC: CPT | Performed by: PHYSICIAN ASSISTANT

## 2019-06-24 PROCEDURE — 80320 DRUG SCREEN QUANTALCOHOLS: CPT | Performed by: PHYSICIAN ASSISTANT

## 2019-06-24 PROCEDURE — 81001 URINALYSIS AUTO W/SCOPE: CPT | Performed by: PHYSICIAN ASSISTANT

## 2019-06-24 PROCEDURE — 96375 TX/PRO/DX INJ NEW DRUG ADDON: CPT

## 2019-06-24 PROCEDURE — 74177 CT ABD & PELVIS W/CONTRAST: CPT

## 2019-06-24 PROCEDURE — 83690 ASSAY OF LIPASE: CPT | Performed by: PHYSICIAN ASSISTANT

## 2019-06-24 RX ORDER — SODIUM CHLORIDE AND POTASSIUM CHLORIDE .9; .15 G/100ML; G/100ML
100 SOLUTION INTRAVENOUS CONTINUOUS
Status: DISCONTINUED | OUTPATIENT
Start: 2019-06-24 | End: 2019-06-26

## 2019-06-24 RX ORDER — ALBUTEROL SULFATE 90 UG/1
2 AEROSOL, METERED RESPIRATORY (INHALATION) EVERY 4 HOURS PRN
Status: DISCONTINUED | OUTPATIENT
Start: 2019-06-24 | End: 2019-06-29 | Stop reason: HOSPADM

## 2019-06-24 RX ORDER — HYDROMORPHONE HCL/PF 1 MG/ML
0.2 SYRINGE (ML) INJECTION ONCE
Status: COMPLETED | OUTPATIENT
Start: 2019-06-24 | End: 2019-06-24

## 2019-06-24 RX ORDER — FLUTICASONE FUROATE AND VILANTEROL 100; 25 UG/1; UG/1
1 POWDER RESPIRATORY (INHALATION)
Status: DISCONTINUED | OUTPATIENT
Start: 2019-06-25 | End: 2019-06-29 | Stop reason: HOSPADM

## 2019-06-24 RX ORDER — FENTANYL CITRATE 50 UG/ML
25 INJECTION, SOLUTION INTRAMUSCULAR; INTRAVENOUS EVERY 2 HOUR PRN
Status: DISCONTINUED | OUTPATIENT
Start: 2019-06-24 | End: 2019-06-25

## 2019-06-24 RX ORDER — DIPHENHYDRAMINE HCL 50 MG
50 CAPSULE ORAL ONCE
Status: COMPLETED | OUTPATIENT
Start: 2019-06-24 | End: 2019-06-24

## 2019-06-24 RX ORDER — ONDANSETRON 2 MG/ML
4 INJECTION INTRAMUSCULAR; INTRAVENOUS ONCE
Status: COMPLETED | OUTPATIENT
Start: 2019-06-24 | End: 2019-06-24

## 2019-06-24 RX ORDER — ONDANSETRON 2 MG/ML
4 INJECTION INTRAMUSCULAR; INTRAVENOUS EVERY 6 HOURS PRN
Status: DISCONTINUED | OUTPATIENT
Start: 2019-06-24 | End: 2019-06-29 | Stop reason: HOSPADM

## 2019-06-24 RX ORDER — SUCRALFATE 1 G/1
1 TABLET ORAL 4 TIMES DAILY
Status: DISCONTINUED | OUTPATIENT
Start: 2019-06-24 | End: 2019-06-29 | Stop reason: HOSPADM

## 2019-06-24 RX ORDER — PANTOPRAZOLE SODIUM 20 MG/1
20 TABLET, DELAYED RELEASE ORAL DAILY
Status: DISCONTINUED | OUTPATIENT
Start: 2019-06-25 | End: 2019-06-29 | Stop reason: HOSPADM

## 2019-06-24 RX ORDER — CLONIDINE HYDROCHLORIDE 0.1 MG/1
0.3 TABLET ORAL 2 TIMES DAILY
Status: DISCONTINUED | OUTPATIENT
Start: 2019-06-25 | End: 2019-06-29 | Stop reason: HOSPADM

## 2019-06-24 RX ORDER — HYDROMORPHONE HCL/PF 1 MG/ML
1 SYRINGE (ML) INJECTION ONCE
Status: COMPLETED | OUTPATIENT
Start: 2019-06-24 | End: 2019-06-24

## 2019-06-24 RX ORDER — NICOTINE 21 MG/24HR
1 PATCH, TRANSDERMAL 24 HOURS TRANSDERMAL DAILY
Status: DISCONTINUED | OUTPATIENT
Start: 2019-06-25 | End: 2019-06-29 | Stop reason: HOSPADM

## 2019-06-24 RX ORDER — FENTANYL CITRATE 50 UG/ML
50 INJECTION, SOLUTION INTRAMUSCULAR; INTRAVENOUS EVERY 2 HOUR PRN
Status: DISCONTINUED | OUTPATIENT
Start: 2019-06-24 | End: 2019-06-25

## 2019-06-24 RX ORDER — DICYCLOMINE HCL 20 MG
20 TABLET ORAL 3 TIMES DAILY
Status: DISCONTINUED | OUTPATIENT
Start: 2019-06-24 | End: 2019-06-29 | Stop reason: HOSPADM

## 2019-06-24 RX ORDER — DIPHENHYDRAMINE HYDROCHLORIDE 50 MG/ML
25 INJECTION INTRAMUSCULAR; INTRAVENOUS EVERY 6 HOURS PRN
Status: DISCONTINUED | OUTPATIENT
Start: 2019-06-24 | End: 2019-06-25

## 2019-06-24 RX ORDER — HYDRALAZINE HYDROCHLORIDE 20 MG/ML
10 INJECTION INTRAMUSCULAR; INTRAVENOUS EVERY 6 HOURS PRN
Status: DISCONTINUED | OUTPATIENT
Start: 2019-06-24 | End: 2019-06-29 | Stop reason: HOSPADM

## 2019-06-24 RX ORDER — ACETAMINOPHEN 325 MG/1
650 TABLET ORAL EVERY 6 HOURS PRN
Status: DISCONTINUED | OUTPATIENT
Start: 2019-06-24 | End: 2019-06-29 | Stop reason: HOSPADM

## 2019-06-24 RX ADMIN — HYDROMORPHONE HYDROCHLORIDE 0.2 MG: 1 INJECTION, SOLUTION INTRAMUSCULAR; INTRAVENOUS; SUBCUTANEOUS at 20:00

## 2019-06-24 RX ADMIN — HYDROMORPHONE HYDROCHLORIDE 1 MG: 1 INJECTION, SOLUTION INTRAMUSCULAR; INTRAVENOUS; SUBCUTANEOUS at 16:30

## 2019-06-24 RX ADMIN — DIPHENHYDRAMINE HYDROCHLORIDE 50 MG: 50 CAPSULE ORAL at 20:13

## 2019-06-24 RX ADMIN — SODIUM CHLORIDE 1000 ML: 0.9 INJECTION, SOLUTION INTRAVENOUS at 16:32

## 2019-06-24 RX ADMIN — IOHEXOL 100 ML: 350 INJECTION, SOLUTION INTRAVENOUS at 19:34

## 2019-06-24 RX ADMIN — FENTANYL CITRATE 50 MCG: 50 INJECTION INTRAMUSCULAR; INTRAVENOUS at 23:55

## 2019-06-24 RX ADMIN — SODIUM CHLORIDE 1000 ML: 0.9 INJECTION, SOLUTION INTRAVENOUS at 17:54

## 2019-06-24 RX ADMIN — ONDANSETRON 4 MG: 2 INJECTION INTRAMUSCULAR; INTRAVENOUS at 16:30

## 2019-06-24 NOTE — ED PROVIDER NOTES
History  Chief Complaint   Patient presents with    Abdominal Pain     pt was in our ED wednesday with symptoms of abdominal pain  pt has hx of pancreatitis  pt stated to have new onset of abdominal pain this morning in umbilical area that radiates to back  pt states to have "vomited approx 8 times today" pt received zofran and fentanyl by EMS      45 yo here for abd pain  Onset this morning  Sudden onset  Epigastric region  Constant and worsening  Radiates directly to back  Nausea and vomiting x8  Non-bloody and non-bilious  No fever, chills, chest pain or sob  States pain is typical of her prior flares of pancreatitis  She admits to drinking 1 alcoholic beverage last night with her dinner  Prior to eating last night she states she had not been able to eat anything since her prior admission last week for same pain she currently has  She follows with GI at [place] [id number]  Being referred for pain management with possible EUS and to evaluate for celiac plexus block         History provided by:  Patient   used: No    Abdominal Pain   Pain location:  Epigastric  Pain quality: stabbing    Pain radiates to:  Back  Pain severity:  Severe  Onset quality:  Sudden  Duration:  1 day  Timing:  Constant  Progression:  Worsening  Chronicity:  Recurrent  Context: alcohol use and eating    Context: not awakening from sleep, not diet changes, not laxative use, not medication withdrawal, not previous surgeries, not recent illness, not recent sexual activity, not recent travel, not retching, not sick contacts, not suspicious food intake and not trauma    Relieved by:  Nothing  Worsened by:  Nothing  Ineffective treatments:  None tried  Associated symptoms: anorexia, nausea and vomiting    Associated symptoms: no belching, no chest pain, no chills, no constipation, no cough, no diarrhea, no dysuria, no fatigue, no fever, no flatus, no hematemesis, no hematochezia, no hematuria, no melena, no shortness of breath, no sore throat, no vaginal bleeding and no vaginal discharge    Risk factors: alcohol abuse    Risk factors: no aspirin use, not elderly, has not had multiple surgeries, no NSAID use, not obese, not pregnant and no recent hospitalization        Prior to Admission Medications   Prescriptions Last Dose Informant Patient Reported? Taking?    Mometasone Furo-Formoterol Fum (DULERA) 100-5 MCG/ACT AERO   Yes No   Sig: Inhale as needed Unsure of dose     albuterol (PROVENTIL HFA,VENTOLIN HFA) 90 mcg/act inhaler   No No   Sig: Inhale 2 puffs every 4 (four) hours as needed for wheezing   cloNIDine (CATAPRES) 0 3 mg tablet   Yes No   Sig: Take 0 3 mg by mouth 2 (two) times a day   dicyclomine (BENTYL) 20 mg tablet   No No   Sig: Take 1 tablet (20 mg total) by mouth 3 (three) times a day   famotidine (PEPCID) 20 mg tablet   No No   Sig: Take 1 tablet (20 mg total) by mouth 2 (two) times a day for 5 days   ibuprofen (MOTRIN) 400 mg tablet   No No   Sig: Take 1 tablet (400 mg total) by mouth every 6 (six) hours as needed for mild pain for up to 5 days   ondansetron (ZOFRAN-ODT) 4 mg disintegrating tablet   No No   Sig: Take 1 tablet (4 mg total) by mouth every 6 (six) hours as needed for nausea or vomiting   oxyCODONE (ROXICODONE) 10 MG TABS   No No   Sig: Take 1 tablet (10 mg total) by mouth every 6 (six) hours as needed for severe pain for up to 10 daysMax Daily Amount: 40 mg   pancrelipase, Lip-Prot-Amyl, (CREON) 24,000 units   No No   Sig: Take 1 capsule by mouth 3 (three) times a day with meals   pantoprazole (PROTONIX) 20 mg tablet   No No   Sig: Take 1 tablet (20 mg total) by mouth daily   sucralfate (CARAFATE) 1 g tablet   No No   Sig: Take 1 tablet (1 g total) by mouth 4 (four) times a day      Facility-Administered Medications: None       Past Medical History:   Diagnosis Date    Alcohol abuse     Arthritis     GERD (gastroesophageal reflux disease)     Hypertension     Nicotine dependence     Obesity     Pancreatitis  Panic attack        Past Surgical History:   Procedure Laterality Date    ABDOMINAL SURGERY      C SECTION    ESOPHAGOGASTRODUODENOSCOPY N/A 12/15/2017    Procedure: ESOPHAGOGASTRODUODENOSCOPY (EGD); Surgeon: Faisal Melgar MD;  Location: [facility]; Service: Gastroenterology       Family History   Problem Relation Age of Onset    Cirrhosis Father     Alcohol abuse Father     Drug abuse Mother      I have reviewed and agree with the history as documented  Social History     Tobacco Use    Smoking status: Current Every Day Smoker     Packs/day: 0 50     Years: 29 00     Pack years: 14 50     Types: Cigarettes    Smokeless tobacco: Never Used   Substance Use Topics    Alcohol use: Yes     Comment: Patient states 'I drink alot of vodka a day"    Drug use: No        Review of Systems   Constitutional: Negative for activity change, appetite change, chills, diaphoresis, fatigue, fever and unexpected weight change  HENT: Negative for congestion, rhinorrhea, sinus pressure, sore throat and trouble swallowing  Eyes: Negative for photophobia and visual disturbance  Respiratory: Negative for apnea, cough, choking, chest tightness, shortness of breath, wheezing and stridor  Cardiovascular: Negative for chest pain, palpitations and leg swelling  Gastrointestinal: Positive for abdominal pain, anorexia, nausea and vomiting  Negative for abdominal distention, blood in stool, constipation, diarrhea, flatus, hematemesis, hematochezia and melena  Genitourinary: Negative for decreased urine volume, difficulty urinating, dysuria, enuresis, flank pain, frequency, hematuria, urgency, vaginal bleeding and vaginal discharge  Musculoskeletal: Negative for arthralgias, myalgias, neck pain and neck stiffness  Skin: Negative for color change, pallor, rash and wound  Allergic/Immunologic: Negative      Neurological: Negative for dizziness, tremors, syncope, weakness, light-headedness, numbness and headaches  Hematological: Negative  Psychiatric/Behavioral: Negative  All other systems reviewed and are negative  Physical Exam  Physical Exam   Constitutional: She is oriented to person, place, and time  She appears well-developed and well-nourished  Non-toxic appearance  She does not have a sickly appearance  She does not appear ill  No distress  HENT:   Head: Normocephalic and atraumatic  Eyes: Pupils are equal, round, and reactive to light  EOM and lids are normal    Neck: Normal range of motion  Neck supple  Cardiovascular: Normal rate, regular rhythm, S1 normal, S2 normal, normal heart sounds, intact distal pulses and normal pulses  Exam reveals no gallop, no distant heart sounds, no friction rub and no decreased pulses  No murmur heard  Pulses:       Radial pulses are 2+ on the right side, and 2+ on the left side  Pulmonary/Chest: Effort normal and breath sounds normal  No accessory muscle usage  No apnea, no tachypnea and no bradypnea  No respiratory distress  She has no decreased breath sounds  She has no wheezes  She has no rhonchi  She has no rales  Abdominal: Soft  Normal appearance  She exhibits no distension  There is tenderness in the epigastric area  There is no rigidity, no rebound and no guarding  Musculoskeletal: Normal range of motion  She exhibits no edema, tenderness or deformity  Neurological: She is alert and oriented to person, place, and time  No cranial nerve deficit  GCS eye subscore is 4  GCS verbal subscore is 5  GCS motor subscore is 6  GCS 15  AAOx3  Ambulating in department without difficulty  CN II-XII grossly intact  No focal neuro deficits  Skin: Skin is warm, dry and intact  No rash noted  She is not diaphoretic  No erythema  No pallor  Psychiatric: Her speech is normal    Nursing note and vitals reviewed        Vital Signs  ED Triage Vitals [06/24/19 1607]   Temperature Pulse Respirations Blood Pressure SpO2   99 °F (37 2 °C) 92 22 (!) 171/102 98 %      Temp Source Heart Rate Source Patient Position - Orthostatic VS BP Location FiO2 (%)   Oral Monitor Lying Right arm --      Pain Score       7           Vitals:    06/24/19 1730 06/24/19 1800 06/24/19 1830 06/24/19 2107   BP: (!) 196/100 (!) 206/108 (!) 204/109 (!) 190/99   Pulse: 80 80 80 71   Patient Position - Orthostatic VS: Lying Lying Lying Lying         Visual Acuity      ED Medications  Medications   albuterol (PROVENTIL HFA,VENTOLIN HFA) inhaler 2 puff (has no administration in time range)   cloNIDine (CATAPRES) tablet 0 3 mg (has no administration in time range)   dicyclomine (BENTYL) tablet 20 mg (has no administration in time range)   fluticasone-vilanterol (BREO ELLIPTA) 100-25 mcg/inh inhaler 1 puff (has no administration in time range)   pantoprazole (PROTONIX) EC tablet 20 mg (has no administration in time range)   sucralfate (CARAFATE) tablet 1 g (has no administration in time range)   diphenhydrAMINE (BENADRYL) injection 25 mg (has no administration in time range)   sodium chloride 0 9 % with KCl 20 mEq/L infusion (premix) (has no administration in time range)   ondansetron (ZOFRAN) injection 4 mg (has no administration in time range)   nicotine (NICODERM CQ) 21 mg/24 hr TD 24 hr patch 1 patch (has no administration in time range)   acetaminophen (TYLENOL) tablet 650 mg (has no administration in time range)   fentanyl citrate (PF) 100 MCG/2ML 25 mcg (has no administration in time range)   fentanyl citrate (PF) 100 MCG/2ML 50 mcg (has no administration in time range)   folic acid 1 mg, thiamine (VITAMIN B1) 100 mg in sodium chloride 0 9 % 100 mL IV piggyback (has no administration in time range)   hydrALAZINE (APRESOLINE) injection 10 mg (has no administration in time range)    EMS REPLENISHMENT MED ( Does not apply Given to EMS 6/24/19 1615)   sodium chloride 0 9 % bolus 1,000 mL (0 mL Intravenous Stopped 6/24/19 1680)   HYDROmorphone (DILAUDID) injection 1 mg (1 mg Intravenous Given 6/24/19 1630)   ondansetron (ZOFRAN) injection 4 mg (4 mg Intravenous Given 6/24/19 1630)   sodium chloride 0 9 % bolus 1,000 mL (0 mL Intravenous Stopped 6/24/19 2000)   HYDROmorphone (DILAUDID) injection 0 2 mg (0 2 mg Intravenous Given 6/24/19 2000)   iohexol (OMNIPAQUE) 350 MG/ML injection (MULTI-DOSE) 85 mL (100 mL Intravenous Given 6/24/19 1934)   diphenhydrAMINE (BENADRYL) capsule 50 mg (50 mg Oral Given 6/24/19 2013)       Diagnostic Studies  Results Reviewed     Procedure Component Value Units Date/Time    Rapid drug screen, urine [[id number]]  (Abnormal) Collected:  06/24/19 1907    Lab Status:  Final result Specimen:  Urine, Clean Catch Updated:  06/24/19 2144     Amph/Meth UR Negative     Barbiturate Ur Negative     Benzodiazepine Urine Negative     Cocaine Urine Negative     Methadone Urine Negative     Opiate Urine Positive     PCP Ur Negative     THC Urine Positive    Narrative:       Presumptive report  If requested, specimen will be sent to reference lab for confirmation  FOR MEDICAL PURPOSES ONLY  IF CONFIRMATION NEEDED PLEASE CONTACT THE LAB WITHIN 5 DAYS      Drug Screen Cutoff Levels:  AMPHETAMINE/METHAMPHETAMINES  1000 ng/mL  BARBITURATES     200 ng/mL  BENZODIAZEPINES     200 ng/mL  COCAINE      300 ng/mL  METHADONE      300 ng/mL  OPIATES      300 ng/mL  PHENCYCLIDINE     25 ng/mL  THC       50 ng/mL      Urine Microscopic [[id number]]  (Abnormal) Collected:  06/24/19 1907    Lab Status:  Final result Specimen:  Urine, Clean Catch Updated:  06/24/19 1923     RBC, UA None Seen /hpf      WBC, UA 0-1 /hpf      Epithelial Cells Occasional /hpf      Bacteria, UA Occasional /hpf      MUCUS THREADS Occasional    UA w Reflex to Microscopic w Reflex to Culture [[id number]]  (Abnormal) Collected:  06/24/19 1907    Lab Status:  Final result Specimen:  Urine, Clean Catch Updated:  06/24/19 1916     Color, UA Yellow     Clarity, UA Clear     Specific Gravity, UA 1 025     pH, UA 6 0     Leukocytes, UA Negative     Nitrite, UA Negative     Protein, UA 30 (1+) mg/dl      Glucose,  (1/2%) mg/dl      Ketones, UA Negative mg/dl      Urobilinogen, UA 0 2 E U /dl      Bilirubin, UA Small     Blood, UA Negative    POCT pregnancy, urine [[id number]]  (Normal) Resulted:  06/24/19 1907    Lab Status:  Final result Updated:  06/24/19 1908     EXT PREG TEST UR (Ref: Negative) negative     Control VALID    CBC and differential [[id number]]  (Abnormal) Collected:  06/24/19 1659    Lab Status:  Final result Specimen:  Blood from Arm, Right Updated:  06/24/19 1707     WBC 10 29 Thousand/uL      RBC 4 82 Million/uL      Hemoglobin 14 0 g/dL      Hematocrit 43 2 %      MCV 90 fL      MCH 29 0 pg      MCHC 32 4 g/dL      RDW 17 4 %      MPV 9 9 fL      Platelets 974 Thousands/uL      nRBC 0 /100 WBCs      Neutrophils Relative 60 %      Immat GRANS % 0 %      Lymphocytes Relative 28 %      Monocytes Relative 10 %      Eosinophils Relative 1 %      Basophils Relative 1 %      Neutrophils Absolute 6 32 Thousands/µL      Immature Grans Absolute 0 03 Thousand/uL      Lymphocytes Absolute 2 83 Thousands/µL      Monocytes Absolute 0 98 Thousand/µL      Eosinophils Absolute 0 06 Thousand/µL      Basophils Absolute 0 07 Thousands/µL     Lipase [[id number]]  (Normal) Collected:  06/24/19 1629    Lab Status:  Final result Specimen:  Blood from Hand, Right Updated:  06/24/19 1652     Lipase 120 u/L     Comprehensive metabolic panel [[id number]]  (Abnormal) Collected:  06/24/19 1629    Lab Status:  Final result Specimen:  Blood from Hand, Right Updated:  06/24/19 1652     Sodium 139 mmol/L      Potassium 3 4 mmol/L      Chloride 103 mmol/L      CO2 24 mmol/L      ANION GAP 12 mmol/L      BUN 8 mg/dL      Creatinine 0 71 mg/dL      Glucose 280 mg/dL      Calcium 8 4 mg/dL      AST 69 U/L      ALT 36 U/L      Alkaline Phosphatase 150 U/L      Total Protein 7 8 g/dL      Albumin 3 4 g/dL      Total Bilirubin 0 40 mg/dL      eGFR 105 ml/min/1 73sq m     Narrative:       National Kid[employer] guidelines for Chronic Kidney Disease (CKD):     Stage 1 with normal or high GFR (GFR > 90 mL/min/1 73 square meters)    Stage 2 Mild CKD (GFR = 60-89 mL/min/1 73 square meters)    Stage 3A Moderate CKD (GFR = 45-59 mL/min/1 73 square meters)    Stage 3B Moderate CKD (GFR = 30-44 mL/min/1 73 square meters)    Stage 4 Severe CKD (GFR = 15-29 mL/min/1 73 square meters)    Stage 5 End Stage CKD (GFR <15 mL/min/1 73 square meters)  Note: GFR calculation is accurate only with a steady state creatinine    Ethanol [[id number]]  (Abnormal) Collected:  06/24/19 1629    Lab Status:  Final result Specimen:  Blood from Arm, Right Updated:  06/24/19 1647     Ethanol Lvl 22 mg/dL                  CT abdomen pelvis with contrast   Final Result by Ramesh Garcia MD (06/24 2014)      1  Amorphous ill-defined hyperdensity in the superior aspect of liver possibly related to vascular shunting/ perfusion variation in this cirrhotic appearing liver  Underlying mass lesion not entirely excluded but felt to be less likely given the    appearance  Nonetheless surveillance imaging and/or further characterization with contrast-enhanced MRI of the liver recommended  2   Mild sigmoid diverticulosis  3   Subcutaneous nodularity in the posterior gluteal subcutaneous fat unchanged possibly related to injection granuloma or folliculitis  Direct clinical exam recommended  Workstation performed: [id number]                    Procedures  Procedures       ED Course                               MDM  Number of Diagnoses or Management Options  Acute on chronic pancreatitis [facility]): new and requires workup  Diagnosis management comments: DDx including but not limited to: gastroenteritis, gastritis, PUD, GERD, gastroparesis, hepatitis, pancreatitis, ileus, bowel obstruction, volvulus, cholecystitis, biliary colic, UTI; doubt cardiac etiology  Plan: Labs  Analgesia  Fluids  Zofran  UDS and ETOH r/o concurrent substance abuse, r/o cannabis hyperemesis  May need repeat CT  Amount and/or Complexity of Data Reviewed  Clinical lab tests: ordered and reviewed  Tests in the radiology section of CPT®: ordered and reviewed  Review and summarize past medical records: yes (Previous admission including GI consult notes  )  Independent visualization of images, tracings, or specimens: yes    Risk of Complications, Morbidity, and/or Mortality  Presenting problems: moderate  Management options: low  General comments: 43 yo with acute on chronic abdominal pain  Labs unremarkable  CT a/p with incidental findings as above  Per patient she was previously aware of abnormal appearance to liver  Has repeatedly requested analgesia  Will admit for continued pain control  Suspect acute on chronic pancreatitis 2/2 her ETOh use  Pt agrees with plan for admission  Spoke with Dr Bar Barajas  Stable at the time of admission  Patient Progress  Patient progress: stable      Disposition  Final diagnoses:   Acute on chronic pancreatitis [facility])     Time reflects when diagnosis was documented in both MDM as applicable and the Disposition within this note     Time User Action Codes Description Comment    6/24/2019  9:09 PM Edison Olson Add [K85 90,  K86 1] Acute on chronic pancreatitis [facility])       ED Disposition     ED Disposition Condition Date/Time Comment    Admit Stable Mon Jun 24, 2019  9:09 PM Case was discussed with Dr Bar Barajas and the patient's admission status was agreed to be Admission Status: observation status to the service of Dr Bar Barajas  Follow-up Information    None         Patient's Medications   Discharge Prescriptions    No medications on file     No discharge procedures on file      ED Provider  Electronically Signed by           Maxwell Cardenas PA-C  06/24/19 3839

## 2019-06-25 LAB
ALBUMIN SERPL BCP-MCNC: 3 G/DL (ref 3.5–5)
ALP SERPL-CCNC: 128 U/L (ref 46–116)
ALT SERPL W P-5'-P-CCNC: 30 U/L (ref 12–78)
ANION GAP SERPL CALCULATED.3IONS-SCNC: 9 MMOL/L (ref 4–13)
AST SERPL W P-5'-P-CCNC: 63 U/L (ref 5–45)
BILIRUB SERPL-MCNC: 0.9 MG/DL (ref 0.2–1)
BUN SERPL-MCNC: 6 MG/DL (ref 5–25)
CALCIUM SERPL-MCNC: 7.3 MG/DL (ref 8.3–10.1)
CHLORIDE SERPL-SCNC: 102 MMOL/L (ref 100–108)
CO2 SERPL-SCNC: 26 MMOL/L (ref 21–32)
CREAT SERPL-MCNC: 0.64 MG/DL (ref 0.6–1.3)
ERYTHROCYTE [DISTWIDTH] IN BLOOD BY AUTOMATED COUNT: 17.2 % (ref 11.6–15.1)
GFR SERPL CREATININE-BSD FRML MDRD: 110 ML/MIN/1.73SQ M
GLUCOSE SERPL-MCNC: 127 MG/DL (ref 65–140)
HCT VFR BLD AUTO: 39 % (ref 34.8–46.1)
HGB BLD-MCNC: 12.5 G/DL (ref 11.5–15.4)
MCH RBC QN AUTO: 28.7 PG (ref 26.8–34.3)
MCHC RBC AUTO-ENTMCNC: 32.1 G/DL (ref 31.4–37.4)
MCV RBC AUTO: 89 FL (ref 82–98)
PLATELET # BLD AUTO: 167 THOUSANDS/UL (ref 149–390)
PMV BLD AUTO: 9.8 FL (ref 8.9–12.7)
POTASSIUM SERPL-SCNC: 3.3 MMOL/L (ref 3.5–5.3)
PROT SERPL-MCNC: 6.8 G/DL (ref 6.4–8.2)
RBC # BLD AUTO: 4.36 MILLION/UL (ref 3.81–5.12)
SODIUM SERPL-SCNC: 137 MMOL/L (ref 136–145)
WBC # BLD AUTO: 11.1 THOUSAND/UL (ref 4.31–10.16)

## 2019-06-25 PROCEDURE — 80053 COMPREHEN METABOLIC PANEL: CPT | Performed by: INTERNAL MEDICINE

## 2019-06-25 PROCEDURE — 36415 COLL VENOUS BLD VENIPUNCTURE: CPT | Performed by: INTERNAL MEDICINE

## 2019-06-25 PROCEDURE — 85027 COMPLETE CBC AUTOMATED: CPT | Performed by: INTERNAL MEDICINE

## 2019-06-25 RX ORDER — HYDROMORPHONE HCL/PF 1 MG/ML
0.2 SYRINGE (ML) INJECTION ONCE
Status: COMPLETED | OUTPATIENT
Start: 2019-06-25 | End: 2019-06-25

## 2019-06-25 RX ORDER — LANOLIN ALCOHOL/MO/W.PET/CERES
3 CREAM (GRAM) TOPICAL
Status: DISCONTINUED | OUTPATIENT
Start: 2019-06-25 | End: 2019-06-29 | Stop reason: HOSPADM

## 2019-06-25 RX ORDER — LANOLIN ALCOHOL/MO/W.PET/CERES
400 CREAM (GRAM) TOPICAL DAILY
Status: DISCONTINUED | OUTPATIENT
Start: 2019-06-26 | End: 2019-06-29 | Stop reason: HOSPADM

## 2019-06-25 RX ORDER — THIAMINE MONONITRATE (VIT B1) 100 MG
100 TABLET ORAL DAILY
Status: DISCONTINUED | OUTPATIENT
Start: 2019-06-26 | End: 2019-06-29 | Stop reason: HOSPADM

## 2019-06-25 RX ORDER — HYDROMORPHONE HCL/PF 1 MG/ML
1 SYRINGE (ML) INJECTION EVERY 4 HOURS PRN
Status: DISCONTINUED | OUTPATIENT
Start: 2019-06-25 | End: 2019-06-27

## 2019-06-25 RX ORDER — DIPHENHYDRAMINE HCL 25 MG
25 TABLET ORAL EVERY 6 HOURS PRN
Status: DISCONTINUED | OUTPATIENT
Start: 2019-06-25 | End: 2019-06-27

## 2019-06-25 RX ADMIN — SODIUM CHLORIDE AND POTASSIUM CHLORIDE 100 ML/HR: .9; .15 SOLUTION INTRAVENOUS at 00:48

## 2019-06-25 RX ADMIN — FENTANYL CITRATE 50 MCG: 50 INJECTION INTRAMUSCULAR; INTRAVENOUS at 07:08

## 2019-06-25 RX ADMIN — FOLIC ACID: 5 INJECTION, SOLUTION INTRAMUSCULAR; INTRAVENOUS; SUBCUTANEOUS at 09:27

## 2019-06-25 RX ADMIN — HYDROMORPHONE HYDROCHLORIDE 1 MG: 1 INJECTION, SOLUTION INTRAMUSCULAR; INTRAVENOUS; SUBCUTANEOUS at 08:57

## 2019-06-25 RX ADMIN — NICOTINE 1 PATCH: 21 PATCH TRANSDERMAL at 21:30

## 2019-06-25 RX ADMIN — FENTANYL CITRATE 50 MCG: 50 INJECTION INTRAMUSCULAR; INTRAVENOUS at 04:38

## 2019-06-25 RX ADMIN — HYDRALAZINE HYDROCHLORIDE 10 MG: 20 INJECTION INTRAMUSCULAR; INTRAVENOUS at 06:51

## 2019-06-25 RX ADMIN — SUCRALFATE 1 G: 1 TABLET ORAL at 12:15

## 2019-06-25 RX ADMIN — ONDANSETRON 4 MG: 2 INJECTION INTRAMUSCULAR; INTRAVENOUS at 07:02

## 2019-06-25 RX ADMIN — HYDROMORPHONE HYDROCHLORIDE 1 MG: 1 INJECTION, SOLUTION INTRAMUSCULAR; INTRAVENOUS; SUBCUTANEOUS at 20:14

## 2019-06-25 RX ADMIN — SODIUM CHLORIDE AND POTASSIUM CHLORIDE 100 ML/HR: .9; .15 SOLUTION INTRAVENOUS at 10:59

## 2019-06-25 RX ADMIN — SUCRALFATE 1 G: 1 TABLET ORAL at 08:58

## 2019-06-25 RX ADMIN — SUCRALFATE 1 G: 1 TABLET ORAL at 00:38

## 2019-06-25 RX ADMIN — HYDROMORPHONE HYDROCHLORIDE 0.2 MG: 1 INJECTION, SOLUTION INTRAMUSCULAR; INTRAVENOUS; SUBCUTANEOUS at 17:01

## 2019-06-25 RX ADMIN — SODIUM CHLORIDE AND POTASSIUM CHLORIDE 100 ML/HR: .9; .15 SOLUTION INTRAVENOUS at 22:08

## 2019-06-25 RX ADMIN — DICYCLOMINE HYDROCHLORIDE 20 MG: 20 TABLET ORAL at 20:14

## 2019-06-25 RX ADMIN — MELATONIN 3 MG: at 04:43

## 2019-06-25 RX ADMIN — DICYCLOMINE HYDROCHLORIDE 20 MG: 20 TABLET ORAL at 17:23

## 2019-06-25 RX ADMIN — ONDANSETRON 4 MG: 2 INJECTION INTRAMUSCULAR; INTRAVENOUS at 00:28

## 2019-06-25 RX ADMIN — CLONIDINE HYDROCHLORIDE 0.3 MG: 0.1 TABLET ORAL at 08:58

## 2019-06-25 RX ADMIN — NICOTINE 1 PATCH: 21 PATCH TRANSDERMAL at 00:28

## 2019-06-25 RX ADMIN — DICYCLOMINE HYDROCHLORIDE 20 MG: 20 TABLET ORAL at 08:58

## 2019-06-25 RX ADMIN — PANTOPRAZOLE SODIUM 20 MG: 20 TABLET, DELAYED RELEASE ORAL at 08:58

## 2019-06-25 RX ADMIN — CLONIDINE HYDROCHLORIDE 0.3 MG: 0.1 TABLET ORAL at 17:23

## 2019-06-25 RX ADMIN — FENTANYL CITRATE 25 MCG: 50 INJECTION, SOLUTION INTRAMUSCULAR; INTRAVENOUS at 05:55

## 2019-06-25 RX ADMIN — HYDRALAZINE HYDROCHLORIDE 10 MG: 20 INJECTION INTRAMUSCULAR; INTRAVENOUS at 00:40

## 2019-06-25 RX ADMIN — SUCRALFATE 1 G: 1 TABLET ORAL at 17:23

## 2019-06-25 RX ADMIN — FENTANYL CITRATE 50 MCG: 50 INJECTION INTRAMUSCULAR; INTRAVENOUS at 02:28

## 2019-06-25 RX ADMIN — HYDROMORPHONE HYDROCHLORIDE 1 MG: 1 INJECTION, SOLUTION INTRAMUSCULAR; INTRAVENOUS; SUBCUTANEOUS at 12:48

## 2019-06-25 RX ADMIN — FLUTICASONE FUROATE AND VILANTEROL TRIFENATATE 1 PUFF: 100; 25 POWDER RESPIRATORY (INHALATION) at 08:58

## 2019-06-25 RX ADMIN — DICYCLOMINE HYDROCHLORIDE 20 MG: 20 TABLET ORAL at 00:38

## 2019-06-25 NOTE — ASSESSMENT & PLAN NOTE
Present on admission, patient's last alcohol was this am   Patient has no insight into her alcohol use  Reinstructed on the implications of alcohol intake even the smallest amount with a history of chronic pancreatitis    · Mild to moderate pain medication  · IV hydration  · Clear liquid diet

## 2019-06-25 NOTE — ED NOTES
SL[facility] provider made aware that patient is requesting other pain medication than the ordered fentanyl  Provider also made aware, patient is stating the fentanyl is making her nauseous  No further orders obtained at this time         Fausto Saab RN  06/25/19 9893

## 2019-06-25 NOTE — ED NOTES
Per Bon Carter, provider, Dr Costa Cano will come to ED to reevaluate the patient         Toney Roberts RN  06/25/19 0602

## 2019-06-25 NOTE — ASSESSMENT & PLAN NOTE
Unable to take her medication earlier  She currently appears more comfortable  Will try to oral medications with sips of meds      · Continue clonidine

## 2019-06-25 NOTE — UTILIZATION REVIEW
Initial Clinical Review    Admission: Date/Time/Statement: OBS  6/24 2308 converted to IP On 6/25 @ 0930  For  Failed treatment of abd pain and nausea   Admitting Physician DEBORAH HARVEY    Level of Care Med Surg    Estimated length of stay More than 2 Midnights    Certification I certify that inpatient services are medically necessary for this patient for a duration of greater than two midnights  See H&P and MD Progress Notes for additional information about the patient's course of treatment  ED Arrival Information     Expected Arrival Acuity Means of Arrival Escorted By Service Admission Type    - 6/24/2019 16:00 Urgent Ambulance [facility] Medicine Urgent    Arrival Complaint    -        Chief Complaint   Patient presents with    Abdominal Pain     pt was in our ED wednesday with symptoms of abdominal pain  pt has hx of pancreatitis  pt stated to have new onset of abdominal pain this morning in umbilical area that radiates to back  pt states to have "vomited approx 8 times today" pt received zofran and fentanyl by EMS      Assessment/Plan: 44 y o  female who presents with recently diagnosed discharged from the hospital on Wednesday patient states that she had breakfast with her significant other who works the night shift  Had siddiqui negative cheese biscuit and decided to have alcohol since his really there dinner and not her breakfast   Patient started developed nausea vomiting x8 over the course the day finally made the emergency room for further evaluation  In the ED the patient had IV pain medications and subsequently started to develop swelling under her chin  She was given Benadryl  She also went for CT scan which showed no obvious inflammation but does show cirrhotic changes which patient is aware of  Patient states she cannot go home because she can't keep her medications down  Patient was noted to be hypertensive and tachycardic on admission     Acute on chronic pancreatitis [facility])  Assessment & Plan  Present on admission, patient's last alcohol was this am   Patient has no insight into her alcohol use  Reinstructed on the implications of alcohol intake even the smallest amount with a history of chronic pancreatitis  · Mild to moderate pain medication  · IV hydration  · Clear liquid diet   Alcoholic cirrhosis ([address] )  Assessment & Plan  Patient admits to having a drink at breakfast   Will place some CIWA protocol   Vomiting  Assessment & Plan  Secondary to pancreatitis  Continue with Zofran p r n      Alcohol abuse  Assessment & Plan  CIWA protocol  Initiate multivitamin folate and thiamine when able to take p o    Hypokalemia  Assessment & Plan  Will add potassium to the IV fluids   Nicotine dependence  Assessment & Plan  Patient desires a nicotine patch   Essential hypertension  Assessment & Plan  Unable to take her medication earlier  She currently appears more comfortable  Will try to oral medications with sips of meds      · Continue clonidine  ED Triage Vitals [06/24/19 1607]   Temperature Pulse Respirations Blood Pressure SpO2   99 °F (37 2 °C) 92 22 (!) 171/102 98 %      Temp Source Heart Rate Source Patient Position - Orthostatic VS BP Location FiO2 (%)   Oral Monitor Lying Right arm --      Pain Score       7        Wt Readings from Last 1 Encounters:   06/24/19 86 2 kg (190 lb 0 6 oz)     Additional Vital Signs:   06/25/19 0709  --  87  18  173/85Abnormal   100 %  None (Room air)  Lying   06/25/19 0651  --  --  --  184/95Abnormal   --  --  --   06/25/19 0600  --  80  --  181/92Abnormal   --  --  --   06/25/19 0559  --  79  18  181/92Abnormal   99 %  --  Lying   06/25/19 0300  --  80  --  178/90Abnormal   --  --  --   06/25/19 0241  --  78  18  179/89Abnormal   100 %  None (Room air)  Lying   06/25/19 0159  --  81  18  202/97Abnormal   100 %  None (Room air)  Lying   06/25/19 0054  --  73  18  184/88Abnormal   100 %  None (Room air)  Lying   06/25/19 0039  --  83 18  195/108Abnormal   100 %  None (Room air)  Lying   06/24/19 2300  --  80  --  191/104Abnormal   --  --  --   06/24/19 2107  --  71  16  190/99Abnormal   100 %  None (Room air)  Lying   06/24/19 1830  --  80  19  204/109Abnormal   99 %  None (Room air)  Lying   06/24/19 1800  --  80  18  206/108Abnormal   99 %  None (Room air)  Lying   06/24/19 1730  --  80  19  196/100Abnormal   98 %  None (Room air)  Lying   06/24/19 1700  --  79  18  209/113Abnormal   98 %  None (Room air)  Lying   06/24/19 1630  --  84  20  211/124Abnormal   98 %  None (Room air)  Lying       Pertinent Labs/Diagnostic Test Results:   6/24  CT abd- 1  Amorphous ill-defined hyperdensity in the superior aspect of liver possibly related to vascular shunting/ perfusion variation in this cirrhotic appearing liver  Underlying mass lesion not entirely excluded but felt to be less likely given the   appearance  Nonetheless surveillance imaging and/or further characterization with contrast-enhanced MRI of the liver recommended  2   Mild sigmoid diverticulosis  3   Subcutaneous nodularity in the posterior gluteal subcutaneous fat unchanged possibly related to injection granuloma or folliculitis  Direct clinical exam recommended    Results from last 7 days   Lab Units 06/25/19  0449 06/24/19  1659 06/18/19  1735   WBC Thousand/uL 11 10* 10 29* 9 54   HEMOGLOBIN g/dL 12 5 14 0 14 5   HEMATOCRIT % 39 0 43 2 43 6   PLATELETS Thousands/uL 167 213 236   NEUTROS ABS Thousands/µL  --  6 32 6 06     Results from last 7 days   Lab Units 06/25/19  0449 06/24/19  1629 06/18/19  1735   SODIUM mmol/L 137 139 137   POTASSIUM mmol/L 3 3* 3 4* 3 3*   CHLORIDE mmol/L 102 103 97*   CO2 mmol/L 26 24 28   ANION GAP mmol/L 9 12 12   BUN mg/dL 6 8 11   CREATININE mg/dL 0 64 0 71 0 67   EGFR ml/min/1 73sq m 110 105 109   CALCIUM mg/dL 7 3* 8 4 8 2*     Results from last 7 days   Lab Units 06/25/19  0449 06/24/19  1629 06/18/19  1735   AST U/L 63* 69* 85*   ALT U/L 30 36 29 ALK PHOS U/L 128* 150* 143*   TOTAL PROTEIN g/dL 6 8 7 8 7 9   ALBUMIN g/dL 3 0* 3 4* 3 4*   TOTAL BILIRUBIN mg/dL 0 90 0 40 0 50   BILIRUBIN DIRECT mg/dL  --   --  0 19     Results from last 7 days   Lab Units 06/25/19  0449 06/24/19  1629 06/18/19  1735   GLUCOSE RANDOM mg/dL 127 280* 270*     Results from last 7 days   Lab Units 06/24/19  1629 06/19/19  0500 06/18/19  1735   LIPASE u/L 120 99 107     Results from last 7 days   Lab Units 06/24/19  1907 06/18/19  1841   CLARITY UA  Clear Clear   COLOR UA  Yellow Yellow   SPEC GRAV UA  1 025 >=1 030   PH UA  6 0 6 5   GLUCOSE UA mg/dl 500 (1/2%)* 250 (1/4%)*   KETONES UA mg/dl Negative Trace*   BLOOD UA  Negative Negative   PROTEIN UA mg/dl 30 (1+)* 100 (2+)*   NITRITE UA  Negative Negative   BILIRUBIN UA  Small* Small*   UROBILINOGEN UA E U /dl 0 2 1 0   LEUKOCYTES UA  Negative Negative   WBC UA /hpf 0-1* None Seen   RBC UA /hpf None Seen None Seen   BACTERIA UA /hpf Occasional Occasional   EPITHELIAL CELLS WET PREP /hpf Occasional Occasional   MUCUS THREADS  Occasional* Occasional*     Results from last 7 days   Lab Units 06/24/19  1907   AMPH/METH  Negative   BARBITURATE UR  Negative   BENZODIAZEPINE UR  Negative   COCAINE UR  Negative   METHADONE URINE  Negative   OPIATE UR  Positive*   PCP UR  Negative   THC UR  Positive*     Results from last 7 days   Lab Units 06/24/19  1629   ETHANOL LVL mg/dL 22*     ED Treatment:   Medication Administration from 06/24/2019 1600 to 06/25/2019 0802       Date/Time Order Dose Route Action     06/24/2019 1615  EMS REPLENISHMENT MED 0  Does not apply Given to EMS     06/24/2019 1732 sodium chloride 0 9 % bolus 1,000 mL 0 mL Intravenous Stopped     06/24/2019 1632 sodium chloride 0 9 % bolus 1,000 mL 1,000 mL Intravenous New Bag     06/24/2019 1630 HYDROmorphone (DILAUDID) injection 1 mg 1 mg Intravenous Given     06/24/2019 1630 ondansetron (ZOFRAN) injection 4 mg 4 mg Intravenous Given     06/24/2019 2000 sodium chloride 0 9 % bolus 1,000 mL 0 mL Intravenous Stopped     06/24/2019 1754 sodium chloride 0 9 % bolus 1,000 mL 1,000 mL Intravenous New Bag     06/24/2019 2000 HYDROmorphone (DILAUDID) injection 0 2 mg 0 2 mg Intravenous Given     06/24/2019 2013 diphenhydrAMINE (BENADRYL) capsule 50 mg 50 mg Oral Given     06/25/2019 0038 dicyclomine (BENTYL) tablet 20 mg 20 mg Oral Given     06/25/2019 0038 sucralfate (CARAFATE) tablet 1 g 1 g Oral Given     06/25/2019 0048 sodium chloride 0 9 % with KCl 20 mEq/L infusion (premix) 100 mL/hr Intravenous New Bag     06/25/2019 0702 ondansetron (ZOFRAN) injection 4 mg 4 mg Intravenous Given     06/25/2019 0028 ondansetron (ZOFRAN) injection 4 mg 4 mg Intravenous Given     06/25/2019 0028 nicotine (NICODERM CQ) 21 mg/24 hr TD 24 hr patch 1 patch 1 patch Transdermal Medication Applied     06/25/2019 0555 fentanyl citrate (PF) 100 MCG/2ML 25 mcg 25 mcg Intravenous Given     06/25/2019 0708 fentanyl citrate (PF) 100 MCG/2ML 50 mcg 50 mcg Intravenous Given     06/25/2019 0438 fentanyl citrate (PF) 100 MCG/2ML 50 mcg 50 mcg Intravenous Given     06/25/2019 0228 fentanyl citrate (PF) 100 MCG/2ML 50 mcg 50 mcg Intravenous Given     06/24/2019 2355 fentanyl citrate (PF) 100 MCG/2ML 50 mcg 50 mcg Intravenous Given     06/25/2019 0651 hydrALAZINE (APRESOLINE) injection 10 mg 10 mg Intravenous Given     06/25/2019 0040 hydrALAZINE (APRESOLINE) injection 10 mg 10 mg Intravenous Given     06/25/2019 0443 melatonin tablet 3 mg 3 mg Oral Given        Past Medical History:   Diagnosis Date    Alcohol abuse     Arthritis     GERD (gastroesophageal reflux disease)     Hypertension     Nicotine dependence     Obesity     Pancreatitis     Panic attack      Present on Admission:   [address] [address]   Alcohol abuse   Acute on chronic pancreatitis ([facility] [address] )   Essential hypertension   Nicotine dependence   Hypokalemia      Admitting Diagnosis: Vomiting [R11 10]  Age/Sex: 44 y o  female  Admission Orders:    Current Facility-Administered Medications:  acetaminophen 650 mg Oral Q6H PRN     albuterol 2 puff Inhalation Q4H PRN     cloNIDine 0 3 mg Oral BID     dicyclomine 20 mg Oral TID     diphenhydrAMINE 25 mg Intravenous Q6H PRN     fentanyl citrate (PF) 25 mcg Intravenous Q2H PRN x1    fentanyl citrate (PF) 50 mcg Intravenous Q2H PRN x4    fluticasone-vilanterol 1 puff Inhalation Daily     folic acid 1 mg, thiamine 100 mg in 0 9% sodium chloride 100 mL IVPB  Intravenous Daily     hydrALAZINE 10 mg Intravenous Q6H PRN x2    melatonin 3 mg Oral HS     nicotine 1 patch Transdermal Daily     ondansetron 4 mg Intravenous Q6H PRN x2    pantoprazole 20 mg Oral Daily     sodium chloride 0 9 % with KCl 20 mEq/L 100 mL/hr Intravenous Continuous     sucralfate 1 g Oral 4x Daily       NPO   Aspiration precautions  CIWA  2-10    6/25  Pt cont w/ N/V this am       Network Utilization Review Department  Phone: [phone]; Fax [phone]  [email or web address]  ATTENTION: Please call with any questions or concerns to [phone]  and carefully listen to the prompts so that you are directed to the right person  Send all requests for admission clinical reviews, approved or denied determinations and any other requests to fax [phone]   All voicemails are confidential

## 2019-06-25 NOTE — QUICK NOTE
Patient was seen and examined today  She reports that her abdominal pain is improved, however, does not feel like she can start eating any food at this time  Vital signs:  Occasional elevated blood pressure  General exam:  Patient appears to be not in distress, but is sickly appearing  Chest:  Clear to auscultation bilaterally  Heart:  S1-S2 normal, regular rhythm rate  Abdomen:  Soft, generalized tenderness  No guarding or rebound tenderness  Extremity:  No edema noted    Assessment and plan:  Acute on chronic pancreatitis:  Unable to tolerate diet yet, but has a clear liquid diet ordered  Continue IV hydration  Pain management with Dilaudid  Alcohol abuse:  Continue CIWA protocol    Hypokalemia:  Will monitor electrolytes and replace as needed  Hypertension:  Elevated blood pressure likely due to alcohol withdrawal   Continue CIWA      Alcoholic cirrhosis:  Needs regular outpatient surveillance for hepatic cancer

## 2019-06-25 NOTE — H&P
H&P- Yahaira Hugo 1975, 44 y o  female MRN: [id number]    Unit/Bed#: ED 06 Encounter: [id number]    Primary Care Provider: Yoli Oconnor MD   Date and time admitted to hospital: 6/24/2019  4:00 PM        Acute on chronic pancreatitis [facility])  Assessment & Plan  Present on admission, patient's last alcohol was this am   Patient has no insight into her alcohol use  Reinstructed on the implications of alcohol intake even the smallest amount with a history of chronic pancreatitis  · Mild to moderate pain medication  · IV hydration  · Clear liquid diet    Alcoholic cirrhosis ([facility] [address] )  Assessment & Plan  Patient admits to having a drink at breakfast   Will place some CIWA protocol    Vomiting  Assessment & Plan  Secondary to pancreatitis  Continue with Zofran p r n Rosella Goldmann Alcohol abuse  Assessment & Plan  CIWA protocol  Initiate multivitamin folate and thiamine when able to take p o  Hypokalemia  Assessment & Plan  Will add potassium to the IV fluids    Nicotine dependence  Assessment & Plan  Patient desires a nicotine patch    Essential hypertension  Assessment & Plan  Unable to take her medication earlier  She currently appears more comfortable  Will try to oral medications with sips of meds  · Continue clonidine      VTE Prophylaxis: Heparin  / sequential compression device   Code Status:  Full code  POLST: POLST form is not discussed and not completed at this time  Discussion with family:  None    Anticipated Length of Stay:  Patient will be admitted on an Observation basis with an anticipated length of stay of  less 2 midnights  Justification for Hospital Stay:  Treatment for pancreatitis of drinking alcohol    Total Time for Visit, including Counseling / Coordination of Care: 1 hour  Greater than 50% of this total time spent on direct patient counseling and coordination of care  Chief Complaint:   Abdominal pain nausea vomiting    History of Present Illness:    Yahaira Hugo is a 44 y o  female who presents with recently diagnosed discharged from the hospital on Wednesday patient states that she had breakfast with her significant other who works the night shift  Had siddiqui negative cheese biscuit and decided to have alcohol since his really there dinner and not her breakfast   Patient started developed nausea vomiting x8 over the course the day finally made the emergency room for further evaluation  In the ED the patient had IV pain medications and subsequently started to develop swelling under her chin  She was given Benadryl  She also went for CT scan which showed no obvious inflammation but does show cirrhotic changes which patient is aware of  Patient states she cannot go home because she can't keep her medications down  Patient was noted to be hypertensive and tachycardic on admission       Review of Systems:    Review of Systems    Past Medical and Surgical History:     Past Medical History:   Diagnosis Date    Alcohol abuse     Arthritis     GERD (gastroesophageal reflux disease)     Hypertension     Nicotine dependence     Obesity     Pancreatitis     Panic attack        Past Surgical History:   Procedure Laterality Date    ABDOMINAL SURGERY      C SECTION    ESOPHAGOGASTRODUODENOSCOPY N/A 12/15/2017    Procedure: ESOPHAGOGASTRODUODENOSCOPY (EGD); Surgeon: Anaid Hughes MD;  Location: [facility]; Service: Gastroenterology       Meds/Allergies:    Prior to Admission medications    Medication Sig Start Date End Date Taking?  Authorizing Provider   albuterol (PROVENTIL HFA,VENTOLIN HFA) 90 mcg/act inhaler Inhale 2 puffs every 4 (four) hours as needed for wheezing 10/10/18   Rodrigo Dia MD   cloNIDine (CATAPRES) 0 3 mg tablet Take 0 3 mg by mouth 2 (two) times a day    Historical Provider, MD   dicyclomine (BENTYL) 20 mg tablet Take 1 tablet (20 mg total) by mouth 3 (three) times a day 5/31/18   OMERO Che   famotidine (PEPCID) 20 mg tablet Take 1 tablet (20 mg total) by mouth 2 (two) times a day for 5 days 2/20/19 2/25/19  Carla Muniz PA-C   ibuprofen (MOTRIN) 400 mg tablet Take 1 tablet (400 mg total) by mouth every 6 (six) hours as needed for mild pain for up to 5 days 2/20/19 2/25/19  Carla Muniz PA-C   Mometasone Furo-Formoterol Fum (DULERA) 100-5 MCG/ACT AERO Inhale as needed Unsure of dose      Historical Provider, MD   ondansetron (ZOFRAN-ODT) 4 mg disintegrating tablet Take 1 tablet (4 mg total) by mouth every 6 (six) hours as needed for nausea or vomiting 3/16/19   Evelin Dupree MD   oxyCODONE (ROXICODONE) 10 MG TABS Take 1 tablet (10 mg total) by mouth every 6 (six) hours as needed for severe pain for up to 10 daysMax Daily Amount: 40 mg 6/19/19 6/29/19  OMERO Parra   pancrelipase, Lip-Prot-Amyl, (CREON) 24,000 units Take 1 capsule by mouth 3 (three) times a day with meals 10/31/17   Snow Miller MD   pantoprazole (PROTONIX) 20 mg tablet Take 1 tablet (20 mg total) by mouth daily 9/20/18   Rashid Mckeon MD   sucralfate (CARAFATE) 1 g tablet Take 1 tablet (1 g total) by mouth 4 (four) times a day 5/31/18   OMERO Tatum     I have reviewed home medications with patient personally  Allergies:    Allergies   Allergen Reactions    Morphine And Related Swelling       Social History:     Marital Status: Single     Patient Pre-hospital Living Situation:  Lives at home with her significant other  Patient Pre-hospital Level of Mobility:  No restrictions  Patient Pre-hospital Diet Restrictions:  No restrictions  Substance Use History:   Social History     Substance and Sexual Activity   Alcohol Use Yes    Comment: Patient states 'I drink alot of vodka a day"     Social History     Tobacco Use   Smoking Status Current Every Day Smoker    Packs/day: 0 50    Years: 29 00    Pack years: 14 50    Types: Cigarettes   Smokeless Tobacco Never Used     Social History     Substance and Sexual Activity   Drug Use No       Family History:    Family History   Problem Relation Age of Onset    Cirrhosis Father     Alcohol abuse Father     Drug abuse Mother        Physical Exam:     Vitals:   Blood Pressure: (!) 190/99 (06/24/19 2107)  Pulse: 71 (06/24/19 2107)  Temperature: 99 °F (37 2 °C) (06/24/19 1607)  Temp Source: Oral (06/24/19 1607)  Respirations: 16 (06/24/19 2107)  Weight - Scale: 86 2 kg (190 lb 0 6 oz) (06/24/19 1607)  SpO2: 100 % (06/24/19 2107)    Physical Exam general well-developed reasonably nourished female no acute distress she is obese normocephalic atraumatic pupils equal round reactive to light extraocular muscles intact mucous membranes are moist neck is supple there is no JVD no lymph nodes no carotid bruits chest is decreased but clear to auscultation is no rhonchi rales or wheezes  Cardiovascular regular rate rhythm positive S1 and S2 no S3-S4 murmur or gallop  Abdomen obese soft nontender nondistended with positive bowel sounds no hepatosplenomegaly no guarding or rebound  Neurologically patient awake alert oriented self general time cranial nerves 2-12 are intact    Additional Data:     Lab Results: I have personally reviewed pertinent reports  Results from last 7 days   Lab Units 06/24/19  1659   WBC Thousand/uL 10 29*   HEMOGLOBIN g/dL 14 0   HEMATOCRIT % 43 2   PLATELETS Thousands/uL 213   NEUTROS PCT % 60   LYMPHS PCT % 28   MONOS PCT % 10   EOS PCT % 1     Results from last 7 days   Lab Units 06/24/19  1629   SODIUM mmol/L 139   POTASSIUM mmol/L 3 4*   CHLORIDE mmol/L 103   CO2 mmol/L 24   BUN mg/dL 8   CREATININE mg/dL 0 71   ANION GAP mmol/L 12   CALCIUM mg/dL 8 4   ALBUMIN g/dL 3 4*   TOTAL BILIRUBIN mg/dL 0 40   ALK PHOS U/L 150*   ALT U/L 36   AST U/L 69*   GLUCOSE RANDOM mg/dL 280*                       Imaging: I have personally reviewed pertinent reports  CT abdomen pelvis with contrast   Final Result by Lucia Ma MD (06/24 2014)      1    Amorphous ill-defined hyperdensity in the superior aspect of liver possibly related to vascular shunting/ perfusion variation in this cirrhotic appearing liver  Underlying mass lesion not entirely excluded but felt to be less likely given the    appearance  Nonetheless surveillance imaging and/or further characterization with contrast-enhanced MRI of the liver recommended  2   Mild sigmoid diverticulosis  3   Subcutaneous nodularity in the posterior gluteal subcutaneous fat unchanged possibly related to injection granuloma or folliculitis  Direct clinical exam recommended  Workstation performed: [id number]             EKG, Pathology, and Other Studies Reviewed on Admission:   · EKG:  None recorded    Allscripts / Epic Records Reviewed: Yes     ** Please Note: This note has been constructed using a voice recognition system   **

## 2019-06-25 NOTE — PROGRESS NOTES
Responding to call from ED Nurses sent to my colleague before shift started  Patient with pain   Patient had informed us that she developed increased swelling her face after receiving dilaudid we , therefore, used fentanyl throughout the night  Patient reports this did not control her pain , then reported that the facial swelling was not directly related to dilaudid deliver as reported earlier  I will therefore, reinitiate dilaudid and we will monitor for any swelling

## 2019-06-25 NOTE — ASSESSMENT & PLAN NOTE
· Confirmed with the patient that she had neck swelling earlier before the admission  · Therefore, she is not allergic to Dilaudid

## 2019-06-25 NOTE — ASSESSMENT & PLAN NOTE
· Present on admission  · Patient with history of recurrent pancreatitis  · Currently, unable to tolerate diet due to nausea  · Continue with Dilaudid for pain management  · Continue with hydration

## 2019-06-25 NOTE — ED NOTES
1 CC-Abdominal pain/chronic pancreatitis    2  Orientation status- A&O x 4    3  Abnormal labs/vitals/focused assessment- WBC 11 0, Potassium 3 3, Albumin 3 0    4  Medication/drips- sodium chloride 0 9% with KCl 20 mE/q infusion 100 ml/hr    5  Narcotic time- 1 mg Dilaudid last given 12:48, 2ml Fetanyl given at 7:08    6  IV lines/drains/etc - 20 R hand    7  Isolation status- N/A    8 Skin- Intact    9  Ambulation status- Ambulatory, no assist needed    10   ED phone number- #[phone]       Genie Rosas, RN  06/25/19 8313

## 2019-06-26 PROBLEM — R16.0 LIVER MASS: Status: ACTIVE | Noted: 2019-06-26

## 2019-06-26 PROBLEM — E11.9 TYPE 2 DIABETES MELLITUS, WITHOUT LONG-TERM CURRENT USE OF INSULIN (HCC): Status: ACTIVE | Noted: 2019-06-26

## 2019-06-26 LAB
ANION GAP SERPL CALCULATED.3IONS-SCNC: 12 MMOL/L (ref 4–13)
BASOPHILS # BLD AUTO: 0.04 THOUSANDS/ΜL (ref 0–0.1)
BASOPHILS NFR BLD AUTO: 1 % (ref 0–1)
BUN SERPL-MCNC: 10 MG/DL (ref 5–25)
CALCIUM SERPL-MCNC: 6.9 MG/DL (ref 8.3–10.1)
CHLORIDE SERPL-SCNC: 101 MMOL/L (ref 100–108)
CO2 SERPL-SCNC: 23 MMOL/L (ref 21–32)
CREAT SERPL-MCNC: 0.61 MG/DL (ref 0.6–1.3)
EOSINOPHIL # BLD AUTO: 0.08 THOUSAND/ΜL (ref 0–0.61)
EOSINOPHIL NFR BLD AUTO: 1 % (ref 0–6)
ERYTHROCYTE [DISTWIDTH] IN BLOOD BY AUTOMATED COUNT: 17.2 % (ref 11.6–15.1)
GFR SERPL CREATININE-BSD FRML MDRD: 112 ML/MIN/1.73SQ M
GLUCOSE SERPL-MCNC: 124 MG/DL (ref 65–140)
GLUCOSE SERPL-MCNC: 134 MG/DL (ref 65–140)
GLUCOSE SERPL-MCNC: 177 MG/DL (ref 65–140)
HCT VFR BLD AUTO: 34.9 % (ref 34.8–46.1)
HGB BLD-MCNC: 11.5 G/DL (ref 11.5–15.4)
IMM GRANULOCYTES # BLD AUTO: 0.04 THOUSAND/UL (ref 0–0.2)
IMM GRANULOCYTES NFR BLD AUTO: 1 % (ref 0–2)
LYMPHOCYTES # BLD AUTO: 2.65 THOUSANDS/ΜL (ref 0.6–4.47)
LYMPHOCYTES NFR BLD AUTO: 34 % (ref 14–44)
MCH RBC QN AUTO: 29.9 PG (ref 26.8–34.3)
MCHC RBC AUTO-ENTMCNC: 33 G/DL (ref 31.4–37.4)
MCV RBC AUTO: 91 FL (ref 82–98)
MONOCYTES # BLD AUTO: 0.6 THOUSAND/ΜL (ref 0.17–1.22)
MONOCYTES NFR BLD AUTO: 8 % (ref 4–12)
NEUTROPHILS # BLD AUTO: 4.47 THOUSANDS/ΜL (ref 1.85–7.62)
NEUTS SEG NFR BLD AUTO: 55 % (ref 43–75)
NRBC BLD AUTO-RTO: 0 /100 WBCS
PLATELET # BLD AUTO: 129 THOUSANDS/UL (ref 149–390)
PMV BLD AUTO: 9.8 FL (ref 8.9–12.7)
POTASSIUM SERPL-SCNC: 3.7 MMOL/L (ref 3.5–5.3)
RBC # BLD AUTO: 3.84 MILLION/UL (ref 3.81–5.12)
SODIUM SERPL-SCNC: 136 MMOL/L (ref 136–145)
WBC # BLD AUTO: 7.88 THOUSAND/UL (ref 4.31–10.16)

## 2019-06-26 PROCEDURE — 85025 COMPLETE CBC W/AUTO DIFF WBC: CPT | Performed by: FAMILY MEDICINE

## 2019-06-26 PROCEDURE — 80048 BASIC METABOLIC PNL TOTAL CA: CPT | Performed by: FAMILY MEDICINE

## 2019-06-26 PROCEDURE — 99232 SBSQ HOSP IP/OBS MODERATE 35: CPT | Performed by: FAMILY MEDICINE

## 2019-06-26 PROCEDURE — 99222 1ST HOSP IP/OBS MODERATE 55: CPT | Performed by: INTERNAL MEDICINE

## 2019-06-26 PROCEDURE — 82948 REAGENT STRIP/BLOOD GLUCOSE: CPT

## 2019-06-26 RX ORDER — LORAZEPAM 1 MG/1
2 TABLET ORAL ONCE
Status: COMPLETED | OUTPATIENT
Start: 2019-06-26 | End: 2019-06-26

## 2019-06-26 RX ORDER — GABAPENTIN 300 MG/1
300 CAPSULE ORAL 3 TIMES DAILY
Qty: 90 CAPSULE | Refills: 0 | Status: ON HOLD | OUTPATIENT
Start: 2019-06-26 | End: 2020-04-17

## 2019-06-26 RX ORDER — DEXTROSE AND SODIUM CHLORIDE 5; .9 G/100ML; G/100ML
75 INJECTION, SOLUTION INTRAVENOUS CONTINUOUS
Status: DISCONTINUED | OUTPATIENT
Start: 2019-06-26 | End: 2019-06-26

## 2019-06-26 RX ORDER — NICOTINE 21 MG/24HR
1 PATCH, TRANSDERMAL 24 HOURS TRANSDERMAL DAILY
Qty: 28 PATCH | Refills: 0 | Status: ON HOLD | OUTPATIENT
Start: 2019-06-26 | End: 2020-04-17

## 2019-06-26 RX ORDER — HEPARIN SODIUM 5000 [USP'U]/ML
5000 INJECTION, SOLUTION INTRAVENOUS; SUBCUTANEOUS EVERY 8 HOURS SCHEDULED
Status: DISCONTINUED | OUTPATIENT
Start: 2019-06-26 | End: 2019-06-29 | Stop reason: HOSPADM

## 2019-06-26 RX ORDER — ONDANSETRON 4 MG/1
4 TABLET, FILM COATED ORAL EVERY 8 HOURS PRN
Qty: 20 TABLET | Refills: 0 | Status: ON HOLD | OUTPATIENT
Start: 2019-06-26 | End: 2020-04-17

## 2019-06-26 RX ADMIN — FLUTICASONE FUROATE AND VILANTEROL TRIFENATATE 1 PUFF: 100; 25 POWDER RESPIRATORY (INHALATION) at 10:20

## 2019-06-26 RX ADMIN — HYDROMORPHONE HYDROCHLORIDE 1 MG: 1 INJECTION, SOLUTION INTRAMUSCULAR; INTRAVENOUS; SUBCUTANEOUS at 08:10

## 2019-06-26 RX ADMIN — LORAZEPAM 2 MG: 1 TABLET ORAL at 14:57

## 2019-06-26 RX ADMIN — Medication 400 MCG: at 10:24

## 2019-06-26 RX ADMIN — SUCRALFATE 1 G: 1 TABLET ORAL at 10:25

## 2019-06-26 RX ADMIN — HYDROMORPHONE HYDROCHLORIDE 1 MG: 1 INJECTION, SOLUTION INTRAMUSCULAR; INTRAVENOUS; SUBCUTANEOUS at 21:12

## 2019-06-26 RX ADMIN — DICYCLOMINE HYDROCHLORIDE 20 MG: 20 TABLET ORAL at 20:18

## 2019-06-26 RX ADMIN — PANTOPRAZOLE SODIUM 20 MG: 20 TABLET, DELAYED RELEASE ORAL at 10:23

## 2019-06-26 RX ADMIN — HYDROMORPHONE HYDROCHLORIDE 1 MG: 1 INJECTION, SOLUTION INTRAMUSCULAR; INTRAVENOUS; SUBCUTANEOUS at 03:54

## 2019-06-26 RX ADMIN — SUCRALFATE 1 G: 1 TABLET ORAL at 12:46

## 2019-06-26 RX ADMIN — SODIUM CHLORIDE AND POTASSIUM CHLORIDE 100 ML/HR: .9; .15 SOLUTION INTRAVENOUS at 10:19

## 2019-06-26 RX ADMIN — DICYCLOMINE HYDROCHLORIDE 20 MG: 20 TABLET ORAL at 17:06

## 2019-06-26 RX ADMIN — HYDROMORPHONE HYDROCHLORIDE 1 MG: 1 INJECTION, SOLUTION INTRAMUSCULAR; INTRAVENOUS; SUBCUTANEOUS at 12:46

## 2019-06-26 RX ADMIN — CLONIDINE HYDROCHLORIDE 0.3 MG: 0.1 TABLET ORAL at 10:24

## 2019-06-26 RX ADMIN — CLONIDINE HYDROCHLORIDE 0.3 MG: 0.1 TABLET ORAL at 17:06

## 2019-06-26 RX ADMIN — THIAMINE HCL TAB 100 MG 100 MG: 100 TAB at 10:23

## 2019-06-26 RX ADMIN — DICYCLOMINE HYDROCHLORIDE 20 MG: 20 TABLET ORAL at 12:46

## 2019-06-26 RX ADMIN — MELATONIN 3 MG: at 22:40

## 2019-06-26 RX ADMIN — NICOTINE 1 PATCH: 21 PATCH TRANSDERMAL at 22:40

## 2019-06-26 RX ADMIN — SUCRALFATE 1 G: 1 TABLET ORAL at 22:40

## 2019-06-26 RX ADMIN — HYDROMORPHONE HYDROCHLORIDE 1 MG: 1 INJECTION, SOLUTION INTRAMUSCULAR; INTRAVENOUS; SUBCUTANEOUS at 17:03

## 2019-06-26 RX ADMIN — HYDROMORPHONE HYDROCHLORIDE 1 MG: 1 INJECTION, SOLUTION INTRAMUSCULAR; INTRAVENOUS; SUBCUTANEOUS at 00:13

## 2019-06-26 RX ADMIN — SUCRALFATE 1 G: 1 TABLET ORAL at 17:06

## 2019-06-26 NOTE — PLAN OF CARE
Problem: PAIN - ADULT  Goal: Verbalizes/displays adequate comfort level or baseline comfort level  Description  Interventions:  - Encourage patient to monitor pain and request assistance  - Assess pain using appropriate pain scale  - Administer analgesics based on type and severity of pain and evaluate response  - Implement non-pharmacological measures as appropriate and evaluate response  - Consider cultural and social influences on pain and pain management  - Notify physician/advanced practitioner if interventions unsuccessful or patient reports new pain  Outcome: Progressing     Problem: INFECTION - ADULT  Goal: Absence or prevention of progression during hospitalization  Description  INTERVENTIONS:  - Assess and monitor for signs and symptoms of infection  - Monitor lab/diagnostic results  - Monitor all insertion sites, i e  indwelling lines, tubes, and drains  - Monitor endotracheal (as able) and nasal secretions for changes in amount and color  - [place] appropriate cooling/warming therapies per order  - Administer medications as ordered  - Instruct and encourage patient and family to use good hand hygiene technique  - Identify and instruct in appropriate isolation precautions for identified infection/condition  Outcome: Progressing  Goal: Absence of fever/infection during neutropenic period  Description  INTERVENTIONS:  - Monitor WBC  - Implement neutropenic guidelines  Outcome: Progressing     Problem: SAFETY ADULT  Goal: Patient will remain free of falls  Description  INTERVENTIONS:  - Assess patient frequently for physical needs  -  Identify cognitive and physical deficits and behaviors that affect risk of falls    -  [place] fall precautions as indicated by assessment   - Educate patient/family on patient safety including physical limitations  - Instruct patient to call for assistance with activity based on assessment  - Modify environment to reduce risk of injury  - Consider OT/PT consult to assist with strengthening/mobility  Outcome: Progressing  Goal: Maintain or return to baseline ADL function  Description  INTERVENTIONS:  -  Assess patient's ability to carry out ADLs; assess patient's baseline for ADL function and identify physical deficits which impact ability to perform ADLs (bathing, care of mouth/teeth, toileting, grooming, dressing, etc )  - Assess/evaluate cause of self-care deficits   - Assess range of motion  - Assess patient's mobility; develop plan if impaired  - Assess patient's need for assistive devices and provide as appropriate  - Encourage maximum independence but intervene and supervise when necessary  ¯ Involve family in performance of ADLs  ¯ Assess for home care needs following discharge   ¯ Request OT consult to assist with ADL evaluation and planning for discharge  ¯ Provide patient education as appropriate  Outcome: Progressing  Goal: Maintain or return mobility status to optimal level  Description  INTERVENTIONS:  - Assess patient's baseline mobility status (ambulation, transfers, stairs, etc )    - Identify cognitive and physical deficits and behaviors that affect mobility  - Identify mobility aids required to assist with transfers and/or ambulation (gait belt, sit-to-stand, lift, walker, cane, etc )  - [place] fall precautions as indicated by assessment  - Record patient progress and toleration of activity level on Mobility SBAR; progress patient to next Phase/Stage  - Instruct patient to call for assistance with activity based on assessment  - Request Rehabilitation consult to assist with strengthening/weightbearing, etc   Outcome: Progressing     Problem: DISCHARGE PLANNING  Goal: Discharge to home or other facility with appropriate resources  Description  INTERVENTIONS:  - Identify barriers to discharge w/patient and caregiver  - Arrange for needed discharge resources and transportation as appropriate  - Identify discharge learning needs (meds, wound care, etc )  - Arrange for interpretive services to assist at discharge as needed  - Refer to Case Management Department for coordinating discharge planning if the patient needs post-hospital services based on physician/advanced practitioner order or complex needs related to functional status, cognitive ability, or social support system  Outcome: Progressing     Problem: Knowledge Deficit  Goal: Patient/family/caregiver demonstrates understanding of disease process, treatment plan, medications, and discharge instructions  Description  Complete learning assessment and assess knowledge base    Interventions:  - Provide teaching at level of understanding  - Provide teaching via preferred learning methods  Outcome: Progressing

## 2019-06-26 NOTE — ASSESSMENT & PLAN NOTE
Patient continues to require CIWA protocol  Continue with multivitamin folate and thiamine when able to take p o

## 2019-06-26 NOTE — ASSESSMENT & PLAN NOTE
· Present on admission  · Patient with history of recurrent pancreatitis  · Tolerates diet  · Continue with Dilaudid for pain management, but decrease the dose of Dilaudid to 0 2 mg p r n   Every 4 hours  ·   ·

## 2019-06-26 NOTE — ASSESSMENT & PLAN NOTE
Patient continues to require CIWA protocol, but at this point will provide increased dose of Ativan p r n  due to the active delirium  Continue with multivitamin folate and thiamine when able to take p o  Patient had psychiatric evaluation and got extensive counseling are to encourage her to stop alcohol use

## 2019-06-26 NOTE — ASSESSMENT & PLAN NOTE
Lab Results   Component Value Date    HGBA1C 7 2 (H) 05/24/2019       No results for input(s): POCGLU in the last 72 hours  Blood Sugar Average: Last 72 hrs:   New diagnosis of diabetes  Discussed management with the patient  Plan to start on metformin 500 mg daily upon discharge  For now, insulin sliding scale

## 2019-06-26 NOTE — ASSESSMENT & PLAN NOTE
· Elevated blood pressure likely due to pain and alcohol withdrawal   · Patient also was not able to take oral medications for blood pressure management  · Continue hydralazine as needed

## 2019-06-26 NOTE — PROGRESS NOTES
Progress Note - Adrian Mcgregor 1975, 44 y o  female MRN: [id number]    Unit/Bed#: -01 Encounter: [id number]    Primary Care Provider: Kateryna Quintanilla MD   Date and time admitted to hospital: 6/24/2019  4:00 PM        Liver mass  Assessment & Plan  CT of the abdomen revealed: " Amorphous ill-defined hyperdensity in the superior aspect of liver possibly related to vascular shunting/ perfusion variation in this cirrhotic appearing liver  Underlying mass lesion not entirely excluded but felt to be less likely given the   appearance  Nonetheless surveillance imaging and/or further characterization with contrast-enhanced MRI of the liver recommended "    Consulted GI for further recommendations on follow-up of this mass  Neck swelling  Assessment & Plan  · Confirmed with the patient that she had neck swelling earlier before the admission  · Therefore, she is not allergic to Dilaudid  Alcoholic cirrhosis ([facility] [address] )  Assessment & Plan  Consulted GI for further recommendations    Alcohol abuse  Assessment & Plan  Patient continues to require CIWA protocol  Continue with multivitamin folate and thiamine when able to take p o  Hypokalemia  Assessment & Plan  Will add potassium to the IV fluids    Essential hypertension  Assessment & Plan  · Elevated blood pressure likely due to pain and alcohol withdrawal   · Patient also was not able to take oral medications for blood pressure management  · Continue hydralazine as needed    * Acute on chronic pancreatitis [facility])  Assessment & Plan  · Present on admission  · Patient with history of recurrent pancreatitis  · Currently, unable to tolerate diet due to nausea  · Continue with Dilaudid for pain management  · Continue with hydration  VTE Pharmacologic Prophylaxis:   Pharmacologic: Heparin  Mechanical VTE Prophylaxis in Place: Yes    Patient Centered Rounds: I have performed bedside rounds with nursing staff today      Discussions with Specialists or Other Care Team Provider:  Nursing    Education and Discussions with Family / Patient:  Patient    Time Spent for Care: 20 minutes  More than 50% of total time spent on counseling and coordination of care as described above  Current Length of Stay: 1 day(s)    Current Patient Status: Inpatient   Certification Statement: The patient will continue to require additional inpatient hospital stay due to Pancreatitis and oral diet intolerance    Discharge Plan:  48 hours    Code Status: Level 1 - Full Code      Subjective:   Patient was seen and examined today  She reports that her abdominal pain is improved, however, does not feel like she can start eating any food at this time        Objective:     Vitals:   Temp (24hrs), Av 4 °F (36 9 °C), Min:98 2 °F (36 8 °C), Max:98 6 °F (37 °C)    Temp:  [98 2 °F (36 8 °C)-98 6 °F (37 °C)] 98 6 °F (37 °C)  HR:  [62-84] 73  Resp:  [17-18] 17  BP: (130-175)/() 175/113  SpO2:  [96 %-100 %] 99 %  Body mass index is 32 62 kg/m²  Input and Output Summary (last 24 hours): Intake/Output Summary (Last 24 hours) at 2019 0841  Last data filed at 2019 2208  Gross per 24 hour   Intake 1000 ml   Output --   Net 1000 ml       Physical Exam:     Physical Exam   Constitutional: She appears well-developed and well-nourished  HENT:   Head: Normocephalic and atraumatic  Cardiovascular: Normal rate and regular rhythm  Pulmonary/Chest: Effort normal and breath sounds normal    Abdominal: Soft  She exhibits no distension  There is tenderness (Generalized)  There is no rebound  Musculoskeletal: She exhibits no edema  Neurological: She is alert  She has normal strength  No cranial nerve deficit  Skin: Skin is warm  No erythema  Psychiatric: She has a normal mood and affect   Her behavior is normal        Additional Data:     Labs:    Results from last 7 days   Lab Units 19  0541   WBC Thousand/uL 7 88   HEMOGLOBIN g/dL 11 5   HEMATOCRIT % 34 9 PLATELETS Thousands/uL 129*   NEUTROS PCT % 55   LYMPHS PCT % 34   MONOS PCT % 8   EOS PCT % 1     Results from last 7 days   Lab Units 06/26/19  0541 06/25/19  0449   SODIUM mmol/L 136 137   POTASSIUM mmol/L 3 7 3 3*   CHLORIDE mmol/L 101 102   CO2 mmol/L 23 26   BUN mg/dL 10 6   CREATININE mg/dL 0 61 0 64   ANION GAP mmol/L 12 9   CALCIUM mg/dL 6 9* 7 3*   ALBUMIN g/dL  --  3 0*   TOTAL BILIRUBIN mg/dL  --  0 90   ALK PHOS U/L  --  128*   ALT U/L  --  30   AST U/L  --  63*   GLUCOSE RANDOM mg/dL 124 127                           * I Have Reviewed All Lab Data Listed Above  * Additional Pertinent Lab Tests Reviewed:  All Labs Within Last 24 Hours Reviewed    Imaging:      CT of the abdomen    Recent Cultures (last 7 days):           Last 24 Hours Medication List:     Current Facility-Administered Medications:  acetaminophen 650 mg Oral Q6H PRN Roseanna Cruz MD    albuterol 2 puff Inhalation Q4H PRN Roseanna Cruz MD    cloNIDine 0 3 mg Oral BID Roseanna Cruz MD    dicyclomine 20 mg Oral TID Roseanna Cruz MD    diphenhydrAMINE 25 mg Oral Q6H PRN Roseanna Cruz MD    fluticasone-vilanterol 1 puff Inhalation Daily Roseanna Cruz MD    folic acid 008 mcg Oral Daily Brian Zapata MD    hydrALAZINE 10 mg Intravenous Q6H PRN Roseanna Cruz MD    HYDROmorphone 1 mg Intravenous Q4H PRN Roseanna Cruz MD    melatonin 3 mg Oral HS Roseanna Cruz MD    nicotine 1 patch Transdermal Daily Roseanna Cruz MD    ondansetron 4 mg Intravenous Q6H PRN Roseanna Cruz MD    pantoprazole 20 mg Oral Daily Roseanna Cruz MD    sodium chloride 0 9 % with KCl 20 mEq/L 100 mL/hr Intravenous Continuous Roseanna Cruz MD Last Rate: 100 mL/hr (06/25/19 2208)   sucralfate 1 g Oral 4x Daily Roseanna Cruz MD    thiamine 100 mg Oral Daily Brian Zapata MD         Today, Patient Was Seen By: Brian Zapata MD    ** Please Note: Dictation voice to text software may have been used in the creation of this document   **

## 2019-06-26 NOTE — ASSESSMENT & PLAN NOTE
· Elevated blood pressure likely due to pain and alcohol withdrawal   ·   · Continue hydralazine as needed and oral medication regimen for blood pressure control

## 2019-06-26 NOTE — ASSESSMENT & PLAN NOTE
CT of the abdomen revealed: " Amorphous ill-defined hyperdensity in the superior aspect of liver possibly related to vascular shunting/ perfusion variation in this cirrhotic appearing liver  Underlying mass lesion not entirely excluded but felt to be less likely given the   appearance  Nonetheless surveillance imaging and/or further characterization with contrast-enhanced MRI of the liver recommended "    MRCP was done  Results are not suggestive of liver mass, just fibrosis  GI continues to follow

## 2019-06-26 NOTE — ASSESSMENT & PLAN NOTE
CT of the abdomen revealed: " Amorphous ill-defined hyperdensity in the superior aspect of liver possibly related to vascular shunting/ perfusion variation in this cirrhotic appearing liver  Underlying mass lesion not entirely excluded but felt to be less likely given the   appearance  Nonetheless surveillance imaging and/or further characterization with contrast-enhanced MRI of the liver recommended "    Consulted GI for further recommendations on follow-up of this mass

## 2019-06-26 NOTE — CONSULTS
Consultation - [facility]  Theresa Yue 44 y o  female MRN: [id number]  Unit/Bed#: -01 Encounter: [id number]        Consults    Reason for Consult / Principal Problem: Abnormal CT scan    HPI: Ms Lauren Higgins is a 45 yo F with a PMH of alcohol abuse, alcoholic cirrhosis, chronic pancreatitis 2/2 alcohol abuse, who presented on 6/24 with recurrent abdominal pain, nausea and vomiting after she ate breakfast  It is documented that she had an alcoholic beverage at breakfast but she did not report this to me  This has been a chronic issue with recurrent abdominal pain after alcohol ingestion and she continues to drink alcohol, she reports significant anxiety and this is a cause for her alcohol use  On admission, she had a CT which showed a hyperdensity in the superior aspect of liver likely related to vascular shunting in a cirrhotic liver but could not exclude underlying mass  No other significant findings  REVIEW OF SYSTEMS: Negative except for as stated above      Historical Information   Past Medical History:   Diagnosis Date    Alcohol abuse     Arthritis     GERD (gastroesophageal reflux disease)     Hypertension     Nicotine dependence     Obesity     Pancreatitis     Panic attack      Past Surgical History:   Procedure Laterality Date    ABDOMINAL SURGERY      C SECTION    ESOPHAGOGASTRODUODENOSCOPY N/A 12/15/2017    Procedure: ESOPHAGOGASTRODUODENOSCOPY (EGD); Surgeon: Rain Duran MD;  Location: [facility];   Service: Gastroenterology     Social History   Social History     Substance and Sexual Activity   Alcohol Use Never    Frequency: Never    Comment: Patient states 'I drink alot of vodka a day"     Social History     Substance and Sexual Activity   Drug Use No     Social History     Tobacco Use   Smoking Status Current Every Day Smoker    Packs/day: 0 50    Years: 29 00    Pack years: 14 50    Types: Cigarettes   Smokeless Tobacco Never Used     Family History   Problem Relation Age of Onset    Cirrhosis Father     Alcohol abuse Father     Drug abuse Mother        Meds/Allergies     Medications Prior to Admission   Medication    albuterol (PROVENTIL HFA,VENTOLIN HFA) 90 mcg/act inhaler    cloNIDine (CATAPRES) 0 3 mg tablet    dicyclomine (BENTYL) 20 mg tablet    Mometasone Furo-Formoterol Fum (DULERA) 100-5 MCG/ACT AERO    ondansetron (ZOFRAN-ODT) 4 mg disintegrating tablet    pancrelipase, Lip-Prot-Amyl, (CREON) 24,000 units    pantoprazole (PROTONIX) 20 mg tablet    sucralfate (CARAFATE) 1 g tablet    oxyCODONE (ROXICODONE) 10 MG TABS     Current Facility-Administered Medications   Medication Dose Route Frequency    acetaminophen (TYLENOL) tablet 650 mg  650 mg Oral Q6H PRN    albuterol (PROVENTIL HFA,VENTOLIN HFA) inhaler 2 puff  2 puff Inhalation Q4H PRN    cloNIDine (CATAPRES) tablet 0 3 mg  0 3 mg Oral BID    dicyclomine (BENTYL) tablet 20 mg  20 mg Oral TID    diphenhydrAMINE (BENADRYL) tablet 25 mg  25 mg Oral Q6H PRN    fluticasone-vilanterol (BREO ELLIPTA) 100-25 mcg/inh inhaler 1 puff  1 puff Inhalation Daily    folic acid (FOLVITE) tablet 400 mcg  400 mcg Oral Daily    heparin (porcine) subcutaneous injection 5,000 Units  5,000 Units Subcutaneous Q8H [address]    hydrALAZINE (APRESOLINE) injection 10 mg  10 mg Intravenous Q6H PRN    HYDROmorphone (DILAUDID) injection 1 mg  1 mg Intravenous Q4H PRN    melatonin tablet 3 mg  3 mg Oral HS    nicotine (NICODERM CQ) 21 mg/24 hr TD 24 hr patch 1 patch  1 patch Transdermal Daily    ondansetron (ZOFRAN) injection 4 mg  4 mg Intravenous Q6H PRN    pantoprazole (PROTONIX) EC tablet 20 mg  20 mg Oral Daily    sucralfate (CARAFATE) tablet 1 g  1 g Oral 4x Daily    thiamine (VITAMIN B1) tablet 100 mg  100 mg Oral Daily       Allergies   Allergen Reactions    Morphine Swelling    Morphine And Related Swelling           Objective     Blood pressure (!) 161/102, pulse 63, temperature 97 9 °F (36 6 °C), resp  rate 19, weight 86 2 kg (190 lb 0 6 oz), last menstrual period 06/03/2019, SpO2 100 %, not currently breastfeeding  Intake/Output Summary (Last 24 hours) at 6/26/2019 1241  Last data filed at 6/25/2019 2208  Gross per 24 hour   Intake 1000 ml   Output --   Net 1000 ml         PHYSICAL EXAM:      General Appearance:   Alert, cooperative, no distress, appears stated age    HEENT:   Normocephalic, atraumatic, anicteric      Neck:  Supple, symmetrical, trachea midline, no adenopathy;    thyroid: no enlargement/tenderness/nodules; no carotid  bruit or JVD    Lungs:   Clear to auscultation bilaterally; no rales, rhonchi or wheezing; respirations unlabored    Heart[de-identified]   S1 and S2 normal; regular rate and rhythm; no murmur, rub, or gallop  Abdomen:   Soft, non-tender, non-distended; normal bowel sounds; no masses, no organomegaly    Rectal:   Deferred    Extremities:  No cyanosis, clubbing or edema    Pulses:  2+ and symmetric all extremities    Skin:  Skin color, texture, turgor normal, no rashes or lesions      Lab Results:   Results from last 7 days   Lab Units 06/26/19  0541   WBC Thousand/uL 7 88   HEMOGLOBIN g/dL 11 5   HEMATOCRIT % 34 9   PLATELETS Thousands/uL 129*   NEUTROS PCT % 55   LYMPHS PCT % 34   MONOS PCT % 8   EOS PCT % 1     Results from last 7 days   Lab Units 06/26/19  0541 06/25/19  0449   POTASSIUM mmol/L 3 7 3 3*   CHLORIDE mmol/L 101 102   CO2 mmol/L 23 26   BUN mg/dL 10 6   CREATININE mg/dL 0 61 0 64   CALCIUM mg/dL 6 9* 7 3*   ALK PHOS U/L  --  128*   ALT U/L  --  30   AST U/L  --  63*         Results from last 7 days   Lab Units 06/24/19  1629   LIPASE u/L 120       Imaging Studies: I have personally reviewed pertinent imaging studies  Ct Abdomen Pelvis With Contrast    Result Date: 6/24/2019  Impression: 1  Amorphous ill-defined hyperdensity in the superior aspect of liver possibly related to vascular shunting/ perfusion variation in this cirrhotic appearing liver  Underlying mass lesion not entirely excluded but felt to be less likely given the appearance  Nonetheless surveillance imaging and/or further characterization with contrast-enhanced MRI of the liver recommended  2   Mild sigmoid diverticulosis  3   Subcutaneous nodularity in the posterior gluteal subcutaneous fat unchanged possibly related to injection granuloma or folliculitis  Direct clinical exam recommended  Workstation performed: [id number]       ASSESSMENT and PLAN:      Abdominal Pain  Chronic Pancreatitis  Alcohol Abuse  - She continue to have recurrent abdominal pain likely related to chronic pancreatitis and continued alcohol abuse  - Alcohol cessation  - We are arranging for OP EUS to confirm chronic pancreatitis and consider celiac plexus block for pain control if this is confirmed, prior MRI/MRCP in 2017 showed chronic dilation of main PD to 7 mm  - OP pain management  - Could consider possibility of diabetic gastroparesis as component of pain, if she is compliant with follow up can check a GES    Abnormal CT of Liver  Alcoholic Cirrhosis  - Low MELD and relatively compensated but she continues to abuse alcohol  - No ascites/LE edema, no encephalopathy, no history of esophageal varices on EGD in 2017 and should have a EGD as outpatient to update her esophageal varices screen  - CT A/P showed abnormal appearing hyperdensity in the liver, unable to exclude underlying mass  - Will plan for MRI/MRCP for further evaluation    Castano's Esophagus  - Continue protonix 40 mg BID  - Will need outpatient EGD for Castano's surveillance      The patient will be seen and examined by Dr Allie Al

## 2019-06-27 ENCOUNTER — APPOINTMENT (INPATIENT)
Dept: MRI IMAGING | Facility: HOSPITAL | Age: 44
DRG: 052 | End: 2019-06-27
Payer: COMMERCIAL

## 2019-06-27 PROBLEM — G93.41 ACUTE METABOLIC ENCEPHALOPATHY: Status: ACTIVE | Noted: 2019-06-27

## 2019-06-27 LAB
GLUCOSE SERPL-MCNC: 127 MG/DL (ref 65–140)
GLUCOSE SERPL-MCNC: 148 MG/DL (ref 65–140)
GLUCOSE SERPL-MCNC: 154 MG/DL (ref 65–140)

## 2019-06-27 PROCEDURE — 82948 REAGENT STRIP/BLOOD GLUCOSE: CPT

## 2019-06-27 PROCEDURE — A9585 GADOBUTROL INJECTION: HCPCS | Performed by: PHYSICIAN ASSISTANT

## 2019-06-27 PROCEDURE — 74183 MRI ABD W/O CNTR FLWD CNTR: CPT

## 2019-06-27 PROCEDURE — 99233 SBSQ HOSP IP/OBS HIGH 50: CPT | Performed by: FAMILY MEDICINE

## 2019-06-27 PROCEDURE — G0425 INPT/ED TELECONSULT30: HCPCS | Performed by: PSYCHIATRY & NEUROLOGY

## 2019-06-27 PROCEDURE — 99232 SBSQ HOSP IP/OBS MODERATE 35: CPT | Performed by: INTERNAL MEDICINE

## 2019-06-27 PROCEDURE — 94762 N-INVAS EAR/PLS OXIMTRY CONT: CPT

## 2019-06-27 PROCEDURE — 76377 3D RENDER W/INTRP POSTPROCES: CPT

## 2019-06-27 RX ORDER — LORAZEPAM 2 MG/ML
2 INJECTION INTRAMUSCULAR ONCE
Status: COMPLETED | OUTPATIENT
Start: 2019-06-27 | End: 2019-06-27

## 2019-06-27 RX ORDER — HALOPERIDOL 5 MG
5 TABLET ORAL EVERY 6 HOURS PRN
Status: DISCONTINUED | OUTPATIENT
Start: 2019-06-27 | End: 2019-06-27

## 2019-06-27 RX ORDER — HALOPERIDOL 5 MG
5 TABLET ORAL EVERY 4 HOURS PRN
Status: DISCONTINUED | OUTPATIENT
Start: 2019-06-27 | End: 2019-06-29 | Stop reason: HOSPADM

## 2019-06-27 RX ORDER — HALOPERIDOL 5 MG
5 TABLET ORAL EVERY 4 HOURS
Status: DISCONTINUED | OUTPATIENT
Start: 2019-06-27 | End: 2019-06-27

## 2019-06-27 RX ORDER — HYDROMORPHONE HCL/PF 1 MG/ML
0.2 SYRINGE (ML) INJECTION EVERY 4 HOURS PRN
Status: DISCONTINUED | OUTPATIENT
Start: 2019-06-27 | End: 2019-06-28

## 2019-06-27 RX ORDER — LORAZEPAM 2 MG/ML
1 INJECTION INTRAMUSCULAR ONCE
Status: COMPLETED | OUTPATIENT
Start: 2019-06-27 | End: 2019-06-27

## 2019-06-27 RX ORDER — CALCIUM CARBONATE 500(1250)
1 TABLET ORAL 2 TIMES DAILY WITH MEALS
Status: DISCONTINUED | OUTPATIENT
Start: 2019-06-27 | End: 2019-06-29 | Stop reason: HOSPADM

## 2019-06-27 RX ORDER — DIPHENHYDRAMINE HCL 25 MG
50 TABLET ORAL EVERY 6 HOURS PRN
Status: DISCONTINUED | OUTPATIENT
Start: 2019-06-27 | End: 2019-06-29 | Stop reason: HOSPADM

## 2019-06-27 RX ORDER — LORAZEPAM 1 MG/1
2 TABLET ORAL ONCE
Status: DISCONTINUED | OUTPATIENT
Start: 2019-06-27 | End: 2019-06-29 | Stop reason: HOSPADM

## 2019-06-27 RX ORDER — HYDROMORPHONE HCL/PF 1 MG/ML
SYRINGE (ML) INJECTION
Status: COMPLETED
Start: 2019-06-27 | End: 2019-06-27

## 2019-06-27 RX ORDER — DIPHENHYDRAMINE HYDROCHLORIDE 50 MG/ML
50 INJECTION INTRAMUSCULAR; INTRAVENOUS ONCE
Status: COMPLETED | OUTPATIENT
Start: 2019-06-27 | End: 2019-06-27

## 2019-06-27 RX ORDER — HYDRALAZINE HYDROCHLORIDE 20 MG/ML
5 INJECTION INTRAMUSCULAR; INTRAVENOUS ONCE
Status: DISCONTINUED | OUTPATIENT
Start: 2019-06-27 | End: 2019-06-29 | Stop reason: HOSPADM

## 2019-06-27 RX ORDER — LORAZEPAM 2 MG/ML
2 INJECTION INTRAMUSCULAR EVERY 4 HOURS PRN
Status: DISCONTINUED | OUTPATIENT
Start: 2019-06-27 | End: 2019-06-29 | Stop reason: HOSPADM

## 2019-06-27 RX ADMIN — DICYCLOMINE HYDROCHLORIDE 20 MG: 20 TABLET ORAL at 10:11

## 2019-06-27 RX ADMIN — HYDRALAZINE HYDROCHLORIDE 10 MG: 20 INJECTION INTRAMUSCULAR; INTRAVENOUS at 19:11

## 2019-06-27 RX ADMIN — CLONIDINE HYDROCHLORIDE 0.3 MG: 0.1 TABLET ORAL at 10:11

## 2019-06-27 RX ADMIN — HYDROMORPHONE HYDROCHLORIDE 1 MG: 1 INJECTION, SOLUTION INTRAMUSCULAR; INTRAVENOUS; SUBCUTANEOUS at 01:13

## 2019-06-27 RX ADMIN — HYDROMORPHONE HYDROCHLORIDE 1 MG: 1 INJECTION, SOLUTION INTRAMUSCULAR; INTRAVENOUS; SUBCUTANEOUS at 05:30

## 2019-06-27 RX ADMIN — Medication 400 MCG: at 10:13

## 2019-06-27 RX ADMIN — SUCRALFATE 1 G: 1 TABLET ORAL at 10:11

## 2019-06-27 RX ADMIN — HALOPERIDOL 5 MG: 5 TABLET ORAL at 16:02

## 2019-06-27 RX ADMIN — NICOTINE 1 PATCH: 21 PATCH TRANSDERMAL at 23:06

## 2019-06-27 RX ADMIN — CLONIDINE HYDROCHLORIDE 0.3 MG: 0.1 TABLET ORAL at 20:50

## 2019-06-27 RX ADMIN — LORAZEPAM 1 MG: 2 INJECTION INTRAMUSCULAR; INTRAVENOUS at 11:36

## 2019-06-27 RX ADMIN — DIPHENHYDRAMINE HYDROCHLORIDE 50 MG: 50 INJECTION, SOLUTION INTRAMUSCULAR; INTRAVENOUS at 16:07

## 2019-06-27 RX ADMIN — LORAZEPAM 2 MG: 2 INJECTION INTRAMUSCULAR; INTRAVENOUS at 21:25

## 2019-06-27 RX ADMIN — HYDRALAZINE HYDROCHLORIDE 10 MG: 20 INJECTION INTRAMUSCULAR; INTRAVENOUS at 06:06

## 2019-06-27 RX ADMIN — HYDROMORPHONE HYDROCHLORIDE 1 MG: 1 INJECTION, SOLUTION INTRAMUSCULAR; INTRAVENOUS; SUBCUTANEOUS at 10:12

## 2019-06-27 RX ADMIN — THIAMINE HCL TAB 100 MG 100 MG: 100 TAB at 10:11

## 2019-06-27 RX ADMIN — LORAZEPAM 2 MG: 2 INJECTION INTRAMUSCULAR; INTRAVENOUS at 16:02

## 2019-06-27 RX ADMIN — LORAZEPAM 2 MG: 2 INJECTION INTRAMUSCULAR; INTRAVENOUS at 14:48

## 2019-06-27 RX ADMIN — FLUTICASONE FUROATE AND VILANTEROL TRIFENATATE 1 PUFF: 100; 25 POWDER RESPIRATORY (INHALATION) at 10:12

## 2019-06-27 RX ADMIN — HYDROMORPHONE HYDROCHLORIDE 0.2 MG: 1 INJECTION, SOLUTION INTRAMUSCULAR; INTRAVENOUS; SUBCUTANEOUS at 21:31

## 2019-06-27 RX ADMIN — CALCIUM 1 TABLET: 500 TABLET ORAL at 10:11

## 2019-06-27 RX ADMIN — PANTOPRAZOLE SODIUM 20 MG: 20 TABLET, DELAYED RELEASE ORAL at 10:11

## 2019-06-27 RX ADMIN — LORAZEPAM 2 MG: 2 INJECTION INTRAMUSCULAR; INTRAVENOUS at 13:12

## 2019-06-27 RX ADMIN — GADOBUTROL 8 ML: 604.72 INJECTION INTRAVENOUS at 12:38

## 2019-06-27 NOTE — PROGRESS NOTES
Progress Note - April Chase 1975, 44 y o  female MRN: [id number]    Unit/Bed#: -01 Encounter: [id number]    Primary Care Provider: Sabino Holland MD   Date and time admitted to hospital: 6/24/2019  4:00 PM        Type 2 diabetes mellitus, without long-term current use of insulin ([facility] )  Assessment & Plan  Lab Results   Component Value Date    HGBA1C 7 2 (H) 05/24/2019       No results for input(s): POCGLU in the last 72 hours  Blood Sugar Average: Last 72 hrs:   New diagnosis of diabetes  Discussed management with the patient  Plan to start on metformin 500 mg daily upon discharge  For now, insulin sliding scale  Liver mass  Assessment & Plan  CT of the abdomen revealed: " Amorphous ill-defined hyperdensity in the superior aspect of liver possibly related to vascular shunting/ perfusion variation in this cirrhotic appearing liver  Underlying mass lesion not entirely excluded but felt to be less likely given the   appearance  Nonetheless surveillance imaging and/or further characterization with contrast-enhanced MRI of the liver recommended "    MRCP was done  Results are not suggestive of liver mass, just fibrosis  GI continues to follow  Acute on chronic pancreatitis [facility])  Assessment & Plan  · Present on admission  · Patient with history of recurrent pancreatitis  · Tolerates diet  · Continue with Dilaudid for pain management, but decrease the dose of Dilaudid to 0 2 mg p r n  Every 4 hours  ·   ·     Alcoholic cirrhosis ([facility] )  Assessment & Plan  Consulted GI for further recommendations, especially in view of the liver mass shown on the CT scan    Alcohol abuse  Assessment & Plan  Patient continues to require CIWA protocol, but at this point will provide increased dose of Ativan p r n  due to the active delirium  Continue with multivitamin folate and thiamine when able to take p o    Patient had psychiatric evaluation and got extensive counseling are to encourage her to stop alcohol use  Hypokalemia  Assessment & Plan  Continue to monitor  Essential hypertension  Assessment & Plan  · Elevated blood pressure likely due to pain and alcohol withdrawal   ·   · Continue hydralazine as needed and oral medication regimen for blood pressure control  * Acute metabolic encephalopathy  Assessment & Plan  Acute onset metabolic encephalopathy presumed to be due to alcohol withdrawal   Patient has being hallucinating and was agitated threatening to leave the hospital against medical advice  Psychiatry evaluation has been performed  Per Psychiatry and myself, patient lacks capacity to make medical decisions at this time  It is recommended that she stays in the hospital for her safety and safety of others  Currently, patient is not allowed to leave against medical advice  Will provide with Haldol 5 mg p o , Ativan 2 mg IV Benadryl 50 mg IV  Regimen will be repeated p r n  Every 4-6 hours, per Psychiatry recommendations  VTE Pharmacologic Prophylaxis:   Pharmacologic: Heparin  Mechanical VTE Prophylaxis in Place: Yes    Patient Centered Rounds: I have performed bedside rounds with nursing staff today  Discussions with Specialists or Other Care Team Provider:  Psychiatry and GI    Education and Discussions with Family / Patient:  Patient and her boyfriend Mr Toney Carmen for Care: 45 minutes  More than 50% of total time spent on counseling and coordination of care as described above  I have spent a significant amount of time taking care of this patient today  I have been present in the patient's room for redirection and extensive counseling as well as facilitation of her cooperation with the staff when she was threatening to leave the hospital against medical advise  I have also been present in the patient's room during the entirety of the psychiatric evaluation to provide the background information about the patient and support her      Current Length of Stay: 2 day(s)    Current Patient Status: Inpatient   Certification Statement: The patient will continue to require additional inpatient hospital stay due to Acute encephalopathy    Discharge Plan:  48 hours    Code Status: Level 1 - Full Code      Subjective:   Patient was seen and examined today  Earlier this morning she was complaining of pain in her abdomen, but stated that she was able to tolerate diet  Her anxiety was reported to be controlled  Later, she went for MRI and became very agitated after she had hallucinations during the MRI experience  She reported that she so 2 women wearing furcoats who was sitting next to her in the MRI machine  She reports that does woman were trying to hit her  Objective:     Vitals:   Temp (24hrs), Av 7 °F (36 5 °C), Min:97 7 °F (36 5 °C), Max:97 7 °F (36 5 °C)    Temp:  [97 7 °F (36 5 °C)] 97 7 °F (36 5 °C)  HR:  [58-83] 83  Resp:  [17-20] 17  BP: (132-189)/() 162/98  SpO2:  [95 %-99 %] 95 %  Body mass index is 32 62 kg/m²  Input and Output Summary (last 24 hours): Intake/Output Summary (Last 24 hours) at 2019 1704  Last data filed at 2019 2000  Gross per 24 hour   Intake 360 ml   Output --   Net 360 ml       Physical Exam:     Physical Exam   Constitutional: She is oriented to person, place, and time  She appears distressed  HENT:   Head: Normocephalic and atraumatic  Eyes: Pupils are equal, round, and reactive to light  Conjunctivae are normal    Neck: Normal range of motion  Cardiovascular: Normal rate and regular rhythm  Pulmonary/Chest: Effort normal and breath sounds normal    Abdominal: Soft  She exhibits no distension  There is tenderness (Right upper quadrant)  Musculoskeletal: She exhibits no edema  Neurological: She is alert and oriented to person, place, and time  She has normal strength  No cranial nerve deficit  Skin: Skin is warm  She is not diaphoretic  No erythema  Psychiatric: Her mood appears anxious   Her affect is inappropriate  She is agitated and actively hallucinating  She expresses inappropriate judgment  She expresses no suicidal plans and no homicidal plans  Additional Data:     Labs:    Results from last 7 days   Lab Units 06/26/19  0541   WBC Thousand/uL 7 88   HEMOGLOBIN g/dL 11 5   HEMATOCRIT % 34 9   PLATELETS Thousands/uL 129*   NEUTROS PCT % 55   LYMPHS PCT % 34   MONOS PCT % 8   EOS PCT % 1     Results from last 7 days   Lab Units 06/26/19  0541 06/25/19  0449   SODIUM mmol/L 136 137   POTASSIUM mmol/L 3 7 3 3*   CHLORIDE mmol/L 101 102   CO2 mmol/L 23 26   BUN mg/dL 10 6   CREATININE mg/dL 0 61 0 64   ANION GAP mmol/L 12 9   CALCIUM mg/dL 6 9* 7 3*   ALBUMIN g/dL  --  3 0*   TOTAL BILIRUBIN mg/dL  --  0 90   ALK PHOS U/L  --  128*   ALT U/L  --  30   AST U/L  --  63*   GLUCOSE RANDOM mg/dL 124 127         Results from last 7 days   Lab Units 06/27/19  1140 06/27/19  0635 06/26/19  2301 06/26/19  1737   POC GLUCOSE mg/dl 154* 127 177* 134                   * I Have Reviewed All Lab Data Listed Above  * Additional Pertinent Lab Tests Reviewed:  All Labs Within Last 24 Hours Reviewed    Imaging:  MRI of the abdomen  Recent Cultures (last 7 days):           Last 24 Hours Medication List:     Current Facility-Administered Medications:  acetaminophen 650 mg Oral Q6H PRN Presley Muro MD   albuterol 2 puff Inhalation Q4H PRN Presley Muro MD   calcium carbonate 1 tablet Oral BID With Meals Roger Perez MD   cloNIDine 0 3 mg Oral BID Presley Muro MD   dicyclomine 20 mg Oral TID Presley Muro MD   diphenhydrAMINE 50 mg Oral Q6H PRN Roger Perez MD   fluticasone-vilanterol 1 puff Inhalation Daily Presley Muro MD   folic acid 705 mcg Oral Daily Roger Perez MD   haloperidol 5 mg Oral Q4H PRN Roger Perez MD   heparin (porcine) 5,000 Units Subcutaneous [facility] Roger Perez MD   hydrALAZINE 10 mg Intravenous Q6H PRN Presley Muro MD   HYDROmorphone 0 2 mg Intravenous Q4H PRN Geno Lawrence MD   insulin lispro 1-6 Units Subcutaneous Q6H [address] MD   insulin lispro 1-6 Units Subcutaneous HS Geno Lawrence MD   LORazepam 2 mg Intravenous Q4H PRN Geno Lawrence MD   melatonin 3 mg Oral HS Jose Denise MD   nicotine 1 patch Transdermal Daily Jose Denise MD   ondansetron 4 mg Intravenous Q6H PRN Jose Denise MD   pantoprazole 20 mg Oral Daily Jose Denise MD   sucralfate 1 g Oral 4x Daily Jose Denise MD   thiamine 100 mg Oral Daily Geno Lawrence MD        Today, Patient Was Seen By: Geno Lawrence MD    ** Please Note: Dictation voice to text software may have been used in the creation of this document   **

## 2019-06-27 NOTE — PROGRESS NOTES
GI Progress Note - Vandana Rodriguez 44 y o  female MRN: [id number]    Unit/Bed#: -01 Encounter: [id number]    Subjective: She apparently became very agitated at MRI and required a control team and multiple doses of Ativan  She is acting paranoid and disoriented during our discussion  Objective:     Vitals: Blood pressure 162/98, pulse 83, temperature 97 7 °F (36 5 °C), resp  rate 17, weight 86 2 kg (190 lb 0 6 oz), last menstrual period 06/03/2019, SpO2 95 %, not currently breastfeeding  ,Body mass index is 32 62 kg/m²  Intake/Output Summary (Last 24 hours) at 6/27/2019 1531  Last data filed at 6/26/2019 2000  Gross per 24 hour   Intake 360 ml   Output --   Net 360 ml       Physical Exam:     General Appearance: Alert, appears much older than stated age, paranoid  Lungs: Clear to auscultation bilaterally, no rales or rhonchi, no labored breathing/accessory muscle use  Heart: RRRR, no murmur  Abdomen: Soft, non-tender, non-distended; bowel sounds normal; no masses or no organomegaly  Extremities: No cyanosis, edema    Invasive Devices     Peripheral Intravenous Line            Peripheral IV 06/25/19 Left;Upper Arm 1 day                Lab Results:  Results from last 7 days   Lab Units 06/26/19  0541   WBC Thousand/uL 7 88   HEMOGLOBIN g/dL 11 5   HEMATOCRIT % 34 9   PLATELETS Thousands/uL 129*   NEUTROS PCT % 55   LYMPHS PCT % 34   MONOS PCT % 8   EOS PCT % 1     Results from last 7 days   Lab Units 06/26/19  0541 06/25/19  0449   POTASSIUM mmol/L 3 7 3 3*   CHLORIDE mmol/L 101 102   CO2 mmol/L 23 26   BUN mg/dL 10 6   CREATININE mg/dL 0 61 0 64   CALCIUM mg/dL 6 9* 7 3*   ALK PHOS U/L  --  128*   ALT U/L  --  30   AST U/L  --  63*         Results from last 7 days   Lab Units 06/24/19  1629   LIPASE u/L 120       Imaging Studies: I have personally reviewed pertinent imaging studies        Mri Abdomen W Wo Contrast And Mrcp  Result Date: 6/27/2019  Impression: Suboptimal study due to image degradation by motion artifact and patient noncooperation during image acquisition  Only arterial phase imaging was performed after administration of contrast  1   Liver cirrhosis  2   Ill-defined parenchymal signal abnormality with anterior capsular retraction in segment VII and IV, favored to be related to confluent fibrosis  This corresponds to the hyperattenuating area described on the prior CT study  No focal arterial phase hyperenhancing lesion is seen within limitations of the study  Continued routine imaging surveillance is recommended  3   Stable segmental dilatation of the main pancreatic duct in the pancreatic tail measuring 6 mm in diameter  Workstation performed: [id number]     Ct Abdomen Pelvis With Contrast  Result Date: 6/24/2019  Impression: 1  Amorphous ill-defined hyperdensity in the superior aspect of liver possibly related to vascular shunting/ perfusion variation in this cirrhotic appearing liver  Underlying mass lesion not entirely excluded but felt to be less likely given the appearance  Nonetheless surveillance imaging and/or further characterization with contrast-enhanced MRI of the liver recommended  2   Mild sigmoid diverticulosis  3   Subcutaneous nodularity in the posterior gluteal subcutaneous fat unchanged possibly related to injection granuloma or folliculitis  Direct clinical exam recommended   Workstation performed: [id number]       Assessment and Plan:    Abdominal Pain  Chronic Pancreatitis  Alcohol Abuse  - She continue to have recurrent abdominal pain related to chronic pancreatitis and continued alcohol abuse  - We have discussed alcohol cessation multiple times  - Plan for OP EUS to confirm chronic pancreatitis and consider celiac plexus block for pain control  - MRI/MRCP previously shows PD dilation consistent with chronic pancreatitis and the MRI today showed stable segmental dilatation of the main PD in the tail measuring 6 mm in diameter  - OP pain management  - Could consider possibility of diabetic gastroparesis as component of pain, if she is compliant with follow up can order a GES     Abnormal CT of Liver  Alcoholic Cirrhosis  - Low MELD and relatively compensated but she continues to abuse alcohol  - No ascites/LE edema, no encephalopathy, no history of esophageal varices on EGD in 2017 and should have a EGD as outpatient to update her esophageal varices screen  - CT A/P showed abnormal appearing hyperdensity in the liver, unable to exclude underlying mass  - MRI/MRCP showed the concerning area on CT to be an area of fibrosis, no focal arterial enhancing lesions concerning for [address]      Castano's Esophagus  - Continue protonix 40 mg BID  - Will need outpatient EGD for Castano's surveillance      The patient was seen and examined by Dr Melissa Nair   GI will sign off

## 2019-06-27 NOTE — CONSULTS
Patient was interviewed using live video with the assistance of on-site staff  Patient Location: [address] [place] [address]  Telepsychiatrist Location: [place]    Telepsychiatry Evaluation  Lucille Coultre -  1975    ID/CC/HPI:   44year-old female with hx refractory alcohol use disorder admitted to the hospital two days ago for acute on chronic pancreatitis, cirrhosis, and alcohol withdrawal   Per nursing staff pt  has been hallucinating and disorganized; she crawled out of the MRI tube during at attempted imaging study  In addition she has been restless, pacing, angry, and exit-seeking  At this time she is restless, agitated, talkative, and grossly oriented  Thoughts are loose, tangential, and disorganized  She denies/minimizes symptoms, denies SI/HI/AVH, lacks insight  She is unable to offer any understanding as to her medical problems, treatments, or the risks of refusing treatments  Substance Abuse History:   Hx alcohol use disorder; pt  minimizes the extent and impact of alcohol abuse    Toxicology/UDS Results:  + cannabis, + benzos    Psychiatric History:   Hx anxiety and depression; denies hx intentional self-harm or psychiatric hospitalizations;     Psychiatric Medications:   Ativan prn    Active Medical Problems:   Acute on chronic pancreatitis, cirrhosis,     Family History:   No known family hx suicide or suicide attempts    Psychosocial Stressors & Legal History:   Lives with a friend and fiancé; works as a ; no legal charges    Access to Firearms:   Denies      Appearance & Attire: gown  Attitude & Behavior: calm and cooperative  Speech:  WNL  Mood / Affect: restless, anxious, agitated  Thought Processes: linear  Thought Content: No SI/HI/VI currently  Perception: +AVH, intermittent  Orientation: intermittently confused  Intellectual Functioning: unknown  Insight & Judgment: poor    Impression & Risk:  44year-old female with unspecified psychosis, delirium/metabolic encephalopathy r/o hepatic encephalopathy vs  withdrawal delirium  She is gravely disabled by the above and she lacks capacity for medical decision-making  NOTE: Capacity is a fluid determination and may be re-assessed as indicated or as requested by patient or family  Recommendations:   1  Assign proxy medical decision-maker; do not allow AMA discharge  2  Rx Haldol 5mg PO x 1 now, Ativan 2mg PO x 1 now, and Benadryl 50mg PO x 1 now  3  Continue to provide Ativan, Haldol and Benadryl q 4-6 hours prn  4  Cont  other meds/mgmt  5  Re-consult telepsychiatry daily or as indicated    Thanks for inviting us to participate in the care of this patient          Hermes Henriquez MD  6/27/19 @ 15:05 ET

## 2019-06-27 NOTE — ASSESSMENT & PLAN NOTE
Acute onset metabolic encephalopathy presumed to be due to alcohol withdrawal   Patient has being hallucinating and was agitated threatening to leave the hospital against medical advice  Psychiatry evaluation has been performed  Per Psychiatry and myself, patient lacks capacity to make medical decisions at this time  It is recommended that she stays in the hospital for her safety and safety of others  Currently, patient is not allowed to leave against medical advice  Will provide with Haldol 5 mg p o , Ativan 2 mg IV Benadryl 50 mg IV  Regimen will be repeated p r n  Every 4-6 hours, per Psychiatry recommendations

## 2019-06-27 NOTE — SOCIAL WORK
CM met with patient at her bedside, patient alert and oriented during interview  Patient reports being independent with ADL's and resides with her fimelody and his mother  Patient reports being independent with her ADL's, no dme use, vna or str  Patient mentions having anxiety, denies any SI/HI  Patient reports her trey can make medical decisions on her behalf if needed  Patient reports filling her prescriptions at  with no co-payment barriers  Cm team will continue to follow and assess for needs  CM reviewed discharge planning process including the following: identifying help at home, patient preference for discharge planning needs, pharmacy preference, and availability of treatment team to discuss questions or concerns patient and/or family may have regarding understanding medications and recognizing signs and symptoms once discharged  CM also encouraged patient to follow up with all recommended appointments after discharge  Patient advised of importance for patient and family to participate in managing patients medical well being  CM name and role reviewed  Discharge Checklist reviewed and CM will continue to monitor for progress toward discharge goals in nursing and provider rounds

## 2019-06-28 LAB
ANION GAP SERPL CALCULATED.3IONS-SCNC: 16 MMOL/L (ref 4–13)
BASOPHILS # BLD AUTO: 0.03 THOUSANDS/ΜL (ref 0–0.1)
BASOPHILS NFR BLD AUTO: 0 % (ref 0–1)
BUN SERPL-MCNC: 11 MG/DL (ref 5–25)
CALCIUM SERPL-MCNC: 8.9 MG/DL (ref 8.3–10.1)
CHLORIDE SERPL-SCNC: 99 MMOL/L (ref 100–108)
CO2 SERPL-SCNC: 22 MMOL/L (ref 21–32)
CREAT SERPL-MCNC: 0.64 MG/DL (ref 0.6–1.3)
EOSINOPHIL # BLD AUTO: 0.03 THOUSAND/ΜL (ref 0–0.61)
EOSINOPHIL NFR BLD AUTO: 0 % (ref 0–6)
ERYTHROCYTE [DISTWIDTH] IN BLOOD BY AUTOMATED COUNT: 17.4 % (ref 11.6–15.1)
GFR SERPL CREATININE-BSD FRML MDRD: 110 ML/MIN/1.73SQ M
GLUCOSE SERPL-MCNC: 106 MG/DL (ref 65–140)
GLUCOSE SERPL-MCNC: 121 MG/DL (ref 65–140)
GLUCOSE SERPL-MCNC: 125 MG/DL (ref 65–140)
GLUCOSE SERPL-MCNC: 153 MG/DL (ref 65–140)
GLUCOSE SERPL-MCNC: 163 MG/DL (ref 65–140)
GLUCOSE SERPL-MCNC: 215 MG/DL (ref 65–140)
HCT VFR BLD AUTO: 41.2 % (ref 34.8–46.1)
HGB BLD-MCNC: 13.3 G/DL (ref 11.5–15.4)
IMM GRANULOCYTES # BLD AUTO: 0.03 THOUSAND/UL (ref 0–0.2)
IMM GRANULOCYTES NFR BLD AUTO: 0 % (ref 0–2)
LYMPHOCYTES # BLD AUTO: 1.62 THOUSANDS/ΜL (ref 0.6–4.47)
LYMPHOCYTES NFR BLD AUTO: 20 % (ref 14–44)
MCH RBC QN AUTO: 29 PG (ref 26.8–34.3)
MCHC RBC AUTO-ENTMCNC: 32.3 G/DL (ref 31.4–37.4)
MCV RBC AUTO: 90 FL (ref 82–98)
MONOCYTES # BLD AUTO: 0.66 THOUSAND/ΜL (ref 0.17–1.22)
MONOCYTES NFR BLD AUTO: 8 % (ref 4–12)
NEUTROPHILS # BLD AUTO: 5.94 THOUSANDS/ΜL (ref 1.85–7.62)
NEUTS SEG NFR BLD AUTO: 72 % (ref 43–75)
NRBC BLD AUTO-RTO: 0 /100 WBCS
PLATELET # BLD AUTO: 142 THOUSANDS/UL (ref 149–390)
PMV BLD AUTO: 10.2 FL (ref 8.9–12.7)
POTASSIUM SERPL-SCNC: 3.2 MMOL/L (ref 3.5–5.3)
RBC # BLD AUTO: 4.59 MILLION/UL (ref 3.81–5.12)
SODIUM SERPL-SCNC: 137 MMOL/L (ref 136–145)
WBC # BLD AUTO: 8.31 THOUSAND/UL (ref 4.31–10.16)

## 2019-06-28 PROCEDURE — 94760 N-INVAS EAR/PLS OXIMETRY 1: CPT

## 2019-06-28 PROCEDURE — 99233 SBSQ HOSP IP/OBS HIGH 50: CPT | Performed by: INTERNAL MEDICINE

## 2019-06-28 PROCEDURE — 82948 REAGENT STRIP/BLOOD GLUCOSE: CPT

## 2019-06-28 PROCEDURE — 99232 SBSQ HOSP IP/OBS MODERATE 35: CPT | Performed by: FAMILY MEDICINE

## 2019-06-28 PROCEDURE — 85025 COMPLETE CBC W/AUTO DIFF WBC: CPT | Performed by: FAMILY MEDICINE

## 2019-06-28 PROCEDURE — 80048 BASIC METABOLIC PNL TOTAL CA: CPT | Performed by: FAMILY MEDICINE

## 2019-06-28 RX ORDER — POTASSIUM CHLORIDE 20 MEQ/1
40 TABLET, EXTENDED RELEASE ORAL ONCE
Status: COMPLETED | OUTPATIENT
Start: 2019-06-28 | End: 2019-06-28

## 2019-06-28 RX ORDER — HYDROMORPHONE HCL/PF 1 MG/ML
1 SYRINGE (ML) INJECTION EVERY 4 HOURS PRN
Status: DISCONTINUED | OUTPATIENT
Start: 2019-06-28 | End: 2019-06-28

## 2019-06-28 RX ORDER — OXYCODONE HYDROCHLORIDE 10 MG/1
10 TABLET ORAL EVERY 6 HOURS PRN
Status: DISCONTINUED | OUTPATIENT
Start: 2019-06-28 | End: 2019-06-29 | Stop reason: HOSPADM

## 2019-06-28 RX ORDER — HYDROMORPHONE HCL/PF 1 MG/ML
0.5 SYRINGE (ML) INJECTION EVERY 4 HOURS PRN
Status: DISCONTINUED | OUTPATIENT
Start: 2019-06-28 | End: 2019-06-28

## 2019-06-28 RX ORDER — HYDROMORPHONE HCL/PF 1 MG/ML
1 SYRINGE (ML) INJECTION ONCE
Status: DISCONTINUED | OUTPATIENT
Start: 2019-06-28 | End: 2019-06-29 | Stop reason: HOSPADM

## 2019-06-28 RX ADMIN — MELATONIN 3 MG: at 22:22

## 2019-06-28 RX ADMIN — SUCRALFATE 1 G: 1 TABLET ORAL at 22:22

## 2019-06-28 RX ADMIN — SUCRALFATE 1 G: 1 TABLET ORAL at 14:11

## 2019-06-28 RX ADMIN — OXYCODONE HYDROCHLORIDE 10 MG: 10 TABLET ORAL at 22:24

## 2019-06-28 RX ADMIN — DICYCLOMINE HYDROCHLORIDE 20 MG: 20 TABLET ORAL at 22:22

## 2019-06-28 RX ADMIN — CALCIUM 1 TABLET: 500 TABLET ORAL at 16:16

## 2019-06-28 RX ADMIN — SUCRALFATE 1 G: 1 TABLET ORAL at 18:00

## 2019-06-28 RX ADMIN — HYDROMORPHONE HYDROCHLORIDE 0.5 MG: 1 INJECTION, SOLUTION INTRAMUSCULAR; INTRAVENOUS; SUBCUTANEOUS at 14:11

## 2019-06-28 RX ADMIN — LORAZEPAM 2 MG: 2 INJECTION INTRAMUSCULAR; INTRAVENOUS at 22:31

## 2019-06-28 RX ADMIN — CLONIDINE HYDROCHLORIDE 0.3 MG: 0.1 TABLET ORAL at 17:59

## 2019-06-28 RX ADMIN — HYDROMORPHONE HYDROCHLORIDE 1 MG: 1 INJECTION, SOLUTION INTRAMUSCULAR; INTRAVENOUS; SUBCUTANEOUS at 17:57

## 2019-06-28 RX ADMIN — PANTOPRAZOLE SODIUM 20 MG: 20 TABLET, DELAYED RELEASE ORAL at 10:42

## 2019-06-28 RX ADMIN — POTASSIUM CHLORIDE 40 MEQ: 1500 TABLET, EXTENDED RELEASE ORAL at 19:03

## 2019-06-28 RX ADMIN — DICYCLOMINE HYDROCHLORIDE 20 MG: 20 TABLET ORAL at 16:16

## 2019-06-28 RX ADMIN — CALCIUM 1 TABLET: 500 TABLET ORAL at 10:42

## 2019-06-28 RX ADMIN — THIAMINE HCL TAB 100 MG 100 MG: 100 TAB at 10:43

## 2019-06-28 RX ADMIN — NICOTINE 1 PATCH: 21 PATCH TRANSDERMAL at 22:22

## 2019-06-28 RX ADMIN — HEPARIN SODIUM 5000 UNITS: 5000 INJECTION INTRAVENOUS; SUBCUTANEOUS at 22:22

## 2019-06-28 RX ADMIN — DICYCLOMINE HYDROCHLORIDE 20 MG: 20 TABLET ORAL at 10:42

## 2019-06-28 RX ADMIN — Medication 400 MCG: at 10:44

## 2019-06-28 RX ADMIN — CLONIDINE HYDROCHLORIDE 0.3 MG: 0.1 TABLET ORAL at 10:42

## 2019-06-28 RX ADMIN — SUCRALFATE 1 G: 1 TABLET ORAL at 10:42

## 2019-06-28 RX ADMIN — HYDROMORPHONE HYDROCHLORIDE 0.2 MG: 1 INJECTION, SOLUTION INTRAMUSCULAR; INTRAVENOUS; SUBCUTANEOUS at 10:53

## 2019-06-28 NOTE — ASSESSMENT & PLAN NOTE
· Present on admission  · Patient with history of recurrent pancreatitis  · Tolerates diet  · Continue with Dilaudid for pain management once and then will switch to oral oxycodone 10 mg every 6 hours, that patient use to take prior to the admission    ·   ·

## 2019-06-28 NOTE — PLAN OF CARE
Problem: PAIN - ADULT  Goal: Verbalizes/displays adequate comfort level or baseline comfort level  Description  Interventions:  - Encourage patient to monitor pain and request assistance  - Assess pain using appropriate pain scale  - Administer analgesics based on type and severity of pain and evaluate response  - Implement non-pharmacological measures as appropriate and evaluate response  - Consider cultural and social influences on pain and pain management  - Notify physician/advanced practitioner if interventions unsuccessful or patient reports new pain  Outcome: Progressing     Problem: INFECTION - ADULT  Goal: Absence or prevention of progression during hospitalization  Description  INTERVENTIONS:  - Assess and monitor for signs and symptoms of infection  - Monitor lab/diagnostic results  - Monitor all insertion sites, i e  indwelling lines, tubes, and drains  - Monitor endotracheal (as able) and nasal secretions for changes in amount and color  - [place] appropriate cooling/warming therapies per order  - Administer medications as ordered  - Instruct and encourage patient and family to use good hand hygiene technique  - Identify and instruct in appropriate isolation precautions for identified infection/condition  Outcome: Progressing  Goal: Absence of fever/infection during neutropenic period  Description  INTERVENTIONS:  - Monitor WBC  - Implement neutropenic guidelines  Outcome: Progressing     Problem: SAFETY ADULT  Goal: Patient will remain free of falls  Description  INTERVENTIONS:  - Assess patient frequently for physical needs  -  Identify cognitive and physical deficits and behaviors that affect risk of falls    -  [place] fall precautions as indicated by assessment   - Educate patient/family on patient safety including physical limitations  - Instruct patient to call for assistance with activity based on assessment  - Modify environment to reduce risk of injury  - Consider OT/PT consult to assist with strengthening/mobility  Outcome: Progressing  Goal: Maintain or return to baseline ADL function  Description  INTERVENTIONS:  -  Assess patient's ability to carry out ADLs; assess patient's baseline for ADL function and identify physical deficits which impact ability to perform ADLs (bathing, care of mouth/teeth, toileting, grooming, dressing, etc )  - Assess/evaluate cause of self-care deficits   - Assess range of motion  - Assess patient's mobility; develop plan if impaired  - Assess patient's need for assistive devices and provide as appropriate  - Encourage maximum independence but intervene and supervise when necessary  ¯ Involve family in performance of ADLs  ¯ Assess for home care needs following discharge   ¯ Request OT consult to assist with ADL evaluation and planning for discharge  ¯ Provide patient education as appropriate  Outcome: Progressing  Goal: Maintain or return mobility status to optimal level  Description  INTERVENTIONS:  - Assess patient's baseline mobility status (ambulation, transfers, stairs, etc )    - Identify cognitive and physical deficits and behaviors that affect mobility  - Identify mobility aids required to assist with transfers and/or ambulation (gait belt, sit-to-stand, lift, walker, cane, etc )  - [place] fall precautions as indicated by assessment  - Record patient progress and toleration of activity level on Mobility SBAR; progress patient to next Phase/Stage  - Instruct patient to call for assistance with activity based on assessment  - Request Rehabilitation consult to assist with strengthening/weightbearing, etc   Outcome: Progressing     Problem: DISCHARGE PLANNING  Goal: Discharge to home or other facility with appropriate resources  Description  INTERVENTIONS:  - Identify barriers to discharge w/patient and caregiver  - Arrange for needed discharge resources and transportation as appropriate  - Identify discharge learning needs (meds, wound care, etc )  - Arrange for interpretive services to assist at discharge as needed  - Refer to Case Management Department for coordinating discharge planning if the patient needs post-hospital services based on physician/advanced practitioner order or complex needs related to functional status, cognitive ability, or social support system  Outcome: Progressing     Problem: Knowledge Deficit  Goal: Patient/family/caregiver demonstrates understanding of disease process, treatment plan, medications, and discharge instructions  Description  Complete learning assessment and assess knowledge base    Interventions:  - Provide teaching at level of understanding  - Provide teaching via preferred learning methods  Outcome: Progressing

## 2019-06-28 NOTE — PROGRESS NOTES
Rediscussed MRI results with patient as she did not recall conversation yesterday  MRI did not show liver lesion suspicious for HCC, likely an area of fibrosis  Pancreas appears stable from prior MRI with chronic PD dilation  Discussed recommendations as given for the past several hospitalizations regarding alcohol cessation, follow up for EUS with possible celiac plexus block, anxiety control, pain management consultation as outpatient  She is stable for discharge from GI standpoint  GI will sign off

## 2019-06-28 NOTE — ASSESSMENT & PLAN NOTE
Acute onset metabolic encephalopathy presumed to be due to alcohol withdrawal   Today her mentation is clear and she is back to baseline  Awaiting for Psychiatry evaluation to clear the patient for discharge and provide recommendations for management of her anxiety

## 2019-06-28 NOTE — ASSESSMENT & PLAN NOTE
Lab Results   Component Value Date    HGBA1C 7 2 (H) 05/24/2019       Recent Labs     06/27/19  2132 06/28/19  0625 06/28/19  1127 06/28/19  1607   POCGLU 148* 121 125 163*       Blood Sugar Average: Last 72 hrs:  (P) 143 625 New diagnosis of diabetes  Discussed management with the patient  Plan to start on metformin 500 mg daily upon discharge  For now, insulin sliding scale

## 2019-06-28 NOTE — ASSESSMENT & PLAN NOTE
Continue with multivitamin folate and thiamine when able to take p o  Patient had psychiatric evaluation and got extensive counseling are to encourage her to stop alcohol use  Patient does not have active withdrawal at this time

## 2019-06-28 NOTE — ASSESSMENT & PLAN NOTE
· Blood pressure is improved  · Continue hydralazine as needed and oral medication regimen for blood pressure control

## 2019-06-28 NOTE — PROGRESS NOTES
Progress Note - Ceola Mention 1975, 44 y o  female MRN: [id number]    Unit/Bed#: -01 Encounter: [id number]    Primary Care Provider: Taniya Glover MD   Date and time admitted to hospital: 6/24/2019  4:00 PM        Type 2 diabetes mellitus, without long-term current use of insulin [facility])  Assessment & Plan  Lab Results   Component Value Date    HGBA1C 7 2 (H) 05/24/2019       Recent Labs     06/27/19  2132 06/28/19  0625 06/28/19  1127 06/28/19  1607   POCGLU 148* 121 125 163*       Blood Sugar Average: Last 72 hrs:  (P) 143 625 New diagnosis of diabetes  Discussed management with the patient  Plan to start on metformin 500 mg daily upon discharge  For now, insulin sliding scale  Liver mass  Assessment & Plan  MRCP was done  Results are not suggestive of liver mass, just fibrosis  GI continues to follow  Acute on chronic pancreatitis [facility])  Assessment & Plan  · Present on admission  · Patient with history of recurrent pancreatitis  · Tolerates diet  · Continue with Dilaudid for pain management once and then will switch to oral oxycodone 10 mg every 6 hours, that patient use to take prior to the admission  ·   ·     Alcoholic cirrhosis ([facility] [address] )  Assessment & Plan  MRI of the abdomen demonstrated fibrosis/cirrhosis of the liver  No hepatic mass    Alcohol abuse  Assessment & Plan  Continue with multivitamin folate and thiamine when able to take p o  Patient had psychiatric evaluation and got extensive counseling are to encourage her to stop alcohol use  Patient does not have active withdrawal at this time  Hypokalemia  Assessment & Plan  Continue to monitor  Provided with oral supplement    Essential hypertension  Assessment & Plan  · Blood pressure is improved  · Continue hydralazine as needed and oral medication regimen for blood pressure control      * Acute metabolic encephalopathy  Assessment & Plan  Acute onset metabolic encephalopathy presumed to be due to alcohol withdrawal   Today her mentation is clear and she is back to baseline  Awaiting for Psychiatry evaluation to clear the patient for discharge and provide recommendations for management of her anxiety  VTE Pharmacologic Prophylaxis:   Pharmacologic: Heparin  Mechanical VTE Prophylaxis in Place: Yes    Patient Centered Rounds: I have performed bedside rounds with nursing staff today  Discussions with Specialists or Other Care Team Provider:  GI    Education and Discussions with Family / Patient:  Patient    Time Spent for Care: 20 minutes  More than 50% of total time spent on counseling and coordination of care as described above  Current Length of Stay: 3 day(s)    Current Patient Status: Inpatient   Certification Statement: The patient will continue to require additional inpatient hospital stay due to Delirium clearance and pain management    Discharge Plan:  Tomorrow    Code Status: Level 1 - Full Code      Subjective:   Patient was seen and examined  She is complaining of abdominal pain and continues to require Dilaudid  I have attempted to decrease her dose yesterday, but today she required 1 mg IV every 4 hours  Her mental state is back to baseline  Objective:     Vitals:   Temp (24hrs), Av 5 °F (36 4 °C), Min:97 2 °F (36 2 °C), Max:97 7 °F (36 5 °C)    Temp:  [97 2 °F (36 2 °C)-97 7 °F (36 5 °C)] 97 7 °F (36 5 °C)  HR:  [64-81] 74  Resp:  [16-18] 16  BP: (137-193)/() 137/80  SpO2:  [95 %-100 %] 98 %  Body mass index is 32 61 kg/m²  Input and Output Summary (last 24 hours): Intake/Output Summary (Last 24 hours) at 2019 1804  Last data filed at 2019 1411  Gross per 24 hour   Intake 240 ml   Output --   Net 240 ml       Physical Exam:     Physical Exam   Constitutional: She appears well-developed and well-nourished  HENT:   Head: Normocephalic and atraumatic  Neck: Normal range of motion  Cardiovascular: Normal rate and regular rhythm     Pulmonary/Chest: Effort normal and breath sounds normal    Abdominal: Soft  She exhibits no distension  There is tenderness (Right upper quadrant)  Musculoskeletal: She exhibits no edema  Neurological: She is alert  She has normal strength  No cranial nerve deficit  Skin: Skin is warm  No erythema  Psychiatric: She has a normal mood and affect  Her behavior is normal        Additional Data:     Labs:    Results from last 7 days   Lab Units 06/28/19  1323   WBC Thousand/uL 8 31   HEMOGLOBIN g/dL 13 3   HEMATOCRIT % 41 2   PLATELETS Thousands/uL 142*   NEUTROS PCT % 72   LYMPHS PCT % 20   MONOS PCT % 8   EOS PCT % 0     Results from last 7 days   Lab Units 06/28/19  1323  06/25/19  0449   SODIUM mmol/L 137   < > 137   POTASSIUM mmol/L 3 2*   < > 3 3*   CHLORIDE mmol/L 99*   < > 102   CO2 mmol/L 22   < > 26   BUN mg/dL 11   < > 6   CREATININE mg/dL 0 64   < > 0 64   ANION GAP mmol/L 16*   < > 9   CALCIUM mg/dL 8 9   < > 7 3*   ALBUMIN g/dL  --   --  3 0*   TOTAL BILIRUBIN mg/dL  --   --  0 90   ALK PHOS U/L  --   --  128*   ALT U/L  --   --  30   AST U/L  --   --  63*   GLUCOSE RANDOM mg/dL 153*   < > 127    < > = values in this interval not displayed  Results from last 7 days   Lab Units 06/28/19  1607 06/28/19  1127 06/28/19  0625 06/27/19  2132 06/27/19  1140 06/27/19  0635 06/26/19  2301 06/26/19  1737   POC GLUCOSE mg/dl 163* 125 121 148* 154* 127 177* 134                   * I Have Reviewed All Lab Data Listed Above  * Additional Pertinent Lab Tests Reviewed:  All Labs Within Last 24 Hours Reviewed    Imaging:    MRI of the abdomen    Recent Cultures (last 7 days):           Last 24 Hours Medication List:     Current Facility-Administered Medications:  acetaminophen 650 mg Oral Q6H PRN Naomi De Guzman MD   albuterol 2 puff Inhalation Q4H PRN Naomi De Guzman MD   calcium carbonate 1 tablet Oral BID With Meals Donita Rosales MD   cloNIDine 0 3 mg Oral BID Naomi De Guzman MD   dicyclomine 20 mg Oral TID Roseanna Cruz MD   diphenhydrAMINE 50 mg Oral Q6H PRN Brian Zapata MD   fluticasone-vilanterol 1 puff Inhalation Daily Roseanna Cruz MD   folic acid 947 mcg Oral Daily Brian Zapata MD   haloperidol 5 mg Oral Q4H PRN Brian Zapata MD   heparin (porcine) 5,000 Units Subcutaneous [facility] Brian Zapata MD   hydrALAZINE 10 mg Intravenous Q6H PRN Roseanna Cruz MD   hydrALAZINE 5 mg Intravenous Once Marielle Escobedo PA-C   HYDROmorphone 1 mg Intravenous Once Brian Zapata MD   insulin lispro 1-6 Units Subcutaneous Q6H [address] Brian Zapata MD   insulin lispro 1-6 Units Subcutaneous HS Brian Zapata MD   LORazepam 2 mg Intravenous Q4H PRN Brian Zapata MD   LORazepam 2 mg Oral Once Brian Zapata MD   melatonin 3 mg Oral HS Roseanna Cruz MD   nicotine 1 patch Transdermal Daily Rosenana Cruz MD   ondansetron 4 mg Intravenous Q6H PRN Roseanna Cruz MD   oxyCODONE 10 mg Oral Q6H PRN Brian Zapata MD   pantoprazole 20 mg Oral Daily Roseanna Cruz MD   sucralfate 1 g Oral 4x Daily Roseanna Cruz MD   thiamine 100 mg Oral Daily Brian Zapata MD        Today, Patient Was Seen By: Brian Zapata MD    ** Please Note: Dictation voice to text software may have been used in the creation of this document   **

## 2019-06-29 VITALS
HEIGHT: 64 IN | BODY MASS INDEX: 32.44 KG/M2 | HEART RATE: 72 BPM | WEIGHT: 190.04 LBS | DIASTOLIC BLOOD PRESSURE: 81 MMHG | OXYGEN SATURATION: 98 % | RESPIRATION RATE: 16 BRPM | TEMPERATURE: 97.7 F | SYSTOLIC BLOOD PRESSURE: 123 MMHG

## 2019-06-29 LAB
ANION GAP SERPL CALCULATED.3IONS-SCNC: 12 MMOL/L (ref 4–13)
BUN SERPL-MCNC: 17 MG/DL (ref 5–25)
CALCIUM SERPL-MCNC: 8.6 MG/DL (ref 8.3–10.1)
CHLORIDE SERPL-SCNC: 101 MMOL/L (ref 100–108)
CO2 SERPL-SCNC: 25 MMOL/L (ref 21–32)
CREAT SERPL-MCNC: 0.77 MG/DL (ref 0.6–1.3)
GFR SERPL CREATININE-BSD FRML MDRD: 96 ML/MIN/1.73SQ M
GLUCOSE SERPL-MCNC: 193 MG/DL (ref 65–140)
GLUCOSE SERPL-MCNC: 202 MG/DL (ref 65–140)
POTASSIUM SERPL-SCNC: 3.6 MMOL/L (ref 3.5–5.3)
SODIUM SERPL-SCNC: 138 MMOL/L (ref 136–145)

## 2019-06-29 PROCEDURE — 99239 HOSP IP/OBS DSCHRG MGMT >30: CPT | Performed by: FAMILY MEDICINE

## 2019-06-29 PROCEDURE — 82948 REAGENT STRIP/BLOOD GLUCOSE: CPT

## 2019-06-29 PROCEDURE — 80048 BASIC METABOLIC PNL TOTAL CA: CPT | Performed by: FAMILY MEDICINE

## 2019-06-29 RX ORDER — LANOLIN ALCOHOL/MO/W.PET/CERES
100 CREAM (GRAM) TOPICAL DAILY
Qty: 30 TABLET | Refills: 0 | Status: ON HOLD | OUTPATIENT
Start: 2019-06-29 | End: 2020-04-17

## 2019-06-29 RX ORDER — OXYCODONE HYDROCHLORIDE 10 MG/1
10 TABLET ORAL 3 TIMES DAILY PRN
Qty: 15 TABLET | Refills: 0 | Status: SHIPPED | OUTPATIENT
Start: 2019-06-29 | End: 2019-07-09

## 2019-06-29 RX ORDER — LORAZEPAM 0.5 MG/1
1 TABLET ORAL EVERY 8 HOURS PRN
Qty: 15 TABLET | Refills: 0 | Status: SHIPPED | OUTPATIENT
Start: 2019-06-29 | End: 2019-12-21 | Stop reason: HOSPADM

## 2019-06-29 RX ADMIN — THIAMINE HCL TAB 100 MG 100 MG: 100 TAB at 08:17

## 2019-06-29 RX ADMIN — OXYCODONE HYDROCHLORIDE 10 MG: 10 TABLET ORAL at 08:23

## 2019-06-29 RX ADMIN — Medication 400 MCG: at 08:17

## 2019-06-29 RX ADMIN — INSULIN LISPRO 2 UNITS: 100 INJECTION, SOLUTION INTRAVENOUS; SUBCUTANEOUS at 07:26

## 2019-06-29 RX ADMIN — OXYCODONE HYDROCHLORIDE 10 MG: 10 TABLET ORAL at 04:47

## 2019-06-29 RX ADMIN — CALCIUM 1 TABLET: 500 TABLET ORAL at 08:16

## 2019-06-29 RX ADMIN — CLONIDINE HYDROCHLORIDE 0.3 MG: 0.1 TABLET ORAL at 08:17

## 2019-06-29 RX ADMIN — PANTOPRAZOLE SODIUM 20 MG: 20 TABLET, DELAYED RELEASE ORAL at 08:17

## 2019-06-29 RX ADMIN — SUCRALFATE 1 G: 1 TABLET ORAL at 08:17

## 2019-06-29 RX ADMIN — DICYCLOMINE HYDROCHLORIDE 20 MG: 20 TABLET ORAL at 08:16

## 2019-06-29 NOTE — ASSESSMENT & PLAN NOTE
· Blood pressure is improved  · Elevations were likely related to alcohol withdrawal   · Continue oral medication regimen for blood pressure control

## 2019-06-29 NOTE — ASSESSMENT & PLAN NOTE
· Present on admission  · Patient with history of recurrent pancreatitis  · Tolerates diet  · Switched to oral oxycodone 10 mg every 6 hours, that patient used to take prior to the admission  · Patient was encouraged to start following up with pain management    ·   ·

## 2019-06-29 NOTE — ASSESSMENT & PLAN NOTE
Continue with multivitamin folate and thiamine when able to take p o  Patient had psychiatric evaluation and got extensive counseling are to encourage her to stop alcohol use  Patient does not have active withdrawal at this time    Patient has been cleared for discharge by psychiatrist

## 2019-06-29 NOTE — ASSESSMENT & PLAN NOTE
Lab Results   Component Value Date    HGBA1C 7 2 (H) 05/24/2019       Recent Labs     06/28/19  1607 06/28/19  1815 06/28/19  2152 06/29/19  0532   POCGLU 163* 106 215* 202*       Blood Sugar Average: Last 72 hrs:  (P) 152 New diagnosis of diabetes  Discussed management with the patient  Plan to start on metformin 500 mg daily upon discharge

## 2019-06-29 NOTE — ASSESSMENT & PLAN NOTE
Acute onset metabolic encephalopathy presumed to be due to alcohol withdrawal   Today her mentation is clear and she is back to baseline  Cleared by Psychiatry for discharge

## 2019-06-29 NOTE — H&P
This evaluation was conducted via telepsychiatry with the assistance of onsite staff    Chief Complaint: Dann Alvarado  was seeing people yesterday   History of Present Illness:  44 yr old white female with a history of ETOH use disorder admitted for pancreatitis, ETOH withdrawal and cirrhosis  The patient has recently been evaluated by psychiatry for disorganized behavior  As per nursing staff the patient was hallucinating yesterday but has not experienced any symptoms today  The medical team is re-consulting psychiatric for clearance  The patient states she was Seeing people who were not there yesterday, she denies she was experiencing auditory hallucinations  The patient reports she had a good nights sleep yesterday night and has not experienced hallucinations today, she denies symptoms of depression, elian and denies s/h ideation  She reports looking forward to her discharge  HI/Violence: denies  Access to weapons: denies  Legal: denies  Psychiatric History/Treatment History: denies  Drug/Alcohol History: ETOH and benzos    Medical History: pancreatitis, Cirrhosis     Medications & Freq: folic acid 315TGD po daily,  Allergies: morphine   Family Psych History/History of suicide denies   Social History: works as a , lives with her friend and fiancé    Mental Status Exam:   Appearance and attire: white female dressed in hospital gown   Attitude and behavior: calm and cooperative   Speech: low in volume  Affect and mood: ok and congruent   Association and thought processes: logical and goal directed   Thought content: no s/h ideation elicited   Perception: no a/v hallucinations elicited   Sensorium, memory, and orientation: AAox3  Intellectual functioning: average  Insight and judgment: good  Impression/Risk Assessment: 44 yr old white female with a hx of etoh use disorder admitted for etoh withdrawal, pancreatitis and cirrhosis   There have been no reported or elicited withdrawal symptoms  since yesterday, neither is there any psychiatric symptoms  The patient is psychiatrically cleared for discharge  Diagnosis- ETOH use disorder        Treatment Recommendations:      add thiamine 100mg po daily regimen

## 2019-06-29 NOTE — PROGRESS NOTES
Patient 1:1 d/c'd at this time  Patient AAOx4 with no issues noted  Calm and cooperative; patient family at the bedside  Call bell within reach  Will continue to monitor

## 2019-06-29 NOTE — DISCHARGE SUMMARY
Discharge- Nemo Phoenix 1975, 44 y o  female MRN: [id number]    Unit/Bed#: -01 Encounter: [id number]    Primary Care Provider: Becka Gill MD   Date and time admitted to hospital: 6/24/2019  4:00 PM        Type 2 diabetes mellitus, without long-term current use of insulin [facility])  Assessment & Plan  Lab Results   Component Value Date    HGBA1C 7 2 (H) 05/24/2019       Recent Labs     06/28/19  1607 06/28/19  1815 06/28/19  2152 06/29/19  0532   POCGLU 163* 106 215* 202*       Blood Sugar Average: Last 72 hrs:  (P) 152 New diagnosis of diabetes  Discussed management with the patient  Plan to start on metformin 500 mg daily upon discharge  Acute on chronic pancreatitis [facility])  Assessment & Plan  · Present on admission  · Patient with history of recurrent pancreatitis  · Tolerates diet  · Switched to oral oxycodone 10 mg every 6 hours, that patient used to take prior to the admission  · Patient was encouraged to start following up with pain management  ·   ·     Alcoholic cirrhosis ([address] )  Assessment & Plan  MRI of the abdomen demonstrated fibrosis/cirrhosis of the liver  No hepatic mass    Alcohol abuse  Assessment & Plan  Continue with multivitamin folate and thiamine when able to take p o  Patient had psychiatric evaluation and got extensive counseling are to encourage her to stop alcohol use  Patient does not have active withdrawal at this time  Patient has been cleared for discharge by psychiatrist     Hypokalemia  Assessment & Plan  Continue to monitor  Provided with oral supplement    Nicotine dependence  Assessment & Plan  Patient desires a nicotine patch    Essential hypertension  Assessment & Plan  · Blood pressure is improved  · Elevations were likely related to alcohol withdrawal   · Continue oral medication regimen for blood pressure control      * Acute metabolic encephalopathy  Assessment & Plan  Acute onset metabolic encephalopathy presumed to be due to alcohol withdrawal   Today her mentation is clear and she is back to baseline  Cleared by Psychiatry for discharge  Discharge Summary - [facility]    Patient Information: Ruchi Mercado 44 y o  female MRN: [id number]  Unit/Bed#: -01 Encounter: [id number]    Discharging Physician / Practitioner: Robert Washington MD  PCP: yKle Smith MD  Admission Date: 6/24/2019  Discharge Date: 06/29/19    Reason for Admission:  Abdominal pain    Discharge Diagnoses:     Principal Problem:    Acute metabolic encephalopathy  Active Problems:    Essential hypertension    Nicotine dependence    Hypokalemia    Alcohol abuse    Vomiting    Alcoholic cirrhosis ([address] )    Acute on chronic pancreatitis ([address] )    Neck swelling    Liver mass    Type 2 diabetes mellitus, without long-term current use of insulin ([facility] [address] )  Resolved Problems:    * No resolved hospital problems  *      Consultations During Hospital Stay:  · GI  · Psychiatry    Procedures Performed:     · None    Significant Findings / Test Results:   MRI of the abdomen:    1  Liver cirrhosis      2   Ill-defined parenchymal signal abnormality with anterior capsular retraction in segment VII and IV, favored to be related to confluent fibrosis  This corresponds to the hyperattenuating area described on the prior CT study  No focal arterial phase   hyperenhancing lesion is seen within limitations of the study  Continued routine imaging surveillance is recommended      3   Stable segmental dilatation of the main pancreatic duct in the pancreatic tail measuring 6 mm in diameter  Incidental Findings:   · As above    Test Results Pending at Discharge (will require follow up): · None     Outpatient Tests Requested:  · None    Complications:  None    Hospital Course:     Ruchi Mercado is a 44 y o  female patient who originally presented to the hospital on 6/24/2019 due to abdominal pain and nausea  Patient was diagnosed with pancreatitis    She has chronic pancreatitis due to alcohol use  GI has been consulted due to abnormal CT of the abdomen, which showed a suspicion for liver mass  MRI of the abdomen was done and did not confirm the presence of a mass  Instead, there was a region of fibrosis noted  Patient was encouraged to follow up with GI  At the time when patient was undergoing MRI, she had developed acute psychosis with hallucinations  It was attributed to alcohol withdrawal versus acute psychosis versus metabolic encephalopathy  Patient was seen by psychiatrist   She required to stay in the hospital for psychiatric treatment  She has been receiving Haldol, Ativan and Benadryl  She required one-to-one observation  Next day, her mentation has cleared  She was seen by Psychiatry again and was clear for discharge  Encouraged the patient to stop drinking alcohol  She was discharged home on a short course of benzodiazepines to prevent withdrawal   Patient has been receiving benzodiazepines persistently during the hospital stay  She will follow up with a psychologist or psychiatrist outside of the hospital   Patient has a counseling center that she follows with  Condition at Discharge: stable     Discharge Day Visit / Exam:     Subjective:  Patient was seen and examined  She Is alert and oriented times 3  She continues to complain of abdominal pain, but it is somewhat improved today  Vitals: Blood Pressure: 123/81 (06/28/19 2343)  Pulse: 72 (06/29/19 0758)  Temperature: 97 7 °F (36 5 °C) (06/29/19 0758)  Temp Source: Oral (06/29/19 0758)  Respirations: 16 (06/29/19 0758)  Height: 5' 4" (162 6 cm) (06/28/19 0100)  Weight - Scale: 86 2 kg (190 lb 0 6 oz) (06/24/19 1607)  SpO2: 98 % (06/29/19 0758)  Exam:   Physical Exam   Constitutional: She is oriented to person, place, and time  She appears well-developed and well-nourished  HENT:   Head: Normocephalic and atraumatic  Eyes: Pupils are equal, round, and reactive to light     Neck: Normal range of motion  Cardiovascular: Normal rate and regular rhythm  Pulmonary/Chest: Effort normal and breath sounds normal    Abdominal: Soft  She exhibits no distension  There is tenderness (Right upper quadrant)  Musculoskeletal: She exhibits no edema  Neurological: She is alert and oriented to person, place, and time  She has normal strength  No cranial nerve deficit  Skin: Skin is warm  No erythema  Psychiatric: She has a normal mood and affect  Her behavior is normal      Discussion with Family:  Patient's significant other at the bedside    Discharge instructions/Information to patient and family:   See after visit summary for information provided to patient and family  Provisions for Follow-Up Care:  See after visit summary for information related to follow-up care and any pertinent home health orders  Disposition:     Home    For Discharges to [facility] SN[facility]:   · Not Applicable to this Patient - Not Applicable to this Patient    Planned Readmission:  No     Discharge Statement:  I spent 45 minutes discharging the patient  This time was spent on the day of discharge  I had direct contact with the patient on the day of discharge  Greater than 50% of the total time was spent examining patient, answering all patient questions, arranging and discussing plan of care with patient as well as directly providing post-discharge instructions  Additional time then spent on discharge activities  Discharge Medications:  See after visit summary for reconciled discharge medications provided to patient and family        ** Please Note: This note has been constructed using a voice recognition system **

## 2019-07-01 NOTE — UTILIZATION REVIEW
Notification of Discharge  This is a Notification of Discharge from our facility [address]  Please be advised that this patient has been discharge from our facility  Below you will find the admission and discharge date and time including the patients disposition  PRESENTATION DATE: 6/24/2019  4:00 PM  OBS ADMISSION DATE: [unfilled]  IP ADMISSION DATE: [unfilled] [unfilled]  DISCHARGE DATE: 6/29/2019 10:03 AM  DISPOSITION: Home/Self Care  Admission Orders (From admission, onward)    Ordered        06/25/19 0930  Inpatient Admission  Once         06/24/19 2308  Place in Observation  Once               Please contact the UR Department if additional information is required to close this patient's authorization/case  [address]  Utilization Review Department  Phone: [phone]; Fax [phone]  [email or web address]  ATTENTION: Please call with any questions or concerns to [phone]  and carefully listen to the prompts so that you are directed to the right person  Send all requests for admission clinical reviews, approved or denied determinations and any other requests to fax [phone]   All voicemails are confidential

## 2019-08-01 ENCOUNTER — HOSPITAL ENCOUNTER (EMERGENCY)
Facility: HOSPITAL | Age: 44
Discharge: HOME | End: 2019-08-02
Attending: EMERGENCY MEDICINE
Payer: COMMERCIAL

## 2019-08-01 ENCOUNTER — APPOINTMENT (EMERGENCY)
Dept: CT IMAGING | Facility: HOSPITAL | Age: 44
End: 2019-08-01
Payer: COMMERCIAL

## 2019-08-01 DIAGNOSIS — R11.2 NAUSEA AND VOMITING: ICD-10-CM

## 2019-08-01 DIAGNOSIS — R10.9 ABDOMINAL PAIN: Primary | ICD-10-CM

## 2019-08-01 DIAGNOSIS — E86.0 DEHYDRATION: ICD-10-CM

## 2019-08-01 LAB
ALBUMIN SERPL BCP-MCNC: 4.6 G/DL (ref 3.5–5)
ALP SERPL-CCNC: 151 U/L (ref 46–116)
ALT SERPL W P-5'-P-CCNC: 36 U/L (ref 12–78)
ANION GAP SERPL CALCULATED.3IONS-SCNC: 17 MMOL/L (ref 4–13)
AST SERPL W P-5'-P-CCNC: 58 U/L (ref 5–45)
BASOPHILS # BLD AUTO: 0.13 THOUSANDS/ΜL (ref 0–0.1)
BASOPHILS NFR BLD AUTO: 1 % (ref 0–1)
BILIRUB SERPL-MCNC: 1.4 MG/DL (ref 0.2–1)
BUN SERPL-MCNC: 11 MG/DL (ref 5–25)
CALCIUM SERPL-MCNC: 9.7 MG/DL (ref 8.3–10.1)
CHLORIDE SERPL-SCNC: 95 MMOL/L (ref 100–108)
CO2 SERPL-SCNC: 28 MMOL/L (ref 21–32)
CREAT SERPL-MCNC: 1.11 MG/DL (ref 0.6–1.3)
EOSINOPHIL # BLD AUTO: 0.01 THOUSAND/ΜL (ref 0–0.61)
EOSINOPHIL NFR BLD AUTO: 0 % (ref 0–6)
ERYTHROCYTE [DISTWIDTH] IN BLOOD BY AUTOMATED COUNT: 16.5 % (ref 11.6–15.1)
GFR SERPL CREATININE-BSD FRML MDRD: 61 ML/MIN/1.73SQ M
GLUCOSE SERPL-MCNC: 225 MG/DL (ref 65–140)
HCG SERPL QL: NEGATIVE
HCT VFR BLD AUTO: 48.4 % (ref 34.8–46.1)
HGB BLD-MCNC: 16 G/DL (ref 11.5–15.4)
IMM GRANULOCYTES # BLD AUTO: 0.08 THOUSAND/UL (ref 0–0.2)
IMM GRANULOCYTES NFR BLD AUTO: 1 % (ref 0–2)
LIPASE SERPL-CCNC: 59 U/L (ref 73–393)
LYMPHOCYTES # BLD AUTO: 2.46 THOUSANDS/ΜL (ref 0.6–4.47)
LYMPHOCYTES NFR BLD AUTO: 14 % (ref 14–44)
MCH RBC QN AUTO: 29.4 PG (ref 26.8–34.3)
MCHC RBC AUTO-ENTMCNC: 33.1 G/DL (ref 31.4–37.4)
MCV RBC AUTO: 89 FL (ref 82–98)
MONOCYTES # BLD AUTO: 1.33 THOUSAND/ΜL (ref 0.17–1.22)
MONOCYTES NFR BLD AUTO: 8 % (ref 4–12)
NEUTROPHILS # BLD AUTO: 13.5 THOUSANDS/ΜL (ref 1.85–7.62)
NEUTS SEG NFR BLD AUTO: 76 % (ref 43–75)
NRBC BLD AUTO-RTO: 0 /100 WBCS
PLATELET # BLD AUTO: 307 THOUSANDS/UL (ref 149–390)
PMV BLD AUTO: 9.7 FL (ref 8.9–12.7)
POTASSIUM SERPL-SCNC: 3.6 MMOL/L (ref 3.5–5.3)
PROT SERPL-MCNC: 9.8 G/DL (ref 6.4–8.2)
RBC # BLD AUTO: 5.45 MILLION/UL (ref 3.81–5.12)
SODIUM SERPL-SCNC: 140 MMOL/L (ref 136–145)
WBC # BLD AUTO: 17.51 THOUSAND/UL (ref 4.31–10.16)

## 2019-08-01 PROCEDURE — 99284 EMERGENCY DEPT VISIT MOD MDM: CPT | Performed by: EMERGENCY MEDICINE

## 2019-08-01 PROCEDURE — 83690 ASSAY OF LIPASE: CPT | Performed by: EMERGENCY MEDICINE

## 2019-08-01 PROCEDURE — 36415 COLL VENOUS BLD VENIPUNCTURE: CPT | Performed by: EMERGENCY MEDICINE

## 2019-08-01 PROCEDURE — 99284 EMERGENCY DEPT VISIT MOD MDM: CPT

## 2019-08-01 PROCEDURE — 96376 TX/PRO/DX INJ SAME DRUG ADON: CPT

## 2019-08-01 PROCEDURE — 84703 CHORIONIC GONADOTROPIN ASSAY: CPT | Performed by: EMERGENCY MEDICINE

## 2019-08-01 PROCEDURE — 96375 TX/PRO/DX INJ NEW DRUG ADDON: CPT

## 2019-08-01 PROCEDURE — 96374 THER/PROPH/DIAG INJ IV PUSH: CPT

## 2019-08-01 PROCEDURE — 85025 COMPLETE CBC W/AUTO DIFF WBC: CPT | Performed by: EMERGENCY MEDICINE

## 2019-08-01 PROCEDURE — 80053 COMPREHEN METABOLIC PANEL: CPT | Performed by: EMERGENCY MEDICINE

## 2019-08-01 PROCEDURE — 74177 CT ABD & PELVIS W/CONTRAST: CPT

## 2019-08-01 PROCEDURE — 96361 HYDRATE IV INFUSION ADD-ON: CPT

## 2019-08-01 RX ORDER — HYDROMORPHONE HCL/PF 1 MG/ML
1 SYRINGE (ML) INJECTION ONCE
Status: COMPLETED | OUTPATIENT
Start: 2019-08-01 | End: 2019-08-01

## 2019-08-01 RX ORDER — METOCLOPRAMIDE HYDROCHLORIDE 5 MG/ML
10 INJECTION INTRAMUSCULAR; INTRAVENOUS ONCE
Status: COMPLETED | OUTPATIENT
Start: 2019-08-01 | End: 2019-08-01

## 2019-08-01 RX ADMIN — SODIUM CHLORIDE 1000 ML: 0.9 INJECTION, SOLUTION INTRAVENOUS at 22:37

## 2019-08-01 RX ADMIN — SODIUM CHLORIDE 1000 ML: 0.9 INJECTION, SOLUTION INTRAVENOUS at 21:38

## 2019-08-01 RX ADMIN — IOHEXOL 100 ML: 350 INJECTION, SOLUTION INTRAVENOUS at 22:46

## 2019-08-01 RX ADMIN — METOCLOPRAMIDE 10 MG: 5 INJECTION, SOLUTION INTRAMUSCULAR; INTRAVENOUS at 21:35

## 2019-08-01 RX ADMIN — HYDROMORPHONE HYDROCHLORIDE 1 MG: 1 INJECTION, SOLUTION INTRAMUSCULAR; INTRAVENOUS; SUBCUTANEOUS at 21:34

## 2019-08-01 RX ADMIN — HYDROMORPHONE HYDROCHLORIDE 1 MG: 1 INJECTION, SOLUTION INTRAMUSCULAR; INTRAVENOUS; SUBCUTANEOUS at 22:36

## 2019-08-02 VITALS
SYSTOLIC BLOOD PRESSURE: 143 MMHG | HEART RATE: 86 BPM | OXYGEN SATURATION: 99 % | HEIGHT: 64 IN | WEIGHT: 165.57 LBS | TEMPERATURE: 98.4 F | RESPIRATION RATE: 18 BRPM | BODY MASS INDEX: 28.27 KG/M2 | DIASTOLIC BLOOD PRESSURE: 88 MMHG

## 2019-08-02 LAB
BACTERIA UR QL AUTO: ABNORMAL /HPF
BILIRUB UR QL STRIP: NEGATIVE
CLARITY UR: CLEAR
COLOR UR: YELLOW
GLUCOSE UR STRIP-MCNC: NEGATIVE MG/DL
HGB UR QL STRIP.AUTO: NEGATIVE
KETONES UR STRIP-MCNC: ABNORMAL MG/DL
LEUKOCYTE ESTERASE UR QL STRIP: NEGATIVE
NITRITE UR QL STRIP: NEGATIVE
NON-SQ EPI CELLS URNS QL MICRO: ABNORMAL /HPF
PH UR STRIP.AUTO: 7 [PH]
PROT UR STRIP-MCNC: ABNORMAL MG/DL
RBC #/AREA URNS AUTO: ABNORMAL /HPF
SP GR UR STRIP.AUTO: <=1.005 (ref 1–1.03)
UROBILINOGEN UR QL STRIP.AUTO: 0.2 E.U./DL
WBC #/AREA URNS AUTO: ABNORMAL /HPF

## 2019-08-02 PROCEDURE — 96361 HYDRATE IV INFUSION ADD-ON: CPT

## 2019-08-02 PROCEDURE — 96376 TX/PRO/DX INJ SAME DRUG ADON: CPT

## 2019-08-02 PROCEDURE — 81001 URINALYSIS AUTO W/SCOPE: CPT | Performed by: EMERGENCY MEDICINE

## 2019-08-02 RX ORDER — DICYCLOMINE HCL 20 MG
20 TABLET ORAL 2 TIMES DAILY
Qty: 20 TABLET | Refills: 0 | Status: SHIPPED | OUTPATIENT
Start: 2019-08-02 | End: 2019-10-18 | Stop reason: HOSPADM

## 2019-08-02 RX ORDER — NAPROXEN 500 MG/1
500 TABLET ORAL 2 TIMES DAILY WITH MEALS
Qty: 20 TABLET | Refills: 0 | Status: SHIPPED | OUTPATIENT
Start: 2019-08-02 | End: 2019-12-20

## 2019-08-02 RX ORDER — MAGNESIUM HYDROXIDE/ALUMINUM HYDROXICE/SIMETHICONE 120; 1200; 1200 MG/30ML; MG/30ML; MG/30ML
30 SUSPENSION ORAL ONCE
Status: COMPLETED | OUTPATIENT
Start: 2019-08-02 | End: 2019-08-02

## 2019-08-02 RX ORDER — CLONIDINE HYDROCHLORIDE 0.1 MG/1
0.3 TABLET ORAL ONCE
Status: COMPLETED | OUTPATIENT
Start: 2019-08-02 | End: 2019-08-02

## 2019-08-02 RX ORDER — LIDOCAINE HYDROCHLORIDE 20 MG/ML
15 SOLUTION OROPHARYNGEAL ONCE
Status: COMPLETED | OUTPATIENT
Start: 2019-08-02 | End: 2019-08-02

## 2019-08-02 RX ORDER — HYDROMORPHONE HCL/PF 1 MG/ML
1 SYRINGE (ML) INJECTION ONCE
Status: COMPLETED | OUTPATIENT
Start: 2019-08-02 | End: 2019-08-02

## 2019-08-02 RX ORDER — CLONIDINE HYDROCHLORIDE 0.1 MG/1
0.2 TABLET ORAL ONCE
Status: DISCONTINUED | OUTPATIENT
Start: 2019-08-02 | End: 2019-08-02

## 2019-08-02 RX ORDER — METOCLOPRAMIDE 10 MG/1
10 TABLET ORAL EVERY 6 HOURS
Qty: 30 TABLET | Refills: 0 | Status: ON HOLD | OUTPATIENT
Start: 2019-08-02 | End: 2020-04-17

## 2019-08-02 RX ORDER — OXYCODONE HYDROCHLORIDE AND ACETAMINOPHEN 5; 325 MG/1; MG/1
1 TABLET ORAL EVERY 4 HOURS PRN
Qty: 10 TABLET | Refills: 0 | Status: SHIPPED | OUTPATIENT
Start: 2019-08-02 | End: 2019-08-07

## 2019-08-02 RX ADMIN — ALUMINUM HYDROXIDE, MAGNESIUM HYDROXIDE, AND SIMETHICONE 30 ML: 200; 200; 20 SUSPENSION ORAL at 03:24

## 2019-08-02 RX ADMIN — HYDROMORPHONE HYDROCHLORIDE 1 MG: 1 INJECTION, SOLUTION INTRAMUSCULAR; INTRAVENOUS; SUBCUTANEOUS at 01:29

## 2019-08-02 RX ADMIN — SODIUM CHLORIDE 1000 ML: 0.9 INJECTION, SOLUTION INTRAVENOUS at 01:29

## 2019-08-02 RX ADMIN — LIDOCAINE HYDROCHLORIDE 15 ML: 20 SOLUTION ORAL; TOPICAL at 03:24

## 2019-08-02 RX ADMIN — CLONIDINE HYDROCHLORIDE 0.3 MG: 0.1 TABLET ORAL at 01:46

## 2019-08-02 NOTE — ED NOTES
Patient transported to [address] [place] [address], [address] [place] [address]  08/01/19 4459

## 2019-08-02 NOTE — ED PROVIDER NOTES
History  Chief Complaint   Patient presents with    Abdominal Pain     patient states " think im having a pancreatitis attack"; pt has been vomiting all day and has hx of alcoholism  pt states she hasnt drank for a month and yesterday had a couple of drinks for her birthday    Vomiting     60-year-old female presents with abdominal pain since yesterday  She states it feels consistent with prior pancreatitis attacks  She states that her pain is below the belly button which is consistent with prior pancreatitis attacks  She states she has not drank alcohol for 1 month, last night she did have a few drinks to celebrate her birthday, started feeling of the abdominal pain last night and attributed it to cramps  However this morning she had severe abdominal pain, nausea and vomiting, presents in distress secondary to abdominal pain  Denies fevers or chills, admits to nausea and vomiting, no change in bowel or bladder habits  Prior to Admission Medications   Prescriptions Last Dose Informant Patient Reported? Taking?    LORazepam (ATIVAN) 0 5 mg tablet Not Taking at Unknown time  No No   Sig: Take 2 tablets (1 mg total) by mouth every 8 (eight) hours as needed for anxiety   Patient not taking: Reported on 8/2/2019   Mometasone Furo-Formoterol Fum (DULERA) 100-5 MCG/ACT AERO   Yes Yes   Sig: Inhale as needed Unsure of dose     albuterol (PROVENTIL HFA,VENTOLIN HFA) 90 mcg/act inhaler   No Yes   Sig: Inhale 2 puffs every 4 (four) hours as needed for wheezing   cloNIDine (CATAPRES) 0 3 mg tablet   Yes Yes   Sig: Take 0 3 mg by mouth 2 (two) times a day   dicyclomine (BENTYL) 20 mg tablet   No Yes   Sig: Take 1 tablet (20 mg total) by mouth 3 (three) times a day   gabapentin (NEURONTIN) 300 mg capsule   No Yes   Sig: Take 1 capsule (300 mg total) by mouth 3 (three) times a day   metFORMIN (GLUCOPHAGE) 500 mg tablet   No Yes   Sig: Take 1 tablet (500 mg total) by mouth daily with breakfast   nicotine (NICODERM CQ) 21 mg/24 hr TD 24 hr patch   No No   Sig: Place 1 patch on the skin daily   ondansetron (ZOFRAN) 4 mg tablet   No Yes   Sig: Take 1 tablet (4 mg total) by mouth every 8 (eight) hours as needed for nausea or vomiting   pancrelipase, Lip-Prot-Amyl, (CREON) 24,000 units   No Yes   Sig: Take 1 capsule by mouth 3 (three) times a day with meals   pantoprazole (PROTONIX) 20 mg tablet   No Yes   Sig: Take 1 tablet (20 mg total) by mouth daily   sucralfate (CARAFATE) 1 g tablet   No Yes   Sig: Take 1 tablet (1 g total) by mouth 4 (four) times a day   thiamine 100 MG tablet   No Yes   Sig: Take 1 tablet (100 mg total) by mouth daily      Facility-Administered Medications: None       Past Medical History:   Diagnosis Date    Alcohol abuse     Arthritis     GERD (gastroesophageal reflux disease)     Hypertension     Nicotine dependence     Obesity     Pancreatitis     Panic attack        Past Surgical History:   Procedure Laterality Date    ABDOMINAL SURGERY      C SECTION    ESOPHAGOGASTRODUODENOSCOPY N/A 12/15/2017    Procedure: ESOPHAGOGASTRODUODENOSCOPY (EGD); Surgeon: Valentin Reagan MD;  Location: [facility]; Service: Gastroenterology       Family History   Problem Relation Age of Onset    Cirrhosis Father     Alcohol abuse Father     Drug abuse Mother      I have reviewed and agree with the history as documented  Social History     Tobacco Use    Smoking status: Current Every Day Smoker     Packs/day: 0 50     Years: 29 00     Pack years: 14 50     Types: Cigarettes    Smokeless tobacco: Never Used   Substance Use Topics    Alcohol use: Never     Frequency: Never     Comment: Patient states 'I drink alot of vodka a day"    Drug use: No        Review of Systems   Constitutional: Positive for appetite change  Negative for chills and fever  HENT: Negative for congestion, rhinorrhea, sinus pressure and sinus pain  Respiratory: Negative for chest tightness and shortness of breath  Cardiovascular: Negative for chest pain and leg swelling  Gastrointestinal: Positive for abdominal pain, nausea and vomiting  Negative for constipation and diarrhea  Genitourinary: Negative for dysuria, flank pain, frequency and vaginal discharge  Musculoskeletal: Negative for back pain and neck pain  Skin: Negative for rash and wound  Neurological: Negative for dizziness, weakness, light-headedness, numbness and headaches  Physical Exam  Physical Exam   Constitutional: She is oriented to person, place, and time  She appears well-developed and well-nourished  Non-toxic appearance  She appears ill  She appears distressed  HENT:   Head: Normocephalic and atraumatic  Oropharynx is dry   Eyes: Pupils are equal, round, and reactive to light  Conjunctivae and EOM are normal  Right eye exhibits no discharge  Left eye exhibits no discharge  No scleral icterus  Neck: Normal range of motion  Neck supple  No JVD present  Cardiovascular: Normal rate, regular rhythm, normal heart sounds and intact distal pulses  Exam reveals no gallop and no friction rub  No murmur heard  Pulmonary/Chest: Effort normal and breath sounds normal  No respiratory distress  She has no wheezes  She has no rhonchi  She has no rales  She exhibits no tenderness  Abdominal: Soft  She exhibits no distension  There is generalized tenderness and tenderness in the periumbilical area  There is no rigidity, no rebound, no guarding and no CVA tenderness  Musculoskeletal: Normal range of motion  She exhibits no edema, tenderness or deformity  Neurological: She is alert and oriented to person, place, and time  No cranial nerve deficit  Skin: Skin is warm and dry  No rash noted  She is not diaphoretic  No erythema  No pallor  Psychiatric: She has a normal mood and affect  Her behavior is normal    Vitals reviewed        Vital Signs  ED Triage Vitals [08/01/19 2049]   Temperature Pulse Respirations Blood Pressure SpO2   98 4 °F (36 9 °C) (!) 142 22 (!) 193/148 99 %      Temp Source Heart Rate Source Patient Position - Orthostatic VS BP Location FiO2 (%)   Oral Monitor Sitting Right arm --      Pain Score       Worst Possible Pain           Vitals:    08/01/19 2049 08/01/19 2327 08/02/19 0132 08/02/19 0250   BP: (!) 193/148 (!) 184/100 (!) 218/119 143/88   Pulse: (!) 142 88 101 86   Patient Position - Orthostatic VS: Sitting Lying Sitting Lying         Visual Acuity      ED Medications  Medications   sodium chloride 0 9 % bolus 1,000 mL (0 mL Intravenous Stopped 8/1/19 2238)   HYDROmorphone (DILAUDID) injection 1 mg (1 mg Intravenous Given 8/1/19 2134)   metoclopramide (REGLAN) injection 10 mg (10 mg Intravenous Given 8/1/19 2135)   sodium chloride 0 9 % bolus 1,000 mL (0 mL Intravenous Stopped 8/1/19 2337)   HYDROmorphone (DILAUDID) injection 1 mg (1 mg Intravenous Given 8/1/19 2236)   iohexol (OMNIPAQUE) 350 MG/ML injection (MULTI-DOSE) 100 mL (100 mL Intravenous Given 8/1/19 2246)   HYDROmorphone (DILAUDID) injection 1 mg (1 mg Intravenous Given 8/2/19 0129)   sodium chloride 0 9 % bolus 1,000 mL (0 mL Intravenous Stopped 8/2/19 0314)   cloNIDine (CATAPRES) tablet 0 3 mg (0 3 mg Oral Given 8/2/19 0146)   aluminum-magnesium hydroxide-simethicone (MYLANTA) 200-200-20 mg/5 mL oral suspension 30 mL (30 mL Oral Given 8/2/19 0324)   Lidocaine Viscous HCl (XYLOCAINE) 2 % mucosal solution 15 mL (15 mL Swish & Spit Given 8/2/19 0324)       Diagnostic Studies  Results Reviewed     Procedure Component Value Units Date/Time    Urine Microscopic [[id number]]  (Abnormal) Collected:  08/02/19 0058    Lab Status:  Final result Specimen:  Urine, Clean Catch Updated:  08/02/19 0113     RBC, UA 0-1 /hpf      WBC, UA None Seen /hpf      Epithelial Cells Moderate /hpf      Bacteria, UA Occasional /hpf     UA w Reflex to Microscopic w Reflex to Culture [[id number]]  (Abnormal) Collected:  08/02/19 0058    Lab Status:  Final result Specimen:  Urine, Clean Catch Updated:  08/02/19 0104     Color, UA Yellow     Clarity, UA Clear     Specific Gravity, UA <=1 005     pH, UA 7 0     Leukocytes, UA Negative     Nitrite, UA Negative     Protein, UA 30 (1+) mg/dl      Glucose, UA Negative mg/dl      Ketones, UA Trace mg/dl      Urobilinogen, UA 0 2 E U /dl      Bilirubin, UA Negative     Blood, UA Negative    Lipase [[id number]]  (Abnormal) Collected:  08/01/19 2133    Lab Status:  Final result Specimen:  Blood from Arm, Right Updated:  08/01/19 2206     Lipase 59 u/L     hCG, qualitative pregnancy [[id number]]  (Normal) Collected:  08/01/19 2133    Lab Status:  Final result Specimen:  Blood from Arm, Right Updated:  08/01/19 2206     Preg, Serum Negative    Comprehensive metabolic panel [[id number]]  (Abnormal) Collected:  08/01/19 2133    Lab Status:  Final result Specimen:  Blood from Arm, Right Updated:  08/01/19 2201     Sodium 140 mmol/L      Potassium 3 6 mmol/L      Chloride 95 mmol/L      CO2 28 mmol/L      ANION GAP 17 mmol/L      BUN 11 mg/dL      Creatinine 1 11 mg/dL      Glucose 225 mg/dL      Calcium 9 7 mg/dL      AST 58 U/L      ALT 36 U/L      Alkaline Phosphatase 151 U/L      Total Protein 9 8 g/dL      Albumin 4 6 g/dL      Total Bilirubin 1 40 mg/dL      eGFR 61 ml/min/1 73sq m     Narrative:       [place] guidelines for Chronic Kidney Disease (CKD):     Stage 1 with normal or high GFR (GFR > 90 mL/min/1 73 square meters)    Stage 2 Mild CKD (GFR = 60-89 mL/min/1 73 square meters)    Stage 3A Moderate CKD (GFR = 45-59 mL/min/1 73 square meters)    Stage 3B Moderate CKD (GFR = 30-44 mL/min/1 73 square meters)    Stage 4 Severe CKD (GFR = 15-29 mL/min/1 73 square meters)    Stage 5 End Stage CKD (GFR <15 mL/min/1 73 square meters)  Note: GFR calculation is accurate only with a steady state creatinine    CBC and differential [[id number]]  (Abnormal) Collected:  08/01/19 2133    Lab Status:  Final result Specimen:  Blood from Arm, Right Updated:  08/01/19 2145     WBC 17 51 Thousand/uL      RBC 5 45 Million/uL      Hemoglobin 16 0 g/dL      Hematocrit 48 4 %      MCV 89 fL      MCH 29 4 pg      MCHC 33 1 g/dL      RDW 16 5 %      MPV 9 7 fL      Platelets 227 Thousands/uL      nRBC 0 /100 WBCs      Neutrophils Relative 76 %      Immat GRANS % 1 %      Lymphocytes Relative 14 %      Monocytes Relative 8 %      Eosinophils Relative 0 %      Basophils Relative 1 %      Neutrophils Absolute 13 50 Thousands/µL      Immature Grans Absolute 0 08 Thousand/uL      Lymphocytes Absolute 2 46 Thousands/µL      Monocytes Absolute 1 33 Thousand/µL      Eosinophils Absolute 0 01 Thousand/µL      Basophils Absolute 0 13 Thousands/µL                  CT abdomen pelvis with contrast   ED Interpretation by Joi Loera DO (08/01 2326)   Cirrhotic liver with fibrosis again visualized        Colonic diverticulosis without evidence of acute diverticulitis  Final Result by Modesto Donato DO (08/01 2314)      Cirrhotic liver with fibrosis again visualized  Colonic diverticulosis without evidence of acute diverticulitis  Workstation performed: [id number]                    Procedures  Procedures       ED Course  ED Course as of Aug 02 0433   u Aug 01, 2019   2245 Dehydration   Hemoglobin(!): 16 0   Fri Aug 02, 2019   0103 Complains of pain and requires more pain control  0111 Consistent with dehydration  Ketones, UA(!): Trace   0111 No evidence of urinary tract infection  Because of abdominal pain is noted  Patient will be discharged home with symptomatic treatment  0131 No evidence of urinary tract infection  Bacteria, UA: Occasional   0142 Patient has not taken blood pressure medications in 3 days because she has not been feeling well  It is likely the cause of hypertension at this time  Will give her her home blood pressure medication    Hopefully patient will be able to be discharged home as no acute findings are found on imaging  MDM  Number of Diagnoses or Management Options  Abdominal pain:   Dehydration:   Nausea and vomiting:   Diagnosis management comments: Abdominal pain  DDX includes but not limited to: pancreatitis, SBO, viral gastroenteritis, cholecystitis, cholangitis, appendicitis, gastritis, gasctic/peptic ulcer  Will eval with: abdominal scan, abdominal labs, UA  Provide sx tx  Workup is not indicative of acute pancreatitis, normal lipase, no evidence on CT scan  May be viral gastroenteritis or nonspecific abdominal pain  Patient is given symptomatic treatment and discharged home advised good return precautions in case of worsening condition at home  Discussed with patient that she may return at any time if home treatment is not sufficient to control her symptoms  Patient agreeable with plan and would like to be discharged home  She is comfortable and with stable vital signs on discharge  Amount and/or Complexity of Data Reviewed  Clinical lab tests: ordered and reviewed  Tests in the radiology section of CPT®: ordered and reviewed        Disposition  Final diagnoses:   Abdominal pain   Nausea and vomiting   Dehydration     Time reflects when diagnosis was documented in both MDM as applicable and the Disposition within this note     Time User Action Codes Description Comment    8/2/2019  3:09 AM Leticia De La O Add [R10 9] Abdominal pain     8/2/2019  3:09 AM Leticia De La O Add [R11 2] Nausea and vomiting     8/2/2019  3:09 AM Viktor De La O Add [E86 0] Dehydration       ED Disposition     ED Disposition Condition Date/Time Comment    Discharge Stable Fri Aug 2, 2019  3:09 AM Awanda Goldberg discharge to home/self care              Follow-up Information     Follow up With Specialties Details Why Contact Info Additional Information    Raji Carmen MD [facility] Medicine Schedule an appointment as soon as possible for a visit  For follow up to ensure improvement, and for further testing and treatment as needed [address] [facility] [id number]  [address] Emergency Department Emergency Medicine  If symptoms worsen [address] [facility] Juanis Mar Regis [address] ED, [address] [place], [place], [address] [facility], [place] Gastroenterology  As needed [place]   [address]  [facility] [id number]             Patient's Medications   Discharge Prescriptions    DICYCLOMINE (BENTYL) 20 MG TABLET    Take 1 tablet (20 mg total) by mouth 2 (two) times a day As needed for stomach cramps       Start Date: 8/2/2019  End Date: --       Order Dose: 20 mg       Quantity: 20 tablet    Refills: 0    METOCLOPRAMIDE (REGLAN) 10 MG TABLET    Take 1 tablet (10 mg total) by mouth every 6 (six) hours As needed for nausea and vomiting       Start Date: 8/2/2019  End Date: --       Order Dose: 10 mg       Quantity: 30 tablet    Refills: 0    NAPROXEN (NAPROSYN) 500 MG TABLET    Take 1 tablet (500 mg total) by mouth 2 (two) times a day with meals For mild abdominal pain       Start Date: 8/2/2019  End Date: --       Order Dose: 500 mg       Quantity: 20 tablet    Refills: 0    OXYCODONE-ACETAMINOPHEN (PERCOCET) 5-325 MG PER TABLET    Take 1 tablet by mouth every 4 (four) hours as needed for severe pain for up to 5 daysMax Daily Amount: 6 tablets       Start Date: 8/2/2019  End Date: 8/7/2019       Order Dose: 1 tablet       Quantity: 10 tablet    Refills: 0     No discharge procedures on file      ED Provider  Electronically Signed by           Kelly Yeager DO  08/02/19 8991

## 2019-08-02 NOTE — ED NOTES
Patient states she has not had her bp medications for the past 3 days; provider made aware that bp was 218/119 and she has not had her medications     Graciela Holly RN  08/02/19 7242

## 2019-08-10 ENCOUNTER — HOSPITAL ENCOUNTER (INPATIENT)
Facility: HOSPITAL | Age: 44
LOS: 2 days | Discharge: HOME | DRG: 282 | End: 2019-08-12
Attending: EMERGENCY MEDICINE | Admitting: INTERNAL MEDICINE
Payer: COMMERCIAL

## 2019-08-10 DIAGNOSIS — R10.11 RIGHT UPPER QUADRANT ABDOMINAL PAIN: Primary | ICD-10-CM

## 2019-08-10 DIAGNOSIS — K86.1 ACUTE ON CHRONIC PANCREATITIS (HCC): Chronic | ICD-10-CM

## 2019-08-10 DIAGNOSIS — E11.9 TYPE 2 DIABETES MELLITUS, WITHOUT LONG-TERM CURRENT USE OF INSULIN (HCC): ICD-10-CM

## 2019-08-10 DIAGNOSIS — F10.10 ALCOHOL ABUSE: ICD-10-CM

## 2019-08-10 DIAGNOSIS — K85.90 ACUTE ON CHRONIC PANCREATITIS (HCC): Chronic | ICD-10-CM

## 2019-08-10 LAB
ALBUMIN SERPL BCP-MCNC: 4.4 G/DL (ref 3.5–5)
ALP SERPL-CCNC: 112 U/L (ref 46–116)
ALT SERPL W P-5'-P-CCNC: 50 U/L (ref 12–78)
ANION GAP SERPL CALCULATED.3IONS-SCNC: 17 MMOL/L (ref 4–13)
AST SERPL W P-5'-P-CCNC: 90 U/L (ref 5–45)
BACTERIA UR QL AUTO: ABNORMAL /HPF
BASOPHILS # BLD AUTO: 0.11 THOUSANDS/ΜL (ref 0–0.1)
BASOPHILS NFR BLD AUTO: 1 % (ref 0–1)
BILIRUB SERPL-MCNC: 0.6 MG/DL (ref 0.2–1)
BILIRUB UR QL STRIP: ABNORMAL
BUN SERPL-MCNC: 4 MG/DL (ref 5–25)
CALCIUM SERPL-MCNC: 9.6 MG/DL (ref 8.3–10.1)
CHLORIDE SERPL-SCNC: 101 MMOL/L (ref 100–108)
CLARITY UR: ABNORMAL
CO2 SERPL-SCNC: 24 MMOL/L (ref 21–32)
COARSE GRAN CASTS URNS QL MICRO: ABNORMAL /LPF
COLOR UR: YELLOW
CREAT SERPL-MCNC: 0.88 MG/DL (ref 0.6–1.3)
EOSINOPHIL # BLD AUTO: 0.03 THOUSAND/ΜL (ref 0–0.61)
EOSINOPHIL NFR BLD AUTO: 0 % (ref 0–6)
ERYTHROCYTE [DISTWIDTH] IN BLOOD BY AUTOMATED COUNT: 17.1 % (ref 11.6–15.1)
GFR SERPL CREATININE-BSD FRML MDRD: 81 ML/MIN/1.73SQ M
GLUCOSE SERPL-MCNC: 158 MG/DL (ref 65–140)
GLUCOSE SERPL-MCNC: 185 MG/DL (ref 65–140)
GLUCOSE UR STRIP-MCNC: NEGATIVE MG/DL
HCT VFR BLD AUTO: 48.3 % (ref 34.8–46.1)
HGB BLD-MCNC: 16.3 G/DL (ref 11.5–15.4)
HGB UR QL STRIP.AUTO: NEGATIVE
HYALINE CASTS #/AREA URNS LPF: ABNORMAL /LPF
IMM GRANULOCYTES # BLD AUTO: 0.07 THOUSAND/UL (ref 0–0.2)
IMM GRANULOCYTES NFR BLD AUTO: 1 % (ref 0–2)
KETONES UR STRIP-MCNC: ABNORMAL MG/DL
LACTATE SERPL-SCNC: 3.1 MMOL/L (ref 0.5–2)
LEUKOCYTE ESTERASE UR QL STRIP: NEGATIVE
LIPASE SERPL-CCNC: 69 U/L (ref 73–393)
LYMPHOCYTES # BLD AUTO: 4.3 THOUSANDS/ΜL (ref 0.6–4.47)
LYMPHOCYTES NFR BLD AUTO: 34 % (ref 14–44)
MCH RBC QN AUTO: 29.7 PG (ref 26.8–34.3)
MCHC RBC AUTO-ENTMCNC: 33.7 G/DL (ref 31.4–37.4)
MCV RBC AUTO: 88 FL (ref 82–98)
MONOCYTES # BLD AUTO: 1.28 THOUSAND/ΜL (ref 0.17–1.22)
MONOCYTES NFR BLD AUTO: 10 % (ref 4–12)
NEUTROPHILS # BLD AUTO: 7.04 THOUSANDS/ΜL (ref 1.85–7.62)
NEUTS SEG NFR BLD AUTO: 54 % (ref 43–75)
NITRITE UR QL STRIP: NEGATIVE
NON-SQ EPI CELLS URNS QL MICRO: ABNORMAL /HPF
NRBC BLD AUTO-RTO: 0 /100 WBCS
PH UR STRIP.AUTO: 5.5 [PH]
PLATELET # BLD AUTO: 328 THOUSANDS/UL (ref 149–390)
PMV BLD AUTO: 9.7 FL (ref 8.9–12.7)
POTASSIUM SERPL-SCNC: 3.4 MMOL/L (ref 3.5–5.3)
PROT SERPL-MCNC: 9.5 G/DL (ref 6.4–8.2)
PROT UR STRIP-MCNC: ABNORMAL MG/DL
RBC # BLD AUTO: 5.49 MILLION/UL (ref 3.81–5.12)
RBC #/AREA URNS AUTO: ABNORMAL /HPF
S PYO AG THROAT QL: NEGATIVE
SODIUM SERPL-SCNC: 142 MMOL/L (ref 136–145)
SP GR UR STRIP.AUTO: >=1.03 (ref 1–1.03)
UROBILINOGEN UR QL STRIP.AUTO: 1 E.U./DL
WBC # BLD AUTO: 12.83 THOUSAND/UL (ref 4.31–10.16)
WBC #/AREA URNS AUTO: ABNORMAL /HPF

## 2019-08-10 PROCEDURE — 96375 TX/PRO/DX INJ NEW DRUG ADDON: CPT

## 2019-08-10 PROCEDURE — 81001 URINALYSIS AUTO W/SCOPE: CPT | Performed by: EMERGENCY MEDICINE

## 2019-08-10 PROCEDURE — 80061 LIPID PANEL: CPT | Performed by: INTERNAL MEDICINE

## 2019-08-10 PROCEDURE — 99285 EMERGENCY DEPT VISIT HI MDM: CPT | Performed by: EMERGENCY MEDICINE

## 2019-08-10 PROCEDURE — 83605 ASSAY OF LACTIC ACID: CPT | Performed by: EMERGENCY MEDICINE

## 2019-08-10 PROCEDURE — 96374 THER/PROPH/DIAG INJ IV PUSH: CPT

## 2019-08-10 PROCEDURE — 87430 STREP A AG IA: CPT | Performed by: EMERGENCY MEDICINE

## 2019-08-10 PROCEDURE — 80053 COMPREHEN METABOLIC PANEL: CPT | Performed by: EMERGENCY MEDICINE

## 2019-08-10 PROCEDURE — 85025 COMPLETE CBC W/AUTO DIFF WBC: CPT | Performed by: EMERGENCY MEDICINE

## 2019-08-10 PROCEDURE — 82948 REAGENT STRIP/BLOOD GLUCOSE: CPT

## 2019-08-10 PROCEDURE — 96372 THER/PROPH/DIAG INJ SC/IM: CPT

## 2019-08-10 PROCEDURE — 87070 CULTURE OTHR SPECIMN AEROBIC: CPT | Performed by: EMERGENCY MEDICINE

## 2019-08-10 PROCEDURE — 83690 ASSAY OF LIPASE: CPT | Performed by: EMERGENCY MEDICINE

## 2019-08-10 PROCEDURE — 99285 EMERGENCY DEPT VISIT HI MDM: CPT

## 2019-08-10 PROCEDURE — 36415 COLL VENOUS BLD VENIPUNCTURE: CPT

## 2019-08-10 PROCEDURE — 83735 ASSAY OF MAGNESIUM: CPT | Performed by: PHYSICIAN ASSISTANT

## 2019-08-10 PROCEDURE — 96361 HYDRATE IV INFUSION ADD-ON: CPT

## 2019-08-10 PROCEDURE — 99219 PR INITIAL OBSERVATION CARE/DAY 50 MINUTES: CPT | Performed by: INTERNAL MEDICINE

## 2019-08-10 RX ORDER — GABAPENTIN 300 MG/1
300 CAPSULE ORAL 3 TIMES DAILY
Status: DISCONTINUED | OUTPATIENT
Start: 2019-08-10 | End: 2019-08-12 | Stop reason: HOSPADM

## 2019-08-10 RX ORDER — ALBUTEROL SULFATE 90 UG/1
2 AEROSOL, METERED RESPIRATORY (INHALATION) EVERY 4 HOURS PRN
Status: DISCONTINUED | OUTPATIENT
Start: 2019-08-10 | End: 2019-08-12 | Stop reason: HOSPADM

## 2019-08-10 RX ORDER — ONDANSETRON 2 MG/ML
4 INJECTION INTRAMUSCULAR; INTRAVENOUS EVERY 8 HOURS PRN
Status: DISCONTINUED | OUTPATIENT
Start: 2019-08-10 | End: 2019-08-12 | Stop reason: HOSPADM

## 2019-08-10 RX ORDER — OLANZAPINE 10 MG/1
10 INJECTION, POWDER, LYOPHILIZED, FOR SOLUTION INTRAMUSCULAR ONCE
Status: COMPLETED | OUTPATIENT
Start: 2019-08-10 | End: 2019-08-10

## 2019-08-10 RX ORDER — CLONIDINE HYDROCHLORIDE 0.1 MG/1
0.3 TABLET ORAL 2 TIMES DAILY
Status: DISCONTINUED | OUTPATIENT
Start: 2019-08-10 | End: 2019-08-12 | Stop reason: HOSPADM

## 2019-08-10 RX ORDER — HYDROMORPHONE HCL/PF 1 MG/ML
1 SYRINGE (ML) INJECTION ONCE
Status: COMPLETED | OUTPATIENT
Start: 2019-08-10 | End: 2019-08-10

## 2019-08-10 RX ORDER — PROMETHAZINE HYDROCHLORIDE 25 MG/ML
25 INJECTION, SOLUTION INTRAMUSCULAR; INTRAVENOUS ONCE
Status: COMPLETED | OUTPATIENT
Start: 2019-08-10 | End: 2019-08-10

## 2019-08-10 RX ORDER — NICOTINE 21 MG/24HR
1 PATCH, TRANSDERMAL 24 HOURS TRANSDERMAL DAILY
Status: DISCONTINUED | OUTPATIENT
Start: 2019-08-11 | End: 2019-08-11

## 2019-08-10 RX ORDER — PANTOPRAZOLE SODIUM 20 MG/1
20 TABLET, DELAYED RELEASE ORAL DAILY
Status: DISCONTINUED | OUTPATIENT
Start: 2019-08-11 | End: 2019-08-12 | Stop reason: HOSPADM

## 2019-08-10 RX ORDER — OXYCODONE HYDROCHLORIDE 10 MG/1
10 TABLET ORAL EVERY 6 HOURS PRN
Status: DISCONTINUED | OUTPATIENT
Start: 2019-08-10 | End: 2019-08-12 | Stop reason: HOSPADM

## 2019-08-10 RX ORDER — SUCRALFATE 1 G/1
1 TABLET ORAL 4 TIMES DAILY
Status: DISCONTINUED | OUTPATIENT
Start: 2019-08-10 | End: 2019-08-12 | Stop reason: HOSPADM

## 2019-08-10 RX ORDER — THIAMINE MONONITRATE (VIT B1) 100 MG
100 TABLET ORAL DAILY
Status: DISCONTINUED | OUTPATIENT
Start: 2019-08-11 | End: 2019-08-12 | Stop reason: HOSPADM

## 2019-08-10 RX ORDER — METOPROLOL TARTRATE 5 MG/5ML
5 INJECTION INTRAVENOUS EVERY 6 HOURS PRN
Status: DISCONTINUED | OUTPATIENT
Start: 2019-08-10 | End: 2019-08-12 | Stop reason: HOSPADM

## 2019-08-10 RX ORDER — POTASSIUM CHLORIDE 14.9 MG/ML
20 INJECTION INTRAVENOUS ONCE
Status: COMPLETED | OUTPATIENT
Start: 2019-08-10 | End: 2019-08-11

## 2019-08-10 RX ORDER — FOLIC ACID 1 MG/1
1 TABLET ORAL DAILY
Status: DISCONTINUED | OUTPATIENT
Start: 2019-08-11 | End: 2019-08-12 | Stop reason: HOSPADM

## 2019-08-10 RX ORDER — ONDANSETRON 2 MG/ML
4 INJECTION INTRAMUSCULAR; INTRAVENOUS ONCE
Status: COMPLETED | OUTPATIENT
Start: 2019-08-10 | End: 2019-08-10

## 2019-08-10 RX ORDER — POTASSIUM CHLORIDE 20 MEQ/1
40 TABLET, EXTENDED RELEASE ORAL ONCE
Status: COMPLETED | OUTPATIENT
Start: 2019-08-10 | End: 2019-08-10

## 2019-08-10 RX ORDER — KETOROLAC TROMETHAMINE 30 MG/ML
30 INJECTION, SOLUTION INTRAMUSCULAR; INTRAVENOUS ONCE
Status: COMPLETED | OUTPATIENT
Start: 2019-08-10 | End: 2019-08-10

## 2019-08-10 RX ORDER — SODIUM CHLORIDE 9 MG/ML
150 INJECTION, SOLUTION INTRAVENOUS CONTINUOUS
Status: DISCONTINUED | OUTPATIENT
Start: 2019-08-10 | End: 2019-08-11

## 2019-08-10 RX ORDER — ACETAMINOPHEN 325 MG/1
650 TABLET ORAL EVERY 6 HOURS PRN
Status: DISCONTINUED | OUTPATIENT
Start: 2019-08-10 | End: 2019-08-12 | Stop reason: HOSPADM

## 2019-08-10 RX ORDER — DICYCLOMINE HCL 20 MG
20 TABLET ORAL 2 TIMES DAILY
Status: DISCONTINUED | OUTPATIENT
Start: 2019-08-10 | End: 2019-08-12 | Stop reason: HOSPADM

## 2019-08-10 RX ORDER — DIPHENHYDRAMINE HYDROCHLORIDE 50 MG/ML
50 INJECTION INTRAMUSCULAR; INTRAVENOUS ONCE
Status: COMPLETED | OUTPATIENT
Start: 2019-08-10 | End: 2019-08-10

## 2019-08-10 RX ORDER — HYDROMORPHONE HCL/PF 1 MG/ML
0.5 SYRINGE (ML) INJECTION EVERY 4 HOURS PRN
Status: DISCONTINUED | OUTPATIENT
Start: 2019-08-10 | End: 2019-08-11

## 2019-08-10 RX ORDER — METOCLOPRAMIDE 10 MG/1
10 TABLET ORAL EVERY 6 HOURS
Status: DISCONTINUED | OUTPATIENT
Start: 2019-08-10 | End: 2019-08-12 | Stop reason: HOSPADM

## 2019-08-10 RX ADMIN — WATER 10 ML: 1 INJECTION INTRAMUSCULAR; INTRAVENOUS; SUBCUTANEOUS at 19:57

## 2019-08-10 RX ADMIN — DIPHENHYDRAMINE HYDROCHLORIDE 50 MG: 50 INJECTION, SOLUTION INTRAMUSCULAR; INTRAVENOUS at 19:54

## 2019-08-10 RX ADMIN — KETOROLAC TROMETHAMINE 30 MG: 30 INJECTION, SOLUTION INTRAMUSCULAR at 19:54

## 2019-08-10 RX ADMIN — POTASSIUM CHLORIDE 20 MEQ: 200 INJECTION, SOLUTION INTRAVENOUS at 23:49

## 2019-08-10 RX ADMIN — METOCLOPRAMIDE HYDROCHLORIDE 10 MG: 10 TABLET ORAL at 22:40

## 2019-08-10 RX ADMIN — ONDANSETRON 4 MG: 2 INJECTION INTRAMUSCULAR; INTRAVENOUS at 22:45

## 2019-08-10 RX ADMIN — SODIUM CHLORIDE 150 ML/HR: 0.9 INJECTION, SOLUTION INTRAVENOUS at 22:41

## 2019-08-10 RX ADMIN — PROMETHAZINE HYDROCHLORIDE 25 MG: 25 INJECTION INTRAMUSCULAR; INTRAVENOUS at 23:54

## 2019-08-10 RX ADMIN — SODIUM CHLORIDE 2000 ML: 0.9 INJECTION, SOLUTION INTRAVENOUS at 21:13

## 2019-08-10 RX ADMIN — ONDANSETRON 4 MG: 2 INJECTION INTRAMUSCULAR; INTRAVENOUS at 19:51

## 2019-08-10 RX ADMIN — SODIUM CHLORIDE 1000 ML: 0.9 INJECTION, SOLUTION INTRAVENOUS at 19:46

## 2019-08-10 RX ADMIN — OLANZAPINE 10 MG: 10 INJECTION, POWDER, FOR SOLUTION INTRAMUSCULAR at 19:57

## 2019-08-10 RX ADMIN — SUCRALFATE 1 G: 1 TABLET ORAL at 22:41

## 2019-08-10 RX ADMIN — METOPROLOL TARTRATE 5 MG: 5 INJECTION INTRAVENOUS at 23:54

## 2019-08-10 RX ADMIN — POTASSIUM CHLORIDE 40 MEQ: 1500 TABLET, EXTENDED RELEASE ORAL at 22:40

## 2019-08-10 RX ADMIN — GABAPENTIN 300 MG: 300 CAPSULE ORAL at 22:40

## 2019-08-10 RX ADMIN — HYDROMORPHONE HYDROCHLORIDE 1 MG: 1 INJECTION, SOLUTION INTRAMUSCULAR; INTRAVENOUS; SUBCUTANEOUS at 20:40

## 2019-08-10 RX ADMIN — HYDROMORPHONE HYDROCHLORIDE 0.5 MG: 1 INJECTION, SOLUTION INTRAMUSCULAR; INTRAVENOUS; SUBCUTANEOUS at 22:38

## 2019-08-10 NOTE — LETTER
[facility]  [address]  [address] [place] [id number]  Dept: [phone]    August 12, 2019     Patient: Randa Gutierrez   YOB: 1975   Date of Visit: 8/10/2019       To Whom it May Concern:    Randa Gutierrez is under my professional care  She was seen in the hospital from 8/10/2019   to 08/12/19  She Was hospitalized under my care and all her significant other Melissa Crocker was present at the bedside during hospitalization       If you have any questions or concerns, please don't hesitate to call           Sincerely,          Jaylon Pan

## 2019-08-10 NOTE — ED PROVIDER NOTES
History  Chief Complaint   Patient presents with    Abdominal Pain     vomiting, recent pancreatitis     38year old female presents emergency department for a return trip with recurrent symptoms of her pancreatitis  She was just discharged from the hospital yesterday  Currently the patient states this is normal bout of pancreatitis, starts in the normal fashion with dry mouth and increasing abdominal pain and inability to tolerate p  O  She is not feel that this is any worse in any way because of this I am going to defer CT scanning until labs are done and will ascertain any in based on laboratory data  Currently, however, the patient is a great deal of pain will be treated as an abdominal migraine  History provided by:  Patient   used: No    Abdominal Pain   Pain location:  Generalized  Pain quality: aching    Pain radiates to:  Does not radiate  Pain severity:  Mild  Timing:  Constant  Progression:  Worsening  Chronicity:  New  Context: not awakening from sleep, not laxative use, not medication withdrawal and not recent illness    Relieved by:  Nothing  Worsened by:  Nothing  Ineffective treatments:  None tried  Associated symptoms: no anorexia, no diarrhea, no fever, no hematuria, no sore throat and no vaginal discharge    Risk factors: not elderly and not obese        Prior to Admission Medications   Prescriptions Last Dose Informant Patient Reported? Taking?    LORazepam (ATIVAN) 0 5 mg tablet   No No   Sig: Take 2 tablets (1 mg total) by mouth every 8 (eight) hours as needed for anxiety   Patient not taking: Reported on 8/2/2019   Mometasone Furo-Formoterol Fum (DULERA) 100-5 MCG/ACT AERO   Yes No   Sig: Inhale as needed Unsure of dose     albuterol (PROVENTIL HFA,VENTOLIN HFA) 90 mcg/act inhaler   No No   Sig: Inhale 2 puffs every 4 (four) hours as needed for wheezing   cloNIDine (CATAPRES) 0 3 mg tablet   Yes No   Sig: Take 0 3 mg by mouth 2 (two) times a day   dicyclomine (BENTYL) 20 mg tablet   No No   Sig: Take 1 tablet (20 mg total) by mouth 3 (three) times a day   dicyclomine (BENTYL) 20 mg tablet   No No   Sig: Take 1 tablet (20 mg total) by mouth 2 (two) times a day As needed for stomach cramps   gabapentin (NEURONTIN) 300 mg capsule   No No   Sig: Take 1 capsule (300 mg total) by mouth 3 (three) times a day   metFORMIN (GLUCOPHAGE) 500 mg tablet   No No   Sig: Take 1 tablet (500 mg total) by mouth daily with breakfast   metoclopramide (REGLAN) 10 mg tablet   No No   Sig: Take 1 tablet (10 mg total) by mouth every 6 (six) hours As needed for nausea and vomiting   naproxen (NAPROSYN) 500 mg tablet   No No   Sig: Take 1 tablet (500 mg total) by mouth 2 (two) times a day with meals For mild abdominal pain   nicotine (NICODERM CQ) 21 mg/24 hr TD 24 hr patch   No No   Sig: Place 1 patch on the skin daily   ondansetron (ZOFRAN) 4 mg tablet   No No   Sig: Take 1 tablet (4 mg total) by mouth every 8 (eight) hours as needed for nausea or vomiting   pancrelipase, Lip-Prot-Amyl, (CREON) 24,000 units   No No   Sig: Take 1 capsule by mouth 3 (three) times a day with meals   pantoprazole (PROTONIX) 20 mg tablet   No No   Sig: Take 1 tablet (20 mg total) by mouth daily   sucralfate (CARAFATE) 1 g tablet   No No   Sig: Take 1 tablet (1 g total) by mouth 4 (four) times a day   thiamine 100 MG tablet   No No   Sig: Take 1 tablet (100 mg total) by mouth daily      Facility-Administered Medications: None       Past Medical History:   Diagnosis Date    Alcohol abuse     Arthritis     GERD (gastroesophageal reflux disease)     Hypertension     Nicotine dependence     Obesity     Pancreatitis     Panic attack        Past Surgical History:   Procedure Laterality Date    ABDOMINAL SURGERY      C SECTION    ESOPHAGOGASTRODUODENOSCOPY N/A 12/15/2017    Procedure: ESOPHAGOGASTRODUODENOSCOPY (EGD); Surgeon: Marnie Aquino MD;  Location: [facility];   Service: Gastroenterology       Family History   Problem Relation Age of Onset    Cirrhosis Father     Alcohol abuse Father     Drug abuse Mother      I have reviewed and agree with the history as documented  Social History     Tobacco Use    Smoking status: Current Every Day Smoker     Packs/day: 1 00     Years: 29 00     Pack years: 29 00     Types: Cigarettes    Smokeless tobacco: Never Used   Substance Use Topics    Alcohol use: Yes     Frequency: Never     Comment: Patient states 'I drink alot of vodka a day"    Drug use: No        Review of Systems   Constitutional: Negative for fever  HENT: Negative for sore throat  Gastrointestinal: Positive for abdominal pain  Negative for anorexia and diarrhea  Genitourinary: Negative for hematuria and vaginal discharge  All other systems reviewed and are negative  Physical Exam  Physical Exam   Constitutional: She is oriented to person, place, and time  She appears well-developed and well-nourished  HENT:   Head: Normocephalic and atraumatic  Right Ear: External ear normal    Left Ear: External ear normal    Eyes: Conjunctivae and EOM are normal    Neck: No JVD present  No tracheal deviation present  No thyromegaly present  Cardiovascular: Normal rate  Pulmonary/Chest: Effort normal and breath sounds normal  No stridor  Abdominal: Soft  She exhibits no distension and no mass  There is tenderness  There is no guarding  No hernia  Musculoskeletal: Normal range of motion  She exhibits no edema, tenderness or deformity  Lymphadenopathy:     She has no cervical adenopathy  Neurological: She is alert and oriented to person, place, and time  Skin: Skin is warm  No rash noted  No erythema  No pallor  Psychiatric: She has a normal mood and affect  Her behavior is normal    Nursing note and vitals reviewed        Vital Signs  ED Triage Vitals   Temperature Pulse Respirations Blood Pressure SpO2   08/10/19 1832 08/10/19 1828 08/10/19 1828 08/10/19 1828 08/10/19 1828   99 5 °F (37 5 °C) (!) 144 16 (!) 188/131 96 %      Temp Source Heart Rate Source Patient Position - Orthostatic VS BP Location FiO2 (%)   08/10/19 1832 08/10/19 1828 08/10/19 1845 08/10/19 1828 --   Oral Monitor Sitting Right arm       Pain Score       08/10/19 1832       Worst Possible Pain           Vitals:    08/10/19 2015 08/10/19 2211 08/10/19 2212 08/10/19 2300   BP: (!) 173/100 (!) 187/101 (!) 170/104 (!) 198/102   Pulse: (!) 127 92  90   Patient Position - Orthostatic VS: Sitting Lying Lying Lying         Visual Acuity      ED Medications  Medications   albuterol (PROVENTIL HFA,VENTOLIN HFA) inhaler 2 puff (has no administration in time range)   cloNIDine (CATAPRES) tablet 0 3 mg (has no administration in time range)   dicyclomine (BENTYL) tablet 20 mg (has no administration in time range)   gabapentin (NEURONTIN) capsule 300 mg (300 mg Oral Given 8/10/19 2240)   metoclopramide (REGLAN) tablet 10 mg (10 mg Oral Given 8/10/19 2240)   pancrelipase (Lip-Prot-Amyl) (CREON) delayed release capsule 24,000 Units (has no administration in time range)   pantoprazole (PROTONIX) EC tablet 20 mg (has no administration in time range)   sucralfate (CARAFATE) tablet 1 g (1 g Oral Given 8/10/19 2241)   thiamine (VITAMIN B1) tablet 100 mg (has no administration in time range)   folic acid (FOLVITE) tablet 1 mg (has no administration in time range)   oxyCODONE (ROXICODONE) immediate release tablet 10 mg (has no administration in time range)   HYDROmorphone (DILAUDID) injection 0 5 mg (0 5 mg Intravenous Given 8/10/19 2238)   sodium chloride 0 9 % infusion (150 mL/hr Intravenous New Bag 8/10/19 2241)   nicotine (NICODERM CQ) 21 mg/24 hr TD 24 hr patch 1 patch (has no administration in time range)   enoxaparin (LOVENOX) subcutaneous injection 40 mg (has no administration in time range)   acetaminophen (TYLENOL) tablet 650 mg (has no administration in time range)   ondansetron (ZOFRAN) injection 4 mg (4 mg Intravenous Given 8/10/19 2245)   insulin lispro (HumaLOG) 100 units/mL subcutaneous injection 1-6 Units (has no administration in time range)   potassium chloride 20 mEq IVPB (premix) (20 mEq Intravenous New Bag 8/10/19 2349)   promethazine (PHENERGAN) injection 25 mg (has no administration in time range)   metoprolol (LOPRESSOR) injection 5 mg (has no administration in time range)   sodium chloride 0 9 % bolus 1,000 mL (0 mL Intravenous Stopped 8/10/19 2046)   diphenhydrAMINE (BENADRYL) injection 50 mg (50 mg Intravenous Given 8/10/19 1954)   ondansetron (ZOFRAN) injection 4 mg (4 mg Intravenous Given 8/10/19 1951)   ketorolac (TORADOL) injection 30 mg (30 mg Intravenous Given 8/10/19 1954)   OLANZapine (ZyPREXA) IM injection 10 mg (10 mg Intramuscular Given 8/10/19 1957)   sterile water injection **ADS Override Pull** (10 mL  Given 8/10/19 1957)   HYDROmorphone (DILAUDID) injection 1 mg (1 mg Intravenous Given 8/10/19 2040)   sodium chloride 0 9 % bolus 2,000 mL (2,000 mL Intravenous New Bag 8/10/19 2113)   potassium chloride (K-DUR,KLOR-CON) CR tablet 40 mEq (40 mEq Oral Given 8/10/19 2240)       Diagnostic Studies  Results Reviewed     Procedure Component Value Units Date/Time    Lactic acid, plasma [[id number]]  (Abnormal) Collected:  08/10/19 2006    Lab Status:  Final result Specimen:  Blood from Arm, Right Updated:  08/10/19 2043     LACTIC ACID 3 1 mmol/L     Narrative:       Result may be elevated if tourniquet was used during collection      Urine Microscopic [[id number]]  (Abnormal) Collected:  08/10/19 2006    Lab Status:  Final result Specimen:  Urine, Clean Catch Updated:  08/10/19 2028     RBC, UA None Seen /hpf      WBC, UA 1-2 /hpf      Epithelial Cells Innumerable /hpf      Bacteria, UA Innumerable /hpf      Hyaline Casts, UA 20-30 /lpf      COARSE GRANULAR CASTS 1-2 /lpf     Rapid Strep A Screen Throat with Reflex to Culture, Pediatrics and Compromised Adults [[id number]]  (Normal) Collected:  08/10/19 2006    Lab Status: Final result Specimen:  Throat Updated:  08/10/19 2023     Rapid Strep A Screen Negative    Throat culture [[id number]] Collected:  08/10/19 2006    Lab Status:   In process Specimen:  Throat Updated:  08/10/19 2023    UA w Reflex to Microscopic w Reflex to Culture [[id number]]  (Abnormal) Collected:  08/10/19 2006    Lab Status:  Final result Specimen:  Urine, Clean Catch Updated:  08/10/19 2016     Color, UA Yellow     Clarity, UA Cloudy     Specific Gravity, UA >=1 030     pH, UA 5 5     Leukocytes, UA Negative     Nitrite, UA Negative     Protein,  (2+) mg/dl      Glucose, UA Negative mg/dl      Ketones, UA Trace mg/dl      Urobilinogen, UA 1 0 E U /dl      Bilirubin, UA Small     Blood, UA Negative    Comprehensive metabolic panel [[id number]]  (Abnormal) Collected:  08/10/19 1846    Lab Status:  Final result Specimen:  Blood from Arm, Right Updated:  08/10/19 1912     Sodium 142 mmol/L      Potassium 3 4 mmol/L      Chloride 101 mmol/L      CO2 24 mmol/L      ANION GAP 17 mmol/L      BUN 4 mg/dL      Creatinine 0 88 mg/dL      Glucose 185 mg/dL      Calcium 9 6 mg/dL      AST 90 U/L      ALT 50 U/L      Alkaline Phosphatase 112 U/L      Total Protein 9 5 g/dL      Albumin 4 4 g/dL      Total Bilirubin 0 60 mg/dL      eGFR 81 ml/min/1 73sq m     Narrative:       Cynthia guidelines for Chronic Kidney Disease (CKD):     Stage 1 with normal or high GFR (GFR > 90 mL/min/1 73 square meters)    Stage 2 Mild CKD (GFR = 60-89 mL/min/1 73 square meters)    Stage 3A Moderate CKD (GFR = 45-59 mL/min/1 73 square meters)    Stage 3B Moderate CKD (GFR = 30-44 mL/min/1 73 square meters)    Stage 4 Severe CKD (GFR = 15-29 mL/min/1 73 square meters)    Stage 5 End Stage CKD (GFR <15 mL/min/1 73 square meters)  Note: GFR calculation is accurate only with a steady state creatinine    Lipase [[id number]]  (Abnormal) Collected:  08/10/19 1846    Lab Status:  Final result Specimen:  Blood from Arm, Right Updated:  08/10/19 1912     Lipase 69 u/L     CBC and differential [[id number]]  (Abnormal) Collected:  08/10/19 1846    Lab Status:  Final result Specimen:  Blood from Arm, Right Updated:  08/10/19 1854     WBC 12 83 Thousand/uL      RBC 5 49 Million/uL      Hemoglobin 16 3 g/dL      Hematocrit 48 3 %      MCV 88 fL      MCH 29 7 pg      MCHC 33 7 g/dL      RDW 17 1 %      MPV 9 7 fL      Platelets 011 Thousands/uL      nRBC 0 /100 WBCs      Neutrophils Relative 54 %      Immat GRANS % 1 %      Lymphocytes Relative 34 %      Monocytes Relative 10 %      Eosinophils Relative 0 %      Basophils Relative 1 %      Neutrophils Absolute 7 04 Thousands/µL      Immature Grans Absolute 0 07 Thousand/uL      Lymphocytes Absolute 4 30 Thousands/µL      Monocytes Absolute 1 28 Thousand/µL      Eosinophils Absolute 0 03 Thousand/µL      Basophils Absolute 0 11 Thousands/µL                  No orders to display              Procedures  Procedures       ED Course                               MDM  Number of Diagnoses or Management Options  Right upper quadrant abdominal pain: new and requires workup     Amount and/or Complexity of Data Reviewed  Clinical lab tests: ordered and reviewed  Tests in the radiology section of CPT®: reviewed  Decide to obtain previous medical records or to obtain history from someone other than the patient: yes  Review and summarize past medical records: yes    Patient Progress  Patient progress: stable      Disposition  Final diagnoses:   Right upper quadrant abdominal pain     Time reflects when diagnosis was documented in both MDM as applicable and the Disposition within this note     Time User Action Codes Description Comment    8/10/2019  8:43 PM Negin Olmstead Add [R10 11] Right upper quadrant abdominal pain     8/10/2019  9:17 PM Joe Conte Add [E11 9] Type 2 diabetes mellitus, without long-term current use of insulin [facility])       ED Disposition     ED Disposition Condition Date/Time Comment    Admit Stable Sat Aug 10, 2019  8:43 PM Case was discussed with Dr Baylee Francis and the patient's admission status was agreed to be Admission Status: inpatient status to the service of Dr Baylee Francis           Follow-up Information    None         Current Discharge Medication List      CONTINUE these medications which have NOT CHANGED    Details   albuterol (PROVENTIL HFA,VENTOLIN HFA) 90 mcg/act inhaler Inhale 2 puffs every 4 (four) hours as needed for wheezing  Qty: 1 Inhaler, Refills: 0    Associated Diagnoses: Bronchospasm      cloNIDine (CATAPRES) 0 3 mg tablet Take 0 3 mg by mouth 2 (two) times a day      !! dicyclomine (BENTYL) 20 mg tablet Take 1 tablet (20 mg total) by mouth 3 (three) times a day  Qty: 90 tablet, Refills: 0    Associated Diagnoses: Generalized abdominal pain      !! dicyclomine (BENTYL) 20 mg tablet Take 1 tablet (20 mg total) by mouth 2 (two) times a day As needed for stomach cramps  Qty: 20 tablet, Refills: 0    Associated Diagnoses: Abdominal pain      gabapentin (NEURONTIN) 300 mg capsule Take 1 capsule (300 mg total) by mouth 3 (three) times a day  Qty: 90 capsule, Refills: 0    Associated Diagnoses: Back pain      LORazepam (ATIVAN) 0 5 mg tablet Take 2 tablets (1 mg total) by mouth every 8 (eight) hours as needed for anxiety  Qty: 15 tablet, Refills: 0    Associated Diagnoses: Anxiety      metFORMIN (GLUCOPHAGE) 500 mg tablet Take 1 tablet (500 mg total) by mouth daily with breakfast  Qty: 30 tablet, Refills: 0    Associated Diagnoses: Blood glucose elevated      metoclopramide (REGLAN) 10 mg tablet Take 1 tablet (10 mg total) by mouth every 6 (six) hours As needed for nausea and vomiting  Qty: 30 tablet, Refills: 0    Associated Diagnoses: Nausea and vomiting      Mometasone Furo-Formoterol Fum (DULERA) 100-5 MCG/ACT AERO Inhale as needed Unsure of dose        naproxen (NAPROSYN) 500 mg tablet Take 1 tablet (500 mg total) by mouth 2 (two) times a day with meals For mild abdominal pain  Qty: 20 tablet, Refills: 0    Associated Diagnoses: Abdominal pain      nicotine (NICODERM CQ) 21 mg/24 hr TD 24 hr patch Place 1 patch on the skin daily  Qty: 28 patch, Refills: 0    Associated Diagnoses: Nicotine dependence      ondansetron (ZOFRAN) 4 mg tablet Take 1 tablet (4 mg total) by mouth every 8 (eight) hours as needed for nausea or vomiting  Qty: 20 tablet, Refills: 0    Associated Diagnoses: Vomiting      pancrelipase, Lip-Prot-Amyl, (CREON) 24,000 units Take 1 capsule by mouth 3 (three) times a day with meals  Qty: 90 capsule, Refills: 0      pantoprazole (PROTONIX) 20 mg tablet Take 1 tablet (20 mg total) by mouth daily  Qty: 20 tablet, Refills: 0    Associated Diagnoses: Abdominal pain; Nausea & vomiting      sucralfate (CARAFATE) 1 g tablet Take 1 tablet (1 g total) by mouth 4 (four) times a day  Qty: 120 tablet, Refills: 0    Associated Diagnoses: Gastritis      thiamine 100 MG tablet Take 1 tablet (100 mg total) by mouth daily  Qty: 30 tablet, Refills: 0    Associated Diagnoses: Alcoholic cirrhosis of liver without ascites ([facility] [address] )       ! ! - Potential duplicate medications found  Please discuss with provider  No discharge procedures on file      ED Provider  Electronically Signed by           Michael Landa DO  08/10/19 2842

## 2019-08-11 LAB
ANION GAP SERPL CALCULATED.3IONS-SCNC: 13 MMOL/L (ref 4–13)
BASOPHILS # BLD AUTO: 0.07 THOUSANDS/ΜL (ref 0–0.1)
BASOPHILS NFR BLD AUTO: 1 % (ref 0–1)
BUN SERPL-MCNC: 3 MG/DL (ref 5–25)
CALCIUM SERPL-MCNC: 8 MG/DL (ref 8.3–10.1)
CHLORIDE SERPL-SCNC: 104 MMOL/L (ref 100–108)
CHOLEST SERPL-MCNC: 223 MG/DL (ref 50–200)
CO2 SERPL-SCNC: 26 MMOL/L (ref 21–32)
CREAT SERPL-MCNC: 0.64 MG/DL (ref 0.6–1.3)
EOSINOPHIL # BLD AUTO: 0.03 THOUSAND/ΜL (ref 0–0.61)
EOSINOPHIL NFR BLD AUTO: 0 % (ref 0–6)
ERYTHROCYTE [DISTWIDTH] IN BLOOD BY AUTOMATED COUNT: 17.1 % (ref 11.6–15.1)
GFR SERPL CREATININE-BSD FRML MDRD: 110 ML/MIN/1.73SQ M
GLUCOSE P FAST SERPL-MCNC: 152 MG/DL (ref 65–99)
GLUCOSE SERPL-MCNC: 122 MG/DL (ref 65–140)
GLUCOSE SERPL-MCNC: 128 MG/DL (ref 65–140)
GLUCOSE SERPL-MCNC: 132 MG/DL (ref 65–140)
GLUCOSE SERPL-MCNC: 138 MG/DL (ref 65–140)
GLUCOSE SERPL-MCNC: 148 MG/DL (ref 65–140)
GLUCOSE SERPL-MCNC: 152 MG/DL (ref 65–140)
GLUCOSE SERPL-MCNC: 182 MG/DL (ref 65–140)
HCT VFR BLD AUTO: 40.1 % (ref 34.8–46.1)
HDLC SERPL-MCNC: 37 MG/DL (ref 40–60)
HGB BLD-MCNC: 13.2 G/DL (ref 11.5–15.4)
IMM GRANULOCYTES # BLD AUTO: 0.03 THOUSAND/UL (ref 0–0.2)
IMM GRANULOCYTES NFR BLD AUTO: 0 % (ref 0–2)
LDLC SERPL CALC-MCNC: 166 MG/DL (ref 0–100)
LYMPHOCYTES # BLD AUTO: 3.26 THOUSANDS/ΜL (ref 0.6–4.47)
LYMPHOCYTES NFR BLD AUTO: 31 % (ref 14–44)
MAGNESIUM SERPL-MCNC: 1.4 MG/DL (ref 1.6–2.6)
MCH RBC QN AUTO: 29.9 PG (ref 26.8–34.3)
MCHC RBC AUTO-ENTMCNC: 32.9 G/DL (ref 31.4–37.4)
MCV RBC AUTO: 91 FL (ref 82–98)
MONOCYTES # BLD AUTO: 1.51 THOUSAND/ΜL (ref 0.17–1.22)
MONOCYTES NFR BLD AUTO: 14 % (ref 4–12)
NEUTROPHILS # BLD AUTO: 5.68 THOUSANDS/ΜL (ref 1.85–7.62)
NEUTS SEG NFR BLD AUTO: 54 % (ref 43–75)
NONHDLC SERPL-MCNC: 186 MG/DL
NRBC BLD AUTO-RTO: 0 /100 WBCS
PLATELET # BLD AUTO: 228 THOUSANDS/UL (ref 149–390)
PMV BLD AUTO: 9.5 FL (ref 8.9–12.7)
POTASSIUM SERPL-SCNC: 3.1 MMOL/L (ref 3.5–5.3)
RBC # BLD AUTO: 4.42 MILLION/UL (ref 3.81–5.12)
SODIUM SERPL-SCNC: 143 MMOL/L (ref 136–145)
TRIGL SERPL-MCNC: 102 MG/DL
WBC # BLD AUTO: 10.58 THOUSAND/UL (ref 4.31–10.16)

## 2019-08-11 PROCEDURE — 99225 PR SBSQ OBSERVATION CARE/DAY 25 MINUTES: CPT | Performed by: NURSE PRACTITIONER

## 2019-08-11 PROCEDURE — 82948 REAGENT STRIP/BLOOD GLUCOSE: CPT

## 2019-08-11 PROCEDURE — 80048 BASIC METABOLIC PNL TOTAL CA: CPT | Performed by: INTERNAL MEDICINE

## 2019-08-11 PROCEDURE — 85025 COMPLETE CBC W/AUTO DIFF WBC: CPT | Performed by: INTERNAL MEDICINE

## 2019-08-11 RX ORDER — NICOTINE 21 MG/24HR
14 PATCH, TRANSDERMAL 24 HOURS TRANSDERMAL DAILY
Status: DISCONTINUED | OUTPATIENT
Start: 2019-08-11 | End: 2019-08-11

## 2019-08-11 RX ORDER — MAGNESIUM SULFATE HEPTAHYDRATE 40 MG/ML
2 INJECTION, SOLUTION INTRAVENOUS ONCE
Status: COMPLETED | OUTPATIENT
Start: 2019-08-11 | End: 2019-08-11

## 2019-08-11 RX ORDER — LIDOCAINE HYDROCHLORIDE 20 MG/ML
15 SOLUTION OROPHARYNGEAL ONCE
Status: COMPLETED | OUTPATIENT
Start: 2019-08-11 | End: 2019-08-11

## 2019-08-11 RX ORDER — HYDROMORPHONE HCL/PF 1 MG/ML
0.5 SYRINGE (ML) INJECTION EVERY 6 HOURS PRN
Status: DISCONTINUED | OUTPATIENT
Start: 2019-08-11 | End: 2019-08-12

## 2019-08-11 RX ORDER — MAGNESIUM HYDROXIDE/ALUMINUM HYDROXICE/SIMETHICONE 120; 1200; 1200 MG/30ML; MG/30ML; MG/30ML
30 SUSPENSION ORAL ONCE
Status: COMPLETED | OUTPATIENT
Start: 2019-08-11 | End: 2019-08-11

## 2019-08-11 RX ORDER — NICOTINE 21 MG/24HR
21 PATCH, TRANSDERMAL 24 HOURS TRANSDERMAL DAILY
Status: DISCONTINUED | OUTPATIENT
Start: 2019-08-11 | End: 2019-08-12 | Stop reason: HOSPADM

## 2019-08-11 RX ORDER — HYDROMORPHONE HCL/PF 1 MG/ML
0.5 SYRINGE (ML) INJECTION ONCE
Status: COMPLETED | OUTPATIENT
Start: 2019-08-11 | End: 2019-08-11

## 2019-08-11 RX ORDER — NICOTINE 21 MG/24HR
21 PATCH, TRANSDERMAL 24 HOURS TRANSDERMAL DAILY
Status: DISCONTINUED | OUTPATIENT
Start: 2019-08-12 | End: 2019-08-11

## 2019-08-11 RX ORDER — SODIUM CHLORIDE, SODIUM LACTATE, POTASSIUM CHLORIDE, CALCIUM CHLORIDE 600; 310; 30; 20 MG/100ML; MG/100ML; MG/100ML; MG/100ML
150 INJECTION, SOLUTION INTRAVENOUS CONTINUOUS
Status: DISCONTINUED | OUTPATIENT
Start: 2019-08-11 | End: 2019-08-12 | Stop reason: HOSPADM

## 2019-08-11 RX ORDER — POTASSIUM CHLORIDE 14.9 MG/ML
20 INJECTION INTRAVENOUS ONCE
Status: COMPLETED | OUTPATIENT
Start: 2019-08-11 | End: 2019-08-11

## 2019-08-11 RX ADMIN — METOCLOPRAMIDE HYDROCHLORIDE 10 MG: 10 TABLET ORAL at 15:55

## 2019-08-11 RX ADMIN — GABAPENTIN 300 MG: 300 CAPSULE ORAL at 15:54

## 2019-08-11 RX ADMIN — METOPROLOL TARTRATE 5 MG: 5 INJECTION INTRAVENOUS at 09:00

## 2019-08-11 RX ADMIN — INSULIN LISPRO 1 UNITS: 100 INJECTION, SOLUTION INTRAVENOUS; SUBCUTANEOUS at 23:57

## 2019-08-11 RX ADMIN — HYDROMORPHONE HYDROCHLORIDE 0.5 MG: 1 INJECTION, SOLUTION INTRAMUSCULAR; INTRAVENOUS; SUBCUTANEOUS at 01:38

## 2019-08-11 RX ADMIN — SODIUM CHLORIDE, SODIUM LACTATE, POTASSIUM CHLORIDE, AND CALCIUM CHLORIDE 200 ML/HR: .6; .31; .03; .02 INJECTION, SOLUTION INTRAVENOUS at 06:48

## 2019-08-11 RX ADMIN — HYDROMORPHONE HYDROCHLORIDE 0.5 MG: 1 INJECTION, SOLUTION INTRAMUSCULAR; INTRAVENOUS; SUBCUTANEOUS at 04:14

## 2019-08-11 RX ADMIN — LIDOCAINE HYDROCHLORIDE 15 ML: 20 SOLUTION ORAL; TOPICAL at 08:14

## 2019-08-11 RX ADMIN — SUCRALFATE 1 G: 1 TABLET ORAL at 21:16

## 2019-08-11 RX ADMIN — PANCRELIPASE 24000 UNITS: 24000; 76000; 120000 CAPSULE, DELAYED RELEASE PELLETS ORAL at 15:55

## 2019-08-11 RX ADMIN — DICYCLOMINE HYDROCHLORIDE 20 MG: 20 TABLET ORAL at 18:05

## 2019-08-11 RX ADMIN — ENOXAPARIN SODIUM 40 MG: 40 INJECTION SUBCUTANEOUS at 09:01

## 2019-08-11 RX ADMIN — HYDROMORPHONE HYDROCHLORIDE 0.5 MG: 1 INJECTION, SOLUTION INTRAMUSCULAR; INTRAVENOUS; SUBCUTANEOUS at 13:12

## 2019-08-11 RX ADMIN — FAMOTIDINE 20 MG: 10 INJECTION, SOLUTION INTRAVENOUS at 08:14

## 2019-08-11 RX ADMIN — POTASSIUM CHLORIDE 20 MEQ: 200 INJECTION, SOLUTION INTRAVENOUS at 17:03

## 2019-08-11 RX ADMIN — OXYCODONE HYDROCHLORIDE 10 MG: 10 TABLET ORAL at 18:11

## 2019-08-11 RX ADMIN — ALUMINUM HYDROXIDE, MAGNESIUM HYDROXIDE, AND SIMETHICONE 30 ML: 200; 200; 20 SUSPENSION ORAL at 08:14

## 2019-08-11 RX ADMIN — GABAPENTIN 300 MG: 300 CAPSULE ORAL at 21:16

## 2019-08-11 RX ADMIN — HYDROMORPHONE HYDROCHLORIDE 0.5 MG: 1 INJECTION, SOLUTION INTRAMUSCULAR; INTRAVENOUS; SUBCUTANEOUS at 21:16

## 2019-08-11 RX ADMIN — SODIUM CHLORIDE, SODIUM LACTATE, POTASSIUM CHLORIDE, AND CALCIUM CHLORIDE 200 ML/HR: .6; .31; .03; .02 INJECTION, SOLUTION INTRAVENOUS at 11:50

## 2019-08-11 RX ADMIN — SUCRALFATE 1 G: 1 TABLET ORAL at 18:05

## 2019-08-11 RX ADMIN — NICOTINE 21 MG: 21 PATCH TRANSDERMAL at 23:53

## 2019-08-11 RX ADMIN — ONDANSETRON 4 MG: 2 INJECTION INTRAMUSCULAR; INTRAVENOUS at 08:57

## 2019-08-11 RX ADMIN — SODIUM CHLORIDE, SODIUM LACTATE, POTASSIUM CHLORIDE, AND CALCIUM CHLORIDE 200 ML/HR: .6; .31; .03; .02 INJECTION, SOLUTION INTRAVENOUS at 01:39

## 2019-08-11 RX ADMIN — SODIUM CHLORIDE, SODIUM LACTATE, POTASSIUM CHLORIDE, AND CALCIUM CHLORIDE 150 ML/HR: .6; .31; .03; .02 INJECTION, SOLUTION INTRAVENOUS at 20:18

## 2019-08-11 RX ADMIN — NICOTINE 21 MG: 21 PATCH TRANSDERMAL at 00:59

## 2019-08-11 RX ADMIN — CLONIDINE HYDROCHLORIDE 0.3 MG: 0.1 TABLET ORAL at 18:06

## 2019-08-11 RX ADMIN — MAGNESIUM SULFATE HEPTAHYDRATE 2 G: 40 INJECTION, SOLUTION INTRAVENOUS at 13:18

## 2019-08-11 RX ADMIN — METOCLOPRAMIDE HYDROCHLORIDE 10 MG: 10 TABLET ORAL at 21:16

## 2019-08-11 RX ADMIN — CLONIDINE HYDROCHLORIDE 0.3 MG: 0.1 TABLET ORAL at 12:15

## 2019-08-11 RX ADMIN — HYDROMORPHONE HYDROCHLORIDE 0.5 MG: 1 INJECTION, SOLUTION INTRAMUSCULAR; INTRAVENOUS; SUBCUTANEOUS at 08:57

## 2019-08-11 NOTE — ASSESSMENT & PLAN NOTE
· Patient presented on admission for recurrent nausea and vomiting along with abdominal pain  · Patient with frequent visits to ED for acute on chronic pancreatitis  · No imaging was obtained in the ED hold off patient does not appear clinically toxic  · Lipase is normal agree symptoms are concerning that this could be a slight exacerbation of her pancreatitis in setting of alcohol consumption  · Patient's nausea vomiting appear to be improving continue IV fluids  · Will decreased frequency of the Dilaudid to q 6 hours as there is no clinical indication to continue acute frequency of q 4 hours and likely discontinue IV pain medication in the morning  · Advance diet to clear liquids encourage patient to take oral medications especially Creon  · Anticipate discharge tomorrow and recommend referral to PCP to pain management

## 2019-08-11 NOTE — ASSESSMENT & PLAN NOTE
· Reports that she has been sober but that she try to did drink a "cocktail"today thinking she had abdominal cramps when her pancreatitis set in  Have given patient alcohol counseling given her history of chronic pancreatitis as well as liver cirrhosis from prior alcohol abuse  · Concern of withdrawal at this time  Patient daily drinking over last several days  · Abstaining from drinking was discussed thoroughly with this patient  · Continue thiamine and folic acid

## 2019-08-11 NOTE — H&P
H&P- Parminder Govea 1975, 38 y o  female MRN: [id number]    Unit/Bed#: ED 12 Encounter: [id number]    Primary Care Provider: Harlan De MD   Date and time admitted to hospital: 8/10/2019  6:23 PM        * Acute on chronic pancreatitis [facility])  Assessment & Plan  Patient presenting with recurrent nausea and vomiting and abdominal pain over the last day  She has a history of frequent visits due to acute on chronic pancreatitis  No imaging was obtained in the ED today  Lipase is within normal limits  However, symptoms are concerning for exacerbation of her pancreatitis again  -plan to admit patient for management of chronic pancreatitis  NPO  IV fluids  Pain control  Antiemetics  Type 2 diabetes mellitus, without long-term current use of insulin (HCC)  Assessment & Plan  Lab Results   Component Value Date    HGBA1C 7 2 (H) 05/24/2019       No results for input(s): POCGLU in the last 72 hours  Blood Sugar Average: Last 72 hrs:    Recent diagnosis of diabetes with a hemoglobin of 7 2 likely from recurrent chronic pancreatitis  She has been initiated on metformin 500 mg daily as an outpatient  Will hold metformin as an inpatient and institute sliding scale insulin   -she is unsure about management of diabetes, will consult diabetic educator for additional teaching  Alcohol abuse  Assessment & Plan  Reports that she has been sober but that she try to did drink a "cocktail"today thinking she had abdominal cramps when her pancreatitis set in  Have given patient alcohol counseling given her history of chronic pancreatitis as well as liver cirrhosis from prior alcohol abuse  Concern of withdrawal at this time  Patient daily drinking over last several days   -continue thiamine and folic acid  Essential hypertension  Assessment & Plan  Continue clonidine 0 3 mg b i d  Hypokalemia  Assessment & Plan  Likely from vomiting today  Potassium is 3 4  Forty mEq of p  o   Potassium ordered  Recheck BMP tomorrow  Magnesium level ordered  VTE PROPHYLAXIS:  + SCDs, Lovenox    CODE STATUS: FULL    Anticipated Length of Stay:  Patient will be admitted on an Inpatient basis with an anticipated length of stay of  2 midnights  Justification for Hospital Stay:  Acute on chronic pancreatitis    CHIEF COMPLAINT   abdominal pain, nausea and vomiting    HISTORY OF PRESENT ILLNESS  Holley Landeros is a 60-year-old female with past medical history notable for chronic pancreatitis thought to be from prior alcohol abuse  Also with cirrhosis thought to be from prior alcohol use  Patient reports that she is now sober  She reports abdominal discomfort and cramping pain over the last day  This was later associated with nausea and vomiting of nonbloody emesis  Her abdominal pain on arrival was 9/10 in intensity which improved with Dilaudid to 4/10 during my evaluation  She denies any fevers or chills at home  She has not eaten anything during the day and has not taken her home medications  She reports that when she experienced her crampy pain today, she try attempted to have a"cocktail drink"to help with the pain  She denies to me that she drinks on a daily basis  She is not sure why she keeps having recurrent pancreatitis attacks  In the ED, her lipase was within normal limits  She did not have a repeat imaging as she was just imaged on August 1st and there was no comment of pancreatitis at that time from that imaging  She was given 3 L of normal saline bolus in the ED  She was given pain medications and antiemetics  Medicine was consulted for admission further management for concern of acute on chronic pancreatitis      REVIEW OF SYSTEMS  A comprehensive 10 point review system conducted all negative except as mentioned in HPI       PMH/PSH    Past Medical History:   Diagnosis Date    Alcohol abuse     Arthritis     GERD (gastroesophageal reflux disease)     Hypertension     Nicotine dependence     Obesity     Pancreatitis     Panic attack        Past Surgical History:   Procedure Laterality Date    ABDOMINAL SURGERY      C SECTION    ESOPHAGOGASTRODUODENOSCOPY N/A 12/15/2017    Procedure: ESOPHAGOGASTRODUODENOSCOPY (EGD); Surgeon: Kalpana Enciso MD;  Location: [facility]; Service: Gastroenterology       ALLERGIES  Allergies   Allergen Reactions    Morphine Swelling    Morphine And Related Swelling       HOME MEDICATIONS  No current facility-administered medications on file prior to encounter        Current Outpatient Medications on File Prior to Encounter   Medication Sig    albuterol (PROVENTIL HFA,VENTOLIN HFA) 90 mcg/act inhaler Inhale 2 puffs every 4 (four) hours as needed for wheezing    cloNIDine (CATAPRES) 0 3 mg tablet Take 0 3 mg by mouth 2 (two) times a day    dicyclomine (BENTYL) 20 mg tablet Take 1 tablet (20 mg total) by mouth 3 (three) times a day    dicyclomine (BENTYL) 20 mg tablet Take 1 tablet (20 mg total) by mouth 2 (two) times a day As needed for stomach cramps    gabapentin (NEURONTIN) 300 mg capsule Take 1 capsule (300 mg total) by mouth 3 (three) times a day    LORazepam (ATIVAN) 0 5 mg tablet Take 2 tablets (1 mg total) by mouth every 8 (eight) hours as needed for anxiety (Patient not taking: Reported on 8/2/2019)    metFORMIN (GLUCOPHAGE) 500 mg tablet Take 1 tablet (500 mg total) by mouth daily with breakfast    metoclopramide (REGLAN) 10 mg tablet Take 1 tablet (10 mg total) by mouth every 6 (six) hours As needed for nausea and vomiting    Mometasone Furo-Formoterol Fum (DULERA) 100-5 MCG/ACT AERO Inhale as needed Unsure of dose      naproxen (NAPROSYN) 500 mg tablet Take 1 tablet (500 mg total) by mouth 2 (two) times a day with meals For mild abdominal pain    nicotine (NICODERM CQ) 21 mg/24 hr TD 24 hr patch Place 1 patch on the skin daily    ondansetron (ZOFRAN) 4 mg tablet Take 1 tablet (4 mg total) by mouth every 8 (eight) hours as needed for nausea or vomiting    pancrelipase, Lip-Prot-Amyl, (CREON) 24,000 units Take 1 capsule by mouth 3 (three) times a day with meals    pantoprazole (PROTONIX) 20 mg tablet Take 1 tablet (20 mg total) by mouth daily    sucralfate (CARAFATE) 1 g tablet Take 1 tablet (1 g total) by mouth 4 (four) times a day    thiamine 100 MG tablet Take 1 tablet (100 mg total) by mouth daily         SOCIAL HISTORY     Marital Status: Single   Substance Use History:   Social History     Substance and Sexual Activity   Alcohol Use Yes    Frequency: Never    Comment: Patient states 'I drink alot of vodka a day"     Social History     Tobacco Use   Smoking Status Current Every Day Smoker    Packs/day: 0 50    Years: 29 00    Pack years: 14 50    Types: Cigarettes   Smokeless Tobacco Never Used     Social History     Substance and Sexual Activity   Drug Use No       FAMILY HISTORY  Noncontributory current HPI    OBJECTIVE    Vitals:   Blood Pressure: (!) 173/100 (08/10/19 2015)  Pulse: (!) 127 (08/10/19 2015)  Temperature: 99 5 °F (37 5 °C) (08/10/19 1832)  Temp Source: Oral (08/10/19 1832)  Respirations: 18 (08/10/19 2015)  Height: 5' 4" (162 6 cm) (08/10/19 1827)  Weight - Scale: 74 6 kg (164 lb 7 4 oz) (08/10/19 1827)  SpO2: 99 % (08/10/19 2015)    GENERAL: AAO x 3  NAD  HEENT: atraumatic, normocephalic  PERLAA  EOMI  NECK- supple, no JVD, no lymphadenopathy, no thryomegaly  CHEST:  Clear to auscultation bilaterally  CVS: S1, S2   Regular rate and rhythm, no murmurs, rub or gallops  ABDOMEN:  Soft, nontender(medicated), nondistended, normoactive bowel sounds  NEUROLOGICAL: CN II -XI grossly intact  No focal motor or sensory deficits  EXTREMITIES: No cyanosis/clubbing or edema      LAB DATA    Results Reviewed     Procedure Component Value Units Date/Time    Lactic acid, plasma [[id number]]  (Abnormal) Collected:  08/10/19 2006    Lab Status:  Final result Specimen:  Blood from Arm, Right Updated: 08/10/19 2043     LACTIC ACID 3 1 mmol/L     Narrative:       Result may be elevated if tourniquet was used during collection  Urine Microscopic [[id number]]  (Abnormal) Collected:  08/10/19 2006    Lab Status:  Final result Specimen:  Urine, Clean Catch Updated:  08/10/19 2028     RBC, UA None Seen /hpf      WBC, UA 1-2 /hpf      Epithelial Cells Innumerable /hpf      Bacteria, UA Innumerable /hpf      Hyaline Casts, UA 20-30 /lpf      COARSE GRANULAR CASTS 1-2 /lpf     Rapid Strep A Screen Throat with Reflex to Culture, Pediatrics and Compromised Adults [[id number]]  (Normal) Collected:  08/10/19 2006    Lab Status:  Final result Specimen:  Throat Updated:  08/10/19 2023     Rapid Strep A Screen Negative    Throat culture [[id number]] Collected:  08/10/19 2006    Lab Status:   In process Specimen:  Throat Updated:  08/10/19 2023    UA w Reflex to Microscopic w Reflex to Culture [[id number]]  (Abnormal) Collected:  08/10/19 2006    Lab Status:  Final result Specimen:  Urine, Clean Catch Updated:  08/10/19 2016     Color, UA Yellow     Clarity, UA Cloudy     Specific Gravity, UA >=1 030     pH, UA 5 5     Leukocytes, UA Negative     Nitrite, UA Negative     Protein,  (2+) mg/dl      Glucose, UA Negative mg/dl      Ketones, UA Trace mg/dl      Urobilinogen, UA 1 0 E U /dl      Bilirubin, UA Small     Blood, UA Negative    Comprehensive metabolic panel [[id number]]  (Abnormal) Collected:  08/10/19 1846    Lab Status:  Final result Specimen:  Blood from Arm, Right Updated:  08/10/19 1912     Sodium 142 mmol/L      Potassium 3 4 mmol/L      Chloride 101 mmol/L      CO2 24 mmol/L      ANION GAP 17 mmol/L      BUN 4 mg/dL      Creatinine 0 88 mg/dL      Glucose 185 mg/dL      Calcium 9 6 mg/dL      AST 90 U/L      ALT 50 U/L      Alkaline Phosphatase 112 U/L      Total Protein 9 5 g/dL      Albumin 4 4 g/dL      Total Bilirubin 0 60 mg/dL      eGFR 81 ml/min/1 73sq m     Narrative:       National Kidney Disease Foundation guidelines for Chronic Kidney Disease (CKD):     Stage 1 with normal or high GFR (GFR > 90 mL/min/1 73 square meters)    Stage 2 Mild CKD (GFR = 60-89 mL/min/1 73 square meters)    Stage 3A Moderate CKD (GFR = 45-59 mL/min/1 73 square meters)    Stage 3B Moderate CKD (GFR = 30-44 mL/min/1 73 square meters)    Stage 4 Severe CKD (GFR = 15-29 mL/min/1 73 square meters)    Stage 5 End Stage CKD (GFR <15 mL/min/1 73 square meters)  Note: GFR calculation is accurate only with a steady state creatinine    Lipase [[id number]]  (Abnormal) Collected:  08/10/19 1846    Lab Status:  Final result Specimen:  Blood from Arm, Right Updated:  08/10/19 1912     Lipase 69 u/L     CBC and differential [[id number]]  (Abnormal) Collected:  08/10/19 1846    Lab Status:  Final result Specimen:  Blood from Arm, Right Updated:  08/10/19 1854     WBC 12 83 Thousand/uL      RBC 5 49 Million/uL      Hemoglobin 16 3 g/dL      Hematocrit 48 3 %      MCV 88 fL      MCH 29 7 pg      MCHC 33 7 g/dL      RDW 17 1 %      MPV 9 7 fL      Platelets 927 Thousands/uL      nRBC 0 /100 WBCs      Neutrophils Relative 54 %      Immat GRANS % 1 %      Lymphocytes Relative 34 %      Monocytes Relative 10 %      Eosinophils Relative 0 %      Basophils Relative 1 %      Neutrophils Absolute 7 04 Thousands/µL      Immature Grans Absolute 0 07 Thousand/uL      Lymphocytes Absolute 4 30 Thousands/µL      Monocytes Absolute 1 28 Thousand/µL      Eosinophils Absolute 0 03 Thousand/µL      Basophils Absolute 0 11 Thousands/µL           EKG, Pathology, and Other Studies Reviewed on Admission  Total time spent in the process of admission, completion of records, counseling, coordination of care, discussion with patient/family was approximately 35 minutes  Adán Ribeiro MD  HOSPITALIST SERVICES  8/10/2019      PLEASE NOTE:  This encounter was completed utilizing the M- [employer]Fluency Direct Speech Voice Recognition Software   Grammatical errors, random word insertions, pronoun errors and incomplete sentences are occasional consequences of the system due to software limitations, ambient noise and hardware issues  These may be missed by proof reading prior to affixing electronic signature  Any questions or concerns about the content, text or information contained within the body of this dictation should be directly addressed to the physician for clarification  Please do not hesitate to call me directly if you have any any questions or concerns

## 2019-08-11 NOTE — PROGRESS NOTES
Progress Note - Jens Kehr 1975, 38 y o  female MRN: [id number]    Unit/Bed#: -01 Encounter: [id number]    Primary Care Provider: Marylyn Homans, MD   Date and time admitted to hospital: 8/10/2019  6:23 PM        * Acute on chronic pancreatitis [facility])  Assessment & Plan  · Patient presented on admission for recurrent nausea and vomiting along with abdominal pain  · Patient with frequent visits to ED for acute on chronic pancreatitis  · No imaging was obtained in the ED hold off patient does not appear clinically toxic  · Lipase is normal agree symptoms are concerning that this could be a slight exacerbation of her pancreatitis in setting of alcohol consumption  · Patient's nausea vomiting appear to be improving continue IV fluids  · Will decreased frequency of the Dilaudid to q 6 hours as there is no clinical indication to continue acute frequency of q 4 hours and likely discontinue IV pain medication in the morning  · Advance diet to clear liquids encourage patient to take oral medications especially Creon  · Anticipate discharge tomorrow and recommend referral to PCP to pain management    Alcohol abuse  Assessment & Plan  · Reports that she has been sober but that she try to did drink a "cocktail"today thinking she had abdominal cramps when her pancreatitis set in  Have given patient alcohol counseling given her history of chronic pancreatitis as well as liver cirrhosis from prior alcohol abuse  · Concern of withdrawal at this time  Patient daily drinking over last several days  · Abstaining from drinking was discussed thoroughly with this patient  · Continue thiamine and folic acid  Hypokalemia  Assessment & Plan  · Likely in setting of GI losses  · Magnesium low as well currently 1 4 will initiate 2 g of magnesium and replete with IV KCl 20 mEq used afterwards  · BMP and magnesium level in a m  Essential hypertension  Assessment & Plan  Continue clonidine 0 3 mg b i d  Renetta Settle     Type 2 diabetes mellitus, without long-term current use of insulin [facility])  Assessment & Plan  Lab Results   Component Value Date    HGBA1C 7 2 (H) 05/24/2019       Recent Labs     08/10/19  2146 08/11/19  0026 08/11/19  0457 08/11/19  1144   POCGLU 158* 128 148* 138       Blood Sugar Average: Last 72 hrs:    Recent diagnosis of diabetes with a hemoglobin of 7 2 likely from recurrent chronic pancreatitis  She has been initiated on metformin 500 mg daily as an outpatient  Will hold metformin as an inpatient and institute sliding scale insulin   -she is unsure about management of diabetes, will consult diabetic educator for additional teaching  (P) 143      VTE Pharmacologic Prophylaxis:   Pharmacologic: Enoxaparin (Lovenox)  Mechanical VTE Prophylaxis in Place: Yes    Patient Centered Rounds: I have performed bedside rounds with nursing staff today  Discussions with Specialists or Other Care Team Provider: none    Education and Discussions with Family / Patient: patient and family at bedside     Time Spent for Care: 30 minutes  More than 50% of total time spent on counseling and coordination of care as described above  Current Length of Stay: 1 day(s)    Current Patient Status: Observation   Certification Statement: The patient, admitted on an observation basis, will now require > 2 midnight hospital stay due to ongoing acute intervention in setting of nausea with vomiting suspected in setting of acute on chronic pancreatitis    Discharge Plan:  Not medically cleared    Code Status: Level 1 - Full Code      Subjective:   Patient reporting improvement of the nausea and is tolerating some clear fluids now    Patient was reporting her pain appears agreeable to this is more of her chronic pain that is cleared by some alcoholic drinking patient again states she has been very dry for a long period of time and just given her symptoms thought it would be better if she drank again patient educated that drinking would not assist her symptoms at this time  Patient reports she was not accepting of pain management in the past however given that she has had multiple admissions in repeat visits to the ED she now realizes pain management could be a benefit for alternative Sir treatment for pain  Education given in appear to be accepted that patient's pain appears to be more for chronic pain and that the need for IV narcotics is not completely warranted and will be decreasing the frequency of the medication and patient stated understanding  Patient made aware will continue to replete her electrolytes everything clinically remained stable she could discharge home tomorrow  Objective:     Vitals:   Temp (24hrs), Av 2 °F (36 8 °C), Min:97 8 °F (36 6 °C), Max:99 5 °F (37 5 °C)    Temp:  [97 8 °F (36 6 °C)-99 5 °F (37 5 °C)] 98 2 °F (36 8 °C)  HR:  [] 57  Resp:  [16-20] 18  BP: (102-198)/() 102/55  SpO2:  [96 %-99 %] 96 %  Body mass index is 28 23 kg/m²  Input and Output Summary (last 24 hours): Intake/Output Summary (Last 24 hours) at 2019 1552  Last data filed at 2019 1315  Gross per 24 hour   Intake 3830 ml   Output 2150 ml   Net 1680 ml       Physical Exam:     Physical Exam   Constitutional: She is oriented to person, place, and time  HENT:   Head: Normocephalic and atraumatic  Eyes: Conjunctivae and EOM are normal    Neck: Normal range of motion  Cardiovascular: Normal rate, regular rhythm and normal heart sounds  Pulmonary/Chest: Effort normal and breath sounds normal    Abdominal: Soft  Bowel sounds are normal  There is tenderness in the epigastric area and periumbilical area  Musculoskeletal: Normal range of motion  Neurological: She is alert and oriented to person, place, and time  Skin: Skin is warm and dry  Psychiatric: She has a normal mood and affect   Her behavior is normal          Additional Data:     Labs:    Results from last 7 days   Lab Units 19  0615   WBC Thousand/uL 10 58*   HEMOGLOBIN g/dL 13 2   HEMATOCRIT % 40 1   PLATELETS Thousands/uL 228   NEUTROS PCT % 54   LYMPHS PCT % 31   MONOS PCT % 14*   EOS PCT % 0     Results from last 7 days   Lab Units 08/11/19  0615 08/10/19  1846   SODIUM mmol/L 143 142   POTASSIUM mmol/L 3 1* 3 4*   CHLORIDE mmol/L 104 101   CO2 mmol/L 26 24   BUN mg/dL 3* 4*   CREATININE mg/dL 0 64 0 88   ANION GAP mmol/L 13 17*   CALCIUM mg/dL 8 0* 9 6   ALBUMIN g/dL  --  4 4   TOTAL BILIRUBIN mg/dL  --  0 60   ALK PHOS U/L  --  112   ALT U/L  --  50   AST U/L  --  90*   GLUCOSE RANDOM mg/dL 152* 185*         Results from last 7 days   Lab Units 08/11/19  1144 08/11/19  0457 08/11/19  0026 08/10/19  2146   POC GLUCOSE mg/dl 138 148* 128 158*         Results from last 7 days   Lab Units 08/10/19  2006   LACTIC ACID mmol/L 3 1*           * I Have Reviewed All Lab Data Listed Above  * Additional Pertinent Lab Tests Reviewed:  All Labs Within Last 24 Hours Reviewed        Recent Cultures (last 7 days):           Last 24 Hours Medication List:     Current Facility-Administered Medications:  acetaminophen 650 mg Oral Q6H PRN Joe Conte MD    albuterol 2 puff Inhalation Q4H PRN Joe Conte MD    cloNIDine 0 3 mg Oral BID Joe Conte MD    dicyclomine 20 mg Oral BID Joe Conte MD    enoxaparin 40 mg Subcutaneous Daily Joe Conte MD    folic acid 1 mg Oral Daily Joe Conte MD    gabapentin 300 mg Oral TID Joe Conte MD    HYDROmorphone 0 5 mg Intravenous Q6H PRN OMERO Colbert    insulin lispro 1-6 Units Subcutaneous Q6H [address] Joe Conte MD    lactated ringers 150 mL/hr Intravenous Continuous OMERO Colbert Last Rate: 150 mL/hr (08/11/19 1312)   metoclopramide 10 mg Oral Q6H Joe Conte MD    metoprolol 5 mg Intravenous Q6H PRN Maria Elena Chilel PA-C    nicotine 21 mg Transdermal Daily Maria Elena Chilel PA-C    ondansetron 4 mg Intravenous Q8H PRN Joe Conte MD    oxyCODONE 10 mg Oral Q6H PRN Raji Olmstead MD pancrelipase (Lip-Prot-Amyl) 24,000 Units Oral TID With Meals Joe Conte MD    pantoprazole 20 mg Oral Daily Joe Conte MD    potassium chloride 20 mEq Intravenous Once OMERO Lyman    sucralfate 1 g Oral 4x Daily Salomón Caballero MD    thiamine 100 mg Oral Daily Salomón Caballero MD         Today, Patient Was Seen By: OMERO Lyman    ** Please Note: Dictation voice to text software may have been used in the creation of this document   **

## 2019-08-11 NOTE — ASSESSMENT & PLAN NOTE
· Likely in setting of GI losses  · Magnesium low as well currently 1 4 will initiate 2 g of magnesium and replete with IV KCl 20 mEq used afterwards  · BMP and magnesium level in a m

## 2019-08-11 NOTE — PLAN OF CARE
Problem: Potential for Falls  Goal: Patient will remain free of falls  Description  INTERVENTIONS:  - Assess patient frequently for physical needs  -  Identify cognitive and physical deficits and behaviors that affect risk of falls  -  [place] fall precautions as indicated by assessment   - Educate patient/family on patient safety including physical limitations  - Instruct patient to call for assistance with activity based on assessment  - Modify environment to reduce risk of injury  - Consider OT/PT consult to assist with strengthening/mobility  Outcome: Progressing     Problem: NEUROSENSORY - ADULT  Goal: Achieves stable or improved neurological status  Description  INTERVENTIONS  - Monitor and report changes in neurological status  - Initiate measures to prevent increased intracranial pressure  - Maintain blood pressure and fluid volume within ordered parameters to optimize cerebral perfusion  - Monitor temperature, glucose, and sodium or any other associated labs   Initiate appropriate interventions as ordered  - Monitor for seizure activity   - Administer anti-seizure medications as ordered  Outcome: Progressing  Goal: Absence of seizures  Description  INTERVENTIONS  - Monitor for seizure activity  - Administer anti-seizure medications as ordered  - Monitor neurological status  Outcome: Progressing  Goal: Remains free of injury related to seizures activity  Description  INTERVENTIONS  - Maintain airway, patient safety  and administer oxygen as ordered  - Monitor patient for seizure activity, document and report duration and description of seizure to physician/advanced practitioner  - If seizure occurs,  ensure patient safety during seizure  - Reorient patient post seizure  - Seizure pads on all 4 side rails  - Instruct patient/family to notify RN of any seizure activity including if an aura is experienced  - Instruct patient/family to call for assistance with activity based on nursing assessment  - Administer anti-seizure medications as ordered  - Monitor fetal well being  Outcome: Progressing  Goal: Achieves maximal functionality and self care  Description  INTERVENTIONS  - Monitor swallowing and airway patency with patient fatigue and changes in neurological status  - Encourage and assist patient to increase activity and self care with guidance from rehab services  - Encourage visually impaired, hearing impaired and aphasic patients to use assistive/communication devices  Outcome: Progressing     Problem: CARDIOVASCULAR - ADULT  Goal: Maintains optimal cardiac output and hemodynamic stability  Description  INTERVENTIONS:  - Monitor I/O, vital signs and rhythm  - Monitor for S/S and trends of decreased cardiac output i e  bleeding, hypotension  - Administer and titrate ordered vasoactive medications to optimize hemodynamic stability  - Assess quality of pulses, skin color and temperature  - Assess for signs of decreased coronary artery perfusion - ex   Angina  - Instruct patient to report change in severity of symptoms  Outcome: Progressing  Goal: Absence of cardiac dysrhythmias or at baseline rhythm  Description  INTERVENTIONS:  - Continuous cardiac monitoring, monitor vital signs, obtain 12 lead EKG if indicated  - Administer antiarrhythmic and heart rate control medications as ordered  - Monitor electrolytes and administer replacement therapy as ordered  Outcome: Progressing     Problem: RESPIRATORY - ADULT  Goal: Achieves optimal ventilation and oxygenation  Description  INTERVENTIONS:  - Assess for changes in respiratory status  - Assess for changes in mentation and behavior  - Position to facilitate oxygenation and minimize respiratory effort  - Oxygen administration by appropriate delivery method based on oxygen saturation (per order) or ABGs  - Initiate smoking cessation education as indicated  - Encourage broncho-pulmonary hygiene including cough, deep breathe, Incentive Spirometry  - Assess the need for suctioning and aspirate as needed  - Assess and instruct to report SOB or any respiratory difficulty  - Respiratory Therapy support as indicated  Outcome: Progressing     Problem: GASTROINTESTINAL - ADULT  Goal: Minimal or absence of nausea and/or vomiting  Description  INTERVENTIONS:  - Administer IV fluids as ordered to ensure adequate hydration  - Maintain NPO status until nausea and vomiting are resolved  - Nasogastric tube as ordered  - Administer ordered antiemetic medications as needed  - Provide nonpharmacologic comfort measures as appropriate  - Advance diet as tolerated, if ordered  - Nutrition services referral to assist patient with adequate nutrition and appropriate food choices  Outcome: Progressing  Goal: Maintains or returns to baseline bowel function  Description  INTERVENTIONS:  - Assess bowel function  - Encourage oral fluids to ensure adequate hydration  - Administer IV fluids as ordered to ensure adequate hydration  - Administer ordered medications as needed  - Encourage mobilization and activity  - Nutrition services referral to assist patient with appropriate food choices  Outcome: Progressing  Goal: Maintains adequate nutritional intake  Description  INTERVENTIONS:  - Monitor percentage of each meal consumed  - Identify factors contributing to decreased intake, treat as appropriate  - Assist with meals as needed  - Monitor I&O, WT and lab values  - Obtain nutrition services referral as needed  Outcome: Progressing     Problem: GENITOURINARY - ADULT  Goal: Maintains or returns to baseline urinary function  Description  INTERVENTIONS:  - Assess urinary function  - Encourage oral fluids to ensure adequate hydration  - Administer IV fluids as ordered to ensure adequate hydration  - Administer ordered medications as needed  - Offer frequent toileting  - Follow urinary retention protocol if ordered  Outcome: Progressing  Goal: Absence of urinary retention  Description  INTERVENTIONS:  - Assess patient's ability to void and empty bladder  - Monitor I/O  - Bladder scan as needed  - Discuss with physician/AP medications to alleviate retention as needed  - Discuss catheterization for long term situations as appropriate   Outcome: Progressing     Problem: METABOLIC, FLUID AND ELECTROLYTES - ADULT  Goal: Electrolytes maintained within normal limits  Description  INTERVENTIONS:  - Monitor labs and assess patient for signs and symptoms of electrolyte imbalances  - Administer electrolyte replacement as ordered  - Monitor response to electrolyte replacements, including repeat lab results as appropriate  - Instruct patient on fluid and nutrition as appropriate  Outcome: Progressing  Goal: Fluid balance maintained  Description  INTERVENTIONS:  - Monitor labs and assess for signs and symptoms of volume excess or deficit  - Monitor I/O and WT  - Instruct patient on fluid and nutrition as appropriate  Outcome: Progressing  Goal: Glucose maintained within target range  Description  INTERVENTIONS:  - Monitor Blood Glucose as ordered  - Assess for signs and symptoms of hyperglycemia and hypoglycemia  - Administer ordered medications to maintain glucose within target range  - Assess nutritional intake and initiate nutrition service referral as needed  Outcome: Progressing     Problem: SKIN/TISSUE INTEGRITY - ADULT  Goal: Skin integrity remains intact  Description  INTERVENTIONS  - Identify patients at risk for skin breakdown  - Assess and monitor skin integrity  - Assess and monitor nutrition and hydration status  - Monitor labs (i e  albumin)  - Assess for incontinence   - Turn and reposition patient  - Assist with mobility/ambulation  - Relieve pressure over bony prominences  - Avoid friction and shearing  - Provide appropriate hygiene as needed including keeping skin clean and dry  - Evaluate need for skin moisturizer/barrier cream  - Collaborate with interdisciplinary team (i e  Nutrition, Rehabilitation, etc )   - Patient/family teaching  Outcome: Progressing  Goal: Incision(s), wounds(s) or drain site(s) healing without S/S of infection  Description  INTERVENTIONS  - Assess and document risk factors for skin impairment   - Assess and document dressing, incision, wound bed, drain sites and surrounding tissue  - Initiate Nutrition services consult and/or wound management as needed  Outcome: Progressing  Goal: Oral mucous membranes remain intact  Description  INTERVENTIONS  - Assess oral mucosa and hygiene practices  - Implement preventative oral hygiene regimen  - Implement oral medicated treatments as ordered  - Initiate Nutrition services referral as needed  Outcome: Progressing     Problem: HEMATOLOGIC - ADULT  Goal: Maintains hematologic stability  Description  INTERVENTIONS  - Assess for signs and symptoms of bleeding or hemorrhage  - Monitor labs  - Administer supportive blood products/factors as ordered and appropriate  Outcome: Progressing     Problem: MUSCULOSKELETAL - ADULT  Goal: Maintain or return mobility to safest level of function  Description  INTERVENTIONS:  - Assess patient's ability to carry out ADLs; assess patient's baseline for ADL function and identify physical deficits which impact ability to perform ADLs (bathing, care of mouth/teeth, toileting, grooming, dressing, etc )  - Assess/evaluate cause of self-care deficits   - Assess range of motion  - Assess patient's mobility; develop plan if impaired  - Assess patient's need for assistive devices and provide as appropriate  - Encourage maximum independence but intervene and supervise when necessary  - Involve family in performance of ADLs  - Assess for home care needs following discharge   - Request OT consult to assist with ADL evaluation and planning for discharge  - Provide patient education as appropriate  Outcome: Progressing  Goal: Maintain proper alignment of affected body part  Description  INTERVENTIONS:  - Support, maintain and protect limb and body alignment  - Provide pt/fam with appropriate education  Outcome: Progressing

## 2019-08-11 NOTE — ASSESSMENT & PLAN NOTE
Lab Results   Component Value Date    HGBA1C 7 2 (H) 05/24/2019       Recent Labs     08/10/19  2146 08/11/19  0026 08/11/19  0457 08/11/19  1144   POCGLU 158* 128 148* 138       Blood Sugar Average: Last 72 hrs:    Recent diagnosis of diabetes with a hemoglobin of 7 2 likely from recurrent chronic pancreatitis  She has been initiated on metformin 500 mg daily as an outpatient  Will hold metformin as an inpatient and institute sliding scale insulin   -she is unsure about management of diabetes, will consult diabetic educator for additional teaching    (P) 143

## 2019-08-11 NOTE — ASSESSMENT & PLAN NOTE
Patient presenting with recurrent nausea and vomiting and abdominal pain over the last day  She has a history of frequent visits due to acute on chronic pancreatitis  No imaging was obtained in the ED today  Lipase is within normal limits  However, symptoms are concerning for exacerbation of her pancreatitis again  -plan to admit patient for management of chronic pancreatitis  NPO  IV fluids  Pain control  Antiemetics

## 2019-08-11 NOTE — ASSESSMENT & PLAN NOTE
Likely from vomiting today  Potassium is 3 4  Forty mEq of p  o  Potassium ordered  Recheck BMP tomorrow  Magnesium level ordered

## 2019-08-11 NOTE — UTILIZATION REVIEW
Initial Clinical Review    Admission: Date/Time/Statement:    OBS  ORDER  8/10  @   2138    IP ORDER  ENTERED     8/87 @    4244  Certification Statement: The patient, admitted on an observation basis, will now require > 2 midnight hospital stay due to ongoing acute intervention in setting of nausea with vomiting suspected in setting of acute on chronic pancreatitis     08/11/19 1643  Inpatient Admission Once     Transfer Service: General Medicine    Expected Discharge Date: 08/12/19       Question Answer Comment   Admitting Physician Chadd Salmeron    Level of Care Med Surg    Estimated length of stay More than 2 Midnights    Certification I certify that inpatient services are medically necessary for this patient for a duration of greater than two midnights  See H&P and MD Progress Notes for additional information about the patient's course of treatment  08/11/19 1642                 ED Arrival Information     Expected Arrival Acuity Means of Arrival Escorted By Service Admission Type    - 8/10/2019 18:23 Urgent Ambulance [facility] Reg Hospitalist Urgent    Arrival Complaint    abd pain/vomiting        Chief Complaint   Patient presents with    Abdominal Pain     vomiting, recent pancreatitis     Assessment/Plan: Acute on chronic pancreatitis [facility])  Assessment & Plan  Patient presenting with recurrent nausea and vomiting and abdominal pain over the last day  She has a history of frequent visits due to acute on chronic pancreatitis  No imaging was obtained in the ED today  Lipase is within normal limits  However, symptoms are concerning for exacerbation of her pancreatitis again  -plan to admit patient for management of chronic pancreatitis  NPO  IV fluids  Pain control    Antiemetics         Type 2 diabetes mellitus, without long-term current use of insulin ([facility])  Alcohol abuse  Assessment & Plan  Reports that she has been sober but that she try to did drink a "cocktail"today thinking she had abdominal cramps when her pancreatitis set in  Have given patient alcohol counseling given her history of chronic pancreatitis as well as liver cirrhosis from prior alcohol abuse  Concern of withdrawal at this time  Patient daily drinking over last several days   -continue thiamine and folic acid      Essential hypertension  Assessment & Plan  Continue clonidine 0 3 mg b i d        Hypokalemia  Assessment & Plan  Likely from vomiting today  Potassium is 3 4  Forty mEq of p  o  Potassium ordered  Recheck BMP tomorrow  Magnesium level ordered  Randa Gutierrez is a 72-year-old female with past medical history notable for chronic pancreatitis thought to be from prior alcohol abuse  Also with cirrhosis thought to be from prior alcohol use  Patient reports that she is now sober  She reports abdominal discomfort and cramping pain over the last day  This was later associated with nausea and vomiting of nonbloody emesis  Her abdominal pain on arrival was 9/10 in intensity which improved with Dilaudid to 4/10 during my evaluation  She denies any fevers or chills at home  She has not eaten anything during the day and has not taken her home medications  She reports that when she experienced her crampy pain today, she try attempted to have a"cocktail drink"to help with the pain  She denies to me that she drinks on a daily basis  She is not sure why she keeps having recurrent pancreatitis attacks  In the ED, her lipase was within normal limits  She did not have a repeat imaging as she was just imaged on August 1st and there was no comment of pancreatitis at that time from that imaging      She was given 3 L of normal saline bolus in the ED  She was given pain medications and antiemetics    Medicine was consulted for admission further management for concern of acute on chronic pancreatitis            ED Triage Vitals   Temperature Pulse Respirations Blood Pressure SpO2   08/10/19 1832 08/10/19 1828 08/10/19 1828 08/10/19 1828 08/10/19 1828   99 5 °F (37 5 °C) (!) 144 16 (!) 188/131 96 %      Temp Source Heart Rate Source Patient Position - Orthostatic VS BP Location FiO2 (%)   08/10/19 1832 08/10/19 1828 08/10/19 1845 08/10/19 1828 --   Oral Monitor Sitting Right arm       Pain Score       08/10/19 1832       Worst Possible Pain        Wt Readings from Last 1 Encounters:   08/10/19 74 6 kg (164 lb 7 4 oz)     Additional Vital Signs:   /11/19 0700  98 °F (36 7 °C)  67  20  180/110Abnormal   --  97 %  None (Room air)  Lying   08/11/19 0300  97 8 °F (36 6 °C)  93  18  170/102Abnormal   --  97 %  None (Room air)  Lying   08/11/19 0104  --  --  --  190/110Abnormal   --  --  --  --   08/10/19 2300  97 8 °F (36 6 °C)  90  18  198/102Abnormal   143  97 %  None (Room air)  Lying   08/10/19 2212  --  --  --  170/104Abnormal   --  --  --  Lying   08/10/19 2211  98 1 °F (36 7 °C)  92  16  187/101Abnormal   --  97 %  None (Room air)  Lying   08/10/19 2145  --  --  --  --  --  --  None (Room air)  --   08/10/19 2015  --  127Abnormal   18  173/100Abnormal   --  99 %  None (Room air)  Sitting   08/10/19 1848  --  --  --  159/109Abnormal   --  --  --  --   08/10/19 1845  --  124Abnormal   18  159/109Abnormal   --  97 %  None (Room air)  Sitting   08/10/19 1832  99 5 °F (37 5 °C)  --  --  --  --  --  --  --   08/10/19 1828  --  144Abnormal   16  188/131Abnormal   --  96 %  None (Room air)             Pertinent Labs/Diagnostic Test Results:   Results from last 7 days   Lab Units 08/11/19  0615 08/10/19  1846   WBC Thousand/uL 10 58* 12 83*   HEMOGLOBIN g/dL 13 2 16 3*   HEMATOCRIT % 40 1 48 3*   PLATELETS Thousands/uL 228 328   NEUTROS ABS Thousands/µL 5 68 7 04         Results from last 7 days   Lab Units 08/11/19  0615 08/10/19  1846   SODIUM mmol/L 143 142   POTASSIUM mmol/L 3 1* 3 4*   CHLORIDE mmol/L 104 101   CO2 mmol/L 26 24   ANION GAP mmol/L 13 17*   BUN mg/dL 3* 4*   CREATININE mg/dL 0 64 0 88   EGFR ml/min/1 73sq m 110 81 CALCIUM mg/dL 8 0* 9 6   MAGNESIUM mg/dL  --  1 4*     Results from last 7 days   Lab Units 08/10/19  1846   AST U/L 90*   ALT U/L 50   ALK PHOS U/L 112   TOTAL PROTEIN g/dL 9 5*   ALBUMIN g/dL 4 4   TOTAL BILIRUBIN mg/dL 0 60     Results from last 7 days   Lab Units 08/11/19  0457 08/11/19  0026 08/10/19  2146   POC GLUCOSE mg/dl 148* 128 158*     Results from last 7 days   Lab Units 08/11/19  0615 08/10/19  1846   GLUCOSE RANDOM mg/dL 152* 185*           Results from last 7 days   Lab Units 08/10/19  2006   LACTIC ACID mmol/L 3 1*           Results from last 7 days   Lab Units 08/10/19  1846   LIPASE u/L 69*             Results from last 7 days   Lab Units 08/10/19  2006   CLARITY UA  Cloudy   COLOR UA  Yellow   SPEC GRAV UA  >=1 030   PH UA  5 5   GLUCOSE UA mg/dl Negative   KETONES UA mg/dl Trace*   BLOOD UA  Negative   PROTEIN UA mg/dl 100 (2+)*   NITRITE UA  Negative   BILIRUBIN UA  Small*   UROBILINOGEN UA E U /dl 1 0   LEUKOCYTES UA  Negative   WBC UA /hpf 1-2*   RBC UA /hpf None Seen   BACTERIA UA /hpf Innumerable*   EPITHELIAL CELLS WET PREP /hpf Innumerable*           ED Treatment:   Medication Administration from 08/10/2019 1822 to 08/10/2019 2140       Date/Time Order Dose Route Comments     08/10/2019 2046 sodium chloride 0 9 % bolus 1,000 mL 0 mL Intravenous      08/10/2019 1946 sodium chloride 0 9 % bolus 1,000 mL 1,000 mL Intravenous      08/10/2019 1954 diphenhydrAMINE (BENADRYL) injection 50 mg 50 mg Intravenous      08/10/2019 1951 ondansetron (ZOFRAN) injection 4 mg 4 mg Intravenous      08/10/2019 1954 ketorolac (TORADOL) injection 30 mg 30 mg Intravenous      08/10/2019 1957 OLANZapine (ZyPREXA) IM injection 10 mg 10 mg Intramuscular      08/10/2019 1957 sterile water injection **ADS Override Pull** 10 mL       08/10/2019 2040 HYDROmorphone (DILAUDID) injection 1 mg 1 mg Intravenous      08/10/2019 2113 sodium chloride 0 9 % bolus 2,000 mL 2,000 mL Intravenous           Present on Admission:   Acute on chronic pancreatitis ([facility] [address] )   Alcohol abuse   Essential hypertension   Type 2 diabetes mellitus, without long-term current use of insulin (HCC)   Hypokalemia      Admitting Diagnosis: Abdominal pain [R10 9]  Right upper quadrant abdominal pain [R10 11]  Type 2 diabetes mellitus, without long-term current use of insulin (HCC) [E11 9]  Age/Sex: 38 y o  female  Admission Orders:    Current Facility-Administered Medications:  acetaminophen 650 mg Oral Q6H PRN   albuterol 2 puff Inhalation Q4H PRN   cloNIDine 0 3 mg Oral BID   dicyclomine 20 mg Oral BID   enoxaparin 40 mg Subcutaneous Daily   folic acid 1 mg Oral Daily   gabapentin 300 mg Oral TID   HYDROmorphone 0 5 mg Intravenous Q4H PRN         X 1  8/10  And  X 2  8/11                           thus far   insulin lispro 1-6 Units Subcutaneous Q6H [address]   lactated ringers 200 mL/hr Intravenous Continuous   metoclopramide 10 mg Oral Q6H   metoprolol 5 mg Intravenous Q6H PRN       X 1  8/10  And   X 2  8/11  Thus far   nicotine 21 mg Transdermal Daily   ondansetron 4 mg Intravenous Q8H PRN         X1  8/10  And  X1  8/11    oxyCODONE 10 mg Oral Q6H PRN   pancrelipase (Lip-Prot-Amyl) 24,000 Units Oral TID With Meals   pantoprazole 20 mg Oral Daily   sucralfate 1 g Oral 4x Daily   thiamine 100 mg Oral Daily       IP CONSULT TO NUTRITION SERVICES   Tele  NPO    Network Utilization Review Department  Phone: [phone]; Fax [phone]  [email or web address]  [email or web address]  ATTENTION: Please call with any questions or concerns to [phone]  and carefully listen to the prompts so that you are directed to the right person  Send all requests for admission clinical reviews, approved or denied determinations and any other requests to fax [phone]   All voicemails are confidential

## 2019-08-11 NOTE — ASSESSMENT & PLAN NOTE
Lab Results   Component Value Date    HGBA1C 7 2 (H) 05/24/2019       No results for input(s): POCGLU in the last 72 hours  Blood Sugar Average: Last 72 hrs:    Recent diagnosis of diabetes with a hemoglobin of 7 2 likely from recurrent chronic pancreatitis  She has been initiated on metformin 500 mg daily as an outpatient  Will hold metformin as an inpatient and institute sliding scale insulin   -she is unsure about management of diabetes, will consult diabetic educator for additional teaching

## 2019-08-11 NOTE — ED NOTES
1  CC- Abdominal pain, Hx of Pancreatitis     2  Orientation status- AOx4    3  Abnormal labs/ focused assessment/vitals- See labs    4  Medications/drips- 3L NS Bolus, Toradol 30mg IV, Benadryl 50mg IV, Zofran 4mg IV    5  Narcotic time/ pain medications- Dilaudid 1mg     6  IV lines/drains/etc - 20G Right wrist     7  Isolation status- N/A    8  Skin- WNL Warm, dry, intact    9  Ambulation status- Independent    10   ED phone number- [phone], RN  08/10/19 2332

## 2019-08-12 VITALS
HEART RATE: 60 BPM | SYSTOLIC BLOOD PRESSURE: 142 MMHG | OXYGEN SATURATION: 100 % | DIASTOLIC BLOOD PRESSURE: 91 MMHG | BODY MASS INDEX: 28.08 KG/M2 | HEIGHT: 64 IN | WEIGHT: 164.46 LBS | TEMPERATURE: 97.5 F | RESPIRATION RATE: 16 BRPM

## 2019-08-12 LAB
ANION GAP SERPL CALCULATED.3IONS-SCNC: 6 MMOL/L (ref 4–13)
BACTERIA THROAT CULT: NORMAL
BUN SERPL-MCNC: 9 MG/DL (ref 5–25)
CALCIUM SERPL-MCNC: 8.2 MG/DL (ref 8.3–10.1)
CHLORIDE SERPL-SCNC: 104 MMOL/L (ref 100–108)
CO2 SERPL-SCNC: 29 MMOL/L (ref 21–32)
CREAT SERPL-MCNC: 0.65 MG/DL (ref 0.6–1.3)
ERYTHROCYTE [DISTWIDTH] IN BLOOD BY AUTOMATED COUNT: 16.4 % (ref 11.6–15.1)
GFR SERPL CREATININE-BSD FRML MDRD: 109 ML/MIN/1.73SQ M
GLUCOSE SERPL-MCNC: 126 MG/DL (ref 65–140)
GLUCOSE SERPL-MCNC: 140 MG/DL (ref 65–140)
GLUCOSE SERPL-MCNC: 215 MG/DL (ref 65–140)
HCT VFR BLD AUTO: 35.6 % (ref 34.8–46.1)
HGB BLD-MCNC: 11.5 G/DL (ref 11.5–15.4)
MCH RBC QN AUTO: 29.9 PG (ref 26.8–34.3)
MCHC RBC AUTO-ENTMCNC: 32.3 G/DL (ref 31.4–37.4)
MCV RBC AUTO: 93 FL (ref 82–98)
PLATELET # BLD AUTO: 170 THOUSANDS/UL (ref 149–390)
PMV BLD AUTO: 9.7 FL (ref 8.9–12.7)
POTASSIUM SERPL-SCNC: 3.4 MMOL/L (ref 3.5–5.3)
RBC # BLD AUTO: 3.84 MILLION/UL (ref 3.81–5.12)
SODIUM SERPL-SCNC: 139 MMOL/L (ref 136–145)
WBC # BLD AUTO: 8.55 THOUSAND/UL (ref 4.31–10.16)

## 2019-08-12 PROCEDURE — 82948 REAGENT STRIP/BLOOD GLUCOSE: CPT

## 2019-08-12 PROCEDURE — 85027 COMPLETE CBC AUTOMATED: CPT | Performed by: NURSE PRACTITIONER

## 2019-08-12 PROCEDURE — 97167 OT EVAL HIGH COMPLEX 60 MIN: CPT

## 2019-08-12 PROCEDURE — G8988 SELF CARE GOAL STATUS: HCPCS

## 2019-08-12 PROCEDURE — G8979 MOBILITY GOAL STATUS: HCPCS

## 2019-08-12 PROCEDURE — G8978 MOBILITY CURRENT STATUS: HCPCS

## 2019-08-12 PROCEDURE — 80048 BASIC METABOLIC PNL TOTAL CA: CPT | Performed by: NURSE PRACTITIONER

## 2019-08-12 PROCEDURE — G8987 SELF CARE CURRENT STATUS: HCPCS

## 2019-08-12 PROCEDURE — 97163 PT EVAL HIGH COMPLEX 45 MIN: CPT

## 2019-08-12 PROCEDURE — 99239 HOSP IP/OBS DSCHRG MGMT >30: CPT | Performed by: NURSE PRACTITIONER

## 2019-08-12 RX ORDER — POTASSIUM CHLORIDE 20 MEQ/1
40 TABLET, EXTENDED RELEASE ORAL ONCE
Status: COMPLETED | OUTPATIENT
Start: 2019-08-12 | End: 2019-08-12

## 2019-08-12 RX ORDER — OXYCODONE HYDROCHLORIDE 5 MG/1
10 TABLET ORAL EVERY 6 HOURS PRN
Qty: 20 TABLET | Refills: 0 | Status: SHIPPED | OUTPATIENT
Start: 2019-08-12 | End: 2019-08-22

## 2019-08-12 RX ORDER — FOLIC ACID 1 MG/1
1 TABLET ORAL DAILY
Refills: 0 | Status: ON HOLD
Start: 2019-08-13 | End: 2020-04-17

## 2019-08-12 RX ADMIN — PANCRELIPASE 24000 UNITS: 24000; 76000; 120000 CAPSULE, DELAYED RELEASE PELLETS ORAL at 09:04

## 2019-08-12 RX ADMIN — METOCLOPRAMIDE HYDROCHLORIDE 10 MG: 10 TABLET ORAL at 09:15

## 2019-08-12 RX ADMIN — GABAPENTIN 300 MG: 300 CAPSULE ORAL at 09:05

## 2019-08-12 RX ADMIN — PANCRELIPASE 24000 UNITS: 24000; 76000; 120000 CAPSULE, DELAYED RELEASE PELLETS ORAL at 12:27

## 2019-08-12 RX ADMIN — PANTOPRAZOLE SODIUM 20 MG: 20 TABLET, DELAYED RELEASE ORAL at 09:06

## 2019-08-12 RX ADMIN — METOCLOPRAMIDE HYDROCHLORIDE 10 MG: 10 TABLET ORAL at 04:53

## 2019-08-12 RX ADMIN — DICYCLOMINE HYDROCHLORIDE 20 MG: 20 TABLET ORAL at 09:07

## 2019-08-12 RX ADMIN — SODIUM CHLORIDE, SODIUM LACTATE, POTASSIUM CHLORIDE, AND CALCIUM CHLORIDE 150 ML/HR: .6; .31; .03; .02 INJECTION, SOLUTION INTRAVENOUS at 02:06

## 2019-08-12 RX ADMIN — SUCRALFATE 1 G: 1 TABLET ORAL at 09:06

## 2019-08-12 RX ADMIN — HYDROMORPHONE HYDROCHLORIDE 0.5 MG: 1 INJECTION, SOLUTION INTRAMUSCULAR; INTRAVENOUS; SUBCUTANEOUS at 10:04

## 2019-08-12 RX ADMIN — Medication 100 MG: at 09:05

## 2019-08-12 RX ADMIN — SODIUM CHLORIDE, SODIUM LACTATE, POTASSIUM CHLORIDE, AND CALCIUM CHLORIDE 150 ML/HR: .6; .31; .03; .02 INJECTION, SOLUTION INTRAVENOUS at 08:54

## 2019-08-12 RX ADMIN — SUCRALFATE 1 G: 1 TABLET ORAL at 12:27

## 2019-08-12 RX ADMIN — CLONIDINE HYDROCHLORIDE 0.3 MG: 0.1 TABLET ORAL at 09:12

## 2019-08-12 RX ADMIN — INSULIN LISPRO 2 UNITS: 100 INJECTION, SOLUTION INTRAVENOUS; SUBCUTANEOUS at 12:28

## 2019-08-12 RX ADMIN — FOLIC ACID 1 MG: 1 TABLET ORAL at 09:06

## 2019-08-12 RX ADMIN — POTASSIUM CHLORIDE 40 MEQ: 1500 TABLET, EXTENDED RELEASE ORAL at 12:27

## 2019-08-12 RX ADMIN — OXYCODONE HYDROCHLORIDE 10 MG: 10 TABLET ORAL at 02:05

## 2019-08-12 NOTE — DISCHARGE SUMMARY
Discharge- Awanda Goldberg 1975, 38 y o  female MRN: [id number]    Unit/Bed#: -01 Encounter: [id number]    Primary Care Provider: Raji Carmen MD   Date and time admitted to hospital: 8/10/2019  6:23 PM        * Acute on chronic pancreatitis [facility])  Assessment & Plan  · Patient presented on admission for recurrent nausea and vomiting along with abdominal pain  · Patient with frequent visits to ED for acute on chronic pancreatitis  · No imaging was obtained in the ED hold off patient does not appear clinically toxic  · Lipase is normal agree symptoms are concerning that this could be a slight exacerbation of her pancreatitis in setting of alcohol consumption  · Patient's nausea vomiting overall improved patient has tolerated a regular diet without difficulty  · Discontinue IV fluids  · Discontinue IV narcotics  · Patient overall clinically stable for discharge    Alcohol abuse  Assessment & Plan  · Reports that she has been sober but that she try to did drink a "cocktail"today thinking she had abdominal cramps when her pancreatitis set in  Have given patient alcohol counseling given her history of chronic pancreatitis as well as liver cirrhosis from prior alcohol abuse  · Concern of withdrawal at this time  Patient daily drinking over last several days  · Abstaining from drinking was discussed thoroughly with this patient  · Continue thiamine and folic acid  Hypokalemia  Assessment & Plan  · Likely in setting of GI losses  · Improved status post replete    Essential hypertension  Assessment & Plan  Continue clonidine 0 3 mg b i d  Waqas Arizmendi     Type 2 diabetes mellitus, without long-term current use of insulin [facility])  Assessment & Plan  Lab Results   Component Value Date    HGBA1C 7 2 (H) 05/24/2019       Recent Labs     08/11/19  2059 08/11/19  2356 08/12/19  0615 08/12/19  1132   POCGLU 132 182* 126 215*       Blood Sugar Average: Last 72 hrs:    Recent diagnosis of diabetes with a hemoglobin of 7 2 likely from recurrent chronic pancreatitis  She has been initiated on metformin 500 mg daily as an outpatient  Will hold metformin as an inpatient and institute sliding scale insulin   -she is unsure about management of diabetes, will consult diabetic educator for additional teaching  Braluio Welch [id number]          Discharging Physician / Practitioner: Alida Sierra  PCP: Favio Eric MD  Admission Date:   Admission Orders (From admission, onward)     Ordered        08/11/19 1642  Inpatient Admission  Once         08/10/19 2138  Place in Observation  Once         08/10/19 2045  Inpatient Admission  Once                   Discharge Date: 08/12/19    Resolved Problems  Date Reviewed: 8/11/2019    None          Consultations During Hospital Stay:  · None    Procedures Performed:   · IV fluid    Significant Findings / Test Results:   · Likely acute on chronic pancreatitis aggravated by alcohol consumption  · Nausea and vomiting in setting of acute on chronic pancreatitis overall resolved  · Alcohol abuse abstaining thoroughly discussed with patient    Incidental Findings:   ·   Hypokalemia repleted    Test Results Pending at Discharge (will require follow up): · None     Outpatient Tests Requested:  · Per PCP recommendation to referral to pain management    Complications:  None    Reason for Admission:  Nausea and vomiting suspected in setting of acute on chronic pancreatitis    Hospital Course:     Randa Gutierrez is a 38 y o  female patient who originally presented to the hospital on 8/10/2019 due to was in the ED recently in patient with known chronic pancreatitis due to significant nausea vomiting that did not improve over several days patient came to the ED for further evaluation where she not improve with doses of Zofran patient was admitted given aggressive IV hydration kept NPO and patient admitted during her symptomatic    That she had consumed an alcoholic drink patient was counseled heavily on abstaining from drinking as this can worsen her symptoms considered improving  Noted lipase was normal which could be expected and acute on chronic pancreatitis  Patient had noted hypokalemia this is likely in setting of her nausea and vomiting overall patient given bowel rest IV Zofran improved incidentally tolerated a diet clear liquids to a regular diet prior to discharge  Given patient's overall improvement she was discharged home  Patient was further counseled on going to pain management as this is going to be a clinical issue moving forward patient stated understanding would talk with her PCP on getting a referral   Outpatient follow-up with GI      Please see above list of diagnoses and related plan for additional information  Condition at Discharge: stable     Discharge Day Visit / Exam:     Subjective:  Patient reports resolution of her pain and overall feeling better patient reports she has tolerated a regular diet and feels ready to go home  Vitals: Blood Pressure: 142/91 (08/12/19 1000)  Pulse: 60 (08/12/19 1000)  Temperature: 97 5 °F (36 4 °C) (08/12/19 0909)  Temp Source: Oral (08/11/19 2300)  Respirations: 16 (08/12/19 0909)  Height: 5' 4" (162 6 cm) (08/10/19 1827)  Weight - Scale: 74 6 kg (164 lb 7 4 oz) (08/10/19 1827)  SpO2: 100 % (08/12/19 1000)  Exam:   Physical Exam   Constitutional: She is oriented to person, place, and time  She appears well-developed and well-nourished  HENT:   Head: Normocephalic and atraumatic  Eyes: Conjunctivae and EOM are normal    Neck: Normal range of motion  Cardiovascular: Normal rate, regular rhythm and normal heart sounds  Pulmonary/Chest: Effort normal and breath sounds normal    Abdominal: Soft  Bowel sounds are normal    Musculoskeletal: Normal range of motion  Neurological: She is alert and oriented to person, place, and time  Skin: Skin is warm and dry  Psychiatric: She has a normal mood and affect   Her behavior is normal  Discussion with Family:  Significant other at bedside    Discharge instructions/Information to patient and family:   See after visit summary for information provided to patient and family  Provisions for Follow-Up Care:  See after visit summary for information related to follow-up care and any pertinent home health orders  Disposition:     Home    For Discharges to [facility]:   · Not Applicable to this Patient - Not Applicable to this Patient    Planned Readmission:  None     Discharge Statement:  I spent 35 minutes discharging the patient  This time was spent on the day of discharge  I had direct contact with the patient on the day of discharge  Greater than 50% of the total time was spent examining patient, answering all patient questions, arranging and discussing plan of care with patient as well as directly providing post-discharge instructions  Additional time then spent on discharge activities  Discharge Medications:  See after visit summary for reconciled discharge medications provided to patient and family        ** Please Note: This note has been constructed using a voice recognition system **

## 2019-08-12 NOTE — PLAN OF CARE
Problem: OCCUPATIONAL THERAPY ADULT  Goal: Performs self-care activities at highest level of function for planned discharge setting  See evaluation for individualized goals  Description  Treatment Interventions: ADL retraining, Functional transfer training, UE strengthening/ROM, Endurance training, Equipment evaluation/education, Compensatory technique education, Continued evaluation, Cardiac education, Energy conservation, Activityengagement          See flowsheet documentation for full assessment, interventions and recommendations  Note:   Limitation: Decreased ADL status, Decreased Safe judgement during ADL, Decreased endurance, Decreased high-level ADLs, Decreased self-care trans  Prognosis: Good  Assessment: Pt is a 38 y o  female seen for OT evaluation s/p admit to [facility] on 8/10/2019 w/ Acute on chronic pancreatitis ([facility] [address] )  Comorbidities affecting pt's functional performance at time of assessment include:alcohol abuse, DM, liver mass, HTN, olitis, vomiting, cirrhosis,  and abdominal and epigastric  pain   Orders placed for OT evaluation and treatment and "home OT" order  Performed at least two patient identifiers during session including name and wristband  Personal factors affecting pt at time of IE include:steps to enter environment, behavioral pattern, difficulty performing ADLS, difficulty performing IADLS , limited insight into deficits, decreased initiation and engagement , financial barriers, health management  and environment  Prior to admission, pt reports Ind with ADLs, Ind with IADLs, and (-) driving    Upon evaluation: Pt requires S with UB ADLs, min A with LB ADLs, S with xfers and S with functional mobility 2* the following deficits impacting occupational performance: weakness, decreased dynamic sit/ stand balance, decreased activity tolerance, decreased standing tolerance time for self care and functional mobility, decreased postural control, decreased safety awareness, increased pain, orthopedic restrictions, decreased coping skills, decreased mobilty and requiring external assistance to complete transitional movements  Pt to benefit from continued skilled OT tx while in the hospital to address deficits as defined above and maximize level of functional independence w ADL's and functional mobility  Occupational Performance areas to address include: bathing/shower, toilet hygiene, dressing, health maintenance, functional mobility, community mobility, meal prep and household maintenance  From OT standpoint, recommendation at time of d/c would be home OT         OT Discharge Recommendation: Home OT  OT - OK to Discharge: Yes(when medically cleared)

## 2019-08-12 NOTE — OCCUPATIONAL THERAPY NOTE
Occupational Therapy Evaluation      Isaacmargi Coeks    8/12/2019    Patient Active Problem List   Diagnosis    Abdominal pain    Essential hypertension    Nicotine dependence    Hypokalemia    Colitis    Pain, dental    Alcohol abuse    Tylenol overdose, accidental or unintentional, initial encounter    Elevated glucose    Epigastric pain    Vomiting    Hypertensive urgency    Alcoholic cirrhosis (HCC)    Moderate dehydration    Pain of upper abdomen    Hypomagnesemia    Leukocytosis    Acute kidney injury ([facility] [address] )    Blood glucose elevated    Marijuana use    Acute on chronic pancreatitis (HCC)    Abnormal LFTs    Neck swelling    Liver mass    Type 2 diabetes mellitus, without long-term current use of insulin (HCC)    Acute metabolic encephalopathy       Past Medical History:   Diagnosis Date    Alcohol abuse     Arthritis     GERD (gastroesophageal reflux disease)     Hypertension     Nicotine dependence     Obesity     Pancreatitis     Panic attack        Past Surgical History:   Procedure Laterality Date    ABDOMINAL SURGERY      C SECTION    ESOPHAGOGASTRODUODENOSCOPY N/A 12/15/2017    Procedure: ESOPHAGOGASTRODUODENOSCOPY (EGD); Surgeon: Amado Fuller MD;  Location: [facility]; Service: Gastroenterology      08/12/19 9802   Note Type   Note type Eval/Treat   Restrictions/Precautions   Weight Bearing Precautions Per Order No   Braces or Orthoses Other (Comment)  (over the counter back brace/lifting brace used prn)   Other Precautions Multiple lines; Fall Risk  (back safety precautions)   Pain Assessment   Pain Assessment No/denies pain   Pain Score No Pain   Home Living   Type of Home House   Home Layout Two level;Stairs to enter with rails;Bed/bath upstairs;1/2 bath on main level  ( 4 F[address]LIX)   Bathroom Shower/Tub Tub/shower unit   [employer]   (no DME at baseline)   [address]   (no DME at baseline)   Prior Function   Level of [place] Independent with ADLs and functional mobility   Lives With Significant other;Family  (triny family)   Receives Help From Family   ADL Assistance Independent   IADLs Independent   Falls in the last 6 months 1 to 4  (2* sciatica and DJD 2* knee buckling)   Vocational Unemployed  (adrianna previously)   Comments (-) driving 2* anxiety  trey assists with transportation   Lifestyle   Autonomy Pt reports PLOF was Ind with ADLs, IADLs, and (-) driving due to anxiety   Reciprocal Relationships trey and his family, grandchild, and dtr   Psychosocial   Psychosocial (WDL) WDL   ADL   Eating Assistance 6  Modified independent   Eating Deficit Increased time to complete   Grooming Assistance 6  Modified Independent   Grooming Deficit Increased time to complete   UB Bathing Assistance 5  Supervision/Setup   UB Bathing Deficit Increased time to complete;Setup;Supervision/safety   LB Bathing Assistance 4  Minimal Assistance   LB Bathing Deficit Increased time to complete;Supervision/safety;Verbal cueing;Steadying;Setup   UB Dressing Assistance 5  Supervision/Setup   UB Dressing Deficit Increased time to complete;Supervision/safety;Verbal cueing;Steadying;Setup   LB Dressing Assistance 4  Minimal Assistance   LB Dressing Deficit Increased time to complete;Supervision/safety;Verbal cueing;Steadying;Setup   Toileting Assistance  4  Minimal Assistance   Toileting Deficit Increased time to complete;Supervison/safety;Verbal cueing;Setup;Steadying   Functional Assistance 4  Minimal Assistance   Functional Deficit Increased time to complete;Supervision/safety;Verbal cueing;Steadying;Setup   Bed Mobility   Supine to Sit 5  Supervision   Additional items Assist x 1; Increased time required;Verbal cues;HOB elevated   Sit to Supine 5  Supervision   Additional items Assist x 1;HOB elevated; Increased time required;Verbal cues   Additional Comments education for log roll & back safety precautions (no bending, lifting, twisting)   Transfers   Sit to Stand 5  Supervision   Additional items Increased time required   Stand to Sit 5  Supervision   Additional items Increased time required   Balance   Static Sitting Good   Dynamic Sitting Fair +   Static Standing Fair +   Dynamic Standing Fair   Ambulatory Fair   Activity Tolerance   Activity Tolerance Patient limited by fatigue   Medical Staff Made Aware PT Jeannette Malave   Nurse Made Aware RN Paul Cohn verbalized pt appropriate to see, made aware of session outcome/recs   RUE Assessment   RUE Assessment WFL   LUE Assessment   LUE Assessment WFL   Hand Function   Gross Motor Coordination Functional   Fine Motor Coordination Functional   Sensation   Light Touch No apparent deficits   Sharp/Dull No apparent deficits   Stereognosis No apparent deficits   Proprioception   Proprioception No apparent deficits   Vision-Basic Assessment   Current Vision No visual deficits   Vision - Complex Assessment   Ocular Range of Motion [facility]   Perception   Inattention/Neglect Appears intact   Cognition   Overall Cognitive Status WFL   Arousal/Participation Alert;Arousable; Cooperative   Attention Within functional limits   Orientation Level Oriented X4   Memory Within functional limits   Following Commands Follows all commands and directions without difficulty   Comments Pt was agreeable to OT eval   Assessment   Limitation Decreased ADL status; Decreased Safe judgement during ADL;Decreased endurance;Decreased high-level ADLs; Decreased self-care trans   Prognosis Good   Assessment Pt is a 38 y o  female seen for OT evaluation s/p admit to [facility] on 8/10/2019 w/ Acute on chronic pancreatitis ([facility] [address] )  Comorbidities affecting pt's functional performance at time of assessment include:alcohol abuse, DM, liver mass, HTN, olitis, vomiting, cirrhosis,  and abdominal and epigastric  pain   Orders placed for OT evaluation and treatment and "home OT" order    Performed at least two patient identifiers during session including name and wristband  Personal factors affecting pt at time of IE include:steps to enter environment, behavioral pattern, difficulty performing ADLS, difficulty performing IADLS , limited insight into deficits, decreased initiation and engagement , financial barriers, health management  and environment  Prior to admission, pt reports Ind with ADLs, Ind with IADLs, and (-) driving  Upon evaluation: Pt requires S with UB ADLs, min A with LB ADLs, S with xfers and S with functional mobility 2* the following deficits impacting occupational performance: weakness, decreased dynamic sit/ stand balance, decreased activity tolerance, decreased standing tolerance time for self care and functional mobility, decreased postural control, decreased safety awareness, increased pain, orthopedic restrictions, decreased coping skills, decreased mobilty and requiring external assistance to complete transitional movements  Pt to benefit from continued skilled OT tx while in the hospital to address deficits as defined above and maximize level of functional independence w ADL's and functional mobility  Occupational Performance areas to address include: bathing/shower, toilet hygiene, dressing, health maintenance, functional mobility, community mobility, meal prep and household maintenance  From OT standpoint, recommendation at time of d/c would be home OT  Goals   Patient Goals to go home   Plan   Treatment Interventions ADL retraining;Functional transfer training;UE strengthening/ROM; Endurance training;Equipment evaluation/education; Compensatory technique education;Continued evaluation;Cardiac education; Energy conservation; Activityengagement   Goal Expiration Date 08/25/19   OT Frequency 3-5x/wk   Recommendation   OT Discharge Recommendation Home OT   OT - OK to Discharge Yes  (when medically cleared)   Barthel Index   Feeding 10   Bathing 0   Grooming Score 5   Dressing Score 5   Bladder Score 10   Bowels Score 10   Toilet Use Score 5   Transfers (Bed/Chair) Score 10   Mobility (Level Surface) Score 0   Stairs Score 0   Barthel Index Score 55   Modified [place] Scale   Modified [place] Scale 3     Occupational therapy Goals: In 2-4 days    1 - Patient will completed passport to independence program in order to further determine DC needs as well as any additional recommendations/ education  2 - Patient will verbalize and demonstrate use of energy conservation/ deep breathing technique and work simplification skills during functional activity with no verbal cues  3 - Patient will verbalize and demonstrate good body mechanics and joint protection techniques during ADLs/ IADLs with no verbal cues     4 - Patient will increase OOB/ sitting tolerance to 4-6 hours per day for increased participation in self care and leisure tasks with no s/s of exertion  5 - Patient will identify s/s of exertion during ADL and functional mobility with no verbal cues  6 - Patient will verbalize/ demonstrate compensatory strategies to recover from exertion with no verbal cues  7 - Patient will increase standing tolerance time to 10 minutes with No UE support to complete sink level ADLs @ Mod I level  8 - Patient will increase sitting tolerance at edge of bed to 30 minutes to complete UB ADLs @ Indep  level       9 - Patient/ Family will demonstrate competency with [address]  Henna Johnson MS OTR/L

## 2019-08-12 NOTE — ASSESSMENT & PLAN NOTE
Lab Results   Component Value Date    HGBA1C 7 2 (H) 05/24/2019       Recent Labs     08/11/19 2059 08/11/19  2356 08/12/19  0615 08/12/19  1132   POCGLU 132 182* 126 215*       Blood Sugar Average: Last 72 hrs:    Recent diagnosis of diabetes with a hemoglobin of 7 2 likely from recurrent chronic pancreatitis  She has been initiated on metformin 500 mg daily as an outpatient  Will hold metformin as an inpatient and institute sliding scale insulin   -she is unsure about management of diabetes, will consult diabetic educator for additional teaching    (P) [id number]

## 2019-08-12 NOTE — PHYSICAL THERAPY NOTE
Physical Therapy Evaluation     Patient's Name: Holley Landeros    Admitting Diagnosis  Abdominal pain [R10 9]  Right upper quadrant abdominal pain [R10 11]  Type 2 diabetes mellitus, without long-term current use of insulin (HCC) [E11 9]    Problem List  Patient Active Problem List   Diagnosis    Abdominal pain    Essential hypertension    Nicotine dependence    Hypokalemia    Colitis    Pain, dental    Alcohol abuse    Tylenol overdose, accidental or unintentional, initial encounter    Elevated glucose    Epigastric pain    Vomiting    Hypertensive urgency    Alcoholic cirrhosis (HCC)    Moderate dehydration    Pain of upper abdomen    Hypomagnesemia    Leukocytosis    Acute kidney injury ([address] )    Blood glucose elevated    Marijuana use    Acute on chronic pancreatitis (HCC)    Abnormal LFTs    Neck swelling    Liver mass    Type 2 diabetes mellitus, without long-term current use of insulin ([address] )    Acute metabolic encephalopathy       Past Medical History  Past Medical History:   Diagnosis Date    Alcohol abuse     Arthritis     GERD (gastroesophageal reflux disease)     Hypertension     Nicotine dependence     Obesity     Pancreatitis     Panic attack        Past Surgical History  Past Surgical History:   Procedure Laterality Date    ABDOMINAL SURGERY      C SECTION    ESOPHAGOGASTRODUODENOSCOPY N/A 12/15/2017    Procedure: ESOPHAGOGASTRODUODENOSCOPY (EGD); Surgeon: Gemma Jason MD;  Location: [facility];   Service: Gastroenterology          08/12/19 [facility]   Note Type   Note type Eval only   Pain Assessment   Pain Assessment No/denies pain   Pain Score No Pain   Home Living   Type of [address] [place] [address] Two level;Stairs to enter with rails;Bed/bath upstairs;1/2 bath on main level  (4 FÉLIX)   Bathroom Shower/Tub Tub/shower unit   [address] [facility]   (no DME at baseline)   [address]   (no AD required at baseline)   Prior Function   Level of [place] Independent with ADLs and functional mobility   Lives With Significant other;Family  (fiances family)   Receives Help From Family   ADL Assistance Independent   IADLs Independent   Falls in the last 6 months 1 to 4  (2* sciatica and DJD 2* knee buckling)   Vocational Unemployed  ( previously)   Comments (-) driving 2* anxiety  fiance assists with transportation   Restrictions/Precautions   Weight Bearing Precautions Per Order No   Braces or Orthoses Other (Comment)  (over the counter back brace/lifting brace used prn)   Other Precautions Multiple lines; Fall Risk  (back safety precautions)   General   Family/Caregiver Present Yes   Cognition   Overall Cognitive Status WFL   Arousal/Participation Alert   Orientation Level Oriented X4   Memory Within functional limits   Following Commands Follows all commands and directions without difficulty   Comments pt agreeable to PT evaluation   RUE Assessment   RUE Assessment   (defer to OT eval for comments)   LUE Assessment   LUE Assessment   (defer to OT eval for comments)   RLE Assessment   RLE Assessment WFL  (at least grossly 3+/5 assessed c functional mobility)   LLE Assessment   LLE Assessment WFL  (at least grossly 3+/5 assessed c functional mobility)   Coordination   Movements are Fluid and Coordinated 1   Sensation WFL   Light Touch   RLE Light Touch Grossly intact   LLE Light Touch Grossly intact   Bed Mobility   Supine to Sit 5  Supervision  (education for log roll technique)   Additional items Assist x 1; Increased time required;Verbal cues;HOB elevated   Sit to Supine 5  Supervision  (log roll technique)   Additional items Assist x 1; Increased time required;Verbal cues;HOB elevated   Additional Comments education for log roll & back safety precautions (no bending, lifting, twisting)   Transfers   Sit to Stand 5  Supervision   Stand to Sit 5  Supervision   Ambulation/Elevation   Gait pattern Decreased foot clearance; Short stride   Gait Assistance 5  Supervision   Additional items Assist x 1;Verbal cues   Assistive Device None   Distance 40'   Balance   Static Sitting Good   Dynamic Sitting Fair +   Static Standing Fair +   Dynamic Standing Fair   Ambulatory Fair   Endurance Deficit   Endurance Deficit Yes   Activity Tolerance   Activity Tolerance Patient limited by fatigue   Medical Staff Made Aware CHANNING Santos   Nurse Made Aware JORGE Barrera verbalized pt appropriate to see, made aware of session outcome/recs   Assessment   Prognosis Good   Problem List Decreased strength;Decreased endurance; Impaired balance;Decreased mobility  (back safety precautions; log roll technique)   Assessment Pt is 38 y o  female seen for PT evaluation on 8/12/2019 s/p admit to [facility] on 8/10/2019 w/ Acute on chronic pancreatitis ([facility] [address] )  Per H&P: "She reports abdominal discomfort and cramping pain over the last day  This was later associated with nausea and vomiting of nonbloody emesis  Her abdominal pain on arrival was 9/10 in intensity which improved with Dilaudid to 4/10 " PT consulted to assess pt's functional mobility and d/c needs  Order placed for PT eval and tx, w/ up w/ A order  Performed at least 2 patient identifiers during session: Name and wristband  Comorbidities affecting pt's physical performance at time of assessment include: alcohol abuse, arthritis, GERD, HTN, nicotine dependence, obesity, pancreatitis, panic attack  PTA, pt was independent w/ all functional mobility w/ no AD/DME, ambulates unrestricted distances and all terrain, has 4 FÉLIX, lives w/ S O  & family in 2 level home and unemployed  Personal factors affecting pt at time of IE include: inaccessible home environment, inability to navigate community distances and positive fall history   Please find objective findings from PT assessment regarding body systems outlined above with impairments and limitations including weakness, impaired balance, decreased endurance, gait deviations, pain, decreased activity tolerance and fall risk, as well as mobility assessment (need for SBA assist w/ all phases of mobility when usually ambulating independently and need for cueing for mobility technique)  The following objective measures performed on IE also reveal limitations: Barthel Index: 70/100 and Modified [place]: 2 (slight disability)  Pt's clinical presentation is currently unstable/unpredictable seen in pt's presentation of abnormal lab value(s), need for input for task focus and mobility technique and ongoing medical assessment  Pt to benefit from continued PT tx to address deficits as defined above and maximize level of functional independent mobility and consistency  From PT/mobility standpoint, recommendation at time of d/c would be Home PT with family support pending progress in order to facilitate return to PLOF  Barriers to Discharge Inaccessible home environment   Goals   Patient Goals to return home   STG Expiration Date 08/22/19   Short Term Goal #1 In 7-10 days: Increase bilateral LE strength 1/2 grade to facilitate independent mobility, Perform all bed mobility tasks modified independent to decrease caregiver burden, Perform all transfers modified independent to improve independence, Ambulate > 150 ft  with least restrictive assistive device modified independent w/o LOB and w/ normalized gait pattern 100% of the time, Navigate 13 stairs modified independent with unilateral handrail to facilitate return to previous living environment, Increase all balance 1/2 grade to decrease risk for falls, Complete exercise program independently, Tolerate 4 hr OOB to faciliate upright tolerance, Improve Barthel Index score to 85 or greater to facilitate independence and PT provider will perform functional balance assessment to determine fall risk   Treatment Day 0   Plan   Treatment/Interventions Functional transfer training;LE strengthening/ROM; Elevations; Therapeutic exercise; Endurance training;Patient/family training;Equipment eval/education; Bed mobility;Gait training;Spoke to nursing;OT   PT Frequency 2-3x/wk   Recommendation   Recommendation Home PT; Home with family support   PT - OK to Discharge No  (pt to clear stairs)   Modified Bailey Scale   Modified [place] Scale 2   Barthel Index   Feeding 10   Bathing 5   Grooming Score 5   Dressing Score 10   Bladder Score 10   Bowels Score 10   Toilet Use Score 10   Transfers (Bed/Chair) Score 10   Mobility (Level Surface) Score 0   Stairs Score 0   Barthel Index Score 70         Neena Chacon, PT, DPT

## 2019-08-12 NOTE — ASSESSMENT & PLAN NOTE
· Patient presented on admission for recurrent nausea and vomiting along with abdominal pain  · Patient with frequent visits to ED for acute on chronic pancreatitis  · No imaging was obtained in the ED hold off patient does not appear clinically toxic  · Lipase is normal agree symptoms are concerning that this could be a slight exacerbation of her pancreatitis in setting of alcohol consumption  · Patient's nausea vomiting overall improved patient has tolerated a regular diet without difficulty  · Discontinue IV fluids  · Discontinue IV narcotics  · Patient overall clinically stable for discharge

## 2019-08-12 NOTE — PLAN OF CARE
Problem: PHYSICAL THERAPY ADULT  Goal: Performs mobility at highest level of function for planned discharge setting  See evaluation for individualized goals  Description  Treatment/Interventions: Functional transfer training, LE strengthening/ROM, Elevations, Therapeutic exercise, Endurance training, Patient/family training, Equipment eval/education, Bed mobility, Gait training, Spoke to nursing, OT          See flowsheet documentation for full assessment, interventions and recommendations  Note:   Prognosis: Good  Problem List: Decreased strength, Decreased endurance, Impaired balance, Decreased mobility(back safety precautions; log roll technique)  Assessment: Pt is 38 y o  female seen for PT evaluation on 8/12/2019 s/p admit to [facility] on 8/10/2019 w/ Acute on chronic pancreatitis ([facility] [address] )  Per H&P: "She reports abdominal discomfort and cramping pain over the last day  This was later associated with nausea and vomiting of nonbloody emesis  Her abdominal pain on arrival was 9/10 in intensity which improved with Dilaudid to 4/10 " PT consulted to assess pt's functional mobility and d/c needs  Order placed for PT eval and tx, w/ up w/ A order  Performed at least 2 patient identifiers during session: Name and wristband  Comorbidities affecting pt's physical performance at time of assessment include: alcohol abuse, arthritis, GERD, HTN, nicotine dependence, obesity, pancreatitis, panic attack  PTA, pt was independent w/ all functional mobility w/ no AD/DME, ambulates unrestricted distances and all terrain, has 4 FÉLIX, lives w/ S O  & family in 2 level home and unemployed  Personal factors affecting pt at time of IE include: inaccessible home environment, inability to navigate community distances and positive fall history   Please find objective findings from PT assessment regarding body systems outlined above with impairments and limitations including weakness, impaired balance, decreased endurance, gait deviations, pain, decreased activity tolerance and fall risk, as well as mobility assessment (need for SBA assist w/ all phases of mobility when usually ambulating independently and need for cueing for mobility technique)  The following objective measures performed on IE also reveal limitations: Barthel Index: 70/100 and Modified [place]: 2 (slight disability)  Pt's clinical presentation is currently unstable/unpredictable seen in pt's presentation of abnormal lab value(s), need for input for task focus and mobility technique and ongoing medical assessment  Pt to benefit from continued PT tx to address deficits as defined above and maximize level of functional independent mobility and consistency  From PT/mobility standpoint, recommendation at time of d/c would be Home PT with family support pending progress in order to facilitate return to PLOF  Barriers to Discharge: Inaccessible home environment     Recommendation: Home PT, Home with family support     PT - OK to Discharge: No(pt to clear stairs)    See flowsheet documentation for full assessment

## 2019-08-13 NOTE — UTILIZATION REVIEW
Notification of Discharge  This is a Notification of Discharge from our facility [address]  Please be advised that this patient has been discharge from our facility  Below you will find the admission and discharge date and time including the patients disposition  PRESENTATION DATE: 8/10/2019  6:23 PM  OBS ADMISSION DATE: 08/10/2019  IP ADMISSION DATE: 8/10/19 2045   DISCHARGE DATE: 8/12/2019  1:50 PM  DISPOSITION: Home/Self Care Home/Self Care   Admission Orders listed below:  Admission Orders (From admission, onward)     Ordered        08/11/19 1642  Inpatient Admission  Once         08/10/19 2138  Place in Observation  Once         08/10/19 2045  Inpatient Admission  Once                   Please contact the UR Department if additional information is required to close this patient's authorization/case  [address]  Utilization Review Department  Phone: [phone]; Fax [phone]  [email or web address]  ATTENTION: Please call with any questions or concerns to [phone]  and carefully listen to the prompts so that you are directed to the right person  Send all requests for admission clinical reviews, approved or denied determinations and any other requests to fax [phone]   All voicemails are confidential

## 2019-08-27 ENCOUNTER — HOSPITAL ENCOUNTER (EMERGENCY)
Facility: HOSPITAL | Age: 44
Discharge: LEFT BEFORE DISCHARGE | End: 2019-08-27
Attending: EMERGENCY MEDICINE
Payer: COMMERCIAL

## 2019-08-27 VITALS
TEMPERATURE: 99.1 F | RESPIRATION RATE: 19 BRPM | SYSTOLIC BLOOD PRESSURE: 160 MMHG | OXYGEN SATURATION: 97 % | DIASTOLIC BLOOD PRESSURE: 119 MMHG | HEART RATE: 116 BPM | BODY MASS INDEX: 29.97 KG/M2 | WEIGHT: 174.6 LBS

## 2019-08-27 DIAGNOSIS — R11.2 NAUSEA AND VOMITING: ICD-10-CM

## 2019-08-27 DIAGNOSIS — K85.90 PANCREATITIS: ICD-10-CM

## 2019-08-27 DIAGNOSIS — R10.13 EPIGASTRIC PAIN: Primary | ICD-10-CM

## 2019-08-27 LAB
ALBUMIN SERPL BCP-MCNC: 3.8 G/DL (ref 3.5–5)
ALP SERPL-CCNC: 103 U/L (ref 46–116)
ALT SERPL W P-5'-P-CCNC: 30 U/L (ref 12–78)
AMMONIA PLAS-SCNC: 15 UMOL/L (ref 11–35)
ANION GAP SERPL CALCULATED.3IONS-SCNC: 15 MMOL/L (ref 4–13)
APTT PPP: 28 SECONDS (ref 23–37)
AST SERPL W P-5'-P-CCNC: 51 U/L (ref 5–45)
ATRIAL RATE: 105 BPM
BASOPHILS # BLD AUTO: 0.08 THOUSANDS/ΜL (ref 0–0.1)
BASOPHILS NFR BLD AUTO: 1 % (ref 0–1)
BILIRUB DIRECT SERPL-MCNC: 0.17 MG/DL (ref 0–0.2)
BILIRUB SERPL-MCNC: 0.4 MG/DL (ref 0.2–1)
BUN SERPL-MCNC: 6 MG/DL (ref 5–25)
CALCIUM SERPL-MCNC: 8.8 MG/DL (ref 8.3–10.1)
CHLORIDE SERPL-SCNC: 100 MMOL/L (ref 100–108)
CO2 SERPL-SCNC: 27 MMOL/L (ref 21–32)
CREAT SERPL-MCNC: 0.68 MG/DL (ref 0.6–1.3)
EOSINOPHIL # BLD AUTO: 0.09 THOUSAND/ΜL (ref 0–0.61)
EOSINOPHIL NFR BLD AUTO: 1 % (ref 0–6)
ERYTHROCYTE [DISTWIDTH] IN BLOOD BY AUTOMATED COUNT: 17 % (ref 11.6–15.1)
ETHANOL SERPL-MCNC: 23 MG/DL (ref 0–3)
GFR SERPL CREATININE-BSD FRML MDRD: 107 ML/MIN/1.73SQ M
GLUCOSE SERPL-MCNC: 162 MG/DL (ref 65–140)
HCT VFR BLD AUTO: 43.3 % (ref 34.8–46.1)
HGB BLD-MCNC: 14.1 G/DL (ref 11.5–15.4)
IMM GRANULOCYTES # BLD AUTO: 0.04 THOUSAND/UL (ref 0–0.2)
IMM GRANULOCYTES NFR BLD AUTO: 0 % (ref 0–2)
INR PPP: 1.05 (ref 0.84–1.19)
LACTATE SERPL-SCNC: 3 MMOL/L (ref 0.5–2)
LIPASE SERPL-CCNC: 79 U/L (ref 73–393)
LYMPHOCYTES # BLD AUTO: 4.24 THOUSANDS/ΜL (ref 0.6–4.47)
LYMPHOCYTES NFR BLD AUTO: 38 % (ref 14–44)
MCH RBC QN AUTO: 29.3 PG (ref 26.8–34.3)
MCHC RBC AUTO-ENTMCNC: 32.6 G/DL (ref 31.4–37.4)
MCV RBC AUTO: 90 FL (ref 82–98)
MONOCYTES # BLD AUTO: 0.74 THOUSAND/ΜL (ref 0.17–1.22)
MONOCYTES NFR BLD AUTO: 7 % (ref 4–12)
NEUTROPHILS # BLD AUTO: 5.86 THOUSANDS/ΜL (ref 1.85–7.62)
NEUTS SEG NFR BLD AUTO: 53 % (ref 43–75)
NRBC BLD AUTO-RTO: 0 /100 WBCS
P AXIS: 83 DEGREES
PLATELET # BLD AUTO: 245 THOUSANDS/UL (ref 149–390)
PMV BLD AUTO: 9.3 FL (ref 8.9–12.7)
POTASSIUM SERPL-SCNC: 3.2 MMOL/L (ref 3.5–5.3)
PR INTERVAL: 172 MS
PROT SERPL-MCNC: 8.3 G/DL (ref 6.4–8.2)
PROTHROMBIN TIME: 13.7 SECONDS (ref 11.6–14.5)
QRS AXIS: 40 DEGREES
QRSD INTERVAL: 88 MS
QT INTERVAL: 360 MS
QTC INTERVAL: 475 MS
RBC # BLD AUTO: 4.82 MILLION/UL (ref 3.81–5.12)
SODIUM SERPL-SCNC: 142 MMOL/L (ref 136–145)
T WAVE AXIS: 73 DEGREES
VENTRICULAR RATE: 105 BPM
WBC # BLD AUTO: 11.05 THOUSAND/UL (ref 4.31–10.16)

## 2019-08-27 PROCEDURE — 99284 EMERGENCY DEPT VISIT MOD MDM: CPT | Performed by: PHYSICIAN ASSISTANT

## 2019-08-27 PROCEDURE — 99284 EMERGENCY DEPT VISIT MOD MDM: CPT

## 2019-08-27 PROCEDURE — 85025 COMPLETE CBC W/AUTO DIFF WBC: CPT | Performed by: PHYSICIAN ASSISTANT

## 2019-08-27 PROCEDURE — 36415 COLL VENOUS BLD VENIPUNCTURE: CPT | Performed by: PHYSICIAN ASSISTANT

## 2019-08-27 PROCEDURE — 83690 ASSAY OF LIPASE: CPT | Performed by: PHYSICIAN ASSISTANT

## 2019-08-27 PROCEDURE — 80076 HEPATIC FUNCTION PANEL: CPT | Performed by: PHYSICIAN ASSISTANT

## 2019-08-27 PROCEDURE — 85610 PROTHROMBIN TIME: CPT | Performed by: PHYSICIAN ASSISTANT

## 2019-08-27 PROCEDURE — 85730 THROMBOPLASTIN TIME PARTIAL: CPT | Performed by: PHYSICIAN ASSISTANT

## 2019-08-27 PROCEDURE — 93010 ELECTROCARDIOGRAM REPORT: CPT | Performed by: INTERNAL MEDICINE

## 2019-08-27 PROCEDURE — 96374 THER/PROPH/DIAG INJ IV PUSH: CPT

## 2019-08-27 PROCEDURE — 80320 DRUG SCREEN QUANTALCOHOLS: CPT | Performed by: PHYSICIAN ASSISTANT

## 2019-08-27 PROCEDURE — 83605 ASSAY OF LACTIC ACID: CPT | Performed by: PHYSICIAN ASSISTANT

## 2019-08-27 PROCEDURE — 82140 ASSAY OF AMMONIA: CPT | Performed by: PHYSICIAN ASSISTANT

## 2019-08-27 PROCEDURE — 80048 BASIC METABOLIC PNL TOTAL CA: CPT | Performed by: PHYSICIAN ASSISTANT

## 2019-08-27 PROCEDURE — 96361 HYDRATE IV INFUSION ADD-ON: CPT

## 2019-08-27 PROCEDURE — 93005 ELECTROCARDIOGRAM TRACING: CPT

## 2019-08-27 RX ORDER — ONDANSETRON 2 MG/ML
4 INJECTION INTRAMUSCULAR; INTRAVENOUS ONCE
Status: COMPLETED | OUTPATIENT
Start: 2019-08-27 | End: 2019-08-27

## 2019-08-27 RX ORDER — POTASSIUM CHLORIDE 20 MEQ/1
20 TABLET, EXTENDED RELEASE ORAL ONCE
Status: DISCONTINUED | OUTPATIENT
Start: 2019-08-27 | End: 2019-08-27 | Stop reason: HOSPADM

## 2019-08-27 RX ORDER — ONDANSETRON 2 MG/ML
1 INJECTION INTRAMUSCULAR; INTRAVENOUS ONCE
Status: COMPLETED | OUTPATIENT
Start: 2019-08-27 | End: 2019-08-27

## 2019-08-27 RX ADMIN — ONDANSETRON 4 MG: 2 INJECTION INTRAMUSCULAR; INTRAVENOUS at 07:37

## 2019-08-27 RX ADMIN — SODIUM CHLORIDE 2500 ML: 0.9 INJECTION, SOLUTION INTRAVENOUS at 07:36

## 2019-08-27 NOTE — ED NOTES
Patient is crying and requesting pain medication  Provider made aware       Yogi Rico RN  08/27/19 8895

## 2019-08-27 NOTE — SEPSIS NOTE
Sepsis Note   Lidya Chen 38 y o  female MRN: [id number]  Unit/Bed#: ED 21 Encounter: [id number]      qSOFA     Row Name 08/27/19 2400                Altered mental status GCS < 15          Respiratory Rate > / =40  0        Systolic BP < / =749  0        Q Sofa Score  0            Initial Sepsis Screening     Row Name 08/27/19 0720                Is the patient's history suggestive of a new or worsening infection? (!) Yes (Proceed)  -DE        Suspected source of infection  acute abdominal infection  -DE        Are two or more of the following signs & symptoms of infection both present and new to the patient? No  -DE        Indicate SIRS criteria  Tachycardia > 90 bpm  -DE        If the answer is yes to both questions, suspicion of sepsis is present          If severe sepsis is present AND tissue hypoperfusion perists in the hour after fluid resuscitation or lactate > 4, the patient meets criteria for SEPTIC SHOCK          Are any of the following organ dysfunction criteria present within 6 hours of suspected infection and SIRS criteria that are NOT considered to be chronic conditions? No  -DE        Organ dysfunction          Date of presentation of severe sepsis          Time of presentation of severe sepsis          Tissue hypoperfusion persists in the hour after crystalloid fluid administration, evidenced, by either:          Was hypotension present within one hour of the conclusion of crystalloid fluid administration?           Date of presentation of septic shock          Time of presentation of septic shock            User Key  (r) = Recorded By, (t) = Taken By, (c) = Cosigned By    [address] Name Provider Type    [address] [place] [address], PA-C Physician Assistant

## 2019-08-27 NOTE — ED PROVIDER NOTES
History  Chief Complaint   Patient presents with    Abdominal Pain     pt c/o abd pain since last night  vomiting today  80-year-old female with past medical history significant for pancreatitis, alcohol abuse, hypertension and gastroesophageal reflux disease presents to the emergency department via EMS with chief complaint of abdominal pain  Onset of symptoms reported as last night  Location of symptoms reported as the epigastric area the abdomen  Quality is reported as sharp pain  Severity is reported as moderate to severe listed as 7/10 on the pain scale  Associated symptoms:  Positive for nausea  Positive for vomiting  Positive for abdominal pain  Denies fevers  Denies rash  Denies dizziness or syncope  Denies constipation  Modifying factors:  Patient reports that she of consumed alcohol last night and subsequently developed epigastric abdominal pain last night  Context:  Patient with history of known pancreatitis  No history of alcohol abuse  Multiple admissions to this hospital recently with similar symptoms  Patient has been counseled extensively regarding cessation of alcohol in relation to her pancreatitis  She reports she consumed alcohol again last night and this prompted her abdominal pain and vomiting  Reviewed past visits via epic:  Patient was last seen in the emergency department on August 10, 2019 and diagnosed with pancreatitis she was admitted for intractable vomiting and discharged on August 12, 2019  History provided by:  Patient and EMS personnel   used: No        Prior to Admission Medications   Prescriptions Last Dose Informant Patient Reported? Taking?    LORazepam (ATIVAN) 0 5 mg tablet   No No   Sig: Take 2 tablets (1 mg total) by mouth every 8 (eight) hours as needed for anxiety   Patient not taking: Reported on 8/2/2019   Mometasone Furo-Formoterol Fum (DULERA) 100-5 MCG/ACT AERO   Yes No   Sig: Inhale as needed Unsure of dose albuterol (PROVENTIL HFA,VENTOLIN HFA) 90 mcg/act inhaler   No No   Sig: Inhale 2 puffs every 4 (four) hours as needed for wheezing   cloNIDine (CATAPRES) 0 3 mg tablet   Yes No   Sig: Take 0 3 mg by mouth 2 (two) times a day   dicyclomine (BENTYL) 20 mg tablet   No No   Sig: Take 1 tablet (20 mg total) by mouth 3 (three) times a day   dicyclomine (BENTYL) 20 mg tablet   No No   Sig: Take 1 tablet (20 mg total) by mouth 2 (two) times a day As needed for stomach cramps   folic acid (FOLVITE) 1 mg tablet   No No   Sig: Take 1 tablet (1 mg total) by mouth daily   gabapentin (NEURONTIN) 300 mg capsule   No No   Sig: Take 1 capsule (300 mg total) by mouth 3 (three) times a day   metFORMIN (GLUCOPHAGE) 500 mg tablet   No No   Sig: Take 1 tablet (500 mg total) by mouth daily with breakfast   metoclopramide (REGLAN) 10 mg tablet   No No   Sig: Take 1 tablet (10 mg total) by mouth every 6 (six) hours As needed for nausea and vomiting   naproxen (NAPROSYN) 500 mg tablet   No No   Sig: Take 1 tablet (500 mg total) by mouth 2 (two) times a day with meals For mild abdominal pain   nicotine (NICODERM CQ) 21 mg/24 hr TD 24 hr patch   No No   Sig: Place 1 patch on the skin daily   ondansetron (ZOFRAN) 4 mg tablet   No No   Sig: Take 1 tablet (4 mg total) by mouth every 8 (eight) hours as needed for nausea or vomiting   pancrelipase, Lip-Prot-Amyl, (CREON) 24,000 units   No No   Sig: Take 1 capsule by mouth 3 (three) times a day with meals   pantoprazole (PROTONIX) 20 mg tablet   No No   Sig: Take 1 tablet (20 mg total) by mouth daily   sucralfate (CARAFATE) 1 g tablet   No No   Sig: Take 1 tablet (1 g total) by mouth 4 (four) times a day   thiamine 100 MG tablet   No No   Sig: Take 1 tablet (100 mg total) by mouth daily      Facility-Administered Medications: None       Past Medical History:   Diagnosis Date    Alcohol abuse     Arthritis     GERD (gastroesophageal reflux disease)     Hypertension     Nicotine dependence  Obesity     Pancreatitis     Panic attack        Past Surgical History:   Procedure Laterality Date    ABDOMINAL SURGERY      C SECTION    ESOPHAGOGASTRODUODENOSCOPY N/A 12/15/2017    Procedure: ESOPHAGOGASTRODUODENOSCOPY (EGD); Surgeon: Therese Haji MD;  Location: [facility]; Service: Gastroenterology       Family History   Problem Relation Age of Onset    Cirrhosis Father     Alcohol abuse Father     Drug abuse Mother      I have reviewed and agree with the history as documented  Social History     Tobacco Use    Smoking status: Current Every Day Smoker     Packs/day: 1 00     Years: 29 00     Pack years: 29 00     Types: Cigarettes    Smokeless tobacco: Never Used   Substance Use Topics    Alcohol use: Yes     Frequency: Never     Comment: Patient states 'I drink alot of vodka a day"    Drug use: No        Review of Systems   Constitutional: Negative for activity change, appetite change, chills, diaphoresis, fatigue, fever and unexpected weight change  HENT: Negative for congestion, dental problem, drooling, ear discharge, ear pain, facial swelling, hearing loss, mouth sores, nosebleeds, postnasal drip, rhinorrhea, sinus pressure, sinus pain, sneezing, sore throat, tinnitus, trouble swallowing and voice change  Eyes: Negative for photophobia, pain, discharge, redness, itching and visual disturbance  Respiratory: Negative for apnea, cough, choking, chest tightness, shortness of breath, wheezing and stridor  Cardiovascular: Negative for chest pain, palpitations and leg swelling  Gastrointestinal: Positive for abdominal pain, nausea and vomiting  Negative for abdominal distention, anal bleeding, blood in stool, constipation, diarrhea and rectal pain  Endocrine: Negative for cold intolerance, heat intolerance, polydipsia, polyphagia and polyuria     Genitourinary: Negative for decreased urine volume, difficulty urinating, dyspareunia, dysuria, enuresis, flank pain, frequency, hematuria, menstrual problem, pelvic pain, urgency, vaginal bleeding, vaginal discharge and vaginal pain  Musculoskeletal: Negative for arthralgias, back pain, gait problem, joint swelling, myalgias, neck pain and neck stiffness  Skin: Negative for color change, pallor, rash and wound  Allergic/Immunologic: Negative for environmental allergies, food allergies and immunocompromised state  Neurological: Negative for dizziness, tremors, seizures, syncope, facial asymmetry, speech difficulty, weakness, light-headedness, numbness and headaches  Hematological: Negative for adenopathy  Does not bruise/bleed easily  Psychiatric/Behavioral: Negative for agitation, confusion, hallucinations, self-injury and suicidal ideas  The patient is nervous/anxious  The patient is not hyperactive  All other systems reviewed and are negative  Physical Exam  Physical Exam   Constitutional: She is oriented to person, place, and time  She appears well-developed and well-nourished  No distress  BP (!) 160/119 (BP Location: Left arm)   Pulse (!) 116   Temp 99 1 °F (37 3 °C) (Oral)   Resp 19   Wt 79 2 kg (174 lb 9 7 oz)   SpO2 97%   BMI 29 97 kg/m²    HENT:   Head: Normocephalic and atraumatic  Right Ear: External ear normal    Left Ear: External ear normal    Nose: Nose normal    Mouth/Throat: Oropharynx is clear and moist  No oropharyngeal exudate  Eyes: Pupils are equal, round, and reactive to light  Conjunctivae and EOM are normal  Right eye exhibits no discharge  Left eye exhibits no discharge  No scleral icterus  Neck: Normal range of motion  Neck supple  No JVD present  No tracheal deviation present  No thyromegaly present  Cardiovascular: Regular rhythm and intact distal pulses  Tachycardia present  Pulmonary/Chest: Effort normal and breath sounds normal  No stridor  No respiratory distress  She has no wheezes  She has no rales  She exhibits no tenderness  Abdominal: Soft   Bowel sounds are normal  She exhibits no distension and no mass  There is tenderness  There is no rebound and no guarding  No hernia  Tenderness to palpation to epigastric area of abdomen  No rigidity or guarding  No pulsatile masses  Musculoskeletal: Normal range of motion  She exhibits no edema, tenderness or deformity  Lymphadenopathy:     She has no cervical adenopathy  Neurological: She is alert and oriented to person, place, and time  She displays normal reflexes  No cranial nerve deficit or sensory deficit  She exhibits normal muscle tone  Coordination normal    Skin: Skin is warm and dry  Capillary refill takes less than 2 seconds  No rash noted  She is not diaphoretic  No erythema  No pallor  Psychiatric: She has a normal mood and affect  Her behavior is normal  Judgment and thought content normal    Nursing note and vitals reviewed        Vital Signs  ED Triage Vitals [08/27/19 0717]   Temperature Pulse Respirations Blood Pressure SpO2   99 1 °F (37 3 °C) (!) 116 19 (!) 160/119 97 %      Temp Source Heart Rate Source Patient Position - Orthostatic VS BP Location FiO2 (%)   Oral Monitor Lying Left arm --      Pain Score       7           Vitals:    08/27/19 0717   BP: (!) 160/119   Pulse: (!) 116   Patient Position - Orthostatic VS: Lying         Visual Acuity      ED Medications  Medications   ondansetron (FOR EMS ONLY) (ZOFRAN) 4 mg/2 mL injection 4 mg (0 mg Does not apply Given to EMS 8/27/19 0751)   sodium chloride 0 9 % bolus 2,500 mL (0 mL Intravenous Stopped 8/27/19 0829)   ondansetron (ZOFRAN) injection 4 mg (4 mg Intravenous Given 8/27/19 0737)       Diagnostic Studies  Results Reviewed     Procedure Component Value Units Date/Time    Lipase [[id number]]  (Normal) Collected:  08/27/19 0736    Lab Status:  Final result Specimen:  Blood from Arm, Right Updated:  08/27/19 0921     Lipase 79 u/L     Lactic acid, plasma [[id number]]  (Abnormal) Collected:  08/27/19 0736    Lab Status:  Final result Specimen:  Blood from Arm, Right Updated:  08/27/19 0831     LACTIC ACID 3 0 mmol/L     Narrative:       Result may be elevated if tourniquet was used during collection      Ammonia [[id number]]  (Normal) Collected:  08/27/19 0736    Lab Status:  Final result Specimen:  Blood from Arm, Right Updated:  08/27/19 0803     Ammonia 15 umol/L     Basic metabolic panel [[id number]]  (Abnormal) Collected:  08/27/19 0736    Lab Status:  Final result Specimen:  Blood from Arm, Right Updated:  08/27/19 0801     Sodium 142 mmol/L      Potassium 3 2 mmol/L      Chloride 100 mmol/L      CO2 27 mmol/L      ANION GAP 15 mmol/L      BUN 6 mg/dL      Creatinine 0 68 mg/dL      Glucose 162 mg/dL      Calcium 8 8 mg/dL      eGFR 107 ml/min/1 73sq m     Narrative:       [place] guidelines for Chronic Kidney Disease (CKD):     Stage 1 with normal or high GFR (GFR > 90 mL/min/1 73 square meters)    Stage 2 Mild CKD (GFR = 60-89 mL/min/1 73 square meters)    Stage 3A Moderate CKD (GFR = 45-59 mL/min/1 73 square meters)    Stage 3B Moderate CKD (GFR = 30-44 mL/min/1 73 square meters)    Stage 4 Severe CKD (GFR = 15-29 mL/min/1 73 square meters)    Stage 5 End Stage CKD (GFR <15 mL/min/1 73 square meters)  Note: GFR calculation is accurate only with a steady state creatinine    Hepatic function panel [[id number]]  (Abnormal) Collected:  08/27/19 0736    Lab Status:  Final result Specimen:  Blood from Arm, Right Updated:  08/27/19 0801     Total Bilirubin 0 40 mg/dL      Bilirubin, Direct 0 17 mg/dL      Alkaline Phosphatase 103 U/L      AST 51 U/L      ALT 30 U/L      Total Protein 8 3 g/dL      Albumin 3 8 g/dL     Ethanol [[id number]]  (Abnormal) Collected:  08/27/19 0736    Lab Status:  Final result Specimen:  Blood from Arm, Right Updated:  08/27/19 0756     Ethanol Lvl 23 mg/dL     Protime-INR [[id number]]  (Normal) Collected:  08/27/19 0736    Lab Status:  Final result Specimen:  Blood from Arm, Right Updated:  08/27/19 8478 Protime 13 7 seconds      INR 1 05    APTT [[id number]]  (Normal) Collected:  08/27/19 0736    Lab Status:  Final result Specimen:  Blood from Arm, Right Updated:  08/27/19 0755     PTT 28 seconds     CBC and differential [[id number]]  (Abnormal) Collected:  08/27/19 0736    Lab Status:  Final result Specimen:  Blood from Arm, Right Updated:  08/27/19 0743     WBC 11 05 Thousand/uL      RBC 4 82 Million/uL      Hemoglobin 14 1 g/dL      Hematocrit 43 3 %      MCV 90 fL      MCH 29 3 pg      MCHC 32 6 g/dL      RDW 17 0 %      MPV 9 3 fL      Platelets 962 Thousands/uL      nRBC 0 /100 WBCs      Neutrophils Relative 53 %      Immat GRANS % 0 %      Lymphocytes Relative 38 %      Monocytes Relative 7 %      Eosinophils Relative 1 %      Basophils Relative 1 %      Neutrophils Absolute 5 86 Thousands/µL      Immature Grans Absolute 0 04 Thousand/uL      Lymphocytes Absolute 4 24 Thousands/µL      Monocytes Absolute 0 74 Thousand/µL      Eosinophils Absolute 0 09 Thousand/µL      Basophils Absolute 0 08 Thousands/µL                  No orders to display              Procedures  ECG 12 Lead Documentation Only  Date/Time: 8/27/2019 7:38 AM  Performed by: Gabby Aleman PA-C  Authorized by: Gabby Aleman PA-C     Indications / Diagnosis:  Tachycardia  ECG reviewed by me, the ED Provider: yes    Patient location:  ED  Previous ECG:     Previous ECG:  Compared to current    Comparison ECG info:   May 24, 2019    Similarity:  No change    Comparison to cardiac monitor: Yes    Interpretation:     Interpretation: normal    Rate:     ECG rate:  105    ECG rate assessment: tachycardic    Rhythm:     Rhythm: sinus tachycardia    Ectopy:     Ectopy: none    QRS:     QRS axis:  Normal    QRS intervals:  Normal  Conduction:     Conduction: normal    ST segments:     ST segments:  Normal  T waves:     T waves: normal    Other findings:     Other findings: LAE             ED Course  ED Course as of Aug 27 1558   Tue Aug 27, 2019 8072 Patient making multiple requests for pain medication here in ed  Discussed with patient workup in progress, symptoms likely secondary to known chronic condition, will await results of workup and reassess  Iv antiemetics given  8113 Patient reports that she does not want to stay in the emergency department, she wants to go home  She states she has pain medication at home  She does not want to take potassium  I have discussed that her workup is incomplete  Discussed possibility of missed diagnosis and subsequent consequences of missed diagnosis due to incomplete workup  Patient verbalized understanding of same  The patient insists on leaving against medical advice, despite my recommendation to remain for ongoing treatment     1: Capacity: I have determined that the patient has capacity to make the decision to leave against medical advice based on the following:    A  Ability to express a choice: The patient is able to express his or her choice and communicate that choice  Daniel Hartmann to understand relevant information: The patient is able to verbalize their diagnosis, understand information about the purpose of treatment, remember the information, and show that he or she can be part of the decision-making process     C  Ability to appreciate the significance of the information and its consequences: The patient understands the consequences of treatment refusal and the risks and benefits of accepting or refusing treatment     D  Ability to manipulate information: The patient is able to engage in reasoning as it applies to making treatment decisions   2: Psychiatric Consultation: There is not an indication to call psychiatry consultation to determine capacity    3  Alternative Treatment: I have discussed the recommended course of treatment and available alternatives  4  Risks: I have discussed the specific risks of that patient refusing treatment   5   Follow-up Care: I have discussed the follow-up care and advised to see patient's PCP immediately or return here for worsening  6  ED Option: I have emphasized that the patient has the option to return to the ED  Reviewed that we can have continuation of the workup at any time and that we are always open  Initial Sepsis Screening     Row Name 08/27/19 0767                Is the patient's history suggestive of a new or worsening infection? (!) Yes (Proceed)  -DE        Suspected source of infection  acute abdominal infection  -DE        Are two or more of the following signs & symptoms of infection both present and new to the patient? No  -DE        Indicate SIRS criteria  Tachycardia > 90 bpm  -DE        If the answer is yes to both questions, suspicion of sepsis is present          If severe sepsis is present AND tissue hypoperfusion perists in the hour after fluid resuscitation or lactate > 4, the patient meets criteria for SEPTIC SHOCK          Are any of the following organ dysfunction criteria present within 6 hours of suspected infection and SIRS criteria that are NOT considered to be chronic conditions? No  -DE        Organ dysfunction          Date of presentation of severe sepsis          Time of presentation of severe sepsis          Tissue hypoperfusion persists in the hour after crystalloid fluid administration, evidenced, by either:          Was hypotension present within one hour of the conclusion of crystalloid fluid administration?           Date of presentation of septic shock          Time of presentation of septic shock            User Key  (r) = Recorded By, (t) = Taken By, (c) = Cosigned By    [address] Name Provider Type    DE Ruslan Stroud PA-C Physician Assistant                  MDM  Number of Diagnoses or Management Options  Epigastric pain: established and worsening  Nausea and vomiting: established and worsening  Pancreatitis: established and worsening  Diagnosis management comments: Differential diagnosis includes but is not limited to appendicitis, diverticulitis, gastroenteritis, gastritis, cholecystitis, pancreatitis, mesenteric adenitis, kidney stone, pyelonephritis, UTI  Plan workup including labs including lipase, add iv fluids  Lab results reviewed  CBC demonstrates mildly elevated white blood cell count of 11 0  Hemoglobin of 14 1 and hematocrit of 43 3 are normal   No anemia  Platelet count of 477191 is normal   INR is normal 1 0  No coagulopathy  Basic metabolic panel remarkable for potassium slightly low at 3 2, BUN of 6 and creatinine of 0 68 are normal   No renal failure  Replacement potassium ordered  Hepatic function panel remarkable for AST elevated at 51, ALT normal at 30, alk-phos normal at 103  Ethanol is elevated at 23      0829: Patient reports that she does not want to stay in the emergency department, she wants to go home  She states she has pain medication at home and wants to go there and take them  She does not want to take potassium  I have discussed with her that her workup in the ED is incomplete and that the source of her symptoms is yet to be confirmed  Discussed possibility of missed diagnosis and subsequent consequences of missed diagnosis due to incomplete workup  Patient verbalized understanding of same  The patient insists on leaving against medical advice, despite my recommendation to remain for ongoing treatment     1: Capacity: I have determined that the patient has capacity to make the decision to leave against medical advice based on the following:    A  Ability to express a choice: The patient is able to express his or her choice and communicate that choice  Herbie Jernigan to understand relevant information: The patient is able to verbalize their diagnosis, understand information about the purpose of treatment, remember the information, and show that he or she can be part of the decision-making process     C   Ability to appreciate the significance of the information and its consequences: The patient understands the consequences of treatment refusal and the risks and benefits of accepting or refusing treatment     D  Ability to manipulate information: The patient is able to engage in reasoning as it applies to making treatment decisions   2: Psychiatric Consultation: There is not an indication to call psychiatry consultation to determine capacity    3  Alternative Treatment: I have discussed the recommended course of treatment and available alternatives  4  Risks: I have discussed the specific risks of that patient refusing treatment    5  Follow-up Care: I have discussed the follow-up care and advised to see patient's PCP immediately or return here for worsening  6  ED Option: I have emphasized that the patient has the option to return to the ED  Reviewed that we can have continuation of the workup at any time and that we are always open  The patient refused to sign the AMA form presented to her and left without signing it  She left without discharge instructions as well  Witnessed by JORGE Cruz  Unable to complete further risk stratification including LUCY, HEART scoring or sepsis evaluation due to patient leaving AMA prior to completion of work up in ed           Amount and/or Complexity of Data Reviewed  Clinical lab tests: ordered and reviewed  Tests in the medicine section of CPT®: ordered and reviewed  Discussion of test results with the performing providers: yes  Obtain history from someone other than the patient: yes (EMS)  Review and summarize past medical records: yes  Independent visualization of images, tracings, or specimens: yes    Patient Progress  Patient progress: (Against medical advice  )      Disposition  Final diagnoses:   Epigastric pain   Nausea and vomiting   Pancreatitis     Time reflects when diagnosis was documented in both MDM as applicable and the Disposition within this note     Time User Action Codes Description Comment 8/27/2019  8:36 AM Irine Phoenix Add [R10 13] Epigastric pain     8/27/2019  8:36 AM Irine Phoenix Add [R11 2] Nausea and vomiting     8/27/2019  8:36 AM Irine Phoenix Add [K85 90] Pancreatitis       ED Disposition     ED Disposition Condition Date/Time Comment    KHURRAM Nayak Aug 27, 2019  8:38 AM Date: 8/27/2019  Patient: Nydia Reaves  Admitted: 8/27/2019  7:14 AM  Attending Provider: Chris Vergara MD    Nydia Reaves or her authorized caregiver has made the decision for the patient to leave the emergency department against the advice of  her attending physician  She or her authorized caregiver has been informed and understands the inherent risks, including death, pain, worsening symptoms, organ failure, disability , stroke, heart attack, sepsis, intraabdominal catastrophe     She or  her authorized caregiver has decided to accept the responsibility for this decision  Nydia Reaves and all necessary parties have been advised that she may return for further evaluation or treatment  Her condition at time of discharge was fair  Steffany Amato Flatten had current vital signs as follows:  BP (!) 160/119 (BP Location: Left arm)   Pulse (!) 116   Temp 99 1 °F (37 3 °C) (Oral)   Resp 19   Wt 79 2 kg (174 lb 9 7 oz)         Follow-up Information     Follow up With Specialties Details Why Contact Info Additional Information    Natalie Gowers, MD Family Medicine Go to  as soon as possible [address] [facility] [id number] [phone] [address] [facility] Emergency Department Emergency Medicine Go to  for further evaluation of symptoms, If symptoms worsen [address], [address] [place], [place], [address] [facility],  Gastroenterology Go to  for further evaluation of symptoms [address] [place] [address]             Discharge Medication List as of 8/27/2019  8:38 AM CONTINUE these medications which have NOT CHANGED    Details   albuterol (PROVENTIL HFA,VENTOLIN HFA) 90 mcg/act inhaler Inhale 2 puffs every 4 (four) hours as needed for wheezing, Starting Wed 10/10/2018, Print      cloNIDine (CATAPRES) 0 3 mg tablet Take 0 3 mg by mouth 2 (two) times a day, Historical Med      !! dicyclomine (BENTYL) 20 mg tablet Take 1 tablet (20 mg total) by mouth 3 (three) times a day, Starting Thu 5/31/2018, Print      !! dicyclomine (BENTYL) 20 mg tablet Take 1 tablet (20 mg total) by mouth 2 (two) times a day As needed for stomach cramps, Starting Fri 0/2/7816, Print      folic acid (FOLVITE) 1 mg tablet Take 1 tablet (1 mg total) by mouth daily, Starting Tue 8/13/2019, No Print      gabapentin (NEURONTIN) 300 mg capsule Take 1 capsule (300 mg total) by mouth 3 (three) times a day, Starting Wed 6/26/2019, Normal      LORazepam (ATIVAN) 0 5 mg tablet Take 2 tablets (1 mg total) by mouth every 8 (eight) hours as needed for anxiety, Starting Sat 6/29/2019, Normal      metFORMIN (GLUCOPHAGE) 500 mg tablet Take 1 tablet (500 mg total) by mouth daily with breakfast, Starting Wed 6/26/2019, Normal      metoclopramide (REGLAN) 10 mg tablet Take 1 tablet (10 mg total) by mouth every 6 (six) hours As needed for nausea and vomiting, Starting Fri 8/2/2019, Print      Mometasone Furo-Formoterol Fum (DULERA) 100-5 MCG/ACT AERO Inhale as needed Unsure of dose  , Historical Med      naproxen (NAPROSYN) 500 mg tablet Take 1 tablet (500 mg total) by mouth 2 (two) times a day with meals For mild abdominal pain, Starting Fri 8/2/2019, Print      nicotine (NICODERM CQ) 21 mg/24 hr TD 24 hr patch Place 1 patch on the skin daily, Starting Wed 6/26/2019, Normal      ondansetron (ZOFRAN) 4 mg tablet Take 1 tablet (4 mg total) by mouth every 8 (eight) hours as needed for nausea or vomiting, Starting Wed 6/26/2019, Normal      pancrelipase, Lip-Prot-Amyl, (CREON) 24,000 units Take 1 capsule by mouth 3 (three) times a day with meals, Starting Tue 10/31/2017, Normal      pantoprazole (PROTONIX) 20 mg tablet Take 1 tablet (20 mg total) by mouth daily, Starting Thu 9/20/2018, Normal      sucralfate (CARAFATE) 1 g tablet Take 1 tablet (1 g total) by mouth 4 (four) times a day, Starting Thu 5/31/2018, Print      thiamine 100 MG tablet Take 1 tablet (100 mg total) by mouth daily, Starting Sat 6/29/2019, Normal       !! - Potential duplicate medications found  Please discuss with provider  No discharge procedures on file      ED Provider  Electronically Signed by           Neeta Sanchez PA-C  08/27/19 4861

## 2019-08-27 NOTE — ED NOTES
Patient called RN to room  Pt was out of bed removing cardiac wires  Demanding medication for pain and stating, "I have pain medication at home  I could just be suffering there, I came here for help " It was explained to patient that IV hydration would help  She demanded IV's be removed  Provider made aware       Bibi Gonzalez RN  08/27/19 [id number]

## 2019-08-27 NOTE — ED NOTES
Patient refused to take potassium PO prior to leaving  Refused to sign AMA paperwork and walked out of the ED into waiting room       Allie Horvath RN  08/27/19 [id number]

## 2019-10-14 ENCOUNTER — APPOINTMENT (EMERGENCY)
Dept: CT IMAGING | Facility: HOSPITAL | Age: 44
DRG: 282 | End: 2019-10-14
Payer: COMMERCIAL

## 2019-10-14 ENCOUNTER — HOSPITAL ENCOUNTER (INPATIENT)
Facility: HOSPITAL | Age: 44
LOS: 3 days | Discharge: HOME | DRG: 282 | End: 2019-10-18
Attending: EMERGENCY MEDICINE | Admitting: INTERNAL MEDICINE
Payer: COMMERCIAL

## 2019-10-14 DIAGNOSIS — R10.13 EPIGASTRIC PAIN: ICD-10-CM

## 2019-10-14 DIAGNOSIS — K57.92 DIVERTICULITIS: ICD-10-CM

## 2019-10-14 DIAGNOSIS — K85.90 PANCREATITIS: Primary | ICD-10-CM

## 2019-10-14 PROBLEM — K21.9 GERD (GASTROESOPHAGEAL REFLUX DISEASE): Status: ACTIVE | Noted: 2019-10-14

## 2019-10-14 PROBLEM — K57.32 SIGMOID DIVERTICULITIS: Status: ACTIVE | Noted: 2019-10-14

## 2019-10-14 PROBLEM — Z72.0 TOBACCO ABUSE: Status: ACTIVE | Noted: 2019-10-14

## 2019-10-14 LAB
ALBUMIN SERPL BCP-MCNC: 4.2 G/DL (ref 3.5–5)
ALP SERPL-CCNC: 126 U/L (ref 46–116)
ALT SERPL W P-5'-P-CCNC: 40 U/L (ref 12–78)
AMORPH URATE CRY URNS QL MICRO: ABNORMAL /HPF
ANION GAP SERPL CALCULATED.3IONS-SCNC: 15 MMOL/L (ref 4–13)
AST SERPL W P-5'-P-CCNC: 100 U/L (ref 5–45)
BACTERIA UR QL AUTO: ABNORMAL /HPF
BASOPHILS # BLD AUTO: 0.05 THOUSANDS/ΜL (ref 0–0.1)
BASOPHILS NFR BLD AUTO: 0 % (ref 0–1)
BILIRUB SERPL-MCNC: 1.2 MG/DL (ref 0.2–1)
BILIRUB UR QL STRIP: NEGATIVE
BUN SERPL-MCNC: 12 MG/DL (ref 5–25)
CALCIUM SERPL-MCNC: 8.6 MG/DL (ref 8.3–10.1)
CHLORIDE SERPL-SCNC: 100 MMOL/L (ref 100–108)
CLARITY UR: ABNORMAL
CO2 SERPL-SCNC: 26 MMOL/L (ref 21–32)
COLOR UR: YELLOW
CREAT SERPL-MCNC: 0.69 MG/DL (ref 0.6–1.3)
EOSINOPHIL # BLD AUTO: 0.01 THOUSAND/ΜL (ref 0–0.61)
EOSINOPHIL NFR BLD AUTO: 0 % (ref 0–6)
ERYTHROCYTE [DISTWIDTH] IN BLOOD BY AUTOMATED COUNT: 19.7 % (ref 11.6–15.1)
EXT PREG TEST URINE: NEGATIVE
EXT. CONTROL ED NAV: NORMAL
GFR SERPL CREATININE-BSD FRML MDRD: 107 ML/MIN/1.73SQ M
GLUCOSE SERPL-MCNC: 91 MG/DL (ref 65–140)
GLUCOSE UR STRIP-MCNC: NEGATIVE MG/DL
HCT VFR BLD AUTO: 42 % (ref 34.8–46.1)
HGB BLD-MCNC: 14 G/DL (ref 11.5–15.4)
HGB UR QL STRIP.AUTO: NEGATIVE
IMM GRANULOCYTES # BLD AUTO: 0.03 THOUSAND/UL (ref 0–0.2)
IMM GRANULOCYTES NFR BLD AUTO: 0 % (ref 0–2)
KETONES UR STRIP-MCNC: ABNORMAL MG/DL
LEUKOCYTE ESTERASE UR QL STRIP: NEGATIVE
LIPASE SERPL-CCNC: 1051 U/L (ref 73–393)
LYMPHOCYTES # BLD AUTO: 1.43 THOUSANDS/ΜL (ref 0.6–4.47)
LYMPHOCYTES NFR BLD AUTO: 11 % (ref 14–44)
MCH RBC QN AUTO: 30.4 PG (ref 26.8–34.3)
MCHC RBC AUTO-ENTMCNC: 33.3 G/DL (ref 31.4–37.4)
MCV RBC AUTO: 91 FL (ref 82–98)
MONOCYTES # BLD AUTO: 0.44 THOUSAND/ΜL (ref 0.17–1.22)
MONOCYTES NFR BLD AUTO: 3 % (ref 4–12)
MUCOUS THREADS UR QL AUTO: ABNORMAL
NEUTROPHILS # BLD AUTO: 11.26 THOUSANDS/ΜL (ref 1.85–7.62)
NEUTS SEG NFR BLD AUTO: 86 % (ref 43–75)
NITRITE UR QL STRIP: NEGATIVE
NON-SQ EPI CELLS URNS QL MICRO: ABNORMAL /HPF
NRBC BLD AUTO-RTO: 0 /100 WBCS
PH UR STRIP.AUTO: 6 [PH]
PLATELET # BLD AUTO: 170 THOUSANDS/UL (ref 149–390)
PMV BLD AUTO: 9.5 FL (ref 8.9–12.7)
POTASSIUM SERPL-SCNC: 3.1 MMOL/L (ref 3.5–5.3)
PROCALCITONIN SERPL-MCNC: <0.05 NG/ML
PROT SERPL-MCNC: 8.7 G/DL (ref 6.4–8.2)
PROT UR STRIP-MCNC: ABNORMAL MG/DL
RBC # BLD AUTO: 4.6 MILLION/UL (ref 3.81–5.12)
RBC #/AREA URNS AUTO: ABNORMAL /HPF
SODIUM SERPL-SCNC: 141 MMOL/L (ref 136–145)
SP GR UR STRIP.AUTO: >=1.03 (ref 1–1.03)
UROBILINOGEN UR QL STRIP.AUTO: 1 E.U./DL
WBC # BLD AUTO: 13.22 THOUSAND/UL (ref 4.31–10.16)
WBC #/AREA URNS AUTO: ABNORMAL /HPF

## 2019-10-14 PROCEDURE — 99285 EMERGENCY DEPT VISIT HI MDM: CPT

## 2019-10-14 PROCEDURE — 96375 TX/PRO/DX INJ NEW DRUG ADDON: CPT

## 2019-10-14 PROCEDURE — 87040 BLOOD CULTURE FOR BACTERIA: CPT | Performed by: INTERNAL MEDICINE

## 2019-10-14 PROCEDURE — 81001 URINALYSIS AUTO W/SCOPE: CPT | Performed by: EMERGENCY MEDICINE

## 2019-10-14 PROCEDURE — 80053 COMPREHEN METABOLIC PANEL: CPT | Performed by: EMERGENCY MEDICINE

## 2019-10-14 PROCEDURE — 83690 ASSAY OF LIPASE: CPT | Performed by: EMERGENCY MEDICINE

## 2019-10-14 PROCEDURE — 85025 COMPLETE CBC W/AUTO DIFF WBC: CPT | Performed by: EMERGENCY MEDICINE

## 2019-10-14 PROCEDURE — 74177 CT ABD & PELVIS W/CONTRAST: CPT

## 2019-10-14 PROCEDURE — 96374 THER/PROPH/DIAG INJ IV PUSH: CPT

## 2019-10-14 PROCEDURE — 81025 URINE PREGNANCY TEST: CPT | Performed by: EMERGENCY MEDICINE

## 2019-10-14 PROCEDURE — 99220 PR INITIAL OBSERVATION CARE/DAY 70 MINUTES: CPT | Performed by: INTERNAL MEDICINE

## 2019-10-14 PROCEDURE — 36415 COLL VENOUS BLD VENIPUNCTURE: CPT

## 2019-10-14 PROCEDURE — 99285 EMERGENCY DEPT VISIT HI MDM: CPT | Performed by: EMERGENCY MEDICINE

## 2019-10-14 PROCEDURE — 96361 HYDRATE IV INFUSION ADD-ON: CPT

## 2019-10-14 PROCEDURE — 84145 PROCALCITONIN (PCT): CPT | Performed by: INTERNAL MEDICINE

## 2019-10-14 RX ORDER — ONDANSETRON 2 MG/ML
4 INJECTION INTRAMUSCULAR; INTRAVENOUS ONCE
Status: COMPLETED | OUTPATIENT
Start: 2019-10-14 | End: 2019-10-14

## 2019-10-14 RX ORDER — KETOROLAC TROMETHAMINE 30 MG/ML
15 INJECTION, SOLUTION INTRAMUSCULAR; INTRAVENOUS ONCE
Status: COMPLETED | OUTPATIENT
Start: 2019-10-14 | End: 2019-10-14

## 2019-10-14 RX ORDER — METOCLOPRAMIDE HYDROCHLORIDE 5 MG/ML
10 INJECTION INTRAMUSCULAR; INTRAVENOUS EVERY 6 HOURS PRN
Status: DISCONTINUED | OUTPATIENT
Start: 2019-10-14 | End: 2019-10-18 | Stop reason: HOSPADM

## 2019-10-14 RX ORDER — KETOROLAC TROMETHAMINE 30 MG/ML
15 INJECTION, SOLUTION INTRAMUSCULAR; INTRAVENOUS EVERY 6 HOURS PRN
Status: DISPENSED | OUTPATIENT
Start: 2019-10-14 | End: 2019-10-16

## 2019-10-14 RX ORDER — SODIUM CHLORIDE AND POTASSIUM CHLORIDE .9; .15 G/100ML; G/100ML
100 SOLUTION INTRAVENOUS CONTINUOUS
Status: DISCONTINUED | OUTPATIENT
Start: 2019-10-14 | End: 2019-10-18 | Stop reason: HOSPADM

## 2019-10-14 RX ORDER — HYDROMORPHONE HCL/PF 1 MG/ML
0.5 SYRINGE (ML) INJECTION EVERY 4 HOURS PRN
Status: DISCONTINUED | OUTPATIENT
Start: 2019-10-14 | End: 2019-10-18 | Stop reason: HOSPADM

## 2019-10-14 RX ORDER — OLANZAPINE 10 MG/1
10 TABLET, ORALLY DISINTEGRATING ORAL ONCE
Status: COMPLETED | OUTPATIENT
Start: 2019-10-14 | End: 2019-10-14

## 2019-10-14 RX ORDER — MORPHINE SULFATE 4 MG/ML
4 INJECTION, SOLUTION INTRAMUSCULAR; INTRAVENOUS ONCE
Status: COMPLETED | OUTPATIENT
Start: 2019-10-14 | End: 2019-10-14

## 2019-10-14 RX ADMIN — OLANZAPINE 10 MG: 10 TABLET, ORALLY DISINTEGRATING ORAL at 13:41

## 2019-10-14 RX ADMIN — METOCLOPRAMIDE 10 MG: 5 INJECTION, SOLUTION INTRAMUSCULAR; INTRAVENOUS at 18:37

## 2019-10-14 RX ADMIN — KETOROLAC TROMETHAMINE 15 MG: 30 INJECTION, SOLUTION INTRAMUSCULAR at 13:34

## 2019-10-14 RX ADMIN — MORPHINE SULFATE 2 MG: 2 INJECTION, SOLUTION INTRAMUSCULAR; INTRAVENOUS at 17:56

## 2019-10-14 RX ADMIN — SODIUM CHLORIDE AND POTASSIUM CHLORIDE 100 ML/HR: .9; .15 SOLUTION INTRAVENOUS at 16:50

## 2019-10-14 RX ADMIN — ONDANSETRON 4 MG: 2 INJECTION INTRAMUSCULAR; INTRAVENOUS at 13:38

## 2019-10-14 RX ADMIN — IOHEXOL 100 ML: 350 INJECTION, SOLUTION INTRAVENOUS at 14:42

## 2019-10-14 RX ADMIN — SODIUM CHLORIDE 500 ML: 0.9 INJECTION, SOLUTION INTRAVENOUS at 13:39

## 2019-10-14 RX ADMIN — MORPHINE SULFATE 4 MG: 4 INJECTION INTRAVENOUS at 14:21

## 2019-10-14 RX ADMIN — HYDROMORPHONE HYDROCHLORIDE 0.5 MG: 1 INJECTION, SOLUTION INTRAMUSCULAR; INTRAVENOUS; SUBCUTANEOUS at 20:47

## 2019-10-14 NOTE — ASSESSMENT & PLAN NOTE
- presenting lipase of 1,051 - trend daily levels - keep NPO and slowly advance diet as tolerated - PRN intravenous pain/emesis control  - recurrence of symptomatology due to continued alcohol consumption despite repeated counseling to avoid (see below)  - CT imaging today revealing "inflammation surrounding the pancreatic head and duodenum, probably representing acute pancreatitis  There is slight prominence of the pancreatic duct in the tail of the pancreas  This finding is stable from the prior exam  There   is no pancreatic calcification or definitive evidence of pancreatic mass  "  - if clinically worsens, may consider gastroenterology evaluation (concurrent suspicion of sigmoid diverticulitis noted)  - monitor vitals and maintain hemodynamics - continue IV fluids

## 2019-10-14 NOTE — ASSESSMENT & PLAN NOTE
- monitor/replete potassium as necessary - possibly secondary to insensible losses from recent intermittent diarrhea coupled with chronic alcoholism  - check serum magnesium level

## 2019-10-14 NOTE — ASSESSMENT & PLAN NOTE
- cessation counseling thoroughly encouraged in light of alcoholic cirrhosis and recurrent pancreatitis  - monitor/replete potassium/magnesium deficiencies as necessary - continue PPI regimen  - continue supplemental thiamine and folic acid  - [facility] protocol initiated

## 2019-10-14 NOTE — ASSESSMENT & PLAN NOTE
- CT imaging revealing "thickening of the wall the sigmoid colon in an area of diverticular disease which may indicate mild colonic diverticulitis"   - initiate empiric IV Levaquin/Flagyl - check stool culture and leukocytes  - remains NPO  - monitor/replete electrolyte deficiencies as necessary  - PRN pain/emesis control

## 2019-10-14 NOTE — H&P
History & Physical - [facility] Internal Medicine  Patient: Mira Kathleen 38 y o  female MRN: [id number]  Unit/Bed#: ED 24 Encounter: [id number]  Primary Care Provider: Bon Staton MD  Date & Time of Admission: 10/14/2019 11:57 AM        Assessment & Plan:    * Acute on chronic alcoholic pancreatitis   Assessment & Plan  - presenting lipase of 1,051 - trend daily levels - keep NPO and slowly advance diet as tolerated - PRN intravenous pain/emesis control  - recurrence of symptomatology due to continued alcohol consumption despite repeated counseling to avoid (see below)  - CT imaging today revealing "inflammation surrounding the pancreatic head and duodenum, probably representing acute pancreatitis  There is slight prominence of the pancreatic duct in the tail of the pancreas  This finding is stable from the prior exam  There   is no pancreatic calcification or definitive evidence of pancreatic mass  "  - if clinically worsens, may consider gastroenterology evaluation (concurrent suspicion of sigmoid diverticulitis noted)  - monitor vitals and maintain hemodynamics - continue IV fluids    Alcohol abuse  Assessment & Plan  - cessation counseling thoroughly encouraged in light of alcoholic cirrhosis and recurrent pancreatitis  - monitor/replete potassium/magnesium deficiencies as necessary - continue PPI regimen  - continue supplemental thiamine and folic acid  - [facility] protocol initiated    Alcoholic cirrhosis  Assessment & Plan  - previously known history - CT imaging revealed stable cirrhotic liver from previous studies  - continue to encourage alcohol cessation   - LFT abnormalities are a sequela of liver disease    Hypokalemia  Assessment & Plan  - monitor/replete potassium as necessary - possibly secondary to insensible losses from recent intermittent diarrhea coupled with chronic alcoholism  - check serum magnesium level    Sigmoid colonic diverticulitis  Assessment & Plan  - CT imaging revealing "thickening of the wall the sigmoid colon in an area of diverticular disease which may indicate mild colonic diverticulitis"   - initiate empiric IV Levaquin/Flagyl - check stool culture and leukocytes  - remains NPO  - monitor/replete electrolyte deficiencies as necessary  - PRN pain/emesis control    Essential hypertension  Assessment & Plan  - low-sodium restriction should be enforced once back on oral diet  - while NPO, PRN IV Labetalol on board (home regimen of Catapres noted)    Leukocytosis  Assessment & Plan  - likely multifactorial secondary to recurrent pancreatitis coupled with sigmoid diverticulitis  - afebrile on presentation - monitor WBC count    GERD (gastroesophageal reflux disease)  Assessment & Plan  - continue PPI regimen    Tobacco abuse  Assessment & Plan  - transdermal nicotine patch on board  - smoking cessation counseling    Diabetes mellitus type 2  Assessment & Plan  - last HbA1c of 7 2 in May 2019 - check updated level  - hold Metformin - initiate SSI coverage per [employer] (Q6h while NPO)      DVT Prophylaxis:  SCDs - Ambulatory    Code Status:  Full code    Discussion with:  Patient at bedside    Anticipated Length of Stay:  Patient will be admitted on an Observation basis with an anticipated length of stay of less than 2 midnights  Justification for Hospital Stay:  Acute on chronic bowel pain due to recurrent pancreatitis from continued alcohol consumption  Total Time for Visit, including Counseling / Coordination of Care: 72 minutes  Greater than 50% of this total time spent on direct patient counseling and coordination of care  Chief Complaint:  Abdominal pain with nausea and diarrhea      History of Present Illness:    Andre Timmons is a 38 y o  female who presents with complaints of progressively worsening abdominal pain with associated nausea/vomiting and mild diarrhea with significant discomfort starting early this morning    She does have a prior history of alcoholism with associated alcoholic cirrhosis and recurrent pancreatitis as a result  She notes that she had a slice of pizza two days ago and initially attributed her symptoms to it being bad pizza  Early this morning she started having significant abdominal discomfort with associated nausea/vomiting and acknowledges that her last alcoholic drink was yesterday  She also reports some intermittent diarrhea as well  In the ER, she was found to have acute on chronic pancreatitis with possible suspicion of a sigmoid colonic diverticulitis  She has been started on IV fluids and intravenous doses of analgesics and emesis control  Upon my encounter, she is resting bed fairly comfortably but still complains of waxing/waning pain aggravated by movements  She acknowledges the importance of alcohol cessation although states that she does intermittently have urges to continue drinking  She also notes occasional marijuana use to help her with anxiety  Denies any fever/chills this time or other acute complaints currently  Review of Systems:    Review of Systems - A thorough 12 point review systems was conducted  Pertinent positives and negatives are mentioned in the history of present illness  Past Medical and Surgical History:     Past Medical History:   Diagnosis Date    Alcohol abuse     Arthritis     GERD (gastroesophageal reflux disease)     Hypertension     Nicotine dependence     Obesity     Pancreatitis     Panic attack        Past Surgical History:   Procedure Laterality Date    ABDOMINAL SURGERY      C SECTION    ESOPHAGOGASTRODUODENOSCOPY N/A 12/15/2017    Procedure: ESOPHAGOGASTRODUODENOSCOPY (EGD); Surgeon: Ana Thomas MD;  Location: [facility]; Service: Gastroenterology         Medications & Allergies:    Prior to Admission medications    Medication Sig Start Date End Date Taking?  Authorizing Provider   albuterol (PROVENTIL HFA,VENTOLIN HFA) 90 mcg/act inhaler Inhale 2 puffs every 4 (four) hours as needed for wheezing 10/10/18   Bev Han MD   cloNIDine (CATAPRES) 0 3 mg tablet Take 0 3 mg by mouth 2 (two) times a day    Historical Provider, MD   dicyclomine (BENTYL) 20 mg tablet Take 1 tablet (20 mg total) by mouth 3 (three) times a day 5/31/18   OMERO Renteria   dicyclomine (BENTYL) 20 mg tablet Take 1 tablet (20 mg total) by mouth 2 (two) times a day As needed for stomach cramps 8/2/19   Salvador De La O DO   folic acid (FOLVITE) 1 mg tablet Take 1 tablet (1 mg total) by mouth daily 8/13/19   OMERO Wells   gabapentin (NEURONTIN) 300 mg capsule Take 1 capsule (300 mg total) by mouth 3 (three) times a day 6/26/19   Nic Small MD   LORazepam (ATIVAN) 0 5 mg tablet Take 2 tablets (1 mg total) by mouth every 8 (eight) hours as needed for anxiety  Patient not taking: Reported on 8/2/2019 6/29/19   Nic Small MD   metFORMIN (GLUCOPHAGE) 500 mg tablet Take 1 tablet (500 mg total) by mouth daily with breakfast 6/26/19   Nic Small MD   metoclopramide (REGLAN) 10 mg tablet Take 1 tablet (10 mg total) by mouth every 6 (six) hours As needed for nausea and vomiting 8/2/19   Lizette De La O DO   Mometasone Furo-Formoterol Fum (DULERA) 100-5 MCG/ACT AERO Inhale as needed Unsure of dose      Historical Provider, MD   naproxen (NAPROSYN) 500 mg tablet Take 1 tablet (500 mg total) by mouth 2 (two) times a day with meals For mild abdominal pain 8/2/19   Lizette De La O DO   nicotine (NICODERM CQ) 21 mg/24 hr TD 24 hr patch Place 1 patch on the skin daily 6/26/19   Nic Small MD   ondansetron (ZOFRAN) 4 mg tablet Take 1 tablet (4 mg total) by mouth every 8 (eight) hours as needed for nausea or vomiting 6/26/19   Nic Small MD   pancrelipase, Lip-Prot-Amyl, (CREON) 24,000 units Take 1 capsule by mouth 3 (three) times a day with meals 10/31/17   Krysytna Luke MD   pantoprazole (PROTONIX) 20 mg tablet Take 1 tablet (20 mg total) by mouth daily 9/20/18   Hansel Chadwick MD   sucralfate (CARAFATE) 1 g tablet Take 1 tablet (1 g total) by mouth 4 (four) times a day 5/31/18   OMERO Renteria   thiamine 100 MG tablet Take 1 tablet (100 mg total) by mouth daily 6/29/19   Jordan James MD         Allergies: Allergies   Allergen Reactions    Morphine Swelling    Morphine And Related Swelling         Social History:    Substance Use History:   Social History     Substance and Sexual Activity   Alcohol Use Yes    Frequency: Never    Comment: Patient states 'I drink alot of vodka a day"     Social History     Tobacco Use   Smoking Status Current Every Day Smoker    Packs/day: 1 00    Years: 29 00    Pack years: 29 00    Types: Cigarettes   Smokeless Tobacco Never Used     Social History     Substance and Sexual Activity   Drug Use No         Family History:    Significant for psychiatric disorder in mother  Significant for alcoholism in father         Physical Exam:     Vitals:   Blood Pressure: 148/79 (10/14/19 1706)  Pulse: 68 (10/14/19 1706)  Temperature: 98 2 °F (36 8 °C) (10/14/19 1204)  Temp Source: Oral (10/14/19 1204)  Respirations: 16 (10/14/19 1706)  SpO2: 97 % (10/14/19 1706)      GENERAL:  Well-developed/nourished - waxing/waning distress due to pain  HEAD:  Normocephalic - atraumatic  EYES: PERRL - EOMI   MOUTH:  Mucosa dry  NECK:  Supple - full range of motion  CARDIAC:  Regular rate/rhythm - S1/S2 positive  PULMONARY:  Clear breath sounds bilaterally - nonlabored respirations  ABDOMEN:  Soft - epigastric/periumbilical tenderness to palpation - nondistended - active bowel sounds  MUSCULOSKELETAL:  Motor strength/range of motion intact  NEUROLOGIC:  Alert/oriented x 3  SKIN:  Chronic wrinkles/blemishes - tattoos noted  PSYCHIATRIC:  Mood/affect somewhat anxious      Additional Data:     Labs & Recent Cultures:    Results from last 7 days   Lab Units 10/14/19  1248   WBC Thousand/uL 13 22*   HEMOGLOBIN g/dL 14 0   HEMATOCRIT % 42 0   PLATELETS Thousands/uL 170   NEUTROS PCT % 86*   LYMPHS PCT % 11*   MONOS PCT % 3*   EOS PCT % 0     Results from last 7 days   Lab Units 10/14/19  1248   SODIUM mmol/L 141   POTASSIUM mmol/L 3 1*   CHLORIDE mmol/L 100   CO2 mmol/L 26   BUN mg/dL 12   CREATININE mg/dL 0 69   ANION GAP mmol/L 15*   CALCIUM mg/dL 8 6   ALBUMIN g/dL 4 2   TOTAL BILIRUBIN mg/dL 1 20*   ALK PHOS U/L 126*   ALT U/L 40   AST U/L 100*   GLUCOSE RANDOM mg/dL 91           Imaging:     Ct Abdomen Pelvis With Contrast    Result Date: 10/14/2019  Narrative: CT ABDOMEN AND PELVIS WITH IV CONTRAST INDICATION:   abd pain  COMPARISON:  August 1, 2019 TECHNIQUE:  CT examination of the abdomen and pelvis was performed  Axial, sagittal, and coronal 2D reformatted images were created from the source data and submitted for interpretation  Radiation dose length product (DLP) for this visit:  409 mGy-cm   This examination, like all CT scans performed in the [facility], was performed utilizing techniques to minimize radiation dose exposure, including the use of iterative reconstruction and automated exposure control  IV Contrast:  100 mL of iohexol (OMNIPAQUE) Enteric Contrast:  Enteric contrast was not administered  FINDINGS: ABDOMEN LOWER CHEST:  No clinically significant abnormality identified in the visualized lower chest  LIVER/BILIARY TREE:  Cirrhosis, unchanged from recent prior  Most of the liver is quite low density with an area of relatively higher density centrally in the liver which is probably some fibrotic scar tissue  No definitive evidence of enhancing mass lesion  The liver has a lobulated to nodular contour  There is enlargement of the left lobe relative to the right  The portal veins appear patent  Hepatic veins are patent  GALLBLADDER:  No calcified gallstones  No pericholecystic inflammatory change  SPLEEN:  Unremarkable   PANCREAS:  There is inflammation surrounding the pancreatic head and duodenum, probably representing acute pancreatitis  There is slight prominence of the pancreatic duct in the tail of the pancreas  This finding is stable from the prior exam   There is no pancreatic calcification or definitive evidence of pancreatic mass  Please note that if the patient does not have clinical findings of pancreatitis, the other etiology for the inflammatory change could be duodenitis/peptic ulcer disease  ADRENAL GLANDS:  Unremarkable  KIDNEYS/URETERS:  Unremarkable  No hydronephrosis  STOMACH AND BOWEL:  As above, there is inflammation with fat stranding surrounding the duodenum with mild duodenal wall thickening  Stomach is unremarkable  There is colonic diverticulosis along the sigmoid region  There seems to be some mild wall thickening which may indicate a mild diverticulitis as well  APPENDIX:  A normal appendix was visualized  ABDOMINOPELVIC CAVITY:  No ascites or free intraperitoneal air  No lymphadenopathy  VESSELS:  Unremarkable for patient's age  PELVIS REPRODUCTIVE ORGANS:  Unremarkable for patient's age  URINARY BLADDER:  Unremarkable  ABDOMINAL WALL/INGUINAL REGIONS:  No hernias are seen  Soft tissue nodularity throughout bilateral buttocks and the subcutaneous fat suggests augmentation procedure  OSSEOUS STRUCTURES:  No acute fracture or destructive osseous lesion  Mild chronic compression deformity at T12  Disc degeneration in the lower lumbar spine  Impression: There is inflammation surrounding the head of the pancreas and duodenum which could be pancreatitis or duodenitis/peptic ulcer disease  Correlate with appropriate lab work up  There is also some thickening of the wall the sigmoid colon in an area of diverticular disease which may indicate mild colonic diverticulitis  Cirrhotic liver is stable from prior  The study was marked in [facility] for immediate notification   Workstation performed: [id number]             ** Please Note: This note is constructed using a voice recognition dictation system  An occasional wrong word/phrase or sound-a-like substitution may have been picked up by dictation device due to the inherent limitations of voice recognition software  Read the chart carefully and recognize, using reasonable context, where substitutions may have occurred  **

## 2019-10-14 NOTE — ASSESSMENT & PLAN NOTE
- previously known history - CT imaging revealed stable cirrhotic liver from previous studies  - continue to encourage alcohol cessation   - LFT abnormalities are a sequela of liver disease

## 2019-10-14 NOTE — ASSESSMENT & PLAN NOTE
- low-sodium restriction should be enforced once back on oral diet  - while NPO, PRN IV Labetalol on board (home regimen of Catapres noted)

## 2019-10-14 NOTE — ASSESSMENT & PLAN NOTE
- likely multifactorial secondary to recurrent pancreatitis coupled with sigmoid diverticulitis  - afebrile on presentation - monitor WBC count

## 2019-10-14 NOTE — ASSESSMENT & PLAN NOTE
- last HbA1c of 7 2 in May 2019 - check updated level  - hold Metformin - initiate SSI coverage per [employer] (Q6h while NPO)

## 2019-10-14 NOTE — ED PROVIDER NOTES
History  Chief Complaint   Patient presents with    Abdominal Pain     pt started with 10/10 abdominal pain at 2am this morning with c/o vomiting  hx of pancreatitis     69-year-old female presenting to the emergency department for evaluation of abdominal pain     Patient has history of chronic recurrent alcoholic pancreatitis, did drink last night, then in the middle I will cover sharp stabbing epigastric abdominal pain, radiating toward her back  Nausea vomiting as well, patient denies any history of alcohol withdrawal, patient has any chest pain or shortness of breath  Prior to Admission Medications   Prescriptions Last Dose Informant Patient Reported? Taking?    LORazepam (ATIVAN) 0 5 mg tablet   No No   Sig: Take 2 tablets (1 mg total) by mouth every 8 (eight) hours as needed for anxiety   Patient not taking: Reported on 8/2/2019   Mometasone Furo-Formoterol Fum (DULERA) 100-5 MCG/ACT AERO   Yes No   Sig: Inhale as needed Unsure of dose     albuterol (PROVENTIL HFA,VENTOLIN HFA) 90 mcg/act inhaler   No No   Sig: Inhale 2 puffs every 4 (four) hours as needed for wheezing   cloNIDine (CATAPRES) 0 3 mg tablet   Yes No   Sig: Take 0 3 mg by mouth 2 (two) times a day   dicyclomine (BENTYL) 20 mg tablet   No No   Sig: Take 1 tablet (20 mg total) by mouth 3 (three) times a day   dicyclomine (BENTYL) 20 mg tablet   No No   Sig: Take 1 tablet (20 mg total) by mouth 2 (two) times a day As needed for stomach cramps   folic acid (FOLVITE) 1 mg tablet   No No   Sig: Take 1 tablet (1 mg total) by mouth daily   gabapentin (NEURONTIN) 300 mg capsule   No No   Sig: Take 1 capsule (300 mg total) by mouth 3 (three) times a day   metFORMIN (GLUCOPHAGE) 500 mg tablet   No No   Sig: Take 1 tablet (500 mg total) by mouth daily with breakfast   metoclopramide (REGLAN) 10 mg tablet   No No   Sig: Take 1 tablet (10 mg total) by mouth every 6 (six) hours As needed for nausea and vomiting   naproxen (NAPROSYN) 500 mg tablet   No No   Sig: Take 1 tablet (500 mg total) by mouth 2 (two) times a day with meals For mild abdominal pain   nicotine (NICODERM CQ) 21 mg/24 hr TD 24 hr patch   No No   Sig: Place 1 patch on the skin daily   ondansetron (ZOFRAN) 4 mg tablet   No No   Sig: Take 1 tablet (4 mg total) by mouth every 8 (eight) hours as needed for nausea or vomiting   pancrelipase, Lip-Prot-Amyl, (CREON) 24,000 units   No No   Sig: Take 1 capsule by mouth 3 (three) times a day with meals   pantoprazole (PROTONIX) 20 mg tablet   No No   Sig: Take 1 tablet (20 mg total) by mouth daily   sucralfate (CARAFATE) 1 g tablet   No No   Sig: Take 1 tablet (1 g total) by mouth 4 (four) times a day   thiamine 100 MG tablet   No No   Sig: Take 1 tablet (100 mg total) by mouth daily      Facility-Administered Medications: None       Past Medical History:   Diagnosis Date    Alcohol abuse     Arthritis     GERD (gastroesophageal reflux disease)     Hypertension     Nicotine dependence     Obesity     Pancreatitis     Panic attack        Past Surgical History:   Procedure Laterality Date    ABDOMINAL SURGERY      C SECTION    ESOPHAGOGASTRODUODENOSCOPY N/A 12/15/2017    Procedure: ESOPHAGOGASTRODUODENOSCOPY (EGD); Surgeon: Nikhil Davison MD;  Location: [facility]; Service: Gastroenterology       Family History   Problem Relation Age of Onset    Cirrhosis Father     Alcohol abuse Father     Drug abuse Mother      I have reviewed and agree with the history as documented  Social History     Tobacco Use    Smoking status: Current Every Day Smoker     Packs/day: 1 00     Years: 29 00     Pack years: 29 00     Types: Cigarettes    Smokeless tobacco: Never Used   Substance Use Topics    Alcohol use: Yes     Frequency: Never     Comment: Patient states 'I drink alot of vodka a day"    Drug use: No        Review of Systems   Constitutional: Negative for appetite change, chills, fatigue and fever     HENT: Negative for sneezing and sore throat  Eyes: Negative for visual disturbance  Respiratory: Negative for cough, choking, chest tightness, shortness of breath and wheezing  Cardiovascular: Negative for chest pain and palpitations  Gastrointestinal: Negative for abdominal pain, constipation, diarrhea, nausea and vomiting  Genitourinary: Negative for difficulty urinating and dysuria  Neurological: Negative for dizziness, weakness, light-headedness, numbness and headaches  All other systems reviewed and are negative  Physical Exam  Physical Exam   Constitutional: She is oriented to person, place, and time  She appears well-developed and well-nourished  No distress  HENT:   Head: Normocephalic and atraumatic  Mouth/Throat: Oropharynx is clear and moist    Eyes: Pupils are equal, round, and reactive to light  EOM are normal    Neck: No JVD present  No tracheal deviation present  Cardiovascular: Normal rate, regular rhythm, normal heart sounds and intact distal pulses  Exam reveals no gallop and no friction rub  No murmur heard  Pulmonary/Chest: Effort normal and breath sounds normal  No respiratory distress  She has no wheezes  She has no rales  Abdominal: Soft  Bowel sounds are normal  She exhibits no distension  There is tenderness in the epigastric area  There is no rebound and no guarding  Neurological: She is alert and oriented to person, place, and time  No cranial nerve deficit  She exhibits normal muscle tone  Skin: Skin is warm and dry  She is not diaphoretic  No pallor  Psychiatric: She has a normal mood and affect  Her behavior is normal    Nursing note and vitals reviewed        Vital Signs  ED Triage Vitals   Temperature Pulse Respirations Blood Pressure SpO2   10/14/19 1204 10/14/19 1201 10/14/19 1201 10/14/19 1202 10/14/19 1201   98 2 °F (36 8 °C) 89 22 166/99 100 %      Temp Source Heart Rate Source Patient Position - Orthostatic VS BP Location FiO2 (%)   10/14/19 1204 10/14/19 1201 10/14/19 1201 10/14/19 1201 --   Oral Monitor Sitting Right arm       Pain Score       10/14/19 1201       Worst Possible Pain           Vitals:    10/14/19 1201 10/14/19 1202 10/14/19 1406   BP:  166/99 (!) 172/88   Pulse: 89  64   Patient Position - Orthostatic VS: Sitting           Visual Acuity      ED Medications  Medications   ondansetron (ZOFRAN) injection 4 mg (4 mg Intravenous Given 10/14/19 1338)   sodium chloride 0 9 % bolus 500 mL (500 mL Intravenous New Bag 10/14/19 1339)   ketorolac (TORADOL) injection 15 mg (15 mg Intravenous Given 10/14/19 1334)   OLANZapine (ZyPREXA ZYDIS) dispersible tablet 10 mg (10 mg Oral Given 10/14/19 1341)   morphine (PF) 4 mg/mL injection 4 mg (4 mg Intravenous Given 10/14/19 1421)   iohexol (OMNIPAQUE) 350 MG/ML injection (MULTI-DOSE) 100 mL (100 mL Intravenous Given 10/14/19 1442)       Diagnostic Studies  Results Reviewed     Procedure Component Value Units Date/Time    Urine Microscopic [[id number]]  (Abnormal) Collected:  10/14/19 1347    Lab Status:  Final result Specimen:  Urine, Clean Catch Updated:  10/14/19 1413     RBC, UA None Seen /hpf      WBC, UA 0-1 /hpf      Epithelial Cells Moderate /hpf      Bacteria, UA Moderate /hpf      AMORPH URATES Moderate /hpf      MUCUS THREADS Moderate    POCT pregnancy, urine [[id number]]  (Normal) Resulted:  10/14/19 1406    Lab Status:  Final result Updated:  10/14/19 1406     EXT PREG TEST UR (Ref: Negative) negative     Control valid    UA w Reflex to Microscopic w Reflex to Culture [[id number]]  (Abnormal) Collected:  10/14/19 1347    Lab Status:  Final result Specimen:  Urine, Clean Catch Updated:  10/14/19 1400     Color, UA Yellow     Clarity, UA Slightly Cloudy     Specific Gravity, UA >=1 030     pH, UA 6 0     Leukocytes, UA Negative     Nitrite, UA Negative     Protein,  (2+) mg/dl      Glucose, UA Negative mg/dl      Ketones, UA 15 (1+) mg/dl      Urobilinogen, UA 1 0 E U /dl      Bilirubin, UA Negative     Blood, UA Negative Comprehensive metabolic panel [[id number]]  (Abnormal) Collected:  10/14/19 1248    Lab Status:  Final result Specimen:  Blood from Arm, Right Updated:  10/14/19 1310     Sodium 141 mmol/L      Potassium 3 1 mmol/L      Chloride 100 mmol/L      CO2 26 mmol/L      ANION GAP 15 mmol/L      BUN 12 mg/dL      Creatinine 0 69 mg/dL      Glucose 91 mg/dL      Calcium 8 6 mg/dL       U/L      ALT 40 U/L      Alkaline Phosphatase 126 U/L      Total Protein 8 7 g/dL      Albumin 4 2 g/dL      Total Bilirubin 1 20 mg/dL      eGFR 107 ml/min/1 73sq m     Narrative:       National Kid[employer] Disease Foundation guidelines for Chronic Kidney Disease (CKD):     Stage 1 with normal or high GFR (GFR > 90 mL/min/1 73 square meters)    Stage 2 Mild CKD (GFR = 60-89 mL/min/1 73 square meters)    Stage 3A Moderate CKD (GFR = 45-59 mL/min/1 73 square meters)    Stage 3B Moderate CKD (GFR = 30-44 mL/min/1 73 square meters)    Stage 4 Severe CKD (GFR = 15-29 mL/min/1 73 square meters)    Stage 5 End Stage CKD (GFR <15 mL/min/1 73 square meters)  Note: GFR calculation is accurate only with a steady state creatinine    Lipase [[id number]]  (Abnormal) Collected:  10/14/19 1248    Lab Status:  Final result Specimen:  Blood from Arm, Right Updated:  10/14/19 1310     Lipase 1,051 u/L     CBC and differential [[id number]]  (Abnormal) Collected:  10/14/19 1248    Lab Status:  Final result Specimen:  Blood from Arm, Right Updated:  10/14/19 1255     WBC 13 22 Thousand/uL      RBC 4 60 Million/uL      Hemoglobin 14 0 g/dL      Hematocrit 42 0 %      MCV 91 fL      MCH 30 4 pg      MCHC 33 3 g/dL      RDW 19 7 %      MPV 9 5 fL      Platelets 992 Thousands/uL      nRBC 0 /100 WBCs      Neutrophils Relative 86 %      Immat GRANS % 0 %      Lymphocytes Relative 11 %      Monocytes Relative 3 %      Eosinophils Relative 0 %      Basophils Relative 0 %      Neutrophils Absolute 11 26 Thousands/µL      Immature Grans Absolute 0 03 Thousand/uL Lymphocytes Absolute 1 43 Thousands/µL      Monocytes Absolute 0 44 Thousand/µL      Eosinophils Absolute 0 01 Thousand/µL      Basophils Absolute 0 05 Thousands/µL                  CT abdomen pelvis with contrast   Final Result by Sherman Silva MD (10/14 4399)      There is inflammation surrounding the head of the pancreas and duodenum which could be pancreatitis or duodenitis/peptic ulcer disease  Correlate with appropriate lab work up  There is also some thickening of the wall the sigmoid colon in an area of diverticular disease which may indicate mild colonic diverticulitis  Cirrhotic liver is stable from prior  The study was marked in [facility] for immediate notification  Workstation performed: [id number]                    Procedures  Procedures       ED Course                               MDM  Number of Diagnoses or Management Options  Pancreatitis:   Diagnosis management comments: 80-year-old female presenting to the emergency department for evaluation of pancreatitis  Will check labs, CT, give IV fluids antiemetics pain medicines, reassess  Disposition  Final diagnoses:   Pancreatitis     Time reflects when diagnosis was documented in both MDM as applicable and the Disposition within this note     Time User Action Codes Description Comment    10/14/2019  4:40 PM Ace, [facility] [R46 [id number]] Pancreatitis       ED Disposition     ED Disposition Condition Date/Time Comment    Admit Stable Mon Oct 14, 2019  4:40 PM Case was discussed with K[facility] and the patient's admission status was agreed to be Admission Status: observation status to the service of Dr Toni Pollack   Follow-up Information    None         Patient's Medications   Discharge Prescriptions    No medications on file     No discharge procedures on file      ED Provider  Electronically Signed by           Marcela Mcneill MD  10/14/19 5290

## 2019-10-15 LAB
ALBUMIN SERPL BCP-MCNC: 3.1 G/DL (ref 3.5–5)
ALP SERPL-CCNC: 93 U/L (ref 46–116)
ALT SERPL W P-5'-P-CCNC: 27 U/L (ref 12–78)
ANION GAP SERPL CALCULATED.3IONS-SCNC: 10 MMOL/L (ref 4–13)
AST SERPL W P-5'-P-CCNC: 58 U/L (ref 5–45)
BASOPHILS # BLD AUTO: 0.02 THOUSANDS/ΜL (ref 0–0.1)
BASOPHILS NFR BLD AUTO: 0 % (ref 0–1)
BILIRUB SERPL-MCNC: 0.9 MG/DL (ref 0.2–1)
BUN SERPL-MCNC: 12 MG/DL (ref 5–25)
CALCIUM SERPL-MCNC: 7.1 MG/DL (ref 8.3–10.1)
CHLORIDE SERPL-SCNC: 101 MMOL/L (ref 100–108)
CO2 SERPL-SCNC: 28 MMOL/L (ref 21–32)
CREAT SERPL-MCNC: 0.66 MG/DL (ref 0.6–1.3)
EOSINOPHIL # BLD AUTO: 0.04 THOUSAND/ΜL (ref 0–0.61)
EOSINOPHIL NFR BLD AUTO: 1 % (ref 0–6)
ERYTHROCYTE [DISTWIDTH] IN BLOOD BY AUTOMATED COUNT: 19.5 % (ref 11.6–15.1)
EST. AVERAGE GLUCOSE BLD GHB EST-MCNC: 151 MG/DL
GFR SERPL CREATININE-BSD FRML MDRD: 109 ML/MIN/1.73SQ M
GLUCOSE SERPL-MCNC: 115 MG/DL (ref 65–140)
GLUCOSE SERPL-MCNC: 135 MG/DL (ref 65–140)
GLUCOSE SERPL-MCNC: 159 MG/DL (ref 65–140)
GLUCOSE SERPL-MCNC: 213 MG/DL (ref 65–140)
GLUCOSE SERPL-MCNC: 53 MG/DL (ref 65–140)
GLUCOSE SERPL-MCNC: 53 MG/DL (ref 65–140)
GLUCOSE SERPL-MCNC: 75 MG/DL (ref 65–140)
GLUCOSE SERPL-MCNC: 86 MG/DL (ref 65–140)
GLUCOSE SERPL-MCNC: 97 MG/DL (ref 65–140)
GLUCOSE SERPL-MCNC: 97 MG/DL (ref 65–140)
HBA1C MFR BLD: 6.9 % (ref 4.2–6.3)
HCT VFR BLD AUTO: 37.7 % (ref 34.8–46.1)
HGB BLD-MCNC: 12.2 G/DL (ref 11.5–15.4)
IMM GRANULOCYTES # BLD AUTO: 0.03 THOUSAND/UL (ref 0–0.2)
IMM GRANULOCYTES NFR BLD AUTO: 0 % (ref 0–2)
LIPASE SERPL-CCNC: 3185 U/L (ref 73–393)
LYMPHOCYTES # BLD AUTO: 1.31 THOUSANDS/ΜL (ref 0.6–4.47)
LYMPHOCYTES NFR BLD AUTO: 16 % (ref 14–44)
MAGNESIUM SERPL-MCNC: 0.7 MG/DL (ref 1.6–2.6)
MCH RBC QN AUTO: 30.3 PG (ref 26.8–34.3)
MCHC RBC AUTO-ENTMCNC: 32.4 G/DL (ref 31.4–37.4)
MCV RBC AUTO: 94 FL (ref 82–98)
MONOCYTES # BLD AUTO: 0.38 THOUSAND/ΜL (ref 0.17–1.22)
MONOCYTES NFR BLD AUTO: 5 % (ref 4–12)
NEUTROPHILS # BLD AUTO: 6.39 THOUSANDS/ΜL (ref 1.85–7.62)
NEUTS SEG NFR BLD AUTO: 78 % (ref 43–75)
NRBC BLD AUTO-RTO: 0 /100 WBCS
PHOSPHATE SERPL-MCNC: 2.9 MG/DL (ref 2.7–4.5)
PLATELET # BLD AUTO: 121 THOUSANDS/UL (ref 149–390)
PMV BLD AUTO: 9.7 FL (ref 8.9–12.7)
POTASSIUM SERPL-SCNC: 3.1 MMOL/L (ref 3.5–5.3)
PROT SERPL-MCNC: 6.9 G/DL (ref 6.4–8.2)
RBC # BLD AUTO: 4.02 MILLION/UL (ref 3.81–5.12)
SODIUM SERPL-SCNC: 139 MMOL/L (ref 136–145)
WBC # BLD AUTO: 8.17 THOUSAND/UL (ref 4.31–10.16)

## 2019-10-15 PROCEDURE — 85025 COMPLETE CBC W/AUTO DIFF WBC: CPT | Performed by: INTERNAL MEDICINE

## 2019-10-15 PROCEDURE — 94762 N-INVAS EAR/PLS OXIMTRY CONT: CPT

## 2019-10-15 PROCEDURE — C9113 INJ PANTOPRAZOLE SODIUM, VIA: HCPCS | Performed by: INTERNAL MEDICINE

## 2019-10-15 PROCEDURE — 80053 COMPREHEN METABOLIC PANEL: CPT | Performed by: INTERNAL MEDICINE

## 2019-10-15 PROCEDURE — 83690 ASSAY OF LIPASE: CPT | Performed by: INTERNAL MEDICINE

## 2019-10-15 PROCEDURE — 82948 REAGENT STRIP/BLOOD GLUCOSE: CPT

## 2019-10-15 PROCEDURE — 83036 HEMOGLOBIN GLYCOSYLATED A1C: CPT | Performed by: INTERNAL MEDICINE

## 2019-10-15 PROCEDURE — 94760 N-INVAS EAR/PLS OXIMETRY 1: CPT

## 2019-10-15 PROCEDURE — 84100 ASSAY OF PHOSPHORUS: CPT | Performed by: INTERNAL MEDICINE

## 2019-10-15 PROCEDURE — 99232 SBSQ HOSP IP/OBS MODERATE 35: CPT | Performed by: INTERNAL MEDICINE

## 2019-10-15 PROCEDURE — 83735 ASSAY OF MAGNESIUM: CPT | Performed by: INTERNAL MEDICINE

## 2019-10-15 RX ORDER — FOLIC ACID 1 MG/1
1 TABLET ORAL DAILY
Status: DISCONTINUED | OUTPATIENT
Start: 2019-10-15 | End: 2019-10-18 | Stop reason: HOSPADM

## 2019-10-15 RX ORDER — DEXTROSE MONOHYDRATE 25 G/50ML
INJECTION, SOLUTION INTRAVENOUS
Status: COMPLETED
Start: 2019-10-15 | End: 2019-10-15

## 2019-10-15 RX ORDER — PANTOPRAZOLE SODIUM 40 MG/1
40 INJECTION, POWDER, FOR SOLUTION INTRAVENOUS
Status: DISCONTINUED | OUTPATIENT
Start: 2019-10-15 | End: 2019-10-15

## 2019-10-15 RX ORDER — MAGNESIUM SULFATE HEPTAHYDRATE 40 MG/ML
2 INJECTION, SOLUTION INTRAVENOUS ONCE
Status: COMPLETED | OUTPATIENT
Start: 2019-10-15 | End: 2019-10-15

## 2019-10-15 RX ORDER — POTASSIUM CHLORIDE 20 MEQ/1
40 TABLET, EXTENDED RELEASE ORAL ONCE
Status: COMPLETED | OUTPATIENT
Start: 2019-10-15 | End: 2019-10-15

## 2019-10-15 RX ORDER — NICOTINE 21 MG/24HR
1 PATCH, TRANSDERMAL 24 HOURS TRANSDERMAL ONCE
Status: DISCONTINUED | OUTPATIENT
Start: 2019-10-15 | End: 2019-10-18 | Stop reason: HOSPADM

## 2019-10-15 RX ORDER — LABETALOL 20 MG/4 ML (5 MG/ML) INTRAVENOUS SYRINGE
10 EVERY 4 HOURS PRN
Status: DISCONTINUED | OUTPATIENT
Start: 2019-10-15 | End: 2019-10-17

## 2019-10-15 RX ORDER — NICOTINE 21 MG/24HR
1 PATCH, TRANSDERMAL 24 HOURS TRANSDERMAL DAILY
Status: DISCONTINUED | OUTPATIENT
Start: 2019-10-15 | End: 2019-10-18 | Stop reason: HOSPADM

## 2019-10-15 RX ORDER — SUCRALFATE 1 G/1
1 TABLET ORAL 4 TIMES DAILY
Status: DISCONTINUED | OUTPATIENT
Start: 2019-10-15 | End: 2019-10-18 | Stop reason: HOSPADM

## 2019-10-15 RX ORDER — PANTOPRAZOLE SODIUM 40 MG/1
40 TABLET, DELAYED RELEASE ORAL
Status: DISCONTINUED | OUTPATIENT
Start: 2019-10-15 | End: 2019-10-18 | Stop reason: HOSPADM

## 2019-10-15 RX ORDER — THIAMINE MONONITRATE (VIT B1) 100 MG
100 TABLET ORAL DAILY
Status: DISCONTINUED | OUTPATIENT
Start: 2019-10-15 | End: 2019-10-18 | Stop reason: HOSPADM

## 2019-10-15 RX ORDER — CLONIDINE HYDROCHLORIDE 0.1 MG/1
0.3 TABLET ORAL 2 TIMES DAILY
Status: DISCONTINUED | OUTPATIENT
Start: 2019-10-15 | End: 2019-10-18 | Stop reason: HOSPADM

## 2019-10-15 RX ORDER — ALBUTEROL SULFATE 90 UG/1
2 AEROSOL, METERED RESPIRATORY (INHALATION) EVERY 4 HOURS PRN
Status: DISCONTINUED | OUTPATIENT
Start: 2019-10-15 | End: 2019-10-18 | Stop reason: HOSPADM

## 2019-10-15 RX ORDER — LORAZEPAM 2 MG/ML
2 INJECTION INTRAMUSCULAR ONCE
Status: COMPLETED | OUTPATIENT
Start: 2019-10-15 | End: 2019-10-15

## 2019-10-15 RX ORDER — LEVOFLOXACIN 5 MG/ML
750 INJECTION, SOLUTION INTRAVENOUS EVERY 24 HOURS
Status: DISCONTINUED | OUTPATIENT
Start: 2019-10-15 | End: 2019-10-15

## 2019-10-15 RX ORDER — LORAZEPAM 2 MG/ML
2 INJECTION INTRAMUSCULAR EVERY 4 HOURS PRN
Status: DISCONTINUED | OUTPATIENT
Start: 2019-10-15 | End: 2019-10-18 | Stop reason: HOSPADM

## 2019-10-15 RX ADMIN — SUCRALFATE 1 G: 1 TABLET ORAL at 17:40

## 2019-10-15 RX ADMIN — LORAZEPAM 2 MG: 2 INJECTION INTRAMUSCULAR; INTRAVENOUS at 22:46

## 2019-10-15 RX ADMIN — SODIUM CHLORIDE AND POTASSIUM CHLORIDE 100 ML/HR: .9; .15 SOLUTION INTRAVENOUS at 18:04

## 2019-10-15 RX ADMIN — DEXTROSE 50 % IN WATER (D50W) INTRAVENOUS SYRINGE 25 ML: at 11:18

## 2019-10-15 RX ADMIN — SODIUM CHLORIDE AND POTASSIUM CHLORIDE 100 ML/HR: .9; .15 SOLUTION INTRAVENOUS at 02:13

## 2019-10-15 RX ADMIN — HYDROMORPHONE HYDROCHLORIDE 0.5 MG: 1 INJECTION, SOLUTION INTRAMUSCULAR; INTRAVENOUS; SUBCUTANEOUS at 13:47

## 2019-10-15 RX ADMIN — PANTOPRAZOLE SODIUM 40 MG: 40 INJECTION, POWDER, FOR SOLUTION INTRAVENOUS at 08:00

## 2019-10-15 RX ADMIN — HYDROMORPHONE HYDROCHLORIDE 0.5 MG: 1 INJECTION, SOLUTION INTRAMUSCULAR; INTRAVENOUS; SUBCUTANEOUS at 04:40

## 2019-10-15 RX ADMIN — HYDROMORPHONE HYDROCHLORIDE 0.5 MG: 1 INJECTION, SOLUTION INTRAMUSCULAR; INTRAVENOUS; SUBCUTANEOUS at 09:35

## 2019-10-15 RX ADMIN — METRONIDAZOLE 500 MG: 500 INJECTION, SOLUTION INTRAVENOUS at 02:09

## 2019-10-15 RX ADMIN — SUCRALFATE 1 G: 1 TABLET ORAL at 01:18

## 2019-10-15 RX ADMIN — HYDROMORPHONE HYDROCHLORIDE 0.5 MG: 1 INJECTION, SOLUTION INTRAMUSCULAR; INTRAVENOUS; SUBCUTANEOUS at 18:06

## 2019-10-15 RX ADMIN — METRONIDAZOLE 500 MG: 500 INJECTION, SOLUTION INTRAVENOUS at 09:45

## 2019-10-15 RX ADMIN — FOLIC ACID 1 MG: 1 TABLET ORAL at 09:34

## 2019-10-15 RX ADMIN — HYDROMORPHONE HYDROCHLORIDE 0.5 MG: 1 INJECTION, SOLUTION INTRAMUSCULAR; INTRAVENOUS; SUBCUTANEOUS at 00:30

## 2019-10-15 RX ADMIN — KETOROLAC TROMETHAMINE 15 MG: 30 INJECTION, SOLUTION INTRAMUSCULAR at 03:16

## 2019-10-15 RX ADMIN — MAGNESIUM SULFATE HEPTAHYDRATE 2 G: 40 INJECTION, SOLUTION INTRAVENOUS at 11:38

## 2019-10-15 RX ADMIN — LORAZEPAM 2 MG: 2 INJECTION INTRAMUSCULAR; INTRAVENOUS at 01:17

## 2019-10-15 RX ADMIN — NICOTINE 1 PATCH: 21 PATCH TRANSDERMAL at 08:00

## 2019-10-15 RX ADMIN — METRONIDAZOLE 500 MG: 500 INJECTION, SOLUTION INTRAVENOUS at 15:47

## 2019-10-15 RX ADMIN — THIAMINE HCL TAB 100 MG 100 MG: 100 TAB at 09:34

## 2019-10-15 RX ADMIN — LEVOFLOXACIN 750 MG: 500 TABLET, FILM COATED ORAL at 21:26

## 2019-10-15 RX ADMIN — LEVOFLOXACIN 750 MG: 5 INJECTION, SOLUTION INTRAVENOUS at 01:17

## 2019-10-15 RX ADMIN — SUCRALFATE 1 G: 1 TABLET ORAL at 08:00

## 2019-10-15 RX ADMIN — CLONIDINE HYDROCHLORIDE 0.3 MG: 0.1 TABLET ORAL at 12:11

## 2019-10-15 RX ADMIN — SUCRALFATE 1 G: 1 TABLET ORAL at 21:26

## 2019-10-15 RX ADMIN — LABETALOL 20 MG/4 ML (5 MG/ML) INTRAVENOUS SYRINGE 10 MG: at 10:10

## 2019-10-15 RX ADMIN — DEXTROSE 50 % IN WATER (D50W) INTRAVENOUS SYRINGE 50 ML: at 00:30

## 2019-10-15 RX ADMIN — SUCRALFATE 1 G: 1 TABLET ORAL at 12:11

## 2019-10-15 RX ADMIN — POTASSIUM CHLORIDE 40 MEQ: 1500 TABLET, EXTENDED RELEASE ORAL at 09:34

## 2019-10-15 RX ADMIN — CLONIDINE HYDROCHLORIDE 0.3 MG: 0.1 TABLET ORAL at 17:57

## 2019-10-15 RX ADMIN — HYDROMORPHONE HYDROCHLORIDE 0.5 MG: 1 INJECTION, SOLUTION INTRAMUSCULAR; INTRAVENOUS; SUBCUTANEOUS at 22:07

## 2019-10-15 RX ADMIN — LABETALOL 20 MG/4 ML (5 MG/ML) INTRAVENOUS SYRINGE 10 MG: at 01:18

## 2019-10-15 NOTE — PROGRESS NOTES
[facility] Progress Note  Patient: Sol Robert 38 y o  female   MRN: [id number]  PCP: Maine Cardoza MD  Unit/Bed#: -01 Encounter: [id number]  Date Of Visit: 10/15/19    Assessment:    Principal Problem:    Acute on chronic alcoholic pancreatitis   Active Problems:    Essential hypertension    Hypokalemia    Alcohol abuse    Alcoholic cirrhosis    Leukocytosis    Diabetes mellitus type 2    Sigmoid colonic diverticulitis    GERD (gastroesophageal reflux disease)    Tobacco abuse      Plan:    · 1  Acute on chronic alcoholic pancreatitis secondary to continual alcohol abuse- on IVF  Will continue pain control  · 2  Alcohol abuse- on CIWA  · 3  Hypokalemia and hypomagnesemia will replete  · 4  Possible sigmoid colonic diverticulitis on CT abdomen- on levaquin and flagyl  · 5  HTN- on clonidine and labetolol prn  · 6  DM- on ISS       VTE Pharmacologic Prophylaxis:   Pharmacologic: Patient has refused VTE prophylaxis  Mechanical VTE Prophylaxis in Place: Yes    Patient Centered Rounds: I have performed bedside rounds with nursing staff today  Discussions with Specialists or Other Care Team Provider:     Education and Discussions with Family / Patient:     Time Spent for Care: 20 minutes  More than 50% of total time spent on counseling and coordination of care as described above  Current Length of Stay: 0 day(s)    Current Patient Status: Inpatient   Certification Statement: The patient will continue to require additional inpatient hospital stay due to acute on chronic pancreatitis    Discharge Plan / Estimated Discharge Date: once pancreatitis resolves    Code Status: Level 1 - Full Code      Subjective:   Patient seen and examined at bedside  Patient has no new complaints      Objective:     Vitals:   Temp (24hrs), Av 1 °F (36 7 °C), Min:97 6 °F (36 4 °C), Max:98 3 °F (36 8 °C)    Temp:  [97 6 °F (36 4 °C)-98 3 °F (36 8 °C)] 98 2 °F (36 8 °C)  HR:  [65-79] 78  Resp: [14-19] 17  BP: (148-192)/() 158/90  SpO2:  [96 %-99 %] 97 %  There is no height or weight on file to calculate BMI  Input and Output Summary (last 24 hours): Intake/Output Summary (Last 24 hours) at 10/15/2019 1443  Last data filed at 10/15/2019 1106  Gross per 24 hour   Intake 3186 67 ml   Output 400 ml   Net 2786 67 ml       Physical Exam:     Physical Exam   Constitutional: She is oriented to person, place, and time  She appears well-developed and well-nourished  HENT:   Head: Normocephalic and atraumatic  Eyes: Pupils are equal, round, and reactive to light  EOM are normal    Neck: Normal range of motion  Neck supple  No JVD present  No tracheal deviation present  No thyromegaly present  Cardiovascular: Normal rate, regular rhythm and normal heart sounds  Exam reveals no gallop and no friction rub  No murmur heard  Pulmonary/Chest: Effort normal and breath sounds normal  No stridor  No respiratory distress  She has no wheezes  Abdominal: Soft  She exhibits no distension  There is tenderness  There is no guarding  Musculoskeletal: Normal range of motion  Neurological: She is alert and oriented to person, place, and time  Skin: Skin is warm and dry  Vitals reviewed  Additional Data:     Labs:    Results from last 7 days   Lab Units 10/15/19  0437   WBC Thousand/uL 8 17   HEMOGLOBIN g/dL 12 2   HEMATOCRIT % 37 7   PLATELETS Thousands/uL 121*   NEUTROS PCT % 78*   LYMPHS PCT % 16   MONOS PCT % 5   EOS PCT % 1     Results from last 7 days   Lab Units 10/15/19  0437   POTASSIUM mmol/L 3 1*   CHLORIDE mmol/L 101   CO2 mmol/L 28   BUN mg/dL 12   CREATININE mg/dL 0 66   CALCIUM mg/dL 7 1*   ALK PHOS U/L 93   ALT U/L 27   AST U/L 58*           * I Have Reviewed All Lab Data Listed Above  * Additional Pertinent Lab Tests Reviewed:  All Labs For Current Hospital Admission Reviewed    Imaging:    Imaging Reports Reviewed Today Include:   Imaging Personally Reviewed by Myself Includes:      Recent Cultures (last 7 days):           Last 24 Hours Medication List:     Current Facility-Administered Medications:  albuterol 2 puff Inhalation Q4H PRN Waqas Caruso MD    cloNIDine 0 3 mg Oral BID Tana Davila MD    folic acid 1 mg Oral Daily Tana Davila MD    HYDROmorphone 0 5 mg Intravenous Q4H PRN OMERO Jones    insulin lispro 1-6 Units Subcutaneous Q6H [address] Waqas Caruso MD    ketorolac 15 mg Intravenous Q6H PRN OMERO Jones    Labetalol HCl 10 mg Intravenous Q4H PRN Waqas Caruso MD    levofloxacin 750 mg Oral Q24H Tana Davila MD    LORazepam 2 mg Intravenous Q4H PRN Waqas Caruso MD    metoclopramide 10 mg Intravenous Q6H PRN Waqas Caruso MD    metroNIDAZOLE 500 mg Intravenous Q8H Waqas Caruso MD Last Rate: 500 mg (10/15/19 0945)   nicotine 1 patch Transdermal Daily Waqas Caruso MD    pancrelipase (Lip-Prot-Amyl) 24,000 Units Oral TID With Meals Waqas Caruso MD    pantoprazole 40 mg Oral Early Morning Tana Davila MD    sodium chloride 0 9 % with KCl 20 mEq/L 100 mL/hr Intravenous Continuous Waqas Caruos MD Last Rate: 100 mL/hr (10/15/19 0213)   sucralfate 1 g Oral 4x Daily Waqas Caruso MD    thiamine 100 mg Oral Daily Tana Davila MD         Today, Patient Was Seen By: Tana Davila MD    ** Please Note: This note has been constructed using a voice recognition system   **

## 2019-10-15 NOTE — UTILIZATION REVIEW
Initial Clinical Review    Admission: Date/Time/Statement: OBS  10/14 1641 converted to IP on 10/15 @ [id number] for continued treatment of pancreatitis     Admitting Physician REYNA TOMAS [facility]    Level of Care Med Surg    Estimated length of stay More than 2 Midnights    Certification I certify that inpatient services are medically necessary for this patient for a duration of greater than two midnights  See H&P and MD Progress Notes for additional information about the patient's course of treatment          ED Arrival Information     Expected Arrival 70 [address] Jacob of Arrival Escorted By Service Admission Type    - 10/14/2019 11:57 Urgent Ambulance [facility] Medicine Urgent    Arrival Complaint    -        Chief Complaint   Patient presents with    Abdominal Pain     pt started with 10/10 abdominal pain at 2am this morning with c/o vomiting  hx of pancreatitis     Assessment/Plan: 37 yo female to ED from home w/ worsening abd pain assoc w/ N/V and mild diarrhea  Hx of alcoholism and recurrent pancreatitis  In Ed found to have  acute on chronic pancreatitis with possible suspicion of a sigmoid colonic diverticulitis  Admitted OBS status w/ acute on chronic alcoholic pancreatitis with elevated lipase will trend, keep NPO , iv pain and emesis control   Monitor CIWA , cont thiamine and folic acid support       ED Triage Vitals   Temperature Pulse Respirations Blood Pressure SpO2   10/14/19 1204 10/14/19 1201 10/14/19 1201 10/14/19 1202 10/14/19 1201   98 2 °F (36 8 °C) 89 22 166/99 100 %      Temp Source Heart Rate Source Patient Position - Orthostatic VS BP Location FiO2 (%)   10/14/19 1204 10/14/19 1201 10/14/19 1201 10/14/19 1201 --   Oral Monitor Sitting Right arm       Pain Score       10/14/19 1201       Worst Possible Pain        Wt Readings from Last 1 Encounters:   08/27/19 79 2 kg (174 lb 9 7 oz)     Additional Vital Signs:   10/15/19 0604  98 3 °F (36 8 °C)  72  14  186/106Abnormal   96 %  None (Room air)  Lying   10/15/19 0442  --  --  --  --  96 %  None (Room air)  --   10/15/19 0156  98 3 °F (36 8 °C)  72  16  168/100  96 %  None (Room air)  Lying   10/15/19 0031  --  --  --  --  98 %  None (Room air)  --   10/15/19 0027  97 6 °F (36 4 °C)  65  18  192/100Abnormal   98 %  None (Room air)  Lying   10/14/19 2011  --  68  16  187/91Abnormal   99 %  None (Room air)  --   10/14/19 2010  --  68  --  187/91Abnormal   --  --  --   10/14/19 1706  --  68  16  148/79  97 %  None (Room air)  Lying   10/14/19 1406  --  64  20  172/88Abnormal   99 %  None (Room air)  --   10/14/19 1204  98 2 °F (36 8 °C)  --  --  --  --  --  --   10/14/19 1202  --  --  --  166/99  --  --         Pertinent Labs/Diagnostic Test Results:   10/14 CT abd- There is inflammation surrounding the head of the pancreas and duodenum which could be pancreatitis or duodenitis/peptic ulcer disease  Correlate with appropriate lab work up   Perry Swapnilboogie is also some thickening of the wall the sigmoid colon in an area of diverticular disease which may indicate mild colonic diverticulitis    Cirrhotic liver is stable from prior    Results from last 7 days   Lab Units 10/15/19  0437 10/14/19  1248   WBC Thousand/uL 8 17 13 22*   HEMOGLOBIN g/dL 12 2 14 0   HEMATOCRIT % 37 7 42 0   PLATELETS Thousands/uL 121* 170   NEUTROS ABS Thousands/µL 6 39 11 26*     Results from last 7 days   Lab Units 10/15/19  0437 10/14/19  1248   SODIUM mmol/L 139 141   POTASSIUM mmol/L 3 1* 3 1*   CHLORIDE mmol/L 101 100   CO2 mmol/L 28 26   ANION GAP mmol/L 10 15*   BUN mg/dL 12 12   CREATININE mg/dL 0 66 0 69   EGFR ml/min/1 73sq m 109 107   CALCIUM mg/dL 7 1* 8 6   MAGNESIUM mg/dL 0 7*  --    PHOSPHORUS mg/dL 2 9  --      Results from last 7 days   Lab Units 10/15/19  0437 10/14/19  1248   AST U/L 58* 100*   ALT U/L 27 40   ALK PHOS U/L 93 126*   TOTAL PROTEIN g/dL 6 9 8 7*   ALBUMIN g/dL 3 1* 4 2   TOTAL BILIRUBIN mg/dL 0 90 1 20*     Results from last 7 days   Lab Units 10/15/19  0126 10/15/19  0055 10/15/19  0025   POC GLUCOSE mg/dl 159* 213* 53*     Results from last 7 days   Lab Units 10/15/19  0437 10/14/19  1248   GLUCOSE RANDOM mg/dL 115 91     Results from last 7 days   Lab Units 10/14/19  1800   PROCALCITONIN ng/ml <0 05     Results from last 7 days   Lab Units 10/15/19  0437 10/14/19  1248   LIPASE u/L 3,185* 1,051*     Results from last 7 days   Lab Units 10/14/19  1347   CLARITY UA  Slightly Cloudy   COLOR UA  Yellow   SPEC GRAV UA  >=1 030   PH UA  6 0   GLUCOSE UA mg/dl Negative   KETONES UA mg/dl 15 (1+)*   BLOOD UA  Negative   PROTEIN UA mg/dl 100 (2+)*   NITRITE UA  Negative   BILIRUBIN UA  Negative   UROBILINOGEN UA E U /dl 1 0   LEUKOCYTES UA  Negative   WBC UA /hpf 0-1*   RBC UA /hpf None Seen   BACTERIA UA /hpf Moderate*   EPITHELIAL CELLS WET PREP /hpf Moderate*   MUCUS THREADS  Moderate*     ED Treatment:   Medication Administration from 10/14/2019 1157 to 10/15/2019 0016       Date/Time Order Dose Route Action     10/14/2019 1338 ondansetron (ZOFRAN) injection 4 mg 4 mg Intravenous Given     10/14/2019 1339 sodium chloride 0 9 % bolus 500 mL 500 mL Intravenous New Bag     10/14/2019 1334 ketorolac (TORADOL) injection 15 mg 15 mg Intravenous Given     10/14/2019 1341 OLANZapine (ZyPREXA ZYDIS) dispersible tablet 10 mg 10 mg Oral Given     10/14/2019 1421 morphine (PF) 4 mg/mL injection 4 mg 4 mg Intravenous Given     10/14/2019 1650 sodium chloride 0 9 % with KCl 20 mEq/L infusion (premix) 100 mL/hr Intravenous New Bag     10/14/2019 1837 metoclopramide (REGLAN) injection 10 mg 10 mg Intravenous Given     10/14/2019 1756 morphine injection 2 mg 2 mg Intravenous Given     10/14/2019 2047 HYDROmorphone (DILAUDID) injection 0 5 mg 0 5 mg Intravenous Given        Past Medical History:   Diagnosis Date    Alcohol abuse     Arthritis     GERD (gastroesophageal reflux disease)     Hypertension     Nicotine dependence     Obesity     Pancreatitis     Panic attack      Present on Admission:   Acute on chronic alcoholic pancreatitis    Alcohol abuse   Alcoholic cirrhosis   Hypokalemia   Essential hypertension   Leukocytosis      Admitting Diagnosis: Pancreatitis [K85 90]  Abdominal pain [R10 9]  Age/Sex: 38 y o  female  Admission Orders:    Medications:  folic acid 1 mg, thiamine 100 mg in 0 9% sodium chloride 100 mL IVPB  Intravenous Daily   insulin lispro 1-6 Units Subcutaneous Q6H [facility]   levofloxacin 750 mg Intravenous Q24H   metroNIDAZOLE 500 mg Intravenous Q8H   nicotine 1 patch Transdermal Daily   pancrelipase (Lip-Prot-Amyl) 24,000 Units Oral TID With Meals   pantoprazole 40 mg Intravenous Q24H SHIVANI   sucralfate 1 g Oral 4x Daily       sodium chloride 0 9 % with KCl 20 mEq/L 100 mL/hr Intravenous Continuous       albuterol 2 puff Inhalation Q4H PRN   HYDROmorphone 0 5 mg Intravenous Q4H PRN  x3   ketorolac 15 mg Intravenous Q6H PRN x1   Labetalol HCl 10 mg Intravenous Q4H PRN  x1   LORazepam 2 mg Intravenous Q4H PRN   metoclopramide 10 mg Intravenous Q6H PRN x1     NPO   CIWA   Scores ranging from 3-9 + tremors , anxiety, nausea and tactile disturbances  Fingerstick ac and hs   SCD    Network Utilization Review Department  Phone: [phone]; Fax [phone]  [email or web address]  ATTENTION: Please call with any questions or concerns to [phone]  and carefully listen to the prompts so that you are directed to the right person  Send all requests for admission clinical reviews, approved or denied determinations and any other requests to fax [phone]   All voicemails are confidential

## 2019-10-15 NOTE — ED NOTES
1  CC: Abdominal Pain   2  Orientation status: A/O X4   3  Abnormal labs/vitals/assessment: potassium 3 1; WBC 13 22; Lipase 1,051  4  Iv/drains/etc : 20 G right AC   5  Last time narcotics given: dilaudid at 2047  6  Medications/drips: sodium chloride with potassium at 100ml/hr   7  Ambulation status: assist x1   8  Isolation status: none  9  Skin: not assessed   10  Trauma: none   11   ED phone number:[phone]     Carol Frias RN  10/14/19 9117

## 2019-10-16 LAB
ANION GAP SERPL CALCULATED.3IONS-SCNC: 12 MMOL/L (ref 4–13)
BASOPHILS # BLD AUTO: 0.03 THOUSANDS/ΜL (ref 0–0.1)
BASOPHILS NFR BLD AUTO: 0 % (ref 0–1)
BUN SERPL-MCNC: 5 MG/DL (ref 5–25)
CALCIUM SERPL-MCNC: 6.4 MG/DL (ref 8.3–10.1)
CHLORIDE SERPL-SCNC: 101 MMOL/L (ref 100–108)
CO2 SERPL-SCNC: 25 MMOL/L (ref 21–32)
CREAT SERPL-MCNC: 0.52 MG/DL (ref 0.6–1.3)
EOSINOPHIL # BLD AUTO: 0.12 THOUSAND/ΜL (ref 0–0.61)
EOSINOPHIL NFR BLD AUTO: 2 % (ref 0–6)
ERYTHROCYTE [DISTWIDTH] IN BLOOD BY AUTOMATED COUNT: 19.3 % (ref 11.6–15.1)
GFR SERPL CREATININE-BSD FRML MDRD: 117 ML/MIN/1.73SQ M
GLUCOSE SERPL-MCNC: 118 MG/DL (ref 65–140)
GLUCOSE SERPL-MCNC: 118 MG/DL (ref 65–140)
GLUCOSE SERPL-MCNC: 151 MG/DL (ref 65–140)
GLUCOSE SERPL-MCNC: 85 MG/DL (ref 65–140)
GLUCOSE SERPL-MCNC: 85 MG/DL (ref 65–140)
HCT VFR BLD AUTO: 38.2 % (ref 34.8–46.1)
HGB BLD-MCNC: 12.5 G/DL (ref 11.5–15.4)
IMM GRANULOCYTES # BLD AUTO: 0.02 THOUSAND/UL (ref 0–0.2)
IMM GRANULOCYTES NFR BLD AUTO: 0 % (ref 0–2)
LIPASE SERPL-CCNC: 1337 U/L (ref 73–393)
LYMPHOCYTES # BLD AUTO: 2.04 THOUSANDS/ΜL (ref 0.6–4.47)
LYMPHOCYTES NFR BLD AUTO: 28 % (ref 14–44)
MCH RBC QN AUTO: 30.7 PG (ref 26.8–34.3)
MCHC RBC AUTO-ENTMCNC: 32.7 G/DL (ref 31.4–37.4)
MCV RBC AUTO: 94 FL (ref 82–98)
MONOCYTES # BLD AUTO: 0.32 THOUSAND/ΜL (ref 0.17–1.22)
MONOCYTES NFR BLD AUTO: 4 % (ref 4–12)
NEUTROPHILS # BLD AUTO: 4.7 THOUSANDS/ΜL (ref 1.85–7.62)
NEUTS SEG NFR BLD AUTO: 66 % (ref 43–75)
NRBC BLD AUTO-RTO: 0 /100 WBCS
PLATELET # BLD AUTO: 106 THOUSANDS/UL (ref 149–390)
PMV BLD AUTO: 10.4 FL (ref 8.9–12.7)
POTASSIUM SERPL-SCNC: 3.3 MMOL/L (ref 3.5–5.3)
RBC # BLD AUTO: 4.07 MILLION/UL (ref 3.81–5.12)
SODIUM SERPL-SCNC: 138 MMOL/L (ref 136–145)
WBC # BLD AUTO: 7.23 THOUSAND/UL (ref 4.31–10.16)

## 2019-10-16 PROCEDURE — 83690 ASSAY OF LIPASE: CPT | Performed by: INTERNAL MEDICINE

## 2019-10-16 PROCEDURE — 85025 COMPLETE CBC W/AUTO DIFF WBC: CPT | Performed by: INTERNAL MEDICINE

## 2019-10-16 PROCEDURE — 99255 IP/OBS CONSLTJ NEW/EST HI 80: CPT | Performed by: INTERNAL MEDICINE

## 2019-10-16 PROCEDURE — 94762 N-INVAS EAR/PLS OXIMTRY CONT: CPT

## 2019-10-16 PROCEDURE — 82948 REAGENT STRIP/BLOOD GLUCOSE: CPT

## 2019-10-16 PROCEDURE — 99232 SBSQ HOSP IP/OBS MODERATE 35: CPT | Performed by: INTERNAL MEDICINE

## 2019-10-16 PROCEDURE — 80048 BASIC METABOLIC PNL TOTAL CA: CPT | Performed by: INTERNAL MEDICINE

## 2019-10-16 RX ORDER — LANOLIN ALCOHOL/MO/W.PET/CERES
3 CREAM (GRAM) TOPICAL
Status: DISCONTINUED | OUTPATIENT
Start: 2019-10-17 | End: 2019-10-18 | Stop reason: HOSPADM

## 2019-10-16 RX ORDER — LORAZEPAM 2 MG/ML
1 INJECTION INTRAMUSCULAR 2 TIMES DAILY PRN
Status: DISCONTINUED | OUTPATIENT
Start: 2019-10-16 | End: 2019-10-18 | Stop reason: HOSPADM

## 2019-10-16 RX ORDER — DIPHENHYDRAMINE HYDROCHLORIDE 50 MG/ML
12.5 INJECTION INTRAMUSCULAR; INTRAVENOUS ONCE
Status: COMPLETED | OUTPATIENT
Start: 2019-10-17 | End: 2019-10-17

## 2019-10-16 RX ADMIN — SUCRALFATE 1 G: 1 TABLET ORAL at 17:33

## 2019-10-16 RX ADMIN — LORAZEPAM 2 MG: 2 INJECTION INTRAMUSCULAR; INTRAVENOUS at 19:58

## 2019-10-16 RX ADMIN — NICOTINE 1 PATCH: 21 PATCH TRANSDERMAL at 08:28

## 2019-10-16 RX ADMIN — LORAZEPAM 1 MG: 2 INJECTION INTRAMUSCULAR; INTRAVENOUS at 19:59

## 2019-10-16 RX ADMIN — SUCRALFATE 1 G: 1 TABLET ORAL at 08:27

## 2019-10-16 RX ADMIN — METRONIDAZOLE 500 MG: 500 INJECTION, SOLUTION INTRAVENOUS at 16:19

## 2019-10-16 RX ADMIN — HYDROMORPHONE HYDROCHLORIDE 0.5 MG: 1 INJECTION, SOLUTION INTRAMUSCULAR; INTRAVENOUS; SUBCUTANEOUS at 06:28

## 2019-10-16 RX ADMIN — METRONIDAZOLE 500 MG: 500 INJECTION, SOLUTION INTRAVENOUS at 23:42

## 2019-10-16 RX ADMIN — HYDROMORPHONE HYDROCHLORIDE 0.5 MG: 1 INJECTION, SOLUTION INTRAMUSCULAR; INTRAVENOUS; SUBCUTANEOUS at 19:39

## 2019-10-16 RX ADMIN — LEVOFLOXACIN 750 MG: 500 TABLET, FILM COATED ORAL at 23:41

## 2019-10-16 RX ADMIN — PANCRELIPASE 24000 UNITS: 24000; 76000; 120000 CAPSULE, DELAYED RELEASE PELLETS ORAL at 11:30

## 2019-10-16 RX ADMIN — SUCRALFATE 1 G: 1 TABLET ORAL at 11:28

## 2019-10-16 RX ADMIN — SODIUM CHLORIDE AND POTASSIUM CHLORIDE 100 ML/HR: .9; .15 SOLUTION INTRAVENOUS at 04:40

## 2019-10-16 RX ADMIN — KETOROLAC TROMETHAMINE 15 MG: 30 INJECTION, SOLUTION INTRAMUSCULAR at 01:26

## 2019-10-16 RX ADMIN — HYDROMORPHONE HYDROCHLORIDE 0.5 MG: 1 INJECTION, SOLUTION INTRAMUSCULAR; INTRAVENOUS; SUBCUTANEOUS at 15:48

## 2019-10-16 RX ADMIN — SUCRALFATE 1 G: 1 TABLET ORAL at 23:42

## 2019-10-16 RX ADMIN — SODIUM CHLORIDE AND POTASSIUM CHLORIDE 100 ML/HR: .9; .15 SOLUTION INTRAVENOUS at 21:29

## 2019-10-16 RX ADMIN — CLONIDINE HYDROCHLORIDE 0.3 MG: 0.1 TABLET ORAL at 17:34

## 2019-10-16 RX ADMIN — LORAZEPAM 2 MG: 2 INJECTION INTRAMUSCULAR; INTRAVENOUS at 19:57

## 2019-10-16 RX ADMIN — INSULIN LISPRO 1 UNITS: 100 INJECTION, SOLUTION INTRAVENOUS; SUBCUTANEOUS at 17:35

## 2019-10-16 RX ADMIN — HYDROMORPHONE HYDROCHLORIDE 0.5 MG: 1 INJECTION, SOLUTION INTRAMUSCULAR; INTRAVENOUS; SUBCUTANEOUS at 23:41

## 2019-10-16 RX ADMIN — HYDROMORPHONE HYDROCHLORIDE 0.5 MG: 1 INJECTION, SOLUTION INTRAMUSCULAR; INTRAVENOUS; SUBCUTANEOUS at 02:13

## 2019-10-16 RX ADMIN — PANCRELIPASE 24000 UNITS: 24000; 76000; 120000 CAPSULE, DELAYED RELEASE PELLETS ORAL at 17:34

## 2019-10-16 RX ADMIN — HYDROMORPHONE HYDROCHLORIDE 0.5 MG: 1 INJECTION, SOLUTION INTRAMUSCULAR; INTRAVENOUS; SUBCUTANEOUS at 11:29

## 2019-10-16 RX ADMIN — METRONIDAZOLE 500 MG: 500 INJECTION, SOLUTION INTRAVENOUS at 00:14

## 2019-10-16 RX ADMIN — LORAZEPAM 2 MG: 2 INJECTION INTRAMUSCULAR; INTRAVENOUS at 19:55

## 2019-10-16 RX ADMIN — METRONIDAZOLE 500 MG: 500 INJECTION, SOLUTION INTRAVENOUS at 08:29

## 2019-10-16 RX ADMIN — CLONIDINE HYDROCHLORIDE 0.3 MG: 0.1 TABLET ORAL at 08:26

## 2019-10-16 RX ADMIN — PANTOPRAZOLE SODIUM 40 MG: 40 TABLET, DELAYED RELEASE ORAL at 06:28

## 2019-10-16 NOTE — PROGRESS NOTES
[facility] Progress Note  Patient: Aby Pratt 38 y o  female   MRN: [id number]  PCP: Bentio Solorzano MD  Unit/Bed#: -01 Encounter: [id number]  Date Of Visit: 10/16/19    Assessment:    Principal Problem:    Acute on chronic alcoholic pancreatitis   Active Problems:    Essential hypertension    Hypokalemia    Alcohol abuse    Alcoholic cirrhosis    Leukocytosis    Diabetes mellitus type 2    Sigmoid colonic diverticulitis    GERD (gastroesophageal reflux disease)    Tobacco abuse      Plan:    · 1  Acute on chronic alcoholic pancreatitis secondary to continual alcohol abuse- on IVF  Will continue pain control  GI to evaluate  · 2  Alcohol abuse- on CIWA  · 3  Hypokalemia and hypomagnesemia will replete  · 4  Possible sigmoid colonic diverticulitis on CT abdomen- on levaquin and flagyl  · 5  HTN- on clonidine and labetolol prn  · 6  DM- on ISS       VTE Pharmacologic Prophylaxis:   Pharmacologic: Patient has refused VTE prophylaxis  Mechanical VTE Prophylaxis in Place: Yes    Patient Centered Rounds: I have performed bedside rounds with nursing staff today  Discussions with Specialists or Other Care Team Provider:     Education and Discussions with Family / Patient:     Time Spent for Care: 20 minutes  More than 50% of total time spent on counseling and coordination of care as described above  Current Length of Stay: 1 day(s)    Current Patient Status: Inpatient   Certification Statement: The patient will continue to require additional inpatient hospital stay due to acute on chronic pancreatitis    Discharge Plan / Estimated Discharge Date: once pancreatitis resolves    Code Status: Level 1 - Full Code      Subjective:   Patient seen and examined at bedside  Patient has no new complaints      Objective:     Vitals:   Temp (24hrs), Av 6 °F (37 °C), Min:98 4 °F (36 9 °C), Max:98 8 °F (37 1 °C)    Temp:  [98 4 °F (36 9 °C)-98 8 °F (37 1 °C)] 98 7 °F (37 1 °C)  HR:  Vikram Sine 64  Resp:  [16] 16  BP: (132-158)/(74-95) 158/92  SpO2:  [96 %-98 %] 98 %  There is no height or weight on file to calculate BMI  Input and Output Summary (last 24 hours): Intake/Output Summary (Last 24 hours) at 10/16/2019 1433  Last data filed at 10/16/2019 0730  Gross per 24 hour   Intake 0 ml   Output 1100 ml   Net -1100 ml       Physical Exam:     Physical Exam   Constitutional: She is oriented to person, place, and time  She appears well-developed and well-nourished  HENT:   Head: Normocephalic and atraumatic  Eyes: Pupils are equal, round, and reactive to light  EOM are normal    Neck: Normal range of motion  Neck supple  No JVD present  No tracheal deviation present  No thyromegaly present  Cardiovascular: Normal rate, regular rhythm and normal heart sounds  Exam reveals no gallop and no friction rub  No murmur heard  Pulmonary/Chest: Effort normal and breath sounds normal  No stridor  No respiratory distress  She has no wheezes  Abdominal: Soft  She exhibits no distension  There is tenderness  There is no guarding  Musculoskeletal: Normal range of motion  Neurological: She is alert and oriented to person, place, and time  Skin: Skin is warm and dry  Vitals reviewed  Additional Data:     Labs:    Results from last 7 days   Lab Units 10/16/19  0529   WBC Thousand/uL 7 23   HEMOGLOBIN g/dL 12 5   HEMATOCRIT % 38 2   PLATELETS Thousands/uL 106*   NEUTROS PCT % 66   LYMPHS PCT % 28   MONOS PCT % 4   EOS PCT % 2     Results from last 7 days   Lab Units 10/16/19  0529 10/15/19  0437   POTASSIUM mmol/L 3 3* 3 1*   CHLORIDE mmol/L 101 101   CO2 mmol/L 25 28   BUN mg/dL 5 12   CREATININE mg/dL 0 52* 0 66   CALCIUM mg/dL 6 4* 7 1*   ALK PHOS U/L  --  93   ALT U/L  --  27   AST U/L  --  58*           * I Have Reviewed All Lab Data Listed Above  * Additional Pertinent Lab Tests Reviewed:  [facility] Admission Reviewed    Imaging:    Imaging Reports Reviewed Today Include:   Imaging Personally Reviewed by Myself Includes:      Recent Cultures (last 7 days):     Results from last 7 days   Lab Units 10/14/19  7005   BLOOD CULTURE  No Growth at 24 hrs  No Growth at 24 hrs  Last 24 Hours Medication List:     Current Facility-Administered Medications:  albuterol 2 puff Inhalation Q4H PRN Ori Jordan MD    cloNIDine 0 3 mg Oral BID Stanley Damon MD    folic acid 1 mg Oral Daily Stanley Damon MD    HYDROmorphone 0 5 mg Intravenous Q4H PRN OMERO Jones    insulin lispro 1-6 Units Subcutaneous Q6H [facility] Ori Jordan MD    ketorolac 15 mg Intravenous Q6H PRN OMERO Jones    Labetalol HCl 10 mg Intravenous Q4H PRN Ori Jordan MD    levofloxacin 750 mg Oral Q24H Stanley Damon MD    LORazepam 1 mg Intravenous BID PRN Stanley Damon MD    LORazepam 2 mg Intravenous Q4H PRN Ori Jordan MD    metoclopramide 10 mg Intravenous Q6H PRN Ori Jordan MD    metroNIDAZOLE 500 mg Intravenous Q8H Ori Jordan MD Last Rate: 500 mg (10/16/19 0829)   nicotine 1 patch Transdermal Daily Ori Jordan MD    nicotine 1 patch Transdermal Once Ori Jordan MD    pancrelipase (Lip-Prot-Amyl) 24,000 Units Oral TID With Meals Ori Jordan MD    pantoprazole 40 mg Oral Early Morning Stanley Damon MD    sodium chloride 0 9 % with KCl 20 mEq/L 100 mL/hr Intravenous Continuous Ori Jordan MD Last Rate: 100 mL/hr (10/16/19 0440)   sucralfate 1 g Oral 4x Daily Ori Jordan MD    thiamine 100 mg Oral Daily Stanley Damon MD         Today, Patient Was Seen By: Stanley Damon MD    ** Please Note: This note has been constructed using a voice recognition system   **

## 2019-10-16 NOTE — CONSULTS
Consultation -  [facility]  Marisela Fabian 38 y o  female MRN: [id number]  Unit/Bed#: -01 Encounter: [id number]      Consults "Click on Consult Button"     Reason for Consult / Principal Problem:  Acute on chronic alcoholic pancreatitis/alcoholic cirrhosis/bile colonic diverticulitis    HPI: Marisela Fabian is a 38y o  year old female who presents with a past medical history significant for acute on chronic alcoholic pancreatitis, alcohol cirrhosis, essential hypertension, alcohol abuse, gastroesophageal reflux disease, and diabetes mellitus  Patient is very well known to me from inpatient and outpatient setting for many years  Patient does have a history of medical noncompliance  Patient does have a heavy history of alcohol abuse which for the most part she does not believe contributes to most of her symptoms  Patient was admitted to [facility] on 10/14/2019 with pancreatitis  Patient actually was going to be discharged home today after she tolerated a regular diet  Unfortunately patient attempted to eat a chicken salad sandwich as well as [employer] toast and had worsening abdominal pain  Patient reports that she believes her something else wrong besides her pancreatitis  She reports that she has never felt pain like this before  During 1 of her previous admissions GI had recommended an outpatient endoscopic ultrasound and possible celiac axis nerve block  She reports that she did not have this done because she does not drive and she relies on other people to drive her  At the present time she is now reporting worsening abdominal pain  She also reports she had 1 loose liquid bowel movement  She denies any melena or rectal bleeding  Patient reports that she had 1 drink prior to admission to help her sleep  She actually thought that she was having abdominal pain due to bad pizza    Patient is now anxious and upset because she really wants to go home because her daughter is due to deliver a baby any day  REVIEW OF SYSTEMS:     CONSTITUTIONAL: Denies any fever, chills, or rigors  Good appetite, and no recent weight loss  HEENT: No earache or tinnitus  Denies hearing loss or visual disturbances  CARDIOVASCULAR: No chest pain or palpitations  RESPIRATORY: Denies any cough, hemoptysis, shortness of breath or dyspnea on exertion  GASTROINTESTINAL: As noted in the History of Present Illness  GENITOURINARY: No problems with urination  Denies any hematuria or dysuria  NEUROLOGIC: No dizziness or vertigo, denies headaches  MUSCULOSKELETAL: Denies any muscle or joint pain  SKIN: Denies skin rashes or itching  ENDOCRINE: Denies excessive thirst  Denies intolerance to heat or cold  PSYCHOSOCIAL: Denies depression or anxiety  Denies any recent memory loss  Historical Information   Past Medical History:   Diagnosis Date    Alcohol abuse     Arthritis     GERD (gastroesophageal reflux disease)     Hypertension     Nicotine dependence     Obesity     Pancreatitis     Panic attack      Past Surgical History:   Procedure Laterality Date    ABDOMINAL SURGERY      C SECTION    ESOPHAGOGASTRODUODENOSCOPY N/A 12/15/2017    Procedure: ESOPHAGOGASTRODUODENOSCOPY (EGD); Surgeon: Vidal Sampson MD;  Location: [facility];   Service: Gastroenterology     Social History   Social History     Substance and Sexual Activity   Alcohol Use Yes    Frequency: 4 or more times a week    Drinks per session: 3 or 4    Comment: Patient states 'I drink alot of vodka a day"     Social History     Substance and Sexual Activity   Drug Use Never     Social History     Tobacco Use   Smoking Status Current Every Day Smoker    Packs/day: 1 00    Years: 29 00    Pack years: 29 00    Types: Cigarettes   Smokeless Tobacco Never Used   Tobacco Comment    pt refused packet     Family History   Problem Relation Age of Onset    Cirrhosis Father     Alcohol abuse Father     Drug abuse Mother Meds/Allergies     Medications Prior to Admission   Medication    albuterol (PROVENTIL HFA,VENTOLIN HFA) 90 mcg/act inhaler    cloNIDine (CATAPRES) 0 3 mg tablet    nicotine (NICODERM CQ) 21 mg/24 hr TD 24 hr patch    pancrelipase, Lip-Prot-Amyl, (CREON) 24,000 units    pantoprazole (PROTONIX) 20 mg tablet    dicyclomine (BENTYL) 20 mg tablet    dicyclomine (BENTYL) 20 mg tablet    folic acid (FOLVITE) 1 mg tablet    gabapentin (NEURONTIN) 300 mg capsule    LORazepam (ATIVAN) 0 5 mg tablet    metFORMIN (GLUCOPHAGE) 500 mg tablet    metoclopramide (REGLAN) 10 mg tablet    Mometasone Furo-Formoterol Fum (DULERA) 100-5 MCG/ACT AERO    naproxen (NAPROSYN) 500 mg tablet    ondansetron (ZOFRAN) 4 mg tablet    sucralfate (CARAFATE) 1 g tablet    thiamine 100 MG tablet     Current Facility-Administered Medications   Medication Dose Route Frequency    albuterol (PROVENTIL HFA,VENTOLIN HFA) inhaler 2 puff  2 puff Inhalation Q4H PRN    cloNIDine (CATAPRES) tablet 0 3 mg  0 3 mg Oral BID    folic acid (FOLVITE) tablet 1 mg  1 mg Oral Daily    HYDROmorphone (DILAUDID) injection 0 5 mg  0 5 mg Intravenous Q4H PRN    insulin lispro (HumaLOG) 100 units/mL subcutaneous injection 1-6 Units  1-6 Units Subcutaneous Q6H [address]    ketorolac (TORADOL) injection 15 mg  15 mg Intravenous Q6H PRN    Labetalol HCl (NORMODYNE) injection 10 mg  10 mg Intravenous Q4H PRN    levofloxacin (LEVAQUIN) tablet 750 mg  750 mg Oral Q24H    LORazepam (ATIVAN) 2 mg/mL injection 1 mg  1 mg Intravenous BID PRN    LORazepam (ATIVAN) 2 mg/mL injection 2 mg  2 mg Intravenous Q4H PRN    metoclopramide (REGLAN) injection 10 mg  10 mg Intravenous Q6H PRN    metroNIDAZOLE (FLAGYL) IVPB (premix) 500 mg  500 mg Intravenous Q8H    nicotine (NICODERM CQ) 21 mg/24 hr TD 24 hr patch 1 patch  1 patch Transdermal Daily    nicotine (NICODERM CQ) 21 mg/24 hr TD 24 hr patch 1 patch  1 patch Transdermal Once    pancrelipase (Lip-Prot-Amyl) (CREON) delayed release capsule 24,000 Units  24,000 Units Oral TID With Meals    pantoprazole (PROTONIX) EC tablet 40 mg  40 mg Oral Early Morning    sodium chloride 0 9 % with KCl 20 mEq/L infusion (premix)  100 mL/hr Intravenous Continuous    sucralfate (CARAFATE) tablet 1 g  1 g Oral 4x Daily    thiamine (VITAMIN B1) tablet 100 mg  100 mg Oral Daily       No Known Allergies    Objective   Blood pressure 158/92, pulse 64, temperature 98 7 °F (37 1 °C), temperature source Tympanic, resp  rate 16, last menstrual period 06/03/2019, SpO2 98 %, not currently breastfeeding  Intake/Output Summary (Last 24 hours) at 10/16/2019 1431  Last data filed at 10/16/2019 0730  Gross per 24 hour   Intake 0 ml   Output 1100 ml   Net -1100 ml         PHYSICAL EXAM:      General Appearance:   Alert, cooperative, no distress, appears stated age    HEENT:   Normocephalic, atraumatic, anicteric      Neck:  Supple, symmetrical, trachea midline, no adenopathy;    thyroid: no enlargement/tenderness/nodules; no carotid  bruit or JVD    Lungs:   Clear to auscultation bilaterally; no rales, rhonchi or wheezing; respirations unlabored    Heart[de-identified]   S1 and S2 normal; regular rate and rhythm; no murmur, rub, or gallop     Abdomen:   Soft, non-tender, non-distended; normal bowel sounds; no masses, no organomegaly    Genitalia:   Deferred    Rectal:   Deferred    Extremities:  No cyanosis, clubbing or edema    Pulses:  2+ and symmetric all extremities    Skin:  Skin color, texture, turgor normal, no rashes or lesions    Lymph nodes:  No palpable cervical, axillary or inguinal lymphadenopathy        Lab Results:   Admission on 10/14/2019   Component Date Value    WBC 10/14/2019 13 22*    RBC 10/14/2019 4 60     Hemoglobin 10/14/2019 14 0     Hematocrit 10/14/2019 42 0     MCV 10/14/2019 91     MCH 10/14/2019 30 4     MCHC 10/14/2019 33 3     RDW 10/14/2019 19 7*    MPV 10/14/2019 9 5     Platelets 82/76/3179 170     nRBC 10/14/2019 0     Neutrophils Relative 10/14/2019 86*    Immat GRANS % 10/14/2019 0     Lymphocytes Relative 10/14/2019 11*    Monocytes Relative 10/14/2019 3*    Eosinophils Relative 10/14/2019 0     Basophils Relative 10/14/2019 0     Neutrophils Absolute 10/14/2019 11 26*    Immature Grans Absolute 10/14/2019 0 03     Lymphocytes Absolute 10/14/2019 1 43     Monocytes Absolute 10/14/2019 0 44     Eosinophils Absolute 10/14/2019 0 01     Basophils Absolute 10/14/2019 0 05     Sodium 10/14/2019 141     Potassium 10/14/2019 3 1*    Chloride 10/14/2019 100     CO2 10/14/2019 26     ANION GAP 10/14/2019 15*    BUN 10/14/2019 12     Creatinine 10/14/2019 0 69     Glucose 10/14/2019 91     Calcium 10/14/2019 8 6     AST 10/14/2019 100*    ALT 10/14/2019 40     Alkaline Phosphatase 10/14/2019 126*    Total Protein 10/14/2019 8 7*    Albumin 10/14/2019 4 2     Total Bilirubin 10/14/2019 1 20*    eGFR 10/14/2019 107     Lipase 10/14/2019 1,051*    Color, UA 10/14/2019 Yellow     Clarity, UA 10/14/2019 Slightly Cloudy     Specific Gravity, UA 10/14/2019 >=1 030     pH, UA 10/14/2019 6 0     Leukocytes, UA 10/14/2019 Negative     Nitrite, UA 10/14/2019 Negative     Protein, UA 10/14/2019 100 (2+)*    Glucose, UA 10/14/2019 Negative     Ketones, UA 10/14/2019 15 (1+)*    Urobilinogen, UA 10/14/2019 1 0     Bilirubin, UA 10/14/2019 Negative     Blood, UA 10/14/2019 Negative     EXT PREG TEST UR (Ref: N* 10/14/2019 negative     Control 10/14/2019 valid     RBC, UA 10/14/2019 None Seen     WBC, UA 10/14/2019 0-1*    Epithelial Cells 10/14/2019 Moderate*    Bacteria, UA 10/14/2019 Moderate*    AMORPH URATES 10/14/2019 Moderate     MUCUS THREADS 10/14/2019 Moderate*    Procalcitonin 10/14/2019 <0 05     Blood Culture 10/14/2019 No Growth at 24 hrs   Blood Culture 10/14/2019 No Growth at 24 hrs       POC Glucose 10/15/2019 53*    POC Glucose 10/15/2019 213*    POC Glucose 10/15/2019 159*  Lipase 10/15/2019 3,185*    Hemoglobin A1C 10/15/2019 6 9*    EAG 10/15/2019 151     Magnesium 10/15/2019 0 7*    Phosphorus 10/15/2019 2 9     WBC 10/15/2019 8 17     RBC 10/15/2019 4 02     Hemoglobin 10/15/2019 12 2     Hematocrit 10/15/2019 37 7     MCV 10/15/2019 94     MCH 10/15/2019 30 3     MCHC 10/15/2019 32 4     RDW 10/15/2019 19 5*    MPV 10/15/2019 9 7     Platelets 67/77/0165 121*    nRBC 10/15/2019 0     Neutrophils Relative 10/15/2019 78*    Immat GRANS % 10/15/2019 0     Lymphocytes Relative 10/15/2019 16     Monocytes Relative 10/15/2019 5     Eosinophils Relative 10/15/2019 1     Basophils Relative 10/15/2019 0     Neutrophils Absolute 10/15/2019 6 39     Immature Grans Absolute 10/15/2019 0 03     Lymphocytes Absolute 10/15/2019 1 31     Monocytes Absolute 10/15/2019 0 38     Eosinophils Absolute 10/15/2019 0 04     Basophils Absolute 10/15/2019 0 02     Sodium 10/15/2019 139     Potassium 10/15/2019 3 1*    Chloride 10/15/2019 101     CO2 10/15/2019 28     ANION GAP 10/15/2019 10     BUN 10/15/2019 12     Creatinine 10/15/2019 0 66     Glucose 10/15/2019 115     Calcium 10/15/2019 7 1*    AST 10/15/2019 58*    ALT 10/15/2019 27     Alkaline Phosphatase 10/15/2019 93     Total Protein 10/15/2019 6 9     Albumin 10/15/2019 3 1*    Total Bilirubin 10/15/2019 0 90     eGFR 10/15/2019 109     POC Glucose 10/15/2019 75     POC Glucose 10/15/2019 53*    POC Glucose 10/15/2019 135     POC Glucose 10/15/2019 86     POC Glucose 10/15/2019 97     POC Glucose 10/15/2019 97     Lipase 10/16/2019 1,337*    WBC 10/16/2019 7 23     RBC 10/16/2019 4 07     Hemoglobin 10/16/2019 12 5     Hematocrit 10/16/2019 38 2     MCV 10/16/2019 94     MCH 10/16/2019 30 7     MCHC 10/16/2019 32 7     RDW 10/16/2019 19 3*    MPV 10/16/2019 10 4     Platelets 22/80/9583 106*    nRBC 10/16/2019 0     Neutrophils Relative 10/16/2019 66     Immat GRANS % 10/16/2019 0     Lymphocytes Relative 10/16/2019 28     Monocytes Relative 10/16/2019 4     Eosinophils Relative 10/16/2019 2     Basophils Relative 10/16/2019 0     Neutrophils Absolute 10/16/2019 4 70     Immature Grans Absolute 10/16/2019 0 02     Lymphocytes Absolute 10/16/2019 2 04     Monocytes Absolute 10/16/2019 0 32     Eosinophils Absolute 10/16/2019 0 12     Basophils Absolute 10/16/2019 0 03     Sodium 10/16/2019 138     Potassium 10/16/2019 3 3*    Chloride 10/16/2019 101     CO2 10/16/2019 25     ANION GAP 10/16/2019 12     BUN 10/16/2019 5     Creatinine 10/16/2019 0 52*    Glucose 10/16/2019 85     Calcium 10/16/2019 6 4*    eGFR 10/16/2019 117     POC Glucose 10/16/2019 85     POC Glucose 10/16/2019 118        Imaging Studies: I have personally reviewed pertinent imaging studies  ASSESSMENT & PLAN:  1  Acute on chronic pancreatitis secondary to alcohol  Patient continues to drink alcohol despite multiple attempts of encouraging her to quit as this is the cause of all of her symptoms  I have also encouraged patient to be more compliant with her Creon dosages  She reports that she only takes the medications when she eats and she is now reporting that she is eating a meal once every several days  Encouraged alcohol cessation  Would decrease patient's regular diet back down to a clear liquid diet  Anti medics p r n   I have expressed to patient that it would be important for her to get an EUS done in possible celiac plexus block but she is unsure if she will be able to do this due to transportation issues  She also reports that she attempted to see pain management but she says that she traveled around for 1 hour try to find our office and she cannot find them  She states she will call and reschedule an appointment  2  Alcohol cirrhosis  Ascites:  None  HE:  None  Varices:  Patient had endoscopy in December of 2017 there were no evidence of esophageal varices at that time  Patient is due for a repeat endoscopy for screening purposes  This can be addressed as an outpatient  HCC:  CT scan on admission showed no liver lesions  Will check alpha fetoprotein  3  Mild acute sigmoid diverticulitis  Recommend continuing Levaquin and Flagyl for a 10 day course  Patient should have an outpatient colonoscopy in 4-6 weeks  Low fiber diet  Serial abdominal exams  Patient will be seen and examined by Dr Regi Larose PA-C  10/16/2019,2:31 PM

## 2019-10-17 LAB
AFP-TM SERPL-MCNC: 6.8 NG/ML (ref 0.5–8)
ANION GAP SERPL CALCULATED.3IONS-SCNC: 14 MMOL/L (ref 4–13)
BASOPHILS # BLD AUTO: 0.05 THOUSANDS/ΜL (ref 0–0.1)
BASOPHILS NFR BLD AUTO: 1 % (ref 0–1)
BUN SERPL-MCNC: 4 MG/DL (ref 5–25)
CALCIUM SERPL-MCNC: 6.9 MG/DL (ref 8.3–10.1)
CHLORIDE SERPL-SCNC: 102 MMOL/L (ref 100–108)
CO2 SERPL-SCNC: 22 MMOL/L (ref 21–32)
CREAT SERPL-MCNC: 0.52 MG/DL (ref 0.6–1.3)
EOSINOPHIL # BLD AUTO: 0.2 THOUSAND/ΜL (ref 0–0.61)
EOSINOPHIL NFR BLD AUTO: 2 % (ref 0–6)
ERYTHROCYTE [DISTWIDTH] IN BLOOD BY AUTOMATED COUNT: 19.7 % (ref 11.6–15.1)
GFR SERPL CREATININE-BSD FRML MDRD: 117 ML/MIN/1.73SQ M
GLUCOSE SERPL-MCNC: 105 MG/DL (ref 65–140)
GLUCOSE SERPL-MCNC: 113 MG/DL (ref 65–140)
GLUCOSE SERPL-MCNC: 113 MG/DL (ref 65–140)
GLUCOSE SERPL-MCNC: 118 MG/DL (ref 65–140)
GLUCOSE SERPL-MCNC: 201 MG/DL (ref 65–140)
GLUCOSE SERPL-MCNC: 96 MG/DL (ref 65–140)
HCT VFR BLD AUTO: 41.7 % (ref 34.8–46.1)
HGB BLD-MCNC: 13.5 G/DL (ref 11.5–15.4)
IMM GRANULOCYTES # BLD AUTO: 0.04 THOUSAND/UL (ref 0–0.2)
IMM GRANULOCYTES NFR BLD AUTO: 0 % (ref 0–2)
LIPASE SERPL-CCNC: 716 U/L (ref 73–393)
LYMPHOCYTES # BLD AUTO: 1.77 THOUSANDS/ΜL (ref 0.6–4.47)
LYMPHOCYTES NFR BLD AUTO: 18 % (ref 14–44)
MCH RBC QN AUTO: 30.3 PG (ref 26.8–34.3)
MCHC RBC AUTO-ENTMCNC: 32.4 G/DL (ref 31.4–37.4)
MCV RBC AUTO: 94 FL (ref 82–98)
MONOCYTES # BLD AUTO: 0.56 THOUSAND/ΜL (ref 0.17–1.22)
MONOCYTES NFR BLD AUTO: 6 % (ref 4–12)
NEUTROPHILS # BLD AUTO: 7.01 THOUSANDS/ΜL (ref 1.85–7.62)
NEUTS SEG NFR BLD AUTO: 73 % (ref 43–75)
NRBC BLD AUTO-RTO: 0 /100 WBCS
PLATELET # BLD AUTO: 128 THOUSANDS/UL (ref 149–390)
PMV BLD AUTO: 10.4 FL (ref 8.9–12.7)
POTASSIUM SERPL-SCNC: 3.6 MMOL/L (ref 3.5–5.3)
RBC # BLD AUTO: 4.46 MILLION/UL (ref 3.81–5.12)
SODIUM SERPL-SCNC: 138 MMOL/L (ref 136–145)
WBC # BLD AUTO: 9.63 THOUSAND/UL (ref 4.31–10.16)

## 2019-10-17 PROCEDURE — 94760 N-INVAS EAR/PLS OXIMETRY 1: CPT

## 2019-10-17 PROCEDURE — 85025 COMPLETE CBC W/AUTO DIFF WBC: CPT | Performed by: INTERNAL MEDICINE

## 2019-10-17 PROCEDURE — 80048 BASIC METABOLIC PNL TOTAL CA: CPT | Performed by: INTERNAL MEDICINE

## 2019-10-17 PROCEDURE — 99232 SBSQ HOSP IP/OBS MODERATE 35: CPT | Performed by: INTERNAL MEDICINE

## 2019-10-17 PROCEDURE — 82948 REAGENT STRIP/BLOOD GLUCOSE: CPT

## 2019-10-17 PROCEDURE — 83690 ASSAY OF LIPASE: CPT | Performed by: INTERNAL MEDICINE

## 2019-10-17 PROCEDURE — 94762 N-INVAS EAR/PLS OXIMTRY CONT: CPT

## 2019-10-17 PROCEDURE — 82105 ALPHA-FETOPROTEIN SERUM: CPT | Performed by: PHYSICIAN ASSISTANT

## 2019-10-17 RX ORDER — MAGNESIUM HYDROXIDE/ALUMINUM HYDROXICE/SIMETHICONE 120; 1200; 1200 MG/30ML; MG/30ML; MG/30ML
30 SUSPENSION ORAL EVERY 4 HOURS PRN
Status: DISCONTINUED | OUTPATIENT
Start: 2019-10-17 | End: 2019-10-18 | Stop reason: HOSPADM

## 2019-10-17 RX ORDER — METRONIDAZOLE 500 MG/1
500 TABLET ORAL EVERY 8 HOURS SCHEDULED
Status: DISCONTINUED | OUTPATIENT
Start: 2019-10-17 | End: 2019-10-18 | Stop reason: HOSPADM

## 2019-10-17 RX ORDER — LABETALOL 20 MG/4 ML (5 MG/ML) INTRAVENOUS SYRINGE
10
Status: DISCONTINUED | OUTPATIENT
Start: 2019-10-17 | End: 2019-10-18 | Stop reason: HOSPADM

## 2019-10-17 RX ORDER — MAGNESIUM HYDROXIDE/ALUMINUM HYDROXICE/SIMETHICONE 120; 1200; 1200 MG/30ML; MG/30ML; MG/30ML
30 SUSPENSION ORAL ONCE
Status: COMPLETED | OUTPATIENT
Start: 2019-10-17 | End: 2019-10-17

## 2019-10-17 RX ADMIN — PANTOPRAZOLE SODIUM 40 MG: 40 TABLET, DELAYED RELEASE ORAL at 05:24

## 2019-10-17 RX ADMIN — LORAZEPAM 1 MG: 2 INJECTION INTRAMUSCULAR; INTRAVENOUS at 10:10

## 2019-10-17 RX ADMIN — PANCRELIPASE 24000 UNITS: 24000; 76000; 120000 CAPSULE, DELAYED RELEASE PELLETS ORAL at 12:34

## 2019-10-17 RX ADMIN — METRONIDAZOLE 500 MG: 500 TABLET ORAL at 23:22

## 2019-10-17 RX ADMIN — LABETALOL 20 MG/4 ML (5 MG/ML) INTRAVENOUS SYRINGE 10 MG: at 00:04

## 2019-10-17 RX ADMIN — MELATONIN 3 MG: 3 TAB ORAL at 23:22

## 2019-10-17 RX ADMIN — LABETALOL 20 MG/4 ML (5 MG/ML) INTRAVENOUS SYRINGE 10 MG: at 12:08

## 2019-10-17 RX ADMIN — HYDROMORPHONE HYDROCHLORIDE 0.5 MG: 1 INJECTION, SOLUTION INTRAMUSCULAR; INTRAVENOUS; SUBCUTANEOUS at 16:48

## 2019-10-17 RX ADMIN — LABETALOL 20 MG/4 ML (5 MG/ML) INTRAVENOUS SYRINGE 10 MG: at 07:39

## 2019-10-17 RX ADMIN — CLONIDINE HYDROCHLORIDE 0.3 MG: 0.1 TABLET ORAL at 17:06

## 2019-10-17 RX ADMIN — METRONIDAZOLE 500 MG: 500 TABLET ORAL at 15:13

## 2019-10-17 RX ADMIN — PANCRELIPASE 24000 UNITS: 24000; 76000; 120000 CAPSULE, DELAYED RELEASE PELLETS ORAL at 16:50

## 2019-10-17 RX ADMIN — HYDROMORPHONE HYDROCHLORIDE 0.5 MG: 1 INJECTION, SOLUTION INTRAMUSCULAR; INTRAVENOUS; SUBCUTANEOUS at 07:39

## 2019-10-17 RX ADMIN — SUCRALFATE 1 G: 1 TABLET ORAL at 17:06

## 2019-10-17 RX ADMIN — ALUMINUM HYDROXIDE, MAGNESIUM HYDROXIDE, AND SIMETHICONE 30 ML: 200; 200; 20 SUSPENSION ORAL at 19:17

## 2019-10-17 RX ADMIN — DIPHENHYDRAMINE HYDROCHLORIDE 12.5 MG: 50 INJECTION, SOLUTION INTRAMUSCULAR; INTRAVENOUS at 00:04

## 2019-10-17 RX ADMIN — ALUMINUM HYDROXIDE, MAGNESIUM HYDROXIDE, AND SIMETHICONE 30 ML: 200; 200; 20 SUSPENSION ORAL at 04:27

## 2019-10-17 RX ADMIN — HYDROMORPHONE HYDROCHLORIDE 0.5 MG: 1 INJECTION, SOLUTION INTRAMUSCULAR; INTRAVENOUS; SUBCUTANEOUS at 03:00

## 2019-10-17 RX ADMIN — LEVOFLOXACIN 750 MG: 500 TABLET, FILM COATED ORAL at 23:22

## 2019-10-17 RX ADMIN — LORAZEPAM 2 MG: 2 INJECTION INTRAMUSCULAR; INTRAVENOUS at 19:17

## 2019-10-17 RX ADMIN — SODIUM CHLORIDE AND POTASSIUM CHLORIDE 100 ML/HR: .9; .15 SOLUTION INTRAVENOUS at 10:14

## 2019-10-17 RX ADMIN — SODIUM CHLORIDE AND POTASSIUM CHLORIDE 100 ML/HR: .9; .15 SOLUTION INTRAVENOUS at 20:30

## 2019-10-17 RX ADMIN — NICOTINE 1 PATCH: 21 PATCH TRANSDERMAL at 09:35

## 2019-10-17 RX ADMIN — SUCRALFATE 1 G: 1 TABLET ORAL at 12:34

## 2019-10-17 RX ADMIN — HYDROMORPHONE HYDROCHLORIDE 0.5 MG: 1 INJECTION, SOLUTION INTRAMUSCULAR; INTRAVENOUS; SUBCUTANEOUS at 12:09

## 2019-10-17 NOTE — NURSING NOTE
Patient had episode of vomiting this morning, aprox  100mL yellow liquid with 3 small streaks of blood about the size of the tip of a pen  Patient states she is was not nauseated, the vomiting came on suddenly and she does not feel nauseous now  Patient is refusing all PO medication, attending provider aware

## 2019-10-17 NOTE — SOCIAL WORK
Late note: CM met with pt at bedside and explained role  Pt very tired during evaluaiton but able to answer questions  Pt reports she lives with her mother and fiance' in a 2 story house; [address]  She denies the use of any DME at home  Pt report she is completely independent with ADLs  She is unemployed  Pt PCP is Dr Garcia Means  She uses [facility]  [place] for medications and denies any difficulty obtaining them  Pt denies any history of HHC and STR  She does report a history of anxiety but denies any current inpatient or outpatient treatment for same  Pt does report alcohol abuse for several years  She denies any history inpatient or outpatient alcohol treatment  Pt is not interested in any treatment for alcohol at this time  She wishes to return home with family transporting on dischagre  CM will continue to follow  CM reviewed d/c planning process including the following: identifying help at home, patient preference for d/c planning needs, availability of treatment team to discuss questions or concerns patient and/or family may have regarding understanding medications and recognizing signs and symptoms once discharged  CM also encouraged patient to follow up with all recommended appointments after discharge  Patient advised of importance for patient and family to participate in managing patients medical well being

## 2019-10-17 NOTE — PROGRESS NOTES
[facility] Progress Note  Patient: Daniel Stanton 38 y o  female   MRN: [id number]  PCP: Alan Sanchez MD  Unit/Bed#: -01 Encounter: [id number]  Date Of Visit: 10/17/19    Assessment:    Principal Problem:    Acute on chronic alcoholic pancreatitis   Active Problems:    Essential hypertension    Hypokalemia    Alcohol abuse    Alcoholic cirrhosis    Leukocytosis    Diabetes mellitus type 2    Sigmoid colonic diverticulitis    GERD (gastroesophageal reflux disease)    Tobacco abuse      Plan:    · 1  Acute on chronic alcoholic pancreatitis secondary to continual alcohol abuse- on IVF  Will continue pain control  GI following  Patient diet increased from clears to low fat  Patient also has been advised to followup with GI as outpatient for EUS to rule out other causes of PD dilation but has never followed up  · 2  Alcohol abuse- on CIWA  · 3  Hypokalemia and hypomagnesemia- will replete  · 4  Possible sigmoid colonic diverticulitis on CT abdomen- on levaquin and flagyl  Patient will need to be treated for 10 day course as per GI  She also needs a colonoscopy in 8 weeks to rule out underlying malignancy or IBD  · 5  HTN- on clonidine and labetolol prn  · 6  DM- on ISS       VTE Pharmacologic Prophylaxis:   Pharmacologic: Patient has refused VTE prophylaxis  Mechanical VTE Prophylaxis in Place: Yes    Patient Centered Rounds: I have performed bedside rounds with nursing staff today  Discussions with Specialists or Other Care Team Provider:     Education and Discussions with Family / Patient:     Time Spent for Care: 20 minutes  More than 50% of total time spent on counseling and coordination of care as described above      Current Length of Stay: 2 day(s)    Current Patient Status: Inpatient   Certification Statement: The patient will continue to require additional inpatient hospital stay due to acute on chronic pancreatitis    Discharge Plan / Estimated Discharge Date: once pancreatitis resolves    Code Status: Level 1 - Full Code      Subjective:   Patient seen and examined at bedside  Patient has no new complaints  Objective:     Vitals:   Temp (24hrs), Av 2 °F (36 8 °C), Min:97 9 °F (36 6 °C), Max:98 5 °F (36 9 °C)    Temp:  [97 9 °F (36 6 °C)-98 5 °F (36 9 °C)] 97 9 °F (36 6 °C)  HR:  [60-74] 63  Resp:  [18-21] 20  BP: (146-188)/() 180/110  SpO2:  [92 %-100 %] 99 %  There is no height or weight on file to calculate BMI  Input and Output Summary (last 24 hours): Intake/Output Summary (Last 24 hours) at 10/17/2019 1331  Last data filed at 10/17/2019 1014  Gross per 24 hour   Intake 720 ml   Output 400 ml   Net 320 ml       Physical Exam:     Physical Exam   Constitutional: She is oriented to person, place, and time  She appears well-developed and well-nourished  HENT:   Head: Normocephalic and atraumatic  Eyes: Pupils are equal, round, and reactive to light  EOM are normal    Neck: Normal range of motion  Neck supple  No JVD present  No tracheal deviation present  No thyromegaly present  Cardiovascular: Normal rate, regular rhythm and normal heart sounds  Exam reveals no gallop and no friction rub  No murmur heard  Pulmonary/Chest: Effort normal and breath sounds normal  No stridor  No respiratory distress  She has no wheezes  Abdominal: Soft  She exhibits no distension  There is tenderness  There is no guarding  Musculoskeletal: Normal range of motion  Neurological: She is alert and oriented to person, place, and time  Skin: Skin is warm and dry  Vitals reviewed            Additional Data:     Labs:    Results from last 7 days   Lab Units 10/17/19  1156   WBC Thousand/uL 9 63   HEMOGLOBIN g/dL 13 5   HEMATOCRIT % 41 7   PLATELETS Thousands/uL 128*   NEUTROS PCT % 73   LYMPHS PCT % 18   MONOS PCT % 6   EOS PCT % 2     Results from last 7 days   Lab Units 10/17/19  1103  10/15/19  0437   POTASSIUM mmol/L 3 6   < > 3 1*   CHLORIDE mmol/L 102   < > 101   CO2 mmol/L 22   < > 28   BUN mg/dL 4*   < > 12   CREATININE mg/dL 0 52*   < > 0 66   CALCIUM mg/dL 6 9*   < > 7 1*   ALK PHOS U/L  --   --  93   ALT U/L  --   --  27   AST U/L  --   --  58*    < > = values in this interval not displayed  * I Have Reviewed All Lab Data Listed Above  * Additional Pertinent Lab Tests Reviewed: [facility] Admission Reviewed    Imaging:    Imaging Reports Reviewed Today Include:   Imaging Personally Reviewed by Myself Includes:      Recent Cultures (last 7 days):     Results from last 7 days   Lab Units 10/14/19  1715   BLOOD CULTURE  No Growth at 48 hrs  No Growth at 48 hrs         Last 24 Hours Medication List:     Current Facility-Administered Medications:  albuterol 2 puff Inhalation Q4H PRN Fernanda Grace MD    cloNIDine 0 3 mg Oral BID Sapna Gonzalez MD    folic acid 1 mg Oral Daily Sapna Gonzalez MD    HYDROmorphone 0 5 mg Intravenous Q4H PRN OMERO Jones    insulin lispro 1-6 Units Subcutaneous Q6H [address] Fernanda Grace MD    Labetalol HCl 10 mg Intravenous Q3H PRN Sapna Gonzalez MD    levofloxacin 750 mg Oral Q24H Sapna Gonzalez MD    LORazepam 1 mg Intravenous BID PRN Sapna Gonzalez MD    LORazepam 2 mg Intravenous Q4H PRN Fernanda Grace MD    melatonin 3 mg Oral HS Teresa Contreras MD    metoclopramide 10 mg Intravenous Q6H PRN Fernanda Grace MD    metroNIDAZOLE 500 mg Oral [facility] Sapna Gonzalez MD    nicotine 1 patch Transdermal Daily Fernanda Grace MD    nicotine 1 patch Transdermal Once Fernanda Grace MD    pancrelipase (Lip-Prot-Amyl) 24,000 Units Oral TID With Meals Fernanda Grace MD    pantoprazole 40 mg Oral Early Morning Sapna Gonzalez MD    sodium chloride 0 9 % with KCl 20 mEq/L 100 mL/hr Intravenous Continuous Fernanda Grace MD Last Rate: 100 mL/hr (10/17/19 1014)   sucralfate 1 g Oral 4x Daily Fernanda Grace MD    thiamine 100 mg Oral Daily Sapna Gonzalez MD         Today, Patient Was Seen By: Sapna Gonzalez MD    ** Please Note: This note has been constructed using a voice recognition system   **

## 2019-10-17 NOTE — PROGRESS NOTES
GI Progress Note - Laurie Encarnacion 38 y o  female MRN: [id number]    Unit/Bed#: -01 Encounter: [id number]    Subjective: Ms Cuca Campbell reports she was having significant pain in her LLQ overnight and an episode of emesis this AM      Objective:     Vitals: Blood pressure (!) 180/110, pulse 63, temperature 97 9 °F (36 6 °C), temperature source Oral, resp  rate 20, last menstrual period 06/03/2019, SpO2 99 %, not currently breastfeeding  ,There is no height or weight on file to calculate BMI  Intake/Output Summary (Last 24 hours) at 10/17/2019 1303  Last data filed at 10/17/2019 1014  Gross per 24 hour   Intake 720 ml   Output 400 ml   Net 320 ml       Physical Exam: Patient refused exam      Invasive Devices     Peripheral Intravenous Line            Peripheral IV 10/16/19 Dorsal (posterior); Right Hand less than 1 day                Lab Results:  Results from last 7 days   Lab Units 10/17/19  1156   WBC Thousand/uL 9 63   HEMOGLOBIN g/dL 13 5   HEMATOCRIT % 41 7   PLATELETS Thousands/uL 128*   NEUTROS PCT % 73   LYMPHS PCT % 18   MONOS PCT % 6   EOS PCT % 2     Results from last 7 days   Lab Units 10/17/19  1103  10/15/19  0437   POTASSIUM mmol/L 3 6   < > 3 1*   CHLORIDE mmol/L 102   < > 101   CO2 mmol/L 22   < > 28   BUN mg/dL 4*   < > 12   CREATININE mg/dL 0 52*   < > 0 66   CALCIUM mg/dL 6 9*   < > 7 1*   ALK PHOS U/L  --   --  93   ALT U/L  --   --  27   AST U/L  --   --  58*    < > = values in this interval not displayed  Results from last 7 days   Lab Units 10/17/19  1103   LIPASE u/L 716*       Imaging Studies: I have personally reviewed pertinent imaging studies  Ct Abdomen Pelvis With Contrast  Result Date: 10/14/2019  Impression: There is inflammation surrounding the head of the pancreas and duodenum which could be pancreatitis or duodenitis/peptic ulcer disease  Correlate with appropriate lab work up   There is also some thickening of the wall the sigmoid colon in an area of diverticular disease which may indicate mild colonic diverticulitis  Cirrhotic liver is stable from prior  Assessment and Plan:     Acute on Chronic Pancreatitis  Dilated PD  - 2/2 continued alcohol abuse  - Clear liquid diet, advance to low fat diet as tolerated  - Alcohol cessation  - IVF hydration  - DVT prophylaxis and incentive spirometry while hospitalized  - EUS as outpatient to rule out other causes of PD dilation rather than chronic pancreatitis, this has been recommended in the past and she unfortunately has not followed up    Mild Acute Diverticulitis  - Continue levaquin/flagyl for 10 day course total  - CT A/P with findings concerning for mild diverticulitis  - She will need a colonoscopy in 6-8 weeks to rule out underlying malignancy or IBD    Compensated Alcoholic Cirrhosis  - MELD low, no MELD labs currently can recheck MELD labs tomorrow with CMP/INR  - Continues to drink alcohol  - Grade 0 encephalopathy  - No ascites/LE edema  - EGD in December 2017 showed SSBE and mild gastritis, no varices at that time, needs repeat EGD which can be paired with her colonoscopy in 6-8 weeks  - HCC screen up to date with MRI abdomen in June showing an area of fibrosis rather than liver lesions, repeat RUQ US in December for continued surveillance      The patient will be seen by Dr Caterina Choi

## 2019-10-17 NOTE — PROGRESS NOTES
The metronidazole has / have been converted to Oral per [facility] IV-to-PO Auto-Conversion Protocol for Adults as approved by the Pharmacy and Therapeutics Committee  The patient met all eligible criteria:  3 Age = 26years old   2) Received at least one dose of the IV form   3) Receiving at least one other scheduled oral/enteral medication   4) Tolerating an oral/enteral diet   and did not have any exclusions:   1) Critical care patient   2) Active GI bleed (IF assessing H2RAs or PPIs)   3) Continuous tube feeding (IF assessing cipro, doxycycline, levofloxacin, minocycline, rifampin, or voriconazole)   4) Receiving PO vancomycin (IF assessing metronidazole)   5) Persistent nausea and/or vomiting   6) Ileus or gastrointestinal obstruction   7) Nanci/nasogastric tube set for continuous suction   8) Specific order not to automatically convert to PO (in the order's comments or if discussed in the most recent Infectious Disease or primary team's progress notes)

## 2019-10-17 NOTE — PLAN OF CARE
Problem: Nutrition/Hydration-ADULT  Goal: Nutrient/Hydration intake appropriate for improving, restoring or maintaining nutritional needs  Description  Monitor and assess patient's nutrition/hydration status for malnutrition  Collaborate with interdisciplinary team and initiate plan and interventions as ordered  Monitor patient's weight and dietary intake as ordered or per policy  Utilize nutrition screening tool and intervene as necessary  Determine patient's food preferences and provide high-protein, high-caloric foods as appropriate       INTERVENTIONS:  - Monitor oral intake, urinary output, labs, and treatment plans  - Assess nutrition and hydration status and recommend course of action  - Evaluate amount of meals eaten  - Assist patient with eating if necessary   - Allow adequate time for meals  - Recommend/ encourage appropriate diets, oral nutritional supplements, and vitamin/mineral supplements  - Order, calculate, and assess calorie counts as needed  - Recommend, monitor, and adjust tube feedings and TPN/PPN based on assessed needs  - Assess need for intravenous fluids  - Provide specific nutrition/hydration education as appropriate  - Include patient/family/caregiver in decisions related to nutrition  Outcome: Progressing     Problem: RESPIRATORY - ADULT  Goal: Achieves optimal ventilation and oxygenation  Description  INTERVENTIONS:  - Assess for changes in respiratory status  - Assess for changes in mentation and behavior  - Position to facilitate oxygenation and minimize respiratory effort  - Oxygen administered by appropriate delivery if ordered  - Initiate smoking cessation education as indicated  - Encourage broncho-pulmonary hygiene including cough, deep breathe, Incentive Spirometry  - Assess the need for suctioning and aspirate as needed  - Assess and instruct to report SOB or any respiratory difficulty  - Respiratory Therapy support as indicated  Outcome: Progressing     Problem: GASTROINTESTINAL - ADULT  Goal: Minimal or absence of nausea and/or vomiting  Description  INTERVENTIONS:  - Administer IV fluids if ordered to ensure adequate hydration  - Maintain NPO status until nausea and vomiting are resolved  - Nasogastric tube if ordered  - Administer ordered antiemetic medications as needed  - Provide nonpharmacologic comfort measures as appropriate  - Advance diet as tolerated, if ordered  - Consider nutrition services referral to assist patient with adequate nutrition and appropriate food choices  Outcome: Progressing  Goal: Maintains adequate nutritional intake  Description  INTERVENTIONS:  - Monitor percentage of each meal consumed  - Identify factors contributing to decreased intake, treat as appropriate  - Assist with meals as needed  - Monitor I&O, weight, and lab values if indicated  - Obtain nutrition services referral as needed  Outcome: Progressing     Problem: METABOLIC, FLUID AND ELECTROLYTES - ADULT  Goal: Electrolytes maintained within normal limits  Description  INTERVENTIONS:  - Monitor labs and assess patient for signs and symptoms of electrolyte imbalances  - Administer electrolyte replacement as ordered  - Monitor response to electrolyte replacements, including repeat lab results as appropriate  - Instruct patient on fluid and nutrition as appropriate  Outcome: Progressing  Goal: Fluid balance maintained  Description  INTERVENTIONS:  - Monitor labs   - Monitor I/O and WT  - Instruct patient on fluid and nutrition as appropriate  - Assess for signs & symptoms of volume excess or deficit  Outcome: Progressing  Goal: Glucose maintained within target range  Description  INTERVENTIONS:  - Monitor Blood Glucose as ordered  - Assess for signs and symptoms of hyperglycemia and hypoglycemia  - Administer ordered medications to maintain glucose within target range  - Assess nutritional intake and initiate nutrition service referral as needed  Outcome: Progressing     Problem: HEMATOLOGIC - ADULT  Goal: Maintains hematologic stability  Description  INTERVENTIONS  - Assess for signs and symptoms of bleeding or hemorrhage  - Monitor labs  - Administer supportive blood products/factors as ordered and appropriate  Outcome: Progressing     Problem: PAIN - ADULT  Goal: Verbalizes/displays adequate comfort level or baseline comfort level  Description  Interventions:  - Encourage patient to monitor pain and request assistance  - Assess pain using appropriate pain scale  - Administer analgesics based on type and severity of pain and evaluate response  - Implement non-pharmacological measures as appropriate and evaluate response  - Consider cultural and social influences on pain and pain management  - Notify physician/advanced practitioner if interventions unsuccessful or patient reports new pain  Outcome: Progressing     Problem: INFECTION - ADULT  Goal: Absence or prevention of progression during hospitalization  Description  INTERVENTIONS:  - Assess and monitor for signs and symptoms of infection  - Monitor lab/diagnostic results  - Monitor all insertion sites, i e  indwelling lines, tubes, and drains  - Monitor endotracheal if appropriate and nasal secretions for changes in amount and color  - [place] appropriate cooling/warming therapies per order  - Administer medications as ordered  - Instruct and encourage patient and family to use good hand hygiene technique  - Identify and instruct in appropriate isolation precautions for identified infection/condition  Outcome: Progressing     Problem: SAFETY ADULT  Goal: Patient will remain free of falls  Description  INTERVENTIONS:  - Assess patient frequently for physical needs  -  Identify cognitive and physical deficits and behaviors that affect risk of falls    -  [place] fall precautions as indicated by assessment   - Educate patient/family on patient safety including physical limitations  - Instruct patient to call for assistance with activity based on assessment  - Modify environment to reduce risk of injury  - Consider OT/PT consult to assist with strengthening/mobility  Outcome: Progressing     Problem: DISCHARGE PLANNING  Goal: Discharge to home or other facility with appropriate resources  Description  INTERVENTIONS:  - Identify barriers to discharge w/patient and caregiver  - Arrange for needed discharge resources and transportation as appropriate  - Identify discharge learning needs (meds, wound care, etc )  - Arrange for interpretive services to assist at discharge as needed  - Refer to Case Management Department for coordinating discharge planning if the patient needs post-hospital services based on physician/advanced practitioner order or complex needs related to functional status, cognitive ability, or social support system  Outcome: Progressing     Problem: Knowledge Deficit  Goal: Patient/family/caregiver demonstrates understanding of disease process, treatment plan, medications, and discharge instructions  Description  Complete learning assessment and assess knowledge base    Interventions:  - Provide teaching at level of understanding  - Provide teaching via preferred learning methods  Outcome: Progressing

## 2019-10-18 VITALS
HEIGHT: 64 IN | WEIGHT: 167.77 LBS | HEART RATE: 73 BPM | DIASTOLIC BLOOD PRESSURE: 92 MMHG | SYSTOLIC BLOOD PRESSURE: 168 MMHG | BODY MASS INDEX: 28.64 KG/M2 | OXYGEN SATURATION: 98 % | RESPIRATION RATE: 18 BRPM | TEMPERATURE: 98 F

## 2019-10-18 LAB
ALBUMIN SERPL BCP-MCNC: 3.2 G/DL (ref 3.5–5)
ALP SERPL-CCNC: 121 U/L (ref 46–116)
ALT SERPL W P-5'-P-CCNC: 39 U/L (ref 12–78)
ANION GAP SERPL CALCULATED.3IONS-SCNC: 13 MMOL/L (ref 4–13)
AST SERPL W P-5'-P-CCNC: 131 U/L (ref 5–45)
BASOPHILS # BLD AUTO: 0.04 THOUSANDS/ΜL (ref 0–0.1)
BASOPHILS NFR BLD AUTO: 1 % (ref 0–1)
BILIRUB SERPL-MCNC: 0.8 MG/DL (ref 0.2–1)
BUN SERPL-MCNC: 4 MG/DL (ref 5–25)
CALCIUM SERPL-MCNC: 7.9 MG/DL (ref 8.3–10.1)
CHLORIDE SERPL-SCNC: 104 MMOL/L (ref 100–108)
CO2 SERPL-SCNC: 22 MMOL/L (ref 21–32)
CREAT SERPL-MCNC: 0.54 MG/DL (ref 0.6–1.3)
EOSINOPHIL # BLD AUTO: 0.16 THOUSAND/ΜL (ref 0–0.61)
EOSINOPHIL NFR BLD AUTO: 2 % (ref 0–6)
ERYTHROCYTE [DISTWIDTH] IN BLOOD BY AUTOMATED COUNT: 19.9 % (ref 11.6–15.1)
GFR SERPL CREATININE-BSD FRML MDRD: 116 ML/MIN/1.73SQ M
GLUCOSE SERPL-MCNC: 124 MG/DL (ref 65–140)
GLUCOSE SERPL-MCNC: 125 MG/DL (ref 65–140)
GLUCOSE SERPL-MCNC: 133 MG/DL (ref 65–140)
GLUCOSE SERPL-MCNC: 165 MG/DL (ref 65–140)
GLUCOSE SERPL-MCNC: 166 MG/DL (ref 65–140)
HCT VFR BLD AUTO: 38 % (ref 34.8–46.1)
HGB BLD-MCNC: 12.7 G/DL (ref 11.5–15.4)
IMM GRANULOCYTES # BLD AUTO: 0.02 THOUSAND/UL (ref 0–0.2)
IMM GRANULOCYTES NFR BLD AUTO: 0 % (ref 0–2)
INR PPP: 1.2 (ref 0.84–1.19)
LYMPHOCYTES # BLD AUTO: 1.83 THOUSANDS/ΜL (ref 0.6–4.47)
LYMPHOCYTES NFR BLD AUTO: 25 % (ref 14–44)
MCH RBC QN AUTO: 31.2 PG (ref 26.8–34.3)
MCHC RBC AUTO-ENTMCNC: 33.4 G/DL (ref 31.4–37.4)
MCV RBC AUTO: 93 FL (ref 82–98)
MONOCYTES # BLD AUTO: 0.55 THOUSAND/ΜL (ref 0.17–1.22)
MONOCYTES NFR BLD AUTO: 7 % (ref 4–12)
NEUTROPHILS # BLD AUTO: 4.84 THOUSANDS/ΜL (ref 1.85–7.62)
NEUTS SEG NFR BLD AUTO: 65 % (ref 43–75)
NRBC BLD AUTO-RTO: 0 /100 WBCS
PLATELET # BLD AUTO: 119 THOUSANDS/UL (ref 149–390)
PMV BLD AUTO: 10.2 FL (ref 8.9–12.7)
POTASSIUM SERPL-SCNC: 3.7 MMOL/L (ref 3.5–5.3)
PROT SERPL-MCNC: 7.3 G/DL (ref 6.4–8.2)
PROTHROMBIN TIME: 15.3 SECONDS (ref 11.6–14.5)
RBC # BLD AUTO: 4.07 MILLION/UL (ref 3.81–5.12)
SODIUM SERPL-SCNC: 139 MMOL/L (ref 136–145)
WBC # BLD AUTO: 7.44 THOUSAND/UL (ref 4.31–10.16)

## 2019-10-18 PROCEDURE — 80053 COMPREHEN METABOLIC PANEL: CPT | Performed by: PHYSICIAN ASSISTANT

## 2019-10-18 PROCEDURE — 85025 COMPLETE CBC W/AUTO DIFF WBC: CPT | Performed by: INTERNAL MEDICINE

## 2019-10-18 PROCEDURE — 94762 N-INVAS EAR/PLS OXIMTRY CONT: CPT

## 2019-10-18 PROCEDURE — 85610 PROTHROMBIN TIME: CPT | Performed by: PHYSICIAN ASSISTANT

## 2019-10-18 PROCEDURE — 82948 REAGENT STRIP/BLOOD GLUCOSE: CPT

## 2019-10-18 PROCEDURE — 99239 HOSP IP/OBS DSCHRG MGMT >30: CPT | Performed by: INTERNAL MEDICINE

## 2019-10-18 RX ORDER — LEVOFLOXACIN 750 MG/1
750 TABLET ORAL EVERY 24 HOURS
Qty: 7 TABLET | Refills: 0 | Status: SHIPPED | OUTPATIENT
Start: 2019-10-18 | End: 2019-10-25

## 2019-10-18 RX ORDER — OXYCODONE HYDROCHLORIDE 5 MG/1
10 TABLET ORAL EVERY 4 HOURS PRN
Qty: 10 TABLET | Refills: 0 | Status: SHIPPED | OUTPATIENT
Start: 2019-10-18 | End: 2019-10-21

## 2019-10-18 RX ORDER — METRONIDAZOLE 500 MG/1
500 TABLET ORAL EVERY 8 HOURS SCHEDULED
Qty: 21 TABLET | Refills: 0 | Status: SHIPPED | OUTPATIENT
Start: 2019-10-18 | End: 2019-10-25

## 2019-10-18 RX ADMIN — PANCRELIPASE 24000 UNITS: 24000; 76000; 120000 CAPSULE, DELAYED RELEASE PELLETS ORAL at 08:28

## 2019-10-18 RX ADMIN — SUCRALFATE 1 G: 1 TABLET ORAL at 08:29

## 2019-10-18 RX ADMIN — HYDROMORPHONE HYDROCHLORIDE 0.5 MG: 1 INJECTION, SOLUTION INTRAMUSCULAR; INTRAVENOUS; SUBCUTANEOUS at 01:12

## 2019-10-18 RX ADMIN — THIAMINE HCL TAB 100 MG 100 MG: 100 TAB at 08:30

## 2019-10-18 RX ADMIN — PANTOPRAZOLE SODIUM 40 MG: 40 TABLET, DELAYED RELEASE ORAL at 05:33

## 2019-10-18 RX ADMIN — HYDROMORPHONE HYDROCHLORIDE 0.5 MG: 1 INJECTION, SOLUTION INTRAMUSCULAR; INTRAVENOUS; SUBCUTANEOUS at 12:00

## 2019-10-18 RX ADMIN — INSULIN LISPRO 1 UNITS: 100 INJECTION, SOLUTION INTRAVENOUS; SUBCUTANEOUS at 12:42

## 2019-10-18 RX ADMIN — NICOTINE 1 PATCH: 21 PATCH TRANSDERMAL at 08:29

## 2019-10-18 RX ADMIN — HYDROMORPHONE HYDROCHLORIDE 0.5 MG: 1 INJECTION, SOLUTION INTRAMUSCULAR; INTRAVENOUS; SUBCUTANEOUS at 05:33

## 2019-10-18 RX ADMIN — INSULIN LISPRO 1 UNITS: 100 INJECTION, SOLUTION INTRAVENOUS; SUBCUTANEOUS at 08:26

## 2019-10-18 RX ADMIN — LORAZEPAM 2 MG: 2 INJECTION INTRAMUSCULAR; INTRAVENOUS at 02:53

## 2019-10-18 RX ADMIN — METRONIDAZOLE 500 MG: 500 TABLET ORAL at 05:33

## 2019-10-18 RX ADMIN — SUCRALFATE 1 G: 1 TABLET ORAL at 12:42

## 2019-10-18 RX ADMIN — LORAZEPAM 1 MG: 2 INJECTION INTRAMUSCULAR; INTRAVENOUS at 12:51

## 2019-10-18 RX ADMIN — SODIUM CHLORIDE AND POTASSIUM CHLORIDE 100 ML/HR: .9; .15 SOLUTION INTRAVENOUS at 07:20

## 2019-10-18 RX ADMIN — CLONIDINE HYDROCHLORIDE 0.3 MG: 0.1 TABLET ORAL at 08:29

## 2019-10-18 RX ADMIN — INSULIN LISPRO 2 UNITS: 100 INJECTION, SOLUTION INTRAVENOUS; SUBCUTANEOUS at 01:12

## 2019-10-18 RX ADMIN — FOLIC ACID 1 MG: 1 TABLET ORAL at 08:29

## 2019-10-18 RX ADMIN — PANCRELIPASE 24000 UNITS: 24000; 76000; 120000 CAPSULE, DELAYED RELEASE PELLETS ORAL at 12:43

## 2019-10-18 NOTE — PLAN OF CARE
Problem: Nutrition/Hydration-ADULT  Goal: Nutrient/Hydration intake appropriate for improving, restoring or maintaining nutritional needs  Description  Monitor and assess patient's nutrition/hydration status for malnutrition  Collaborate with interdisciplinary team and initiate plan and interventions as ordered  Monitor patient's weight and dietary intake as ordered or per policy  Utilize nutrition screening tool and intervene as necessary  Determine patient's food preferences and provide high-protein, high-caloric foods as appropriate       INTERVENTIONS:  - Monitor oral intake, urinary output, labs, and treatment plans  - Assess nutrition and hydration status and recommend course of action  - Evaluate amount of meals eaten  - Assist patient with eating if necessary   - Allow adequate time for meals  - Recommend/ encourage appropriate diets, oral nutritional supplements, and vitamin/mineral supplements  - Order, calculate, and assess calorie counts as needed  - Recommend, monitor, and adjust tube feedings and TPN/PPN based on assessed needs  - Assess need for intravenous fluids  - Provide specific nutrition/hydration education as appropriate  - Include patient/family/caregiver in decisions related to nutrition  Outcome: Progressing     Problem: RESPIRATORY - ADULT  Goal: Achieves optimal ventilation and oxygenation  Description  INTERVENTIONS:  - Assess for changes in respiratory status  - Assess for changes in mentation and behavior  - Position to facilitate oxygenation and minimize respiratory effort  - Oxygen administered by appropriate delivery if ordered  - Initiate smoking cessation education as indicated  - Encourage broncho-pulmonary hygiene including cough, deep breathe, Incentive Spirometry  - Assess the need for suctioning and aspirate as needed  - Assess and instruct to report SOB or any respiratory difficulty  - Respiratory Therapy support as indicated  Outcome: Progressing     Problem: GASTROINTESTINAL - ADULT  Goal: Minimal or absence of nausea and/or vomiting  Description  INTERVENTIONS:  - Administer IV fluids if ordered to ensure adequate hydration  - Maintain NPO status until nausea and vomiting are resolved  - Nasogastric tube if ordered  - Administer ordered antiemetic medications as needed  - Provide nonpharmacologic comfort measures as appropriate  - Advance diet as tolerated, if ordered  - Consider nutrition services referral to assist patient with adequate nutrition and appropriate food choices  Outcome: Progressing  Goal: Maintains adequate nutritional intake  Description  INTERVENTIONS:  - Monitor percentage of each meal consumed  - Identify factors contributing to decreased intake, treat as appropriate  - Assist with meals as needed  - Monitor I&O, weight, and lab values if indicated  - Obtain nutrition services referral as needed  Outcome: Progressing     Problem: METABOLIC, FLUID AND ELECTROLYTES - ADULT  Goal: Electrolytes maintained within normal limits  Description  INTERVENTIONS:  - Monitor labs and assess patient for signs and symptoms of electrolyte imbalances  - Administer electrolyte replacement as ordered  - Monitor response to electrolyte replacements, including repeat lab results as appropriate  - Instruct patient on fluid and nutrition as appropriate  Outcome: Progressing  Goal: Fluid balance maintained  Description  INTERVENTIONS:  - Monitor labs   - Monitor I/O and WT  - Instruct patient on fluid and nutrition as appropriate  - Assess for signs & symptoms of volume excess or deficit  Outcome: Progressing  Goal: Glucose maintained within target range  Description  INTERVENTIONS:  - Monitor Blood Glucose as ordered  - Assess for signs and symptoms of hyperglycemia and hypoglycemia  - Administer ordered medications to maintain glucose within target range  - Assess nutritional intake and initiate nutrition service referral as needed  Outcome: Progressing

## 2019-10-18 NOTE — NURSING NOTE
Pt refused to maintain pulse oxymetry on  Same reported by night shift  EDU provided at this time  Pt verbalizes understanding  However continues to Refuse    Respiratory and MD notified of same

## 2019-10-18 NOTE — DISCHARGE SUMMARY
Discharge Summary - [facility]    Patient Information: Teresa Andrews 38 y o  female MRN: [id number]  Unit/Bed#: -01 Encounter: [id number]    Discharging Physician / Practitioner: Lisa Tovar MD  PCP: Emelia Gentile MD  Admission Date: 10/14/2019  Discharge Date: 10/18/19    Disposition:     Home    Reason for Admission: Abdominal pain    Discharge Diagnoses:     Principal Problem:    Acute on chronic alcoholic pancreatitis   Active Problems:    Essential hypertension    Hypokalemia    Alcohol abuse    Alcoholic cirrhosis    Leukocytosis    Diabetes mellitus type 2    Sigmoid colonic diverticulitis    GERD (gastroesophageal reflux disease)    Tobacco abuse  Resolved Problems:    * No resolved hospital problems  *      Consultations During Hospital Stay:  · GI    Procedures Performed:     · none    Significant Findings / Test Results:     · CT abdomen and pelvis with contrast  Impression:       There is inflammation surrounding the head of the pancreas and duodenum which could be pancreatitis or duodenitis/peptic ulcer disease   Correlate with appropriate lab work up  There is also some thickening of the wall the sigmoid colon in an area of diverticular disease which may indicate mild colonic diverticulitis  Cirrhotic liver is stable from prior  ·     Incidental Findings:   ·      Test Results Pending at Discharge (will require follow up):   ·      Outpatient Tests Requested:  ·     Complications:  None    History of Present Illness:     Teresa Andrews is a 38 y o  female who presents with complaints of progressively worsening abdominal pain with associated nausea/vomiting and mild diarrhea with significant discomfort starting early this morning  She does have a prior history of alcoholism with associated alcoholic cirrhosis and recurrent pancreatitis as a result    She notes that she had a slice of pizza two days ago and initially attributed her symptoms to it being bad christopher  Early this morning she started having significant abdominal discomfort with associated nausea/vomiting and acknowledges that her last alcoholic drink was yesterday  She also reports some intermittent diarrhea as well  In the ER, she was found to have acute on chronic pancreatitis with possible suspicion of a sigmoid colonic diverticulitis  She has been started on IV fluids and intravenous doses of analgesics and emesis control  Upon my encounter, she is resting bed fairly comfortably but still complains of waxing/waning pain aggravated by movements  She acknowledges the importance of alcohol cessation although states that she does intermittently have urges to continue drinking  She also notes occasional marijuana use to help her with anxiety  Denies any fever/chills this time or other acute complaints currently  Hospital Course:     Ailyn Romano is a 38 y o  female patient who originally presented to the hospital on 10/14/2019 due to complaints of abdominal pain  Patient found to have acute on chronic pancreatitis secondary to continual alcohol abuse  Patient was given IVF  She also had possible sigmoid diverticulitis on CT and was given IV abx  GI was consulted and advised patient to decrease alcohol abuse  She is also to continue PO abx for another 7 days for diverticulitis  Patient abdominal has improved and she is tolerating her diet  Patient is hemodynamically stable and will be discharged home  She is to followup with GI as outpatient for possible colonoscopy in 6-8 weeks  Condition at Discharge: stable     Discharge Day Visit / Exam:     Subjective:  Patient seen and examined at bedside  Patient has no new complaints    Vitals: Blood Pressure: 168/92 (10/18/19 1100)  Pulse: 73 (10/18/19 1100)  Temperature: 98 °F (36 7 °C) (10/18/19 1100)  Temp Source: Oral (10/18/19 1100)  Respirations: 18 (10/18/19 1100)  Height: 5' 4" (162 6 cm) (10/18/19 1042)  Weight - Scale: 76 1 kg (167 lb 12 3 oz) (10/18/19 1042)  SpO2: 98 % (10/18/19 1217)  Exam:   Physical Exam   Constitutional: She is oriented to person, place, and time  She appears well-developed and well-nourished  HENT:   Head: Normocephalic and atraumatic  Eyes: Pupils are equal, round, and reactive to light  EOM are normal    Neck: Normal range of motion  Neck supple  No JVD present  No tracheal deviation present  No thyromegaly present  Cardiovascular: Normal rate, regular rhythm and normal heart sounds  Exam reveals no gallop and no friction rub  No murmur heard  Pulmonary/Chest: Effort normal and breath sounds normal  No stridor  No respiratory distress  Abdominal: Soft  Bowel sounds are normal  She exhibits no distension  There is tenderness  There is no guarding  Musculoskeletal: Normal range of motion  She exhibits no edema  Neurological: She is alert and oriented to person, place, and time  Skin: Skin is warm  Vitals reviewed  Discussion with Family:     Discharge instructions/Information to patient and family:   See after visit summary for information provided to patient and family  Provisions for Follow-Up Care:  See after visit summary for information related to follow-up care and any pertinent home health orders  Planned Readmission: none     Discharge Statement:  I spent 50 minutes discharging the patient  This time was spent on the day of discharge  I had direct contact with the patient on the day of discharge  Greater than 50% of the total time was spent examining patient, answering all patient questions, arranging and discussing plan of care with patient as well as directly providing post-discharge instructions  Additional time then spent on discharge activities  Discharge Medications:  See after visit summary for reconciled discharge medications provided to patient and family        ** Please Note: This note has been constructed using a voice recognition system **

## 2019-10-19 LAB
BACTERIA BLD CULT: NORMAL
BACTERIA BLD CULT: NORMAL

## 2019-10-21 NOTE — UTILIZATION REVIEW
Notification of Discharge  This is a Notification of Discharge from our facility [address]  Please be advised that this patient has been discharge from our facility  Below you will find the admission and discharge date and time including the patients disposition  PRESENTATION DATE: 10/14/2019 11:57 AM  OBS ADMISSION DATE: 10/14/2019  IP ADMISSION DATE: 10/15/19 0917   DISCHARGE DATE: 10/18/2019  1:50 PM  DISPOSITION: Home/Self Care Home/Self Care   Admission Orders listed below:  Admission Orders (From admission, onward)     Ordered        10/15/19 0917  Inpatient Admission  Once         10/14/19 1641  Place in Observation  Once                   Please contact the UR Department if additional information is required to close this patient's authorization/case  [address]  Utilization Review Department  Phone: [phone]; Fax [phone]  [email or web address]  ATTENTION: Please call with any questions or concerns to [phone]  and carefully listen to the prompts so that you are directed to the right person  Send all requests for admission clinical reviews, approved or denied determinations and any other requests to fax [phone]   All voicemails are confidential

## 2019-12-20 ENCOUNTER — APPOINTMENT (EMERGENCY)
Dept: CT IMAGING | Facility: HOSPITAL | Age: 44
DRG: 282 | End: 2019-12-20
Payer: COMMERCIAL

## 2019-12-20 ENCOUNTER — APPOINTMENT (INPATIENT)
Dept: RADIOLOGY | Facility: HOSPITAL | Age: 44
DRG: 282 | End: 2019-12-20
Payer: COMMERCIAL

## 2019-12-20 ENCOUNTER — APPOINTMENT (INPATIENT)
Dept: ULTRASOUND IMAGING | Facility: HOSPITAL | Age: 44
DRG: 282 | End: 2019-12-20
Payer: COMMERCIAL

## 2019-12-20 ENCOUNTER — HOSPITAL ENCOUNTER (INPATIENT)
Facility: HOSPITAL | Age: 44
LOS: 1 days | Discharge: LEFT BEFORE DISCHARGE | DRG: 282 | End: 2019-12-21
Attending: EMERGENCY MEDICINE | Admitting: INTERNAL MEDICINE
Payer: COMMERCIAL

## 2019-12-20 DIAGNOSIS — E83.39 HYPOPHOSPHATEMIA: ICD-10-CM

## 2019-12-20 DIAGNOSIS — E87.2 LACTIC ACIDOSIS: ICD-10-CM

## 2019-12-20 DIAGNOSIS — K86.1 ACUTE ON CHRONIC PANCREATITIS (HCC): Primary | ICD-10-CM

## 2019-12-20 DIAGNOSIS — R11.10 VOMITING: ICD-10-CM

## 2019-12-20 DIAGNOSIS — K70.30 ALCOHOLIC CIRRHOSIS (HCC): ICD-10-CM

## 2019-12-20 DIAGNOSIS — K85.90 ACUTE ON CHRONIC PANCREATITIS (HCC): Primary | ICD-10-CM

## 2019-12-20 PROBLEM — R65.10 SIRS (SYSTEMIC INFLAMMATORY RESPONSE SYNDROME) (HCC): Status: ACTIVE | Noted: 2017-12-14

## 2019-12-20 PROBLEM — I10 ACCELERATED HYPERTENSION: Status: ACTIVE | Noted: 2019-12-20

## 2019-12-20 LAB
ALBUMIN SERPL BCP-MCNC: 4.2 G/DL (ref 3.5–5)
ALP SERPL-CCNC: 186 U/L (ref 46–116)
ALT SERPL W P-5'-P-CCNC: 57 U/L (ref 12–78)
AMMONIA PLAS-SCNC: 21 UMOL/L (ref 11–35)
ANION GAP SERPL CALCULATED.3IONS-SCNC: 28 MMOL/L (ref 4–13)
APTT PPP: 27 SECONDS (ref 23–37)
AST SERPL W P-5'-P-CCNC: 147 U/L (ref 5–45)
ATRIAL RATE: 131 BPM
BACTERIA UR QL AUTO: ABNORMAL /HPF
BASOPHILS # BLD AUTO: 0.08 THOUSANDS/ΜL (ref 0–0.1)
BASOPHILS NFR BLD AUTO: 1 % (ref 0–1)
BILIRUB SERPL-MCNC: 1.5 MG/DL (ref 0.2–1)
BILIRUB UR QL STRIP: ABNORMAL
BUN SERPL-MCNC: 9 MG/DL (ref 5–25)
CALCIUM SERPL-MCNC: 9.2 MG/DL (ref 8.3–10.1)
CHLORIDE SERPL-SCNC: 95 MMOL/L (ref 100–108)
CLARITY UR: ABNORMAL
CO2 SERPL-SCNC: 17 MMOL/L (ref 21–32)
COLOR UR: YELLOW
CREAT SERPL-MCNC: 0.89 MG/DL (ref 0.6–1.3)
EOSINOPHIL # BLD AUTO: 0.01 THOUSAND/ΜL (ref 0–0.61)
EOSINOPHIL NFR BLD AUTO: 0 % (ref 0–6)
ERYTHROCYTE [DISTWIDTH] IN BLOOD BY AUTOMATED COUNT: 17.9 % (ref 11.6–15.1)
ETHANOL SERPL-MCNC: 116 MG/DL (ref 0–3)
EXT PREG TEST URINE: NEGATIVE
EXT. CONTROL ED NAV: NORMAL
FLUAV RNA NPH QL NAA+PROBE: NORMAL
FLUBV RNA NPH QL NAA+PROBE: NORMAL
GFR SERPL CREATININE-BSD FRML MDRD: 80 ML/MIN/1.73SQ M
GLUCOSE SERPL-MCNC: 143 MG/DL (ref 65–140)
GLUCOSE SERPL-MCNC: 154 MG/DL (ref 65–140)
GLUCOSE SERPL-MCNC: 163 MG/DL (ref 65–140)
GLUCOSE SERPL-MCNC: 215 MG/DL (ref 65–140)
GLUCOSE UR STRIP-MCNC: NEGATIVE MG/DL
HCT VFR BLD AUTO: 46.1 % (ref 34.8–46.1)
HGB BLD-MCNC: 15.1 G/DL (ref 11.5–15.4)
HGB UR QL STRIP.AUTO: ABNORMAL
IMM GRANULOCYTES # BLD AUTO: 0.04 THOUSAND/UL (ref 0–0.2)
IMM GRANULOCYTES NFR BLD AUTO: 0 % (ref 0–2)
INR PPP: 1.1 (ref 0.84–1.19)
KETONES UR STRIP-MCNC: ABNORMAL MG/DL
LACTATE SERPL-SCNC: 0.9 MMOL/L (ref 0.5–2)
LACTATE SERPL-SCNC: 1.7 MMOL/L (ref 0.5–2)
LACTATE SERPL-SCNC: 2.1 MMOL/L (ref 0.5–2)
LACTATE SERPL-SCNC: 2.5 MMOL/L (ref 0.5–2)
LACTATE SERPL-SCNC: 3.2 MMOL/L (ref 0.5–2)
LACTATE SERPL-SCNC: 7.5 MMOL/L (ref 0.5–2)
LEUKOCYTE ESTERASE UR QL STRIP: NEGATIVE
LIPASE SERPL-CCNC: 1207 U/L (ref 73–393)
LYMPHOCYTES # BLD AUTO: 2.94 THOUSANDS/ΜL (ref 0.6–4.47)
LYMPHOCYTES NFR BLD AUTO: 25 % (ref 14–44)
MAGNESIUM SERPL-MCNC: 1.6 MG/DL (ref 1.6–2.6)
MCH RBC QN AUTO: 30.2 PG (ref 26.8–34.3)
MCHC RBC AUTO-ENTMCNC: 32.8 G/DL (ref 31.4–37.4)
MCV RBC AUTO: 92 FL (ref 82–98)
MONOCYTES # BLD AUTO: 1.13 THOUSAND/ΜL (ref 0.17–1.22)
MONOCYTES NFR BLD AUTO: 10 % (ref 4–12)
MUCOUS THREADS UR QL AUTO: ABNORMAL
NEUTROPHILS # BLD AUTO: 7.64 THOUSANDS/ΜL (ref 1.85–7.62)
NEUTS SEG NFR BLD AUTO: 64 % (ref 43–75)
NITRITE UR QL STRIP: NEGATIVE
NON-SQ EPI CELLS URNS QL MICRO: ABNORMAL /HPF
NRBC BLD AUTO-RTO: 0 /100 WBCS
P AXIS: 76 DEGREES
PH UR STRIP.AUTO: 6 [PH]
PHOSPHATE SERPL-MCNC: 1.2 MG/DL (ref 2.7–4.5)
PLATELET # BLD AUTO: 123 THOUSANDS/UL (ref 149–390)
PLATELET # BLD AUTO: 213 THOUSANDS/UL (ref 149–390)
PMV BLD AUTO: 10.1 FL (ref 8.9–12.7)
PMV BLD AUTO: 9.7 FL (ref 8.9–12.7)
POTASSIUM SERPL-SCNC: 3.3 MMOL/L (ref 3.5–5.3)
PR INTERVAL: 132 MS
PROCALCITONIN SERPL-MCNC: 0.05 NG/ML
PROT SERPL-MCNC: 9.3 G/DL (ref 6.4–8.2)
PROT UR STRIP-MCNC: >=300 MG/DL
PROTHROMBIN TIME: 14.2 SECONDS (ref 11.6–14.5)
QRS AXIS: 36 DEGREES
QRSD INTERVAL: 82 MS
QT INTERVAL: 394 MS
QTC INTERVAL: 581 MS
RBC # BLD AUTO: 5 MILLION/UL (ref 3.81–5.12)
RBC #/AREA URNS AUTO: ABNORMAL /HPF
RSV RNA NPH QL NAA+PROBE: NORMAL
SODIUM SERPL-SCNC: 140 MMOL/L (ref 136–145)
SP GR UR STRIP.AUTO: >=1.03 (ref 1–1.03)
T WAVE AXIS: 62 DEGREES
TROPONIN I SERPL-MCNC: <0.02 NG/ML
UROBILINOGEN UR QL STRIP.AUTO: 1 E.U./DL
VENTRICULAR RATE: 131 BPM
WBC # BLD AUTO: 11.84 THOUSAND/UL (ref 4.31–10.16)
WBC #/AREA URNS AUTO: ABNORMAL /HPF

## 2019-12-20 PROCEDURE — 99223 1ST HOSP IP/OBS HIGH 75: CPT | Performed by: INTERNAL MEDICINE

## 2019-12-20 PROCEDURE — 80320 DRUG SCREEN QUANTALCOHOLS: CPT | Performed by: PHYSICIAN ASSISTANT

## 2019-12-20 PROCEDURE — 76705 ECHO EXAM OF ABDOMEN: CPT

## 2019-12-20 PROCEDURE — 83690 ASSAY OF LIPASE: CPT | Performed by: PHYSICIAN ASSISTANT

## 2019-12-20 PROCEDURE — 84484 ASSAY OF TROPONIN QUANT: CPT | Performed by: PHYSICIAN ASSISTANT

## 2019-12-20 PROCEDURE — 94760 N-INVAS EAR/PLS OXIMETRY 1: CPT

## 2019-12-20 PROCEDURE — 83605 ASSAY OF LACTIC ACID: CPT | Performed by: PHYSICIAN ASSISTANT

## 2019-12-20 PROCEDURE — 81025 URINE PREGNANCY TEST: CPT | Performed by: PHYSICIAN ASSISTANT

## 2019-12-20 PROCEDURE — 96366 THER/PROPH/DIAG IV INF ADDON: CPT

## 2019-12-20 PROCEDURE — 96376 TX/PRO/DX INJ SAME DRUG ADON: CPT

## 2019-12-20 PROCEDURE — 85730 THROMBOPLASTIN TIME PARTIAL: CPT | Performed by: PHYSICIAN ASSISTANT

## 2019-12-20 PROCEDURE — 87631 RESP VIRUS 3-5 TARGETS: CPT | Performed by: INTERNAL MEDICINE

## 2019-12-20 PROCEDURE — 85049 AUTOMATED PLATELET COUNT: CPT | Performed by: INTERNAL MEDICINE

## 2019-12-20 PROCEDURE — 74177 CT ABD & PELVIS W/CONTRAST: CPT

## 2019-12-20 PROCEDURE — 84100 ASSAY OF PHOSPHORUS: CPT | Performed by: PHYSICIAN ASSISTANT

## 2019-12-20 PROCEDURE — 93005 ELECTROCARDIOGRAM TRACING: CPT

## 2019-12-20 PROCEDURE — 82948 REAGENT STRIP/BLOOD GLUCOSE: CPT

## 2019-12-20 PROCEDURE — 82140 ASSAY OF AMMONIA: CPT | Performed by: PHYSICIAN ASSISTANT

## 2019-12-20 PROCEDURE — 71045 X-RAY EXAM CHEST 1 VIEW: CPT

## 2019-12-20 PROCEDURE — 85025 COMPLETE CBC W/AUTO DIFF WBC: CPT | Performed by: PHYSICIAN ASSISTANT

## 2019-12-20 PROCEDURE — 99285 EMERGENCY DEPT VISIT HI MDM: CPT | Performed by: PHYSICIAN ASSISTANT

## 2019-12-20 PROCEDURE — 80053 COMPREHEN METABOLIC PANEL: CPT | Performed by: PHYSICIAN ASSISTANT

## 2019-12-20 PROCEDURE — 36415 COLL VENOUS BLD VENIPUNCTURE: CPT | Performed by: PHYSICIAN ASSISTANT

## 2019-12-20 PROCEDURE — 85610 PROTHROMBIN TIME: CPT | Performed by: PHYSICIAN ASSISTANT

## 2019-12-20 PROCEDURE — 83605 ASSAY OF LACTIC ACID: CPT | Performed by: INTERNAL MEDICINE

## 2019-12-20 PROCEDURE — 96365 THER/PROPH/DIAG IV INF INIT: CPT

## 2019-12-20 PROCEDURE — 96375 TX/PRO/DX INJ NEW DRUG ADDON: CPT

## 2019-12-20 PROCEDURE — 81001 URINALYSIS AUTO W/SCOPE: CPT | Performed by: PHYSICIAN ASSISTANT

## 2019-12-20 PROCEDURE — 99285 EMERGENCY DEPT VISIT HI MDM: CPT

## 2019-12-20 PROCEDURE — 93010 ELECTROCARDIOGRAM REPORT: CPT | Performed by: INTERNAL MEDICINE

## 2019-12-20 PROCEDURE — 83735 ASSAY OF MAGNESIUM: CPT | Performed by: PHYSICIAN ASSISTANT

## 2019-12-20 PROCEDURE — 96361 HYDRATE IV INFUSION ADD-ON: CPT

## 2019-12-20 PROCEDURE — 87040 BLOOD CULTURE FOR BACTERIA: CPT | Performed by: PHYSICIAN ASSISTANT

## 2019-12-20 PROCEDURE — 84145 PROCALCITONIN (PCT): CPT | Performed by: PHYSICIAN ASSISTANT

## 2019-12-20 RX ORDER — FOLIC ACID 1 MG/1
1 TABLET ORAL DAILY
Status: DISCONTINUED | OUTPATIENT
Start: 2019-12-21 | End: 2019-12-21 | Stop reason: HOSPADM

## 2019-12-20 RX ORDER — GABAPENTIN 300 MG/1
300 CAPSULE ORAL 3 TIMES DAILY
Status: DISCONTINUED | OUTPATIENT
Start: 2019-12-20 | End: 2019-12-21 | Stop reason: HOSPADM

## 2019-12-20 RX ORDER — ONDANSETRON 2 MG/ML
4 INJECTION INTRAMUSCULAR; INTRAVENOUS ONCE
Status: COMPLETED | OUTPATIENT
Start: 2019-12-20 | End: 2019-12-20

## 2019-12-20 RX ORDER — CLONIDINE HYDROCHLORIDE 0.1 MG/1
0.3 TABLET ORAL 2 TIMES DAILY
Status: DISCONTINUED | OUTPATIENT
Start: 2019-12-20 | End: 2019-12-21 | Stop reason: HOSPADM

## 2019-12-20 RX ORDER — PANTOPRAZOLE SODIUM 40 MG/1
40 INJECTION, POWDER, FOR SOLUTION INTRAVENOUS
Status: DISCONTINUED | OUTPATIENT
Start: 2019-12-21 | End: 2019-12-21 | Stop reason: HOSPADM

## 2019-12-20 RX ORDER — HYDROMORPHONE HCL 110MG/55ML
2 PATIENT CONTROLLED ANALGESIA SYRINGE INTRAVENOUS EVERY 4 HOURS PRN
Status: DISCONTINUED | OUTPATIENT
Start: 2019-12-20 | End: 2019-12-21 | Stop reason: HOSPADM

## 2019-12-20 RX ORDER — HYDROMORPHONE HCL/PF 1 MG/ML
0.2 SYRINGE (ML) INJECTION ONCE
Status: COMPLETED | OUTPATIENT
Start: 2019-12-20 | End: 2019-12-20

## 2019-12-20 RX ORDER — THIAMINE MONONITRATE (VIT B1) 100 MG
100 TABLET ORAL DAILY
Status: DISCONTINUED | OUTPATIENT
Start: 2019-12-21 | End: 2019-12-21 | Stop reason: HOSPADM

## 2019-12-20 RX ORDER — LORAZEPAM 2 MG/ML
2 INJECTION INTRAMUSCULAR ONCE
Status: COMPLETED | OUTPATIENT
Start: 2019-12-20 | End: 2019-12-20

## 2019-12-20 RX ORDER — HYDROMORPHONE HCL/PF 1 MG/ML
0.5 SYRINGE (ML) INJECTION ONCE
Status: COMPLETED | OUTPATIENT
Start: 2019-12-20 | End: 2019-12-20

## 2019-12-20 RX ORDER — LABETALOL 20 MG/4 ML (5 MG/ML) INTRAVENOUS SYRINGE
10 EVERY 4 HOURS PRN
Status: DISCONTINUED | OUTPATIENT
Start: 2019-12-20 | End: 2019-12-21 | Stop reason: HOSPADM

## 2019-12-20 RX ORDER — ONDANSETRON 2 MG/ML
4 INJECTION INTRAMUSCULAR; INTRAVENOUS EVERY 4 HOURS PRN
Status: DISCONTINUED | OUTPATIENT
Start: 2019-12-20 | End: 2019-12-20 | Stop reason: SDUPTHER

## 2019-12-20 RX ORDER — HEPARIN SODIUM 5000 [USP'U]/ML
5000 INJECTION, SOLUTION INTRAVENOUS; SUBCUTANEOUS EVERY 8 HOURS SCHEDULED
Status: DISCONTINUED | OUTPATIENT
Start: 2019-12-20 | End: 2019-12-21 | Stop reason: HOSPADM

## 2019-12-20 RX ORDER — ALBUTEROL SULFATE 90 UG/1
2 AEROSOL, METERED RESPIRATORY (INHALATION) EVERY 4 HOURS PRN
Status: DISCONTINUED | OUTPATIENT
Start: 2019-12-20 | End: 2019-12-21 | Stop reason: HOSPADM

## 2019-12-20 RX ORDER — LABETALOL 20 MG/4 ML (5 MG/ML) INTRAVENOUS SYRINGE
10 ONCE
Status: COMPLETED | OUTPATIENT
Start: 2019-12-20 | End: 2019-12-20

## 2019-12-20 RX ORDER — SODIUM CHLORIDE 9 MG/ML
125 INJECTION, SOLUTION INTRAVENOUS CONTINUOUS
Status: DISCONTINUED | OUTPATIENT
Start: 2019-12-20 | End: 2019-12-21

## 2019-12-20 RX ORDER — NICOTINE 21 MG/24HR
1 PATCH, TRANSDERMAL 24 HOURS TRANSDERMAL DAILY
Status: DISCONTINUED | OUTPATIENT
Start: 2019-12-21 | End: 2019-12-21 | Stop reason: HOSPADM

## 2019-12-20 RX ORDER — ONDANSETRON 2 MG/ML
4 INJECTION INTRAMUSCULAR; INTRAVENOUS EVERY 4 HOURS PRN
Status: DISCONTINUED | OUTPATIENT
Start: 2019-12-20 | End: 2019-12-21 | Stop reason: HOSPADM

## 2019-12-20 RX ORDER — MAGNESIUM SULFATE HEPTAHYDRATE 40 MG/ML
2 INJECTION, SOLUTION INTRAVENOUS ONCE
Status: COMPLETED | OUTPATIENT
Start: 2019-12-20 | End: 2019-12-21

## 2019-12-20 RX ORDER — LORAZEPAM 2 MG/ML
1 INJECTION INTRAMUSCULAR ONCE
Status: COMPLETED | OUTPATIENT
Start: 2019-12-20 | End: 2019-12-20

## 2019-12-20 RX ADMIN — SODIUM CHLORIDE 1000 ML: 0.9 INJECTION, SOLUTION INTRAVENOUS at 09:05

## 2019-12-20 RX ADMIN — SODIUM CHLORIDE 1000 ML: 0.9 INJECTION, SOLUTION INTRAVENOUS at 10:20

## 2019-12-20 RX ADMIN — INSULIN LISPRO 2 UNITS: 100 INJECTION, SOLUTION INTRAVENOUS; SUBCUTANEOUS at 15:13

## 2019-12-20 RX ADMIN — IOHEXOL 100 ML: 350 INJECTION, SOLUTION INTRAVENOUS at 12:13

## 2019-12-20 RX ADMIN — LABETALOL 20 MG/4 ML (5 MG/ML) INTRAVENOUS SYRINGE 10 MG: at 22:31

## 2019-12-20 RX ADMIN — ONDANSETRON 4 MG: 2 INJECTION INTRAMUSCULAR; INTRAVENOUS at 10:20

## 2019-12-20 RX ADMIN — POTASSIUM PHOSPHATE, MONOBASIC AND POTASSIUM PHOSPHATE, DIBASIC 21 MMOL: 224; 236 INJECTION, SOLUTION INTRAVENOUS at 11:46

## 2019-12-20 RX ADMIN — HYDROMORPHONE HYDROCHLORIDE 2 MG: 2 INJECTION, SOLUTION INTRAMUSCULAR; INTRAVENOUS; SUBCUTANEOUS at 16:24

## 2019-12-20 RX ADMIN — SODIUM CHLORIDE 125 ML/HR: 0.9 INJECTION, SOLUTION INTRAVENOUS at 14:17

## 2019-12-20 RX ADMIN — HYDROMORPHONE HYDROCHLORIDE 2 MG: 2 INJECTION, SOLUTION INTRAMUSCULAR; INTRAVENOUS; SUBCUTANEOUS at 20:19

## 2019-12-20 RX ADMIN — LABETALOL 20 MG/4 ML (5 MG/ML) INTRAVENOUS SYRINGE 10 MG: at 14:18

## 2019-12-20 RX ADMIN — LORAZEPAM 1 MG: 2 INJECTION INTRAMUSCULAR; INTRAVENOUS at 14:55

## 2019-12-20 RX ADMIN — MAGNESIUM SULFATE HEPTAHYDRATE 2 G: 40 INJECTION, SOLUTION INTRAVENOUS at 16:42

## 2019-12-20 RX ADMIN — LORAZEPAM 2 MG: 2 INJECTION INTRAMUSCULAR; INTRAVENOUS at 21:12

## 2019-12-20 RX ADMIN — LORAZEPAM 2 MG: 2 INJECTION INTRAMUSCULAR; INTRAVENOUS at 09:02

## 2019-12-20 RX ADMIN — HYDROMORPHONE HYDROCHLORIDE 0.2 MG: 1 INJECTION, SOLUTION INTRAMUSCULAR; INTRAVENOUS; SUBCUTANEOUS at 10:20

## 2019-12-20 RX ADMIN — GABAPENTIN 300 MG: 300 CAPSULE ORAL at 21:00

## 2019-12-20 RX ADMIN — ONDANSETRON 4 MG: 2 INJECTION INTRAMUSCULAR; INTRAVENOUS at 19:39

## 2019-12-20 RX ADMIN — HYDROMORPHONE HYDROCHLORIDE 0.5 MG: 1 INJECTION, SOLUTION INTRAMUSCULAR; INTRAVENOUS; SUBCUTANEOUS at 12:34

## 2019-12-20 NOTE — ASSESSMENT & PLAN NOTE
- last HbA1c of 6 9 in October 2019  - hold Metformin while hospitalized especially in the setting of lactic acidosis and abnormal LFTs - initiated SSI coverage per [employer] (Q6h while NPO)

## 2019-12-20 NOTE — ED PROVIDER NOTES
History  Chief Complaint   Patient presents with    Abdominal Pain     Pt present with center abdominal pain  Pt is a daily vodka drinker and last drink was about 10-12 hours ago  Hx of pancreatitis      36-year-old female with history of alcohol abuse, pancreatitis, GERD, hypertension, cirrhosis, and panic attacks presenting for evaluation of abdominal pain that began 7 hours ago  Patient rates her pain as a 10/10 in severity  The pain is worse in the periumbilical region and radiates to the back  She states this feels exactly like her bouts of pancreatitis in the past  Patient also complains of nausea and vomiting  She has had innumerable episodes of vomiting since her symptoms began  Patient admits to daily alcohol use with her last drink being about 12 hours ago  She estimates that she typically drinks 3 alcoholic beverages per day  Patient denies fevers, dysuria, urinary frequency, chest pain, shortness of breath, vaginal bleeding, vaginal discharge  Previous abdominal surgeries include  sections and EGD  Patient was admitted in 2019 for similar symptoms         History provided by:  Patient and medical records   used: No    Abdominal Pain   Pain location:  Epigastric  Pain quality: sharp and stabbing    Pain radiates to:  Back  Pain severity:  Severe  Onset quality:  Sudden  Duration:  7 hours  Timing:  Constant  Progression:  Unchanged  Chronicity:  Recurrent  Context: alcohol use    Context: not diet changes, not eating, not previous surgeries, not sick contacts, not suspicious food intake and not trauma    Relieved by:  Nothing  Worsened by:  Nothing  Ineffective treatments:  None tried  Associated symptoms: anorexia, fatigue, nausea and vomiting    Associated symptoms: no chest pain, no chills, no constipation, no cough, no diarrhea, no dysuria, no fever, no flatus, no hematemesis, no hematochezia, no hematuria, no melena, no shortness of breath, no sore throat, no vaginal bleeding and no vaginal discharge    Risk factors: alcohol abuse        Prior to Admission Medications   Prescriptions Last Dose Informant Patient Reported? Taking?    LORazepam (ATIVAN) 0 5 mg tablet   No No   Sig: Take 2 tablets (1 mg total) by mouth every 8 (eight) hours as needed for anxiety   Patient not taking: Reported on 8/2/2019   Mometasone Furo-Formoterol Fum (DULERA) 100-5 MCG/ACT AERO   Yes No   Sig: Inhale as needed Unsure of dose     albuterol (PROVENTIL HFA,VENTOLIN HFA) 90 mcg/act inhaler   No No   Sig: Inhale 2 puffs every 4 (four) hours as needed for wheezing   cloNIDine (CATAPRES) 0 3 mg tablet   Yes No   Sig: Take 0 3 mg by mouth 2 (two) times a day   dicyclomine (BENTYL) 20 mg tablet   No No   Sig: Take 1 tablet (20 mg total) by mouth 3 (three) times a day   Patient not taking: Reported on 68/57/3608   folic acid (FOLVITE) 1 mg tablet   No No   Sig: Take 1 tablet (1 mg total) by mouth daily   Patient not taking: Reported on 10/15/2019   gabapentin (NEURONTIN) 300 mg capsule   No No   Sig: Take 1 capsule (300 mg total) by mouth 3 (three) times a day   Patient not taking: Reported on 10/15/2019   metFORMIN (GLUCOPHAGE) 500 mg tablet   No No   Sig: Take 1 tablet (500 mg total) by mouth daily with breakfast   Patient not taking: Reported on 10/15/2019   metoclopramide (REGLAN) 10 mg tablet   No No   Sig: Take 1 tablet (10 mg total) by mouth every 6 (six) hours As needed for nausea and vomiting   Patient not taking: Reported on 10/15/2019   naproxen (NAPROSYN) 500 mg tablet   No No   Sig: Take 1 tablet (500 mg total) by mouth 2 (two) times a day with meals For mild abdominal pain   Patient not taking: Reported on 10/15/2019   nicotine (NICODERM CQ) 21 mg/24 hr TD 24 hr patch   No No   Sig: Place 1 patch on the skin daily   ondansetron (ZOFRAN) 4 mg tablet   No No   Sig: Take 1 tablet (4 mg total) by mouth every 8 (eight) hours as needed for nausea or vomiting   Patient not taking: Reported on 10/15/2019   pancrelipase, Lip-Prot-Amyl, (CREON) 24,000 units   No No   Sig: Take 1 capsule by mouth 3 (three) times a day with meals   pantoprazole (PROTONIX) 20 mg tablet   No No   Sig: Take 1 tablet (20 mg total) by mouth daily   sucralfate (CARAFATE) 1 g tablet   No No   Sig: Take 1 tablet (1 g total) by mouth 4 (four) times a day   Patient not taking: Reported on 10/15/2019   thiamine 100 MG tablet   No No   Sig: Take 1 tablet (100 mg total) by mouth daily   Patient not taking: Reported on 10/15/2019      Facility-Administered Medications: None       Past Medical History:   Diagnosis Date    Alcohol abuse     Arthritis     GERD (gastroesophageal reflux disease)     Hypertension     Nicotine dependence     Obesity     Pancreatitis     Panic attack        Past Surgical History:   Procedure Laterality Date    ABDOMINAL SURGERY      C SECTION    ESOPHAGOGASTRODUODENOSCOPY N/A 12/15/2017    Procedure: ESOPHAGOGASTRODUODENOSCOPY (EGD); Surgeon: Pramod Hernandez MD;  Location: [facility]; Service: Gastroenterology       Family History   Problem Relation Age of Onset    Cirrhosis Father     Alcohol abuse Father     Drug abuse Mother      I have reviewed and agree with the history as documented  Social History     Tobacco Use    Smoking status: Current Every Day Smoker     Packs/day: 1 00     Years: 29 00     Pack years: 29 00     Types: Cigarettes    Smokeless tobacco: Never Used    Tobacco comment: pt refused packet   Substance Use Topics    Alcohol use: Yes     Frequency: 4 or more times a week     Drinks per session: 3 or 4     Comment: Patient states 'I drink alot of vodka a day"    Drug use: Never        Review of Systems   Constitutional: Positive for fatigue  Negative for chills and fever  HENT: Negative for drooling and sore throat  Respiratory: Negative for cough and shortness of breath  Cardiovascular: Positive for palpitations  Negative for chest pain     Gastrointestinal: Positive for abdominal pain, anorexia, nausea and vomiting  Negative for constipation, diarrhea, flatus, hematemesis, hematochezia and melena  Genitourinary: Negative for dysuria, hematuria, vaginal bleeding and vaginal discharge  Musculoskeletal: Negative for back pain and neck pain  Skin: Negative for color change and rash  Neurological: Positive for tremors  Negative for weakness and light-headedness  Psychiatric/Behavioral: Negative for confusion  All other systems reviewed and are negative  Physical Exam  Physical Exam   Constitutional: She appears well-developed and well-nourished  She appears ill  She appears distressed  Patient appears uncomfortable and is clutching her abdomen   HENT:   Head: Normocephalic and atraumatic  Right Ear: External ear normal    Left Ear: External ear normal    Nose: Nose normal    Mouth/Throat: Oropharynx is clear and moist    Eyes: Conjunctivae are normal  Right eye exhibits no discharge  Left eye exhibits no discharge  No scleral icterus  Neck: Normal range of motion  Neck supple  Cardiovascular: Regular rhythm and normal heart sounds  Tachycardia present  No murmur heard  Pulmonary/Chest: Effort normal and breath sounds normal  No stridor  No respiratory distress  She has no wheezes  She has no rales  Abdominal: Soft  Normal appearance and bowel sounds are normal  She exhibits no distension  There is tenderness in the epigastric area and periumbilical area  There is no rigidity, no guarding and no CVA tenderness  Musculoskeletal: Normal range of motion  She exhibits no edema or deformity  Lymphadenopathy:     She has no cervical adenopathy  Neurological: She is alert  She is not disoriented  She displays tremor  GCS eye subscore is 4  GCS verbal subscore is 5  GCS motor subscore is 6  Patient tremulous    Skin: Skin is warm and dry  She is not diaphoretic  Psychiatric: She has a normal mood and affect   Her behavior is normal    Nursing note and vitals reviewed        Vital Signs  ED Triage Vitals   Temperature Pulse Respirations Blood Pressure SpO2   12/20/19 0855 12/20/19 0855 12/20/19 0855 12/20/19 0855 12/20/19 0855   98 2 °F (36 8 °C) (!) 135 20 (!) 177/104 98 %      Temp Source Heart Rate Source Patient Position - Orthostatic VS BP Location FiO2 (%)   12/20/19 0855 12/20/19 0855 12/20/19 0855 12/20/19 0855 --   Oral Monitor Lying Right arm       Pain Score       12/20/19 1020       Worst Possible Pain           Vitals:    12/20/19 1130 12/20/19 1226 12/20/19 1300 12/20/19 1330   BP: (!) 178/106 (!) 168/111 (!) 168/109 (!) 182/101   Pulse: 97 97 101 102   Patient Position - Orthostatic VS: Lying Lying Lying Lying         Visual Acuity      ED Medications  Medications   potassium phosphate 21 mmol in sodium chloride 0 9 % 250 mL infusion (21 mmol Intravenous New Bag 12/20/19 1146)   Labetalol HCl (NORMODYNE) injection 10 mg (has no administration in time range)   sodium chloride 0 9 % infusion (has no administration in time range)   LORazepam (ATIVAN) 2 mg/mL injection 2 mg (2 mg Intravenous Given 12/20/19 0902)   sodium chloride 0 9 % bolus 1,000 mL (0 mL Intravenous Stopped 12/20/19 1047)   sodium chloride 0 9 % bolus 1,000 mL (0 mL Intravenous Stopped 12/20/19 1144)   HYDROmorphone (DILAUDID) injection 0 2 mg (0 2 mg Intravenous Given 12/20/19 1020)   ondansetron (ZOFRAN) injection 4 mg (4 mg Intravenous Given 12/20/19 1020)   HYDROmorphone (DILAUDID) injection 0 5 mg (0 5 mg Intravenous Given 12/20/19 1234)   iohexol (OMNIPAQUE) 350 MG/ML injection (MULTI-DOSE) 100 mL (100 mL Intravenous Given 12/20/19 1213)       Diagnostic Studies  Results Reviewed     Procedure Component Value Units Date/Time    Blood culture #1 [[id number]]     Lab Status:  No result Specimen:  Blood     Blood culture #2 [[id number]]     Lab Status:  No result Specimen:  Blood     Procalcitonin [[id number]]     Lab Status:  No result Specimen:  Blood     Lactic acid, plasma x2 [[id number]]  (Abnormal) Collected:  12/20/19 1141    Lab Status:  Final result Specimen:  Blood from Arm, Right Updated:  12/20/19 1212     LACTIC ACID 2 5 mmol/L     Narrative:       Result may be elevated if tourniquet was used during collection      Urine Microscopic [[id number]]  (Abnormal) Collected:  12/20/19 1147    Lab Status:  Final result Specimen:  Urine, Clean Catch Updated:  12/20/19 1201     RBC, UA 1-2 /hpf      WBC, UA 0-1 /hpf      Epithelial Cells Moderate /hpf      Bacteria, UA Moderate /hpf      MUCUS THREADS Moderate    UA w Reflex to Microscopic w Reflex to Culture [[id number]]  (Abnormal) Collected:  12/20/19 1147    Lab Status:  Final result Specimen:  Urine, Clean Catch Updated:  12/20/19 1155     Color, UA Yellow     Clarity, UA Cloudy     Specific Gravity, UA >=1 030     pH, UA 6 0     Leukocytes, UA Negative     Nitrite, UA Negative     Protein, UA >=300 mg/dl      Glucose, UA Negative mg/dl      Ketones, UA 15 (1+) mg/dl      Urobilinogen, UA 1 0 E U /dl      Bilirubin, UA Moderate     Blood, UA Trace-Intact    POCT pregnancy, urine [[id number]]  (Normal) Resulted:  12/20/19 1151    Lab Status:  Final result Updated:  12/20/19 1151     EXT PREG TEST UR (Ref: Negative) negative     Control valid    Comprehensive metabolic panel [[id number]]  (Abnormal) Collected:  12/20/19 0904    Lab Status:  Final result Specimen:  Blood from Arm, Right Updated:  12/20/19 1117     Sodium 140 mmol/L      Potassium 3 3 mmol/L      Chloride 95 mmol/L      CO2 17 mmol/L      ANION GAP 28 mmol/L      BUN 9 mg/dL      Creatinine 0 89 mg/dL      Glucose 163 mg/dL      Calcium 9 2 mg/dL       U/L      ALT 57 U/L      Alkaline Phosphatase 186 U/L      Total Protein 9 3 g/dL      Albumin 4 2 g/dL      Total Bilirubin 1 50 mg/dL      eGFR 80 ml/min/1 73sq m     Narrative:       Cynthia guidelines for Chronic Kidney Disease (CKD):     Stage 1 with normal or high GFR (GFR > 90 mL/min/1 73 square meters)    Stage 2 Mild CKD (GFR = 60-89 mL/min/1 73 square meters)    Stage 3A Moderate CKD (GFR = 45-59 mL/min/1 73 square meters)    Stage 3B Moderate CKD (GFR = 30-44 mL/min/1 73 square meters)    Stage 4 Severe CKD (GFR = 15-29 mL/min/1 73 square meters)    Stage 5 End Stage CKD (GFR <15 mL/min/1 73 square meters)  Note: GFR calculation is accurate only with a steady state creatinine    Lipase [[id number]]  (Abnormal) Collected:  12/20/19 0904    Lab Status:  Final result Specimen:  Blood from Arm, Right Updated:  12/20/19 1117     Lipase 1,207 u/L     Phosphorus [[id number]]  (Abnormal) Collected:  12/20/19 0904    Lab Status:  Final result Specimen:  Blood from Arm, Right Updated:  12/20/19 1117     Phosphorus 1 2 mg/dL     Magnesium [[id number]]  (Normal) Collected:  12/20/19 0904    Lab Status:  Final result Specimen:  Blood from Arm, Right Updated:  12/20/19 1117     Magnesium 1 6 mg/dL     Lactic acid, plasma x2 [[id number]]     Lab Status:  No result Specimen:  Blood     Lactic acid, plasma [[id number]]  (Abnormal) Collected:  12/20/19 0904    Lab Status:  Final result Specimen:  Blood from Arm, Right Updated:  12/20/19 1006     LACTIC ACID 7 5 mmol/L     Narrative:       Result may be elevated if tourniquet was used during collection      Ammonia [[id number]]  (Normal) Collected:  12/20/19 0917    Lab Status:  Final result Specimen:  Blood from Arm, Right Updated:  12/20/19 1002     Ammonia 21 umol/L     Ethanol [[id number]]  (Abnormal) Collected:  12/20/19 0904    Lab Status:  Final result Specimen:  Blood from Arm, Right Updated:  12/20/19 1002     Ethanol Lvl 116 mg/dL     Troponin I [[id number]]  (Normal) Collected:  12/20/19 0904    Lab Status:  Final result Specimen:  Blood from Arm, Right Updated:  12/20/19 1001     Troponin I <0 02 ng/mL     Protime-INR [[id number]]  (Normal) Collected:  12/20/19 0904    Lab Status:  Final result Specimen:  Blood from Arm, Right Updated:  12/20/19 0931     Protime 14 2 seconds      INR 1 10    APTT [[id number]]  (Normal) Collected:  12/20/19 0904    Lab Status:  Final result Specimen:  Blood from Arm, Right Updated:  12/20/19 0931     PTT 27 seconds     CBC and differential [[id number]]  (Abnormal) Collected:  12/20/19 0904    Lab Status:  Final result Specimen:  Blood from Arm, Right Updated:  12/20/19 0915     WBC 11 84 Thousand/uL      RBC 5 00 Million/uL      Hemoglobin 15 1 g/dL      Hematocrit 46 1 %      MCV 92 fL      MCH 30 2 pg      MCHC 32 8 g/dL      RDW 17 9 %      MPV 9 7 fL      Platelets 519 Thousands/uL      nRBC 0 /100 WBCs      Neutrophils Relative 64 %      Immat GRANS % 0 %      Lymphocytes Relative 25 %      Monocytes Relative 10 %      Eosinophils Relative 0 %      Basophils Relative 1 %      Neutrophils Absolute 7 64 Thousands/µL      Immature Grans Absolute 0 04 Thousand/uL      Lymphocytes Absolute 2 94 Thousands/µL      Monocytes Absolute 1 13 Thousand/µL      Eosinophils Absolute 0 01 Thousand/µL      Basophils Absolute 0 08 Thousands/µL                  CT abdomen pelvis with contrast   Final Result by Mily Dinh MD (12/20 1324)      Profound hepatic cirrhosis with associated fibrotic change centrally in the liver resulting hepatic lobulation but also progressive narrowing of hepatic veins and intrahepatic IVC  Increasing inflammatory edema surrounding the pancreatic head and uncinate process as well as 2nd and 3rd portion of duodenum  No pancreatic or peripancreatic cyst or discrete fluid collection noted  The appearance is suspicious for focal acute    interstitial edematous pancreatitis especially in this patient with abnormal lipase  Nonspecific prominence of the pancreatic duct in the pancreatic tail, similar from previous examination  Chronic sigmoid diverticulosis  The study was marked in [facility] for immediate notification           Workstation performed: [id number]                    Procedures  ECG 12 Lead Documentation Only  Date/Time: 12/20/2019 3:29 PM  Performed by: Tanika Kaur PA-C  Authorized by: Tanika Kaur PA-C     Indications / Diagnosis:  Abdominal pain  ECG reviewed by me, the ED Provider: yes    Patient location:  ED  Previous ECG:     Previous ECG:  Compared to current    Comparison ECG info:  8/27/19    Similarity:  No change    Comparison to cardiac monitor: Yes    Interpretation:     Interpretation: abnormal    Quality:     Tracing quality:  Limited by artifact  Rate:     ECG rate:  131    ECG rate assessment: tachycardic    Rhythm:     Rhythm: sinus rhythm    Ectopy:     Ectopy: none    QRS:     QRS axis:  Normal    QRS intervals:  Normal  Conduction:     Conduction: normal    ST segments:     ST segments:  Normal  T waves:     T waves: non-specific               ED Course  ED Course as of Dec 20 1404   Fri Dec 20, 2019   1013 LACTIC ACID(!!): 7 5   1402 Patient meets sepsis criteria  No active signs of infection on exam  Lactate likely related to dehydration  Blood cultures and procalcitonin ordered per SLIM request                           MDM  Number of Diagnoses or Management Options  Acute on chronic pancreatitis [facility]): new and requires workup  Hypophosphatemia: new and requires workup  Lactic acidosis: new and requires workup  Diagnosis management comments: 81-year-old female with a history of alcoholism and cirrhosis presenting for evaluation of epigastric pain and vomiting x7 hours  She states this feels exactly like her previous bouts of pancreatitis  On exam, patient is tachycardic to the 130s and tremulous  Last alcoholic drink was about 12 hours ago  Differential diagnosis includes but is not limited to: alcohol withdrawal, gastritis, GERD, pancreatitis, hepatitis, cholecystitis, cholelithiasis, colitis, diverticulitis, appendicitis, mesenteric adenitis, UTI, pyelonephritis, SBO, constipation, kidney stone, musculoskeletal, nonspecific abdominal pain      Initial ED plan: Check labs including CBC, CMP, ETOH, ammonia, Mg, Phos, troponin, lactate  Will check CT abdomen for further evaluation  IV fluid bolus and 2mg Ativan for signs of alcohol withdrawal     Final assessment:  Labs significant for a lactate of 7 5, lipase of 1200, phosphorus of 1 2, and ETOH level of 116  Metabolic acidosis present which is likely due to profound lactic acidosis  CT reveals evidence of worsening edema to the pancreas compared with previous imaging  Patient appears improved after Ativan and tachycardia has resolved  Patient ultimately given 2L of saline with improvement in lactate to 2 5 suggesting that lactic acidosis is 2/2 to dehydration  Patient in severe pain and required multiple doses of Dilaudid for analgesia  Patient admitted for further management          Amount and/or Complexity of Data Reviewed  Clinical lab tests: ordered and reviewed  Tests in the radiology section of CPT®: ordered and reviewed  Review and summarize past medical records: yes  Discuss the patient with other providers: yes  Independent visualization of images, tracings, or specimens: yes    Risk of Complications, Morbidity, and/or Mortality  Presenting problems: high  Diagnostic procedures: high  Management options: high    Patient Progress  Patient progress: stable        Disposition  Final diagnoses:   Acute on chronic pancreatitis ([facility] 75 )   Lactic acidosis   Hypophosphatemia     Time reflects when diagnosis was documented in both MDM as applicable and the Disposition within this note     Time User Action Codes Description Comment    12/20/2019  2:03  [place] [facility] [[id number],  K86 1] Acute [facility] chronic pancreatitis ([facility] )     12/20/2019  2:03  [place] [facility] [place] [E87 2] Lactic acidosis     12/20/2019  2:04 PM [address] [place], [place] [address]o [facility] [[id number]] Hypophosphatemia       ED Disposition     ED Disposition Condition Date/Time Comment    Admit Stable Fri Dec 20, 2019  2:03 PM Case was discussed with Dr Alicja Samano and the patient's admission status was agreed to be Admission Status: inpatient status to the service of Dr Manjula Banks   Follow-up Information    None         Patient's Medications   Discharge Prescriptions    No medications on file     No discharge procedures on file      ED Provider  Electronically Signed by           Dasha Benavidez PA-C  12/20/19 6145

## 2019-12-20 NOTE — ED NOTES
1  CC ETOH withdrawal, vomiting, tremors    2  Is this admission due to an injury? no    3  Orientation status A&Oxs 4     4  Abnormal labs/abnormal focused assessment/abnormal vitals Ethanol=116, Lactic Acid 7 5, 2 5, 2 1, last lactic acid sent at 16:00  CIWA due @ 19:30      5  Medications/ drips Magnesium sulfate    6  Last time narcotics were given/ pain medication 2mg IVP diluadid@ 16:24      7  IV lines/drains/ etc  20RAC    8  Isolation status None    9  Skin WDL     10  Ambulation status Ambulatory    11   ED phone # [phone]     Farrukh Muir RN  12/20/19 4637

## 2019-12-20 NOTE — ASSESSMENT & PLAN NOTE
- associated with chronic alcoholism  - monitor/replete potassium and phosphate as necessary   - will administer IV magnesium sulfate augment potassium by availability as serum magnesium is borderline low-normal range

## 2019-12-20 NOTE — ASSESSMENT & PLAN NOTE
- CT imaging on presentation revealing "Increasing inflammatory edema surrounding the pancreatic head and uncinate process as well as 2nd and 3rd portion of duodenum  No pancreatic or peripancreatic cyst or discrete fluid collection noted  The appearance is suspicious for focal acute interstitial edematous pancreatitis especially in this patient with abnormal lipase  Nonspecific prominence of the pancreatic duct in the pancreatic tail, similar from previous examination  "   - remains NPO - PRN intravenous analgesia regimen input - PRN emesis control  - strongly counseled on alcohol cessation (see plan for alcohol abuse below)  - serum lipase elevated @ 1,207 on presentation - will trend  - associated lactic acidosis noted - on aggressive IV fluid resuscitation (see plan for SIRS below)  - etiology is likely alcohol abuse/intoxication w/ elevated serum levels - check fasting triglycerides as well

## 2019-12-20 NOTE — PLAN OF CARE
Problem: Potential for Falls  Goal: Patient will remain free of falls  Description  INTERVENTIONS:  - Assess patient frequently for physical needs  -  Identify cognitive and physical deficits and behaviors that affect risk of falls    -  [place] fall precautions as indicated by assessment   - Educate patient/family on patient safety including physical limitations  - Instruct patient to call for assistance with activity based on assessment  - Modify environment to reduce risk of injury  - Consider OT/PT consult to assist with strengthening/mobility  Outcome: Progressing

## 2019-12-20 NOTE — ASSESSMENT & PLAN NOTE
- BP elevation likely multifactorial secondary to uncontrolled pain coupled with possible early alcohol withdrawal and possible noncompliance with home Clonidine regimen (adverse effect of rebound hypertension if noncompliant)   - c/w Clonidine - PRN IV Labetalol on board for residual spikes   - see plan for alcohol abuse/withdrawal below  - low-sodium restriction once on oral diet

## 2019-12-20 NOTE — H&P
History & Physical - [facility] Internal Medicine  Patient: Zheng Truong 38 y o  female MRN: [id number]  Unit/Bed#: ED 19 Encounter: [id number]  Primary Care Provider: Shantel Rogers MD  Date & Time of Admission: 12/20/2019  8:44 AM        Assessment & Plan:    * Acute on chronic alcoholic pancreatitis   Assessment & Plan  - CT imaging on presentation revealing "Increasing inflammatory edema surrounding the pancreatic head and uncinate process as well as 2nd and 3rd portion of duodenum  No pancreatic or peripancreatic cyst or discrete fluid collection noted  The appearance is suspicious for focal acute interstitial edematous pancreatitis especially in this patient with abnormal lipase  Nonspecific prominence of the pancreatic duct in the pancreatic tail, similar from previous examination  "   - remains NPO - PRN intravenous analgesia regimen input - PRN emesis control  - strongly counseled on alcohol cessation (see plan for alcohol abuse below)  - serum lipase elevated @ 1,207 on presentation - will trend  - associated lactic acidosis noted - on aggressive IV fluid resuscitation (see plan for SIRS below)  - etiology is likely alcohol abuse/intoxication w/ elevated serum levels - check fasting triglycerides as well     Alcohol abuse with intoxication  Assessment & Plan  - counseled on cessation although unlikely to comply due to recurrence/chronicity - serum alcohol level elevated at 116 on presentation  - [facility] protocol initiated - monitor for progressive withdrawal  - thiamine/multivitamin/folic acid regimen on board  - monitor/replete electrolyte deficiencies  - once more medically stable, discussion should occur regarding alcohol rehab post-discharge    Diabetes mellitus type 2  Assessment & Plan  - last HbA1c of 6 9 in October 2019  - hold Metformin while hospitalized especially in the setting of lactic acidosis and abnormal LFTs - initiated SSI coverage per [employer] (Q6h while NPO)    Hypokalemia - Hypophosphatemia  Assessment & Plan  - associated with chronic alcoholism  - monitor/replete potassium and phosphate as necessary   - will administer IV magnesium sulfate augment potassium by availability as serum magnesium is borderline low-normal range    History of alcoholic cirrhosis  Assessment & Plan  - with associated abnormal LFTs  - CT imaging revealing "Profound hepatic cirrhosis with associated fibrotic change centrally in the liver resulting hepatic lobulation but also progressive narrowing of hepatic veins and intrahepatic IVC  "  - will appreciate gastroenterology input in light of progression of disease and hepatobiliary vascular narrowing  - check liver ultrasound with dopplers  - limit/avoid hepatotoxins if possible  - AFP tumor marker level normal at 6 8 in October 2019    SIRS (systemic inflammatory response syndrome) - Lactic acidosis  Assessment & Plan  - presents with tachycardia/tachypnea coupled with leukocytosis and lactic acidosis in association with acute/chronic pancreatitis and alcohol abuse  - lactic acid level significantly improved with aggressive IV fluid resuscitation - continue IV fluids and monitor level every two hours until < 2  - blood cultures drawn in the ER - check serum procalcitonin level as infectious etiology seems less likely currently  - continue vital sign monitoring - afebrile on presentation  - complains of some cough/congestion - check portable CXR and Influenza/RSV screen    Accelerated hypertension - history of Essential hypertension  Assessment & Plan  - BP elevation likely multifactorial secondary to uncontrolled pain coupled with possible early alcohol withdrawal and possible noncompliance with home Clonidine regimen (adverse effect of rebound hypertension if noncompliant)   - c/w Clonidine - PRN IV Labetalol on board for residual spikes   - see plan for alcohol abuse/withdrawal below  - low-sodium restriction once on oral diet    GERD (gastroesophageal reflux disease)  Assessment & Plan  - continue PPI regimen    Tobacco abuse  Assessment & Plan  - transdermal nicotine patch on board      DVT Prophylaxis:  Heparin SC    Code Status: Full code    Discussion with:  Patient at bedside    Anticipated Length of Stay:  Patient will be admitted on an Inpatient basis with an anticipated length of stay of greater than 2 midnights  Justification for Hospital Stay:  Recurrent alcoholic pancreatitis with associated alcohol abuse/intoxication requiring intravenous pain/emesis control, and LFT monitoring, with IV fluid resuscitation for SIRS requiring infectious workup  Total Time for Visit, including Counseling / Coordination of Care: 72 minutes  Greater than 50% of this total time spent on direct patient counseling and coordination of care  Chief Complaint:  Abdominal pain since early morning      History of Present Illness:    Zheng Truong is a 38 y o  female who presents with complaints of progressively worsening/persistent epigastric and right upper quadrant abdominal pain that started approximately around 2:30 AM today  The patient states that her last alcoholic beverage was shortly prior to that time of pain onset  She does have a chronic history of alcoholism and recurrent pancreatitis as a result  She notes that the pain was associated with nausea and vomiting  Denies any fever/chills however  She notes that she now only drinks sips to calm my nerves", although her serum alcohol level was significantly elevated on presentation at 116  She was given dose of IV Dilaudid and Ativan in the ER with only minimal improvement in her presenting symptoms  She also was noted to have elevated blood pressures on admission  She remains anxious upon my encounter  Review of Systems:    Review of Systems - A thorough 12 point review systems was conducted  Pertinent positives and negatives are mentioned in the history of present illness        Past Medical and Surgical History:     Past Medical History:   Diagnosis Date    Alcohol abuse     Arthritis     GERD (gastroesophageal reflux disease)     Hypertension     Nicotine dependence     Obesity     Pancreatitis     Panic attack        Past Surgical History:   Procedure Laterality Date    ABDOMINAL SURGERY      C SECTION    ESOPHAGOGASTRODUODENOSCOPY N/A 12/15/2017    Procedure: ESOPHAGOGASTRODUODENOSCOPY (EGD); Surgeon: Sarah Mahajan MD;  Location: [facility]; Service: Gastroenterology         Medications & Allergies:    Prior to Admission medications    Medication Sig Start Date End Date Taking?  Authorizing Provider   albuterol (PROVENTIL HFA,VENTOLIN HFA) 90 mcg/act inhaler Inhale 2 puffs every 4 (four) hours as needed for wheezing 10/10/18   Oralia Mac MD   cloNIDine (CATAPRES) 0 3 mg tablet Take 0 3 mg by mouth 2 (two) times a day    Historical Provider, MD   dicyclomine (BENTYL) 20 mg tablet Take 1 tablet (20 mg total) by mouth 3 (three) times a day  Patient not taking: Reported on 10/15/2019 5/31/18   OMERO Renteria   folic acid (FOLVITE) 1 mg tablet Take 1 tablet (1 mg total) by mouth daily  Patient not taking: Reported on 10/15/2019 8/13/19   OMERO Delvalle   gabapentin (NEURONTIN) 300 mg capsule Take 1 capsule (300 mg total) by mouth 3 (three) times a day  Patient not taking: Reported on 10/15/2019 6/26/19   Chelsie Louise MD   LORazepam (ATIVAN) 0 5 mg tablet Take 2 tablets (1 mg total) by mouth every 8 (eight) hours as needed for anxiety  Patient not taking: Reported on 8/2/2019 6/29/19   Chelsie Louise MD   metFORMIN (GLUCOPHAGE) 500 mg tablet Take 1 tablet (500 mg total) by mouth daily with breakfast  Patient not taking: Reported on 10/15/2019 6/26/19   Chelsie Louise MD   metoclopramide (REGLAN) 10 mg tablet Take 1 tablet (10 mg total) by mouth every 6 (six) hours As needed for nausea and vomiting  Patient not taking: Reported on 10/15/2019 8/2/19   Kim LEHMAN DO Jairon   Mometasone Furo-Formoterol Fum (DULERA) 100-5 MCG/ACT AERO Inhale as needed Unsure of dose      Historical Provider, MD   nicotine (NICODERM CQ) 21 mg/24 hr TD 24 hr patch Place 1 patch on the skin daily 6/26/19   Nicole Severin, MD   ondansetron [facility] 4 mg tablet Take 1 tablet (4 mg total) by mouth every 8 (eight) hours as needed for nausea or vomiting  Patient not taking: Reported on 10/15/2019 6/26/19   Nicole Severin, MD   pancrelipase, Lip-Prot-Amyl, (CREON) 24,000 units Take 1 capsule by mouth 3 (three) times a day with meals 10/31/17   Terrell Aguayo MD   pantoprazole (PROTONIX) 20 mg tablet Take 1 tablet (20 mg total) by mouth daily 9/20/18   Howard Rudolph MD   sucralfate (CARAFATE) 1 g tablet Take 1 tablet (1 g total) by mouth 4 (four) times a day  Patient not taking: Reported on 10/15/2019 5/31/18   OMERO Renteria   thiamine 100 MG tablet Take 1 tablet (100 mg total) by mouth daily  Patient not taking: Reported on 10/15/2019 6/29/19   Nicole Severin, MD   naproxen (NAPROSYN) 500 mg tablet Take 1 tablet (500 mg total) by mouth 2 (two) times a day with meals For mild abdominal pain  Patient not taking: Reported on 10/15/2019 8/2/19 12/20/19  Santos Bullion L DO Jairon         Allergies: No Known Allergies      Social History:    Substance Use History:   Social History     Substance and Sexual Activity   Alcohol Use Yes    Frequency: 4 or more times a week    Drinks per session: 3 or 4    Comment: Patient states 'I drink alot of vodka a day"     Social History     Tobacco Use   Smoking Status Current Every Day Smoker    Packs/day: 1 00    Years: 29 00    Pack years: 29 00    Types: Cigarettes   Smokeless Tobacco Never Used   Tobacco Comment    pt refused packet     Social History     Substance and Sexual Activity   Drug Use Never         Family History:    Per medical chart, significant for alcohol abuse and cirrhosis in father and for drug abuse in mother        Physical Exam:     Vitals:   Blood Pressure: (!) 192/109 (12/20/19 1431)  Pulse: 90 (12/20/19 1431)  Temperature: 98 2 °F (36 8 °C) (12/20/19 0855)  Temp Source: Oral (12/20/19 0855)  Respirations: 20 (12/20/19 1430)  Height: 5' 4" (162 6 cm) (12/20/19 0855)  Weight - Scale: 77 6 kg (171 lb 1 2 oz) (12/20/19 0855)  SpO2: 94 % (12/20/19 1430)      GENERAL:  Well-developed/nourished - distress due to pain  HEAD:  Normocephalic - atraumatic  EYES: PERRL - EOMI   MOUTH:  Mucosa dry  NECK:  Supple - full range of motion  CARDIAC:  Intermittently tachycardic but regular - S1/S2 positive  PULMONARY:  Clear breath sounds bilaterally - nonlabored respirations  ABDOMEN:  Soft - epigastric/RUQ tenderness to palpation - nondistended - active bowel sounds  MUSCULOSKELETAL:  Motor strength/range of motion intact  NEUROLOGIC:  Alert/oriented at baseline - no current extremity tremors   SKIN:  Chronic wrinkles/blemishes - tattoos noted  PSYCHIATRIC:  Mood/affect somewhat anxious      Additional Data:     Labs & Recent Cultures:    Results from last 7 days   Lab Units 12/20/19  0904   WBC Thousand/uL 11 84*   HEMOGLOBIN g/dL 15 1   HEMATOCRIT % 46 1   PLATELETS Thousands/uL 213   NEUTROS PCT % 64   LYMPHS PCT % 25   MONOS PCT % 10   EOS PCT % 0     Results from last 7 days   Lab Units 12/20/19  0904   SODIUM mmol/L 140   POTASSIUM mmol/L 3 3*   CHLORIDE mmol/L 95*   CO2 mmol/L 17*   BUN mg/dL 9   CREATININE mg/dL 0 89   ANION GAP mmol/L 28*   CALCIUM mg/dL 9 2   ALBUMIN g/dL 4 2   TOTAL BILIRUBIN mg/dL 1 50*   ALK PHOS U/L 186*   ALT U/L 57   AST U/L 147*   GLUCOSE RANDOM mg/dL 163*     Results from last 7 days   Lab Units 12/20/19  0904   INR  1 10             Results from last 7 days   Lab Units 12/20/19  1141 12/20/19  0904   LACTIC ACID mmol/L 2 5* 7 5*           Imaging:     Ct Abdomen Pelvis With Contrast    Result Date: 12/20/2019  Narrative: CT ABDOMEN AND PELVIS WITH IV CONTRAST INDICATION:   Epigastric pain   COMPARISON:  Numerous prior examinations, most recently October 14, 2019 TECHNIQUE:  CT examination of the abdomen and pelvis was performed  Axial, sagittal, and coronal 2D reformatted images were created from the source data and submitted for interpretation  Radiation dose length product (DLP) for this visit:  437 mGy-cm   This examination, like all CT scans performed in the [address] [place] [address], was performed utilizing techniques to minimize radiation dose exposure, including the use of iterative reconstruction and automated exposure control  IV Contrast:  100 mL of iohexol (OMNIPAQUE) Enteric Contrast:  Enteric contrast was not administered  FINDINGS: ABDOMEN LOWER CHEST:  No clinically significant abnormality identified in the visualized lower chest  LIVER/BILIARY TREE:  Again noted is cirrhotic liver morphology which is unchanged from previous examination  As on prior examination liver parenchyma demonstrates predominantly low density with some enhancing linear density in the medial left anterior right hepatic lobe; although this appears unchanged in configuration when compared to prior examination, there is increased enhancement within this area of scarring/fibrosis  Lobulation and fibrosis results in marked narrowing of hepatic veins and intrahepatic IVC which appears to have significantly progressed since October 14, 2019 perhaps best visualized on image 12 of series 2  Within the limitations of this examination, no interval development of solid hepatic mass noted  GALLBLADDER:  No calcified gallstones  No pericholecystic inflammatory change  SPLEEN:  Unremarkable  PANCREAS:  There is increasing inflammatory edema surrounding the pancreatic head and duodenum  Also again noted is slight prominence of the pancreatic duct in the tail of the pancreas, though this is unchanged from prior examination  No pancreatic or peripancreatic cyst or walled off fluid collection  ADRENAL GLANDS:  Unremarkable   KIDNEYS/URETERS: Unremarkable  No hydronephrosis  STOMACH AND BOWEL:  As mentioned above, there is increasing inflammation with fat stranding surrounding the duodenum with mild duodenal wall thickening  Stomach is unremarkable  Again noted is colonic diverticulosis along the sigmoid region  Some mucosal wall thickening involving the sigmoid colon in the left hemipelvis without pericolonic inflammatory edema is consistent with either stricturing or muscular hypertrophy related to chronic diverticular disease  APPENDIX:  A normal appendix was visualized  ABDOMINOPELVIC CAVITY:  No ascites or free intraperitoneal air  No lymphadenopathy  VESSELS:  Unremarkable for patient's age  PELVIS REPRODUCTIVE ORGANS:  Unremarkable for patient's age  URINARY BLADDER:  Unremarkable  ABDOMINAL WALL/INGUINAL REGIONS:  No hernias are seen  Extensive soft tissue nodularity throughout bilateral buttocks and the subcutaneous fat suggests augmentation procedure appears unchanged from previous examination  OSSEOUS STRUCTURES:  No acute fracture or destructive osseous lesion  Mild chronic compression deformity at T12  Disc degeneration in the lower lumbar spine  Impression: Profound hepatic cirrhosis with associated fibrotic change centrally in the liver resulting hepatic lobulation but also progressive narrowing of hepatic veins and intrahepatic IVC  Increasing inflammatory edema surrounding the pancreatic head and uncinate process as well as 2nd and 3rd portion of duodenum  No pancreatic or peripancreatic cyst or discrete fluid collection noted  The appearance is suspicious for focal acute interstitial edematous pancreatitis especially in this patient with abnormal lipase  Nonspecific prominence of the pancreatic duct in the pancreatic tail, similar from previous examination  Chronic sigmoid diverticulosis  The study was marked in [facility] for immediate notification   Workstation performed: [id number]             ** Please Note: This note is constructed using a voice recognition dictation system  An occasional wrong word/phrase or sound-a-like substitution may have been picked up by dictation device due to the inherent limitations of voice recognition software  Read the chart carefully and recognize, using reasonable context, where substitutions may have occurred  **

## 2019-12-20 NOTE — ASSESSMENT & PLAN NOTE
- with associated abnormal LFTs  - CT imaging revealing "Profound hepatic cirrhosis with associated fibrotic change centrally in the liver resulting hepatic lobulation but also progressive narrowing of hepatic veins and intrahepatic IVC  "  - will appreciate gastroenterology input in light of progression of disease and hepatobiliary vascular narrowing  - check liver ultrasound with dopplers  - limit/avoid hepatotoxins if possible  - AFP tumor marker level normal at 6 8 in October 2019

## 2019-12-20 NOTE — ASSESSMENT & PLAN NOTE
- counseled on cessation although unlikely to comply due to recurrence/chronicity - serum alcohol level elevated at 116 on presentation  - [facility] protocol initiated - monitor for progressive withdrawal  - thiamine/multivitamin/folic acid regimen on board  - monitor/replete electrolyte deficiencies  - once more medically stable, discussion should occur regarding alcohol rehab post-discharge

## 2019-12-20 NOTE — ASSESSMENT & PLAN NOTE
- presents with tachycardia/tachypnea coupled with leukocytosis and lactic acidosis in association with acute/chronic pancreatitis and alcohol abuse  - lactic acid level significantly improved with aggressive IV fluid resuscitation - continue IV fluids and monitor level every two hours until < 2  - blood cultures drawn in the ER - check serum procalcitonin level as infectious etiology seems less likely currently  - continue vital sign monitoring - afebrile on presentation  - complains of some cough/congestion - check portable CXR and Influenza/RSV screen

## 2019-12-21 ENCOUNTER — HOSPITAL ENCOUNTER (INPATIENT)
Facility: HOSPITAL | Age: 44
LOS: 1 days | Discharge: HOME | DRG: 282 | End: 2019-12-23
Attending: EMERGENCY MEDICINE | Admitting: STUDENT IN AN ORGANIZED HEALTH CARE EDUCATION/TRAINING PROGRAM
Payer: COMMERCIAL

## 2019-12-21 VITALS
RESPIRATION RATE: 16 BRPM | DIASTOLIC BLOOD PRESSURE: 123 MMHG | OXYGEN SATURATION: 97 % | HEIGHT: 64 IN | BODY MASS INDEX: 29.21 KG/M2 | HEART RATE: 89 BPM | WEIGHT: 171.08 LBS | TEMPERATURE: 98.4 F | SYSTOLIC BLOOD PRESSURE: 194 MMHG

## 2019-12-21 DIAGNOSIS — F10.10 ALCOHOL ABUSE: ICD-10-CM

## 2019-12-21 DIAGNOSIS — R10.13 EPIGASTRIC PAIN: ICD-10-CM

## 2019-12-21 DIAGNOSIS — K85.90 PANCREATITIS: Primary | ICD-10-CM

## 2019-12-21 DIAGNOSIS — E87.6 HYPOKALEMIA: ICD-10-CM

## 2019-12-21 LAB
ALBUMIN SERPL BCP-MCNC: 3.2 G/DL (ref 3.5–5)
ALBUMIN SERPL BCP-MCNC: 3.6 G/DL (ref 3.5–5)
ALP SERPL-CCNC: 132 U/L (ref 46–116)
ALP SERPL-CCNC: 144 U/L (ref 46–116)
ALT SERPL W P-5'-P-CCNC: 38 U/L (ref 12–78)
ALT SERPL W P-5'-P-CCNC: 41 U/L (ref 12–78)
AMPHETAMINES SERPL QL SCN: NEGATIVE
ANION GAP SERPL CALCULATED.3IONS-SCNC: 12 MMOL/L (ref 4–13)
ANION GAP SERPL CALCULATED.3IONS-SCNC: 8 MMOL/L (ref 4–13)
AST SERPL W P-5'-P-CCNC: 104 U/L (ref 5–45)
AST SERPL W P-5'-P-CCNC: 81 U/L (ref 5–45)
BACTERIA UR QL AUTO: ABNORMAL /HPF
BARBITURATES UR QL: NEGATIVE
BASOPHILS # BLD AUTO: 0.05 THOUSANDS/ΜL (ref 0–0.1)
BASOPHILS NFR BLD AUTO: 1 % (ref 0–1)
BENZODIAZ UR QL: POSITIVE
BILIRUB SERPL-MCNC: 1.2 MG/DL (ref 0.2–1)
BILIRUB SERPL-MCNC: 1.5 MG/DL (ref 0.2–1)
BILIRUB UR QL STRIP: ABNORMAL
BUN SERPL-MCNC: 10 MG/DL (ref 5–25)
BUN SERPL-MCNC: 10 MG/DL (ref 5–25)
CALCIUM SERPL-MCNC: 7.3 MG/DL (ref 8.3–10.1)
CALCIUM SERPL-MCNC: 8.3 MG/DL (ref 8.3–10.1)
CHLORIDE SERPL-SCNC: 102 MMOL/L (ref 100–108)
CHLORIDE SERPL-SCNC: 98 MMOL/L (ref 100–108)
CHOLEST SERPL-MCNC: 177 MG/DL (ref 50–200)
CLARITY UR: CLEAR
CO2 SERPL-SCNC: 29 MMOL/L (ref 21–32)
CO2 SERPL-SCNC: 30 MMOL/L (ref 21–32)
COCAINE UR QL: NEGATIVE
COLOR UR: ABNORMAL
CREAT SERPL-MCNC: 0.55 MG/DL (ref 0.6–1.3)
CREAT SERPL-MCNC: 0.7 MG/DL (ref 0.6–1.3)
EOSINOPHIL # BLD AUTO: 0.02 THOUSAND/ΜL (ref 0–0.61)
EOSINOPHIL NFR BLD AUTO: 0 % (ref 0–6)
ERYTHROCYTE [DISTWIDTH] IN BLOOD BY AUTOMATED COUNT: 17.5 % (ref 11.6–15.1)
ETHANOL SERPL-MCNC: <3 MG/DL (ref 0–3)
GFR SERPL CREATININE-BSD FRML MDRD: 106 ML/MIN/1.73SQ M
GFR SERPL CREATININE-BSD FRML MDRD: 115 ML/MIN/1.73SQ M
GLUCOSE SERPL-MCNC: 130 MG/DL (ref 65–140)
GLUCOSE SERPL-MCNC: 132 MG/DL (ref 65–140)
GLUCOSE SERPL-MCNC: 135 MG/DL (ref 65–140)
GLUCOSE SERPL-MCNC: 137 MG/DL (ref 65–140)
GLUCOSE SERPL-MCNC: 139 MG/DL (ref 65–140)
GLUCOSE UR STRIP-MCNC: NEGATIVE MG/DL
HCT VFR BLD AUTO: 38.2 % (ref 34.8–46.1)
HDLC SERPL-MCNC: 33 MG/DL
HGB BLD-MCNC: 12 G/DL (ref 11.5–15.4)
HGB UR QL STRIP.AUTO: NEGATIVE
IMM GRANULOCYTES # BLD AUTO: 0.04 THOUSAND/UL (ref 0–0.2)
IMM GRANULOCYTES NFR BLD AUTO: 0 % (ref 0–2)
KETONES UR STRIP-MCNC: ABNORMAL MG/DL
LDLC SERPL CALC-MCNC: 120 MG/DL (ref 0–100)
LEUKOCYTE ESTERASE UR QL STRIP: NEGATIVE
LIPASE SERPL-CCNC: 736 U/L (ref 73–393)
LIPASE SERPL-CCNC: 885 U/L (ref 73–393)
LYMPHOCYTES # BLD AUTO: 2.37 THOUSANDS/ΜL (ref 0.6–4.47)
LYMPHOCYTES NFR BLD AUTO: 26 % (ref 14–44)
MAGNESIUM SERPL-MCNC: 1.4 MG/DL (ref 1.6–2.6)
MAGNESIUM SERPL-MCNC: 1.9 MG/DL (ref 1.6–2.6)
MCH RBC QN AUTO: 30.5 PG (ref 26.8–34.3)
MCHC RBC AUTO-ENTMCNC: 31.4 G/DL (ref 31.4–37.4)
MCV RBC AUTO: 97 FL (ref 82–98)
METHADONE UR QL: NEGATIVE
MONOCYTES # BLD AUTO: 0.79 THOUSAND/ΜL (ref 0.17–1.22)
MONOCYTES NFR BLD AUTO: 9 % (ref 4–12)
MUCOUS THREADS UR QL AUTO: ABNORMAL
NEUTROPHILS # BLD AUTO: 5.74 THOUSANDS/ΜL (ref 1.85–7.62)
NEUTS SEG NFR BLD AUTO: 64 % (ref 43–75)
NITRITE UR QL STRIP: NEGATIVE
NON-SQ EPI CELLS URNS QL MICRO: ABNORMAL /HPF
NONHDLC SERPL-MCNC: 144 MG/DL
NRBC BLD AUTO-RTO: 0 /100 WBCS
OPIATES UR QL SCN: POSITIVE
PCP UR QL: NEGATIVE
PH UR STRIP.AUTO: 6 [PH]
PHOSPHATE SERPL-MCNC: 2.5 MG/DL (ref 2.7–4.5)
PLATELET # BLD AUTO: 119 THOUSANDS/UL (ref 149–390)
PMV BLD AUTO: 10.2 FL (ref 8.9–12.7)
POTASSIUM SERPL-SCNC: 3.1 MMOL/L (ref 3.5–5.3)
POTASSIUM SERPL-SCNC: 3.3 MMOL/L (ref 3.5–5.3)
PROT SERPL-MCNC: 7.1 G/DL (ref 6.4–8.2)
PROT SERPL-MCNC: 7.3 G/DL (ref 6.4–8.2)
PROT UR STRIP-MCNC: >=300 MG/DL
RBC # BLD AUTO: 3.93 MILLION/UL (ref 3.81–5.12)
RBC #/AREA URNS AUTO: ABNORMAL /HPF
SODIUM SERPL-SCNC: 139 MMOL/L (ref 136–145)
SODIUM SERPL-SCNC: 140 MMOL/L (ref 136–145)
SP GR UR STRIP.AUTO: >=1.03 (ref 1–1.03)
THC UR QL: POSITIVE
TRIGL SERPL-MCNC: 120 MG/DL
TROPONIN I SERPL-MCNC: <0.02 NG/ML
UROBILINOGEN UR QL STRIP.AUTO: 1 E.U./DL
WBC # BLD AUTO: 9.01 THOUSAND/UL (ref 4.31–10.16)
WBC #/AREA URNS AUTO: ABNORMAL /HPF

## 2019-12-21 PROCEDURE — 83690 ASSAY OF LIPASE: CPT | Performed by: INTERNAL MEDICINE

## 2019-12-21 PROCEDURE — 96374 THER/PROPH/DIAG INJ IV PUSH: CPT

## 2019-12-21 PROCEDURE — 80053 COMPREHEN METABOLIC PANEL: CPT | Performed by: EMERGENCY MEDICINE

## 2019-12-21 PROCEDURE — 84100 ASSAY OF PHOSPHORUS: CPT | Performed by: INTERNAL MEDICINE

## 2019-12-21 PROCEDURE — C9113 INJ PANTOPRAZOLE SODIUM, VIA: HCPCS | Performed by: INTERNAL MEDICINE

## 2019-12-21 PROCEDURE — 80307 DRUG TEST PRSMV CHEM ANLYZR: CPT | Performed by: EMERGENCY MEDICINE

## 2019-12-21 PROCEDURE — 99285 EMERGENCY DEPT VISIT HI MDM: CPT

## 2019-12-21 PROCEDURE — 80061 LIPID PANEL: CPT | Performed by: INTERNAL MEDICINE

## 2019-12-21 PROCEDURE — 96361 HYDRATE IV INFUSION ADD-ON: CPT

## 2019-12-21 PROCEDURE — 82948 REAGENT STRIP/BLOOD GLUCOSE: CPT

## 2019-12-21 PROCEDURE — 94762 N-INVAS EAR/PLS OXIMTRY CONT: CPT

## 2019-12-21 PROCEDURE — 99223 1ST HOSP IP/OBS HIGH 75: CPT | Performed by: INTERNAL MEDICINE

## 2019-12-21 PROCEDURE — 36415 COLL VENOUS BLD VENIPUNCTURE: CPT | Performed by: EMERGENCY MEDICINE

## 2019-12-21 PROCEDURE — 96375 TX/PRO/DX INJ NEW DRUG ADDON: CPT

## 2019-12-21 PROCEDURE — 83735 ASSAY OF MAGNESIUM: CPT | Performed by: EMERGENCY MEDICINE

## 2019-12-21 PROCEDURE — 83735 ASSAY OF MAGNESIUM: CPT | Performed by: INTERNAL MEDICINE

## 2019-12-21 PROCEDURE — 80053 COMPREHEN METABOLIC PANEL: CPT | Performed by: INTERNAL MEDICINE

## 2019-12-21 PROCEDURE — 99285 EMERGENCY DEPT VISIT HI MDM: CPT | Performed by: EMERGENCY MEDICINE

## 2019-12-21 PROCEDURE — 81001 URINALYSIS AUTO W/SCOPE: CPT | Performed by: EMERGENCY MEDICINE

## 2019-12-21 PROCEDURE — 80320 DRUG SCREEN QUANTALCOHOLS: CPT | Performed by: EMERGENCY MEDICINE

## 2019-12-21 PROCEDURE — 84484 ASSAY OF TROPONIN QUANT: CPT | Performed by: EMERGENCY MEDICINE

## 2019-12-21 PROCEDURE — 85025 COMPLETE CBC W/AUTO DIFF WBC: CPT | Performed by: EMERGENCY MEDICINE

## 2019-12-21 PROCEDURE — 99239 HOSP IP/OBS DSCHRG MGMT >30: CPT | Performed by: STUDENT IN AN ORGANIZED HEALTH CARE EDUCATION/TRAINING PROGRAM

## 2019-12-21 PROCEDURE — 83690 ASSAY OF LIPASE: CPT | Performed by: EMERGENCY MEDICINE

## 2019-12-21 RX ORDER — MORPHINE SULFATE 4 MG/ML
4 INJECTION, SOLUTION INTRAMUSCULAR; INTRAVENOUS ONCE
Status: COMPLETED | OUTPATIENT
Start: 2019-12-21 | End: 2019-12-21

## 2019-12-21 RX ORDER — LORAZEPAM 1 MG/1
2 TABLET ORAL ONCE
Status: DISCONTINUED | OUTPATIENT
Start: 2019-12-21 | End: 2019-12-21

## 2019-12-21 RX ORDER — SODIUM CHLORIDE, SODIUM LACTATE, POTASSIUM CHLORIDE, CALCIUM CHLORIDE 600; 310; 30; 20 MG/100ML; MG/100ML; MG/100ML; MG/100ML
250 INJECTION, SOLUTION INTRAVENOUS CONTINUOUS
Status: DISCONTINUED | OUTPATIENT
Start: 2019-12-21 | End: 2019-12-21 | Stop reason: HOSPADM

## 2019-12-21 RX ORDER — ONDANSETRON 2 MG/ML
4 INJECTION INTRAMUSCULAR; INTRAVENOUS ONCE
Status: COMPLETED | OUTPATIENT
Start: 2019-12-21 | End: 2019-12-21

## 2019-12-21 RX ORDER — CHLORDIAZEPOXIDE HYDROCHLORIDE 25 MG/1
100 CAPSULE, GELATIN COATED ORAL ONCE
Status: DISCONTINUED | OUTPATIENT
Start: 2019-12-21 | End: 2019-12-21

## 2019-12-21 RX ORDER — CHLORDIAZEPOXIDE HYDROCHLORIDE 25 MG/1
100 CAPSULE, GELATIN COATED ORAL ONCE
Status: COMPLETED | OUTPATIENT
Start: 2019-12-21 | End: 2019-12-21

## 2019-12-21 RX ORDER — LORAZEPAM 2 MG/ML
2 INJECTION INTRAMUSCULAR ONCE
Status: COMPLETED | OUTPATIENT
Start: 2019-12-21 | End: 2019-12-21

## 2019-12-21 RX ORDER — POTASSIUM CHLORIDE 20 MEQ/1
40 TABLET, EXTENDED RELEASE ORAL ONCE
Status: COMPLETED | OUTPATIENT
Start: 2019-12-22 | End: 2019-12-22

## 2019-12-21 RX ORDER — MAGNESIUM SULFATE HEPTAHYDRATE 40 MG/ML
2 INJECTION, SOLUTION INTRAVENOUS ONCE
Status: COMPLETED | OUTPATIENT
Start: 2019-12-22 | End: 2019-12-22

## 2019-12-21 RX ORDER — DIPHENHYDRAMINE HYDROCHLORIDE 50 MG/ML
25 INJECTION INTRAMUSCULAR; INTRAVENOUS ONCE
Status: COMPLETED | OUTPATIENT
Start: 2019-12-21 | End: 2019-12-21

## 2019-12-21 RX ORDER — ONDANSETRON 4 MG/1
4 TABLET, FILM COATED ORAL EVERY 8 HOURS PRN
Qty: 20 TABLET | Refills: 0 | Status: SHIPPED | OUTPATIENT
Start: 2019-12-21 | End: 2020-02-02 | Stop reason: HOSPADM

## 2019-12-21 RX ADMIN — CHLORDIAZEPOXIDE HYDROCHLORIDE 100 MG: 25 CAPSULE ORAL at 11:04

## 2019-12-21 RX ADMIN — ONDANSETRON 4 MG: 2 INJECTION INTRAMUSCULAR; INTRAVENOUS at 04:50

## 2019-12-21 RX ADMIN — SODIUM CHLORIDE, SODIUM LACTATE, POTASSIUM CHLORIDE, AND CALCIUM CHLORIDE 250 ML/HR: .6; .31; .03; .02 INJECTION, SOLUTION INTRAVENOUS at 09:06

## 2019-12-21 RX ADMIN — SODIUM CHLORIDE 1000 ML: 0.9 INJECTION, SOLUTION INTRAVENOUS at 22:24

## 2019-12-21 RX ADMIN — ZINC 1 TABLET: TAB ORAL at 09:01

## 2019-12-21 RX ADMIN — PANTOPRAZOLE SODIUM 40 MG: 40 INJECTION, POWDER, FOR SOLUTION INTRAVENOUS at 09:02

## 2019-12-21 RX ADMIN — HYDROMORPHONE HYDROCHLORIDE 2 MG: 2 INJECTION, SOLUTION INTRAMUSCULAR; INTRAVENOUS; SUBCUTANEOUS at 09:07

## 2019-12-21 RX ADMIN — CLONIDINE HYDROCHLORIDE 0.3 MG: 0.1 TABLET ORAL at 09:01

## 2019-12-21 RX ADMIN — GABAPENTIN 300 MG: 300 CAPSULE ORAL at 09:01

## 2019-12-21 RX ADMIN — LORAZEPAM 2 MG: 2 INJECTION INTRAMUSCULAR; INTRAVENOUS at 07:13

## 2019-12-21 RX ADMIN — HYDROMORPHONE HYDROCHLORIDE 2 MG: 2 INJECTION, SOLUTION INTRAMUSCULAR; INTRAVENOUS; SUBCUTANEOUS at 04:41

## 2019-12-21 RX ADMIN — ONDANSETRON 4 MG: 2 INJECTION INTRAMUSCULAR; INTRAVENOUS at 22:38

## 2019-12-21 RX ADMIN — NICOTINE 1 PATCH: 21 PATCH TRANSDERMAL at 09:02

## 2019-12-21 RX ADMIN — HYDROMORPHONE HYDROCHLORIDE 2 MG: 2 INJECTION, SOLUTION INTRAMUSCULAR; INTRAVENOUS; SUBCUTANEOUS at 00:17

## 2019-12-21 RX ADMIN — MORPHINE SULFATE 4 MG: 4 INJECTION INTRAVENOUS at 22:38

## 2019-12-21 RX ADMIN — THIAMINE HCL TAB 100 MG 100 MG: 100 TAB at 09:01

## 2019-12-21 RX ADMIN — FOLIC ACID 1 MG: 1 TABLET ORAL at 09:01

## 2019-12-21 RX ADMIN — DIPHENHYDRAMINE HYDROCHLORIDE 25 MG: 50 INJECTION, SOLUTION INTRAMUSCULAR; INTRAVENOUS at 22:53

## 2019-12-21 RX ADMIN — PANCRELIPASE 24000 UNITS: 24000; 76000; 120000 CAPSULE, DELAYED RELEASE PELLETS ORAL at 09:03

## 2019-12-21 NOTE — ASSESSMENT & PLAN NOTE
- counseled on cessation although unlikely to comply due to recurrence/chronicity - serum alcohol level elevated at 116 on presentation  - [facility] protocol initiated - monitor for progressive withdrawal  - thiamine/multivitamin/folic acid regimen on board  - monitor/replete electrolyte deficiencies  - once more medically stable, discussion should occur regarding alcohol rehab post-discharge    Left against medical advice

## 2019-12-21 NOTE — PLAN OF CARE
Problem: Potential for Falls  Goal: Patient will remain free of falls  Description  INTERVENTIONS:  - Assess patient frequently for physical needs  -  Identify cognitive and physical deficits and behaviors that affect risk of falls    -  [place] fall precautions as indicated by assessment   - Educate patient/family on patient safety including physical limitations  - Instruct patient to call for assistance with activity based on assessment  - Modify environment to reduce risk of injury  - Consider OT/PT consult to assist with strengthening/mobility  Outcome: Progressing

## 2019-12-21 NOTE — ASSESSMENT & PLAN NOTE
- with associated abnormal LFTs  - CT imaging revealing "Profound hepatic cirrhosis with associated fibrotic change centrally in the liver resulting hepatic lobulation but also progressive narrowing of hepatic veins and intrahepatic IVC  "  - will appreciate gastroenterology input in light of progression of disease and hepatobiliary vascular narrowing  - check liver ultrasound with dopplers  - limit/avoid hepatotoxins if possible  - AFP tumor marker level normal at 6 8 in October 2019      Signed out against medical advice

## 2019-12-21 NOTE — ASSESSMENT & PLAN NOTE
- presents with tachycardia/tachypnea coupled with leukocytosis and lactic acidosis in association with acute/chronic pancreatitis and alcohol abuse  - lactic acid level significantly improved with aggressive IV fluid resuscitation - continue IV fluids and monitor level every two hours until < 2  - blood cultures drawn in the ER - check serum procalcitonin level as infectious etiology seems less likely currently  - continue vital sign monitoring - afebrile on presentation  - complains of some cough/congestion - check portable CXR and Influenza/RSV screen    Signed out against medical advice

## 2019-12-21 NOTE — ASSESSMENT & PLAN NOTE
- associated with chronic alcoholism  - monitor/replete potassium and phosphate as necessary   - will administer IV magnesium sulfate augment potassium by availability as serum magnesium is borderline low-normal range    Left against medical advice

## 2019-12-21 NOTE — ASSESSMENT & PLAN NOTE
- last HbA1c of 6 9 in October 2019  - hold Metformin while hospitalized especially in the setting of lactic acidosis and abnormal LFTs - initiated SSI coverage per [employer] (Q6h while NPO)  (P) [phone]     Signed out against medical advice

## 2019-12-21 NOTE — UTILIZATION REVIEW
Initial Clinical Review    Admission: Date/Time/Statement: Inpatient Admission Orders (From admission, onward)     Ordered        12/20/19 1404  Inpatient Admission  Once                   Orders Placed This Encounter   Procedures    Inpatient Admission     Standing Status:   Standing     Number of Occurrences:   1     Order Specific Question:   Admitting Physician     Answer:   Daisy Mendoza     Order Specific Question:   Level of Care     Answer:   Med Surg [16]     Order Specific Question:   Estimated length of stay     Answer:   More than 2 Midnights     Order Specific Question:   Certification     Answer:   I certify that inpatient services are medically necessary for this patient for a duration of greater than two midnights  See H&P and MD Progress Notes for additional information about the patient's course of treatment  ED Arrival Information     Expected Arrival Acuity Means of Arrival Escorted By Service Admission Type    - 12/20/2019 08:43 Emergent Ambulance [facility] Medicine Emergency    Arrival Complaint    Abd pain        Chief Complaint   Patient presents with    Abdominal Pain     Pt present with center abdominal pain  Pt is a daily vodka drinker and last drink was about 10-12 hours ago  Hx of pancreatitis      Assessment/Plan: 39 yo female to ED from home w/ c/o of progressively worsening /peristent epigastric and RUQ abd pain started 230 am    Last alcoholic beverage was shortly prior to pain onset   Alcohol level 116 on arrival  Anxious and elevated BP on arrival   Admitted IP status w/ acute on chronic alcoholic pancreatitis lipase 1207 on aggressive IVF , f/u labs , CIWA   HTN prn iv labetalol , low Na restriction   12/21 GI consult   Acute pancreatitis profound cirrhosis , lipase 1207, LR @ 250 , serial abd exams , supportive care , NPO , DVT prophy   Alcoholic Hepatitis - cont alcohol abuse  Not a transplant candidate      ED Triage Vitals   Temperature Pulse Respirations Blood Pressure SpO2   12/20/19 0855 12/20/19 0855 12/20/19 0855 12/20/19 0855 12/20/19 0855   98 2 °F (36 8 °C) (!) 135 20 (!) 177/104 98 %      Temp Source Heart Rate Source Patient Position - Orthostatic VS BP Location FiO2 (%)   12/20/19 0855 12/20/19 0855 12/20/19 0855 12/20/19 0855 --   Oral Monitor Lying Right arm       Pain Score       12/20/19 1020       Worst Possible Pain        Wt Readings from Last 1 Encounters:   12/20/19 77 6 kg (171 lb 1 2 oz)     Additional Vital Signs:   12/21/19 0812            95 %  None (Room air)     12/21/19 08:09:20  98 6 °F (37 °C)  94  18  163/104Abnormal   124  94 %       12/21/19 0800    96               12/21/19 07:40:52  98 3 °F (36 8 °C)  93  19  165/102Abnormal   123  94 %       12/21/19 0700    90               12/21/19 0500            87 %Abnormal        12/21/19 0345            94 %  None (Room air)     12/21/19 03:07:52    94  18  165/96  119  92 %       12/20/19 2328            94 %  None (Room air)     12/20/19 19:31:52  97 5 °F (36 4 °C)  85    167/103Abnormal   124  92 %       12/20/19 1826            92 %  None (Room air)     12/20/19 1820              None (Room air)     12/20/19 17:08:58  97 2 °F (36 2 °C)Abnormal   82    168/104Abnormal    125  91 %       BP: patient resting at this time at 12/20/19 1708   12/20/19 1644    95  18  162/95    97 %  None (Room air)  Lying   12/20/19 1600    92  20  153/92    97 %  None (Room air)  Lying   12/20/19 1530    88  20  202/108Abnormal     97 %  None (Room air)  Lying   12/20/19 1500    88  17  186/104Abnormal     93 %  None (Room air)  Lying   12/20/19 1431    90    192/109Abnormal            12/20/19 1430    90  20  180/101Abnormal     94 %  None (Room air)  Lying   12/20/19 1330    102  16  182/101Abnormal     94 %  None (Room air)  Lying   12/20/19 1300    101  15  168/109Abnormal     97 %  None (Room air)  Lying 12/20/19 1226    97  16  168/111Abnormal     97 %  None (Room air)  Lying   12/20/19 1130    97  14  178/106Abnormal     95 %  None (Room air)  Lying   12/20/19 1100    98  19  174/111Abnormal     96 %  None (Room air)  Lying   12/20/19 1030    96    171/101Abnormal            12/20/19 1000    99  14  166/108Abnormal     95 %  None (Room air)  Lying   12/20/19 0934              None (Room air)     12/20/19 0933    96    166/108Abnormal                Pertinent Labs/Diagnostic Test Results:   12/20 EKG - ST  12/20 CT abd -    Profound hepatic cirrhosis with associated fibrotic change centrally in the liver resulting hepatic lobulation but also progressive narrowing of hepatic veins and intrahepatic IVC    Increasing inflammatory edema surrounding the pancreatic head and uncinate process as well as 2nd and 3rd portion of duodenum  No pancreatic or peripancreatic cyst or discrete fluid collection noted  The appearance is suspicious for focal acute   interstitial edematous pancreatitis especially in this patient with abnormal lipase    Nonspecific prominence of the pancreatic duct in the pancreatic tail, similar from previous examination    Chronic sigmoid diverticulosis  12/20 CXR -    No acute cardiopulmonary disease  12/20 RUQ US - 1  Technically limited study  See above  2   Hepatomegaly with diffuse extensive hepatic steatosis with limited internal visibility  3   Apparent narrowing of the central hepatic veins and intrahepatic inferior vena cava as described above and consistent with the findings on recent CT  No discrete mass noted accounting for this  4   Patent portal venous system with somewhat slow hepatopedal flow    5   Additional findings as noted      Results from last 7 days   Lab Units 12/20/19  1815 12/20/19  0904   WBC Thousand/uL  --  11 84*   HEMOGLOBIN g/dL  --  15 1   HEMATOCRIT %  --  46 1   PLATELETS Thousands/uL 123* 213   NEUTROS ABS Thousands/µL  --  7 64* Results from last 7 days   Lab Units 12/21/19  0455 12/20/19  0904   SODIUM mmol/L 140 140   POTASSIUM mmol/L 3 3* 3 3*   CHLORIDE mmol/L 102 95*   CO2 mmol/L 30 17*   ANION GAP mmol/L 8 28*   BUN mg/dL 10 9   CREATININE mg/dL 0 55* 0 89   EGFR ml/min/1 73sq m 115 80   CALCIUM mg/dL 7 3* 9 2   MAGNESIUM mg/dL 1 9 1 6   PHOSPHORUS mg/dL 2 5* 1 2*     Results from last 7 days   Lab Units 12/21/19  0455 12/20/19  0917 12/20/19  0904   AST U/L 81*  --  147*   ALT U/L 38  --  57   ALK PHOS U/L 132*  --  186*   TOTAL PROTEIN g/dL 7 1  --  9 3*   ALBUMIN g/dL 3 2*  --  4 2   TOTAL BILIRUBIN mg/dL 1 20*  --  1 50*   AMMONIA umol/L  --  21  --      Results from last 7 days   Lab Units 12/21/19  0617 12/21/19  0016 12/20/19  2044 12/20/19  1700 12/20/19  1450   POC GLUCOSE mg/dl 135 130 154* 143* 215*     Results from last 7 days   Lab Units 12/21/19  0455 12/20/19  0904   GLUCOSE RANDOM mg/dL 132 163*       Results from last 7 days   Lab Units 12/20/19  0904   TROPONIN I ng/mL <0 02     Results from last 7 days   Lab Units 12/20/19  0904   PROTIME seconds 14 2   INR  1 10   PTT seconds 27     Results from last 7 days   Lab Units 12/20/19  1411   PROCALCITONIN ng/ml 0 05     Results from last 7 days   Lab Units 12/20/19  2234 12/20/19  1815 12/20/19  1623 12/20/19  1407 12/20/19  1141 12/20/19  0904   LACTIC ACID mmol/L 0 9 1 7 3 2* 2 1* 2 5* 7 5*     Results from last 7 days   Lab Units 12/21/19  0455 12/20/19  0904   LIPASE u/L 736* 1,207*     Results from last 7 days   Lab Units 12/20/19  1147   CLARITY UA  Cloudy   COLOR UA  Yellow   SPEC GRAV UA  >=1 030   PH UA  6 0   GLUCOSE UA mg/dl Negative   KETONES UA mg/dl 15 (1+)*   BLOOD UA  Trace-Intact*   PROTEIN UA mg/dl >=300*   NITRITE UA  Negative   BILIRUBIN UA  Moderate*   UROBILINOGEN UA E U /dl 1 0   LEUKOCYTES UA  Negative   WBC UA /hpf 0-1*   RBC UA /hpf 1-2*   BACTERIA UA /hpf Moderate*   EPITHELIAL CELLS WET PREP /hpf Moderate*   MUCUS THREADS  Moderate* Results from last 7 days   Lab Units 12/20/19  1518   INFLUENZA A PCR  None Detected   RSV PCR  None Detected     Results from last 7 days   Lab Units 12/20/19  0904   ETHANOL LVL mg/dL 116*     Results from last 7 days   Lab Units 12/20/19  1412   BLOOD CULTURE  Received in Microbiology Lab  Culture in Progress  Received in Microbiology Lab  Culture in Progress       ED Treatment:   Medication Administration from 12/20/2019 0843 to 12/20/2019 1649       Date/Time Order Dose Route Action     12/20/2019 0902 LORazepam (ATIVAN) 2 mg/mL injection 2 mg 2 mg Intravenous Given     12/20/2019 0905 sodium chloride 0 9 % bolus 1,000 mL 1,000 mL Intravenous New Bag     12/20/2019 1020 sodium chloride 0 9 % bolus 1,000 mL 1,000 mL Intravenous New Bag     12/20/2019 1020 HYDROmorphone (DILAUDID) injection 0 2 mg 0 2 mg Intravenous Given     12/20/2019 1020 ondansetron (ZOFRAN) injection 4 mg 4 mg Intravenous Given     12/20/2019 1146 potassium phosphate 21 mmol in sodium chloride 0 9 % 250 mL infusion 21 mmol Intravenous New Bag     12/20/2019 1234 HYDROmorphone (DILAUDID) injection 0 5 mg 0 5 mg Intravenous Given     12/20/2019 1418 Labetalol HCl (NORMODYNE) injection 10 mg 10 mg Intravenous Given     12/20/2019 1417 sodium chloride 0 9 % infusion 125 mL/hr Intravenous New Bag     12/20/2019 1513 insulin lispro (HumaLOG) 100 units/mL subcutaneous injection 1-5 Units 2 Units Subcutaneous Given     12/20/2019 1624 HYDROmorphone (DILAUDID) injection 2 mg 2 mg Intravenous Given     12/20/2019 1642 magnesium sulfate 2 g/50 mL IVPB (premix) 2 g 2 g Intravenous New Bag     12/20/2019 1455 LORazepam (ATIVAN) 2 mg/mL injection 1 mg 1 mg Intravenous Given        Past Medical History:   Diagnosis Date    Alcohol abuse     Arthritis     GERD (gastroesophageal reflux disease)     Hypertension     Nicotine dependence     Obesity     Pancreatitis     Panic attack      Present on Admission:   Acute on chronic alcoholic pancreatitis    Alcohol abuse with intoxication   Diabetes mellitus type 2   Hypokalemia - Hypophosphatemia   History of alcoholic cirrhosis   GERD (gastroesophageal reflux disease)   Tobacco abuse      Admitting Diagnosis: Lactic acidosis [L43 8]  Alcoholic cirrhosis (HCC) [D99 18]  Hypophosphatemia [E83 39]  Abdominal pain [R10 9]  Acute on chronic pancreatitis (HCC) [K85 90, K86 1]  Age/Sex: 38 y o  female  Admission Orders:  Scheduled Medications:    Medications:  chlordiazePOXIDE 100 mg Oral Once   cloNIDine 0 3 mg Oral BID   folic acid 1 mg Oral Daily   gabapentin 300 mg Oral TID   heparin (porcine) 5,000 Units Subcutaneous Q8H [address]   insulin lispro 1-5 Units Subcutaneous Q6H   multivitamin stress formula 1 tablet Oral Daily   nicotine 1 patch Transdermal Daily   pancrelipase (Lip-Prot-Amyl) 24,000 Units Oral TID With Meals   pantoprazole 40 mg Intravenous Q24H [address]   thiamine 100 mg Oral Daily     Continuous IV Infusions:    lactated ringers 250 mL/hr Intravenous Continuous     PRN Meds:    albuterol 2 puff Inhalation Q4H PRN   HYDROmorphone 2 mg Intravenous Q4H PRN x5   Labetalol HCl 10 mg Intravenous Q4H PRN x1   ondansetron 4 mg Intravenous Q4H PRN x2     Tele   CIWA  Score 2-12 anxiety , tactile disturbance , N/V, tremor, orientation and clouding of sensorium   IP CONSULT TO GASTROENTEROLOGY    Network Utilization Review Department  [email or web address]  ATTENTION: Please call with any questions or concerns to [phone] and carefully listen to the prompts so that you are directed to the right person  All voicemails are confidential   Mikey Pierre all requests for admission clinical reviews, approved or denied determinations and any other requests to dedicated fax number below belonging to the campus where the patient is receiving treatment   List of dedicated fax numbers for the Facilities:  FACILITY NAME UR FAX NUMBER   ADMISSION DENIALS (Administrative/Medical Necessity) [facility] (Maternity/NICU/Pediatrics) [phone]   Yari Gabriel [phone]   Ed Urenaemilie [phone]   Bon Youssef [phone]   [address] [phone]   [facility]  [phone]   [address] [facility] [address]  [address] [facility] [address] [phone]

## 2019-12-21 NOTE — ASSESSMENT & PLAN NOTE
- BP elevation likely multifactorial secondary to uncontrolled pain coupled with possible early alcohol withdrawal and possible noncompliance with home Clonidine regimen (adverse effect of rebound hypertension if noncompliant)   - c/w Clonidine - PRN IV Labetalol on board for residual spikes   - see plan for alcohol abuse/withdrawal below  - low-sodium restriction once on oral diet    Signed out against medical advice

## 2019-12-21 NOTE — ASSESSMENT & PLAN NOTE
- CT imaging on presentation revealing "Increasing inflammatory edema surrounding the pancreatic head and uncinate process as well as 2nd and 3rd portion of duodenum  No pancreatic or peripancreatic cyst or discrete fluid collection noted  The appearance is suspicious for focal acute interstitial edematous pancreatitis especially in this patient with abnormal lipase  Nonspecific prominence of the pancreatic duct in the pancreatic tail, similar from previous examination  "   - remains NPO - PRN intravenous analgesia regimen input - PRN emesis control  - strongly counseled on alcohol cessation (see plan for alcohol abuse below)  - serum lipase elevated @ 1,207 on presentation - will trend  - associated lactic acidosis noted - on aggressive IV fluid resuscitation (see plan for SIRS below)  - etiology is likely alcohol abuse/intoxication w/ elevated serum levels - check fasting triglycerides as well     Signed out against medical advice

## 2019-12-21 NOTE — CONSULTS
Consultation -  [facility]  Ailyn Romano 38 y o  female MRN: [id number]  Unit/Bed#: -01 Encounter: [id number]        Consults    Reason for Consult / Principal Problem: Abdominal Pain, Alcohol Abuse    HPI: Ms Jian Mccollum is a 37 yo F with a PMH of noncompliance, alcohol abuse, chronic pancreatitis and recurrent acute pancreatitis 2/2 alcohol abuse, alcoholic cirrhosis, anxiety, presenting due to abdominal pain  She is unable to provide any history currently given she is drowsy  All she states is that she wants to leave for a party for her daughter today and then come back to the hospital  Per the H&P, she was having worsening epigastric/RUQ  She continues to drink alcohol and has an elevated blood alcohol level but reports minimal alcohol use  She falls asleep as we try to talk and is snoring while sitting upright  REVIEW OF SYSTEMS: Negative except for as stated above      Historical Information   Past Medical History:   Diagnosis Date    Alcohol abuse     Arthritis     GERD (gastroesophageal reflux disease)     Hypertension     Nicotine dependence     Obesity     Pancreatitis     Panic attack      Past Surgical History:   Procedure Laterality Date    ABDOMINAL SURGERY      C SECTION    ESOPHAGOGASTRODUODENOSCOPY N/A 12/15/2017    Procedure: ESOPHAGOGASTRODUODENOSCOPY (EGD); Surgeon: Maribel Jiang MD;  Location: [facility];   Service: Gastroenterology     Social History   Social History     Substance and Sexual Activity   Alcohol Use Yes    Frequency: 4 or more times a week    Drinks per session: 3 or 4    Comment: Patient states 'I drink alot of vodka a day"     Social History     Substance and Sexual Activity   Drug Use Never     Social History     Tobacco Use   Smoking Status Current Every Day Smoker    Packs/day: 1 00    Years: 29 00    Pack years: 29 00    Types: Cigarettes   Smokeless Tobacco Never Used   Tobacco Comment    pt refused packet     Family History   Problem Relation Age of Onset    Cirrhosis Father     Alcohol abuse Father     Drug abuse Mother        Meds/Allergies     Medications Prior to Admission   Medication    albuterol (PROVENTIL HFA,VENTOLIN HFA) 90 mcg/act inhaler    cloNIDine (CATAPRES) 0 3 mg tablet    dicyclomine (BENTYL) 20 mg tablet    folic acid (FOLVITE) 1 mg tablet    gabapentin (NEURONTIN) 300 mg capsule    LORazepam (ATIVAN) 0 5 mg tablet    metFORMIN (GLUCOPHAGE) 500 mg tablet    metoclopramide (REGLAN) 10 mg tablet    Mometasone Furo-Formoterol Fum (DULERA) 100-5 MCG/ACT AERO    nicotine (NICODERM CQ) 21 mg/24 hr TD 24 hr patch    ondansetron (ZOFRAN) 4 mg tablet    pancrelipase, Lip-Prot-Amyl, (CREON) 24,000 units    pantoprazole (PROTONIX) 20 mg tablet    sucralfate (CARAFATE) 1 g tablet    thiamine 100 MG tablet     Current Facility-Administered Medications   Medication Dose Route Frequency    albuterol (PROVENTIL HFA,VENTOLIN HFA) inhaler 2 puff  2 puff Inhalation Q4H PRN    cloNIDine (CATAPRES) tablet 0 3 mg  0 3 mg Oral BID    folic acid (FOLVITE) tablet 1 mg  1 mg Oral Daily    gabapentin (NEURONTIN) capsule 300 mg  300 mg Oral TID    heparin (porcine) subcutaneous injection 5,000 Units  5,000 Units Subcutaneous Q8H [address]    HYDROmorphone (DILAUDID) injection 2 mg  2 mg Intravenous Q4H PRN    insulin lispro (HumaLOG) 100 units/mL subcutaneous injection 1-5 Units  1-5 Units Subcutaneous Q6H    Labetalol HCl (NORMODYNE) injection 10 mg  10 mg Intravenous Q4H PRN    lactated ringers infusion  250 mL/hr Intravenous Continuous    multivitamin stress formula tablet 1 tablet  1 tablet Oral Daily    nicotine (NICODERM CQ) 21 mg/24 hr TD 24 hr patch 1 patch  1 patch Transdermal Daily    ondansetron (ZOFRAN) injection 4 mg  4 mg Intravenous Q4H PRN    pancrelipase (Lip-Prot-Amyl) (CREON) delayed release capsule 24,000 Units  24,000 Units Oral TID With Meals    pantoprazole (PROTONIX) injection 40 mg  40 mg Intravenous Q24H [facility]    thiamine (VITAMIN B1) tablet 100 mg  100 mg Oral Daily       No Known Allergies        Objective     Blood pressure (!) 163/104, pulse 94, temperature 98 6 °F (37 °C), resp  rate 18, height 5' 4" (1 626 m), weight 77 6 kg (171 lb 1 2 oz), last menstrual period 11/11/2019, SpO2 95 %, not currently breastfeeding  Intake/Output Summary (Last 24 hours) at 12/21/2019 1002  Last data filed at 12/20/2019 1546  Gross per 24 hour   Intake 2250 ml   Output    Net 2250 ml         PHYSICAL EXAM:      General Appearance:   Drowsy, in and out of sleep, able to answer orientation questions appropriately    HEENT:   Normocephalic, atraumatic, anicteric      Neck:  Supple, symmetrical, trachea midline   Lungs:   Clear to auscultation bilaterally; no rales, rhonchi or wheezing; respirations unlabored    Heart[de-identified]   S1 and S2 normal; regular rate and rhythm; no murmur, rub, or gallop  Abdomen:   Non-distended, soft, BS active, (+) mild generalized TTP   Rectal:   Deferred    Extremities:  No cyanosis, clubbing or edema    Pulses:  2+ and symmetric all extremities    Skin:  No jaundice or pallor, no rashes or lesions      Lab Results:   Results from last 7 days   Lab Units 12/20/19  1815 12/20/19  0904   WBC Thousand/uL  --  11 84*   HEMOGLOBIN g/dL  --  15 1   HEMATOCRIT %  --  46 1   PLATELETS Thousands/uL 123* 213   NEUTROS PCT %  --  64   LYMPHS PCT %  --  25   MONOS PCT %  --  10   EOS PCT %  --  0     Results from last 7 days   Lab Units 12/21/19  0455   POTASSIUM mmol/L 3 3*   CHLORIDE mmol/L 102   CO2 mmol/L 30   BUN mg/dL 10   CREATININE mg/dL 0 55*   CALCIUM mg/dL 7 3*   ALK PHOS U/L 132*   ALT U/L 38   AST U/L 81*     Results from last 7 days   Lab Units 12/20/19  0904   INR  1 10     Results from last 7 days   Lab Units 12/21/19  0455   LIPASE u/L 736*       Imaging Studies: I have personally reviewed pertinent imaging studies        Xr Chest Portable  Result Date: 12/20/2019  Impression: No acute cardiopulmonary disease  Workstation performed: [id number]     Ct Abdomen Pelvis With Contrast  Result Date: 12/20/2019  Impression: Profound hepatic cirrhosis with associated fibrotic change centrally in the liver resulting hepatic lobulation but also progressive narrowing of hepatic veins and intrahepatic IVC  Increasing inflammatory edema surrounding the pancreatic head and uncinate process as well as 2nd and 3rd portion of duodenum  No pancreatic or peripancreatic cyst or discrete fluid collection noted  The appearance is suspicious for focal acute interstitial edematous pancreatitis especially in this patient with abnormal lipase  Nonspecific prominence of the pancreatic duct in the pancreatic tail, similar from previous examination  Chronic sigmoid diverticulosis  The study was marked in [facility] for immediate notification  Workstation performed: [id number]     Us Right Upper Quadrant With Liver Dopplers  Result Date: 12/20/2019  Impression: 1  Technically limited study  See above  2   Hepatomegaly with diffuse extensive hepatic steatosis with limited internal visibility  3   Apparent narrowing of the central hepatic veins and intrahepatic inferior vena cava as described above and consistent with the findings on recent CT  No discrete mass noted accounting for this  4   Patent portal venous system with somewhat slow hepatopedal flow  5   Additional findings as noted   Workstation performed: [id number]       ASSESSMENT and PLAN:      Acute Pancreatitis  - CT A/P shows findings consistent with acute pancreatitis, chronic prominence of pancreatic duct, profound cirrhosis with fibrotic change and compression of hepatic veins and intrahepatic IVC  - RUQ US shows same narrowing of hepatic veins and intrahepatic IVC but patent portal vein, no thrombus or mass noted  - Lipase 1207  - TG normal, pt continues to drink alcohol despite recurrent admission and offering of resources to help her abstain  - LR at 250 ml/hr  - Serial abdominal exams  - Supportive care  - NPO, can consider advancing to clear liquids later today if pain improves  - DVT prophylaxis and incentive spirometry  - Pt wants to go home and come back, discussed with patient that she is not medically stable for discharged    Alcoholic Hepatitis  Alcoholic Cirrhosis  - Discriminant function <50 for alcoholic hepatitis  - [facility] protocol, folate and thiamine supplementation  - MELD 9, Child Class A  - Continue alcohol abuse as above, alcohol cessation  - She is drowsy but no encephalopathy  - EGD in December 2017 showing Castano's esophagus and gastritis, no varices, needs repeat EGD as outpatient but patient is noncompliant  [facility] with [facility] in June and [address] [facility] [address] yesterday are negative, repeat in June 2020  - Not a transplant candidate due to low MELD and continued alcohol abuse      The patient will be seen by Dr Javier Crenshaw

## 2019-12-21 NOTE — DISCHARGE SUMMARY
Discharge- Afsaneh Montoya 1975, 38 y o  female MRN: [id number]    Unit/Bed#: -01 Encounter: [id number]    Primary Care Provider: Gurdeep Hinson MD   Date and time admitted to hospital: 12/20/2019  8:44 AM        Accelerated hypertension - history of Essential hypertension  Assessment & Plan  - BP elevation likely multifactorial secondary to uncontrolled pain coupled with possible early alcohol withdrawal and possible noncompliance with home Clonidine regimen (adverse effect of rebound hypertension if noncompliant)   - c/w Clonidine - PRN IV Labetalol on board for residual spikes   - see plan for alcohol abuse/withdrawal below  - low-sodium restriction once on oral diet    Signed out against medical advice     Tobacco abuse  Assessment & Plan  - transdermal nicotine patch on board    GERD (gastroesophageal reflux disease)  Assessment & Plan  - continue PPI regimen    Diabetes mellitus type 2  Assessment & Plan  - last HbA1c of 6 9 in October 2019  - hold Metformin while hospitalized especially in the setting of lactic acidosis and abnormal LFTs - initiated SSI coverage per [employer] (Q6h while NPO)  (P) [phone]     Signed out against medical advice    History of alcoholic cirrhosis  Assessment & Plan  - with associated abnormal LFTs  - CT imaging revealing "Profound hepatic cirrhosis with associated fibrotic change centrally in the liver resulting hepatic lobulation but also progressive narrowing of hepatic veins and intrahepatic IVC  "  - will appreciate gastroenterology input in light of progression of disease and hepatobiliary vascular narrowing  - check liver ultrasound with dopplers  - limit/avoid hepatotoxins if possible  - AFP tumor marker level normal at 6 8 in October 2019      Signed out against medical advice     SIRS (systemic inflammatory response syndrome) - Lactic acidosis  Assessment & Plan  - presents with tachycardia/tachypnea coupled with leukocytosis and lactic acidosis in association with acute/chronic pancreatitis and alcohol abuse  - lactic acid level significantly improved with aggressive IV fluid resuscitation - continue IV fluids and monitor level every two hours until < 2  - blood cultures drawn in the ER - check serum procalcitonin level as infectious etiology seems less likely currently  - continue vital sign monitoring - afebrile on presentation  - complains of some cough/congestion - check portable CXR and Influenza/RSV screen    Signed out against medical advice     Alcohol abuse with intoxication  Assessment & Plan  - counseled on cessation although unlikely to comply due to recurrence/chronicity - serum alcohol level elevated at 116 on presentation  - [facility] protocol initiated - monitor for progressive withdrawal  - thiamine/multivitamin/folic acid regimen on board  - monitor/replete electrolyte deficiencies  - once more medically stable, discussion should occur regarding alcohol rehab post-discharge    Left against medical advice    Hypokalemia - Hypophosphatemia  Assessment & Plan  - associated with chronic alcoholism  - monitor/replete potassium and phosphate as necessary   - will administer IV magnesium sulfate augment potassium by availability as serum magnesium is borderline low-normal range    Left against medical advice    * Acute on chronic alcoholic pancreatitis   Assessment & Plan  - CT imaging on presentation revealing "Increasing inflammatory edema surrounding the pancreatic head and uncinate process as well as 2nd and 3rd portion of duodenum  No pancreatic or peripancreatic cyst or discrete fluid collection noted  The appearance is suspicious for focal acute interstitial edematous pancreatitis especially in this patient with abnormal lipase  Nonspecific prominence of the pancreatic duct in the pancreatic tail, similar from previous examination  "   - remains NPO - PRN intravenous analgesia regimen input - PRN emesis control  - strongly counseled on alcohol cessation (see plan for alcohol abuse below)  - serum lipase elevated @ 1,207 on presentation - will trend  - associated lactic acidosis noted - on aggressive IV fluid resuscitation (see plan for SIRS below)  - etiology is likely alcohol abuse/intoxication w/ elevated serum levels - check fasting triglycerides as well     Signed out against medical advice          Discharging Physician / Practitioner: Savana Gannon MD  PCP: Luisa Parrish MD  Admission Date:   Admission Orders (From admission, onward)     Ordered        12/20/19 1404  Inpatient Admission  Once                   Discharge Date: 12/21/19    Disposition:      Other: Home    For Discharges to [facility]:   · Not Applicable to this Patient - Not Applicable to this Patient    Reason for Admission: ETOH pancreatitis, ETOH intoxication/withdrawal, Withdrawal seizure    Discharge Diagnoses:     Please see assessment and plan section above for further details regarding discharge diagnoses  Resolved Problems  Date Reviewed: 12/20/2019    None          Consultations During Hospital Stay:  · GI    Procedures Performed:   · None     Medication Adjustments and Discharge Medications:  · Summary of Medication Adjustments made as a result of this hospitalization: Prescribed zofran  · Medication Dosing Tapers - Please refer to Discharge Medication List for details on any medication dosing tapers (if applicable to patient)  · Medications being temporarily held (include recommended restart time): NA  · Discharge Medication List: See after visit summary for reconciled discharge medications  Wound Care Recommendations:  When applicable, please see wound care section of After Visit Summary  Diet Recommendations at Discharge:  Diet -        Diet Orders   (From admission, onward)             Start     Ordered    12/20/19 1659  Diet NPO; Sips with meds  Diet effective now     Question Answer Comment   Diet Type NPO    NPO Except:  Cherylyn Fish with meds    RD to adjust diet per protocol? Yes        12/20/19 7345                Instructions for any Catheters / Lines Present at Discharge (including removal date, if applicable): None    Significant Findings / Test Results:   · Hepatic cirrhosis, inflammatory edema surrounding pancreatic head     Incidental Findings:   · NA     Test Results Pending at Discharge (will require follow up):   · NA     Outpatient Tests Requested:  · NA    Complications:  NA    Hospital Course:     Ailyn Romano is a 38 y o  female patient who originally presented to the hospital on 12/20/2019 due to abdominal pain  Admitted for suspected pancreatitis along with alcohol withdrawal   GI was consulted and assessed patient however prior to optimization of care patient wished to sign out against medical advice  Patient was alert and oriented x3 and had full understanding of her diagnosis and the complications that could arise if she was discharged at this time  States that she has a daughter she needs to see and will come back to the hospital within a few hours  Patient was given Librium p o  100 mg while admitted prior to being discharged  At this time given patient has history of home plus chronic alcohol abuse I do not feel comfortable prescribing her any benzodiazepines or narcotic pain medication to go home with  I stated to her that it is not safe to go home at this time and I am concerned for the severe complications of alcohol withdrawal for which she understood and promised that she would come back to the hospital within a few hours  She was experiencing nausea and had an episode of emesis prior to discharge  I prescribed her some Zofran as an outpatient  On 12/21/2019, patient signed out against medical advice      Condition at Discharge: serious     Discharge Day Visit / Exam:     Subjective:  Patient feels lethargic, weak and nauseous  Vitals: Blood Pressure: (!) 194/123 (12/21/19 1057)  Pulse: 89 (12/21/19 1057)  Temperature: 98 4 °F (36 9 °C) (12/21/19 1057)  Temp Source: Oral (12/20/19 0855)  Respirations: 16 (12/21/19 1057)  Height: 5' 4" (162 6 cm) (12/20/19 0855)  Weight - Scale: 77 6 kg (171 lb 1 2 oz) (12/20/19 0855)  SpO2: 97 % (12/21/19 1057)  Exam:   Lethargic but alert  Regular rate room in excellent clear to auscultation bilaterally  Bowel sounds positive, no tenderness and no distention  No lower extremity edema  Positive tongue fasciculations and diffuse tremulousness  Alert and oriented x3    Discussion with Family:  Alcohol withdrawal, withdrawal seizures, pancreatitis    Goals of Care Discussions:  · Code Status at Discharge: Level 1 - Full Code  · Were there any Goals of Care Discussions during Hospitalization?: No  · Results of any General Goals of Care Discussions: NA   · POLST Completed: No   · If POLST Completed, Summary of POLST Agreement Provided Here: NA   · OK to Rehospitalize if Needed? No    Discharge instructions/Information to patient and family:   See after visit summary section titled Discharge Instructions for information provided to patient and family  Planned Readmission: None      Discharge Statement:  I spent 30 minutes discharging the patient  This time was spent on the day of discharge  I had direct contact with the patient on the day of discharge  Greater than 50% of the total time was spent examining patient, answering all patient questions, arranging and discussing plan of care with patient as well as directly providing post-discharge instructions  Additional time then spent on discharge activities      ** Please Note: This note has been constructed using a voice recognition system **

## 2019-12-22 PROBLEM — D69.6 THROMBOCYTOPENIA (HCC): Status: ACTIVE | Noted: 2019-12-22

## 2019-12-22 LAB
ALBUMIN SERPL BCP-MCNC: 3 G/DL (ref 3.5–5)
ALP SERPL-CCNC: 120 U/L (ref 46–116)
ALT SERPL W P-5'-P-CCNC: 32 U/L (ref 12–78)
ANION GAP SERPL CALCULATED.3IONS-SCNC: 10 MMOL/L (ref 4–13)
AST SERPL W P-5'-P-CCNC: 85 U/L (ref 5–45)
BASOPHILS # BLD AUTO: 0.03 THOUSANDS/ΜL (ref 0–0.1)
BASOPHILS NFR BLD AUTO: 0 % (ref 0–1)
BILIRUB SERPL-MCNC: 1.1 MG/DL (ref 0.2–1)
BUN SERPL-MCNC: 8 MG/DL (ref 5–25)
CALCIUM SERPL-MCNC: 7.4 MG/DL (ref 8.3–10.1)
CHLORIDE SERPL-SCNC: 100 MMOL/L (ref 100–108)
CO2 SERPL-SCNC: 28 MMOL/L (ref 21–32)
CREAT SERPL-MCNC: 0.7 MG/DL (ref 0.6–1.3)
EOSINOPHIL # BLD AUTO: 0.03 THOUSAND/ΜL (ref 0–0.61)
EOSINOPHIL NFR BLD AUTO: 0 % (ref 0–6)
ERYTHROCYTE [DISTWIDTH] IN BLOOD BY AUTOMATED COUNT: 17.7 % (ref 11.6–15.1)
GFR SERPL CREATININE-BSD FRML MDRD: 106 ML/MIN/1.73SQ M
GLUCOSE SERPL-MCNC: 104 MG/DL (ref 65–140)
GLUCOSE SERPL-MCNC: 186 MG/DL (ref 65–140)
GLUCOSE SERPL-MCNC: 208 MG/DL (ref 65–140)
GLUCOSE SERPL-MCNC: 250 MG/DL (ref 65–140)
GLUCOSE SERPL-MCNC: 259 MG/DL (ref 65–140)
GLUCOSE SERPL-MCNC: 96 MG/DL (ref 65–140)
HCT VFR BLD AUTO: 36.3 % (ref 34.8–46.1)
HGB BLD-MCNC: 11.4 G/DL (ref 11.5–15.4)
IMM GRANULOCYTES # BLD AUTO: 0.02 THOUSAND/UL (ref 0–0.2)
IMM GRANULOCYTES NFR BLD AUTO: 0 % (ref 0–2)
LYMPHOCYTES # BLD AUTO: 2.33 THOUSANDS/ΜL (ref 0.6–4.47)
LYMPHOCYTES NFR BLD AUTO: 33 % (ref 14–44)
MAGNESIUM SERPL-MCNC: 2.1 MG/DL (ref 1.6–2.6)
MCH RBC QN AUTO: 30.8 PG (ref 26.8–34.3)
MCHC RBC AUTO-ENTMCNC: 31.4 G/DL (ref 31.4–37.4)
MCV RBC AUTO: 98 FL (ref 82–98)
MONOCYTES # BLD AUTO: 0.48 THOUSAND/ΜL (ref 0.17–1.22)
MONOCYTES NFR BLD AUTO: 7 % (ref 4–12)
NEUTROPHILS # BLD AUTO: 4.18 THOUSANDS/ΜL (ref 1.85–7.62)
NEUTS SEG NFR BLD AUTO: 60 % (ref 43–75)
NRBC BLD AUTO-RTO: 0 /100 WBCS
PHOSPHATE SERPL-MCNC: 1.2 MG/DL (ref 2.7–4.5)
PLATELET # BLD AUTO: 102 THOUSANDS/UL (ref 149–390)
PMV BLD AUTO: 10.5 FL (ref 8.9–12.7)
POTASSIUM SERPL-SCNC: 2.9 MMOL/L (ref 3.5–5.3)
PROT SERPL-MCNC: 6.5 G/DL (ref 6.4–8.2)
RBC # BLD AUTO: 3.7 MILLION/UL (ref 3.81–5.12)
SODIUM SERPL-SCNC: 138 MMOL/L (ref 136–145)
WBC # BLD AUTO: 7.07 THOUSAND/UL (ref 4.31–10.16)

## 2019-12-22 PROCEDURE — 94760 N-INVAS EAR/PLS OXIMETRY 1: CPT

## 2019-12-22 PROCEDURE — 99223 1ST HOSP IP/OBS HIGH 75: CPT | Performed by: FAMILY MEDICINE

## 2019-12-22 PROCEDURE — NC001 PR NO CHARGE: Performed by: INTERNAL MEDICINE

## 2019-12-22 PROCEDURE — 84100 ASSAY OF PHOSPHORUS: CPT | Performed by: PHYSICIAN ASSISTANT

## 2019-12-22 PROCEDURE — 83735 ASSAY OF MAGNESIUM: CPT | Performed by: PHYSICIAN ASSISTANT

## 2019-12-22 PROCEDURE — 80053 COMPREHEN METABOLIC PANEL: CPT | Performed by: PHYSICIAN ASSISTANT

## 2019-12-22 PROCEDURE — 85025 COMPLETE CBC W/AUTO DIFF WBC: CPT | Performed by: PHYSICIAN ASSISTANT

## 2019-12-22 PROCEDURE — 94762 N-INVAS EAR/PLS OXIMTRY CONT: CPT

## 2019-12-22 PROCEDURE — 82948 REAGENT STRIP/BLOOD GLUCOSE: CPT

## 2019-12-22 RX ORDER — ONDANSETRON 2 MG/ML
4 INJECTION INTRAMUSCULAR; INTRAVENOUS EVERY 6 HOURS PRN
Status: DISCONTINUED | OUTPATIENT
Start: 2019-12-22 | End: 2019-12-23 | Stop reason: HOSPADM

## 2019-12-22 RX ORDER — LORAZEPAM 2 MG/ML
2 INJECTION INTRAMUSCULAR ONCE
Status: COMPLETED | OUTPATIENT
Start: 2019-12-22 | End: 2019-12-22

## 2019-12-22 RX ORDER — PANTOPRAZOLE SODIUM 20 MG/1
20 TABLET, DELAYED RELEASE ORAL DAILY
Status: DISCONTINUED | OUTPATIENT
Start: 2019-12-22 | End: 2019-12-23 | Stop reason: HOSPADM

## 2019-12-22 RX ORDER — CALCIUM CARBONATE 200(500)MG
1000 TABLET,CHEWABLE ORAL DAILY PRN
Status: DISCONTINUED | OUTPATIENT
Start: 2019-12-22 | End: 2019-12-23 | Stop reason: HOSPADM

## 2019-12-22 RX ORDER — THIAMINE MONONITRATE (VIT B1) 100 MG
100 TABLET ORAL DAILY
Status: DISCONTINUED | OUTPATIENT
Start: 2019-12-22 | End: 2019-12-23 | Stop reason: HOSPADM

## 2019-12-22 RX ORDER — SUCRALFATE 1 G/1
1 TABLET ORAL 4 TIMES DAILY
Status: DISCONTINUED | OUTPATIENT
Start: 2019-12-22 | End: 2019-12-23 | Stop reason: HOSPADM

## 2019-12-22 RX ORDER — HYDROMORPHONE HCL/PF 1 MG/ML
0.2 SYRINGE (ML) INJECTION
Status: DISCONTINUED | OUTPATIENT
Start: 2019-12-22 | End: 2019-12-23 | Stop reason: HOSPADM

## 2019-12-22 RX ORDER — NICOTINE 21 MG/24HR
1 PATCH, TRANSDERMAL 24 HOURS TRANSDERMAL DAILY
Status: DISCONTINUED | OUTPATIENT
Start: 2019-12-22 | End: 2019-12-22

## 2019-12-22 RX ORDER — SODIUM CHLORIDE 9 MG/ML
125 INJECTION, SOLUTION INTRAVENOUS CONTINUOUS
Status: DISCONTINUED | OUTPATIENT
Start: 2019-12-22 | End: 2019-12-22

## 2019-12-22 RX ORDER — SODIUM CHLORIDE 9 MG/ML
150 INJECTION, SOLUTION INTRAVENOUS CONTINUOUS
Status: DISCONTINUED | OUTPATIENT
Start: 2019-12-22 | End: 2019-12-23

## 2019-12-22 RX ORDER — LORAZEPAM 0.5 MG/1
0.5 TABLET ORAL EVERY 8 HOURS PRN
Status: DISCONTINUED | OUTPATIENT
Start: 2019-12-22 | End: 2019-12-23 | Stop reason: HOSPADM

## 2019-12-22 RX ORDER — NICOTINE 21 MG/24HR
1 PATCH, TRANSDERMAL 24 HOURS TRANSDERMAL DAILY
Status: DISCONTINUED | OUTPATIENT
Start: 2019-12-22 | End: 2019-12-23 | Stop reason: HOSPADM

## 2019-12-22 RX ORDER — CLONIDINE HYDROCHLORIDE 0.1 MG/1
0.3 TABLET ORAL 2 TIMES DAILY
Status: DISCONTINUED | OUTPATIENT
Start: 2019-12-22 | End: 2019-12-23 | Stop reason: HOSPADM

## 2019-12-22 RX ORDER — ALBUTEROL SULFATE 90 UG/1
2 AEROSOL, METERED RESPIRATORY (INHALATION) EVERY 4 HOURS PRN
Status: DISCONTINUED | OUTPATIENT
Start: 2019-12-22 | End: 2019-12-23 | Stop reason: HOSPADM

## 2019-12-22 RX ORDER — CHLORDIAZEPOXIDE HYDROCHLORIDE 25 MG/1
25 CAPSULE, GELATIN COATED ORAL EVERY 8 HOURS SCHEDULED
Status: DISCONTINUED | OUTPATIENT
Start: 2019-12-22 | End: 2019-12-22

## 2019-12-22 RX ORDER — FOLIC ACID 1 MG/1
1 TABLET ORAL DAILY
Status: DISCONTINUED | OUTPATIENT
Start: 2019-12-22 | End: 2019-12-23 | Stop reason: HOSPADM

## 2019-12-22 RX ORDER — POTASSIUM CHLORIDE 20 MEQ/1
40 TABLET, EXTENDED RELEASE ORAL ONCE
Status: COMPLETED | OUTPATIENT
Start: 2019-12-22 | End: 2019-12-22

## 2019-12-22 RX ORDER — LABETALOL 20 MG/4 ML (5 MG/ML) INTRAVENOUS SYRINGE
10 EVERY 6 HOURS PRN
Status: DISCONTINUED | OUTPATIENT
Start: 2019-12-22 | End: 2019-12-23 | Stop reason: HOSPADM

## 2019-12-22 RX ADMIN — SODIUM CHLORIDE 150 ML/HR: 0.9 INJECTION, SOLUTION INTRAVENOUS at 18:52

## 2019-12-22 RX ADMIN — PANCRELIPASE 24000 UNITS: 24000; 76000; 120000 CAPSULE, DELAYED RELEASE PELLETS ORAL at 18:01

## 2019-12-22 RX ADMIN — SUCRALFATE 1 G: 1 TABLET ORAL at 11:54

## 2019-12-22 RX ADMIN — PANCRELIPASE 24000 UNITS: 24000; 76000; 120000 CAPSULE, DELAYED RELEASE PELLETS ORAL at 08:51

## 2019-12-22 RX ADMIN — HYDROMORPHONE HYDROCHLORIDE 0.2 MG: 1 INJECTION, SOLUTION INTRAMUSCULAR; INTRAVENOUS; SUBCUTANEOUS at 12:14

## 2019-12-22 RX ADMIN — HYDROMORPHONE HYDROCHLORIDE 0.2 MG: 1 INJECTION, SOLUTION INTRAMUSCULAR; INTRAVENOUS; SUBCUTANEOUS at 05:22

## 2019-12-22 RX ADMIN — NICOTINE 1 PATCH: 21 PATCH TRANSDERMAL at 21:33

## 2019-12-22 RX ADMIN — HYDROMORPHONE HYDROCHLORIDE 0.2 MG: 1 INJECTION, SOLUTION INTRAMUSCULAR; INTRAVENOUS; SUBCUTANEOUS at 20:22

## 2019-12-22 RX ADMIN — INSULIN LISPRO 2 UNITS: 100 INJECTION, SOLUTION INTRAVENOUS; SUBCUTANEOUS at 18:03

## 2019-12-22 RX ADMIN — CLONIDINE HYDROCHLORIDE 0.3 MG: 0.1 TABLET ORAL at 08:41

## 2019-12-22 RX ADMIN — CLONIDINE HYDROCHLORIDE 0.3 MG: 0.1 TABLET ORAL at 18:02

## 2019-12-22 RX ADMIN — HYDROMORPHONE HYDROCHLORIDE 0.2 MG: 1 INJECTION, SOLUTION INTRAMUSCULAR; INTRAVENOUS; SUBCUTANEOUS at 01:09

## 2019-12-22 RX ADMIN — PANTOPRAZOLE SODIUM 20 MG: 20 TABLET, DELAYED RELEASE ORAL at 05:22

## 2019-12-22 RX ADMIN — SODIUM CHLORIDE 150 ML/HR: 0.9 INJECTION, SOLUTION INTRAVENOUS at 12:16

## 2019-12-22 RX ADMIN — SUCRALFATE 1 G: 1 TABLET ORAL at 18:02

## 2019-12-22 RX ADMIN — LORAZEPAM 0.5 MG: 0.5 TABLET ORAL at 21:40

## 2019-12-22 RX ADMIN — INSULIN LISPRO 1 UNITS: 100 INJECTION, SOLUTION INTRAVENOUS; SUBCUTANEOUS at 22:55

## 2019-12-22 RX ADMIN — SUCRALFATE 1 G: 1 TABLET ORAL at 21:25

## 2019-12-22 RX ADMIN — HYDROMORPHONE HYDROCHLORIDE 0.2 MG: 1 INJECTION, SOLUTION INTRAMUSCULAR; INTRAVENOUS; SUBCUTANEOUS at 16:24

## 2019-12-22 RX ADMIN — NICOTINE 1 PATCH: 21 PATCH TRANSDERMAL at 02:20

## 2019-12-22 RX ADMIN — HYDROMORPHONE HYDROCHLORIDE 0.2 MG: 1 INJECTION, SOLUTION INTRAMUSCULAR; INTRAVENOUS; SUBCUTANEOUS at 08:42

## 2019-12-22 RX ADMIN — PANCRELIPASE 24000 UNITS: 24000; 76000; 120000 CAPSULE, DELAYED RELEASE PELLETS ORAL at 11:54

## 2019-12-22 RX ADMIN — LORAZEPAM 2 MG: 2 INJECTION INTRAMUSCULAR; INTRAVENOUS at 03:00

## 2019-12-22 RX ADMIN — SUCRALFATE 1 G: 1 TABLET ORAL at 08:41

## 2019-12-22 RX ADMIN — POTASSIUM CHLORIDE 40 MEQ: 1500 TABLET, EXTENDED RELEASE ORAL at 00:56

## 2019-12-22 RX ADMIN — INSULIN LISPRO 3 UNITS: 100 INJECTION, SOLUTION INTRAVENOUS; SUBCUTANEOUS at 08:54

## 2019-12-22 RX ADMIN — FOLIC ACID 1 MG: 1 TABLET ORAL at 08:41

## 2019-12-22 RX ADMIN — MAGNESIUM SULFATE HEPTAHYDRATE 2 G: 40 INJECTION, SOLUTION INTRAVENOUS at 00:56

## 2019-12-22 RX ADMIN — POTASSIUM CHLORIDE 40 MEQ: 1500 TABLET, EXTENDED RELEASE ORAL at 20:17

## 2019-12-22 RX ADMIN — SODIUM CHLORIDE 100 ML/HR: 0.9 INJECTION, SOLUTION INTRAVENOUS at 00:57

## 2019-12-22 RX ADMIN — SUCRALFATE 1 G: 1 TABLET ORAL at 00:56

## 2019-12-22 RX ADMIN — LORAZEPAM 0.5 MG: 0.5 TABLET ORAL at 14:51

## 2019-12-22 RX ADMIN — THIAMINE HCL TAB 100 MG 100 MG: 100 TAB at 08:41

## 2019-12-22 NOTE — PLAN OF CARE
Problem: Potential for Falls  Goal: Patient will remain free of falls  Description  INTERVENTIONS:  - Assess patient frequently for physical needs  -  Identify cognitive and physical deficits and behaviors that affect risk of falls    -  [place] fall precautions as indicated by assessment   - Educate patient/family on patient safety including physical limitations  - Instruct patient to call for assistance with activity based on assessment  - Modify environment to reduce risk of injury  - Consider OT/PT consult to assist with strengthening/mobility  Outcome: Progressing     Problem: PAIN - ADULT  Goal: Verbalizes/displays adequate comfort level or baseline comfort level  Description  Interventions:  - Encourage patient to monitor pain and request assistance  - Assess pain using appropriate pain scale  - Administer analgesics based on type and severity of pain and evaluate response  - Implement non-pharmacological measures as appropriate and evaluate response  - Consider cultural and social influences on pain and pain management  - Notify physician/advanced practitioner if interventions unsuccessful or patient reports new pain  Outcome: Progressing     Problem: INFECTION - ADULT  Goal: Absence or prevention of progression during hospitalization  Description  INTERVENTIONS:  - Assess and monitor for signs and symptoms of infection  - Monitor lab/diagnostic results  - Monitor all insertion sites, i e  indwelling lines, tubes, and drains  - Monitor endotracheal if appropriate and nasal secretions for changes in amount and color  - [place]enton appropriate cooling/warming therapies per order  - Administer medications as ordered  - Instruct and encourage patient and family to use good hand hygiene technique  - Identify and instruct in appropriate isolation precautions for identified infection/condition  Outcome: Progressing     Problem: SAFETY ADULT  Goal: Patient will remain free of falls  Description  INTERVENTIONS:  - Assess patient frequently for physical needs  -  Identify cognitive and physical deficits and behaviors that affect risk of falls    -  [place] fall precautions as indicated by assessment   - Educate patient/family on patient safety including physical limitations  - Instruct patient to call for assistance with activity based on assessment  - Modify environment to reduce risk of injury  - Consider OT/PT consult to assist with strengthening/mobility  Outcome: Progressing  Goal: Maintain or return to baseline ADL function  Description  INTERVENTIONS:  -  Assess patient's ability to carry out ADLs; assess patient's baseline for ADL function and identify physical deficits which impact ability to perform ADLs (bathing, care of mouth/teeth, toileting, grooming, dressing, etc )  - Assess/evaluate cause of self-care deficits   - Assess range of motion  - Assess patient's mobility; develop plan if impaired  - Assess patient's need for assistive devices and provide as appropriate  - Encourage maximum independence but intervene and supervise when necessary  - Involve family in performance of ADLs  - Assess for home care needs following discharge   - Consider OT consult to assist with ADL evaluation and planning for discharge  - Provide patient education as appropriate  Outcome: Progressing  Goal: Maintain or return mobility status to optimal level  Description  INTERVENTIONS:  - Assess patient's baseline mobility status (ambulation, transfers, stairs, etc )    - Identify cognitive and physical deficits and behaviors that affect mobility  - Identify mobility aids required to assist with transfers and/or ambulation (gait belt, sit-to-stand, lift, walker, cane, etc )  - [place] fall precautions as indicated by assessment  - Record patient progress and toleration of activity level on Mobility SBAR; progress patient to next Phase/Stage  - Instruct patient to call for assistance with activity based on assessment  - Consider rehabilitation consult to assist with strengthening/weightbearing, etc   Outcome: Progressing     Problem: DISCHARGE PLANNING  Goal: Discharge to home or other facility with appropriate resources  Description  INTERVENTIONS:  - Identify barriers to discharge w/patient and caregiver  - Arrange for needed discharge resources and transportation as appropriate  - Identify discharge learning needs (meds, wound care, etc )  - Arrange for interpretive services to assist at discharge as needed  - Refer to Case Management Department for coordinating discharge planning if the patient needs post-hospital services based on physician/advanced practitioner order or complex needs related to functional status, cognitive ability, or social support system  Outcome: Progressing     Problem: Knowledge Deficit  Goal: Patient/family/caregiver demonstrates understanding of disease process, treatment plan, medications, and discharge instructions  Description  Complete learning assessment and assess knowledge base    Interventions:  - Provide teaching at level of understanding  - Provide teaching via preferred learning methods  Outcome: Progressing

## 2019-12-22 NOTE — ASSESSMENT & PLAN NOTE
· CT abdomen/pelvis on 12/20 revealed "Profound hepatic cirrhosis with associated fibrotic change centrally in the liver resulting hepatic lobulation but also progressive narrowing of hepatic veins and intrahepatic IVC "  · GI consult as above  · Encourage alcohol cessation

## 2019-12-22 NOTE — UTILIZATION REVIEW
Initial Clinical Review    Admission: Date/Time/Statement: Inpatient Admission Orders (From admission, onward)     Ordered        12/22/19 0003  Inpatient Admission  Once                   Orders Placed This Encounter   Procedures    Inpatient Admission     Standing Status:   Standing     Number of Occurrences:   1     Order Specific Question:   Admitting Physician     Answer:   Ramón Allan     Order Specific Question:   Level of Care     Answer:   Med Surg [16]     Order Specific Question:   Estimated length of stay     Answer:   More than 2 Midnights     Order Specific Question:   Certification     Answer:   I certify that inpatient services are medically necessary for this patient for a duration of greater than two midnights  See H&P and MD Progress Notes for additional information about the patient's course of treatment  ED Arrival Information     Expected Arrival Acuity Means of Arrival Escorted By Service Admission Type    - 12/21/2019 21:52 [facility] Urgent    Arrival Complaint    Mitch Mcculloughe pain/SOB        Chief Complaint   Patient presents with    Abdominal Pain     hx pancreatitis, admitted last night and left AMA this morning     Assessment/Plan: 38 to female to ED from home w/ periumbilical pain radiates through back assoc N/V/D + chills  Admitted 12/20 left AMA to go to daughters Bday party   Came back w/ on going abd pain  Admitted IP status w/ acute on chronic pancreatitis and alcohol abuse  Would like to quit drinking , last drink 2 days ago   Make NPO , IVF , pain control , re consult GI , CIWA , zofran        PE : abd tenderness and distention   ED Triage Vitals   Temperature Pulse Respirations Blood Pressure SpO2   12/21/19 2200 12/21/19 2200 12/21/19 2200 12/21/19 2200 12/21/19 2200   98 3 °F (36 8 °C) (!) 108 18 (!) 191/109 99 %      Temp Source Heart Rate Source Patient Position - Orthostatic VS BP Location FiO2 (%)   12/21/19 2200 12/21/19 2200 12/21/19 2230 12/21/19 2230 --   Oral Monitor Lying Right arm       Pain Score       12/21/19 2200       6        Wt Readings from Last 1 Encounters:   12/22/19 78 8 kg (173 lb 11 2 oz)     Additional Vital Signs:   12/22/19 0802            97 %  None (Room air)     12/22/19 07:22:19  98 1 °F (36 7 °C)  102  17  157/94  115  97 %       12/22/19 07:21:45  98 1 °F (36 7 °C)  104  17  157/94  115  93 %       12/22/19 07:08:45    99    158/96  117  96 %       12/22/19 0515    101  20  158/94  115  98 %       12/22/19 0353    107Abnormal     159/92           12/22/19 03:47:25    111Abnormal     159/92  114  94 %       12/22/19 0243    92    165/100           12/22/19 0118              None (Room air)     12/22/19 00:45:23  98 6 °F (37 °C)  95  18      97 %       12/22/19 0042    96    160/92        Lying   12/22/19 0000    96  18  164/100    99 %  None (Room air)  Lying   12/21/19 2330    92  18  148/75    96 %  None (Room air)  Lying   12/21/19 2230    101  18  179/102Abnormal     97 %           Pertinent Labs/Diagnostic Test Results:   12/20 CT abd - Profound hepatic cirrhosis with associated fibrotic change centrally in the liver resulting hepatic lobulation but also progressive narrowing of hepatic veins and intrahepatic IVC      Increasing inflammatory edema surrounding the pancreatic head and uncinate process as well as 2nd and 3rd portion of duodenum  No pancreatic or peripancreatic cyst or discrete fluid collection noted    The appearance is suspicious for focal acute   interstitial edematous pancreatitis especially in this patient with abnormal lipase      Nonspecific prominence of the pancreatic duct in the pancreatic tail, similar from previous examination      Chronic sigmoid diverticulosis  Results from last 7 days   Lab Units 12/22/19  0509 12/21/19  2224 12/20/19  1815 12/20/19  0904   WBC Thousand/uL 7 07 9 01  --  11 84*   HEMOGLOBIN g/dL 11 4* 12 0 --  15 1   HEMATOCRIT % 36 3 38 2  --  46 1   PLATELETS Thousands/uL 102* 119* 123* 213   NEUTROS ABS Thousands/µL 4 18 5 74  --  7 64*     Results from last 7 days   Lab Units 12/22/19  0509 12/21/19  2224 12/21/19  0455 12/20/19  0904   SODIUM mmol/L 138 139 140 140   POTASSIUM mmol/L 2 9* 3 1* 3 3* 3 3*   CHLORIDE mmol/L 100 98* 102 95*   CO2 mmol/L 28 29 30 17*   ANION GAP mmol/L 10 12 8 28*   BUN mg/dL 8 10 10 9   CREATININE mg/dL 0 70 0 70 0 55* 0 89   EGFR ml/min/1 73sq m 106 106 115 80   CALCIUM mg/dL 7 4* 8 3 7 3* 9 2   MAGNESIUM mg/dL 2 1 1 4* 1 9 1 6   PHOSPHORUS mg/dL 1 2*  --  2 5* 1 2*     Results from last 7 days   Lab Units 12/22/19  0509 12/21/19 2224 12/21/19  0455 12/20/19  0917 12/20/19  0904   AST U/L 85* 104* 81*  --  147*   ALT U/L 32 41 38  --  57   ALK PHOS U/L 120* 144* 132*  --  186*   TOTAL PROTEIN g/dL 6 5 7 3 7 1  --  9 3*   ALBUMIN g/dL 3 0* 3 6 3 2*  --  4 2   TOTAL BILIRUBIN mg/dL 1 10* 1 50* 1 20*  --  1 50*   AMMONIA umol/L  --   --   --  21  --      Results from last 7 days   Lab Units 12/22/19  0508 12/22/19  0057 12/21/19  1059 12/21/19  0617 12/21/19  0016 12/20/19  2044 12/20/19  1700 12/20/19  1450   POC GLUCOSE mg/dl 259* 96 139 135 130 154* 143* 215*     Results from last 7 days   Lab Units 12/22/19  0509 12/21/19 2224 12/21/19  0455 12/20/19  0904   GLUCOSE RANDOM mg/dL 250* 137 132 163*     Results from last 7 days   Lab Units 12/21/19 2224 12/20/19  0904   TROPONIN I ng/mL <0 02 <0 02     Results from last 7 days   Lab Units 12/20/19  0904   PROTIME seconds 14 2   INR  1 10   PTT seconds 27     Results from last 7 days   Lab Units 12/20/19  1411   PROCALCITONIN ng/ml 0 05     Results from last 7 days   Lab Units 12/20/19  2234 12/20/19  1815 12/20/19  1623 12/20/19  1407 12/20/19  1141 12/20/19  0904   LACTIC ACID mmol/L 0 9 1 7 3 2* 2 1* 2 5* 7 5*     Results from last 7 days   Lab Units 12/21/19  2224 12/21/19  0455 12/20/19  0904   LIPASE u/L 885* 736* 1,207* Results from last 7 days   Lab Units 12/21/19 2227 12/20/19  1147   CLARITY UA  Clear Cloudy   COLOR UA  [place] Yellow   SPEC GRAV UA  >=1 030 >=1 030   PH UA  6 0 6 0   GLUCOSE UA mg/dl Negative Negative   KETONES UA mg/dl 15 (1+)* 15 (1+)*   BLOOD UA  Negative Trace-Intact*   PROTEIN UA mg/dl >=300* >=300*   NITRITE UA  Negative Negative   BILIRUBIN UA  Moderate* Moderate*   UROBILINOGEN UA E U /dl 1 0 1 0   LEUKOCYTES UA  Negative Negative   WBC UA /hpf 0-1* 0-1*   RBC UA /hpf 0-1* 1-2*   BACTERIA UA /hpf Occasional Moderate*   EPITHELIAL CELLS WET PREP /hpf Occasional Moderate*   MUCUS THREADS  Moderate* Moderate*     Results from last 7 days   Lab Units 12/20/19  1518   INFLUENZA A PCR  None Detected   RSV PCR  None Detected     Results from last 7 days   Lab Units 12/21/19 2227   AMPH/METH  Negative   BARBITURATE UR  Negative   BENZODIAZEPINE UR  Positive*   COCAINE UR  Negative   METHADONE URINE  Negative   OPIATE UR  Positive*   PCP UR  Negative   THC UR  Positive*     Results from last 7 days   Lab Units 12/21/19  2224 12/20/19  0904   ETHANOL LVL mg/dL <3 116*     Results from last 7 days   Lab Units 12/20/19  1412   BLOOD CULTURE  No Growth at 24 hrs  No Growth at 24 hrs       ED Treatment:   Medication Administration from 12/21/2019 2152 to 12/22/2019 0028       Date/Time Order Dose Route Action Action by Comments     12/22/2019 0021 sodium chloride 0 9 % bolus 1,000 mL 0 mL Intravenous Stopped [facility]      12/21/2019 2224 sodium chloride 0 9 % bolus 1,000 mL 1,000 mL Intravenous [address]      12/21/2019 2238 [facility]dansetron [facility] injection 4 mg 4 mg Intravenous Given Sonu Penning, [place]      12/21/2019 2238 morphine (PF) 4 mg/mL injection 4 mg 4 mg Intravenous Given Sonu Penning, [place]      12/21/2019 2253 diphenhydrAMINE (BENADRYL) injection 25 mg 25 mg Intravenous Given Sonu Robbins, RN         Past Medical History:   Diagnosis Date    Alcohol abuse     Arthritis     GERD (gastroesophageal reflux disease)     Hypertension     Nicotine dependence     Obesity     Pancreatitis     Panic attack      Present on Admission:   Accelerated hypertension - history of Essential hypertension   Tobacco abuse   GERD (gastroesophageal reflux disease)   Diabetes mellitus type 2   Abnormal LFTs   Acute on chronic alcoholic pancreatitis    Hypomagnesemia   History of alcoholic cirrhosis   Vomiting   Thrombocytopenia (HCC)   Alcohol abuse   Hypokalemia      Admitting Diagnosis: Pancreatitis [K85 90]  Abdominal pain [R10 9]  Age/Sex: 38 y o  female  Admission Orders:  Scheduled Medications:    Medications:  cloNIDine 0 3 mg Oral BID   folic acid 1 mg Oral Daily   insulin lispro 1-5 Units Subcutaneous HS   insulin lispro 1-6 Units Subcutaneous TID AC   nicotine 1 patch Transdermal Daily   pancrelipase (Lip-Prot-Amyl) 24,000 Units Oral TID With Meals   pantoprazole 20 mg Oral Daily   sucralfate 1 g Oral 4x Daily   thiamine 100 mg Oral Daily     Continuous IV Infusions:    sodium chloride 100 mL/hr Intravenous Continuous     PRN Meds:    albuterol 2 puff Inhalation Q4H PRN   calcium carbonate 1,000 mg Oral Daily PRN   HYDROmorphone 0 2 mg Intravenous Q3H PRN x3   Labetalol HCl 10 mg Intravenous Q6H PRN   ondansetron 4 mg Intravenous Q6H PRN     CIWA   NPO   Fingerstick q6hr  IP CONSULT TO GASTROENTEROLOGY  IP CONSULT TO CASE MANAGEMENT    Network Utilization Review Department  [email or web address]  ATTENTION: Please call with any questions or concerns to [phone] and carefully listen to the prompts so that you are directed to the right person  All voicemails are confidential   Enoc Montez all requests for admission clinical reviews, approved or denied determinations and any other requests to dedicated fax number below belonging to the campus where the patient is receiving treatment   List of dedicated fax numbers for the Facilities:  [address] FAX NUMBER   ADMISSION DENIALS (Administrative/Medical Necessity) [address] [facility] [phone]   [facility] [phone]   [facility] [phone]   Radha Camejo [phone]   [address] [phone]   [facility]  [phone]   [address] [facility]  [address] [facility] [address] [facility] [phone]

## 2019-12-22 NOTE — ED NOTES
1   CC- Pt c/o abdominal pain  Was admitted yesterday for pancreatitis and signed out AMA due to a family emergency  Returns for treatment  2  Orientation Status- Alert and oriented x 4    3  Abnormal labs/Abnormal focus Assessment/abnormal vitals    4  Medications- Pt given morphine @ 2238- C/o itching  Was then given Benadryl at that time  5  Last time narcotics were given/pain meds  2238    6/ IV Lines/Drains/ etc 22 LH    7  Isolation status- none    8  Skin not assessed    9  Ambulation Status steady gait    10   ED phone number [phone], RN  12/22/19 JORGE Mcgregor  12/22/19 9186

## 2019-12-22 NOTE — ED PROVIDER NOTES
History  Chief Complaint   Patient presents with    Abdominal Pain     hx pancreatitis, admitted last night and left AMA this morning     45yo female with h/o alcoholism and pancreatitis was admitted yesterday for pancreatitis flare, went upstairs and states she had to leave AMA for a family emergency  She states she promised she would come back to be admitted when she could and did  States she is still have the abd pain and N/V  History provided by:  Patient  Abdominal Pain   Pain location:  Epigastric  Pain quality: cramping and sharp    Pain radiation: entire abdomen  Pain severity:  Moderate  Onset quality:  Gradual  Duration:  1 day  Timing:  Constant  Progression:  Waxing and waning  Chronicity:  Chronic  Context: alcohol use (Pt with h/o alcoholism, drinks vodka all day to feel normal she reports)    Relieved by:  Nothing  Worsened by:  Nothing  Ineffective treatments:  None tried  Associated symptoms: anorexia, diarrhea, nausea and vomiting    Associated symptoms: no dysuria, no fatigue and no hematuria        Prior to Admission Medications   Prescriptions Last Dose Informant Patient Reported? Taking?    Mometasone Furo-Formoterol Fum (DULERA) 100-5 MCG/ACT AERO   Yes No   Sig: Inhale as needed Unsure of dose     albuterol (PROVENTIL HFA,VENTOLIN HFA) 90 mcg/act inhaler   No No   Sig: Inhale 2 puffs every 4 (four) hours as needed for wheezing   cloNIDine (CATAPRES) 0 3 mg tablet   Yes No   Sig: Take 0 3 mg by mouth 2 (two) times a day   dicyclomine (BENTYL) 20 mg tablet   No No   Sig: Take 1 tablet (20 mg total) by mouth 3 (three) times a day   Patient not taking: Reported on 41/62/1513   folic acid (FOLVITE) 1 mg tablet   No No   Sig: Take 1 tablet (1 mg total) by mouth daily   Patient not taking: Reported on 10/15/2019   gabapentin (NEURONTIN) 300 mg capsule   No No   Sig: Take 1 capsule (300 mg total) by mouth 3 (three) times a day   Patient not taking: Reported on 10/15/2019   metFORMIN (GLUCOPHAGE) 500 mg tablet   No No   Sig: Take 1 tablet (500 mg total) by mouth daily with breakfast   Patient not taking: Reported on 10/15/2019   metoclopramide (REGLAN) 10 mg tablet   No No   Sig: Take 1 tablet (10 mg total) by mouth every 6 (six) hours As needed for nausea and vomiting   Patient not taking: Reported on 10/15/2019   nicotine (NICODERM CQ) 21 mg/24 hr TD 24 hr patch   No No   Sig: Place 1 patch on the skin daily   ondansetron (ZOFRAN) 4 mg tablet   No No   Sig: Take 1 tablet (4 mg total) by mouth every 8 (eight) hours as needed for nausea or vomiting   Patient not taking: Reported on 10/15/2019   ondansetron (ZOFRAN) 4 mg tablet   No No   Sig: Take 1 tablet (4 mg total) by mouth every 8 (eight) hours as needed for nausea or vomiting   pancrelipase, Lip-Prot-Amyl, (CREON) 24,000 units   No No   Sig: Take 1 capsule by mouth 3 (three) times a day with meals   pantoprazole (PROTONIX) 20 mg tablet   No No   Sig: Take 1 tablet (20 mg total) by mouth daily   sucralfate (CARAFATE) 1 g tablet   No No   Sig: Take 1 tablet (1 g total) by mouth 4 (four) times a day   Patient not taking: Reported on 10/15/2019   thiamine 100 MG tablet   No No   Sig: Take 1 tablet (100 mg total) by mouth daily   Patient not taking: Reported on 10/15/2019      Facility-Administered Medications: None       Past Medical History:   Diagnosis Date    Alcohol abuse     Arthritis     GERD (gastroesophageal reflux disease)     Hypertension     Nicotine dependence     Obesity     Pancreatitis     Panic attack        Past Surgical History:   Procedure Laterality Date    ABDOMINAL SURGERY      C SECTION    ESOPHAGOGASTRODUODENOSCOPY N/A 12/15/2017    Procedure: ESOPHAGOGASTRODUODENOSCOPY (EGD); Surgeon: Nikhil Davison MD;  Location: [facility];   Service: Gastroenterology       Family History   Problem Relation Age of Onset    Cirrhosis Father     Alcohol abuse Father     Drug abuse Mother      I have reviewed and agree with the history as documented  Social History     Tobacco Use    Smoking status: Current Every Day Smoker     Packs/day: 1 00     Years: 29 00     Pack years: 29 00     Types: Cigarettes    Smokeless tobacco: Never Used    Tobacco comment: pt refused packet   Substance Use Topics    Alcohol use: Yes     Frequency: 4 or more times a week     Drinks per session: 3 or 4     Comment: Patient states 'I drink alot of vodka a day"    Drug use: Yes     Types: Marijuana        Review of Systems   Constitutional: Negative for fatigue  Gastrointestinal: Positive for abdominal pain, anorexia, diarrhea, nausea and vomiting  Genitourinary: Negative for dysuria and hematuria  All other systems reviewed and are negative  Physical Exam  Physical Exam   Constitutional: She is oriented to person, place, and time  She appears well-developed and well-nourished  HENT:   Head: Normocephalic and atraumatic  Eyes: Pupils are equal, round, and reactive to light  EOM are normal    Neck: Neck supple  Cardiovascular: Tachycardia present  Pulmonary/Chest: Effort normal and breath sounds normal  She has no wheezes  Abdominal: Soft  Bowel sounds are normal  There is tenderness (diffusely)  Musculoskeletal: She exhibits no edema  Neurological: She is alert and oriented to person, place, and time  No cranial nerve deficit  She exhibits normal muscle tone  Psychiatric: Her mood appears anxious  Vitals reviewed        Vital Signs  ED Triage Vitals   Temperature Pulse Respirations Blood Pressure SpO2   12/21/19 2200 12/21/19 2200 12/21/19 2200 12/21/19 2200 12/21/19 2200   98 3 °F (36 8 °C) (!) 108 18 (!) 191/109 99 %      Temp Source Heart Rate Source Patient Position - Orthostatic VS BP Location FiO2 (%)   12/21/19 2200 12/21/19 2200 12/21/19 2230 12/21/19 2230 --   Oral Monitor Lying Right arm       Pain Score       12/21/19 2200       6           Vitals:    12/21/19 2330 12/22/19 0000 12/22/19 0042 12/22/19 0045 BP: 148/75 164/100 160/92    Pulse: 92 96 96 95   Patient Position - Orthostatic VS: Lying Lying Lying          Visual Acuity      ED Medications  Medications   magnesium sulfate 2 g/50 mL IVPB (premix) 2 g (2 g Intravenous New Bag 12/22/19 0056)   albuterol (PROVENTIL HFA,VENTOLIN HFA) inhaler 2 puff (has no administration in time range)   cloNIDine (CATAPRES) tablet 0 3 mg (has no administration in time range)   folic acid (FOLVITE) tablet 1 mg (has no administration in time range)   pancrelipase (Lip-Prot-Amyl) (CREON) delayed release capsule 24,000 Units (has no administration in time range)   pantoprazole (PROTONIX) EC tablet 20 mg (has no administration in time range)   sucralfate (CARAFATE) tablet 1 g (1 g Oral Given 12/22/19 0056)   thiamine (VITAMIN B1) tablet 100 mg (has no administration in time range)   sodium chloride 0 9 % infusion (100 mL/hr Intravenous New Bag 12/22/19 0057)   ondansetron (ZOFRAN) injection 4 mg (has no administration in time range)   calcium carbonate (TUMS) chewable tablet 1,000 mg (has no administration in time range)   nicotine (NICODERM CQ) 21 mg/24 hr TD 24 hr patch 1 patch (has no administration in time range)   insulin lispro (HumaLOG) 100 units/mL subcutaneous injection 1-6 Units (has no administration in time range)   insulin lispro (HumaLOG) 100 units/mL subcutaneous injection 1-5 Units (1 Units Subcutaneous Not Given 12/22/19 0103)   chlordiazePOXIDE (LIBRIUM) capsule 25 mg (has no administration in time range)   HYDROmorphone (DILAUDID) injection 0 2 mg (0 2 mg Intravenous Given 12/22/19 0109)   sodium chloride 0 9 % bolus 1,000 mL (0 mL Intravenous Stopped 12/22/19 0021)   ondansetron (ZOFRAN) injection 4 mg (4 mg Intravenous Given 12/21/19 2238)   morphine (PF) 4 mg/mL injection 4 mg (4 mg Intravenous Given 12/21/19 2238)   diphenhydrAMINE (BENADRYL) injection 25 mg (25 mg Intravenous Given 12/21/19 2253)   potassium chloride (K-DUR,KLOR-CON) CR tablet 40 mEq (40 mEq Oral Given 12/22/19 0056)       Diagnostic Studies  Results Reviewed     Procedure Component Value Units Date/Time    Comprehensive metabolic panel [[id number]]  (Abnormal) Collected:  12/21/19 2224    Lab Status:  Final result Specimen:  Blood from Arm, Left Updated:  12/21/19 2251     Sodium 139 mmol/L      Potassium 3 1 mmol/L      Chloride 98 mmol/L      CO2 29 mmol/L      ANION GAP 12 mmol/L      BUN 10 mg/dL      Creatinine 0 70 mg/dL      Glucose 137 mg/dL      Calcium 8 3 mg/dL       U/L      ALT 41 U/L      Alkaline Phosphatase 144 U/L      Total Protein 7 3 g/dL      Albumin 3 6 g/dL      Total Bilirubin 1 50 mg/dL      eGFR 106 ml/min/1 73sq m     Narrative:       [place] guidelines for Chronic Kidney Disease (CKD):     Stage 1 with normal or high GFR (GFR > 90 mL/min/1 73 square meters)    Stage 2 Mild CKD (GFR = 60-89 mL/min/1 73 square meters)    Stage 3A Moderate CKD (GFR = 45-59 mL/min/1 73 square meters)    Stage 3B Moderate CKD (GFR = 30-44 mL/min/1 73 square meters)    Stage 4 Severe CKD (GFR = 15-29 mL/min/1 73 square meters)    Stage 5 End Stage CKD (GFR <15 mL/min/1 73 square meters)  Note: GFR calculation is accurate only with a steady state creatinine    Lipase [[id number]]  (Abnormal) Collected:  12/21/19 2224    Lab Status:  Final result Specimen:  Blood from Arm, Left Updated:  12/21/19 2251     Lipase 885 u/L     Magnesium [[id number]]  (Abnormal) Collected:  12/21/19 2224    Lab Status:  Final result Specimen:  Blood from Arm, Left Updated:  12/21/19 2251     Magnesium 1 4 mg/dL     Urine Microscopic [[id number]]  (Abnormal) Collected:  12/21/19 2227    Lab Status:  Final result Specimen:  Urine, Clean Catch Updated:  12/21/19 2251     RBC, UA 0-1 /hpf      WBC, UA 0-1 /hpf      Epithelial Cells Occasional /hpf      Bacteria, UA Occasional /hpf      MUCUS THREADS Moderate    Troponin I [[id number]]  (Normal) Collected:  12/21/19 2224    Lab Status: Final result Specimen:  Blood from Arm, Left Updated:  12/21/19 2249     Troponin I <0 02 ng/mL     Rapid drug screen, urine [[id number]]  (Abnormal) Collected:  12/21/19 2227    Lab Status:  Final result Specimen:  Urine, Clean Catch Updated:  12/21/19 2248     Amph/Meth UR Negative     Barbiturate Ur Negative     Benzodiazepine Urine Positive     Cocaine Urine Negative     Methadone Urine Negative     Opiate Urine Positive     PCP Ur Negative     THC Urine Positive    Narrative:       Presumptive report  If requested, specimen will be sent to reference lab for confirmation  FOR MEDICAL PURPOSES ONLY  IF CONFIRMATION NEEDED PLEASE CONTACT THE LAB WITHIN 5 DAYS      Drug Screen Cutoff Levels:  AMPHETAMINE/METHAMPHETAMINES  1000 ng/mL  BARBITURATES     200 ng/mL  BENZODIAZEPINES     200 ng/mL  COCAINE      300 ng/mL  METHADONE      300 ng/mL  OPIATES      300 ng/mL  PHENCYCLIDINE     25 ng/mL  THC       50 ng/mL      Ethanol [[id number]]  (Normal) Collected:  12/21/19 2224    Lab Status:  Final result Specimen:  Blood from Arm, Left Updated:  12/21/19 2243     Ethanol Lvl <3 mg/dL     CBC and differential [[id number]]  (Abnormal) Collected:  12/21/19 2224    Lab Status:  Final result Specimen:  Blood from Arm, Left Updated:  12/21/19 2241     WBC 9 01 Thousand/uL      RBC 3 93 Million/uL      Hemoglobin 12 0 g/dL      Hematocrit 38 2 %      MCV 97 fL      MCH 30 5 pg      MCHC 31 4 g/dL      RDW 17 5 %      MPV 10 2 fL      Platelets 381 Thousands/uL      nRBC 0 /100 WBCs      Neutrophils Relative 64 %      Immat GRANS % 0 %      Lymphocytes Relative 26 %      Monocytes Relative 9 %      Eosinophils Relative 0 %      Basophils Relative 1 %      Neutrophils Absolute 5 74 Thousands/µL      Immature Grans Absolute 0 04 Thousand/uL      Lymphocytes Absolute 2 37 Thousands/µL      Monocytes Absolute 0 79 Thousand/µL      Eosinophils Absolute 0 02 Thousand/µL      Basophils Absolute 0 05 Thousands/µL     UA w Reflex to Microscopic w Reflex to Culture [[id number]]  (Abnormal) Collected:  12/21/19 2227    Lab Status:  Final result Specimen:  Urine, Clean Catch Updated:  12/21/19 2241     Color, UA [place]     Clarity, UA Clear     Specific Gravity, UA >=1 030     pH, UA 6 0     Leukocytes, UA Negative     Nitrite, UA Negative     Protein, UA >=300 mg/dl      Glucose, UA Negative mg/dl      Ketones, UA 15 (1+) mg/dl      Urobilinogen, UA 1 0 E U /dl      Bilirubin, UA Moderate     Blood, UA Negative                 No orders to display              Procedures  Procedures         ED Course                               MDM  Number of Diagnoses or Management Options  Pancreatitis: new and requires workup     Amount and/or Complexity of Data Reviewed  Clinical lab tests: ordered and reviewed  Review and summarize past medical records: yes    Risk of Complications, Morbidity, and/or Mortality  Presenting problems: high  Management options: high          Disposition  Final diagnoses:   Pancreatitis     Time reflects when diagnosis was documented in both MDM as applicable and the Disposition within this note     Time User Action Codes Description Comment    12/21/2019 11:57 PM Laura Sweeney 89, [address] [K85 90] Pancreatitis       ED Disposition     ED Disposition Condition Date/Time Comment    Admit Stable Sat Dec 21, 2019 11:57 PM Case was discussed with Ezequiel Sauer and the patient's admission status was agreed to be Admission Status: inpatient status to the service of Dr Binu Johnson           Follow-up Information    None         Current Discharge Medication List      CONTINUE these medications which have NOT CHANGED    Details   albuterol (PROVENTIL HFA,VENTOLIN HFA) 90 mcg/act inhaler Inhale 2 puffs every 4 (four) hours as needed for wheezing  Qty: 1 Inhaler, Refills: 0    Associated Diagnoses: Bronchospasm      cloNIDine (CATAPRES) 0 3 mg tablet Take 0 3 mg by mouth 2 (two) times a day      dicyclomine (BENTYL) 20 mg tablet Take 1 tablet (20 mg total) by mouth 3 (three) times a day  Qty: 90 tablet, Refills: 0    Associated Diagnoses: Generalized abdominal pain      folic acid (FOLVITE) 1 mg tablet Take 1 tablet (1 mg total) by mouth daily  Refills: 0    Associated Diagnoses: Alcohol abuse      gabapentin (NEURONTIN) 300 mg capsule Take 1 capsule (300 mg total) by mouth 3 (three) times a day  Qty: 90 capsule, Refills: 0    Associated Diagnoses: Back pain      metFORMIN (GLUCOPHAGE) 500 mg tablet Take 1 tablet (500 mg total) by mouth daily with breakfast  Qty: 30 tablet, Refills: 0    Associated Diagnoses: Blood glucose elevated      metoclopramide (REGLAN) 10 mg tablet Take 1 tablet (10 mg total) by mouth every 6 (six) hours As needed for nausea and vomiting  Qty: 30 tablet, Refills: 0    Associated Diagnoses: Nausea and vomiting      Mometasone Furo-Formoterol Fum (DULERA) 100-5 MCG/ACT AERO Inhale as needed Unsure of dose        nicotine (NICODERM CQ) 21 mg/24 hr TD 24 hr patch Place 1 patch on the skin daily  Qty: 28 patch, Refills: 0    Associated Diagnoses: Nicotine dependence      !! ondansetron (ZOFRAN) 4 mg tablet Take 1 tablet (4 mg total) by mouth every 8 (eight) hours as needed for nausea or vomiting  Qty: 20 tablet, Refills: 0    Associated Diagnoses: Vomiting      !! ondansetron (ZOFRAN) 4 mg tablet Take 1 tablet (4 mg total) by mouth every 8 (eight) hours as needed for nausea or vomiting  Qty: 20 tablet, Refills: 0    Associated Diagnoses: Vomiting      pancrelipase, Lip-Prot-Amyl, (CREON) 24,000 units Take 1 capsule by mouth 3 (three) times a day with meals  Qty: 90 capsule, Refills: 0      pantoprazole (PROTONIX) 20 mg tablet Take 1 tablet (20 mg total) by mouth daily  Qty: 20 tablet, Refills: 0    Associated Diagnoses: Abdominal pain; Nausea & vomiting      sucralfate (CARAFATE) 1 g tablet Take 1 tablet (1 g total) by mouth 4 (four) times a day  Qty: 120 tablet, Refills: 0    Associated Diagnoses: Gastritis      thiamine 100 MG tablet Take 1 tablet (100 mg total) by mouth daily  Qty: 30 tablet, Refills: 0    Associated Diagnoses: Alcoholic cirrhosis of liver without ascites ([facility] [address] )       ! ! - Potential duplicate medications found  Please discuss with provider  No discharge procedures on file      ED Provider  Electronically Signed by           Ace Murillo MD  12/22/19 2240

## 2019-12-22 NOTE — UTILIZATION REVIEW
Notification of Inpatient Admission/Inpatient Authorization Request   This is a Notification of Inpatient Admission for [address]  Be advised that this patient was admitted to our facility under Inpatient Status  Contact Valeria Maldonado at [phone] for additional admission information  [facility] DEDICATED Miguel Gwendolyn [phone]  Patient Name:   Jannette Alanis   YOB: 1975       [address]   [address]:   [address]   Tax ID: [id number]  NPI: [id number] Attending Provider/NPI: El Noguera Md [[id number]]      Place of Service Code: 24     Place of Service Name:  Inpatient Hospital   Start Date: 12/20/19 1404     Discharge Date & Time: 12/21/2019 11:35 AM    Type of Admission: Inpatient Status Discharge Disposition   (if discharged): Left against medical advice or discontinued care   Patient Diagnoses: Lactic acidosis [X46 3]  Alcoholic cirrhosis ([facility] [address] ) [I97 77]  Hypophosphatemia [E83 39]  Abdominal pain [R10 9]  Acute on chronic pancreatitis ([facility] [address] ) [K85 90, K86 1]     Orders: Admission Orders (From admission, onward)     Ordered        12/20/19 1404  Inpatient Admission  Once                    Assigned Utilization Review Contact: Valeria Maldonado  Utilization   Network Utilization Review Department  Phone: [phone]; Fax [phone]  Email: [email or web address]   ATTENTION PAYERS: Please call the assigned Utilization  directly with any questions or concerns ALL voicemails in the department are confidential  Send all requests for admission clinical reviews, approved or denied determinations and any other requests to dedicated fax number belonging to the campus where the patient is receiving treatment

## 2019-12-22 NOTE — PLAN OF CARE
Problem: Potential for Falls  Goal: Patient will remain free of falls  Description  INTERVENTIONS:  - Assess patient frequently for physical needs  -  Identify cognitive and physical deficits and behaviors that affect risk of falls    -  [place] fall precautions as indicated by assessment   - Educate patient/family on patient safety including physical limitations  - Instruct patient to call for assistance with activity based on assessment  - Modify environment to reduce risk of injury  - Consider OT/PT consult to assist with strengthening/mobility  Outcome: Progressing     Problem: PAIN - ADULT  Goal: Verbalizes/displays adequate comfort level or baseline comfort level  Description  Interventions:  - Encourage patient to monitor pain and request assistance  - Assess pain using appropriate pain scale  - Administer analgesics based on type and severity of pain and evaluate response  - Implement non-pharmacological measures as appropriate and evaluate response  - Consider cultural and social influences on pain and pain management  - Notify physician/advanced practitioner if interventions unsuccessful or patient reports new pain  Outcome: Progressing     Problem: INFECTION - ADULT  Goal: Absence or prevention of progression during hospitalization  Description  INTERVENTIONS:  - Assess and monitor for signs and symptoms of infection  - Monitor lab/diagnostic results  - Monitor all insertion sites, i e  indwelling lines, tubes, and drains  - Monitor endotracheal if appropriate and nasal secretions for changes in amount and color  - [place] appropriate cooling/warming therapies per order  - Administer medications as ordered  - Instruct and encourage patient and family to use good hand hygiene technique  - Identify and instruct in appropriate isolation precautions for identified infection/condition  Outcome: Progressing     Problem: SAFETY ADULT  Goal: Patient will remain free of falls  Description  INTERVENTIONS:  - Assess patient frequently for physical needs  -  Identify cognitive and physical deficits and behaviors that affect risk of falls    -  [place] fall precautions as indicated by assessment   - Educate patient/family on patient safety including physical limitations  - Instruct patient to call for assistance with activity based on assessment  - Modify environment to reduce risk of injury  - Consider OT/PT consult to assist with strengthening/mobility  Outcome: Progressing  Goal: Maintain or return to baseline ADL function  Description  INTERVENTIONS:  -  Assess patient's ability to carry out ADLs; assess patient's baseline for ADL function and identify physical deficits which impact ability to perform ADLs (bathing, care of mouth/teeth, toileting, grooming, dressing, etc )  - Assess/evaluate cause of self-care deficits   - Assess range of motion  - Assess patient's mobility; develop plan if impaired  - Assess patient's need for assistive devices and provide as appropriate  - Encourage maximum independence but intervene and supervise when necessary  - Involve family in performance of ADLs  - Assess for home care needs following discharge   - Consider OT consult to assist with ADL evaluation and planning for discharge  - Provide patient education as appropriate  Outcome: Progressing  Goal: Maintain or return mobility status to optimal level  Description  INTERVENTIONS:  - Assess patient's baseline mobility status (ambulation, transfers, stairs, etc )    - Identify cognitive and physical deficits and behaviors that affect mobility  - Identify mobility aids required to assist with transfers and/or ambulation (gait belt, sit-to-stand, lift, walker, cane, etc )  - [place] fall precautions as indicated by assessment  - Record patient progress and toleration of activity level on Mobility SBAR; progress patient to next Phase/Stage  - Instruct patient to call for assistance with activity based on assessment  - Consider rehabilitation consult to assist with strengthening/weightbearing, etc   Outcome: Progressing     Problem: DISCHARGE PLANNING  Goal: Discharge to home or other facility with appropriate resources  Description  INTERVENTIONS:  - Identify barriers to discharge w/patient and caregiver  - Arrange for needed discharge resources and transportation as appropriate  - Identify discharge learning needs (meds, wound care, etc )  - Arrange for interpretive services to assist at discharge as needed  - Refer to Case Management Department for coordinating discharge planning if the patient needs post-hospital services based on physician/advanced practitioner order or complex needs related to functional status, cognitive ability, or social support system  Outcome: Progressing     Problem: Knowledge Deficit  Goal: Patient/family/caregiver demonstrates understanding of disease process, treatment plan, medications, and discharge instructions  Description  Complete learning assessment and assess knowledge base    Interventions:  - Provide teaching at level of understanding  - Provide teaching via preferred learning methods  Outcome: Progressing

## 2019-12-22 NOTE — CONSULTS
Consultation - [facility]  Ailyn Romano 38 y o  female MRN: [id number]  Unit/Bed#: -01 Encounter: [id number]      Inpatient consult to gastroenterology  Consult performed by: Maribel Jiang MD  Consult ordered by: Arabella Ojeda PA-C           Reason for Consult / Principal Problem:  Abdominal pain and pancreatitis and cirrhosis    HPI: Ailyn Romano is a 38y o  year old female who presented back to the emergency room with continued abdominal pain after leaving earlier in the day against medical advice  She is well known to our group  The patient has alcohol dependence and active alcoholism and is a daily vodka drinker  She has chronic pancreatitis from her alcoholism and has had multiple admissions for acute on chronic pancreatitis  In addition the patient has alcoholic cirrhosis which is compensated at present  She reports that she has had severe abdominal pain for about 2 days  She reports that she has had nausea and vomiting  She reports that she was having chills but no fevers  She reports this feels similar to prior episodes about the pain is very severe according to her and it is radiating to her back  She denies heartburn regurgitation or dysphagia  She denies melena hematochezia  She reports that she has seen 3 counselors and noone will give her benzodiazepines  She reports that she cannot stop drinking because for panic attacks  She reports that she will not stop drinking until she is medicated for her panic attacks  The patient had endoscopy in December of 2017 that showed visualized Castano's esophagus with negative pathology and mild nonerosive gastritis  She has never had esophageal varices or ascites or hepatic encephalopathy    The patient has had multiple admissions for alcohol withdrawal    The patient was admitted to the hospital yesterday and we saw her in consultation and then she reports that she received a call from her daughter and had to leave the hospital against medical advice  She reports that she drank sips of vodka, while she was at home  She reports no significant his change in her symptoms while out of the hospital and she batista male E came back because the pain was bad  REVIEW OF SYSTEMS:     CONSTITUTIONAL: Denies any fever, chills, or rigors  Good appetite, and no recent weight loss  HEENT: No earache or tinnitus  Denies hearing loss or visual disturbances  CARDIOVASCULAR: No chest pain or palpitations  RESPIRATORY: Denies any cough, hemoptysis, shortness of breath or dyspnea on exertion  GASTROINTESTINAL: As noted in the History of Present Illness  GENITOURINARY: No problems with urination  Denies any hematuria or dysuria  NEUROLOGIC: No dizziness or vertigo, denies headaches  MUSCULOSKELETAL: Denies any muscle or joint pain  SKIN: Denies skin rashes or itching  ENDOCRINE: Denies excessive thirst  Denies intolerance to heat or cold  PSYCHOSOCIAL: Denies depression or anxiety  Denies any recent memory loss  Historical Information   Past Medical History:   Diagnosis Date    Alcohol abuse     Arthritis     GERD (gastroesophageal reflux disease)     Hypertension     Nicotine dependence     Obesity     Pancreatitis     Panic attack      Past Surgical History:   Procedure Laterality Date    ABDOMINAL SURGERY      C SECTION    ESOPHAGOGASTRODUODENOSCOPY N/A 12/15/2017    Procedure: ESOPHAGOGASTRODUODENOSCOPY (EGD); Surgeon: Osa Eisenmenger, MD;  Location: [facility];   Service: Gastroenterology     Social History   Social History     Substance and Sexual Activity   Alcohol Use Yes    Frequency: 4 or more times a week    Drinks per session: 3 or 4    Comment: Patient states 'I drink alot of vodka a day"     Social History     Substance and Sexual Activity   Drug Use Yes    Types: Marijuana     Social History     Tobacco Use   Smoking Status Current Every Day Smoker    Packs/day: 1 00    Years: 29 00  Pack years: 29 00    Types: Cigarettes   Smokeless Tobacco Never Used   Tobacco Comment    pt refused packet     Family History   Problem Relation Age of Onset    Cirrhosis Father     Alcohol abuse Father     Drug abuse Mother        Meds/Allergies     Medications Prior to Admission   Medication    albuterol (PROVENTIL HFA,VENTOLIN HFA) 90 mcg/act inhaler    cloNIDine (CATAPRES) 0 3 mg tablet    dicyclomine (BENTYL) 20 mg tablet    folic acid (FOLVITE) 1 mg tablet    gabapentin (NEURONTIN) 300 mg capsule    metFORMIN (GLUCOPHAGE) 500 mg tablet    metoclopramide (REGLAN) 10 mg tablet    Mometasone Furo-Formoterol Fum (DULERA) 100-5 MCG/ACT AERO    nicotine (NICODERM CQ) 21 mg/24 hr TD 24 hr patch    ondansetron (ZOFRAN) 4 mg tablet    ondansetron (ZOFRAN) 4 mg tablet    pancrelipase, Lip-Prot-Amyl, (CREON) 24,000 units    pantoprazole (PROTONIX) 20 mg tablet    sucralfate (CARAFATE) 1 g tablet    thiamine 100 MG tablet     Current Facility-Administered Medications   Medication Dose Route Frequency    albuterol (PROVENTIL HFA,VENTOLIN HFA) inhaler 2 puff  2 puff Inhalation Q4H PRN    calcium carbonate (TUMS) chewable tablet 1,000 mg  1,000 mg Oral Daily PRN    cloNIDine (CATAPRES) tablet 0 3 mg  0 3 mg Oral BID    folic acid (FOLVITE) tablet 1 mg  1 mg Oral Daily    HYDROmorphone (DILAUDID) injection 0 2 mg  0 2 mg Intravenous Q3H PRN    insulin lispro (HumaLOG) 100 units/mL subcutaneous injection 1-5 Units  1-5 Units Subcutaneous HS    insulin lispro (HumaLOG) 100 units/mL subcutaneous injection 1-6 Units  1-6 Units Subcutaneous TID AC    Labetalol HCl (NORMODYNE) injection 10 mg  10 mg Intravenous Q6H PRN    nicotine (NICODERM CQ) 21 mg/24 hr TD 24 hr patch 1 patch  1 patch Transdermal Daily    ondansetron (ZOFRAN) injection 4 mg  4 mg Intravenous Q6H PRN    pancrelipase (Lip-Prot-Amyl) (CREON) delayed release capsule 24,000 Units  24,000 Units Oral TID With Meals    pantoprazole (PROTONIX) EC tablet 20 mg  20 mg Oral Daily    sodium chloride 0 9 % infusion  150 mL/hr Intravenous Continuous    sucralfate (CARAFATE) tablet 1 g  1 g Oral 4x Daily    thiamine (VITAMIN B1) tablet 100 mg  100 mg Oral Daily       No Known Allergies    Objective   Blood pressure 157/94, pulse 102, temperature 98 1 °F (36 7 °C), resp  rate 17, height 5' 4" (1 626 m), weight 78 8 kg (173 lb 11 2 oz), last menstrual period 11/11/2019, SpO2 97 %, not currently breastfeeding  No intake or output data in the 24 hours ending 12/22/19 1102      PHYSICAL EXAM:      General Appearance:   Alert, cooperative, no distress, appears stated age    HEENT:   Normocephalic, atraumatic, anicteric      Neck:  Supple, symmetrical, trachea midline, no adenopathy;    thyroid: no enlargement/tenderness/nodules; no carotid  bruit or JVD    Lungs:   Clear to auscultation bilaterally; no rales, rhonchi or wheezing; respirations unlabored    Heart[de-identified]   S1 and S2 normal; regular rate and rhythm; no murmur, rub, or gallop     Abdomen:   Soft, non-tender, non-distended; normal bowel sounds; no masses, no organomegaly    Genitalia:   Deferred    Rectal:   Deferred    Extremities:  No cyanosis, clubbing or edema    Pulses:  2+ and symmetric all extremities    Skin:  Skin color, texture, turgor normal, no rashes or lesions    Lymph nodes:  No palpable cervical, axillary or inguinal lymphadenopathy        Lab Results:   Admission on 12/21/2019   Component Date Value    WBC 12/21/2019 9 01     RBC 12/21/2019 3 93     Hemoglobin 12/21/2019 12 0     Hematocrit 12/21/2019 38 2     MCV 12/21/2019 97     MCH 12/21/2019 30 5     MCHC 12/21/2019 31 4     RDW 12/21/2019 17 5*    MPV 12/21/2019 10 2     Platelets 70/[birth year]1941 119*    nRBC 12/21/2019 0     Neutrophils Relative 12/21/2019 64     Immat GRANS % 12/21/2019 0     Lymphocytes Relative 12/21/2019 26     Monocytes Relative 12/21/2019 9     Eosinophils Relative 12/21/2019 0     Basophils Relative 12/21/2019 1     Neutrophils Absolute 12/21/2019 5 74     Immature Grans Absolute 12/21/2019 0 04     Lymphocytes Absolute 12/21/2019 2 37     Monocytes Absolute 12/21/2019 0 79     Eosinophils Absolute 12/21/2019 0 02     Basophils Absolute 12/21/2019 0 05     Sodium 12/21/2019 139     Potassium 12/21/2019 3 1*    Chloride 12/21/2019 98*    CO2 12/21/2019 29     ANION GAP 12/21/2019 12     BUN 12/21/2019 10     Creatinine 12/21/2019 0 70     Glucose 12/21/2019 137     Calcium 12/21/2019 8 3     AST 12/21/2019 104*    ALT 12/21/2019 41     Alkaline Phosphatase 12/21/2019 144*    Total Protein 12/21/2019 7 3     Albumin 12/21/2019 3 6     Total Bilirubin 12/21/2019 1 50*    eGFR 12/21/2019 106     Troponin I 12/21/2019 <0 02     Lipase 12/21/2019 885*    Magnesium 12/21/2019 1 4*    Color, UA 12/21/2019 [place]     Clarity, UA 12/21/2019 Clear     Specific Gravity, UA 12/21/2019 >=1 030     pH, UA 12/21/2019 6 0     Leukocytes, UA 12/21/2019 Negative     Nitrite, UA 12/21/2019 Negative     Protein, UA 12/21/2019 >=300*    Glucose, UA 12/21/2019 Negative     Ketones, UA 12/21/2019 15 (1+)*    Urobilinogen, UA 12/21/2019 1 0     Bilirubin, UA 12/21/2019 Moderate*    Blood, UA 12/21/2019 Negative     Amph/Meth UR 12/21/2019 Negative     Barbiturate Ur 12/21/2019 Negative     Benzodiazepine Urine 12/21/2019 Positive*    Cocaine Urine 12/21/2019 Negative     Methadone Urine 12/21/2019 Negative     Opiate Urine 12/21/2019 Positive*    PCP Ur 12/21/2019 Negative     THC Urine 12/21/2019 Positive*    Ethanol Lvl 12/21/2019 <3     RBC, UA 12/21/2019 0-1*    WBC, UA 12/21/2019 0-1*    Epithelial Cells 12/21/2019 Occasional     Bacteria, UA 12/21/2019 Occasional     MUCUS THREADS 12/21/2019 Moderate*    POC Glucose 12/22/2019 96     Sodium 12/22/2019 138     Potassium 12/22/2019 2 9*    Chloride 12/22/2019 100     CO2 12/22/2019 28     ANION GAP 12/22/2019 10     BUN 12/22/2019 8     Creatinine 12/22/2019 0 70     Glucose 12/22/2019 250*    Calcium 12/22/2019 7 4*    AST 12/22/2019 85*    ALT 12/22/2019 32     Alkaline Phosphatase 12/22/2019 120*    Total Protein 12/22/2019 6 5     Albumin 12/22/2019 3 0*    Total Bilirubin 12/22/2019 1 10*    eGFR 12/22/2019 106     Magnesium 12/22/2019 2 1     Phosphorus 12/22/2019 1 2*    WBC 12/22/2019 7 07     RBC 12/22/2019 3 70*    Hemoglobin 12/22/2019 11 4*    Hematocrit 12/22/2019 36 3     MCV 12/22/2019 98     MCH 12/22/2019 30 8     MCHC 12/22/2019 31 4     RDW 12/22/2019 17 7*    MPV 12/22/2019 10 5     Platelets 28/02/3005 102*    nRBC 12/22/2019 0     Neutrophils Relative 12/22/2019 60     Immat GRANS % 12/22/2019 0     Lymphocytes Relative 12/22/2019 33     Monocytes Relative 12/22/2019 7     Eosinophils Relative 12/22/2019 0     Basophils Relative 12/22/2019 0     Neutrophils Absolute 12/22/2019 4 18     Immature Grans Absolute 12/22/2019 0 02     Lymphocytes Absolute 12/22/2019 2 33     Monocytes Absolute 12/22/2019 0 48     Eosinophils Absolute 12/22/2019 0 03     Basophils Absolute 12/22/2019 0 03     POC Glucose 12/22/2019 259*       Imaging Studies: I have personally reviewed pertinent imaging studies  I personally reviewed her CT imaging from December 20th that shows hepatic cirrhosis with progressive narrowing of the patent veins and IVC  It also shows inflammatory edema around the pancreas consistent with acute pancreatitis      ASSESSMENT & PLAN:  Principal Problem:    Acute on chronic alcoholic pancreatitis   Active Problems:    Hypokalemia    Alcohol abuse    Vomiting    History of alcoholic cirrhosis    Hypomagnesemia    Abnormal LFTs    Diabetes mellitus type 2    GERD (gastroesophageal reflux disease)    Tobacco abuse    Accelerated hypertension - history of Essential hypertension    Thrombocytopenia ([address] )    She is a 18-year-old female with active alcohol dependence and alcoholism admitted with abdominal pain and nausea due to acute on chronic alcoholic pancreatitis BISAP score of 0    In addition the patient has alcoholic cirrhosis which is well-compensated with meld score of 8    NPO  IV fluids  Antiemetics and pain control  Complete alcohol cessation  Multivitamin thiamine and folate  [facility] protocol for alcohol withdrawal  Continue pancreatic enzymes  PPI  Serial abdominal exams  Incentive spirometry and DVT prophylaxis    Srikanth Cotter MD  12/22/2019,11:02 AM

## 2019-12-22 NOTE — ASSESSMENT & PLAN NOTE
Lab Results   Component Value Date    HGBA1C 6 9 (H) 10/15/2019       Recent Labs     12/21/19  0016 12/21/19  0617 12/21/19  1059 12/22/19  0057   POCGLU 130 135 139 96       Blood Sugar Average: Last 72 hrs:  (P) 96     · Unsure of what current home med is, but states has not been taking it regularly 2/2 multiple hypoglycemia episodes after taking   Reports is no longer taking Glucophage  · FSBS q6hrs while NPO w/ SSI

## 2019-12-22 NOTE — ASSESSMENT & PLAN NOTE
· Admitted on 12/20/19 for the same but left AMA the following morning, now back  · Lipase on admission 885  Was previously 1207 --> 736  · CT scan 12/20/19 revealed "Increasing inflammatory edema surrounding the pancreatic head and uncinate process as well as 2nd and 3rd portion of duodenum  No pancreatic or peripancreatic cyst or discrete fluid collection noted  The appearance is suspicious for focal acute interstitial edematous pancreatitis especially in this patient with abnormal lipase  Nonspecific prominence of the pancreatic duct in the pancreatic tail, similar from previous examination  · C/O ongoing periumbilical pain radiating through to back with nausea and vomiting    · NPO  · IVF  · Pain control  · Re-consult GI

## 2019-12-22 NOTE — H&P
H&P- Maninder Henning 1975, 38 y o  female MRN: [id number]    Unit/Bed#: -01 Encounter: [id number]    Primary Care Provider: Radha Sampson MD   Date and time admitted to hospital: 12/21/2019  9:57 PM        * Acute on chronic alcoholic pancreatitis   Assessment & Plan  · Admitted on 12/20/19 for the same but left AMA the following morning, now back  · Lipase on admission 885  Was previously 1207 --> 736  · CT scan 12/20/19 revealed "Increasing inflammatory edema surrounding the pancreatic head and uncinate process as well as 2nd and 3rd portion of duodenum  No pancreatic or peripancreatic cyst or discrete fluid collection noted  The appearance is suspicious for focal acute interstitial edematous pancreatitis especially in this patient with abnormal lipase  Nonspecific prominence of the pancreatic duct in the pancreatic tail, similar from previous examination  · C/O ongoing periumbilical pain radiating through to back with nausea and vomiting  · NPO  · IVF  · Pain control  · Re-consult GI    Alcohol abuse  Assessment & Plan  · States sips on Vodka throughout the day, but a large bottle will last about a week  Last alcoholic drink was 2 days ago  · States started drinking alcohol when she stopped taking Xanax 5 5 years ago to help with anxiety  Would like to start seeing a counselor to get started back on Xanax so she can stop drinking alcohol  · CIWA protocol  · CM consulted for discussion of rehab options once medically stable    Hypokalemia  Assessment & Plan  · Potassium 3 1 on admission, with Magnesium @ 1 4  · Klor-con 40meq PO with mag repletion as below  · Repeat BMP and Mag in am    Hypomagnesemia  Assessment & Plan  · Mag 1 4 on admission  · MagSO4 2g IV x 1  · Repeat BMP and Mag in am    Vomiting  Assessment & Plan  · Likely 2/2 alcohol withdrawal  · Zofran PRN    Accelerated hypertension - history of Essential hypertension  Assessment & Plan  · Hypertensive in ED  191/109  · Hypertension likely related to alcohol withdrawal +/- non compliance with medication regimen  · Continue home dose Clonidine 0 3mg BID  · Labetalol PRN    History of alcoholic cirrhosis  Assessment & Plan  · CT abdomen/pelvis on 12/20 revealed "Profound hepatic cirrhosis with associated fibrotic change centrally in the liver resulting hepatic lobulation but also progressive narrowing of hepatic veins and intrahepatic IVC "  · GI consult as above  · Encourage alcohol cessation    Abnormal LFTs  Assessment & Plan  · Likely related to alcoholic cirrhosis  AST//41  Alk phos 144  T bili 1 50  · Repeat CMP in am    Diabetes mellitus type 2  Assessment & Plan  Lab Results   Component Value Date    HGBA1C 6 9 (H) 10/15/2019       Recent Labs     12/21/19  0016 12/21/19  0617 12/21/19  1059 12/22/19  0057   POCGLU 130 135 139 96       Blood Sugar Average: Last 72 hrs:  (P) 96     · Unsure of what current home med is, but states has not been taking it regularly 2/2 multiple hypoglycemia episodes after taking  Reports is no longer taking Glucophage  · FSBS q6hrs while NPO w/ SSI    Thrombocytopenia (HCC)  Assessment & Plan  · Platelets 473 on admission  Appears to be chronic  Likely 2/2 cirrhosis  · No signs of active bleeding  · Repeat CBC in am    GERD (gastroesophageal reflux disease)  Assessment & Plan  · Continue home dose Protonix and Sucralfate  · TUMS PRN    Tobacco abuse  Assessment & Plan  · Currently smoking 1ppd  · Smoking cessation education  · Nicotine patch      VTE Prophylaxis: Low risk  Ambulatory  / reason for no mechanical VTE prophylaxis Ambulatory   Code Status: Level 1 Full code  POLST: POLST form is not discussed and not completed at this time  Discussion with family: NA    Anticipated Length of Stay:  Patient will be admitted on an Inpatient basis with an anticipated length of stay of  Greater than 2 midnights     Justification for Hospital Stay: See AP above    Total Time for Visit, including Counseling / Coordination of Care: 30 minutes  Greater than 50% of this total time spent on direct patient counseling and coordination of care  Chief Complaint:   Abdominal pain, N/V    History of Present Illness:    Rohini Dean is a 38 y o  female who presents with periumbilical pain that radiates through to back with associated N/V/D  (+) chills  Was admitted initially on 12/20 for acute pancreatitis but left AMA to attend daughter's party  Came back in this evening to be readmitted  States has had ongoing abdominal pain and N/V  Denies any alcohol consumption since leaving hospital and ETOH level in ED confirms  Reports is interested in quitting drinking alcohol  Reports would like to speak to a counselor on an outpt basis to get back on Xanax, for anxiety issues, so that she can quit drinking in its' place  Review of Systems:    Review of Systems   Constitutional: Positive for appetite change and diaphoresis  Negative for activity change, chills and fever  HENT: Positive for sinus pressure  Negative for congestion, ear pain and sore throat  Eyes: Negative for visual disturbance  Respiratory: Positive for cough and shortness of breath  Negative for wheezing  Cardiovascular: Negative for chest pain, palpitations and leg swelling  Gastrointestinal: Positive for abdominal distention, abdominal pain, diarrhea, nausea and vomiting  Negative for constipation  Genitourinary: Negative for difficulty urinating, dysuria, frequency and urgency  Musculoskeletal: Negative for neck pain and neck stiffness  Neurological: Negative for dizziness, syncope and light-headedness  All other systems reviewed and are negative        Past Medical and Surgical History:     Past Medical History:   Diagnosis Date    Alcohol abuse     Arthritis     GERD (gastroesophageal reflux disease)     Hypertension     Nicotine dependence     Obesity     Pancreatitis     Panic attack        Past Surgical History:   Procedure Laterality Date    ABDOMINAL SURGERY      C SECTION    ESOPHAGOGASTRODUODENOSCOPY N/A 12/15/2017    Procedure: ESOPHAGOGASTRODUODENOSCOPY (EGD); Surgeon: Catherine Melgar MD;  Location: [facility]; Service: Gastroenterology       Meds/Allergies:    Prior to Admission medications    Medication Sig Start Date End Date Taking?  Authorizing Provider   albuterol (PROVENTIL HFA,VENTOLIN HFA) 90 mcg/act inhaler Inhale 2 puffs every 4 (four) hours as needed for wheezing 10/10/18   Connie Cardoso MD   cloNIDine (CATAPRES) 0 3 mg tablet Take 0 3 mg by mouth 2 (two) times a day    Historical Provider, MD   dicyclomine (BENTYL) 20 mg tablet Take 1 tablet (20 mg total) by mouth 3 (three) times a day  Patient not taking: Reported on 10/15/2019 5/31/18   OMERO Renteria   folic acid (FOLVITE) 1 mg tablet Take 1 tablet (1 mg total) by mouth daily  Patient not taking: Reported on 10/15/2019 8/13/19   Ronita Gowers, CRNP   gabapentin (NEURONTIN) 300 mg capsule Take 1 capsule (300 mg total) by mouth 3 (three) times a day  Patient not taking: Reported on 10/15/2019 6/26/19   Robert Parks MD   metFORMIN (GLUCOPHAGE) 500 mg tablet Take 1 tablet (500 mg total) by mouth daily with breakfast  Patient not taking: Reported on 10/15/2019 6/26/19   Robert Parks MD   metoclopramide (REGLAN) 10 mg tablet Take 1 tablet (10 mg total) by mouth every 6 (six) hours As needed for nausea and vomiting  Patient not taking: Reported on 10/15/2019 8/2/19   Anum De La O,    Mometasone Furo-Formoterol Fum (DULERA) 100-5 MCG/ACT AERO Inhale as needed Unsure of dose      Historical Provider, MD   nicotine (NICODERM CQ) 21 mg/24 hr TD 24 hr patch Place 1 patch on the skin daily 6/26/19   Robert Parks MD   ondansetron (ZOFRAN) 4 mg tablet Take 1 tablet (4 mg total) by mouth every 8 (eight) hours as needed for nausea or vomiting  Patient not taking: Reported on 10/15/2019 6/26/19   Robert Parks MD ondansetron (ZOFRAN) 4 mg tablet Take 1 tablet (4 mg total) by mouth every 8 (eight) hours as needed for nausea or vomiting 12/21/19   Morelia Damon MD   pancrelipase, Lip-Prot-Amyl, (CREON) 24,000 units Take 1 capsule by mouth 3 (three) times a day with meals 10/31/17   Ana Major MD   pantoprazole (PROTONIX) 20 mg tablet Take 1 tablet (20 mg total) by mouth daily 9/20/18   Perry Sim MD   sucralfate (CARAFATE) 1 g tablet Take 1 tablet (1 g total) by mouth 4 (four) times a day  Patient not taking: Reported on 10/15/2019 5/31/18   OMERO Renteria   thiamine 100 MG tablet Take 1 tablet (100 mg total) by mouth daily  Patient not taking: Reported on 10/15/2019 6/29/19   Campbell Ashby MD     I have reviewed home medications with patient personally      Allergies: No Known Allergies    Social History:     Marital Status: Single   Patient Pre-hospital Living Situation: Lives with family  Patient Pre-hospital Level of Mobility: Ambulatory w/o assistive device  Patient Pre-hospital Diet Restrictions: None  Substance Use History:   Social History     Substance and Sexual Activity   Alcohol Use Yes    Frequency: 4 or more times a week    Drinks per session: 3 or 4    Comment: Patient states 'I drink alot of vodka a day"     Social History     Tobacco Use   Smoking Status Current Every Day Smoker    Packs/day: 1 00    Years: 29 00    Pack years: 29 00    Types: Cigarettes   Smokeless Tobacco Never Used   Tobacco Comment    pt refused packet     Social History     Substance and Sexual Activity   Drug Use Yes    Types: Marijuana       Family History:    Family History   Problem Relation Age of Onset    Cirrhosis Father     Alcohol abuse Father     Drug abuse Mother        Physical Exam:     Vitals:   Blood Pressure: 160/92 (12/22/19 0042)  Pulse: 95 (12/22/19 0045)  Temperature: 98 6 °F (37 °C) (12/22/19 0045)  Temp Source: Oral (12/21/19 2200)  Respirations: 18 (12/22/19 0045)  Height: 5' 4" (162 6 cm) (12/22/19 0039)  Weight - Scale: 78 8 kg (173 lb 11 2 oz) (12/22/19 0042)  SpO2: 97 % (12/22/19 0045)    Physical Exam   Constitutional: She is oriented to person, place, and time  She appears well-developed and well-nourished  No distress  HENT:   Head: Normocephalic and atraumatic  Eyes: Pupils are equal, round, and reactive to light  EOM are normal    Neck: Normal range of motion  Neck supple  Cardiovascular: Normal rate, regular rhythm, normal heart sounds and intact distal pulses  Exam reveals no gallop and no friction rub  No murmur heard  Pulmonary/Chest: Effort normal and breath sounds normal  No respiratory distress  She has no wheezes  She has no rales  Abdominal: Soft  Bowel sounds are normal  She exhibits distension  There is tenderness  There is no rebound and no guarding  Musculoskeletal: Normal range of motion  She exhibits no edema or tenderness  Neurological: She is alert and oriented to person, place, and time  Skin: Skin is warm and dry  Psychiatric: She has a normal mood and affect  Her behavior is normal  Thought content normal          Additional Data:     Lab Results: I have personally reviewed pertinent reports        Results from last 7 days   Lab Units 12/21/19  2224   WBC Thousand/uL 9 01   HEMOGLOBIN g/dL 12 0   HEMATOCRIT % 38 2   PLATELETS Thousands/uL 119*   NEUTROS PCT % 64   LYMPHS PCT % 26   MONOS PCT % 9   EOS PCT % 0     Results from last 7 days   Lab Units 12/21/19  2224   SODIUM mmol/L 139   POTASSIUM mmol/L 3 1*   CHLORIDE mmol/L 98*   CO2 mmol/L 29   BUN mg/dL 10   CREATININE mg/dL 0 70   ANION GAP mmol/L 12   CALCIUM mg/dL 8 3   ALBUMIN g/dL 3 6   TOTAL BILIRUBIN mg/dL 1 50*   ALK PHOS U/L 144*   ALT U/L 41   AST U/L 104*   GLUCOSE RANDOM mg/dL 137     Results from last 7 days   Lab Units 12/20/19  0904   INR  1 10     Results from last 7 days   Lab Units 12/22/19  0057 12/21/19  1059 12/21/19  0617 12/21/19  0016 12/20/19  2044 12/20/19  1700 12/20/19  1450 POC GLUCOSE mg/dl 96 139 135 130 154* 143* 215*         Results from last 7 days   Lab Units 12/20/19  2234 12/20/19  1815 12/20/19  1623 12/20/19  1411 12/20/19  1407   LACTIC ACID mmol/L 0 9 1 7 3 2*  --  2 1*   PROCALCITONIN ng/ml  --   --   --  0 05  --        Imaging: I have personally reviewed pertinent reports  No orders to display       EKG, Pathology, and Other Studies Reviewed on Admission:   · EKG: NA    Allscripts / Epic Records Reviewed: Yes     ** Please Note: This note has been constructed using a voice recognition system   **

## 2019-12-22 NOTE — ASSESSMENT & PLAN NOTE
· States sips on Vodka throughout the day, but a large bottle will last about a week  Last alcoholic drink was 2 days ago  · States started drinking alcohol when she stopped taking Xanax 5 5 years ago to help with anxiety   Would like to start seeing a counselor to get started back on Xanax so she can stop drinking alcohol  · CIWA protocol  · CM consulted for discussion of rehab options once medically stable

## 2019-12-22 NOTE — ASSESSMENT & PLAN NOTE
· Potassium 3 1 on admission, with Magnesium @ 1 4  · Klor-con 40meq PO with mag repletion as below  · Repeat BMP and Mag in am

## 2019-12-22 NOTE — ASSESSMENT & PLAN NOTE
· Platelets 612 on admission  Appears to be chronic   Likely 2/2 cirrhosis  · No signs of active bleeding  · Repeat CBC in am

## 2019-12-22 NOTE — ASSESSMENT & PLAN NOTE
· Hypertensive in ED   191/109  · Hypertension likely related to alcohol withdrawal +/- non compliance with medication regimen  · Continue home dose Clonidine 0 3mg BID  · Labetalol PRN

## 2019-12-23 VITALS
SYSTOLIC BLOOD PRESSURE: 129 MMHG | DIASTOLIC BLOOD PRESSURE: 82 MMHG | HEART RATE: 70 BPM | HEIGHT: 64 IN | RESPIRATION RATE: 18 BRPM | OXYGEN SATURATION: 100 % | TEMPERATURE: 98.1 F | WEIGHT: 173.7 LBS | BODY MASS INDEX: 29.65 KG/M2

## 2019-12-23 PROBLEM — E87.6 HYPOKALEMIA: Status: RESOLVED | Noted: 2017-03-07 | Resolved: 2019-12-23

## 2019-12-23 PROBLEM — K86.1 ACUTE ON CHRONIC PANCREATITIS (HCC): Chronic | Status: RESOLVED | Noted: 2019-06-18 | Resolved: 2019-12-23

## 2019-12-23 PROBLEM — E83.42 HYPOMAGNESEMIA: Status: RESOLVED | Noted: 2018-05-29 | Resolved: 2019-12-23

## 2019-12-23 PROBLEM — E83.39 HYPOPHOSPHATEMIA: Status: ACTIVE | Noted: 2019-12-23

## 2019-12-23 PROBLEM — R11.10 VOMITING: Status: RESOLVED | Noted: 2017-12-14 | Resolved: 2019-12-23

## 2019-12-23 PROBLEM — K85.90 ACUTE ON CHRONIC PANCREATITIS (HCC): Chronic | Status: RESOLVED | Noted: 2019-06-18 | Resolved: 2019-12-23

## 2019-12-23 LAB
ANION GAP SERPL CALCULATED.3IONS-SCNC: 7 MMOL/L (ref 4–13)
BUN SERPL-MCNC: 3 MG/DL (ref 5–25)
CALCIUM SERPL-MCNC: 6.5 MG/DL (ref 8.3–10.1)
CHLORIDE SERPL-SCNC: 105 MMOL/L (ref 100–108)
CO2 SERPL-SCNC: 27 MMOL/L (ref 21–32)
CREAT SERPL-MCNC: 0.43 MG/DL (ref 0.6–1.3)
GFR SERPL CREATININE-BSD FRML MDRD: 125 ML/MIN/1.73SQ M
GLUCOSE SERPL-MCNC: 120 MG/DL (ref 65–140)
GLUCOSE SERPL-MCNC: 121 MG/DL (ref 65–140)
GLUCOSE SERPL-MCNC: 141 MG/DL (ref 65–140)
POTASSIUM SERPL-SCNC: 3.1 MMOL/L (ref 3.5–5.3)
SODIUM SERPL-SCNC: 139 MMOL/L (ref 136–145)

## 2019-12-23 PROCEDURE — 82948 REAGENT STRIP/BLOOD GLUCOSE: CPT

## 2019-12-23 PROCEDURE — 99239 HOSP IP/OBS DSCHRG MGMT >30: CPT | Performed by: STUDENT IN AN ORGANIZED HEALTH CARE EDUCATION/TRAINING PROGRAM

## 2019-12-23 PROCEDURE — 99232 SBSQ HOSP IP/OBS MODERATE 35: CPT | Performed by: INTERNAL MEDICINE

## 2019-12-23 PROCEDURE — 94762 N-INVAS EAR/PLS OXIMTRY CONT: CPT

## 2019-12-23 PROCEDURE — 80048 BASIC METABOLIC PNL TOTAL CA: CPT | Performed by: FAMILY MEDICINE

## 2019-12-23 RX ORDER — POTASSIUM CHLORIDE 14.9 MG/ML
20 INJECTION INTRAVENOUS ONCE
Status: COMPLETED | OUTPATIENT
Start: 2019-12-23 | End: 2019-12-23

## 2019-12-23 RX ORDER — OXYCODONE HYDROCHLORIDE AND ACETAMINOPHEN 5; 325 MG/1; MG/1
1 TABLET ORAL EVERY 4 HOURS PRN
Qty: 10 TABLET | Refills: 0 | Status: SHIPPED | OUTPATIENT
Start: 2019-12-23 | End: 2019-12-26

## 2019-12-23 RX ORDER — POTASSIUM CHLORIDE 14.9 MG/ML
20 INJECTION INTRAVENOUS ONCE
Status: DISCONTINUED | OUTPATIENT
Start: 2019-12-23 | End: 2019-12-23

## 2019-12-23 RX ADMIN — SUCRALFATE 1 G: 1 TABLET ORAL at 08:17

## 2019-12-23 RX ADMIN — SODIUM CHLORIDE 150 ML/HR: 0.9 INJECTION, SOLUTION INTRAVENOUS at 08:22

## 2019-12-23 RX ADMIN — THIAMINE HCL TAB 100 MG 100 MG: 100 TAB at 08:16

## 2019-12-23 RX ADMIN — HYDROMORPHONE HYDROCHLORIDE 0.2 MG: 1 INJECTION, SOLUTION INTRAMUSCULAR; INTRAVENOUS; SUBCUTANEOUS at 04:49

## 2019-12-23 RX ADMIN — HYDROMORPHONE HYDROCHLORIDE 0.2 MG: 1 INJECTION, SOLUTION INTRAMUSCULAR; INTRAVENOUS; SUBCUTANEOUS at 08:13

## 2019-12-23 RX ADMIN — HYDROMORPHONE HYDROCHLORIDE 0.2 MG: 1 INJECTION, SOLUTION INTRAMUSCULAR; INTRAVENOUS; SUBCUTANEOUS at 00:20

## 2019-12-23 RX ADMIN — POTASSIUM CHLORIDE 20 MEQ: 14.9 INJECTION, SOLUTION INTRAVENOUS at 10:15

## 2019-12-23 RX ADMIN — Medication 1 TABLET: at 12:55

## 2019-12-23 RX ADMIN — Medication 1 TABLET: at 10:15

## 2019-12-23 RX ADMIN — FOLIC ACID 1 MG: 1 TABLET ORAL at 08:17

## 2019-12-23 RX ADMIN — LORAZEPAM 0.5 MG: 0.5 TABLET ORAL at 08:21

## 2019-12-23 RX ADMIN — SODIUM CHLORIDE 150 ML/HR: 0.9 INJECTION, SOLUTION INTRAVENOUS at 01:22

## 2019-12-23 RX ADMIN — PANTOPRAZOLE SODIUM 20 MG: 20 TABLET, DELAYED RELEASE ORAL at 05:33

## 2019-12-23 RX ADMIN — CLONIDINE HYDROCHLORIDE 0.3 MG: 0.1 TABLET ORAL at 08:17

## 2019-12-23 RX ADMIN — SUCRALFATE 1 G: 1 TABLET ORAL at 12:55

## 2019-12-23 RX ADMIN — PANCRELIPASE 24000 UNITS: 24000; 76000; 120000 CAPSULE, DELAYED RELEASE PELLETS ORAL at 08:17

## 2019-12-23 RX ADMIN — PANCRELIPASE 24000 UNITS: 24000; 76000; 120000 CAPSULE, DELAYED RELEASE PELLETS ORAL at 12:54

## 2019-12-23 RX ADMIN — HYDROMORPHONE HYDROCHLORIDE 0.2 MG: 1 INJECTION, SOLUTION INTRAMUSCULAR; INTRAVENOUS; SUBCUTANEOUS at 12:55

## 2019-12-23 NOTE — UTILIZATION REVIEW
Notification of Inpatient Admission/Inpatient Authorization Request   This is a Notification of Inpatient Admission for [address]  Be advised that this patient was admitted to our facility under Inpatient Status  Contact Genaro Serrano at [phone] for additional admission information  Shelia ROLLINS DEPT DEDICATED Guero Billingsley [phone]  Patient Name:   Rohini Dean   YOB: 1975       [address]   [address]:   [address]   Tax ID: [id number]  NPI: [id number] Attending Provider/NPI: Yenni May Md [[id number]]   Place of Service Code: 24     Place of Service Name:  Inpatient Hospital   Start Date: 12/22/19 0002     Discharge Date & Time: No discharge date for patient encounter  Type of Admission: Inpatient Status Discharge Disposition   (if discharged): Left against medical advice or discontinued care   Patient Diagnoses: Pancreatitis [K85 90]  Abdominal pain [R10 9]     Orders: Admission Orders (From admission, onward)     Ordered        12/22/19 0003  Inpatient Admission  Once                    Assigned Utilization Review Contact: Genaro Serrano  Utilization   Network Utilization Review Department  Phone: [phone]; Fax [phone]  Email: [email or web address]   ATTENTION PAYERS: Please call the assigned Utilization  directly with any questions or concerns ALL voicemails in the department are confidential  Send all requests for admission clinical reviews, approved or denied determinations and any other requests to dedicated fax number belonging to the campus where the patient is receiving treatment

## 2019-12-23 NOTE — ASSESSMENT & PLAN NOTE
· Platelets 550 on admission  Appears to be chronic   Likely 2/2 cirrhosis  · No signs of active bleeding  · No further intervention at this time

## 2019-12-23 NOTE — ASSESSMENT & PLAN NOTE
· Likely secondary to poor nutrition in setting of chronic alcohol abuse  · Replete all lytes, will discharge patient with potassium phosphate supplements  · Encourage low fat diet

## 2019-12-23 NOTE — ASSESSMENT & PLAN NOTE
· CT abdomen/pelvis on 12/20 revealed "Profound hepatic cirrhosis with associated fibrotic change centrally in the liver resulting hepatic lobulation but also progressive narrowing of hepatic veins and intrahepatic IVC "  · GI consulted, see recommendations  · At this time stable for discharge from GI perspective  · Patient states that she has anxiety and uses alcohol for self-medication, will refer to Psychiatry as an outpatient  · Encourage alcohol cessation

## 2019-12-23 NOTE — ASSESSMENT & PLAN NOTE
· Hypertensive in ED   191/109  · Hypertension likely related to alcohol withdrawal +/- non compliance with medication regimen  · Continue home dose Clonidine 0 3mg BID  · Now with much better control currently normotensive and stable for discharge

## 2019-12-23 NOTE — PROGRESS NOTES
GI Progress Note - Jannette Alanis 38 y o  female MRN: [id number]    Unit/Bed#: -01 Encounter: [id number]    Subjective: She reports abdominal pain but no N/V, able to tolerate liquids  She wants to eat  She wants to go home  She reports the pain medications are not good here and she rather be at home  She reports mismanaged anxiety as the reason for her continued alcohol abuse and reports seeing multiple psychiatrists in the past that have recommended zoloft but this didn't work for her  Objective:     Vitals: Blood pressure 129/82, pulse 70, temperature 98 1 °F (36 7 °C), resp  rate 18, height 5' 4" (1 626 m), weight 78 8 kg (173 lb 11 2 oz), last menstrual period 11/11/2019, SpO2 100 %, not currently breastfeeding  ,Body mass index is 29 82 kg/m²        Intake/Output Summary (Last 24 hours) at 12/23/2019 1218  Last data filed at 12/23/2019 0914  Gross per 24 hour   Intake 260 ml   Output    Net 260 ml       Physical Exam:     General Appearance: Alert, appears stated age and cooperative  Lungs: Clear to auscultation bilaterally, no rales or rhonchi, no labored breathing/accessory muscle use  Heart: Regular rate and rhythm, S1, S2 normal, no murmur, click, rub or gallop  Abdomen: Soft, non-tender, non-distended; bowel sounds normal; no masses or no organomegaly  Extremities: No cyanosis, edema    Invasive Devices     Peripheral Intravenous Line            Peripheral IV 12/21/19 Left Hand 1 day                Lab Results:  Results from last 7 days   Lab Units 12/22/19  0509   WBC Thousand/uL 7 07   HEMOGLOBIN g/dL 11 4*   HEMATOCRIT % 36 3   PLATELETS Thousands/uL 102*   NEUTROS PCT % 60   LYMPHS PCT % 33   MONOS PCT % 7   EOS PCT % 0     Results from last 7 days   Lab Units 12/23/19  0717 12/22/19  0509   POTASSIUM mmol/L 3 1* 2 9*   CHLORIDE mmol/L 105 100   CO2 mmol/L 27 28   BUN mg/dL 3* 8   CREATININE mg/dL 0 43* 0 70   CALCIUM mg/dL 6 5* 7 4*   ALK PHOS U/L  --  120*   ALT U/L  --  32   AST U/L  -- 85*     Results from last 7 days   Lab Units 12/20/19  0904   INR  1 10     Results from last 7 days   Lab Units 12/21/19  2224   LIPASE u/L 885*       Imaging Studies: I have personally reviewed pertinent imaging studies  No results found  Assessment and Plan:     Acute Pancreatitis  - CT A/P shows findings consistent with acute pancreatitis, chronic prominence of pancreatic duct, profound cirrhosis with fibrotic change and compression of hepatic veins and intrahepatic IVC  - RUQ US showed narrowing of hepatic veins and intrahepatic IVC but patent portal vein, no thrombus or mass noted  - Clinically improving  - Advance to low fat diet  - Discussed alcohol cessation, she reports anxiety and driving factor for continued alcohol use, will discuss with CM about providing list of covered psychiatrists for outpatient follow up  - If she is able to tolerate PO without significant pain, she can be discharged from GI standpoint  - She does have a component of drug seeking so would try to limit narcotics, she was fast asleep on exam and when she woke up reports severe pain     Alcoholic Hepatitis  Compensated Alcoholic Cirrhosis  - Discriminant function <97 for alcoholic hepatitis  - [facility] protocol  - MELD 9, Child Class A  - Continue alcohol abuse as above, alcohol cessation  - Grade 0 HE  - EGD in December 2017 showing Castano's esophagus and gastritis, no varices, needs repeat EGD as outpatient but patient is noncompliant  - HCC screen with MRI/MRCP in June and [address] yesterday are negative, repeat in June 2020  - Not a transplant candidate due to low MELD and continued alcohol abuse      The patient will be seen by Dr Mily Daly  GI will sign off   Call with questions

## 2019-12-23 NOTE — ASSESSMENT & PLAN NOTE
· Likely related to alcoholic cirrhosis  AST//41  Alk phos 144   T bili 1 50  · Likely secondary to alcohol abuse with ETOH induced cirrhosis per imaging  · GI assessed patient, no further intervention recommended at this time

## 2019-12-23 NOTE — ASSESSMENT & PLAN NOTE
Lab Results   Component Value Date    HGBA1C 6 9 (H) 10/15/2019       Recent Labs     12/22/19  1641 12/22/19  2225 12/23/19  0626 12/23/19  1147   POCGLU 208* 186* 121 141*       Blood Sugar Average: Last 72 hrs:  (P) 159 [id number]     · Unsure of what current home med is, but states has not been taking it regularly 2/2 multiple hypoglycemia episodes after taking   Reports is no longer taking Glucophage  · Glucose levels stable, recommend that patient follow up with outpatient primary medical doctor for further management of diabetes

## 2019-12-23 NOTE — ASSESSMENT & PLAN NOTE
· States sips on Vodka throughout the day, but a large bottle will last about a week  Last alcoholic drink was 2 days ago  · States started drinking alcohol when she stopped taking Xanax 5 5 years ago to help with anxiety   Would like to start seeing a counselor to get started back on Xanax so she can stop drinking alcohol- will refer patient to outpatient psychiatry  · Does not appear to be actively withdrawing at this time, GI has assessed patient and now currently stable for discharge  · Diet with home to low-fat and tolerating it well

## 2019-12-23 NOTE — DISCHARGE SUMMARY
Discharge- Rohini Dean 1975, 38 y o  female MRN: [id number]    Unit/Bed#: -01 Encounter: [id number]    Primary Care Provider: Luisa Parrish MD   Date and time admitted to hospital: 12/21/2019  9:57 PM        Hypophosphatemia  Assessment & Plan  · Likely secondary to poor nutrition in setting of chronic alcohol abuse  · Replete all lytes, will discharge patient with potassium phosphate supplements  · Encourage low fat diet      Thrombocytopenia (HCC)  Assessment & Plan  · Platelets 089 on admission  Appears to be chronic  Likely 2/2 cirrhosis  · No signs of active bleeding  · No further intervention at this time    Accelerated hypertension - history of Essential hypertension  Assessment & Plan  · Hypertensive in ED  191/109  · Hypertension likely related to alcohol withdrawal +/- non compliance with medication regimen  · Continue home dose Clonidine 0 3mg BID  · Now with much better control currently normotensive and stable for discharge    Tobacco abuse  Assessment & Plan  · Currently smoking 1ppd  · Smoking cessation education  · Nicotine patch    GERD (gastroesophageal reflux disease)  Assessment & Plan  · Continue home dose Protonix and Sucralfate  · TUMS PRN    Diabetes mellitus type 2  Assessment & Plan  Lab Results   Component Value Date    HGBA1C 6 9 (H) 10/15/2019       Recent Labs     12/22/19  1641 12/22/19  2225 12/23/19  0626 12/23/19  1147   POCGLU 208* 186* 121 141*       Blood Sugar Average: Last 72 hrs:  (P) 159 [id number]     · Unsure of what current home med is, but states has not been taking it regularly 2/2 multiple hypoglycemia episodes after taking  Reports is no longer taking Glucophage  · Glucose levels stable, recommend that patient follow up with outpatient primary medical doctor for further management of diabetes    Abnormal LFTs  Assessment & Plan  · Likely related to alcoholic cirrhosis  AST//41  Alk phos 144   T bili 1 50  · Likely secondary to alcohol abuse with ETOH induced cirrhosis per imaging  · GI assessed patient, no further intervention recommended at this time    History of alcoholic cirrhosis  Assessment & Plan  · CT abdomen/pelvis on 12/20 revealed "Profound hepatic cirrhosis with associated fibrotic change centrally in the liver resulting hepatic lobulation but also progressive narrowing of hepatic veins and intrahepatic IVC "  · GI consulted, see recommendations  · At this time stable for discharge from GI perspective  · Patient states that she has anxiety and uses alcohol for self-medication, will refer to Psychiatry as an outpatient  · Encourage alcohol cessation    Alcohol abuse  Assessment & Plan  · States sips on Vodka throughout the day, but a large bottle will last about a week  Last alcoholic drink was 2 days ago  · States started drinking alcohol when she stopped taking Xanax 5 5 years ago to help with anxiety  Would like to start seeing a counselor to get started back on Xanax so she can stop drinking alcohol- will refer patient to outpatient psychiatry  · Does not appear to be actively withdrawing at this time, GI has assessed patient and now currently stable for discharge  · Diet with home to low-fat and tolerating it well        Discharging Physician / Practitioner: Urbano Rosario MD  PCP: Shelby Aguilar MD  Admission Date:   Admission Orders (From admission, onward)     Ordered        12/22/19 0003  Inpatient Admission  Once                   Discharge Date: 12/23/19    Disposition:      Other: Home    For Discharges to [facility]:   · Not Applicable to this Patient - Not Applicable to this Patient    Reason for Admission: Abdominal Pain    Discharge Diagnoses:     Please see assessment and plan section above for further details regarding discharge diagnoses       Resolved Problems  Date Reviewed: 12/22/2019          Resolved    Hypokalemia 12/23/2019     Resolved by  Urbano Rosario MD    Vomiting 12/23/2019     Resolved by Namrata Magana MD    Overview Signed 12/15/2017  9:35 AM by Roxy Jasso PA-C     Added automatically from request for surgery [id number]         Hypomagnesemia 12/23/2019     Resolved by  Namrata Magana MD    * (Principal) Acute on chronic alcoholic pancreatitis  83/09/8003     Resolved by  Namrata Magana MD          Consultations During Hospital Stay:  · GI    Procedures Performed:   · None     Medication Adjustments and Discharge Medications:  · Summary of Medication Adjustments made as a result of this hospitalization: Added potasium phosphate   · Medication Dosing Tapers - Please refer to Discharge Medication List for details on any medication dosing tapers (if applicable to patient)  · Medications being temporarily held (include recommended restart time): See med rec  · Discharge Medication List: See after visit summary for reconciled discharge medications  Wound Care Recommendations:  When applicable, please see wound care section of After Visit Summary  Diet Recommendations at Discharge:  Diet -        Diet Orders   (From admission, onward)             Start     Ordered    12/23/19 1205  Diet Regular; Regular House; Lo Fat  Diet effective now     Question Answer Comment   Diet Type Regular    Regular Regular House    Other Restriction(s): Lo Fat    RD to adjust diet per protocol? Yes        12/23/19 1205                Instructions for any Catheters / Lines Present at Discharge (including removal date, if applicable): NA    Significant Findings / Test Results:   · CT A/P- pancreatitis, cirrhosis     Incidental Findings:   · NA     Test Results Pending at Discharge (will require follow up): · Ambulatory referral to psychiatry     Outpatient Tests Requested:  · NA    Complications:  None    Hospital Course:     Aby Pratt is a 38 y o  female patient who originally presented to the hospital on 12/21/2019 due to abdominal pain    Recently admitted for alcohol for pancreatitis and potential alcohol withdrawal over the weekend but signed out against medical advice for personal reasons  Return back to the hospital for continuation of care from previous admission  GI was consulted, see recommendations but otherwise had no further input  Patient tolerated advancement of diet without any abdominal pain  Endorsed having severe anxiety for which she drinks alcohol, so she will be referred to ambulatory Psychiatry for further evaluation  At this time I am not inclined to prescribe an actively drinking patient benzodiazepines for safety concerns  Also noted to have hypophosphatemia and hypokalemia for which he was repleted  Will discharge patient with several days of potassium phosphate supplementation as well  On 12/23/2019, patient was deemed medically stable and cleared for discharge  She did not have any withdrawal symptoms at the time and her blood pressure was well controlled  Condition at Discharge: good     Discharge Day Visit / Exam:     Subjective:  Patient feels better today  Vitals: Blood Pressure: 129/82 (12/23/19 0731)  Pulse: 70 (12/23/19 0731)  Temperature: 98 1 °F (36 7 °C) (12/23/19 0731)  Temp Source: Oral (12/21/19 2200)  Respirations: 18 (12/23/19 0731)  Height: 5' 4" (162 6 cm) (12/22/19 0039)  Weight - Scale: 78 8 kg (173 lb 11 2 oz) (12/22/19 0042)  SpO2: 100 % (12/23/19 1229)  Exam:   Physical Exam   Constitutional: She is oriented to person, place, and time  She appears well-developed and well-nourished  Cardiovascular: Normal rate and regular rhythm  Pulmonary/Chest: Effort normal and breath sounds normal    Abdominal: Soft  Bowel sounds are normal    Neurological: She is alert and oriented to person, place, and time  Skin: Skin is warm and dry  Psychiatric: She has a normal mood and affect   Her behavior is normal        Discussion with Family:  Pancreatitis, electrolyte derangements, alcohol abuse, anxiety    Goals of Care Discussions:  · Code Status at Discharge: Level 1 - Full Code  · Were there any Goals of Care Discussions during Hospitalization?: No  · Results of any General Goals of Care Discussions: NA   · POLST Completed: No   · If POLST Completed, Summary of POLST Agreement Provided Here: NA   · OK to Rehospitalize if Needed? No    Discharge instructions/Information to patient and family:   See after visit summary section titled Discharge Instructions for information provided to patient and family  Planned Readmission: None      Discharge Statement:  I spent 30 minutes discharging the patient  This time was spent on the day of discharge  I had direct contact with the patient on the day of discharge  Greater than 50% of the total time was spent examining patient, answering all patient questions, arranging and discussing plan of care with patient as well as directly providing post-discharge instructions  Additional time then spent on discharge activities      ** Please Note: This note has been constructed using a voice recognition system **

## 2019-12-24 NOTE — UTILIZATION REVIEW
Notification of Discharge  This is a Notification of Discharge from our facility [address]  Please be advised that this patient has been discharge from our facility  Below you will find the admission and discharge date and time including the patients disposition  PRESENTATION DATE: 12/21/2019  9:57 PM  OBS ADMISSION DATE:   IP ADMISSION DATE: 12/22/19 0002   DISCHARGE DATE: 12/23/2019  4:42 PM  DISPOSITION: Home/Self Care Home/Self Care   Admission Orders listed below:  Admission Orders (From admission, onward)     Ordered        12/22/19 0003  Inpatient Admission  Once                   Please contact the UR Department if additional information is required to close this patient's authorization/case  [address] Utilization Review Department  Main: [phone] x carefully listen to the prompts  All voicemails are confidential   [email or web address]  Send all requests for admission clinical reviews, approved or denied determinations and any other requests to dedicated fax number below belonging to the campus where the patient is receiving treatment   List of dedicated fax numbers:  [address] DENIALS (Administrative/Medical Necessity) [phone]   [address] (Maternity/NICU/Pediatrics) [phone]   [facility] [phone]   Rajanma Daria [phone]   [facility] [phone]   [facility] [address] [place] [address] [phone]   [facility]  [phone]   [address] [facility] [address] [facility]  [address] [facility] [address] [facility] [phone]

## 2019-12-24 NOTE — UTILIZATION REVIEW
Notification of Discharge  This is a Notification of Discharge from our facility [address]  Please be advised that this patient has been discharge from our facility  Below you will find the admission and discharge date and time including the patients disposition  PRESENTATION DATE: 12/20/2019  8:44 AM  OBS ADMISSION DATE:   IP ADMISSION DATE: 12/20/19 1404   DISCHARGE DATE: 12/21/2019 11:35 AM  DISPOSITION: Home/Self Care Home/Self Care   Admission Orders listed below:  Admission Orders (From admission, onward)     Ordered        12/20/19 1404  Inpatient Admission  Once                   Please contact the UR Department if additional information is required to close this patient's authorization/case  [facility] Utilization Review Department  Main: [phone] x carefully listen to the prompts  All voicemails are confidential   [email or web address]  Send all requests for admission clinical reviews, approved or denied determinations and any other requests to dedicated fax number below belonging to the campus where the patient is receiving treatment   List of dedicated fax numbers:  [address] DENIALS (Administrative/Medical Necessity) [phone]   [address] (Maternity/NICU/Pediatrics) [phone]   [facility] [phone]     [address] [phone]   [facility] [phone]   [address] [phone]   [facility]  [phone]   [address] [facility]  2401 [facility] [place][note 2] [address] [facility] [phone]

## 2019-12-25 LAB
BACTERIA BLD CULT: NORMAL
BACTERIA BLD CULT: NORMAL

## 2020-01-21 ENCOUNTER — HOSPITAL ENCOUNTER (EMERGENCY)
Facility: HOSPITAL | Age: 45
Discharge: HOME | End: 2020-01-21
Attending: EMERGENCY MEDICINE | Admitting: EMERGENCY MEDICINE
Payer: COMMERCIAL

## 2020-01-21 VITALS
RESPIRATION RATE: 20 BRPM | HEART RATE: 105 BPM | DIASTOLIC BLOOD PRESSURE: 82 MMHG | TEMPERATURE: 98.3 F | SYSTOLIC BLOOD PRESSURE: 128 MMHG | OXYGEN SATURATION: 98 %

## 2020-01-21 DIAGNOSIS — J11.1 INFLUENZA: Primary | ICD-10-CM

## 2020-01-21 LAB
ALBUMIN SERPL BCP-MCNC: 3.5 G/DL (ref 3.5–5)
ALP SERPL-CCNC: 211 U/L (ref 46–116)
ALT SERPL W P-5'-P-CCNC: 58 U/L (ref 12–78)
ANION GAP SERPL CALCULATED.3IONS-SCNC: 11 MMOL/L (ref 4–13)
ANION GAP SERPL CALCULATED.3IONS-SCNC: 12 MMOL/L (ref 4–13)
AST SERPL W P-5'-P-CCNC: 205 U/L (ref 5–45)
BASOPHILS # BLD AUTO: 0.03 THOUSANDS/ΜL (ref 0–0.1)
BASOPHILS NFR BLD AUTO: 0 % (ref 0–1)
BILIRUB SERPL-MCNC: 0.9 MG/DL (ref 0.2–1)
BUN SERPL-MCNC: 3 MG/DL (ref 5–25)
BUN SERPL-MCNC: 5 MG/DL (ref 5–25)
CALCIUM SERPL-MCNC: 7.2 MG/DL (ref 8.3–10.1)
CALCIUM SERPL-MCNC: 8 MG/DL (ref 8.3–10.1)
CHLORIDE SERPL-SCNC: 100 MMOL/L (ref 100–108)
CHLORIDE SERPL-SCNC: 97 MMOL/L (ref 100–108)
CO2 SERPL-SCNC: 29 MMOL/L (ref 21–32)
CO2 SERPL-SCNC: 30 MMOL/L (ref 21–32)
CREAT SERPL-MCNC: 0.61 MG/DL (ref 0.6–1.3)
CREAT SERPL-MCNC: 0.74 MG/DL (ref 0.6–1.3)
EOSINOPHIL # BLD AUTO: 0.02 THOUSAND/ΜL (ref 0–0.61)
EOSINOPHIL NFR BLD AUTO: 0 % (ref 0–6)
ERYTHROCYTE [DISTWIDTH] IN BLOOD BY AUTOMATED COUNT: 17.7 % (ref 11.6–15.1)
FLUAV RNA NPH QL NAA+PROBE: DETECTED
FLUBV RNA NPH QL NAA+PROBE: ABNORMAL
GFR SERPL CREATININE-BSD FRML MDRD: 100 ML/MIN/1.73SQ M
GFR SERPL CREATININE-BSD FRML MDRD: 111 ML/MIN/1.73SQ M
GLUCOSE SERPL-MCNC: 101 MG/DL (ref 65–140)
GLUCOSE SERPL-MCNC: 123 MG/DL (ref 65–140)
HCT VFR BLD AUTO: 43.7 % (ref 34.8–46.1)
HGB BLD-MCNC: 14.5 G/DL (ref 11.5–15.4)
IMM GRANULOCYTES # BLD AUTO: 0.04 THOUSAND/UL (ref 0–0.2)
IMM GRANULOCYTES NFR BLD AUTO: 0 % (ref 0–2)
LIPASE SERPL-CCNC: 118 U/L (ref 73–393)
LYMPHOCYTES # BLD AUTO: 2.99 THOUSANDS/ΜL (ref 0.6–4.47)
LYMPHOCYTES NFR BLD AUTO: 29 % (ref 14–44)
MCH RBC QN AUTO: 30.1 PG (ref 26.8–34.3)
MCHC RBC AUTO-ENTMCNC: 33.2 G/DL (ref 31.4–37.4)
MCV RBC AUTO: 91 FL (ref 82–98)
MONOCYTES # BLD AUTO: 0.89 THOUSAND/ΜL (ref 0.17–1.22)
MONOCYTES NFR BLD AUTO: 9 % (ref 4–12)
NEUTROPHILS # BLD AUTO: 6.49 THOUSANDS/ΜL (ref 1.85–7.62)
NEUTS SEG NFR BLD AUTO: 62 % (ref 43–75)
NRBC BLD AUTO-RTO: 0 /100 WBCS
PLATELET # BLD AUTO: 244 THOUSANDS/UL (ref 149–390)
PMV BLD AUTO: 10.6 FL (ref 8.9–12.7)
POTASSIUM SERPL-SCNC: 2.8 MMOL/L (ref 3.5–5.3)
POTASSIUM SERPL-SCNC: 3.2 MMOL/L (ref 3.5–5.3)
PROT SERPL-MCNC: 8.6 G/DL (ref 6.4–8.2)
RBC # BLD AUTO: 4.81 MILLION/UL (ref 3.81–5.12)
RSV RNA NPH QL NAA+PROBE: ABNORMAL
SODIUM SERPL-SCNC: 139 MMOL/L (ref 136–145)
SODIUM SERPL-SCNC: 140 MMOL/L (ref 136–145)
WBC # BLD AUTO: 10.46 THOUSAND/UL (ref 4.31–10.16)

## 2020-01-21 PROCEDURE — 36415 COLL VENOUS BLD VENIPUNCTURE: CPT | Performed by: PHYSICIAN ASSISTANT

## 2020-01-21 PROCEDURE — 87631 RESP VIRUS 3-5 TARGETS: CPT | Performed by: PHYSICIAN ASSISTANT

## 2020-01-21 PROCEDURE — 96375 TX/PRO/DX INJ NEW DRUG ADDON: CPT

## 2020-01-21 PROCEDURE — 99284 EMERGENCY DEPT VISIT MOD MDM: CPT | Performed by: PHYSICIAN ASSISTANT

## 2020-01-21 PROCEDURE — 83690 ASSAY OF LIPASE: CPT | Performed by: PHYSICIAN ASSISTANT

## 2020-01-21 PROCEDURE — 80048 BASIC METABOLIC PNL TOTAL CA: CPT | Performed by: PHYSICIAN ASSISTANT

## 2020-01-21 PROCEDURE — 96365 THER/PROPH/DIAG IV INF INIT: CPT

## 2020-01-21 PROCEDURE — 85025 COMPLETE CBC W/AUTO DIFF WBC: CPT | Performed by: PHYSICIAN ASSISTANT

## 2020-01-21 PROCEDURE — 93005 ELECTROCARDIOGRAM TRACING: CPT

## 2020-01-21 PROCEDURE — 96367 TX/PROPH/DG ADDL SEQ IV INF: CPT

## 2020-01-21 PROCEDURE — 80053 COMPREHEN METABOLIC PANEL: CPT | Performed by: PHYSICIAN ASSISTANT

## 2020-01-21 PROCEDURE — 96366 THER/PROPH/DIAG IV INF ADDON: CPT

## 2020-01-21 PROCEDURE — 99284 EMERGENCY DEPT VISIT MOD MDM: CPT

## 2020-01-21 RX ORDER — METOCLOPRAMIDE 10 MG/1
10 TABLET ORAL EVERY 6 HOURS
Qty: 30 TABLET | Refills: 0 | Status: ON HOLD | OUTPATIENT
Start: 2020-01-21 | End: 2020-04-17

## 2020-01-21 RX ORDER — LORAZEPAM 1 MG/1
2 TABLET ORAL ONCE
Status: COMPLETED | OUTPATIENT
Start: 2020-01-21 | End: 2020-01-21

## 2020-01-21 RX ORDER — LORAZEPAM 2 MG/ML
1 INJECTION INTRAMUSCULAR ONCE
Status: COMPLETED | OUTPATIENT
Start: 2020-01-21 | End: 2020-01-21

## 2020-01-21 RX ORDER — POTASSIUM CHLORIDE 20 MEQ/1
40 TABLET, EXTENDED RELEASE ORAL ONCE
Status: COMPLETED | OUTPATIENT
Start: 2020-01-21 | End: 2020-01-21

## 2020-01-21 RX ORDER — ONDANSETRON 2 MG/ML
4 INJECTION INTRAMUSCULAR; INTRAVENOUS ONCE
Status: COMPLETED | OUTPATIENT
Start: 2020-01-21 | End: 2020-01-21

## 2020-01-21 RX ORDER — SODIUM CHLORIDE, SODIUM GLUCONATE, SODIUM ACETATE, POTASSIUM CHLORIDE, MAGNESIUM CHLORIDE, SODIUM PHOSPHATE, DIBASIC, AND POTASSIUM PHOSPHATE .53; .5; .37; .037; .03; .012; .00082 G/100ML; G/100ML; G/100ML; G/100ML; G/100ML; G/100ML; G/100ML
1000 INJECTION, SOLUTION INTRAVENOUS ONCE
Status: COMPLETED | OUTPATIENT
Start: 2020-01-21 | End: 2020-01-21

## 2020-01-21 RX ORDER — POTASSIUM CHLORIDE 14.9 MG/ML
20 INJECTION INTRAVENOUS ONCE
Status: COMPLETED | OUTPATIENT
Start: 2020-01-21 | End: 2020-01-21

## 2020-01-21 RX ORDER — METOCLOPRAMIDE HYDROCHLORIDE 5 MG/ML
10 INJECTION INTRAMUSCULAR; INTRAVENOUS ONCE
Status: COMPLETED | OUTPATIENT
Start: 2020-01-21 | End: 2020-01-21

## 2020-01-21 RX ADMIN — POTASSIUM CHLORIDE 20 MEQ: 200 INJECTION, SOLUTION INTRAVENOUS at 04:54

## 2020-01-21 RX ADMIN — LORAZEPAM 2 MG: 1 TABLET ORAL at 04:45

## 2020-01-21 RX ADMIN — LORAZEPAM 1 MG: 2 INJECTION INTRAMUSCULAR; INTRAVENOUS at 03:42

## 2020-01-21 RX ADMIN — SODIUM CHLORIDE, SODIUM GLUCONATE, SODIUM ACETATE, POTASSIUM CHLORIDE, MAGNESIUM CHLORIDE, SODIUM PHOSPHATE, DIBASIC, AND POTASSIUM PHOSPHATE 1000 ML: .53; .5; .37; .037; .03; .012; .00082 INJECTION, SOLUTION INTRAVENOUS at 03:49

## 2020-01-21 RX ADMIN — METOCLOPRAMIDE 10 MG: 5 INJECTION, SOLUTION INTRAMUSCULAR; INTRAVENOUS at 07:29

## 2020-01-21 RX ADMIN — ONDANSETRON 4 MG: 2 INJECTION INTRAMUSCULAR; INTRAVENOUS at 03:41

## 2020-01-21 RX ADMIN — POTASSIUM CHLORIDE 40 MEQ: 1500 TABLET, EXTENDED RELEASE ORAL at 04:46

## 2020-01-21 NOTE — ED PROVIDER NOTES
History  Chief Complaint   Patient presents with    Flu Symptoms     pt brought via ems, DX with flu last monday , pt reports worsening of symptoms, c/o fatigue, N/V     Patient is a 46-year-old female that presents emergency department with complaints of weakness, nausea, vomiting  Patient was diagnosed with the flu on Monday  She states that she is having persistent symptoms  She denies any abdominal pain, cough, congestion, chest pain  Prior to Admission Medications   Prescriptions Last Dose Informant Patient Reported? Taking?    Mometasone Furo-Formoterol Fum (DULERA) 100-5 MCG/ACT AERO   Yes No   Sig: Inhale as needed Unsure of dose     albuterol (PROVENTIL HFA,VENTOLIN HFA) 90 mcg/act inhaler   No No   Sig: Inhale 2 puffs every 4 (four) hours as needed for wheezing   cloNIDine (CATAPRES) 0 3 mg tablet   Yes No   Sig: Take 0 3 mg by mouth 2 (two) times a day   dicyclomine (BENTYL) 20 mg tablet   No No   Sig: Take 1 tablet (20 mg total) by mouth 3 (three) times a day   Patient not taking: Reported on 26/00/7763   folic acid (FOLVITE) 1 mg tablet   No No   Sig: Take 1 tablet (1 mg total) by mouth daily   Patient not taking: Reported on 10/15/2019   gabapentin (NEURONTIN) 300 mg capsule   No No   Sig: Take 1 capsule (300 mg total) by mouth 3 (three) times a day   Patient not taking: Reported on 10/15/2019   metFORMIN (GLUCOPHAGE) 500 mg tablet   No No   Sig: Take 1 tablet (500 mg total) by mouth daily with breakfast   Patient not taking: Reported on 10/15/2019   metoclopramide (REGLAN) 10 mg tablet   No No   Sig: Take 1 tablet (10 mg total) by mouth every 6 (six) hours As needed for nausea and vomiting   Patient not taking: Reported on 10/15/2019   nicotine (NICODERM CQ) 21 mg/24 hr TD 24 hr patch   No No   Sig: Place 1 patch on the skin daily   ondansetron (ZOFRAN) 4 mg tablet   No No   Sig: Take 1 tablet (4 mg total) by mouth every 8 (eight) hours as needed for nausea or vomiting   Patient not taking: Reported on 10/15/2019   ondansetron (ZOFRAN) 4 mg tablet   No No   Sig: Take 1 tablet (4 mg total) by mouth every 8 (eight) hours as needed for nausea or vomiting   pancrelipase, Lip-Prot-Amyl, (CREON) 24,000 units   No No   Sig: Take 1 capsule by mouth 3 (three) times a day with meals   pantoprazole (PROTONIX) 20 mg tablet   No No   Sig: Take 1 tablet (20 mg total) by mouth daily   potassium-sodium phosphateS (K-PHOS,PHOSPHA 250) 155-852-130 mg tablet   No No   Sig: Take 1 tablet by mouth 2 (two) times a day for 10 days   sucralfate (CARAFATE) 1 g tablet   No No   Sig: Take 1 tablet (1 g total) by mouth 4 (four) times a day   Patient not taking: Reported on 10/15/2019   thiamine 100 MG tablet   No No   Sig: Take 1 tablet (100 mg total) by mouth daily   Patient not taking: Reported on 10/15/2019      Facility-Administered Medications: None       Past Medical History:   Diagnosis Date    Alcohol abuse     Arthritis     GERD (gastroesophageal reflux disease)     Hypertension     Nicotine dependence     Obesity     Pancreatitis     Panic attack        Past Surgical History:   Procedure Laterality Date    ABDOMINAL SURGERY      C SECTION    ESOPHAGOGASTRODUODENOSCOPY N/A 12/15/2017    Procedure: ESOPHAGOGASTRODUODENOSCOPY (EGD); Surgeon: Quinton Denney MD;  Location: [facility]; Service: Gastroenterology       Family History   Problem Relation Age of Onset    Cirrhosis Father     Alcohol abuse Father     Drug abuse Mother      I have reviewed and agree with the history as documented      Social History     Tobacco Use    Smoking status: Current Every Day Smoker     Packs/day: 1 00     Years: 29 00     Pack years: 29 00     Types: Cigarettes    Smokeless tobacco: Never Used    Tobacco comment: pt refused packet   Substance Use Topics    Alcohol use: Yes     Frequency: 4 or more times a week     Drinks per session: 3 or 4     Comment: Patient states 'I drink alot of vodka a day"    Drug use: Yes     Types: Marijuana        Review of Systems   Constitutional: Positive for fever  Respiratory: Negative for shortness of breath  Cardiovascular: Negative for chest pain  Gastrointestinal: Positive for nausea and vomiting  Neurological: Positive for weakness  All other systems reviewed and are negative  Physical Exam  Physical Exam   Constitutional: She is oriented to person, place, and time  She appears well-developed and well-nourished  HENT:   Head: Normocephalic and atraumatic  Right Ear: External ear normal    Left Ear: External ear normal    Nose: Nose normal    Mouth/Throat: Oropharynx is clear and moist    Eyes: Pupils are equal, round, and reactive to light  Conjunctivae and EOM are normal    Neck: Normal range of motion  Cardiovascular: Normal rate, regular rhythm and normal heart sounds  Pulmonary/Chest: Effort normal and breath sounds normal  No respiratory distress  Abdominal: Soft  Bowel sounds are normal  There is no tenderness  Neurological: She is alert and oriented to person, place, and time  Skin: Skin is warm  Capillary refill takes less than 2 seconds  Psychiatric: She has a normal mood and affect  Her behavior is normal  Judgment and thought content normal    Vitals reviewed        Vital Signs  ED Triage Vitals   Temperature Pulse Respirations Blood Pressure SpO2   01/21/20 0308 01/21/20 0306 01/21/20 0306 01/21/20 0306 01/21/20 0306   98 3 °F (36 8 °C) (!) 111 (!) 24 137/96 98 %      Temp Source Heart Rate Source Patient Position - Orthostatic VS BP Location FiO2 (%)   01/21/20 0308 01/21/20 0306 01/21/20 0306 01/21/20 0306 --   Oral Monitor Lying Right arm       Pain Score       --                  Vitals:    01/21/20 0306   BP: 137/96   Pulse: (!) 111   Patient Position - Orthostatic VS: Lying         Visual Acuity      ED Medications  Medications   potassium chloride 20 mEq IVPB (premix) (20 mEq Intravenous New Bag 1/21/20 0454)   ondansetron [facility]) injection 4 mg (4 mg Intravenous Given 1/21/20 0341)   multi-electrolyte (ISOLYTE-S PH 7 4) bolus 1,000 mL (0 mL Intravenous Stopped 1/21/20 0454)   LORazepam (ATIVAN) 2 mg/mL injection 1 mg (1 mg Intravenous Given 1/21/20 0342)   potassium chloride (K-DUR,KLOR-CON) CR tablet 40 mEq (40 mEq Oral Given 1/21/20 0446)   LORazepam (ATIVAN) tablet 2 mg (2 mg Oral Given 1/21/20 0445)       Diagnostic Studies  Results Reviewed     Procedure Component Value Units Date/Time    Basic metabolic panel [[id number]]  (Abnormal) Collected:  01/21/20 0620    Lab Status:  Final result Specimen:  Blood from Arm, Right Updated:  01/21/20 2586     Sodium 140 mmol/L      Potassium 3 2 mmol/L      Chloride 100 mmol/L      CO2 29 mmol/L      ANION GAP 11 mmol/L      BUN 3 mg/dL      Creatinine 0 61 mg/dL      Glucose 101 mg/dL      Calcium 7 2 mg/dL      eGFR 111 ml/min/1 73sq m     Narrative:       [facility] guidelines for Chronic Kidney Disease (CKD):     Stage 1 with normal or high GFR (GFR > 90 mL/min/1 73 square meters)    Stage 2 Mild CKD (GFR = 60-89 mL/min/1 73 square meters)    Stage 3A Moderate CKD (GFR = 45-59 mL/min/1 73 square meters)    Stage 3B Moderate CKD (GFR = 30-44 mL/min/1 73 square meters)    Stage 4 Severe CKD (GFR = 15-29 mL/min/1 73 square meters)    Stage 5 End Stage CKD (GFR <15 mL/min/1 73 square meters)  Note: GFR calculation is accurate only with a steady state creatinine    Influenza A/B and RSV PCR [[id number]]  (Abnormal) Collected:  01/21/20 0340    Lab Status:  Final result Specimen:  Nasopharyngeal Swab Updated:  01/21/20 0425     INFLUENZA A PCR Detected     INFLUENZA B PCR None Detected     RSV PCR None Detected    Lipase [[id number]]  (Normal) Collected:  01/21/20 0339    Lab Status:  Final result Specimen:  Blood from Arm, Right Updated:  01/21/20 0409     Lipase 118 u/L     Comprehensive metabolic panel [[id number]]  (Abnormal) Collected:  01/21/20 0339    Lab Status:  Final result Specimen:  Blood from Arm, Right Updated:  01/21/20 0409     Sodium 139 mmol/L      Potassium 2 8 mmol/L      Chloride 97 mmol/L      CO2 30 mmol/L      ANION GAP 12 mmol/L      BUN 5 mg/dL      Creatinine 0 74 mg/dL      Glucose 123 mg/dL      Calcium 8 0 mg/dL       U/L      ALT 58 U/L      Alkaline Phosphatase 211 U/L      Total Protein 8 6 g/dL      Albumin 3 5 g/dL      Total Bilirubin 0 90 mg/dL      eGFR 100 ml/min/1 73sq m     Narrative:       [employer] guidelines for Chronic Kidney Disease (CKD):     Stage 1 with normal or high GFR (GFR > 90 mL/min/1 73 square meters)    Stage 2 Mild CKD (GFR = 60-89 mL/min/1 73 square meters)    Stage 3A Moderate CKD (GFR = 45-59 mL/min/1 73 square meters)    Stage 3B Moderate CKD (GFR = 30-44 mL/min/1 73 square meters)    Stage 4 Severe CKD (GFR = 15-29 mL/min/1 73 square meters)    Stage 5 End Stage CKD (GFR <15 mL/min/1 73 square meters)  Note: GFR calculation is accurate only with a steady state creatinine    CBC and differential [[id number]]  (Abnormal) Collected:  01/21/20 0339    Lab Status:  Final result Specimen:  Blood from Arm, Right Updated:  01/21/20 0352     WBC 10 46 Thousand/uL      RBC 4 81 Million/uL      Hemoglobin 14 5 g/dL      Hematocrit 43 7 %      MCV 91 fL      MCH 30 1 pg      MCHC 33 2 g/dL      RDW 17 7 %      MPV 10 6 fL      Platelets 903 Thousands/uL      nRBC 0 /100 WBCs      Neutrophils Relative 62 %      Immat GRANS % 0 %      Lymphocytes Relative 29 %      Monocytes Relative 9 %      Eosinophils Relative 0 %      Basophils Relative 0 %      Neutrophils Absolute 6 49 Thousands/µL      Immature Grans Absolute 0 04 Thousand/uL      Lymphocytes Absolute 2 99 Thousands/µL      Monocytes Absolute 0 89 Thousand/µL      Eosinophils Absolute 0 02 Thousand/µL      Basophils Absolute 0 03 Thousands/µL                  No orders to display              Procedures  ECG 12 Lead Documentation Only  Date/Time: 1/21/2020 6:55 AM  Performed by: Idalmis Olguin PA-C  Authorized by: Idalmis Olguin PA-C     Indications / Diagnosis:  Hypokalemia  ECG reviewed by me, the ED Provider: yes    Patient location:  ED  Previous ECG:     Previous ECG:  Compared to current    Similarity:  Changes noted  Interpretation:     Interpretation: normal    Rate:     ECG rate:  87    ECG rate assessment: normal    Rhythm:     Rhythm: sinus rhythm    ST segments:     ST segments:  Normal  T waves:     T waves: normal    Other findings:     Other findings: prolonged qTc interval               ED Course                               MDM  Number of Diagnoses or Management Options  Influenza:   Diagnosis management comments: Patient is a 59-year-old female presents emergency department with complaints of nausea, vomiting, with penis  Patient has known influenza  States that her symptoms have gotten worse  Lab evaluation performed and found patient to be hypokalemic  Patient received IV fluids  Patient is chronically hypokalemic  Patient received oral and IV potassium  She also received Zofran and Ativan  She states she feels significantly improved  BMP was recheck potassium has improved  Patient stable for discharge  Return parameters were discussed         Amount and/or Complexity of Data Reviewed  Clinical lab tests: reviewed and ordered  Independent visualization of images, tracings, or specimens: yes    Risk of Complications, Morbidity, and/or Mortality  Presenting problems: moderate  Diagnostic procedures: moderate  Management options: moderate    Patient Progress  Patient progress: stable        Disposition  Final diagnoses:   Influenza     Time reflects when diagnosis was documented in both MDM as applicable and the Disposition within this note     Time User Action Codes Description Comment    1/21/2020  6:44 AM Samir Sheikh Add [J11 1] Influenza       ED Disposition     ED Disposition Condition Date/Time Comment    Discharge Good Tue Jan 21, 2020  6:44 AM Bridgett Shelby discharge to home/self care  Follow-up Information     Follow up With Specialties Details Why Contact Info    Caroline Farooq MD Family Medicine Schedule an appointment as soon as possible for a visit   [address] [place], [place] [id number] [phone]            Patient's Medications   Discharge Prescriptions    METOCLOPRAMIDE (REGLAN) 10 MG TABLET    Take 1 tablet (10 mg total) by mouth every 6 (six) hours       Start Date: 1/21/2020 End Date: --       Order Dose: 10 mg       Quantity: 30 tablet    Refills: 0     No discharge procedures on file      ED Provider  Electronically Signed by           Idalmis Olguin PA-C  01/21/20 8195

## 2020-01-22 LAB
ATRIAL RATE: 87 BPM
P AXIS: 74 DEGREES
PR INTERVAL: 150 MS
QRS AXIS: 18 DEGREES
QRSD INTERVAL: 86 MS
QT INTERVAL: 414 MS
QTC INTERVAL: 498 MS
T WAVE AXIS: 49 DEGREES
VENTRICULAR RATE: 87 BPM

## 2020-01-22 PROCEDURE — 93010 ELECTROCARDIOGRAM REPORT: CPT | Performed by: INTERNAL MEDICINE

## 2020-01-31 ENCOUNTER — HOSPITAL ENCOUNTER (INPATIENT)
Facility: HOSPITAL | Age: 45
LOS: 1 days | Discharge: HOME | DRG: 140 | End: 2020-02-02
Attending: EMERGENCY MEDICINE | Admitting: STUDENT IN AN ORGANIZED HEALTH CARE EDUCATION/TRAINING PROGRAM
Payer: COMMERCIAL

## 2020-01-31 ENCOUNTER — APPOINTMENT (OUTPATIENT)
Dept: CT IMAGING | Facility: HOSPITAL | Age: 45
DRG: 140 | End: 2020-01-31
Payer: COMMERCIAL

## 2020-01-31 DIAGNOSIS — E16.2 HYPOGLYCEMIA: Primary | ICD-10-CM

## 2020-01-31 DIAGNOSIS — E87.6 HYPOKALEMIA: ICD-10-CM

## 2020-01-31 DIAGNOSIS — R11.10 VOMITING: ICD-10-CM

## 2020-01-31 DIAGNOSIS — K70.30 CIRRHOSIS WITH ALCOHOLISM (HCC): ICD-10-CM

## 2020-01-31 DIAGNOSIS — N39.0 UTI (URINARY TRACT INFECTION): ICD-10-CM

## 2020-01-31 DIAGNOSIS — K86.1 CHRONIC PANCREATITIS (HCC): ICD-10-CM

## 2020-01-31 DIAGNOSIS — R56.9 SEIZURE-LIKE ACTIVITY (HCC): ICD-10-CM

## 2020-01-31 LAB
ALBUMIN SERPL BCP-MCNC: 2.6 G/DL (ref 3.5–5)
ALBUMIN SERPL BCP-MCNC: 2.7 G/DL (ref 3.5–5)
ALP SERPL-CCNC: 271 U/L (ref 46–116)
ALT SERPL W P-5'-P-CCNC: 24 U/L (ref 12–78)
AMMONIA PLAS-SCNC: 45 UMOL/L (ref 11–35)
AMPHETAMINES SERPL QL SCN: NEGATIVE
ANION GAP SERPL CALCULATED.3IONS-SCNC: 6 MMOL/L (ref 4–13)
ANION GAP SERPL CALCULATED.3IONS-SCNC: 8 MMOL/L (ref 4–13)
APAP SERPL-MCNC: <2 UG/ML (ref 10–20)
APTT PPP: 30 SECONDS (ref 23–37)
AST SERPL W P-5'-P-CCNC: 101 U/L (ref 5–45)
BACTERIA UR QL AUTO: ABNORMAL /HPF
BARBITURATES UR QL: NEGATIVE
BASOPHILS # BLD AUTO: 0.05 THOUSANDS/ΜL (ref 0–0.1)
BASOPHILS NFR BLD AUTO: 0 % (ref 0–1)
BENZODIAZ UR QL: POSITIVE
BILIRUB DIRECT SERPL-MCNC: 0.7 MG/DL (ref 0–0.2)
BILIRUB SERPL-MCNC: 1.2 MG/DL (ref 0.2–1)
BILIRUB UR QL STRIP: ABNORMAL
BUN SERPL-MCNC: 6 MG/DL (ref 5–25)
BUN SERPL-MCNC: 6 MG/DL (ref 5–25)
CALCIUM SERPL-MCNC: 6.7 MG/DL (ref 8.3–10.1)
CALCIUM SERPL-MCNC: 6.7 MG/DL (ref 8.3–10.1)
CHLORIDE SERPL-SCNC: 94 MMOL/L (ref 100–108)
CHLORIDE SERPL-SCNC: 98 MMOL/L (ref 100–108)
CLARITY UR: ABNORMAL
CO2 SERPL-SCNC: 33 MMOL/L (ref 21–32)
CO2 SERPL-SCNC: 34 MMOL/L (ref 21–32)
COCAINE UR QL: NEGATIVE
COLOR UR: YELLOW
CREAT SERPL-MCNC: 0.82 MG/DL (ref 0.6–1.3)
CREAT SERPL-MCNC: 0.92 MG/DL (ref 0.6–1.3)
EOSINOPHIL # BLD AUTO: 0.03 THOUSAND/ΜL (ref 0–0.61)
EOSINOPHIL NFR BLD AUTO: 0 % (ref 0–6)
ERYTHROCYTE [DISTWIDTH] IN BLOOD BY AUTOMATED COUNT: 18.6 % (ref 11.6–15.1)
ETHANOL SERPL-MCNC: <3 MG/DL (ref 0–3)
EXT PREG TEST URINE: NEGATIVE
EXT. CONTROL ED NAV: NORMAL
GFR SERPL CREATININE-BSD FRML MDRD: 77 ML/MIN/1.73SQ M
GFR SERPL CREATININE-BSD FRML MDRD: 88 ML/MIN/1.73SQ M
GLUCOSE P FAST SERPL-MCNC: 102 MG/DL (ref 65–99)
GLUCOSE SERPL-MCNC: 102 MG/DL (ref 65–140)
GLUCOSE SERPL-MCNC: 104 MG/DL (ref 65–140)
GLUCOSE SERPL-MCNC: 123 MG/DL (ref 65–140)
GLUCOSE SERPL-MCNC: 124 MG/DL (ref 65–140)
GLUCOSE SERPL-MCNC: 93 MG/DL (ref 65–140)
GLUCOSE UR STRIP-MCNC: NEGATIVE MG/DL
HCT VFR BLD AUTO: 38.8 % (ref 34.8–46.1)
HGB BLD-MCNC: 12.4 G/DL (ref 11.5–15.4)
HGB UR QL STRIP.AUTO: ABNORMAL
IMM GRANULOCYTES # BLD AUTO: 0.07 THOUSAND/UL (ref 0–0.2)
IMM GRANULOCYTES NFR BLD AUTO: 1 % (ref 0–2)
INR PPP: 1.16 (ref 0.84–1.19)
KETONES UR STRIP-MCNC: ABNORMAL MG/DL
LACTATE SERPL-SCNC: 1.9 MMOL/L (ref 0.5–2)
LEUKOCYTE ESTERASE UR QL STRIP: ABNORMAL
LIPASE SERPL-CCNC: 84 U/L (ref 73–393)
LYMPHOCYTES # BLD AUTO: 1.72 THOUSANDS/ΜL (ref 0.6–4.47)
LYMPHOCYTES NFR BLD AUTO: 15 % (ref 14–44)
MCH RBC QN AUTO: 30 PG (ref 26.8–34.3)
MCHC RBC AUTO-ENTMCNC: 32 G/DL (ref 31.4–37.4)
MCV RBC AUTO: 94 FL (ref 82–98)
METHADONE UR QL: NEGATIVE
MONOCYTES # BLD AUTO: 0.83 THOUSAND/ΜL (ref 0.17–1.22)
MONOCYTES NFR BLD AUTO: 7 % (ref 4–12)
NEUTROPHILS # BLD AUTO: 9.09 THOUSANDS/ΜL (ref 1.85–7.62)
NEUTS SEG NFR BLD AUTO: 77 % (ref 43–75)
NITRITE UR QL STRIP: POSITIVE
NON-SQ EPI CELLS URNS QL MICRO: ABNORMAL /HPF
NRBC BLD AUTO-RTO: 0 /100 WBCS
OPIATES UR QL SCN: POSITIVE
PCP UR QL: NEGATIVE
PH UR STRIP.AUTO: 5.5 [PH]
PHOSPHATE SERPL-MCNC: 2 MG/DL (ref 2.7–4.5)
PLATELET # BLD AUTO: 152 THOUSANDS/UL (ref 149–390)
PMV BLD AUTO: 10.4 FL (ref 8.9–12.7)
POTASSIUM SERPL-SCNC: 2.7 MMOL/L (ref 3.5–5.3)
POTASSIUM SERPL-SCNC: 2.7 MMOL/L (ref 3.5–5.3)
PROT SERPL-MCNC: 7.3 G/DL (ref 6.4–8.2)
PROT UR STRIP-MCNC: ABNORMAL MG/DL
PROTHROMBIN TIME: 14.9 SECONDS (ref 11.6–14.5)
RBC # BLD AUTO: 4.13 MILLION/UL (ref 3.81–5.12)
RBC #/AREA URNS AUTO: ABNORMAL /HPF
SALICYLATES SERPL-MCNC: <3 MG/DL (ref 3–20)
SODIUM SERPL-SCNC: 136 MMOL/L (ref 136–145)
SODIUM SERPL-SCNC: 137 MMOL/L (ref 136–145)
SP GR UR STRIP.AUTO: 1.02 (ref 1–1.03)
THC UR QL: POSITIVE
TROPONIN I SERPL-MCNC: <0.02 NG/ML
UROBILINOGEN UR QL STRIP.AUTO: 1 E.U./DL
WBC # BLD AUTO: 11.79 THOUSAND/UL (ref 4.31–10.16)
WBC #/AREA URNS AUTO: ABNORMAL /HPF

## 2020-01-31 PROCEDURE — 99285 EMERGENCY DEPT VISIT HI MDM: CPT

## 2020-01-31 PROCEDURE — 99220 PR INITIAL OBSERVATION CARE/DAY 70 MINUTES: CPT | Performed by: STUDENT IN AN ORGANIZED HEALTH CARE EDUCATION/TRAINING PROGRAM

## 2020-01-31 PROCEDURE — 87086 URINE CULTURE/COLONY COUNT: CPT | Performed by: PHYSICIAN ASSISTANT

## 2020-01-31 PROCEDURE — 80320 DRUG SCREEN QUANTALCOHOLS: CPT | Performed by: PHYSICIAN ASSISTANT

## 2020-01-31 PROCEDURE — 83690 ASSAY OF LIPASE: CPT | Performed by: STUDENT IN AN ORGANIZED HEALTH CARE EDUCATION/TRAINING PROGRAM

## 2020-01-31 PROCEDURE — 87186 SC STD MICRODIL/AGAR DIL: CPT | Performed by: PHYSICIAN ASSISTANT

## 2020-01-31 PROCEDURE — 36415 COLL VENOUS BLD VENIPUNCTURE: CPT | Performed by: PHYSICIAN ASSISTANT

## 2020-01-31 PROCEDURE — 99285 EMERGENCY DEPT VISIT HI MDM: CPT | Performed by: PHYSICIAN ASSISTANT

## 2020-01-31 PROCEDURE — 85610 PROTHROMBIN TIME: CPT | Performed by: PHYSICIAN ASSISTANT

## 2020-01-31 PROCEDURE — 94762 N-INVAS EAR/PLS OXIMTRY CONT: CPT

## 2020-01-31 PROCEDURE — 90686 IIV4 VACC NO PRSV 0.5 ML IM: CPT | Performed by: STUDENT IN AN ORGANIZED HEALTH CARE EDUCATION/TRAINING PROGRAM

## 2020-01-31 PROCEDURE — 82948 REAGENT STRIP/BLOOD GLUCOSE: CPT

## 2020-01-31 PROCEDURE — 93005 ELECTROCARDIOGRAM TRACING: CPT

## 2020-01-31 PROCEDURE — 82140 ASSAY OF AMMONIA: CPT | Performed by: PHYSICIAN ASSISTANT

## 2020-01-31 PROCEDURE — 80076 HEPATIC FUNCTION PANEL: CPT | Performed by: PHYSICIAN ASSISTANT

## 2020-01-31 PROCEDURE — 90471 IMMUNIZATION ADMIN: CPT | Performed by: STUDENT IN AN ORGANIZED HEALTH CARE EDUCATION/TRAINING PROGRAM

## 2020-01-31 PROCEDURE — 85730 THROMBOPLASTIN TIME PARTIAL: CPT | Performed by: PHYSICIAN ASSISTANT

## 2020-01-31 PROCEDURE — 81025 URINE PREGNANCY TEST: CPT | Performed by: PHYSICIAN ASSISTANT

## 2020-01-31 PROCEDURE — 85025 COMPLETE CBC W/AUTO DIFF WBC: CPT | Performed by: PHYSICIAN ASSISTANT

## 2020-01-31 PROCEDURE — 83605 ASSAY OF LACTIC ACID: CPT | Performed by: PHYSICIAN ASSISTANT

## 2020-01-31 PROCEDURE — 70450 CT HEAD/BRAIN W/O DYE: CPT

## 2020-01-31 PROCEDURE — 96360 HYDRATION IV INFUSION INIT: CPT

## 2020-01-31 PROCEDURE — 81001 URINALYSIS AUTO W/SCOPE: CPT | Performed by: PHYSICIAN ASSISTANT

## 2020-01-31 PROCEDURE — 80329 ANALGESICS NON-OPIOID 1 OR 2: CPT | Performed by: PHYSICIAN ASSISTANT

## 2020-01-31 PROCEDURE — 84484 ASSAY OF TROPONIN QUANT: CPT | Performed by: PHYSICIAN ASSISTANT

## 2020-01-31 PROCEDURE — 80048 BASIC METABOLIC PNL TOTAL CA: CPT | Performed by: PHYSICIAN ASSISTANT

## 2020-01-31 PROCEDURE — 80307 DRUG TEST PRSMV CHEM ANLYZR: CPT | Performed by: PHYSICIAN ASSISTANT

## 2020-01-31 PROCEDURE — 87077 CULTURE AEROBIC IDENTIFY: CPT | Performed by: PHYSICIAN ASSISTANT

## 2020-01-31 PROCEDURE — 80069 RENAL FUNCTION PANEL: CPT | Performed by: STUDENT IN AN ORGANIZED HEALTH CARE EDUCATION/TRAINING PROGRAM

## 2020-01-31 RX ORDER — POTASSIUM CHLORIDE 20 MEQ/1
40 TABLET, EXTENDED RELEASE ORAL ONCE
Status: COMPLETED | OUTPATIENT
Start: 2020-01-31 | End: 2020-01-31

## 2020-01-31 RX ORDER — HEPARIN SODIUM 5000 [USP'U]/ML
5000 INJECTION, SOLUTION INTRAVENOUS; SUBCUTANEOUS EVERY 8 HOURS SCHEDULED
Status: DISCONTINUED | OUTPATIENT
Start: 2020-01-31 | End: 2020-02-02 | Stop reason: HOSPADM

## 2020-01-31 RX ORDER — POTASSIUM CHLORIDE 14.9 MG/ML
20 INJECTION INTRAVENOUS ONCE
Status: COMPLETED | OUTPATIENT
Start: 2020-01-31 | End: 2020-01-31

## 2020-01-31 RX ORDER — LIDOCAINE HYDROCHLORIDE 20 MG/ML
15 SOLUTION OROPHARYNGEAL 4 TIMES DAILY PRN
Status: DISCONTINUED | OUTPATIENT
Start: 2020-01-31 | End: 2020-02-02 | Stop reason: HOSPADM

## 2020-01-31 RX ORDER — ACETAMINOPHEN 325 MG/1
650 TABLET ORAL EVERY 8 HOURS PRN
Status: DISCONTINUED | OUTPATIENT
Start: 2020-01-31 | End: 2020-01-31

## 2020-01-31 RX ORDER — ONDANSETRON 2 MG/ML
4 INJECTION INTRAMUSCULAR; INTRAVENOUS EVERY 4 HOURS PRN
Status: DISCONTINUED | OUTPATIENT
Start: 2020-01-31 | End: 2020-02-02 | Stop reason: HOSPADM

## 2020-01-31 RX ORDER — ACETAMINOPHEN 325 MG/1
650 TABLET ORAL EVERY 6 HOURS PRN
Status: DISCONTINUED | OUTPATIENT
Start: 2020-01-31 | End: 2020-01-31

## 2020-01-31 RX ORDER — SUCRALFATE 1 G/1
1 TABLET ORAL
Status: DISCONTINUED | OUTPATIENT
Start: 2020-01-31 | End: 2020-02-02 | Stop reason: HOSPADM

## 2020-01-31 RX ORDER — PANTOPRAZOLE SODIUM 20 MG/1
20 TABLET, DELAYED RELEASE ORAL
Status: DISCONTINUED | OUTPATIENT
Start: 2020-02-01 | End: 2020-02-02 | Stop reason: HOSPADM

## 2020-01-31 RX ORDER — FOLIC ACID 1 MG/1
1 TABLET ORAL DAILY
Status: DISCONTINUED | OUTPATIENT
Start: 2020-02-01 | End: 2020-02-02 | Stop reason: HOSPADM

## 2020-01-31 RX ORDER — SODIUM CHLORIDE 9 MG/ML
175 INJECTION, SOLUTION INTRAVENOUS CONTINUOUS
Status: DISCONTINUED | OUTPATIENT
Start: 2020-01-31 | End: 2020-02-01

## 2020-01-31 RX ORDER — THIAMINE MONONITRATE (VIT B1) 100 MG
100 TABLET ORAL DAILY
Status: DISCONTINUED | OUTPATIENT
Start: 2020-02-01 | End: 2020-02-02 | Stop reason: HOSPADM

## 2020-01-31 RX ORDER — SODIUM CHLORIDE 9 MG/ML
125 INJECTION, SOLUTION INTRAVENOUS CONTINUOUS
Status: DISCONTINUED | OUTPATIENT
Start: 2020-01-31 | End: 2020-02-02 | Stop reason: HOSPADM

## 2020-01-31 RX ORDER — NICOTINE 21 MG/24HR
21 PATCH, TRANSDERMAL 24 HOURS TRANSDERMAL DAILY
Status: DISCONTINUED | OUTPATIENT
Start: 2020-01-31 | End: 2020-02-02 | Stop reason: HOSPADM

## 2020-01-31 RX ORDER — ACETAMINOPHEN 325 MG/1
650 TABLET ORAL EVERY 6 HOURS PRN
Status: DISCONTINUED | OUTPATIENT
Start: 2020-01-31 | End: 2020-02-02 | Stop reason: HOSPADM

## 2020-01-31 RX ADMIN — LIDOCAINE HYDROCHLORIDE 15 ML: 20 SOLUTION ORAL; TOPICAL at 18:37

## 2020-01-31 RX ADMIN — POTASSIUM CHLORIDE 20 MEQ: 200 INJECTION, SOLUTION INTRAVENOUS at 10:02

## 2020-01-31 RX ADMIN — SODIUM CHLORIDE 125 ML/HR: 0.9 INJECTION, SOLUTION INTRAVENOUS at 10:03

## 2020-01-31 RX ADMIN — INFLUENZA VIRUS VACCINE 0.5 ML: 15; 15; 15; 15 SUSPENSION INTRAMUSCULAR at 13:07

## 2020-01-31 RX ADMIN — SODIUM CHLORIDE 1000 ML: 0.9 INJECTION, SOLUTION INTRAVENOUS at 07:26

## 2020-01-31 RX ADMIN — SUCRALFATE 1 G: 1 TABLET ORAL at 17:23

## 2020-01-31 RX ADMIN — POTASSIUM CHLORIDE 20 MEQ: 200 INJECTION, SOLUTION INTRAVENOUS at 19:55

## 2020-01-31 RX ADMIN — ACETAMINOPHEN 650 MG: 325 TABLET, FILM COATED ORAL at 17:22

## 2020-01-31 RX ADMIN — ACETAMINOPHEN 650 MG: 325 TABLET, FILM COATED ORAL at 13:07

## 2020-01-31 RX ADMIN — SODIUM CHLORIDE 125 ML/HR: 0.9 INJECTION, SOLUTION INTRAVENOUS at 18:36

## 2020-01-31 RX ADMIN — POTASSIUM CHLORIDE 40 MEQ: 1500 TABLET, EXTENDED RELEASE ORAL at 19:55

## 2020-01-31 RX ADMIN — SUCRALFATE 1 G: 1 TABLET ORAL at 22:22

## 2020-01-31 RX ADMIN — CEFTRIAXONE SODIUM 1000 MG: 10 INJECTION, POWDER, FOR SOLUTION INTRAVENOUS at 08:58

## 2020-01-31 RX ADMIN — NICOTINE 21 MG: 21 PATCH TRANSDERMAL at 14:18

## 2020-01-31 NOTE — PLAN OF CARE
Problem: Potential for Falls  Goal: Patient will remain free of falls  Description  INTERVENTIONS:  - Assess patient frequently for physical needs  -  Identify cognitive and physical deficits and behaviors that affect risk of falls    -  [place] fall precautions as indicated by assessment   - Educate patient/family on patient safety including physical limitations  - Instruct patient to call for assistance with activity based on assessment  - Modify environment to reduce risk of injury  - Consider OT/PT consult to assist with strengthening/mobility  Outcome: Progressing     Problem: PAIN - ADULT  Goal: Verbalizes/displays adequate comfort level or baseline comfort level  Description  Interventions:  - Encourage patient to monitor pain and request assistance  - Assess pain using appropriate pain scale  - Administer analgesics based on type and severity of pain and evaluate response  - Implement non-pharmacological measures as appropriate and evaluate response  - Consider cultural and social influences on pain and pain management  - Notify physician/advanced practitioner if interventions unsuccessful or patient reports new pain  Outcome: Progressing     Problem: INFECTION - ADULT  Goal: Absence or prevention of progression during hospitalization  Description  INTERVENTIONS:  - Assess and monitor for signs and symptoms of infection  - Monitor lab/diagnostic results  - Monitor all insertion sites, i e  indwelling lines, tubes, and drains  - Monitor endotracheal if appropriate and nasal secretions for changes in amount and color  - [place] appropriate cooling/warming therapies per order  - Administer medications as ordered  - Instruct and encourage patient and family to use good hand hygiene technique  - Identify and instruct in appropriate isolation precautions for identified infection/condition  Outcome: Progressing  Goal: Absence of fever/infection during neutropenic period  Description  INTERVENTIONS:  - Monitor WBC    Outcome: Progressing     Problem: SAFETY ADULT  Goal: Patient will remain free of falls  Description  INTERVENTIONS:  - Assess patient frequently for physical needs  -  Identify cognitive and physical deficits and behaviors that affect risk of falls    -  [place] fall precautions as indicated by assessment   - Educate patient/family on patient safety including physical limitations  - Instruct patient to call for assistance with activity based on assessment  - Modify environment to reduce risk of injury  - Consider OT/PT consult to assist with strengthening/mobility  Outcome: Progressing  Goal: Maintain or return to baseline ADL function  Description  INTERVENTIONS:  -  Assess patient's ability to carry out ADLs; assess patient's baseline for ADL function and identify physical deficits which impact ability to perform ADLs (bathing, care of mouth/teeth, toileting, grooming, dressing, etc )  - Assess/evaluate cause of self-care deficits   - Assess range of motion  - Assess patient's mobility; develop plan if impaired  - Assess patient's need for assistive devices and provide as appropriate  - Encourage maximum independence but intervene and supervise when necessary  - Involve family in performance of ADLs  - Assess for home care needs following discharge   - Consider OT consult to assist with ADL evaluation and planning for discharge  - Provide patient education as appropriate  Outcome: Progressing  Goal: Maintain or return mobility status to optimal level  Description  INTERVENTIONS:  - Assess patient's baseline mobility status (ambulation, transfers, stairs, etc )    - Identify cognitive and physical deficits and behaviors that affect mobility  - Identify mobility aids required to assist with transfers and/or ambulation (gait belt, sit-to-stand, lift, walker, cane, etc )  - [place] fall precautions as indicated by assessment  - Record patient progress and toleration of activity level on Mobility SBAR; progress patient to next Phase/Stage  - Instruct patient to call for assistance with activity based on assessment  - Consider rehabilitation consult to assist with strengthening/weightbearing, etc   Outcome: Progressing     Problem: DISCHARGE PLANNING  Goal: Discharge to home or other facility with appropriate resources  Description  INTERVENTIONS:  - Identify barriers to discharge w/patient and caregiver  - Arrange for needed discharge resources and transportation as appropriate  - Identify discharge learning needs (meds, wound care, etc )  - Arrange for interpretive services to assist at discharge as needed  - Refer to Case Management Department for coordinating discharge planning if the patient needs post-hospital services based on physician/advanced practitioner order or complex needs related to functional status, cognitive ability, or social support system  Outcome: Progressing     Problem: Knowledge Deficit  Goal: Patient/family/caregiver demonstrates understanding of disease process, treatment plan, medications, and discharge instructions  Description  Complete learning assessment and assess knowledge base    Interventions:  - Provide teaching at level of understanding  - Provide teaching via preferred learning methods  Outcome: Progressing

## 2020-01-31 NOTE — ASSESSMENT & PLAN NOTE
· Patient sources seizure-like activity, denies any history of seizures  · Denies any alcohol use, ethanol level negative  · Patient does have extensive history of alcohol use/abuse/withdrawal with frequent admissions  · Although ethanol level is 0 and lactate negative, patient more than likely had potential withdrawal seizure  · CT head is with ordered, will follow-up results within

## 2020-01-31 NOTE — ASSESSMENT & PLAN NOTE
· Ethanol level of 0 on ED arrival, patient denying alcohol use at this time  · Will start CIWA protocol and continue to monitor

## 2020-01-31 NOTE — ASSESSMENT & PLAN NOTE
Lab Results   Component Value Date    HGBA1C 6 9 (H) 10/15/2019       Recent Labs     01/31/20  0613   POCGLU 124       Blood Sugar Average: Last 72 hrs:  (P) 124  · Glucose levels will control at this time secondary to poor p o   Intake from multiple episodes of emesis likely from underlying gastritis versus gastroenteritis  · Will continue to check glucose levels with meals and at bedtime, continue with corrective sliding scale

## 2020-01-31 NOTE — ED NOTES
1  CC possible seizure    2  Is this admission due to an injury? no    3  Orientation status-A&Ox3    4  Abnormal labs/ focused assessment/ vitals Urine- nitrates & bacteria    5  Medications/ drips rocephine    6  Narcotic time/ pain medications-none    7  IV lines/drains/ etc - 22G left AC    8  Isolation status- none    9  Skin-intact    10  Ambulation status-with assist     11   ED phone number [phone]     Beth Roth RN  01/31/20 7874

## 2020-01-31 NOTE — ASSESSMENT & PLAN NOTE
· Trace leuk esterase, positive nitrites  · Status post 1 dose Rocephin in the ED, will continue antibiotics   · with plan for 3 day course, follow-up urine culture results

## 2020-01-31 NOTE — ED PROVIDER NOTES
History  Chief Complaint   Patient presents with    Seizure - Prior Hx Of     pt reports feeling shaky and diaphoretic; BS was 79 when checked, stated she had some starbursts but doesn't remember anything else; pt states boyfriend says she had a seizure     41-year-old female with multiple chronic medical problems including chronic pancreatitis, diabetes, hypertension, alcohol abuse and anxiety presents to the emergency department with chief complaint of possible seizure  Patient reports symptoms started last night  She reports she has been suffering nausea, vomiting and diarrhea symptoms for the past week  She has not really eaten anything  Last night before bed she took her Glucotrol  She states her blood sugar at that time was 67  Shortly thereafter she started to feel lightheaded, she became confused, nauseous and shaky  She was sweating  She tried to eat some candy while her significant other went to the kitchen to get her some juice  She reports when he returned he saw her shaking like she was having a seizure  She reports confusion associated with these episodes  Onset reported as last night  She continues to feel shaky and lightheaded this morning  She has no history of seizures  She does have a history of chronic alcohol use and chronic pancreatitis  Onset reported as last night  Location of symptoms reported as multiple sites  Quality is reported as generalized shaking, confusion and generalized weakness  Modifying factors:  Patient reports drinking juice and eating candy seem to and increase her blood sugar  Patient denies any fevers but reports flu-like symptoms the past few days  Reviewed past visits via epic: patient last seen in ed on 1/21/2020 for evaluation of influenza      History provided by:  Patient and significant other   used: No    Seizure - Prior Hx Of       Prior to Admission Medications   Prescriptions Last Dose Informant Patient Reported? Taking? Mometasone Furo-Formoterol Fum (DULERA) 100-5 MCG/ACT AERO   Yes Yes   Sig: Inhale as needed Unsure of dose     albuterol (PROVENTIL HFA,VENTOLIN HFA) 90 mcg/act inhaler   No Yes   Sig: Inhale 2 puffs every 4 (four) hours as needed for wheezing   cloNIDine (CATAPRES) 0 3 mg tablet   Yes Yes   Sig: Take 0 3 mg by mouth 2 (two) times a day   dicyclomine (BENTYL) 20 mg tablet Not Taking at Unknown time  No No   Sig: Take 1 tablet (20 mg total) by mouth 3 (three) times a day   Patient not taking: Reported on 85/45/5613   folic acid (FOLVITE) 1 mg tablet Not Taking at Unknown time  No No   Sig: Take 1 tablet (1 mg total) by mouth daily   Patient not taking: Reported on 10/15/2019   gabapentin (NEURONTIN) 300 mg capsule Not Taking at Unknown time  No No   Sig: Take 1 capsule (300 mg total) by mouth 3 (three) times a day   Patient not taking: Reported on 10/15/2019   metFORMIN (GLUCOPHAGE) 500 mg tablet Not Taking at Unknown time  No No   Sig: Take 1 tablet (500 mg total) by mouth daily with breakfast   Patient not taking: Reported on 10/15/2019   metoclopramide (REGLAN) 10 mg tablet Not Taking at Unknown time  No No   Sig: Take 1 tablet (10 mg total) by mouth every 6 (six) hours As needed for nausea and vomiting   Patient not taking: Reported on 1/31/2020   metoclopramide (REGLAN) 10 mg tablet   No Yes   Sig: Take 1 tablet (10 mg total) by mouth every 6 (six) hours   nicotine (NICODERM CQ) 21 mg/24 hr TD 24 hr patch Not Taking at Unknown time  No No   Sig: Place 1 patch on the skin daily   Patient not taking: Reported on 1/31/2020   ondansetron (ZOFRAN) 4 mg tablet Not Taking at Unknown time  No No   Sig: Take 1 tablet (4 mg total) by mouth every 8 (eight) hours as needed for nausea or vomiting   Patient not taking: Reported on 1/31/2020   ondansetron (ZOFRAN) 4 mg tablet   No Yes   Sig: Take 1 tablet (4 mg total) by mouth every 8 (eight) hours as needed for nausea or vomiting   pancrelipase, Lip-Prot-Amyl, (CREON) 24,000 units Not Taking at Unknown time  No No   Sig: Take 1 capsule by mouth 3 (three) times a day with meals   Patient not taking: Reported on 1/31/2020   pantoprazole (PROTONIX) 20 mg tablet   No Yes   Sig: Take 1 tablet (20 mg total) by mouth daily   potassium-sodium phosphateS (K-PHOS,PHOSPHA 250) 155-852-130 mg tablet   No Yes   Sig: Take 1 tablet by mouth 2 (two) times a day for 10 days   sucralfate (CARAFATE) 1 g tablet Not Taking at Unknown time  No No   Sig: Take 1 tablet (1 g total) by mouth 4 (four) times a day   Patient not taking: Reported on 10/15/2019   thiamine 100 MG tablet Not Taking at Unknown time  No No   Sig: Take 1 tablet (100 mg total) by mouth daily   Patient not taking: Reported on 10/15/2019      Facility-Administered Medications: None       Past Medical History:   Diagnosis Date    Alcohol abuse     Arthritis     COPD exacerbation ([facility] [address] ) 2/1/2020    GERD (gastroesophageal reflux disease)     Hypertension     Nicotine dependence     Obesity     Pancreatitis     Panic attack        Past Surgical History:   Procedure Laterality Date    ABDOMINAL SURGERY      C SECTION    ESOPHAGOGASTRODUODENOSCOPY N/A 12/15/2017    Procedure: ESOPHAGOGASTRODUODENOSCOPY (EGD); Surgeon: Vernon Martinez MD;  Location: [facility]; Service: Gastroenterology       Family History   Problem Relation Age of Onset    Cirrhosis Father     Alcohol abuse Father     Drug abuse Mother      I have reviewed and agree with the history as documented      Social History     Tobacco Use    Smoking status: Current Every Day Smoker     Packs/day: 1 00     Years: 29 00     Pack years: 29 00     Types: Cigarettes    Smokeless tobacco: Never Used    Tobacco comment: pt refused packet   Substance Use Topics    Alcohol use: Yes     Frequency: 4 or more times a week     Drinks per session: 3 or 4     Comment: Patient states 'I drink alot of vodka a day"    Drug use: Yes     Types: Marijuana        Review of Systems   Constitutional: Positive for chills, diaphoresis and fatigue  Negative for activity change, appetite change, fever and unexpected weight change  HENT: Negative for congestion, dental problem, drooling, ear discharge, ear pain, facial swelling, hearing loss, mouth sores, nosebleeds, postnasal drip, rhinorrhea, sinus pressure, sinus pain, sneezing, sore throat, tinnitus, trouble swallowing and voice change  Eyes: Negative for photophobia, pain, discharge, redness, itching and visual disturbance  Respiratory: Negative for apnea, cough, choking, chest tightness, shortness of breath, wheezing and stridor  Cardiovascular: Negative for chest pain, palpitations and leg swelling  Gastrointestinal: Positive for diarrhea, nausea and vomiting  Negative for abdominal distention, abdominal pain, anal bleeding, blood in stool, constipation and rectal pain  Endocrine: Negative for cold intolerance, heat intolerance, polydipsia, polyphagia and polyuria  Genitourinary: Negative for decreased urine volume, difficulty urinating, dyspareunia, dysuria, enuresis, flank pain, frequency, hematuria, menstrual problem, pelvic pain, urgency, vaginal bleeding, vaginal discharge and vaginal pain  Musculoskeletal: Negative for arthralgias, back pain, gait problem, joint swelling, myalgias, neck pain and neck stiffness  Skin: Negative for color change, pallor, rash and wound  Allergic/Immunologic: Negative for environmental allergies, food allergies and immunocompromised state  Neurological: Positive for dizziness, tremors, weakness and light-headedness  Negative for syncope, facial asymmetry, speech difficulty, numbness and headaches  Hematological: Negative for adenopathy  Does not bruise/bleed easily  Psychiatric/Behavioral: Positive for confusion  Negative for agitation, hallucinations, self-injury and suicidal ideas  The patient is not hyperactive      All other systems reviewed and are negative  Physical Exam  Physical Exam   Constitutional: She is oriented to person, place, and time  She appears well-developed and well-nourished  No distress  /72 (BP Location: Right arm)   Pulse 68   Temp 98 3 °F (36 8 °C) (Oral)   Resp 19   Ht 5' 4" (1 626 m)   Wt 79 4 kg (175 lb)   LMP 11/11/2019 (Approximate)   SpO2 98%   BMI 30 04 kg/m²    HENT:   Head: Normocephalic and atraumatic  Right Ear: External ear normal    Left Ear: External ear normal    Nose: Nose normal    Mouth/Throat: No oropharyngeal exudate  Mucus membranes dry and pink  Eyes: Pupils are equal, round, and reactive to light  Conjunctivae and EOM are normal  Right eye exhibits no discharge  Left eye exhibits no discharge  No scleral icterus  Neck: Normal range of motion  Neck supple  No JVD present  No tracheal deviation present  Cardiovascular: Normal rate, regular rhythm and intact distal pulses  Pulmonary/Chest: Effort normal and breath sounds normal  No stridor  No respiratory distress  She has no wheezes  She has no rales  She exhibits no tenderness  Abdominal: Soft  Bowel sounds are normal  She exhibits no distension and no mass  There is no tenderness  There is no rebound and no guarding  No hernia  Musculoskeletal: Normal range of motion  She exhibits no edema, tenderness or deformity  Lymphadenopathy:     She has no cervical adenopathy  Neurological: She is alert and oriented to person, place, and time  She displays normal reflexes  No cranial nerve deficit or sensory deficit  She exhibits normal muscle tone  Coordination normal    Skin: Skin is warm and dry  Capillary refill takes less than 2 seconds  No rash noted  She is not diaphoretic  No erythema  No pallor  Psychiatric: She has a normal mood and affect  Her behavior is normal  Judgment and thought content normal    Nursing note and vitals reviewed        Vital Signs  ED Triage Vitals [01/31/20 0614]   Temperature Pulse Respirations Blood Pressure SpO2   98 4 °F (36 9 °C) 94 18 156/92 93 %      Temp Source Heart Rate Source Patient Position - Orthostatic VS BP Location FiO2 (%)   Oral Monitor Lying Right arm --      Pain Score       No Pain           Vitals:    02/02/20 0300 02/02/20 0700 02/02/20 0900 02/02/20 1135   BP: 164/85 (!) 174/101 (!) 216/115 (!) 199/90   Pulse: 65 79 92    Patient Position - Orthostatic VS: Lying Lying Lying Sitting         Visual Acuity  Visual Acuity      Most Recent Value   L Pupil Size (mm)  2   R Pupil Size (mm)  2          ED Medications  Medications   potassium chloride 20 mEq IVPB (premix) (20 mEq Intravenous Not Given 2/1/20 1745)   sodium chloride 0 9 % bolus 1,000 mL (1,000 mL Intravenous New Bag 1/31/20 0726)   potassium chloride 20 mEq IVPB (premix) (0 mEq Intravenous Stopped 1/31/20 1309)   ceftriaxone (ROCEPHIN) 1 g/50 mL in dextrose IVPB (1,000 mg Intravenous New Bag 1/31/20 0858)   influenza vaccine, quadrivalent (FLUARIX) IM injection 0 5 mL (0 5 mL Intramuscular Given 1/31/20 1307)   potassium chloride (K-DUR,KLOR-CON) CR tablet 40 mEq (40 mEq Oral Given 1/31/20 1955)   potassium chloride 20 mEq IVPB (premix) (20 mEq Intravenous New Bag 1/31/20 1955)   potassium chloride 10 % oral solution 40 mEq (40 mEq Oral Given 2/1/20 1051)   magnesium sulfate 2 g/50 mL IVPB (premix) 2 g (0 g Intravenous Stopped 2/1/20 2033)   potassium chloride 20 mEq IVPB (premix) (20 mEq Intravenous New Bag 2/1/20 2033)   magnesium sulfate IVPB (premix) SOLN 1 g (1 g Intravenous New Bag 2/1/20 2256)       Diagnostic Studies  Results Reviewed     Procedure Component Value Units Date/Time    Urine culture [[id number]]  (Abnormal)  (Susceptibility) Collected:  01/31/20 0737    Lab Status:  Final result Specimen:  Urine, Clean Catch Updated:  02/02/20 0801     Urine Culture >100,000 cfu/ml Enterococcus faecalis    Susceptibility     Enterococcus faecalis (1)     Antibiotic Interpretation Microscan Method Status    ZID Performed  Yes BUNNY Final    Ampicillin ($$) Susceptible <=2 00 ug/ml BUNNY Final    Levofloxacin ($) Susceptible 1 00 ug/ml BUNNY Final    Nitrofurantoin Susceptible <=32 ug/ml BUNNY Final    Tetracycline Resistant >8 ug/ml BUNNY Final    Vancomycin ($) Susceptible 1 00 ug/ml BUNNY Final                   Magnesium [[id number]]  (Abnormal) Collected:  02/02/20 0501    Lab Status:  Final result Specimen:  Blood from Arm, Right Updated:  02/02/20 2943     Magnesium 1 4 mg/dL     Comprehensive metabolic panel [[id number]]  (Abnormal) Collected:  02/02/20 0501    Lab Status:  Final result Specimen:  Blood from Arm, Right Updated:  02/02/20 4351     Sodium 136 mmol/L      Potassium 3 8 mmol/L      Chloride 100 mmol/L      CO2 26 mmol/L      ANION GAP 10 mmol/L      BUN 4 mg/dL      Creatinine 0 79 mg/dL      Glucose 295 mg/dL      Calcium 6 8 mg/dL       U/L      ALT 22 U/L      Alkaline Phosphatase 248 U/L      Total Protein 7 2 g/dL      Albumin 2 3 g/dL      Total Bilirubin 0 90 mg/dL      eGFR 92 ml/min/1 73sq m     Narrative:       [place] guidelines for Chronic Kidney Disease (CKD):     Stage 1 with normal or high GFR (GFR > 90 mL/min/1 73 square meters)    Stage 2 Mild CKD (GFR = 60-89 mL/min/1 73 square meters)    Stage 3A Moderate CKD (GFR = 45-59 mL/min/1 73 square meters)    Stage 3B Moderate CKD (GFR = 30-44 mL/min/1 73 square meters)    Stage 4 Severe CKD (GFR = 15-29 mL/min/1 73 square meters)    Stage 5 End Stage CKD (GFR <15 mL/min/1 73 square meters)  Note: GFR calculation is accurate only with a steady state creatinine    CBC and differential [[id number]]  (Abnormal) Collected:  02/02/20 0501    Lab Status:  Final result Specimen:  Blood from Arm, Right Updated:  02/02/20 0550     WBC 7 04 Thousand/uL      RBC 3 69 Million/uL      Hemoglobin 11 2 g/dL      Hematocrit 35 5 %      MCV 96 fL      MCH 30 4 pg      MCHC 31 5 g/dL      RDW 18 6 %      MPV 11 5 fL      Platelets 270 Thousands/uL nRBC 0 /100 WBCs      Neutrophils Relative 86 %      Immat GRANS % 1 %      Lymphocytes Relative 10 %      Monocytes Relative 3 %      Eosinophils Relative 0 %      Basophils Relative 0 %      Neutrophils Absolute 6 05 Thousands/µL      Immature Grans Absolute 0 06 Thousand/uL      Lymphocytes Absolute 0 72 Thousands/µL      Monocytes Absolute 0 20 Thousand/µL      Eosinophils Absolute 0 00 Thousand/µL      Basophils Absolute 0 01 Thousands/µL     Comprehensive metabolic panel [[id number]]  (Abnormal) Collected:  02/01/20 0523    Lab Status:  Final result Specimen:  Blood from Arm, Right Updated:  02/01/20 6601     Sodium 137 mmol/L      Potassium 3 0 mmol/L      Chloride 101 mmol/L      CO2 28 mmol/L      ANION GAP 8 mmol/L      BUN 5 mg/dL      Creatinine 0 68 mg/dL      Glucose 103 mg/dL      Glucose, Fasting 103 mg/dL      Calcium 6 5 mg/dL       U/L      ALT 21 U/L      Alkaline Phosphatase 225 U/L      Total Protein 6 8 g/dL      Albumin 2 3 g/dL      Total Bilirubin 1 20 mg/dL      eGFR 107 ml/min/1 73sq m     Narrative:       [place] guidelines for Chronic Kidney Disease (CKD):     Stage 1 with normal or high GFR (GFR > 90 mL/min/1 73 square meters)    Stage 2 Mild CKD (GFR = 60-89 mL/min/1 73 square meters)    Stage 3A Moderate CKD (GFR = 45-59 mL/min/1 73 square meters)    Stage 3B Moderate CKD (GFR = 30-44 mL/min/1 73 square meters)    Stage 4 Severe CKD (GFR = 15-29 mL/min/1 73 square meters)    Stage 5 End Stage CKD (GFR <15 mL/min/1 73 square meters)  Note: GFR calculation is accurate only with a steady state creatinine    Magnesium [[id number]]  (Abnormal) Collected:  02/01/20 0523    Lab Status:  Final result Specimen:  Blood from Arm, Right Updated:  02/01/20 0607     Magnesium 1 0 mg/dL     CBC and differential [[id number]]  (Abnormal) Collected:  02/01/20 0523    Lab Status:  Final result Specimen:  Blood from Arm, Right Updated:  02/01/20 0547     WBC 7 72 Thousand/uL      RBC 3 41 Million/uL      Hemoglobin 10 4 g/dL      Hematocrit 33 2 %      MCV 97 fL      MCH 30 5 pg      MCHC 31 3 g/dL      RDW 18 6 %      MPV 11 2 fL      Platelets 830 Thousands/uL      nRBC 0 /100 WBCs      Neutrophils Relative 70 %      Immat GRANS % 1 %      Lymphocytes Relative 22 %      Monocytes Relative 7 %      Eosinophils Relative 0 %      Basophils Relative 0 %      Neutrophils Absolute 5 43 Thousands/µL      Immature Grans Absolute 0 05 Thousand/uL      Lymphocytes Absolute 1 67 Thousands/µL      Monocytes Absolute 0 51 Thousand/µL      Eosinophils Absolute 0 03 Thousand/µL      Basophils Absolute 0 03 Thousands/µL     Lipase [[id number]]  (Normal) Collected:  01/31/20 0725    Lab Status:  Final result Specimen:  Blood from Arm, Left Updated:  01/31/20 1656     Lipase 84 u/L     Salicylate level [[id number]]  (Abnormal) Collected:  01/31/20 0725    Lab Status:  Final result Specimen:  Blood from Arm, Left Updated:  93/07/87 8796     Salicylate Lvl <3 mg/dL     Basic metabolic panel [[id number]]  (Abnormal) Collected:  01/31/20 0725    Lab Status:  Final result Specimen:  Blood from Arm, Left Updated:  01/31/20 0810     Sodium 136 mmol/L      Potassium 2 7 mmol/L      Chloride 94 mmol/L      CO2 34 mmol/L      ANION GAP 8 mmol/L      BUN 6 mg/dL      Creatinine 0 82 mg/dL      Glucose 123 mg/dL      Calcium 6 7 mg/dL      eGFR 88 ml/min/1 73sq m     Narrative:       [place] guidelines for Chronic Kidney Disease (CKD):     Stage 1 with normal or high GFR (GFR > 90 mL/min/1 73 square meters)    Stage 2 Mild CKD (GFR = 60-89 mL/min/1 73 square meters)    Stage 3A Moderate CKD (GFR = 45-59 mL/min/1 73 square meters)    Stage 3B Moderate CKD (GFR = 30-44 mL/min/1 73 square meters)    Stage 4 Severe CKD (GFR = 15-29 mL/min/1 73 square meters)    Stage 5 End Stage CKD (GFR <15 mL/min/1 73 square meters)  Note: GFR calculation is accurate only with a steady state creatinine    APTT [[id number]]  (Normal) Collected:  01/31/20 0725    Lab Status:  Final result Specimen:  Blood from Arm, Left Updated:  01/31/20 0806     PTT 30 seconds     Protime-INR [[id number]]  (Abnormal) Collected:  01/31/20 0725    Lab Status:  Final result Specimen:  Blood from Arm, Left Updated:  01/31/20 0806     Protime 14 9 seconds      INR 1 16    Hepatic function panel [[id number]]  (Abnormal) Collected:  01/31/20 0725    Lab Status:  Final result Specimen:  Blood from Arm, Left Updated:  01/31/20 0805     Total Bilirubin 1 20 mg/dL      Bilirubin, Direct 0 70 mg/dL      Alkaline Phosphatase 271 U/L       U/L      ALT 24 U/L      Total Protein 7 3 g/dL      Albumin 2 7 g/dL     Acetaminophen level-If concentration is detectable, please discuss with medical  on call  [[id number]]  (Abnormal) Collected:  01/31/20 0725    Lab Status:  Final result Specimen:  Blood from Arm, Left Updated:  01/31/20 0805     Acetaminophen Level <2 0 ug/mL     Lactic acid, plasma x2 [[id number]]  (Normal) Collected:  01/31/20 0725    Lab Status:  Final result Specimen:  Blood from Arm, Left Updated:  01/31/20 0804     LACTIC ACID 1 9 mmol/L     Narrative:       Result may be elevated if tourniquet was used during collection      Troponin I [[id number]]  (Normal) Collected:  01/31/20 0725    Lab Status:  Final result Specimen:  Blood from Arm, Left Updated:  01/31/20 0757     Troponin I <0 02 ng/mL     Urine Microscopic [[id number]]  (Abnormal) Collected:  01/31/20 0737    Lab Status:  Final result Specimen:  Urine, Clean Catch Updated:  01/31/20 0755     RBC, UA 4-10 /hpf      WBC, UA 10-20 /hpf      Epithelial Cells Moderate /hpf      Bacteria, UA Occasional /hpf     Ammonia [[id number]]  (Abnormal) Collected:  01/31/20 0725    Lab Status:  Final result Specimen:  Blood from Arm, Left Updated:  01/31/20 0751     Ammonia 45 umol/L     Rapid drug screen, urine [[id number]]  (Abnormal) Collected:  01/31/20 0736 Lab Status:  Final result Specimen:  Urine, Clean Catch Updated:  01/31/20 0750     Amph/Meth UR Negative     Barbiturate Ur Negative     Benzodiazepine Urine Positive     Cocaine Urine Negative     Methadone Urine Negative     Opiate Urine Positive     PCP Ur Negative     THC Urine Positive    Narrative:       Presumptive report  If requested, specimen will be sent to reference lab for confirmation  FOR MEDICAL PURPOSES ONLY  IF CONFIRMATION NEEDED PLEASE CONTACT THE LAB WITHIN 5 DAYS      Drug Screen Cutoff Levels:  AMPHETAMINE/METHAMPHETAMINES  1000 ng/mL  BARBITURATES     200 ng/mL  BENZODIAZEPINES     200 ng/mL  COCAINE      300 ng/mL  METHADONE      300 ng/mL  OPIATES      300 ng/mL  PHENCYCLIDINE     25 ng/mL  THC       50 ng/mL      Ethanol [[id number]]  (Normal) Collected:  01/31/20 0725    Lab Status:  Final result Specimen:  Blood from Arm, Left Updated:  01/31/20 0749     Ethanol Lvl <3 mg/dL     UA w Reflex to Microscopic w Reflex to Culture [[id number]]  (Abnormal) Collected:  01/31/20 0737    Lab Status:  Final result Specimen:  Urine, Clean Catch Updated:  01/31/20 0748     Color, UA Yellow     Clarity, UA Cloudy     Specific Gravity, UA 1 025     pH, UA 5 5     Leukocytes, UA Trace     Nitrite, UA Positive     Protein,  (2+) mg/dl      Glucose, UA Negative mg/dl      Ketones, UA Trace mg/dl      Urobilinogen, UA 1 0 E U /dl      Bilirubin, UA Moderate     Blood, UA Trace-Intact    POCT pregnancy, urine [[id number]]  (Normal) Resulted:  01/31/20 0746    Lab Status:  Final result Updated:  01/31/20 0747     EXT PREG TEST UR (Ref: Negative) negative     Control valid    CBC and differential [[id number]]  (Abnormal) Collected:  01/31/20 0725    Lab Status:  Final result Specimen:  Blood from Arm, Left Updated:  01/31/20 0740     WBC 11 79 Thousand/uL      RBC 4 13 Million/uL      Hemoglobin 12 4 g/dL      Hematocrit 38 8 %      MCV 94 fL      MCH 30 0 pg      MCHC 32 0 g/dL      RDW 18 6 %      MPV 10 4 fL      Platelets 562 Thousands/uL      nRBC 0 /100 WBCs      Neutrophils Relative 77 %      Immat GRANS % 1 %      Lymphocytes Relative 15 %      Monocytes Relative 7 %      Eosinophils Relative 0 %      Basophils Relative 0 %      Neutrophils Absolute 9 09 Thousands/µL      Immature Grans Absolute 0 07 Thousand/uL      Lymphocytes Absolute 1 72 Thousands/µL      Monocytes Absolute 0 83 Thousand/µL      Eosinophils Absolute 0 03 Thousand/µL      Basophils Absolute 0 05 Thousands/µL     Fingerstick Glucose (POCT) [[id number]]  (Normal) Collected:  01/31/20 0613    Lab Status:  Final result Updated:  01/31/20 0614     POC Glucose 124 mg/dl                  CT head wo contrast   Final Result by Porsche Magana MD (02/01 1734)      No acute intracranial abnormality  Mild diffuse microangiopathic changes  There is also an 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 (series 2 image 14 )            Workstation performed: [id number]         XR chest portable    (Results Pending)              Procedures  Procedures         ED Course                               MDM  Number of Diagnoses or Management Options  Chronic pancreatitis ([facility] [address] ):   Cirrhosis with alcoholism ([facility] [address] ): Hypoglycemia: new and requires workup  Hypokalemia: new and requires workup  UTI (urinary tract infection): new and requires workup  Vomiting: new and requires workup  Diagnosis management comments: ddx includes but is not limited to:  URI, RSV, sinusitis, OE, OM, serous otitis media,  flu, allergies, viral syndrome, asthma, bronchitis, pneumonia, consider but doubt interstitial lung disease, pertussis  Lab results reviewed  Pregnancy test was negative  Urinalysis demonstrates positive nitrites, trace blood and trace leukocytes  Concerning for urinary tract infection  Rapid drug screen positive for benzodiazepines, opiates and marijuana  Ammonia is elevated 45, lactic acid normal 1 9  Lactic acidosis  , panel demonstrates ethanol, salicylates and acetaminophen levels normal   Troponin normal less than 0 02  CBC demonstrates elevated white blood cell count 11 7, hemoglobin 12 4 and hematocrit 30 8 are normal   No anemia  INR normal 1 1  No coagulopathy  Hepatic function panel demonstrates elevated total bilirubin of 1 2, direct bilirubin elevated at 0 7, alk-phos elevated at 271  AST elevated at 101  Patient has chronic elevation of LFTs in the past   Basic metabolic panel demonstrates potassium at 2 7  IV replacement was ordered  BUN of 16 creatinine of 0 82 are normal     ED course:  Patient's symptoms most consistent with hypoglycemic episode  This may have been exacerbated by taking insulin after having a week's worth of nausea vomiting and diarrhea symptoms which appear clear flu-like  Suspect patient was dehydrated  She reports she did not eat anything all day and then took her Glucotrol last night  This prompted a likely hypoglycemic event causing her confusion, slurred speech and shakiness  This was probably exacerbated by her recent nausea vomiting and diarrhea symptoms  Will replete potassium  IV antiemetics given  IV fluids given  Will treat with Rocephin to cover urinary tract infection given her history of diabetes  Will admit for potassium replacement, IV fluids, assurance of ability to take oral intake and to monitor blood sugars    Case discussed with KVNG Oro regarding admission        Amount and/or Complexity of Data Reviewed  Clinical lab tests: ordered and reviewed  Discussion of test results with the performing providers: yes  Obtain history from someone other than the patient: yes (Sig other)  Review and summarize past medical records: yes  Independent visualization of images, tracings, or specimens: yes          Disposition  Final diagnoses:   Hypoglycemia   Hypokalemia   Vomiting   UTI (urinary tract infection)   Chronic pancreatitis ([facility] )   Cirrhosis with alcoholism ([facility] )     Time reflects when diagnosis was documented in both MDM as applicable and the Disposition within this note     Time User Action Codes Description Comment    1/31/2020  8:46 AM [place] Area Add [E16 2] Hypoglycemia     1/31/2020  8:46 AM Anola Area Add [E87 6] Hypokalemia     1/31/2020  8:46 AM Anola Area Add [R11 10] Vomiting     1/31/2020  8:46 AM Anola Area Add [N39 0] UTI (urinary tract infection)     1/31/2020  8:46 AM Anola Area Add [K86 1] Chronic pancreatitis ([facility] )     1/31/2020  8:47 AM An[place] Area Add [K70 30] Cirrhosis with alcoholism ([facility] )     1/31/2020  5:29 PM Shirlene Holstein Add [R56 9] Seizure-like activity [facility])       ED Disposition     ED Disposition Condition Date/Time Comment    Admit Stable Fri Jan 31, 2020  8:46 AM Case was discussed with Dr Abdon De La Torre and the patient's admission status was agreed to be Admission Status: observation status to the service of Dr Abdon De La Torre           Follow-up Information     Follow up With Specialties Details Why Contact Info    Danny Perkins MD Family Medicine Schedule an appointment as soon as possible for a visit in 1 week(s)  [address] [place] [id number] [phone]            Discharge Medication List as of 2/2/2020 12:16 PM      START taking these medications    Details   levofloxacin (LEVAQUIN) 750 mg tablet Take 1 tablet (750 mg total) by mouth every 24 hours for 4 days, Starting Sun 2/2/2020, Until Thu 2/6/2020, Normal         CONTINUE these medications which have NOT CHANGED    Details   albuterol (PROVENTIL HFA,VENTOLIN HFA) 90 mcg/act inhaler Inhale 2 puffs every 4 (four) hours as needed for wheezing, Starting Wed 10/10/2018, Print      cloNIDine (CATAPRES) 0 3 mg tablet Take 0 3 mg by mouth 2 (two) times a day, Historical Med      !! metoclopramide (REGLAN) 10 mg tablet Take 1 tablet (10 mg total) by mouth every 6 (six) hours, Starting Tue 1/21/2020, Normal      Mometasone Furo-Formoterol Fum (DULERA) 100-5 MCG/ACT AERO Inhale as needed Unsure of dose  , Historical Med      pantoprazole (PROTONIX) 20 mg tablet Take 1 tablet (20 mg total) by mouth daily, Starting Thu 9/20/2018, Normal      potassium-sodium phosphateS (K-PHOS,PHOSPHA 250) 155-852-130 mg tablet Take 1 tablet by mouth 2 (two) times a day for 10 days, Starting Mon 12/23/2019, Until Fri 1/31/2020, Normal      dicyclomine (BENTYL) 20 mg tablet Take 1 tablet (20 mg total) by mouth 3 (three) times a day, Starting Thu 3/85/6217, Print      folic acid (FOLVITE) 1 mg tablet Take 1 tablet (1 mg total) by mouth daily, Starting Tue 8/13/2019, No Print      gabapentin (NEURONTIN) 300 mg capsule Take 1 capsule (300 mg total) by mouth 3 (three) times a day, Starting Wed 6/26/2019, Normal      metFORMIN (GLUCOPHAGE) 500 mg tablet Take 1 tablet (500 mg total) by mouth daily with breakfast, Starting Wed 6/26/2019, Normal      !! metoclopramide (REGLAN) 10 mg tablet Take 1 tablet (10 mg total) by mouth every 6 (six) hours As needed for nausea and vomiting, Starting Fri 8/2/2019, Print      nicotine (NICODERM CQ) 21 mg/24 hr TD 24 hr patch Place 1 patch on the skin daily, Starting Wed 6/26/2019, Normal      ondansetron (ZOFRAN) 4 mg tablet Take 1 tablet (4 mg total) by mouth every 8 (eight) hours as needed for nausea or vomiting, Starting Wed 6/26/2019, Normal      pancrelipase, Lip-Prot-Amyl, (CREON) 24,000 units Take 1 capsule by mouth 3 (three) times a day with meals, Starting Tue 10/31/2017, Normal      sucralfate (CARAFATE) 1 g tablet Take 1 tablet (1 g total) by mouth 4 (four) times a day, Starting Thu 5/31/2018, Print      thiamine 100 MG tablet Take 1 tablet (100 mg total) by mouth daily, Starting Sat 6/29/2019, Normal       !! - Potential duplicate medications found  Please discuss with provider          Outpatient Discharge Orders   Discharge Diet     Activity as tolerated       ED Provider  Electronically Signed by           Shayy Chahal PA-C  02/04/20 [id number]

## 2020-01-31 NOTE — H&P
H&P- Maninder Henning 1975, 38 y o  female MRN: [id number]    Unit/Bed#: -01 Encounter: [id number]    Primary Care Provider: Radha Sampson MD   Date and time admitted to hospital: 1/31/2020  6:08 AM        UTI (urinary tract infection)  Assessment & Plan  · Trace leuk esterase, positive nitrites  · Status post 1 dose Rocephin in the ED, will continue antibiotics   · with plan for 3 day course, follow-up urine culture results    Seizure-like activity ([address] )  Assessment & Plan  · Patient sources seizure-like activity, denies any history of seizures  · Denies any alcohol use, ethanol level negative  · Patient does have extensive history of alcohol use/abuse/withdrawal with frequent admissions  · Although ethanol level is 0 and lactate negative, patient more than likely had potential withdrawal seizure  · CT head is with ordered, will follow-up results within    Type 2 diabetes mellitus [facility])  Assessment & Plan  Lab Results   Component Value Date    HGBA1C 6 9 (H) 10/15/2019       Recent Labs     01/31/20  0613   POCGLU 124       Blood Sugar Average: Last 72 hrs:  (P) 124  · Glucose levels will control at this time secondary to poor p o   Intake from multiple episodes of emesis likely from underlying gastritis versus gastroenteritis  · Will continue to check glucose levels with meals and at bedtime, continue with corrective sliding scale    History of alcoholic cirrhosis  Assessment & Plan  · Noted, continue to monitor with daily liver function tests    Alcohol abuse  Assessment & Plan  · Ethanol level of 0 on ED arrival, patient denying alcohol use at this time  · Will start CIWA protocol and continue to monitor    Hypokalemia  Assessment & Plan  · Likely secondary to multiple episodes of emesis, probably in setting of gastritis versus viral gastroenteritis versus wise potential chronic alcohol abuse  · Continue with aggressive repletion and monitor potassium with daily renal function panel  · Continue with supportive care and IV hydration    Nicotine dependence  Assessment & Plan  · Counseled on smoking cessation  · Continue with nicotine patch    Essential hypertension  Assessment & Plan  · Currently well controlled, continue to monitor with vital signs per floor protocol        VTE Prophylaxis: Heparin  / sequential compression device   Code Status:  Full code  POLST: POLST form is not discussed and not completed at this time  Discussion with family:  Gastroenteritis, nausea, vomiting, hypokalemia    Anticipated Length of Stay:  Patient will be admitted on an Observation basis with an anticipated length of stay of 2 midnights  Justification for Hospital Stay:  See above    Total Time for Visit, including Counseling / Coordination of Care: 45 minutes  Greater than 50% of this total time spent on direct patient counseling and coordination of care  Chief Complaint:   Nausea, vomiting    History of Present Illness:    Catina Mccollum is a 38 y o  female who presents with multiple episodes of emesis associated with nausea  Patient also states that she had seizure-like activity witnessed by her boyfriend at home  Patient describes seizure as generalized tonic-clonic movements with tongue biting and postictal confusion  Lactic acid negative, at than all level 0  Patient denies any active history of alcohol abuse at this time, states her last drink was almost a week ago  Denies any history of seizures, denies any history of withdrawal seizures  Currently denying any headaches, confusion, chest pain, chest palpitations, shortness of breath  Patient does endorse nausea, vomiting, fevers, chills, night sweats over the last 1 month  Patient states she was diagnosed with flu in status post treatment with Tamiflu  Patient denying any dysuria at this time, diarrhea, or abdominal pain at this time  In the ED patient was noted to have a potassium of 2 7 admitted to Medicine for further management    Review of Systems:    Review of Systems    Past Medical and Surgical History:     Past Medical History:   Diagnosis Date    Alcohol abuse     Arthritis     GERD (gastroesophageal reflux disease)     Hypertension     Nicotine dependence     Obesity     Pancreatitis     Panic attack        Past Surgical History:   Procedure Laterality Date    ABDOMINAL SURGERY      C SECTION    ESOPHAGOGASTRODUODENOSCOPY N/A 12/15/2017    Procedure: ESOPHAGOGASTRODUODENOSCOPY (EGD); Surgeon: Libby Otto MD;  Location: [facility]; Service: Gastroenterology       Meds/Allergies:    Prior to Admission medications    Medication Sig Start Date End Date Taking?  Authorizing Provider   albuterol (PROVENTIL HFA,VENTOLIN HFA) 90 mcg/act inhaler Inhale 2 puffs every 4 (four) hours as needed for wheezing 10/10/18  Yes Bev Han MD   cloNIDine (CATAPRES) 0 3 mg tablet Take 0 3 mg by mouth 2 (two) times a day   Yes Historical Provider, MD   metoclopramide (REGLAN) 10 mg tablet Take 1 tablet (10 mg total) by mouth every 6 (six) hours 1/21/20  Yes Chasity Granger PA-C   Mometasone Furo-Formoterol Fum (DULERA) 100-5 MCG/ACT AERO Inhale as needed Unsure of dose     Yes Historical Provider, MD   ondansetron (ZOFRAN) 4 mg tablet Take 1 tablet (4 mg total) by mouth every 8 (eight) hours as needed for nausea or vomiting 12/21/19  Yes Shannon Resendiz MD   pantoprazole (PROTONIX) 20 mg tablet Take 1 tablet (20 mg total) by mouth daily 9/20/18  Yes Kevin Leo MD   potassium-sodium phosphateS (K-PHOS,PHOSPHA 250) 134-278-116 mg tablet Take 1 tablet by mouth 2 (two) times a day for 10 days 12/23/19 1/31/20 Yes Shannon Resendiz MD   dicyclomine (BENTYL) 20 mg tablet Take 1 tablet (20 mg total) by mouth 3 (three) times a day  Patient not taking: Reported on 10/15/2019 5/31/18   OMERO Renteria   folic acid (FOLVITE) 1 mg tablet Take 1 tablet (1 mg total) by mouth daily  Patient not taking: Reported on 10/15/2019 8/13/19   Mati Jordan OMERO   gabapentin (NEURONTIN) 300 mg capsule Take 1 capsule (300 mg total) by mouth 3 (three) times a day  Patient not taking: Reported on 10/15/2019 6/26/19   Francy Nugent MD   metFORMIN (GLUCOPHAGE) 500 mg tablet Take 1 tablet (500 mg total) by mouth daily with breakfast  Patient not taking: Reported on 10/15/2019 6/26/19   Francy Nugent MD   metoclopramide (REGLAN) 10 mg tablet Take 1 tablet (10 mg total) by mouth every 6 (six) hours As needed for nausea and vomiting  Patient not taking: Reported on 1/31/2020 8/2/19   Adam De La O DO   nicotine (NICODERM CQ) 21 mg/24 hr TD 24 hr patch Place 1 patch on the skin daily  Patient not taking: Reported on 1/31/2020 6/26/19   Francy Nugent MD   ondansetron [facility] 4 mg tablet Take 1 tablet (4 mg total) by mouth every 8 (eight) hours as needed for nausea or vomiting  Patient not taking: Reported on 1/31/2020 6/26/19   Francy Nugent MD   pancrelipase, Lip-Prot-Amyl, (CREON) 24,000 units Take 1 capsule by mouth 3 (three) times a day with meals  Patient not taking: Reported on 1/31/2020 10/31/17   Waldo Trinh MD   sucralfate (CARAFATE) 1 g tablet Take 1 tablet (1 g total) by mouth 4 (four) times a day  Patient not taking: Reported on 10/15/2019 5/31/18   OMERO Renteria   thiamine 100 MG tablet Take 1 tablet (100 mg total) by mouth daily  Patient not taking: Reported on 10/15/2019 6/29/19   Francy Nugent MD     I have reviewed home medications with patient personally      Allergies: No Known Allergies    Social History:     Marital Status: Single   Occupation: na  Patient Pre-hospital Living Situation: na  Patient Pre-hospital Level of Mobility: na  Patient Pre-hospital Diet Restrictions: na  Substance Use History:   Social History     Substance and Sexual Activity   Alcohol Use Yes    Frequency: 4 or more times a week    Drinks per session: 3 or 4    Comment: Patient states 'I drink alot of vodka a day"     Social History     Tobacco Use Smoking Status Current Every Day Smoker    Packs/day: 1 00    Years: 29 00    Pack years: 29 00    Types: Cigarettes   Smokeless Tobacco Never Used   Tobacco Comment    pt refused packet     Social History     Substance and Sexual Activity   Drug Use Yes    Types: Marijuana       Family History:    non-contributory    Physical Exam:     Vitals:   Blood Pressure: 108/61 (01/31/20 1500)  Pulse: 80 (01/31/20 1500)  Temperature: 98 2 °F (36 8 °C) (01/31/20 1500)  Temp Source: Oral (01/31/20 1500)  Respirations: 18 (01/31/20 1500)  Height: 5' 4" (162 6 cm) (01/31/20 3256)  Weight - Scale: 79 4 kg (175 lb) (01/31/20 0614)  SpO2: 97 % (01/31/20 1500)        Ill appearing, in minimal distress  Regular rate rhythm  Clear to auscultation bilaterally  Bowel sounds positive, nontender but distended  No lower extremity edema  Alert and oriented x3    Additional Data:     Lab Results: I have personally reviewed pertinent reports  Results from last 7 days   Lab Units 01/31/20  0725   WBC Thousand/uL 11 79*   HEMOGLOBIN g/dL 12 4   HEMATOCRIT % 38 8   PLATELETS Thousands/uL 152   NEUTROS PCT % 77*   LYMPHS PCT % 15   MONOS PCT % 7   EOS PCT % 0     Results from last 7 days   Lab Units 01/31/20  0725   SODIUM mmol/L 136   POTASSIUM mmol/L 2 7*   CHLORIDE mmol/L 94*   CO2 mmol/L 34*   BUN mg/dL 6   CREATININE mg/dL 0 82   ANION GAP mmol/L 8   CALCIUM mg/dL 6 7*   ALBUMIN g/dL 2 7*   TOTAL BILIRUBIN mg/dL 1 20*   ALK PHOS U/L 271*   ALT U/L 24   AST U/L 101*   GLUCOSE RANDOM mg/dL 123     Results from last 7 days   Lab Units 01/31/20  0725   INR  1 16     Results from last 7 days   Lab Units 01/31/20  0613   POC GLUCOSE mg/dl 124         Results from last 7 days   Lab Units 01/31/20  0725   LACTIC ACID mmol/L 1 9       Imaging: I have personally reviewed pertinent reports        CT head wo contrast    (Results Pending)       EKG, Pathology, and Other Studies Reviewed on Admission:   · EKG:  Pending completion    Allscripts / [facility] Records Reviewed: Yes     ** Please Note: This note has been constructed using a voice recognition system   **

## 2020-01-31 NOTE — ED NOTES
2nd IV attempted, unsuccessful in placement  PT requests to try later on today   Will start K+ after antibiotics     Nils Lam, RN  01/31/20 4107

## 2020-01-31 NOTE — ASSESSMENT & PLAN NOTE
· Likely secondary to multiple episodes of emesis, probably in setting of gastritis versus viral gastroenteritis versus wise potential chronic alcohol abuse  · Continue with aggressive repletion and monitor potassium with daily renal function panel  · Continue with supportive care and IV hydration

## 2020-02-01 PROBLEM — J44.1 COPD EXACERBATION (HCC): Status: ACTIVE | Noted: 2020-02-01

## 2020-02-01 LAB
ALBUMIN SERPL BCP-MCNC: 2.3 G/DL (ref 3.5–5)
ALP SERPL-CCNC: 225 U/L (ref 46–116)
ALT SERPL W P-5'-P-CCNC: 21 U/L (ref 12–78)
ANION GAP SERPL CALCULATED.3IONS-SCNC: 8 MMOL/L (ref 4–13)
AST SERPL W P-5'-P-CCNC: 102 U/L (ref 5–45)
BASOPHILS # BLD AUTO: 0.03 THOUSANDS/ΜL (ref 0–0.1)
BASOPHILS NFR BLD AUTO: 0 % (ref 0–1)
BILIRUB SERPL-MCNC: 1.2 MG/DL (ref 0.2–1)
BUN SERPL-MCNC: 5 MG/DL (ref 5–25)
CALCIUM SERPL-MCNC: 6.5 MG/DL (ref 8.3–10.1)
CHLORIDE SERPL-SCNC: 101 MMOL/L (ref 100–108)
CO2 SERPL-SCNC: 28 MMOL/L (ref 21–32)
CREAT SERPL-MCNC: 0.68 MG/DL (ref 0.6–1.3)
EOSINOPHIL # BLD AUTO: 0.03 THOUSAND/ΜL (ref 0–0.61)
EOSINOPHIL NFR BLD AUTO: 0 % (ref 0–6)
ERYTHROCYTE [DISTWIDTH] IN BLOOD BY AUTOMATED COUNT: 18.6 % (ref 11.6–15.1)
GFR SERPL CREATININE-BSD FRML MDRD: 107 ML/MIN/1.73SQ M
GLUCOSE P FAST SERPL-MCNC: 103 MG/DL (ref 65–99)
GLUCOSE SERPL-MCNC: 103 MG/DL (ref 65–140)
GLUCOSE SERPL-MCNC: 105 MG/DL (ref 65–140)
GLUCOSE SERPL-MCNC: 113 MG/DL (ref 65–140)
GLUCOSE SERPL-MCNC: 235 MG/DL (ref 65–140)
GLUCOSE SERPL-MCNC: 91 MG/DL (ref 65–140)
HCT VFR BLD AUTO: 33.2 % (ref 34.8–46.1)
HGB BLD-MCNC: 10.4 G/DL (ref 11.5–15.4)
IMM GRANULOCYTES # BLD AUTO: 0.05 THOUSAND/UL (ref 0–0.2)
IMM GRANULOCYTES NFR BLD AUTO: 1 % (ref 0–2)
LYMPHOCYTES # BLD AUTO: 1.67 THOUSANDS/ΜL (ref 0.6–4.47)
LYMPHOCYTES NFR BLD AUTO: 22 % (ref 14–44)
MAGNESIUM SERPL-MCNC: 1 MG/DL (ref 1.6–2.6)
MCH RBC QN AUTO: 30.5 PG (ref 26.8–34.3)
MCHC RBC AUTO-ENTMCNC: 31.3 G/DL (ref 31.4–37.4)
MCV RBC AUTO: 97 FL (ref 82–98)
MONOCYTES # BLD AUTO: 0.51 THOUSAND/ΜL (ref 0.17–1.22)
MONOCYTES NFR BLD AUTO: 7 % (ref 4–12)
NEUTROPHILS # BLD AUTO: 5.43 THOUSANDS/ΜL (ref 1.85–7.62)
NEUTS SEG NFR BLD AUTO: 70 % (ref 43–75)
NRBC BLD AUTO-RTO: 0 /100 WBCS
PLATELET # BLD AUTO: 123 THOUSANDS/UL (ref 149–390)
PMV BLD AUTO: 11.2 FL (ref 8.9–12.7)
POTASSIUM SERPL-SCNC: 3 MMOL/L (ref 3.5–5.3)
PROT SERPL-MCNC: 6.8 G/DL (ref 6.4–8.2)
RBC # BLD AUTO: 3.41 MILLION/UL (ref 3.81–5.12)
SODIUM SERPL-SCNC: 137 MMOL/L (ref 136–145)
WBC # BLD AUTO: 7.72 THOUSAND/UL (ref 4.31–10.16)

## 2020-02-01 PROCEDURE — 82948 REAGENT STRIP/BLOOD GLUCOSE: CPT

## 2020-02-01 PROCEDURE — 99233 SBSQ HOSP IP/OBS HIGH 50: CPT | Performed by: STUDENT IN AN ORGANIZED HEALTH CARE EDUCATION/TRAINING PROGRAM

## 2020-02-01 PROCEDURE — 80053 COMPREHEN METABOLIC PANEL: CPT | Performed by: STUDENT IN AN ORGANIZED HEALTH CARE EDUCATION/TRAINING PROGRAM

## 2020-02-01 PROCEDURE — 83735 ASSAY OF MAGNESIUM: CPT | Performed by: STUDENT IN AN ORGANIZED HEALTH CARE EDUCATION/TRAINING PROGRAM

## 2020-02-01 PROCEDURE — 94762 N-INVAS EAR/PLS OXIMTRY CONT: CPT

## 2020-02-01 PROCEDURE — 99253 IP/OBS CNSLTJ NEW/EST LOW 45: CPT | Performed by: PSYCHIATRY & NEUROLOGY

## 2020-02-01 PROCEDURE — 85025 COMPLETE CBC W/AUTO DIFF WBC: CPT | Performed by: STUDENT IN AN ORGANIZED HEALTH CARE EDUCATION/TRAINING PROGRAM

## 2020-02-01 RX ORDER — ALPRAZOLAM 0.5 MG/1
1 TABLET ORAL 3 TIMES DAILY PRN
Status: DISCONTINUED | OUTPATIENT
Start: 2020-02-01 | End: 2020-02-02 | Stop reason: HOSPADM

## 2020-02-01 RX ORDER — ALBUTEROL SULFATE 90 UG/1
2 AEROSOL, METERED RESPIRATORY (INHALATION) EVERY 6 HOURS PRN
Status: DISCONTINUED | OUTPATIENT
Start: 2020-02-01 | End: 2020-02-02 | Stop reason: HOSPADM

## 2020-02-01 RX ORDER — POTASSIUM CHLORIDE 14.9 MG/ML
20 INJECTION INTRAVENOUS ONCE
Status: COMPLETED | OUTPATIENT
Start: 2020-02-01 | End: 2020-02-01

## 2020-02-01 RX ORDER — CLONIDINE HYDROCHLORIDE 0.1 MG/1
0.3 TABLET ORAL 2 TIMES DAILY
Status: DISCONTINUED | OUTPATIENT
Start: 2020-02-01 | End: 2020-02-01

## 2020-02-01 RX ORDER — POTASSIUM CHLORIDE 14.9 MG/ML
20 INJECTION INTRAVENOUS
Status: DISPENSED | OUTPATIENT
Start: 2020-02-01 | End: 2020-02-01

## 2020-02-01 RX ORDER — POTASSIUM CHLORIDE 20MEQ/15ML
40 LIQUID (ML) ORAL ONCE
Status: COMPLETED | OUTPATIENT
Start: 2020-02-01 | End: 2020-02-01

## 2020-02-01 RX ORDER — AMLODIPINE BESYLATE 5 MG/1
5 TABLET ORAL DAILY
Status: DISCONTINUED | OUTPATIENT
Start: 2020-02-01 | End: 2020-02-02 | Stop reason: HOSPADM

## 2020-02-01 RX ORDER — MAGNESIUM SULFATE HEPTAHYDRATE 40 MG/ML
2 INJECTION, SOLUTION INTRAVENOUS ONCE
Status: COMPLETED | OUTPATIENT
Start: 2020-02-01 | End: 2020-02-01

## 2020-02-01 RX ORDER — IPRATROPIUM BROMIDE AND ALBUTEROL SULFATE 2.5; .5 MG/3ML; MG/3ML
3 SOLUTION RESPIRATORY (INHALATION)
Status: DISCONTINUED | OUTPATIENT
Start: 2020-02-01 | End: 2020-02-01

## 2020-02-01 RX ORDER — MAGNESIUM SULFATE 1 G/100ML
1 INJECTION INTRAVENOUS ONCE
Status: COMPLETED | OUTPATIENT
Start: 2020-02-01 | End: 2020-02-01

## 2020-02-01 RX ORDER — METHYLPREDNISOLONE SODIUM SUCCINATE 40 MG/ML
40 INJECTION, POWDER, LYOPHILIZED, FOR SOLUTION INTRAMUSCULAR; INTRAVENOUS EVERY 12 HOURS SCHEDULED
Status: DISCONTINUED | OUTPATIENT
Start: 2020-02-01 | End: 2020-02-02 | Stop reason: HOSPADM

## 2020-02-01 RX ADMIN — INSULIN LISPRO 2 UNITS: 100 INJECTION, SOLUTION INTRAVENOUS; SUBCUTANEOUS at 22:22

## 2020-02-01 RX ADMIN — ALPRAZOLAM 1 MG: 0.5 TABLET ORAL at 12:17

## 2020-02-01 RX ADMIN — AMLODIPINE BESYLATE 5 MG: 5 TABLET ORAL at 15:40

## 2020-02-01 RX ADMIN — METHYLPREDNISOLONE SODIUM SUCCINATE 40 MG: 40 INJECTION, POWDER, FOR SOLUTION INTRAMUSCULAR; INTRAVENOUS at 16:49

## 2020-02-01 RX ADMIN — SUCRALFATE 1 G: 1 TABLET ORAL at 22:23

## 2020-02-01 RX ADMIN — MAGNESIUM SULFATE HEPTAHYDRATE 2 G: 40 INJECTION, SOLUTION INTRAVENOUS at 11:26

## 2020-02-01 RX ADMIN — SODIUM CHLORIDE 125 ML/HR: 0.9 INJECTION, SOLUTION INTRAVENOUS at 02:09

## 2020-02-01 RX ADMIN — ACETAMINOPHEN 650 MG: 325 TABLET, FILM COATED ORAL at 15:39

## 2020-02-01 RX ADMIN — FOLIC ACID 1 MG: 1 TABLET ORAL at 09:55

## 2020-02-01 RX ADMIN — NICOTINE 21 MG: 21 PATCH TRANSDERMAL at 09:58

## 2020-02-01 RX ADMIN — POTASSIUM CHLORIDE 40 MEQ: 20 SOLUTION ORAL at 10:51

## 2020-02-01 RX ADMIN — POTASSIUM CHLORIDE 20 MEQ: 200 INJECTION, SOLUTION INTRAVENOUS at 15:00

## 2020-02-01 RX ADMIN — POTASSIUM CHLORIDE 20 MEQ: 200 INJECTION, SOLUTION INTRAVENOUS at 11:30

## 2020-02-01 RX ADMIN — PANTOPRAZOLE SODIUM 20 MG: 20 TABLET, DELAYED RELEASE ORAL at 09:56

## 2020-02-01 RX ADMIN — POTASSIUM CHLORIDE 20 MEQ: 200 INJECTION, SOLUTION INTRAVENOUS at 20:33

## 2020-02-01 RX ADMIN — CEFTRIAXONE SODIUM 1000 MG: 10 INJECTION, POWDER, FOR SOLUTION INTRAVENOUS at 13:00

## 2020-02-01 RX ADMIN — SUCRALFATE 1 G: 1 TABLET ORAL at 12:13

## 2020-02-01 RX ADMIN — MAGNESIUM SULFATE HEPTAHYDRATE 1 G: 1 INJECTION, SOLUTION INTRAVENOUS at 22:56

## 2020-02-01 RX ADMIN — HEPARIN SODIUM 5000 UNITS: 5000 INJECTION INTRAVENOUS; SUBCUTANEOUS at 22:23

## 2020-02-01 RX ADMIN — SUCRALFATE 1 G: 1 TABLET ORAL at 09:56

## 2020-02-01 RX ADMIN — SODIUM CHLORIDE 125 ML/HR: 0.9 INJECTION, SOLUTION INTRAVENOUS at 22:51

## 2020-02-01 RX ADMIN — ACETAMINOPHEN 650 MG: 325 TABLET, FILM COATED ORAL at 04:16

## 2020-02-01 RX ADMIN — THIAMINE HCL TAB 100 MG 100 MG: 100 TAB at 09:56

## 2020-02-01 RX ADMIN — SUCRALFATE 1 G: 1 TABLET ORAL at 16:49

## 2020-02-01 NOTE — RESPIRATORY THERAPY NOTE
RT Protocol Note  Irene Knott 38 y o  female MRN: [id number]  Unit/Bed#: -01 Encounter: [id number]    Assessment    Principal Problem:    Seizure-like activity ([address] )  Active Problems:    Essential hypertension    Nicotine dependence    Hypokalemia    Alcohol abuse    History of alcoholic cirrhosis    Type 2 diabetes mellitus (HCC)    UTI (urinary tract infection)      Home Pulmonary Medications:  Albuterol inhaler       Past Medical History:   Diagnosis Date    Alcohol abuse     Arthritis     GERD (gastroesophageal reflux disease)     Hypertension     Nicotine dependence     Obesity     Pancreatitis     Panic attack      Social History     Socioeconomic History    Marital status: Single     Spouse name: None    Number of children: None    Years of education: None    Highest education level: None   Occupational History    None   Social Needs    Financial resource strain: None    Food insecurity:     Worry: None     Inability: None    Transportation needs:     Medical: None     Non-medical: None   Tobacco Use    Smoking status: Current Every Day Smoker     Packs/day: 1 00     Years: 29 00     Pack years: 29 00     Types: Cigarettes    Smokeless tobacco: Never Used    Tobacco comment: pt refused packet   Substance and Sexual Activity    Alcohol use: Yes     Frequency: 4 or more times a week     Drinks per session: 3 or 4     Comment: Patient states 'I drink alot of vodka a day"    Drug use: Yes     Types: Marijuana    Sexual activity: Yes     Partners: Male     Birth control/protection: None   Lifestyle    Physical activity:     Days per week: None     Minutes per session: None    Stress: None   Relationships    Social connections:     Talks on phone: None     Gets together: None     Attends Denominational service: None     Active member of club or organization: None     Attends meetings of clubs or organizations: None     Relationship status: None    Intimate partner violence:     Fear of current or ex partner: None     Emotionally abused: None     Physically abused: None     Forced sexual activity: None   Other Topics Concern    None   Social History Narrative    None       Subjective         Objective    Physical Exam:   Assessment Type: (P) Assess only  General Appearance: (P) Alert, Awake  Respiratory Pattern: (P) Normal  Chest Assessment: (P) Chest expansion symmetrical  Bilateral Breath Sounds: (P) Clear  Cough: (P) Productive, Strong    Vitals:  Blood pressure (!) 174/104, pulse 81, temperature 98 7 °F (37 1 °C), temperature source Oral, resp  rate 19, height 5' 4" (1 626 m), weight 79 4 kg (175 lb), last menstrual period 11/11/2019, SpO2 97 %, not currently breastfeeding  Imaging and other studies: I have personally reviewed pertinent reports  Plan    Respiratory Plan: (P) Home Bronchodilator Patient pathway        Resp Comments: (P) Pt  assessed per protocol  Pt is a smoker continues to smoke with congestive cough  Takes inhaler at home due to congestion, not SOB  Pt  is clear on room air w/saturations of 97%  Will give her albuterol inhaler q6hprn

## 2020-02-01 NOTE — ASSESSMENT & PLAN NOTE
· History of alcoholic cirrhosis  · Patient was seen by GI on previous admission recently  · Based on previous notes patient's discriminant function <32  · Meld score 9, child class a  · Grade 0 hepatic encephalopathy  · EGD in December 2017 showed Castano's esophagus and gastritis, no varices, recommended outpatient EGD to follow-up  · [facility]  screening with MRI/MRCP in shown and ultrasound on previous recent admission are negative, repeat ultrasound in June 2020  · Recommended close follow-up with primary care provider and Gastroenterology outpatient  I do suspected noncompliance with recommendations and followups

## 2020-02-01 NOTE — UTILIZATION REVIEW
liver function tests     Alcohol abuse  Assessment & Plan  · Ethanol level of 0 on ED arrival, patient denying alcohol use at this time  · Will start CIWA protocol and continue to monitor     Hypokalemia  Assessment & Plan  · Likely secondary to multiple episodes of emesis, probably in setting of gastritis versus viral gastroenteritis versus wise potential chronic alcohol abuse  · Continue with aggressive repletion and monitor potassium with daily renal function panel  · Continue with supportive care and IV hydration     Nicotine dependence  Assessment & Plan  · Counseled on smoking cessation  · Continue with nicotine patch     Essential hypertension  Assessment & Plan  Currently well controlled, continue to monitor with vital signs per floor protocol     Zheng Truong is a 38 y o  female who presents with multiple episodes of emesis associated with nausea  Patient also states that she had seizure-like activity witnessed by her boyfriend at home  Patient describes seizure as generalized tonic-clonic movements with tongue biting and postictal confusion  Lactic acid negative, at than all level 0  Patient denies any active history of alcohol abuse at this time, states her last drink was almost a week ago  Denies any history of seizures, denies any history of withdrawal seizures  Currently denying any headaches, confusion, chest pain, chest palpitations, shortness of breath  Patient does endorse nausea, vomiting, fevers, chills, night sweats over the last 1 month  Patient states she was diagnosed with flu in status post treatment with Tamiflu  Patient denying any dysuria at this time, diarrhea, or abdominal pain at this time  In the ED patient was noted to have a potassium of 2 7 admitted to Medicine for further management        ED Triage Vitals [01/31/20 0614]   Temperature Pulse Respirations Blood Pressure SpO2   98 4 °F (36 9 °C) 94 18 156/92 93 %      Temp Source Heart Rate Source Patient Position - Orthostatic VS BP Location FiO2 (%)   Oral Monitor Lying Right arm --      Pain Score       No Pain        Wt Readings from Last 1 Encounters:   01/31/20 79 4 kg (175 lb)     Additional Vital Signs:   02/01/20 0742            94 %  None (Room air)     02/01/20 0700  98 2 °F (36 8 °C)  78  18  167/98  127  94 %  None (Room air)  Lying   02/01/20 0408            99 %  None (Room air)     02/01/20 0300    83  18  164/83  1117  93 %  None (Room air)  Lying   02/01/20 0027            96 %  None (Room air)     01/31/20 2300  98 °F (36 7 °C)  73  18  126/84  99  96 %  None (Room air)  Lying   01/31/20 2130            96 %  None (Room air)     01/31/20 1900  98 1 °F (36 7 °C)  74  18  118/72  90  98 %  None (Room air)  Lying   01/31/20 1500  98 2 °F (36 8 °C)  80  18  108/61  78  97 %  None (Room air)  Lying   01/31/20 1108  98 3 °F (36 8 °C)  68  19  123/72    98 %  None (Room air)  Lying   01/31/20 0900    73  49Abnormal   111/70    97 %  None (Room air)  Lying   01/31/20 0614  98 4 °F (36 9 °C)  94  18  156/92    93 %  None (Room air)  Lying     CIWA-Ar Total  0  1  2    4   Row Name  01/31/20  1500  01/31/20  1108  01/31/20  0900  01/31/20  0614           Pertinent Labs/Diagnostic Test Results:   Results from last 7 days   Lab Units 02/01/20  0523 01/31/20  0725   WBC Thousand/uL 7 72 11 79*   HEMOGLOBIN g/dL 10 4* 12 4   HEMATOCRIT % 33 2* 38 8   PLATELETS Thousands/uL 123* 152   NEUTROS ABS Thousands/µL 5 43 9 09*         Results from last 7 days   Lab Units 02/01/20  0523 01/31/20  1827 01/31/20  0725   SODIUM mmol/L 137 137 136   POTASSIUM mmol/L 3 0* 2 7* 2 7*   CHLORIDE mmol/L 101 98* 94*   CO2 mmol/L 28 33* 34*   ANION GAP mmol/L 8 6 8   BUN mg/dL 5 6 6   CREATININE mg/dL 0 68 0 92 0 82   EGFR ml/min/1 73sq m 107 77 88   CALCIUM mg/dL 6 5* 6 7* 6 7*   MAGNESIUM mg/dL 1 0*  --   --    PHOSPHORUS mg/dL  --  2 0*  --      Results from last 7 days   Lab Units 02/01/20  0539 01/31/20  1827 01/31/20  0725   AST U/L 102*  --  101*   ALT U/L 21  --  24   ALK PHOS U/L 225*  --  271*   TOTAL PROTEIN g/dL 6 8  --  7 3   ALBUMIN g/dL 2 3* 2 6* 2 7*   TOTAL BILIRUBIN mg/dL 1 20*  --  1 20*   BILIRUBIN DIRECT mg/dL  --   --  0 70*   AMMONIA umol/L  --   --  45*     Results from last 7 days   Lab Units 02/01/20  0528 01/31/20  2105 01/31/20  1733 01/31/20  0613   POC GLUCOSE mg/dl 91 93 104 124     Results from last 7 days   Lab Units 02/01/20  0523 01/31/20  1827 01/31/20  0725   GLUCOSE RANDOM mg/dL 103 102 123           Results from last 7 days   Lab Units 01/31/20  0725   TROPONIN I ng/mL <0 02         Results from last 7 days   Lab Units 01/31/20  0725   PROTIME seconds 14 9*   INR  1 16   PTT seconds 30             Results from last 7 days   Lab Units 01/31/20  0725   LACTIC ACID mmol/L 1 9           Results from last 7 days   Lab Units 01/31/20  0725   LIPASE u/L 84             Results from last 7 days   Lab Units 01/31/20  0737   CLARITY UA  Cloudy   COLOR UA  Yellow   SPEC GRAV UA  1 025   PH UA  5 5   GLUCOSE UA mg/dl Negative   KETONES UA mg/dl Trace*   BLOOD UA  Trace-Intact*   PROTEIN UA mg/dl 100 (2+)*   NITRITE UA  Positive*   BILIRUBIN UA  Moderate*   UROBILINOGEN UA E U /dl 1 0   LEUKOCYTES UA  Trace*   WBC UA /hpf 10-20*   RBC UA /hpf 4-10*   BACTERIA UA /hpf Occasional   EPITHELIAL CELLS WET PREP /hpf Moderate*         Results from last 7 days   Lab Units 01/31/20  0736   AMPH/METH  Negative   BARBITURATE UR  Negative   BENZODIAZEPINE UR  Positive*   COCAINE UR  Negative   METHADONE URINE  Negative   OPIATE UR  Positive*   PCP UR  Negative   THC UR  Positive*     Results from last 7 days   Lab Units 01/31/20  0725   ETHANOL LVL mg/dL <3   ACETAMINOPHEN LVL ug/mL <4 6*   SALICYLATE LVL mg/dL <3*                 Results from last 7 days   Lab Units 01/31/20  0737   URINE CULTURE  >100,000 cfu/ml Gamma Hemolytic Streptococcus               ED Treatment:   Medication Administration from 01/31/2020 0608 to 01/31/2020 3806       Date/Time Order Dose Route Action Comments     01/31/2020 0726 sodium chloride 0 9 % bolus 1,000 mL 1,000 mL Intravenous New Bag      01/31/2020 0858 ceftriaxone (ROCEPHIN) 1 g/50 mL in dextrose IVPB 1,000 mg Intravenous New Bag         Present on Admission:   Essential hypertension   Nicotine dependence   History of alcoholic cirrhosis   Alcohol abuse      Admitting Diagnosis: Chronic pancreatitis (HCC) [K86 1]  Hypokalemia [E87 6]  Seizure (HCC) [R56 9]  Vomiting [R11 10]  UTI (urinary tract infection) [N39 0]  Hypoglycemia [E16 2]  [facility] ) [K70 30]  Age/Sex: 38 y o  female  Admission Orders:  Scheduled Medications:    Medications:  folic acid 1 mg Oral Daily   heparin (porcine) 5,000 Units Subcutaneous Q8H [facility]   insulin lispro 1-5 Units Subcutaneous TID AC   insulin lispro 1-5 Units Subcutaneous HS   nicotine 21 mg Transdermal Daily   pantoprazole 20 mg Oral Early Morning   sucralfate 1 g Oral 4x Daily (AC & HS)   thiamine 100 mg Oral Daily     Continuous IV Infusions:    sodium chloride 125 mL/hr Intravenous Continuous   sodium chloride 175 mL/hr Intravenous Continuous     PRN Meds:    acetaminophen 650 mg Oral Q6H PRN   Lidocaine Viscous HCl 15 mL Swish & Spit 4x Daily PRN   ondansetron 4 mg Intravenous Q4H PRN   pneumococcal 23-valent polysaccharide vaccine 0 5 mL Subcutaneous Prior to discharge       IP CONSULT TO NEUROLOGY     Tele  Fall  Precautions  CIWA  scores    Network Utilization Review Department  [email or web address]  ATTENTION: Please call with any questions or concerns to [phone] and carefully listen to the prompts so that you are directed to the right person  All voicemails are confidential   Alissa Quezada all requests for admission clinical reviews, approved or denied determinations and any other requests to dedicated fax number below belonging to the campus where the patient is receiving treatment  List of dedicated fax numbers for the Facilities:  [address] DENIALS (Administrative/Medical Necessity) [phone]   [address] (Maternity/NICU/Pediatrics) [phone]   Babatunde Foote [phone]   Meera Weiss [phone]   Crissy Marquez [phone]   Merlinda Granville [phone]   [address] [facility] [address] [facility] [phone]   [facility]  [phone]   [address] [facility]  [address] [facility] [address] [facility] [phone]

## 2020-02-01 NOTE — ASSESSMENT & PLAN NOTE
· Uncontrolled  · Not sure what her home medications are  · Compliant with recommendations  · Reports that she does not follow up with physicians outpatient  · Start on Norvasc 5 mg daily  · Continue monitor vitals

## 2020-02-01 NOTE — PROGRESS NOTES
Progress Note - Teresa Andrews 1975, 38 y o  female MRN: [id number]    Unit/Bed#: -01 Encounter: [id number]    Primary Care Provider: Emelia Gentile MD   Date and time admitted to hospital: 1/31/2020  6:08 AM        * Seizure-like activity [facility])  Assessment & Plan  · Present on admission with episode of seizure-like activity at home  Patient reports that was witnessed by her boyfriend  · Patient also reports that she checked her sugars at home prior to the event and was 40s  Patient was having shakiness and before she can eat anything which sugars she had to seizure  · Reports a history of episode of seizure long time ago  · Denies any recent alcohol use, ethanol level negative  · Patient does have extensive history of alcohol use/abuse/withdrawal with frequent admissions  · CT head pending  · Although ethanol level is 0 and lactate negative, I do suspect alcohol withdrawal seizure vs due to hypoglycemia  · Currently awake, alert, oriented x3 at her baseline  Appears anxious  Requesting to go home  · Threatening to leave against medical advice without signing AMA form  · After discussing risk of her metabolic abnormalities,importance of neurology consultation and CT head report,   Patient agreed to stay  · Continue with seizure precaution  · Continue Accu-Cheks monitoring to avoid hypoglycemia  · Not compliant with recommendations and followups  · Unsure what medications she takes at home for diabetes  · Awaiting Neurology consult  COPD exacerbation [facility])  Assessment & Plan  Patient is an active smoker  Never officially diagnosed with COPD  Currently c/o increase cough associated with wheezing  Not in acute respiratory distress  Bilateral wheezing, rhonchi appreciated on exam   Continue with IV Solu-Medrol 40 b i d    Nebs treatment  Respiratory protocol  Pulmonology consult    UTI (urinary tract infection)  Assessment & Plan  · Trace leuk esterase, positive nitrites  · Status post 1 dose Rocephin in the ED, will continue antibiotics   · with plan for 3 day course, follow-up urine culture results    Type 2 diabetes mellitus [facility])  Assessment & Plan  Lab Results   Component Value Date    HGBA1C 6 9 (H) 10/15/2019       Recent Labs     01/31/20  1733 01/31/20  2105 02/01/20  0528 02/01/20  1122   POCGLU 104 93 91 105       Blood Sugar Average: Last 72 hrs:  (P) 103 4  · Glucose levels will control at this time secondary to poor p o  Intake from multiple episodes of emesis likely from underlying gastritis versus gastroenteritis  · Will continue to check glucose levels with meals and at bedtime, continue with corrective sliding scale  · Unsure what she is taking at home  · Recommended close follow-up with primary care provider outpatient but suspect not compliant  Hypomagnesemia  Assessment & Plan  Potassium noted 1 0  Continue tele monitoring  Continue IV supplemented monitor  History of alcoholic cirrhosis  Assessment & Plan  · History of alcoholic cirrhosis  · Patient was seen by GI on previous admission recently  · Based on previous notes patient's discriminant function <32  · Meld score 9, child class a  · Grade 0 hepatic encephalopathy  · EGD in December 2017 showed Castano's esophagus and gastritis, no varices, recommended outpatient EGD to follow-up  · [facility] [address]  screening with MRI/MRCP in shown and ultrasound on previous recent admission are negative, repeat ultrasound in June 2020  · Recommended close follow-up with primary care provider and Gastroenterology outpatient  I do suspected noncompliance with recommendations and followups  Alcohol abuse  Assessment & Plan  · Ethanol level of 0 on ED arrival, patient denying alcohol use at this time  · Reports her last drink was almost 2 weeks ago  · No signs of withdrawal noted at this time  · Continue CIWA protocol      Hypokalemia  Assessment & Plan  · Likely secondary to multiple episodes of emesis, probably in setting of gastritis versus viral gastroenteritis versus wise potential chronic alcohol abuse  · Presented with potential mouth 2 7, magnesium 1  · Continue tele monitoring  · Continue p o  And IV supplement and monitor and supplement as needed  Nicotine dependence  Assessment & Plan  · Counseled on smoking cessation  · Continue with nicotine patch  Essential hypertension  Assessment & Plan  · Uncontrolled  · Not sure what her home medications are  · Compliant with recommendations  · Reports that she does not follow up with physicians outpatient  · Start on Norvasc 5 mg daily  · Continue monitor vitals  VTE Pharmacologic Prophylaxis:   Pharmacologic: Heparin    Patient Centered Rounds: I have performed bedside rounds with nursing staff today  Education and Discussions with Family / Patient:  Patient  Spoke with significant other at bedside  Time Spent for Care: 30 minutes  More than 50% of total time spent on counseling and coordination of care as described above  Current Length of Stay: 0 day(s)    Current Patient Status: Observation   Certification Statement: The patient will continue to require additional inpatient hospital stay due to [address] Neurology consult, awaiting CT head report, electrolyte abnormalities require monitoring and supplementation  Discharge Plan:  Hopefully in 24-48 hours  Code Status: Prior      Subjective:   78-year-old female with multiple episodes of emesis associated with nausea, previous frequent admissions with alcohol-related issues, history of opioid use, history of leaving against medical advise, noncompliance  Presented this time after having reported episode of seizure at home  Patient reports that seizure activity was witnessed by her significant other    Patient reports that "her fingerstick glucose was 40s at home prior to the event and was going to eat something sweet but had an  episode of seizure before she can eat something sweet"  Currently awake, alert, oriented x3 at her baseline  Seen ambulating in her room without any complaints  Appears anxious  Does complain of increased cough with wheezing  Denies respiratory distress  Threatening to leave against medical advice  Reports "I feels fine and back to my normal self and I do not want to stay here anymore  "   patient agreed to stay    After discussing risk of leaving against medical advise in the settings of electrolytes abnormalities, COPD exacerbation, pending neurology consult, pending CT head report  Objective:     Vitals:   Temp (24hrs), Av 2 °F (36 8 °C), Min:98 °F (36 7 °C), Max:98 7 °F (37 1 °C)    Temp:  [98 °F (36 7 °C)-98 7 °F (37 1 °C)] 98 1 °F (36 7 °C)  HR:  [73-87] 87  Resp:  [18-19] 19  BP: (118-174)/() 156/98  SpO2:  [93 %-99 %] 95 %  Body mass index is 30 04 kg/m²  Input and Output Summary (last 24 hours): Intake/Output Summary (Last 24 hours) at 2020 1552  Last data filed at 2020 1535  Gross per 24 hour   Intake 1308 75 ml   Output 850 ml   Net 458 75 ml       Physical Exam:     Physical Exam   Constitutional: She is oriented to person, place, and time  No distress  HENT:   Head: Normocephalic and atraumatic  Eyes: Pupils are equal, round, and reactive to light  EOM are normal    Neck: Normal range of motion  Neck supple  No JVD present  Cardiovascular: Normal rate and regular rhythm  Pulmonary/Chest: Effort normal  No stridor  No respiratory distress  She has wheezes  Not in acute respiratory distress  Be to speak in full sentences  Bilateral rhonchi noted  Abdominal: Soft  Bowel sounds are normal  She exhibits no distension  There is no tenderness  Musculoskeletal: Normal range of motion  Neurological: She is alert and oriented to person, place, and time  Skin: She is not diaphoretic  Psychiatric:   Appears anxious           Additional Data:     Labs:    Results from last 7 days   Lab Units 02/01/20  0523   WBC Thousand/uL 7 72   HEMOGLOBIN g/dL 10 4*   HEMATOCRIT % 33 2*   PLATELETS Thousands/uL 123*   NEUTROS PCT % 70   LYMPHS PCT % 22   MONOS PCT % 7   EOS PCT % 0     Results from last 7 days   Lab Units 02/01/20  0523   SODIUM mmol/L 137   POTASSIUM mmol/L 3 0*   CHLORIDE mmol/L 101   CO2 mmol/L 28   BUN mg/dL 5   CREATININE mg/dL 0 68   ANION GAP mmol/L 8   CALCIUM mg/dL 6 5*   ALBUMIN g/dL 2 3*   TOTAL BILIRUBIN mg/dL 1 20*   ALK PHOS U/L 225*   ALT U/L 21   AST U/L 102*   GLUCOSE RANDOM mg/dL 103     Results from last 7 days   Lab Units 01/31/20  0725   INR  1 16     Results from last 7 days   Lab Units 02/01/20  1122 02/01/20  0528 01/31/20  2105 01/31/20  1733 01/31/20  0613   POC GLUCOSE mg/dl 105 91 93 104 124         Results from last 7 days   Lab Units 01/31/20  0725   LACTIC ACID mmol/L 1 9           * I Have Reviewed All Lab Data Listed Above  * Additional Pertinent Lab Tests Reviewed:  All Labs Within Last 24 Hours Reviewed        Recent Cultures (last 7 days):     Results from last 7 days   Lab Units 01/31/20  0737   URINE CULTURE  >100,000 cfu/ml Enterococcus faecalis*       Last 24 Hours Medication List:     Current Facility-Administered Medications:  acetaminophen 650 mg Oral Q6H PRN Con MD Gibran    albuterol 2 puff Inhalation Q6H PRN Yamil JETER MD    ALPRAZolam 1 mg Oral TID PRN Yamil JETER MD    amLODIPine 5 mg Oral Daily Yamil JETER MD    cefTRIAXone 1,000 mg Intravenous Q24H Yamil JETER MD Last Rate: 1,000 mg (47/35/13 4108)   folic acid 1 mg Oral Daily Con MD Gibran    heparin (porcine) 5,000 Units Subcutaneous [facility] Con MD Gibran    insulin lispro 1-5 Units Subcutaneous TID AC Con MD Gibran    insulin lispro 1-5 Units Subcutaneous HS Con MD Gibran    Lidocaine Viscous HCl 15 mL Swish & Spit 4x Daily PRN Con MD Gibran    nicotine 21 mg Transdermal Daily Cathleen Lawrence PA-C    ondansetron 4 mg Intravenous Q4H PRN Con MD Gibran pantoprazole 20 mg Oral Early Morning Zi De La Torre MD    pneumococcal 23-valent polysaccharide vaccine 0 5 mL Subcutaneous Prior to discharge Zi De La Torre MD    potassium chloride 20 mEq Intravenous Q2H Yamil JETER MD Last Rate: 20 mEq (02/01/20 1130)   sodium chloride 125 mL/hr Intravenous Continuous Diane Rainey PA-C Last Rate: 125 mL/hr (02/01/20 0209)   sodium chloride 175 mL/hr Intravenous Continuous Zi De La Torre MD    sucralfate 1 g Oral 4x Daily (AC & HS) Zi De La Torre MD    thiamine 100 mg Oral Daily Zi De La Torre MD         Today, Patient Was Seen By: Ilana Martinez MD    ** Please Note: Dictation voice to text software may have been used in the creation of this document   **

## 2020-02-01 NOTE — ASSESSMENT & PLAN NOTE
· Likely secondary to multiple episodes of emesis, probably in setting of gastritis versus viral gastroenteritis versus wise potential chronic alcohol abuse  · Presented with potential mouth 2 7, magnesium 1  · Continue tele monitoring  · Continue p o  And IV supplement and monitor and supplement as needed

## 2020-02-01 NOTE — PLAN OF CARE
Problem: Potential for Falls  Goal: Patient will remain free of falls  Description  INTERVENTIONS:  - Assess patient frequently for physical needs  -  Identify cognitive and physical deficits and behaviors that affect risk of falls    -  [place] fall precautions as indicated by assessment   - Educate patient/family on patient safety including physical limitations  - Instruct patient to call for assistance with activity based on assessment  - Modify environment to reduce risk of injury  - Consider OT/PT consult to assist with strengthening/mobility  Outcome: Progressing     Problem: PAIN - ADULT  Goal: Verbalizes/displays adequate comfort level or baseline comfort level  Description  Interventions:  - Encourage patient to monitor pain and request assistance  - Assess pain using appropriate pain scale  - Administer analgesics based on type and severity of pain and evaluate response  - Implement non-pharmacological measures as appropriate and evaluate response  - Consider cultural and social influences on pain and pain management  - Notify physician/advanced practitioner if interventions unsuccessful or patient reports new pain  Outcome: Progressing     Problem: INFECTION - ADULT  Goal: Absence or prevention of progression during hospitalization  Description  INTERVENTIONS:  - Assess and monitor for signs and symptoms of infection  - Monitor lab/diagnostic results  - Monitor all insertion sites, i e  indwelling lines, tubes, and drains  - Monitor endotracheal if appropriate and nasal secretions for changes in amount and color  - [place] appropriate cooling/warming therapies per order  - Administer medications as ordered  - Instruct and encourage patient and family to use good hand hygiene technique  - Identify and instruct in appropriate isolation precautions for identified infection/condition  Outcome: Progressing     Problem: SAFETY ADULT  Goal: Patient will remain free of falls  Description  INTERVENTIONS:  - Assess patient frequently for physical needs  -  Identify cognitive and physical deficits and behaviors that affect risk of falls  -  [place] fall precautions as indicated by assessment   - Educate patient/family on patient safety including physical limitations  - Instruct patient to call for assistance with activity based on assessment  - Modify environment to reduce risk of injury  - Consider OT/PT consult to assist with strengthening/mobility  Outcome: Progressing     Problem: Knowledge Deficit  Goal: Patient/family/caregiver demonstrates understanding of disease process, treatment plan, medications, and discharge instructions  Description  Complete learning assessment and assess knowledge base    Interventions:  - Provide teaching at level of understanding  - Provide teaching via preferred learning methods  Outcome: Progressing     Problem: DISCHARGE PLANNING  Goal: Discharge to home or other facility with appropriate resources  Description  INTERVENTIONS:  - Identify barriers to discharge w/patient and caregiver  - Arrange for needed discharge resources and transportation as appropriate  - Identify discharge learning needs (meds, wound care, etc )  - Arrange for interpretive services to assist at discharge as needed  - Refer to Case Management Department for coordinating discharge planning if the patient needs post-hospital services based on physician/advanced practitioner order or complex needs related to functional status, cognitive ability, or social support system  Outcome: Progressing

## 2020-02-01 NOTE — ASSESSMENT & PLAN NOTE
· Present on admission with episode of seizure-like activity at home  Patient reports that was witnessed by her boyfriend  · Patient also reports that she checked her sugars at home prior to the event and was 40s  Patient was having shakiness and before she can eat anything which sugars she had to seizure  · Reports a history of episode of seizure long time ago  · Denies any recent alcohol use, ethanol level negative  · Patient does have extensive history of alcohol use/abuse/withdrawal with frequent admissions  · CT head pending  · Although ethanol level is 0 and lactate negative, I do suspect alcohol withdrawal seizure vs due to hypoglycemia  · Currently awake, alert, oriented x3 at her baseline  Appears anxious  Requesting to go home  · Threatening to leave against medical advice without signing AMA form  · After discussing risk of her metabolic abnormalities,importance of neurology consultation and CT head report,   Patient agreed to stay  · Continue with seizure precaution  · Continue Accu-Cheks monitoring to avoid hypoglycemia  · Not compliant with recommendations and followups  · Unsure what medications she takes at home for diabetes  · Awaiting Neurology consult

## 2020-02-01 NOTE — ASSESSMENT & PLAN NOTE
· Ethanol level of 0 on ED arrival, patient denying alcohol use at this time  · Reports her last drink was almost 2 weeks ago  · No signs of withdrawal noted at this time  · Continue CIWA protocol

## 2020-02-01 NOTE — ASSESSMENT & PLAN NOTE
Lab Results   Component Value Date    HGBA1C 6 9 (H) 10/15/2019       Recent Labs     01/31/20  1733 01/31/20  2105 02/01/20  0528 02/01/20  1122   POCGLU 104 93 91 105       Blood Sugar Average: Last 72 hrs:  (P) 103 4  · Glucose levels will control at this time secondary to poor p o  Intake from multiple episodes of emesis likely from underlying gastritis versus gastroenteritis  · Will continue to check glucose levels with meals and at bedtime, continue with corrective sliding scale  · Unsure what she is taking at home  · Recommended close follow-up with primary care provider outpatient but suspect not compliant

## 2020-02-01 NOTE — ASSESSMENT & PLAN NOTE
Patient is an active smoker  Never officially diagnosed with COPD  Currently c/o increase cough associated with wheezing  Not in acute respiratory distress  Bilateral wheezing, rhonchi appreciated on exam   Continue with IV Solu-Medrol 40 b i d    Nebs treatment  Respiratory protocol  Pulmonology consult

## 2020-02-02 ENCOUNTER — APPOINTMENT (INPATIENT)
Dept: RADIOLOGY | Facility: HOSPITAL | Age: 45
DRG: 140 | End: 2020-02-02
Payer: COMMERCIAL

## 2020-02-02 VITALS
OXYGEN SATURATION: 100 % | DIASTOLIC BLOOD PRESSURE: 90 MMHG | RESPIRATION RATE: 19 BRPM | TEMPERATURE: 97.8 F | HEART RATE: 92 BPM | WEIGHT: 175 LBS | BODY MASS INDEX: 29.88 KG/M2 | HEIGHT: 64 IN | SYSTOLIC BLOOD PRESSURE: 199 MMHG

## 2020-02-02 LAB
ALBUMIN SERPL BCP-MCNC: 2.3 G/DL (ref 3.5–5)
ALP SERPL-CCNC: 248 U/L (ref 46–116)
ALT SERPL W P-5'-P-CCNC: 22 U/L (ref 12–78)
ANION GAP SERPL CALCULATED.3IONS-SCNC: 10 MMOL/L (ref 4–13)
AST SERPL W P-5'-P-CCNC: 109 U/L (ref 5–45)
BACTERIA UR CULT: ABNORMAL
BASOPHILS # BLD AUTO: 0.01 THOUSANDS/ΜL (ref 0–0.1)
BASOPHILS NFR BLD AUTO: 0 % (ref 0–1)
BILIRUB SERPL-MCNC: 0.9 MG/DL (ref 0.2–1)
BUN SERPL-MCNC: 4 MG/DL (ref 5–25)
CALCIUM SERPL-MCNC: 6.8 MG/DL (ref 8.3–10.1)
CHLORIDE SERPL-SCNC: 100 MMOL/L (ref 100–108)
CO2 SERPL-SCNC: 26 MMOL/L (ref 21–32)
CREAT SERPL-MCNC: 0.79 MG/DL (ref 0.6–1.3)
EOSINOPHIL # BLD AUTO: 0 THOUSAND/ΜL (ref 0–0.61)
EOSINOPHIL NFR BLD AUTO: 0 % (ref 0–6)
ERYTHROCYTE [DISTWIDTH] IN BLOOD BY AUTOMATED COUNT: 18.6 % (ref 11.6–15.1)
GFR SERPL CREATININE-BSD FRML MDRD: 92 ML/MIN/1.73SQ M
GLUCOSE SERPL-MCNC: 128 MG/DL (ref 65–140)
GLUCOSE SERPL-MCNC: 234 MG/DL (ref 65–140)
GLUCOSE SERPL-MCNC: 295 MG/DL (ref 65–140)
HCT VFR BLD AUTO: 35.5 % (ref 34.8–46.1)
HGB BLD-MCNC: 11.2 G/DL (ref 11.5–15.4)
IMM GRANULOCYTES # BLD AUTO: 0.06 THOUSAND/UL (ref 0–0.2)
IMM GRANULOCYTES NFR BLD AUTO: 1 % (ref 0–2)
LYMPHOCYTES # BLD AUTO: 0.72 THOUSANDS/ΜL (ref 0.6–4.47)
LYMPHOCYTES NFR BLD AUTO: 10 % (ref 14–44)
MAGNESIUM SERPL-MCNC: 1.4 MG/DL (ref 1.6–2.6)
MCH RBC QN AUTO: 30.4 PG (ref 26.8–34.3)
MCHC RBC AUTO-ENTMCNC: 31.5 G/DL (ref 31.4–37.4)
MCV RBC AUTO: 96 FL (ref 82–98)
MONOCYTES # BLD AUTO: 0.2 THOUSAND/ΜL (ref 0.17–1.22)
MONOCYTES NFR BLD AUTO: 3 % (ref 4–12)
NEUTROPHILS # BLD AUTO: 6.05 THOUSANDS/ΜL (ref 1.85–7.62)
NEUTS SEG NFR BLD AUTO: 86 % (ref 43–75)
NRBC BLD AUTO-RTO: 0 /100 WBCS
PLATELET # BLD AUTO: 120 THOUSANDS/UL (ref 149–390)
PMV BLD AUTO: 11.5 FL (ref 8.9–12.7)
POTASSIUM SERPL-SCNC: 3.8 MMOL/L (ref 3.5–5.3)
PROT SERPL-MCNC: 7.2 G/DL (ref 6.4–8.2)
RBC # BLD AUTO: 3.69 MILLION/UL (ref 3.81–5.12)
SODIUM SERPL-SCNC: 136 MMOL/L (ref 136–145)
WBC # BLD AUTO: 7.04 THOUSAND/UL (ref 4.31–10.16)

## 2020-02-02 PROCEDURE — 80053 COMPREHEN METABOLIC PANEL: CPT | Performed by: STUDENT IN AN ORGANIZED HEALTH CARE EDUCATION/TRAINING PROGRAM

## 2020-02-02 PROCEDURE — 71045 X-RAY EXAM CHEST 1 VIEW: CPT

## 2020-02-02 PROCEDURE — 82948 REAGENT STRIP/BLOOD GLUCOSE: CPT

## 2020-02-02 PROCEDURE — 83735 ASSAY OF MAGNESIUM: CPT | Performed by: STUDENT IN AN ORGANIZED HEALTH CARE EDUCATION/TRAINING PROGRAM

## 2020-02-02 PROCEDURE — 94762 N-INVAS EAR/PLS OXIMTRY CONT: CPT

## 2020-02-02 PROCEDURE — 99239 HOSP IP/OBS DSCHRG MGMT >30: CPT | Performed by: INTERNAL MEDICINE

## 2020-02-02 PROCEDURE — 85025 COMPLETE CBC W/AUTO DIFF WBC: CPT | Performed by: STUDENT IN AN ORGANIZED HEALTH CARE EDUCATION/TRAINING PROGRAM

## 2020-02-02 RX ORDER — CLONIDINE HYDROCHLORIDE 0.1 MG/1
0.3 TABLET ORAL EVERY 12 HOURS SCHEDULED
Status: DISCONTINUED | OUTPATIENT
Start: 2020-02-02 | End: 2020-02-02 | Stop reason: HOSPADM

## 2020-02-02 RX ORDER — LEVOFLOXACIN 750 MG/1
750 TABLET ORAL EVERY 24 HOURS
Qty: 4 TABLET | Refills: 0 | Status: SHIPPED | OUTPATIENT
Start: 2020-02-02 | End: 2020-02-06

## 2020-02-02 RX ADMIN — INSULIN LISPRO 2 UNITS: 100 INJECTION, SOLUTION INTRAVENOUS; SUBCUTANEOUS at 09:37

## 2020-02-02 RX ADMIN — METHYLPREDNISOLONE SODIUM SUCCINATE 40 MG: 40 INJECTION, POWDER, FOR SOLUTION INTRAMUSCULAR; INTRAVENOUS at 09:37

## 2020-02-02 RX ADMIN — PANTOPRAZOLE SODIUM 20 MG: 20 TABLET, DELAYED RELEASE ORAL at 06:42

## 2020-02-02 RX ADMIN — AMLODIPINE BESYLATE 5 MG: 5 TABLET ORAL at 09:36

## 2020-02-02 RX ADMIN — THIAMINE HCL TAB 100 MG 100 MG: 100 TAB at 09:36

## 2020-02-02 RX ADMIN — FOLIC ACID 1 MG: 1 TABLET ORAL at 09:36

## 2020-02-02 RX ADMIN — SUCRALFATE 1 G: 1 TABLET ORAL at 06:42

## 2020-02-02 NOTE — ASSESSMENT & PLAN NOTE
· Trace leuk esterase, positive nitrites  · Status post 1 dose Rocephin in the ED, will continue antibiotics   · Urine cultures growing Enterobacter    Will discharged on levofloxacin for 4 more days

## 2020-02-02 NOTE — PROGRESS NOTES
Discharge order was put in and patient was advised antibiotics were sent to their pharmacy  Patient refused to wait for discharge paperwork stating, " I can't wait, I don't need it"

## 2020-02-02 NOTE — ASSESSMENT & PLAN NOTE
· Present on admission with episode of seizure-like activity at home  Patient reports that was witnessed by her boyfriend  · Patient also reports that she checked her sugars at home prior to the event and was 40s  Patient was having shakiness and before she can eat anything which sugars she had to seizure  · Etiology of seizure activity could be from hypoglycemia  · Denies any recent alcohol use, ethanol level negative  Denies any alcohol withdrawal symptoms  · Patient does have extensive history of alcohol use/abuse/withdrawal with frequent admissions  · CT head negative for acute abnormality  · Currently awake, alert, oriented x3 at her baseline  Appears anxious  Requesting to go home  · Threatening to leave against medical advice without signing AMA form  · Continue with seizure precaution  · Continue Accu-Cheks monitoring to avoid hypoglycemia  · Neurology note reviewed, believes this symptoms could be hypoglycemia  · Counseled patient extensively on signs and symptoms of hypoglycemia and management  Will need close outpatient PCP follow-up  · Recommended to avoid oral sulfonylureas at this time  Will need to continue metformin    ·

## 2020-02-02 NOTE — ASSESSMENT & PLAN NOTE
Patient is an active smoker  No wheezing on exam today  Patient significantly improved with IV steroids  She refused any oral steroids on discharge, she feels she is well, requesting for discharge to that she can go out and smoke    Counseled on smoking cessation patient not willing to quit at this time  [facility] treatment  Respiratory protocol  Pulmonology consult

## 2020-02-02 NOTE — PROGRESS NOTES
Patient's blood pressure was 199/90  Catapres 0 3 mg  was ordered  Patient refused to take catapres stating, " I have it at home, I'm going to  take it  Right now I'm leaving and going to have a cigarette outside"

## 2020-02-02 NOTE — ASSESSMENT & PLAN NOTE
· History of alcoholic cirrhosis  · Patient was seen by GI on previous admission recently  · Based on previous notes patient's discriminant function <32  · Meld score 9, child class a  · Grade 0 hepatic encephalopathy  · EGD in December 2017 showed Castano's esophagus and gastritis, no varices, recommended outpatient EGD to follow-up  · [facility] 75  screening with MRI/MRCP in shown and ultrasound on previous recent admission are negative, repeat ultrasound in June 2020  · Recommended close follow-up with primary care provider and Gastroenterology outpatient  I do suspected noncompliance with recommendations and followups

## 2020-02-02 NOTE — ASSESSMENT & PLAN NOTE
· Uncontrolled  · Non Compliant with recommendations  · Patient was supposed to be taking clonidine 0 3 b i d  However looks like not really taking it next av order clonidine in the hospital patient refused and was requesting for discharge  · She received amlodipine 5 mg this morning, repeat blood pressure an hour later is 190/90  Patient refused anything IV or she could not wait to get her clonidine here  · Counseled patient on compliance and taking medications and close outpatient follow-up  · Continue monitor vitals

## 2020-02-02 NOTE — ASSESSMENT & PLAN NOTE
Lab Results   Component Value Date    HGBA1C 6 9 (H) 10/15/2019       Recent Labs     02/01/20  1556 02/01/20  2116 02/02/20  0618 02/02/20  1052   POCGLU 113 235* 234* 128       Blood Sugar Average: Last 72 hrs:  (P) 438 [id number]  · Glucose levels will control at this time secondary to poor p o  Intake from multiple episodes of emesis likely from underlying gastritis versus gastroenteritis  · She was hypoglycemic prior to admission, noncompliance again medication  · Reportedly she was recently started on glipizide by her PCP    That could caused hypoglycemia on admission and hypoglycemic seizures as well  · Recommended to continue her home dose of metformin and hold off glipizide  · Will need close outpatient PCP

## 2020-02-02 NOTE — DISCHARGE SUMMARY
Discharge- Zheng Truong 1975, 38 y o  female MRN: [id number]    Unit/Bed#: -01 Encounter: [id number]    Primary Care Provider: Shantel Rogers MD   Date and time admitted to hospital: 1/31/2020  6:08 AM        History of alcoholic cirrhosis  Assessment & Plan  · History of alcoholic cirrhosis  · Patient was seen by GI on previous admission recently  · Based on previous notes patient's discriminant function <32  · Meld score 9, child class a  · Grade 0 hepatic encephalopathy  · EGD in December 2017 showed Castano's esophagus and gastritis, no varices, recommended outpatient EGD to follow-up  · [facility] [address]  screening with MRI/MRCP in shown and ultrasound on previous recent admission are negative, repeat ultrasound in June 2020  · Recommended close follow-up with primary care provider and Gastroenterology outpatient  I do suspected noncompliance with recommendations and followups  Nicotine dependence  Assessment & Plan  · Counseled on smoking cessation  · Continue with nicotine patch  Essential hypertension  Assessment & Plan  · Uncontrolled  · Non Compliant with recommendations  · Patient was supposed to be taking clonidine 0 3 b i d  However looks like not really taking it next av order clonidine in the hospital patient refused and was requesting for discharge  · She received amlodipine 5 mg this morning, repeat blood pressure an hour later is 190/90  Patient refused anything IV or she could not wait to get her clonidine here  · Counseled patient on compliance and taking medications and close outpatient follow-up  · Continue monitor vitals  COPD exacerbation [facility])  Assessment & Plan  Patient is an active smoker  No wheezing on exam today  Patient significantly improved with IV steroids  She refused any oral steroids on discharge, she feels she is well, requesting for discharge to that she can go out and smoke    Counseled on smoking cessation patient not willing to quit at this time  Nebs treatment  Respiratory protocol  Pulmonology consult    UTI (urinary tract infection)  Assessment & Plan  · Trace leuk esterase, positive nitrites  · Status post 1 dose Rocephin in the ED, will continue antibiotics   · Urine cultures growing Enterobacter  Will discharged on levofloxacin for 4 more days    Type 2 diabetes mellitus [facility])  Assessment & Plan  Lab Results   Component Value Date    HGBA1C 6 9 (H) 10/15/2019       Recent Labs     02/01/20  1556 02/01/20  2116 02/02/20  0618 02/02/20  1052   POCGLU 113 235* 234* 128       Blood Sugar Average: Last 72 hrs:  (P) 227 [id number]  · Glucose levels will control at this time secondary to poor p o  Intake from multiple episodes of emesis likely from underlying gastritis versus gastroenteritis  · She was hypoglycemic prior to admission, noncompliance again medication  · Reportedly she was recently started on glipizide by her PCP  That could caused hypoglycemia on admission and hypoglycemic seizures as well  · Recommended to continue her home dose of metformin and hold off glipizide  · Will need close outpatient PCP      Hypomagnesemia  Assessment & Plan  Replete and monitor    Alcohol abuse  Assessment & Plan  · Ethanol level of 0 on ED arrival, patient denying alcohol use at this time  · Reports her last drink was almost 2 weeks ago  · No signs of withdrawal noted at this time  · Continue CIWA protocol  Hypokalemia  Assessment & Plan  · Likely secondary to multiple episodes of emesis, probably in setting of gastritis versus viral gastroenteritis versus wise potential chronic alcohol abuse  · Presented with potential mouth 2 7, magnesium 1  · Continue tele monitoring  · Continue p o  And IV supplement and monitor and supplement as needed  * Seizure-like activity ([facility] [address] )  Assessment & Plan  · Present on admission with episode of seizure-like activity at home  Patient reports that was witnessed by her boyfriend    · Patient also reports that she checked her sugars at home prior to the event and was 40s  Patient was having shakiness and before she can eat anything which sugars she had to seizure  · Etiology of seizure activity could be from hypoglycemia  · Denies any recent alcohol use, ethanol level negative  Denies any alcohol withdrawal symptoms  · Patient does have extensive history of alcohol use/abuse/withdrawal with frequent admissions  · CT head negative for acute abnormality  · Currently awake, alert, oriented x3 at her baseline  Appears anxious  Requesting to go home  · Threatening to leave against medical advice without signing AMA form  · Continue with seizure precaution  · Continue Accu-Cheks monitoring to avoid hypoglycemia  · Neurology note reviewed, believes this symptoms could be hypoglycemia  · Counseled patient extensively on signs and symptoms of hypoglycemia and management  Will need close outpatient PCP follow-up  · Recommended to avoid oral sulfonylureas at this time  Will need to continue metformin    ·       Discharging Physician / Practitioner: Rmaona Bush MD  PCP: Tamia Alonzo MD  Admission Date:   Admission Orders (From admission, onward)     Ordered        02/01/20 1657  Inpatient Admission  Once         01/31/20 0847  Place in Observation  Once                   Discharge Date: 02/02/20    Resolved Problems  Date Reviewed: 2/2/2020    None          Consultations During Hospital Stay:  neurology    Procedures Performed:   · none    Significant Findings / Test Results:   cxr    Incidental Findings:   · none    Test Results Pending at Discharge (will require follow up):   ·      Outpatient Tests Requested:  ·     Complications:      Reason for Admission:    Hospital Course:     Lei Crocker is a 38 y o  female patient with past medical history of type 2 diabetes, hypertension, extensive smoking history, alcohol abuse, cirrhosis who originally presented to the hospital on 1/31/2020 due to concern for seizure-like activity at home  She stated her blood sugar was 40 when she to glyburide at home  upon presentation to the hospital she was alert and oriented  CT of the head did not demonstrate any evidence of acute abnormality  His seizure was believed secondary to hypoglycemia  She had extensive alcohol history, CIWA protocols scores have been low  Reportedly has not taken alcohol for 2 weeks now  Patient extensively counseled on recognition and management of hypoglycemic symptoms  Oral sulfonylurea was discontinued upon discharge  Recommended to continue metformin  Will need close outpatient PCP follow-up next  Also found to have UTI, was discharged on Levaquin for more days  She was started on IV steroids for possible Chronic obstructive pulmonary disease exacerbation however patient refused any oral steroids on discharge  However her lungs were clear and did not have any wheezing  She clinically felt better with couple doses of IV steroids  Recommended to continue breathing treatments at home      Please see above list of diagnoses and related plan for additional information  Condition at Discharge: stable     Discharge Day Visit / Exam:     Subjective:  Patient seen and examined  No complaints at this time  She is very anxious for discharge  Wants to go home  She was to go out and have cigarette  Refused nicotine patch  Vitals: Blood Pressure: (!) 199/90 (02/02/20 1135)  Pulse: 92 (02/02/20 0900)  Temperature: 97 8 °F (36 6 °C) (02/02/20 0900)  Temp Source: Oral (02/02/20 0900)  Respirations: 19 (02/02/20 0900)  Height: 5' 4" (162 6 cm) (01/31/20 2411)  Weight - Scale: 79 4 kg (175 lb) (01/31/20 0614)  SpO2: 100 % (02/02/20 0900)  Exam:   Physical Exam   Constitutional: She is oriented to person, place, and time  She appears well-developed and well-nourished  Patient appeared anxious, wants to be discharged as soon as possible   HENT:   Head: Normocephalic and atraumatic     Eyes: Pupils are equal, round, and reactive to light  EOM are normal    Neck: Neck supple  Cardiovascular: Normal rate and regular rhythm  Pulmonary/Chest: Effort normal and breath sounds normal  No respiratory distress  She has no wheezes  She has no rales  Abdominal: Soft  Bowel sounds are normal  There is no tenderness  Musculoskeletal: Normal range of motion  Neurological: She is alert and oriented to person, place, and time  Skin: Skin is warm and dry  Discussion with Family:  Significant other at the bedside    Discharge instructions/Information to patient and family:   See after visit summary for information provided to patient and family  Provisions for Follow-Up Care:  See after visit summary for information related to follow-up care and any pertinent home health orders  Disposition:     Home      Planned Readmission:      Discharge Statement:  I spent 35 minutes discharging the patient  This time was spent on the day of discharge  I had direct contact with the patient on the day of discharge  Greater than 50% of the total time was spent examining patient, answering all patient questions, arranging and discussing plan of care with patient as well as directly providing post-discharge instructions  Additional time then spent on discharge activities  Discharge Medications:  See after visit summary for reconciled discharge medications provided to patient and family        ** Please Note: This note has been constructed using a voice recognition system **

## 2020-02-03 NOTE — UTILIZATION REVIEW
OBS order 1/31  0847 converted to IP on 2/1 6979    Admitting Physician Elizabeth Sierra V    Level of Care Med Surg    Estimated length of stay More than 2 Midnights    Certification I certify that inpatient services are medically necessary for this patient for a duration of greater than two midnights   See H&P and MD Progress Notes for additional information about the patient's course of treatment

## 2020-02-04 LAB
ATRIAL RATE: 107 BPM
ATRIAL RATE: 86 BPM
P AXIS: 40 DEGREES
P AXIS: 86 DEGREES
PR INTERVAL: 116 MS
PR INTERVAL: 140 MS
QRS AXIS: 8 DEGREES
QRS AXIS: 83 DEGREES
QRSD INTERVAL: 72 MS
QRSD INTERVAL: 96 MS
QT INTERVAL: 328 MS
QT INTERVAL: 416 MS
QTC INTERVAL: 437 MS
QTC INTERVAL: 497 MS
T WAVE AXIS: 38 DEGREES
T WAVE AXIS: 85 DEGREES
VENTRICULAR RATE: 107 BPM
VENTRICULAR RATE: 86 BPM

## 2020-02-04 PROCEDURE — 93010 ELECTROCARDIOGRAM REPORT: CPT | Performed by: INTERNAL MEDICINE

## 2020-03-28 ENCOUNTER — APPOINTMENT (EMERGENCY)
Dept: CT IMAGING | Facility: HOSPITAL | Age: 45
End: 2020-03-28
Payer: COMMERCIAL

## 2020-03-28 ENCOUNTER — HOSPITAL ENCOUNTER (OUTPATIENT)
Facility: HOSPITAL | Age: 45
Setting detail: OBSERVATION
Discharge: LEFT BEFORE DISCHARGE | End: 2020-03-29
Attending: EMERGENCY MEDICINE | Admitting: STUDENT IN AN ORGANIZED HEALTH CARE EDUCATION/TRAINING PROGRAM
Payer: COMMERCIAL

## 2020-03-28 DIAGNOSIS — E86.0 DEHYDRATION: ICD-10-CM

## 2020-03-28 DIAGNOSIS — R11.10 INTRACTABLE VOMITING: ICD-10-CM

## 2020-03-28 DIAGNOSIS — K72.90 LIVER FAILURE (HCC): Primary | ICD-10-CM

## 2020-03-28 DIAGNOSIS — R10.13 EPIGASTRIC PAIN: ICD-10-CM

## 2020-03-28 DIAGNOSIS — K85.90 PANCREATITIS: ICD-10-CM

## 2020-03-28 PROBLEM — R19.7 DIARRHEA: Status: ACTIVE | Noted: 2020-03-28

## 2020-03-28 PROBLEM — Z91.19 NON-COMPLIANCE: Status: ACTIVE | Noted: 2020-03-28

## 2020-03-28 LAB
ALBUMIN SERPL BCP-MCNC: 3 G/DL (ref 3.5–5)
ALP SERPL-CCNC: 500 U/L (ref 46–116)
ALT SERPL W P-5'-P-CCNC: 30 U/L (ref 12–78)
ANION GAP SERPL CALCULATED.3IONS-SCNC: 16 MMOL/L (ref 4–13)
AST SERPL W P-5'-P-CCNC: 165 U/L (ref 5–45)
ATRIAL RATE: 95 BPM
BACTERIA UR QL AUTO: NORMAL /HPF
BASOPHILS # BLD AUTO: 0.04 THOUSANDS/ΜL (ref 0–0.1)
BASOPHILS NFR BLD AUTO: 1 % (ref 0–1)
BILIRUB SERPL-MCNC: 4 MG/DL (ref 0.2–1)
BILIRUB UR QL STRIP: ABNORMAL
BUN SERPL-MCNC: 10 MG/DL (ref 5–25)
CALCIUM SERPL-MCNC: 7.4 MG/DL (ref 8.3–10.1)
CHLORIDE SERPL-SCNC: 92 MMOL/L (ref 100–108)
CLARITY UR: CLEAR
CO2 SERPL-SCNC: 28 MMOL/L (ref 21–32)
COLOR UR: YELLOW
CREAT SERPL-MCNC: 0.99 MG/DL (ref 0.6–1.3)
EOSINOPHIL # BLD AUTO: 0.02 THOUSAND/ΜL (ref 0–0.61)
EOSINOPHIL NFR BLD AUTO: 0 % (ref 0–6)
ERYTHROCYTE [DISTWIDTH] IN BLOOD BY AUTOMATED COUNT: 20.6 % (ref 11.6–15.1)
ETHANOL SERPL-MCNC: <3 MG/DL (ref 0–3)
FLUAV RNA NPH QL NAA+PROBE: NORMAL
FLUBV RNA NPH QL NAA+PROBE: NORMAL
GFR SERPL CREATININE-BSD FRML MDRD: 70 ML/MIN/1.73SQ M
GLUCOSE SERPL-MCNC: 146 MG/DL (ref 65–140)
GLUCOSE SERPL-MCNC: 163 MG/DL (ref 65–140)
GLUCOSE UR STRIP-MCNC: NEGATIVE MG/DL
HCT VFR BLD AUTO: 38.3 % (ref 34.8–46.1)
HGB BLD-MCNC: 12.1 G/DL (ref 11.5–15.4)
HGB UR QL STRIP.AUTO: NEGATIVE
IMM GRANULOCYTES # BLD AUTO: 0.04 THOUSAND/UL (ref 0–0.2)
IMM GRANULOCYTES NFR BLD AUTO: 1 % (ref 0–2)
KETONES UR STRIP-MCNC: NEGATIVE MG/DL
LEUKOCYTE ESTERASE UR QL STRIP: NEGATIVE
LIPASE SERPL-CCNC: 79 U/L (ref 73–393)
LYMPHOCYTES # BLD AUTO: 1.69 THOUSANDS/ΜL (ref 0.6–4.47)
LYMPHOCYTES NFR BLD AUTO: 20 % (ref 14–44)
MCH RBC QN AUTO: 31.1 PG (ref 26.8–34.3)
MCHC RBC AUTO-ENTMCNC: 31.6 G/DL (ref 31.4–37.4)
MCV RBC AUTO: 99 FL (ref 82–98)
MONOCYTES # BLD AUTO: 0.72 THOUSAND/ΜL (ref 0.17–1.22)
MONOCYTES NFR BLD AUTO: 9 % (ref 4–12)
NEUTROPHILS # BLD AUTO: 5.86 THOUSANDS/ΜL (ref 1.85–7.62)
NEUTS SEG NFR BLD AUTO: 69 % (ref 43–75)
NITRITE UR QL STRIP: NEGATIVE
NON-SQ EPI CELLS URNS QL MICRO: NORMAL /HPF
NRBC BLD AUTO-RTO: 0 /100 WBCS
NT-PROBNP SERPL-MCNC: 63 PG/ML
P AXIS: 80 DEGREES
PH UR STRIP.AUTO: 7 [PH]
PLATELET # BLD AUTO: 117 THOUSANDS/UL (ref 149–390)
PMV BLD AUTO: 10.7 FL (ref 8.9–12.7)
POTASSIUM SERPL-SCNC: 3.1 MMOL/L (ref 3.5–5.3)
PR INTERVAL: 136 MS
PROT SERPL-MCNC: 7.7 G/DL (ref 6.4–8.2)
PROT UR STRIP-MCNC: ABNORMAL MG/DL
QRS AXIS: 40 DEGREES
QRSD INTERVAL: 82 MS
QT INTERVAL: 390 MS
QTC INTERVAL: 490 MS
RBC # BLD AUTO: 3.89 MILLION/UL (ref 3.81–5.12)
RBC #/AREA URNS AUTO: NORMAL /HPF
RSV RNA NPH QL NAA+PROBE: NORMAL
SODIUM SERPL-SCNC: 136 MMOL/L (ref 136–145)
SP GR UR STRIP.AUTO: <=1.005 (ref 1–1.03)
T WAVE AXIS: 41 DEGREES
TROPONIN I SERPL-MCNC: <0.02 NG/ML
UROBILINOGEN UR QL STRIP.AUTO: 2 E.U./DL
VENTRICULAR RATE: 95 BPM
WBC # BLD AUTO: 8.37 THOUSAND/UL (ref 4.31–10.16)
WBC #/AREA URNS AUTO: NORMAL /HPF

## 2020-03-28 PROCEDURE — 80307 DRUG TEST PRSMV CHEM ANLYZR: CPT | Performed by: STUDENT IN AN ORGANIZED HEALTH CARE EDUCATION/TRAINING PROGRAM

## 2020-03-28 PROCEDURE — 80053 COMPREHEN METABOLIC PANEL: CPT | Performed by: EMERGENCY MEDICINE

## 2020-03-28 PROCEDURE — 96376 TX/PRO/DX INJ SAME DRUG ADON: CPT

## 2020-03-28 PROCEDURE — 74177 CT ABD & PELVIS W/CONTRAST: CPT

## 2020-03-28 PROCEDURE — 99220 PR INITIAL OBSERVATION CARE/DAY 70 MINUTES: CPT | Performed by: STUDENT IN AN ORGANIZED HEALTH CARE EDUCATION/TRAINING PROGRAM

## 2020-03-28 PROCEDURE — 96375 TX/PRO/DX INJ NEW DRUG ADDON: CPT

## 2020-03-28 PROCEDURE — 83690 ASSAY OF LIPASE: CPT | Performed by: EMERGENCY MEDICINE

## 2020-03-28 PROCEDURE — 36415 COLL VENOUS BLD VENIPUNCTURE: CPT | Performed by: EMERGENCY MEDICINE

## 2020-03-28 PROCEDURE — 87631 RESP VIRUS 3-5 TARGETS: CPT | Performed by: EMERGENCY MEDICINE

## 2020-03-28 PROCEDURE — 81001 URINALYSIS AUTO W/SCOPE: CPT | Performed by: EMERGENCY MEDICINE

## 2020-03-28 PROCEDURE — 87635 SARS-COV-2 COVID-19 AMP PRB: CPT | Performed by: EMERGENCY MEDICINE

## 2020-03-28 PROCEDURE — 83880 ASSAY OF NATRIURETIC PEPTIDE: CPT | Performed by: EMERGENCY MEDICINE

## 2020-03-28 PROCEDURE — 84484 ASSAY OF TROPONIN QUANT: CPT | Performed by: EMERGENCY MEDICINE

## 2020-03-28 PROCEDURE — 82948 REAGENT STRIP/BLOOD GLUCOSE: CPT

## 2020-03-28 PROCEDURE — 96374 THER/PROPH/DIAG INJ IV PUSH: CPT

## 2020-03-28 PROCEDURE — 94762 N-INVAS EAR/PLS OXIMTRY CONT: CPT

## 2020-03-28 PROCEDURE — 99285 EMERGENCY DEPT VISIT HI MDM: CPT

## 2020-03-28 PROCEDURE — 96361 HYDRATE IV INFUSION ADD-ON: CPT

## 2020-03-28 PROCEDURE — 93005 ELECTROCARDIOGRAM TRACING: CPT

## 2020-03-28 PROCEDURE — 80320 DRUG SCREEN QUANTALCOHOLS: CPT | Performed by: EMERGENCY MEDICINE

## 2020-03-28 PROCEDURE — 99285 EMERGENCY DEPT VISIT HI MDM: CPT | Performed by: EMERGENCY MEDICINE

## 2020-03-28 PROCEDURE — 71275 CT ANGIOGRAPHY CHEST: CPT

## 2020-03-28 PROCEDURE — 93010 ELECTROCARDIOGRAM REPORT: CPT | Performed by: INTERNAL MEDICINE

## 2020-03-28 PROCEDURE — 85025 COMPLETE CBC W/AUTO DIFF WBC: CPT | Performed by: EMERGENCY MEDICINE

## 2020-03-28 RX ORDER — POTASSIUM CHLORIDE 29.8 MG/ML
40 INJECTION INTRAVENOUS ONCE
Status: DISCONTINUED | OUTPATIENT
Start: 2020-03-28 | End: 2020-03-28

## 2020-03-28 RX ORDER — FOLIC ACID 1 MG/1
1 TABLET ORAL DAILY
Status: DISCONTINUED | OUTPATIENT
Start: 2020-03-29 | End: 2020-03-29 | Stop reason: HOSPADM

## 2020-03-28 RX ORDER — PANTOPRAZOLE SODIUM 20 MG/1
20 TABLET, DELAYED RELEASE ORAL DAILY
Status: DISCONTINUED | OUTPATIENT
Start: 2020-03-29 | End: 2020-03-29 | Stop reason: HOSPADM

## 2020-03-28 RX ORDER — SODIUM CHLORIDE 9 MG/ML
75 INJECTION, SOLUTION INTRAVENOUS CONTINUOUS
Status: DISCONTINUED | OUTPATIENT
Start: 2020-03-28 | End: 2020-03-29 | Stop reason: HOSPADM

## 2020-03-28 RX ORDER — HYDROMORPHONE HCL/PF 1 MG/ML
1 SYRINGE (ML) INJECTION ONCE
Status: DISCONTINUED | OUTPATIENT
Start: 2020-03-28 | End: 2020-03-28

## 2020-03-28 RX ORDER — CLONIDINE HYDROCHLORIDE 0.1 MG/1
0.3 TABLET ORAL 2 TIMES DAILY
Status: DISCONTINUED | OUTPATIENT
Start: 2020-03-28 | End: 2020-03-28

## 2020-03-28 RX ORDER — LORAZEPAM 1 MG/1
2 TABLET ORAL ONCE
Status: COMPLETED | OUTPATIENT
Start: 2020-03-28 | End: 2020-03-28

## 2020-03-28 RX ORDER — GABAPENTIN 300 MG/1
300 CAPSULE ORAL 3 TIMES DAILY
Status: DISCONTINUED | OUTPATIENT
Start: 2020-03-28 | End: 2020-03-29 | Stop reason: HOSPADM

## 2020-03-28 RX ORDER — THIAMINE MONONITRATE (VIT B1) 100 MG
100 TABLET ORAL DAILY
Status: DISCONTINUED | OUTPATIENT
Start: 2020-03-29 | End: 2020-03-29 | Stop reason: HOSPADM

## 2020-03-28 RX ORDER — POTASSIUM CHLORIDE 14.9 MG/ML
20 INJECTION INTRAVENOUS
Status: COMPLETED | OUTPATIENT
Start: 2020-03-28 | End: 2020-03-28

## 2020-03-28 RX ORDER — OXYCODONE HYDROCHLORIDE 5 MG/1
5 TABLET ORAL EVERY 6 HOURS PRN
Status: DISCONTINUED | OUTPATIENT
Start: 2020-03-28 | End: 2020-03-29 | Stop reason: HOSPADM

## 2020-03-28 RX ORDER — DICYCLOMINE HCL 20 MG
20 TABLET ORAL 3 TIMES DAILY
Status: DISCONTINUED | OUTPATIENT
Start: 2020-03-28 | End: 2020-03-29 | Stop reason: HOSPADM

## 2020-03-28 RX ORDER — SUCRALFATE 1 G/1
1 TABLET ORAL 4 TIMES DAILY
Status: DISCONTINUED | OUTPATIENT
Start: 2020-03-28 | End: 2020-03-29 | Stop reason: HOSPADM

## 2020-03-28 RX ORDER — ACETAMINOPHEN 325 MG/1
650 TABLET ORAL EVERY 6 HOURS PRN
Status: DISCONTINUED | OUTPATIENT
Start: 2020-03-28 | End: 2020-03-29 | Stop reason: HOSPADM

## 2020-03-28 RX ORDER — CLONIDINE HYDROCHLORIDE 0.1 MG/1
0.1 TABLET ORAL 2 TIMES DAILY
Status: DISCONTINUED | OUTPATIENT
Start: 2020-03-29 | End: 2020-03-29 | Stop reason: HOSPADM

## 2020-03-28 RX ORDER — ONDANSETRON 2 MG/ML
4 INJECTION INTRAMUSCULAR; INTRAVENOUS ONCE
Status: COMPLETED | OUTPATIENT
Start: 2020-03-28 | End: 2020-03-28

## 2020-03-28 RX ORDER — ONDANSETRON 2 MG/ML
4 INJECTION INTRAMUSCULAR; INTRAVENOUS EVERY 6 HOURS PRN
Status: DISCONTINUED | OUTPATIENT
Start: 2020-03-28 | End: 2020-03-29 | Stop reason: HOSPADM

## 2020-03-28 RX ORDER — HYDROMORPHONE HCL/PF 1 MG/ML
1 SYRINGE (ML) INJECTION ONCE
Status: COMPLETED | OUTPATIENT
Start: 2020-03-28 | End: 2020-03-28

## 2020-03-28 RX ORDER — NICOTINE 21 MG/24HR
21 PATCH, TRANSDERMAL 24 HOURS TRANSDERMAL DAILY
Status: DISCONTINUED | OUTPATIENT
Start: 2020-03-28 | End: 2020-03-29 | Stop reason: HOSPADM

## 2020-03-28 RX ORDER — CHLORDIAZEPOXIDE HYDROCHLORIDE 5 MG/1
5 CAPSULE, GELATIN COATED ORAL EVERY 8 HOURS SCHEDULED
Status: DISCONTINUED | OUTPATIENT
Start: 2020-03-28 | End: 2020-03-29 | Stop reason: HOSPADM

## 2020-03-28 RX ADMIN — SUCRALFATE 1 G: 1 TABLET ORAL at 18:09

## 2020-03-28 RX ADMIN — OXYCODONE HYDROCHLORIDE 5 MG: 5 TABLET ORAL at 18:09

## 2020-03-28 RX ADMIN — POTASSIUM CHLORIDE 20 MEQ: 200 INJECTION, SOLUTION INTRAVENOUS at 21:05

## 2020-03-28 RX ADMIN — POTASSIUM CHLORIDE 20 MEQ: 200 INJECTION, SOLUTION INTRAVENOUS at 18:10

## 2020-03-28 RX ADMIN — NICOTINE 21 MG: 21 PATCH TRANSDERMAL at 15:45

## 2020-03-28 RX ADMIN — SODIUM CHLORIDE 1000 ML: 0.9 INJECTION, SOLUTION INTRAVENOUS at 10:35

## 2020-03-28 RX ADMIN — IOHEXOL 85 ML: 350 INJECTION, SOLUTION INTRAVENOUS at 11:49

## 2020-03-28 RX ADMIN — GABAPENTIN 300 MG: 300 CAPSULE ORAL at 21:08

## 2020-03-28 RX ADMIN — HYDROMORPHONE HYDROCHLORIDE 1 MG: 1 INJECTION, SOLUTION INTRAMUSCULAR; INTRAVENOUS; SUBCUTANEOUS at 13:39

## 2020-03-28 RX ADMIN — SODIUM CHLORIDE 1000 ML: 0.9 INJECTION, SOLUTION INTRAVENOUS at 13:38

## 2020-03-28 RX ADMIN — SUCRALFATE 1 G: 1 TABLET ORAL at 21:08

## 2020-03-28 RX ADMIN — ONDANSETRON 4 MG: 2 INJECTION INTRAMUSCULAR; INTRAVENOUS at 10:35

## 2020-03-28 RX ADMIN — DICYCLOMINE HYDROCHLORIDE 20 MG: 20 TABLET ORAL at 21:20

## 2020-03-28 RX ADMIN — ACETAMINOPHEN 650 MG: 325 TABLET ORAL at 18:12

## 2020-03-28 RX ADMIN — HYDROMORPHONE HYDROCHLORIDE 1 MG: 1 INJECTION, SOLUTION INTRAMUSCULAR; INTRAVENOUS; SUBCUTANEOUS at 10:36

## 2020-03-28 RX ADMIN — CHLORDIAZEPOXIDE HYDROCHLORIDE 5 MG: 5 CAPSULE ORAL at 21:20

## 2020-03-28 RX ADMIN — SODIUM CHLORIDE 75 ML/HR: 0.9 INJECTION, SOLUTION INTRAVENOUS at 18:09

## 2020-03-28 RX ADMIN — LORAZEPAM 2 MG: 1 TABLET ORAL at 18:32

## 2020-03-29 VITALS
TEMPERATURE: 98.3 F | HEIGHT: 64 IN | OXYGEN SATURATION: 95 % | DIASTOLIC BLOOD PRESSURE: 85 MMHG | SYSTOLIC BLOOD PRESSURE: 122 MMHG | BODY MASS INDEX: 29.96 KG/M2 | RESPIRATION RATE: 19 BRPM | HEART RATE: 93 BPM | WEIGHT: 175.49 LBS

## 2020-03-29 LAB
ALBUMIN SERPL BCP-MCNC: 2.4 G/DL (ref 3.5–5)
ALP SERPL-CCNC: 349 U/L (ref 46–116)
ALT SERPL W P-5'-P-CCNC: 23 U/L (ref 12–78)
ANION GAP SERPL CALCULATED.3IONS-SCNC: 11 MMOL/L (ref 4–13)
AST SERPL W P-5'-P-CCNC: 119 U/L (ref 5–45)
BASOPHILS # BLD AUTO: 0.03 THOUSANDS/ΜL (ref 0–0.1)
BASOPHILS NFR BLD AUTO: 0 % (ref 0–1)
BILIRUB SERPL-MCNC: 2.7 MG/DL (ref 0.2–1)
BUN SERPL-MCNC: 8 MG/DL (ref 5–25)
CALCIUM SERPL-MCNC: 6.3 MG/DL (ref 8.3–10.1)
CHLORIDE SERPL-SCNC: 97 MMOL/L (ref 100–108)
CO2 SERPL-SCNC: 28 MMOL/L (ref 21–32)
CREAT SERPL-MCNC: 0.65 MG/DL (ref 0.6–1.3)
EOSINOPHIL # BLD AUTO: 0.05 THOUSAND/ΜL (ref 0–0.61)
EOSINOPHIL NFR BLD AUTO: 1 % (ref 0–6)
ERYTHROCYTE [DISTWIDTH] IN BLOOD BY AUTOMATED COUNT: 20.1 % (ref 11.6–15.1)
GFR SERPL CREATININE-BSD FRML MDRD: 109 ML/MIN/1.73SQ M
GLUCOSE P FAST SERPL-MCNC: 114 MG/DL (ref 65–99)
GLUCOSE SERPL-MCNC: 114 MG/DL (ref 65–140)
HCT VFR BLD AUTO: 32.3 % (ref 34.8–46.1)
HGB BLD-MCNC: 10 G/DL (ref 11.5–15.4)
IMM GRANULOCYTES # BLD AUTO: 0.05 THOUSAND/UL (ref 0–0.2)
IMM GRANULOCYTES NFR BLD AUTO: 1 % (ref 0–2)
LYMPHOCYTES # BLD AUTO: 2.17 THOUSANDS/ΜL (ref 0.6–4.47)
LYMPHOCYTES NFR BLD AUTO: 30 % (ref 14–44)
MCH RBC QN AUTO: 31.3 PG (ref 26.8–34.3)
MCHC RBC AUTO-ENTMCNC: 31 G/DL (ref 31.4–37.4)
MCV RBC AUTO: 101 FL (ref 82–98)
MONOCYTES # BLD AUTO: 0.51 THOUSAND/ΜL (ref 0.17–1.22)
MONOCYTES NFR BLD AUTO: 7 % (ref 4–12)
NEUTROPHILS # BLD AUTO: 4.32 THOUSANDS/ΜL (ref 1.85–7.62)
NEUTS SEG NFR BLD AUTO: 61 % (ref 43–75)
NRBC BLD AUTO-RTO: 0 /100 WBCS
PLATELET # BLD AUTO: 97 THOUSANDS/UL (ref 149–390)
PMV BLD AUTO: 11.5 FL (ref 8.9–12.7)
POTASSIUM SERPL-SCNC: 3 MMOL/L (ref 3.5–5.3)
PROT SERPL-MCNC: 6.6 G/DL (ref 6.4–8.2)
RBC # BLD AUTO: 3.2 MILLION/UL (ref 3.81–5.12)
SODIUM SERPL-SCNC: 136 MMOL/L (ref 136–145)
WBC # BLD AUTO: 7.13 THOUSAND/UL (ref 4.31–10.16)

## 2020-03-29 PROCEDURE — 85025 COMPLETE CBC W/AUTO DIFF WBC: CPT | Performed by: STUDENT IN AN ORGANIZED HEALTH CARE EDUCATION/TRAINING PROGRAM

## 2020-03-29 PROCEDURE — 94762 N-INVAS EAR/PLS OXIMTRY CONT: CPT

## 2020-03-29 PROCEDURE — 80053 COMPREHEN METABOLIC PANEL: CPT | Performed by: STUDENT IN AN ORGANIZED HEALTH CARE EDUCATION/TRAINING PROGRAM

## 2020-03-29 RX ADMIN — CHLORDIAZEPOXIDE HYDROCHLORIDE 5 MG: 5 CAPSULE ORAL at 06:30

## 2020-03-29 RX ADMIN — OXYCODONE HYDROCHLORIDE 5 MG: 5 TABLET ORAL at 00:41

## 2020-04-01 LAB
AMPHETAMINES UR QL SCN: NEGATIVE NG/ML
BARBITURATES UR QL SCN: NEGATIVE NG/ML
BENZODIAZ UR QL: NEGATIVE
BZE UR QL: NEGATIVE NG/ML
CANNABINOIDS UR QL SCN: POSITIVE
METHADONE UR QL SCN: NEGATIVE NG/ML
OPIATES UR QL: NEGATIVE
PCP UR QL: NEGATIVE NG/ML
PROPOXYPH UR QL SCN: NEGATIVE NG/ML

## 2020-04-02 LAB — SARS-COV-2 RNA SPEC QL NAA+PROBE: NOT DETECTED

## 2020-04-14 ENCOUNTER — APPOINTMENT (EMERGENCY)
Dept: CT IMAGING | Facility: HOSPITAL | Age: 45
DRG: 720 | End: 2020-04-14
Payer: COMMERCIAL

## 2020-04-14 ENCOUNTER — HOSPITAL ENCOUNTER (INPATIENT)
Facility: HOSPITAL | Age: 45
LOS: 2 days | Discharge: HOME | DRG: 720 | End: 2020-04-17
Attending: EMERGENCY MEDICINE | Admitting: STUDENT IN AN ORGANIZED HEALTH CARE EDUCATION/TRAINING PROGRAM
Payer: COMMERCIAL

## 2020-04-14 DIAGNOSIS — R10.13 EPIGASTRIC PAIN: ICD-10-CM

## 2020-04-14 DIAGNOSIS — A41.9 SEPSIS (HCC): ICD-10-CM

## 2020-04-14 DIAGNOSIS — K52.9 COLITIS: Primary | ICD-10-CM

## 2020-04-14 LAB
ALBUMIN SERPL BCP-MCNC: 2.2 G/DL (ref 3.5–5)
ALP SERPL-CCNC: 492 U/L (ref 46–116)
ALT SERPL W P-5'-P-CCNC: 18 U/L (ref 12–78)
AMMONIA PLAS-SCNC: 30 UMOL/L (ref 11–35)
ANION GAP SERPL CALCULATED.3IONS-SCNC: 13 MMOL/L (ref 4–13)
APTT PPP: 40 SECONDS (ref 23–37)
AST SERPL W P-5'-P-CCNC: 145 U/L (ref 5–45)
BASOPHILS # BLD AUTO: 0.12 THOUSANDS/ΜL (ref 0–0.1)
BASOPHILS NFR BLD AUTO: 1 % (ref 0–1)
BILIRUB SERPL-MCNC: 1.7 MG/DL (ref 0.2–1)
BUN SERPL-MCNC: 6 MG/DL (ref 5–25)
CALCIUM SERPL-MCNC: 7.5 MG/DL (ref 8.3–10.1)
CHLORIDE SERPL-SCNC: 97 MMOL/L (ref 100–108)
CO2 SERPL-SCNC: 29 MMOL/L (ref 21–32)
CREAT SERPL-MCNC: 0.61 MG/DL (ref 0.6–1.3)
EOSINOPHIL # BLD AUTO: 0.08 THOUSAND/ΜL (ref 0–0.61)
EOSINOPHIL NFR BLD AUTO: 1 % (ref 0–6)
ERYTHROCYTE [DISTWIDTH] IN BLOOD BY AUTOMATED COUNT: 21.1 % (ref 11.6–15.1)
GFR SERPL CREATININE-BSD FRML MDRD: 111 ML/MIN/1.73SQ M
GLUCOSE SERPL-MCNC: 180 MG/DL (ref 65–140)
HCG SERPL QL: NEGATIVE
HCT VFR BLD AUTO: 35.8 % (ref 34.8–46.1)
HGB BLD-MCNC: 11.5 G/DL (ref 11.5–15.4)
IMM GRANULOCYTES # BLD AUTO: 0.11 THOUSAND/UL (ref 0–0.2)
IMM GRANULOCYTES NFR BLD AUTO: 1 % (ref 0–2)
INR PPP: 1.22 (ref 0.84–1.19)
LACTATE SERPL-SCNC: 6 MMOL/L (ref 0.5–2)
LIPASE SERPL-CCNC: 78 U/L (ref 73–393)
LYMPHOCYTES # BLD AUTO: 2.95 THOUSANDS/ΜL (ref 0.6–4.47)
LYMPHOCYTES NFR BLD AUTO: 19 % (ref 14–44)
MCH RBC QN AUTO: 31.9 PG (ref 26.8–34.3)
MCHC RBC AUTO-ENTMCNC: 32.1 G/DL (ref 31.4–37.4)
MCV RBC AUTO: 99 FL (ref 82–98)
MONOCYTES # BLD AUTO: 1.3 THOUSAND/ΜL (ref 0.17–1.22)
MONOCYTES NFR BLD AUTO: 8 % (ref 4–12)
NEUTROPHILS # BLD AUTO: 11.32 THOUSANDS/ΜL (ref 1.85–7.62)
NEUTS SEG NFR BLD AUTO: 70 % (ref 43–75)
NRBC BLD AUTO-RTO: 0 /100 WBCS
PLATELET # BLD AUTO: 253 THOUSANDS/UL (ref 149–390)
PMV BLD AUTO: 10.4 FL (ref 8.9–12.7)
POTASSIUM SERPL-SCNC: 3.2 MMOL/L (ref 3.5–5.3)
PROT SERPL-MCNC: 7.5 G/DL (ref 6.4–8.2)
PROTHROMBIN TIME: 15.5 SECONDS (ref 11.6–14.5)
RBC # BLD AUTO: 3.6 MILLION/UL (ref 3.81–5.12)
SODIUM SERPL-SCNC: 139 MMOL/L (ref 136–145)
WBC # BLD AUTO: 15.88 THOUSAND/UL (ref 4.31–10.16)

## 2020-04-14 PROCEDURE — 96375 TX/PRO/DX INJ NEW DRUG ADDON: CPT

## 2020-04-14 PROCEDURE — 85610 PROTHROMBIN TIME: CPT | Performed by: PHYSICIAN ASSISTANT

## 2020-04-14 PROCEDURE — 96365 THER/PROPH/DIAG IV INF INIT: CPT

## 2020-04-14 PROCEDURE — 87040 BLOOD CULTURE FOR BACTERIA: CPT | Performed by: PHYSICIAN ASSISTANT

## 2020-04-14 PROCEDURE — 96376 TX/PRO/DX INJ SAME DRUG ADON: CPT

## 2020-04-14 PROCEDURE — 96361 HYDRATE IV INFUSION ADD-ON: CPT

## 2020-04-14 PROCEDURE — 85025 COMPLETE CBC W/AUTO DIFF WBC: CPT | Performed by: PHYSICIAN ASSISTANT

## 2020-04-14 PROCEDURE — 85730 THROMBOPLASTIN TIME PARTIAL: CPT | Performed by: PHYSICIAN ASSISTANT

## 2020-04-14 PROCEDURE — 82140 ASSAY OF AMMONIA: CPT | Performed by: PHYSICIAN ASSISTANT

## 2020-04-14 PROCEDURE — 74177 CT ABD & PELVIS W/CONTRAST: CPT

## 2020-04-14 PROCEDURE — 36415 COLL VENOUS BLD VENIPUNCTURE: CPT | Performed by: PHYSICIAN ASSISTANT

## 2020-04-14 PROCEDURE — 80053 COMPREHEN METABOLIC PANEL: CPT | Performed by: PHYSICIAN ASSISTANT

## 2020-04-14 PROCEDURE — 83690 ASSAY OF LIPASE: CPT | Performed by: PHYSICIAN ASSISTANT

## 2020-04-14 PROCEDURE — 84703 CHORIONIC GONADOTROPIN ASSAY: CPT | Performed by: PHYSICIAN ASSISTANT

## 2020-04-14 PROCEDURE — 93005 ELECTROCARDIOGRAM TRACING: CPT

## 2020-04-14 PROCEDURE — 83605 ASSAY OF LACTIC ACID: CPT | Performed by: PHYSICIAN ASSISTANT

## 2020-04-14 PROCEDURE — 99285 EMERGENCY DEPT VISIT HI MDM: CPT

## 2020-04-14 RX ORDER — HYDROMORPHONE HCL/PF 1 MG/ML
0.5 SYRINGE (ML) INJECTION ONCE
Status: COMPLETED | OUTPATIENT
Start: 2020-04-14 | End: 2020-04-14

## 2020-04-14 RX ORDER — HYDROMORPHONE HCL/PF 1 MG/ML
1 SYRINGE (ML) INJECTION ONCE
Status: COMPLETED | OUTPATIENT
Start: 2020-04-14 | End: 2020-04-14

## 2020-04-14 RX ORDER — HYDROMORPHONE HCL/PF 1 MG/ML
0.5 SYRINGE (ML) INJECTION ONCE
Status: DISCONTINUED | OUTPATIENT
Start: 2020-04-14 | End: 2020-04-14

## 2020-04-14 RX ORDER — ONDANSETRON 2 MG/ML
4 INJECTION INTRAMUSCULAR; INTRAVENOUS ONCE
Status: COMPLETED | OUTPATIENT
Start: 2020-04-14 | End: 2020-04-14

## 2020-04-14 RX ADMIN — HYDROMORPHONE HYDROCHLORIDE 0.5 MG: 1 INJECTION, SOLUTION INTRAMUSCULAR; INTRAVENOUS; SUBCUTANEOUS at 23:26

## 2020-04-14 RX ADMIN — SODIUM CHLORIDE 1500 ML: 0.9 INJECTION, SOLUTION INTRAVENOUS at 23:24

## 2020-04-14 RX ADMIN — PIPERACILLIN SODIUM,TAZOBACTAM SODIUM 3.38 G: 3; .375 INJECTION, POWDER, FOR SOLUTION INTRAVENOUS at 23:16

## 2020-04-14 RX ADMIN — ONDANSETRON 4 MG: 2 INJECTION INTRAMUSCULAR; INTRAVENOUS at 22:49

## 2020-04-14 RX ADMIN — HYDROMORPHONE HYDROCHLORIDE 1 MG: 1 INJECTION, SOLUTION INTRAMUSCULAR; INTRAVENOUS; SUBCUTANEOUS at 22:50

## 2020-04-14 RX ADMIN — IOHEXOL 100 ML: 350 INJECTION, SOLUTION INTRAVENOUS at 23:14

## 2020-04-14 RX ADMIN — SODIUM CHLORIDE 1000 ML: 0.9 INJECTION, SOLUTION INTRAVENOUS at 22:51

## 2020-04-15 PROBLEM — A41.9 SEPSIS (HCC): Status: ACTIVE | Noted: 2020-04-15

## 2020-04-15 LAB
ATRIAL RATE: 116 BPM
GLUCOSE SERPL-MCNC: 112 MG/DL (ref 65–140)
GLUCOSE SERPL-MCNC: 124 MG/DL (ref 65–140)
GLUCOSE SERPL-MCNC: 153 MG/DL (ref 65–140)
GLUCOSE SERPL-MCNC: 96 MG/DL (ref 65–140)
GLUCOSE SERPL-MCNC: 99 MG/DL (ref 65–140)
LACTATE SERPL-SCNC: 3.4 MMOL/L (ref 0.5–2)
LACTATE SERPL-SCNC: 3.6 MMOL/L (ref 0.5–2)
LACTATE SERPL-SCNC: 4.1 MMOL/L (ref 0.5–2)
LACTATE SERPL-SCNC: 4.3 MMOL/L (ref 0.5–2)
LACTATE SERPL-SCNC: 4.4 MMOL/L (ref 0.5–2)
LACTATE SERPL-SCNC: 4.7 MMOL/L (ref 0.5–2)
P AXIS: 77 DEGREES
PLATELET # BLD AUTO: 170 THOUSANDS/UL (ref 149–390)
PMV BLD AUTO: 10.8 FL (ref 8.9–12.7)
PR INTERVAL: 144 MS
PROCALCITONIN SERPL-MCNC: 0.13 NG/ML
QRS AXIS: 60 DEGREES
QRSD INTERVAL: 76 MS
QT INTERVAL: 346 MS
QTC INTERVAL: 480 MS
T WAVE AXIS: 46 DEGREES
VENTRICULAR RATE: 116 BPM

## 2020-04-15 PROCEDURE — 82948 REAGENT STRIP/BLOOD GLUCOSE: CPT

## 2020-04-15 PROCEDURE — 87505 NFCT AGENT DETECTION GI: CPT | Performed by: PHYSICIAN ASSISTANT

## 2020-04-15 PROCEDURE — 99223 1ST HOSP IP/OBS HIGH 75: CPT | Performed by: INTERNAL MEDICINE

## 2020-04-15 PROCEDURE — 83605 ASSAY OF LACTIC ACID: CPT | Performed by: PHYSICIAN ASSISTANT

## 2020-04-15 PROCEDURE — 87493 C DIFF AMPLIFIED PROBE: CPT | Performed by: PHYSICIAN ASSISTANT

## 2020-04-15 PROCEDURE — 85049 AUTOMATED PLATELET COUNT: CPT | Performed by: PHYSICIAN ASSISTANT

## 2020-04-15 PROCEDURE — 93010 ELECTROCARDIOGRAM REPORT: CPT | Performed by: INTERNAL MEDICINE

## 2020-04-15 PROCEDURE — 99285 EMERGENCY DEPT VISIT HI MDM: CPT | Performed by: PHYSICIAN ASSISTANT

## 2020-04-15 PROCEDURE — 99254 IP/OBS CNSLTJ NEW/EST MOD 60: CPT | Performed by: INTERNAL MEDICINE

## 2020-04-15 PROCEDURE — 36415 COLL VENOUS BLD VENIPUNCTURE: CPT | Performed by: PHYSICIAN ASSISTANT

## 2020-04-15 PROCEDURE — 83605 ASSAY OF LACTIC ACID: CPT | Performed by: INTERNAL MEDICINE

## 2020-04-15 PROCEDURE — 99254 IP/OBS CNSLTJ NEW/EST MOD 60: CPT | Performed by: SURGERY

## 2020-04-15 PROCEDURE — C9113 INJ PANTOPRAZOLE SODIUM, VIA: HCPCS | Performed by: PHYSICIAN ASSISTANT

## 2020-04-15 PROCEDURE — 84145 PROCALCITONIN (PCT): CPT | Performed by: PHYSICIAN ASSISTANT

## 2020-04-15 RX ORDER — CLONIDINE HYDROCHLORIDE 0.1 MG/1
0.3 TABLET ORAL 2 TIMES DAILY
Status: DISCONTINUED | OUTPATIENT
Start: 2020-04-15 | End: 2020-04-17 | Stop reason: HOSPADM

## 2020-04-15 RX ORDER — HYDROMORPHONE HCL/PF 1 MG/ML
0.5 SYRINGE (ML) INJECTION EVERY 2 HOUR PRN
Status: COMPLETED | OUTPATIENT
Start: 2020-04-15 | End: 2020-04-15

## 2020-04-15 RX ORDER — ONDANSETRON 2 MG/ML
4 INJECTION INTRAMUSCULAR; INTRAVENOUS EVERY 6 HOURS PRN
Status: DISCONTINUED | OUTPATIENT
Start: 2020-04-15 | End: 2020-04-15

## 2020-04-15 RX ORDER — ALBUTEROL SULFATE 90 UG/1
2 AEROSOL, METERED RESPIRATORY (INHALATION) EVERY 4 HOURS PRN
Status: DISCONTINUED | OUTPATIENT
Start: 2020-04-15 | End: 2020-04-17 | Stop reason: HOSPADM

## 2020-04-15 RX ORDER — SODIUM CHLORIDE 9 MG/ML
125 INJECTION, SOLUTION INTRAVENOUS CONTINUOUS
Status: DISPENSED | OUTPATIENT
Start: 2020-04-15 | End: 2020-04-15

## 2020-04-15 RX ORDER — PANTOPRAZOLE SODIUM 40 MG/1
40 TABLET, DELAYED RELEASE ORAL DAILY
Status: DISCONTINUED | OUTPATIENT
Start: 2020-04-15 | End: 2020-04-15

## 2020-04-15 RX ORDER — DICYCLOMINE HCL 20 MG
20 TABLET ORAL
Status: DISCONTINUED | OUTPATIENT
Start: 2020-04-15 | End: 2020-04-15

## 2020-04-15 RX ORDER — KETOROLAC TROMETHAMINE 30 MG/ML
15 INJECTION, SOLUTION INTRAMUSCULAR; INTRAVENOUS EVERY 6 HOURS PRN
Status: DISCONTINUED | OUTPATIENT
Start: 2020-04-15 | End: 2020-04-15

## 2020-04-15 RX ORDER — NICOTINE 21 MG/24HR
1 PATCH, TRANSDERMAL 24 HOURS TRANSDERMAL DAILY
Status: DISCONTINUED | OUTPATIENT
Start: 2020-04-15 | End: 2020-04-17 | Stop reason: HOSPADM

## 2020-04-15 RX ORDER — ONDANSETRON 2 MG/ML
4 INJECTION INTRAMUSCULAR; INTRAVENOUS EVERY 6 HOURS PRN
Status: DISCONTINUED | OUTPATIENT
Start: 2020-04-15 | End: 2020-04-17 | Stop reason: HOSPADM

## 2020-04-15 RX ORDER — NICOTINE 21 MG/24HR
21 PATCH, TRANSDERMAL 24 HOURS TRANSDERMAL DAILY
Status: DISCONTINUED | OUTPATIENT
Start: 2020-04-15 | End: 2020-04-15

## 2020-04-15 RX ORDER — ACETAMINOPHEN 160 MG/5ML
650 SUSPENSION, ORAL (FINAL DOSE FORM) ORAL EVERY 6 HOURS
Status: DISCONTINUED | OUTPATIENT
Start: 2020-04-15 | End: 2020-04-17 | Stop reason: HOSPADM

## 2020-04-15 RX ORDER — DOCUSATE SODIUM 100 MG/1
100 CAPSULE, LIQUID FILLED ORAL 2 TIMES DAILY
Status: DISCONTINUED | OUTPATIENT
Start: 2020-04-15 | End: 2020-04-15

## 2020-04-15 RX ORDER — METOCLOPRAMIDE 10 MG/1
10 TABLET ORAL EVERY 6 HOURS
Status: DISCONTINUED | OUTPATIENT
Start: 2020-04-15 | End: 2020-04-15

## 2020-04-15 RX ORDER — SUCRALFATE 1 G/1
1 TABLET ORAL 4 TIMES DAILY
Status: DISCONTINUED | OUTPATIENT
Start: 2020-04-15 | End: 2020-04-16

## 2020-04-15 RX ORDER — NICOTINE 21 MG/24HR
1 PATCH, TRANSDERMAL 24 HOURS TRANSDERMAL DAILY
Status: DISCONTINUED | OUTPATIENT
Start: 2020-04-15 | End: 2020-04-15

## 2020-04-15 RX ORDER — HYDROMORPHONE HCL/PF 1 MG/ML
1 SYRINGE (ML) INJECTION EVERY 4 HOURS PRN
Status: DISCONTINUED | OUTPATIENT
Start: 2020-04-15 | End: 2020-04-17 | Stop reason: HOSPADM

## 2020-04-15 RX ORDER — PANTOPRAZOLE SODIUM 40 MG/1
40 INJECTION, POWDER, FOR SOLUTION INTRAVENOUS EVERY 12 HOURS SCHEDULED
Status: DISCONTINUED | OUTPATIENT
Start: 2020-04-15 | End: 2020-04-17

## 2020-04-15 RX ADMIN — METRONIDAZOLE 500 MG: 500 INJECTION, SOLUTION INTRAVENOUS at 10:14

## 2020-04-15 RX ADMIN — ALBUTEROL SULFATE 2 PUFF: 90 AEROSOL, METERED RESPIRATORY (INHALATION) at 11:13

## 2020-04-15 RX ADMIN — ALBUTEROL SULFATE 2 PUFF: 90 AEROSOL, METERED RESPIRATORY (INHALATION) at 05:26

## 2020-04-15 RX ADMIN — ACETAMINOPHEN 650 MG: 650 SUSPENSION ORAL at 07:06

## 2020-04-15 RX ADMIN — HYDROMORPHONE HYDROCHLORIDE 1 MG: 1 INJECTION, SOLUTION INTRAMUSCULAR; INTRAVENOUS; SUBCUTANEOUS at 17:30

## 2020-04-15 RX ADMIN — PANTOPRAZOLE SODIUM 40 MG: 40 INJECTION, POWDER, FOR SOLUTION INTRAVENOUS at 21:13

## 2020-04-15 RX ADMIN — ACETAMINOPHEN 650 MG: 650 SUSPENSION ORAL at 21:12

## 2020-04-15 RX ADMIN — METOCLOPRAMIDE 10 MG: 10 TABLET ORAL at 02:54

## 2020-04-15 RX ADMIN — HYDROMORPHONE HYDROCHLORIDE 0.5 MG: 1 INJECTION, SOLUTION INTRAMUSCULAR; INTRAVENOUS; SUBCUTANEOUS at 14:58

## 2020-04-15 RX ADMIN — DICYCLOMINE HYDROCHLORIDE 20 MG: 20 TABLET ORAL at 07:06

## 2020-04-15 RX ADMIN — METRONIDAZOLE 500 MG: 500 INJECTION, SOLUTION INTRAVENOUS at 02:55

## 2020-04-15 RX ADMIN — PANTOPRAZOLE SODIUM 40 MG: 40 INJECTION, POWDER, FOR SOLUTION INTRAVENOUS at 02:54

## 2020-04-15 RX ADMIN — SODIUM CHLORIDE 500 ML: 0.9 INJECTION, SOLUTION INTRAVENOUS at 07:12

## 2020-04-15 RX ADMIN — PIPERACILLIN SODIUM,TAZOBACTAM SODIUM 3.38 G: 3; .375 INJECTION, POWDER, FOR SOLUTION INTRAVENOUS at 05:25

## 2020-04-15 RX ADMIN — NICOTINE 1 PATCH: 21 PATCH TRANSDERMAL at 03:09

## 2020-04-15 RX ADMIN — PIPERACILLIN SODIUM,TAZOBACTAM SODIUM 3.38 G: 3; .375 INJECTION, POWDER, FOR SOLUTION INTRAVENOUS at 12:15

## 2020-04-15 RX ADMIN — SUCRALFATE 1 G: 1 TABLET ORAL at 17:21

## 2020-04-15 RX ADMIN — HYDROMORPHONE HYDROCHLORIDE 0.5 MG: 1 INJECTION, SOLUTION INTRAMUSCULAR; INTRAVENOUS; SUBCUTANEOUS at 05:07

## 2020-04-15 RX ADMIN — ACETAMINOPHEN 650 MG: 650 SUSPENSION ORAL at 01:34

## 2020-04-15 RX ADMIN — KETOROLAC TROMETHAMINE 15 MG: 30 INJECTION, SOLUTION INTRAMUSCULAR at 01:38

## 2020-04-15 RX ADMIN — SODIUM CHLORIDE 125 ML/HR: 0.9 INJECTION, SOLUTION INTRAVENOUS at 02:55

## 2020-04-15 RX ADMIN — HYDROMORPHONE HYDROCHLORIDE 0.5 MG: 1 INJECTION, SOLUTION INTRAMUSCULAR; INTRAVENOUS; SUBCUTANEOUS at 10:10

## 2020-04-15 RX ADMIN — HYDROMORPHONE HYDROCHLORIDE 0.5 MG: 1 INJECTION, SOLUTION INTRAMUSCULAR; INTRAVENOUS; SUBCUTANEOUS at 01:38

## 2020-04-15 RX ADMIN — PIPERACILLIN SODIUM,TAZOBACTAM SODIUM 3.38 G: 3; .375 INJECTION, POWDER, FOR SOLUTION INTRAVENOUS at 17:21

## 2020-04-15 RX ADMIN — HYDROMORPHONE HYDROCHLORIDE 1 MG: 1 INJECTION, SOLUTION INTRAMUSCULAR; INTRAVENOUS; SUBCUTANEOUS at 21:52

## 2020-04-15 RX ADMIN — HYDROMORPHONE HYDROCHLORIDE 0.5 MG: 1 INJECTION, SOLUTION INTRAMUSCULAR; INTRAVENOUS; SUBCUTANEOUS at 12:11

## 2020-04-15 RX ADMIN — POTASSIUM CHLORIDE 30 MEQ: 1500 TABLET, EXTENDED RELEASE ORAL at 02:54

## 2020-04-15 RX ADMIN — PIPERACILLIN SODIUM,TAZOBACTAM SODIUM 3.38 G: 3; .375 INJECTION, POWDER, FOR SOLUTION INTRAVENOUS at 23:04

## 2020-04-15 RX ADMIN — CLONIDINE HYDROCHLORIDE 0.3 MG: 0.1 TABLET ORAL at 17:21

## 2020-04-15 RX ADMIN — HYDROMORPHONE HYDROCHLORIDE 0.5 MG: 1 INJECTION, SOLUTION INTRAMUSCULAR; INTRAVENOUS; SUBCUTANEOUS at 07:28

## 2020-04-15 RX ADMIN — HYDROMORPHONE HYDROCHLORIDE 0.5 MG: 1 INJECTION, SOLUTION INTRAMUSCULAR; INTRAVENOUS; SUBCUTANEOUS at 19:35

## 2020-04-15 RX ADMIN — ACETAMINOPHEN 650 MG: 650 SUSPENSION ORAL at 14:55

## 2020-04-15 RX ADMIN — DICYCLOMINE HYDROCHLORIDE 20 MG: 20 TABLET ORAL at 01:34

## 2020-04-15 RX ADMIN — SUCRALFATE 1 G: 1 TABLET ORAL at 21:12

## 2020-04-15 RX ADMIN — FOLIC ACID: 5 INJECTION, SOLUTION INTRAMUSCULAR; INTRAVENOUS; SUBCUTANEOUS at 08:36

## 2020-04-16 PROBLEM — G47.00 INSOMNIA: Status: ACTIVE | Noted: 2020-04-16

## 2020-04-16 LAB
ALBUMIN SERPL BCP-MCNC: 1.9 G/DL (ref 3.5–5)
ALP SERPL-CCNC: 361 U/L (ref 46–116)
ALT SERPL W P-5'-P-CCNC: 12 U/L (ref 12–78)
ANION GAP SERPL CALCULATED.3IONS-SCNC: 8 MMOL/L (ref 4–13)
AST SERPL W P-5'-P-CCNC: 90 U/L (ref 5–45)
BILIRUB SERPL-MCNC: 2.6 MG/DL (ref 0.2–1)
BUN SERPL-MCNC: 4 MG/DL (ref 5–25)
C DIFF TOX GENS STL QL NAA+PROBE: NEGATIVE
CALCIUM SERPL-MCNC: 6.7 MG/DL (ref 8.3–10.1)
CAMPYLOBACTER DNA SPEC NAA+PROBE: NORMAL
CHLORIDE SERPL-SCNC: 100 MMOL/L (ref 100–108)
CO2 SERPL-SCNC: 28 MMOL/L (ref 21–32)
CREAT SERPL-MCNC: 0.59 MG/DL (ref 0.6–1.3)
CRP SERPL QL: 119.5 MG/L
ERYTHROCYTE [DISTWIDTH] IN BLOOD BY AUTOMATED COUNT: 19.8 % (ref 11.6–15.1)
GFR SERPL CREATININE-BSD FRML MDRD: 113 ML/MIN/1.73SQ M
GGT SERPL-CCNC: 752 U/L (ref 5–85)
GLUCOSE SERPL-MCNC: 102 MG/DL (ref 65–140)
GLUCOSE SERPL-MCNC: 107 MG/DL (ref 65–140)
GLUCOSE SERPL-MCNC: 108 MG/DL (ref 65–140)
GLUCOSE SERPL-MCNC: 121 MG/DL (ref 65–140)
GLUCOSE SERPL-MCNC: 151 MG/DL (ref 65–140)
GLUCOSE SERPL-MCNC: 96 MG/DL (ref 65–140)
HCT VFR BLD AUTO: 30 % (ref 34.8–46.1)
HGB BLD-MCNC: 9.3 G/DL (ref 11.5–15.4)
INR PPP: 1.32 (ref 0.84–1.19)
MCH RBC QN AUTO: 32.1 PG (ref 26.8–34.3)
MCHC RBC AUTO-ENTMCNC: 31 G/DL (ref 31.4–37.4)
MCV RBC AUTO: 103 FL (ref 82–98)
PLATELET # BLD AUTO: 128 THOUSANDS/UL (ref 149–390)
PMV BLD AUTO: 10.6 FL (ref 8.9–12.7)
POTASSIUM SERPL-SCNC: 3.2 MMOL/L (ref 3.5–5.3)
PROT SERPL-MCNC: 6.3 G/DL (ref 6.4–8.2)
PROTHROMBIN TIME: 16.4 SECONDS (ref 11.6–14.5)
RBC # BLD AUTO: 2.9 MILLION/UL (ref 3.81–5.12)
SALMONELLA DNA SPEC QL NAA+PROBE: NORMAL
SHIGA TOXIN STX GENE SPEC NAA+PROBE: NORMAL
SHIGELLA DNA SPEC QL NAA+PROBE: NORMAL
SODIUM SERPL-SCNC: 136 MMOL/L (ref 136–145)
WBC # BLD AUTO: 8.87 THOUSAND/UL (ref 4.31–10.16)

## 2020-04-16 PROCEDURE — 80053 COMPREHEN METABOLIC PANEL: CPT | Performed by: INTERNAL MEDICINE

## 2020-04-16 PROCEDURE — 94760 N-INVAS EAR/PLS OXIMETRY 1: CPT

## 2020-04-16 PROCEDURE — 82948 REAGENT STRIP/BLOOD GLUCOSE: CPT

## 2020-04-16 PROCEDURE — 94762 N-INVAS EAR/PLS OXIMTRY CONT: CPT

## 2020-04-16 PROCEDURE — 99232 SBSQ HOSP IP/OBS MODERATE 35: CPT | Performed by: INTERNAL MEDICINE

## 2020-04-16 PROCEDURE — 82977 ASSAY OF GGT: CPT | Performed by: PHYSICIAN ASSISTANT

## 2020-04-16 PROCEDURE — 85027 COMPLETE CBC AUTOMATED: CPT | Performed by: INTERNAL MEDICINE

## 2020-04-16 PROCEDURE — C9113 INJ PANTOPRAZOLE SODIUM, VIA: HCPCS | Performed by: INTERNAL MEDICINE

## 2020-04-16 PROCEDURE — 83993 ASSAY FOR CALPROTECTIN FECAL: CPT | Performed by: PHYSICIAN ASSISTANT

## 2020-04-16 PROCEDURE — 85610 PROTHROMBIN TIME: CPT | Performed by: PHYSICIAN ASSISTANT

## 2020-04-16 PROCEDURE — C9113 INJ PANTOPRAZOLE SODIUM, VIA: HCPCS | Performed by: PHYSICIAN ASSISTANT

## 2020-04-16 PROCEDURE — 86140 C-REACTIVE PROTEIN: CPT | Performed by: PHYSICIAN ASSISTANT

## 2020-04-16 RX ORDER — LORAZEPAM 0.5 MG/1
0.25 TABLET ORAL ONCE AS NEEDED
Status: COMPLETED | OUTPATIENT
Start: 2020-04-16 | End: 2020-04-16

## 2020-04-16 RX ORDER — DICYCLOMINE HCL 20 MG
20 TABLET ORAL
Status: DISCONTINUED | OUTPATIENT
Start: 2020-04-16 | End: 2020-04-17 | Stop reason: HOSPADM

## 2020-04-16 RX ORDER — FLUTICASONE PROPIONATE 50 MCG
1 SPRAY, SUSPENSION (ML) NASAL 2 TIMES DAILY
Status: DISCONTINUED | OUTPATIENT
Start: 2020-04-16 | End: 2020-04-17 | Stop reason: HOSPADM

## 2020-04-16 RX ORDER — POTASSIUM CHLORIDE AND SODIUM CHLORIDE 900; 300 MG/100ML; MG/100ML
75 INJECTION, SOLUTION INTRAVENOUS CONTINUOUS
Status: DISCONTINUED | OUTPATIENT
Start: 2020-04-16 | End: 2020-04-17 | Stop reason: HOSPADM

## 2020-04-16 RX ORDER — TEMAZEPAM 15 MG/1
30 CAPSULE ORAL
Status: DISCONTINUED | OUTPATIENT
Start: 2020-04-16 | End: 2020-04-17 | Stop reason: HOSPADM

## 2020-04-16 RX ORDER — LOPERAMIDE HYDROCHLORIDE 2 MG/1
2 CAPSULE ORAL 4 TIMES DAILY PRN
Status: DISCONTINUED | OUTPATIENT
Start: 2020-04-16 | End: 2020-04-17 | Stop reason: HOSPADM

## 2020-04-16 RX ORDER — LORATADINE 10 MG/1
10 TABLET ORAL DAILY
Status: DISCONTINUED | OUTPATIENT
Start: 2020-04-16 | End: 2020-04-17 | Stop reason: HOSPADM

## 2020-04-16 RX ORDER — DIPHENOXYLATE HYDROCHLORIDE AND ATROPINE SULFATE 2.5; .025 MG/1; MG/1
1 TABLET ORAL ONCE
Status: COMPLETED | OUTPATIENT
Start: 2020-04-16 | End: 2020-04-16

## 2020-04-16 RX ADMIN — DIPHENOXYLATE HYDROCHLORIDE AND ATROPINE SULFATE 1 TABLET: 2.5; .025 TABLET ORAL at 10:11

## 2020-04-16 RX ADMIN — METRONIDAZOLE 500 MG: 500 INJECTION, SOLUTION INTRAVENOUS at 23:25

## 2020-04-16 RX ADMIN — LOPERAMIDE HYDROCHLORIDE 2 MG: 2 CAPSULE ORAL at 19:52

## 2020-04-16 RX ADMIN — HYDROMORPHONE HYDROCHLORIDE 1 MG: 1 INJECTION, SOLUTION INTRAMUSCULAR; INTRAVENOUS; SUBCUTANEOUS at 19:49

## 2020-04-16 RX ADMIN — ACETAMINOPHEN 650 MG: 650 SUSPENSION ORAL at 19:48

## 2020-04-16 RX ADMIN — ACETAMINOPHEN 650 MG: 650 SUSPENSION ORAL at 14:56

## 2020-04-16 RX ADMIN — LORATADINE 10 MG: 10 TABLET ORAL at 10:11

## 2020-04-16 RX ADMIN — POTASSIUM CHLORIDE AND SODIUM CHLORIDE 75 ML/HR: 900; 300 INJECTION, SOLUTION INTRAVENOUS at 10:02

## 2020-04-16 RX ADMIN — PANCRELIPASE 24000 UNITS: 24000; 76000; 120000 CAPSULE, DELAYED RELEASE PELLETS ORAL at 19:53

## 2020-04-16 RX ADMIN — PANTOPRAZOLE SODIUM 40 MG: 40 INJECTION, POWDER, FOR SOLUTION INTRAVENOUS at 21:17

## 2020-04-16 RX ADMIN — CEFTRIAXONE SODIUM 1000 MG: 10 INJECTION, POWDER, FOR SOLUTION INTRAVENOUS at 19:52

## 2020-04-16 RX ADMIN — PIPERACILLIN SODIUM,TAZOBACTAM SODIUM 3.38 G: 3; .375 INJECTION, POWDER, FOR SOLUTION INTRAVENOUS at 11:58

## 2020-04-16 RX ADMIN — HYDROMORPHONE HYDROCHLORIDE 1 MG: 1 INJECTION, SOLUTION INTRAMUSCULAR; INTRAVENOUS; SUBCUTANEOUS at 02:03

## 2020-04-16 RX ADMIN — ACETAMINOPHEN 650 MG: 650 SUSPENSION ORAL at 02:05

## 2020-04-16 RX ADMIN — HYDROMORPHONE HYDROCHLORIDE 1 MG: 1 INJECTION, SOLUTION INTRAMUSCULAR; INTRAVENOUS; SUBCUTANEOUS at 23:49

## 2020-04-16 RX ADMIN — DICYCLOMINE HYDROCHLORIDE 20 MG: 20 TABLET ORAL at 19:49

## 2020-04-16 RX ADMIN — TEMAZEPAM 30 MG: 15 CAPSULE ORAL at 21:17

## 2020-04-16 RX ADMIN — METRONIDAZOLE 500 MG: 500 INJECTION, SOLUTION INTRAVENOUS at 14:57

## 2020-04-16 RX ADMIN — POTASSIUM CHLORIDE AND SODIUM CHLORIDE 75 ML/HR: 900; 300 INJECTION, SOLUTION INTRAVENOUS at 23:52

## 2020-04-16 RX ADMIN — INSULIN LISPRO 1 UNITS: 100 INJECTION, SOLUTION INTRAVENOUS; SUBCUTANEOUS at 23:36

## 2020-04-16 RX ADMIN — CLONIDINE HYDROCHLORIDE 0.3 MG: 0.1 TABLET ORAL at 19:52

## 2020-04-16 RX ADMIN — FLUTICASONE PROPIONATE 1 SPRAY: 50 SPRAY, METERED NASAL at 10:06

## 2020-04-16 RX ADMIN — NICOTINE 1 PATCH: 21 PATCH TRANSDERMAL at 03:26

## 2020-04-16 RX ADMIN — HYDROMORPHONE HYDROCHLORIDE 1 MG: 1 INJECTION, SOLUTION INTRAMUSCULAR; INTRAVENOUS; SUBCUTANEOUS at 10:05

## 2020-04-16 RX ADMIN — PANTOPRAZOLE SODIUM 40 MG: 40 INJECTION, POWDER, FOR SOLUTION INTRAVENOUS at 09:07

## 2020-04-16 RX ADMIN — ACETAMINOPHEN 650 MG: 650 SUSPENSION ORAL at 06:50

## 2020-04-16 RX ADMIN — PIPERACILLIN SODIUM,TAZOBACTAM SODIUM 3.38 G: 3; .375 INJECTION, POWDER, FOR SOLUTION INTRAVENOUS at 06:00

## 2020-04-16 RX ADMIN — FLUTICASONE PROPIONATE 1 SPRAY: 50 SPRAY, METERED NASAL at 19:53

## 2020-04-16 RX ADMIN — HYDROMORPHONE HYDROCHLORIDE 1 MG: 1 INJECTION, SOLUTION INTRAMUSCULAR; INTRAVENOUS; SUBCUTANEOUS at 06:00

## 2020-04-16 RX ADMIN — HYDROMORPHONE HYDROCHLORIDE 1 MG: 1 INJECTION, SOLUTION INTRAMUSCULAR; INTRAVENOUS; SUBCUTANEOUS at 14:56

## 2020-04-16 RX ADMIN — SUCRALFATE 1 G: 1 TABLET ORAL at 09:08

## 2020-04-16 RX ADMIN — LORAZEPAM 0.25 MG: 0.5 TABLET ORAL at 03:26

## 2020-04-16 RX ADMIN — CLONIDINE HYDROCHLORIDE 0.3 MG: 0.1 TABLET ORAL at 09:07

## 2020-04-16 RX ADMIN — NICOTINE 1 PATCH: 21 PATCH TRANSDERMAL at 23:25

## 2020-04-17 VITALS
OXYGEN SATURATION: 100 % | HEART RATE: 84 BPM | DIASTOLIC BLOOD PRESSURE: 84 MMHG | HEIGHT: 64 IN | SYSTOLIC BLOOD PRESSURE: 123 MMHG | TEMPERATURE: 98 F | RESPIRATION RATE: 18 BRPM | WEIGHT: 185 LBS | BODY MASS INDEX: 31.58 KG/M2

## 2020-04-17 LAB
ALBUMIN SERPL BCP-MCNC: 2 G/DL (ref 3.5–5)
ALP SERPL-CCNC: 474 U/L (ref 46–116)
ALT SERPL W P-5'-P-CCNC: 11 U/L (ref 12–78)
ANION GAP SERPL CALCULATED.3IONS-SCNC: 12 MMOL/L (ref 4–13)
AST SERPL W P-5'-P-CCNC: 114 U/L (ref 5–45)
BILIRUB SERPL-MCNC: 2 MG/DL (ref 0.2–1)
BUN SERPL-MCNC: 4 MG/DL (ref 5–25)
CALCIUM SERPL-MCNC: 7.1 MG/DL (ref 8.3–10.1)
CHLORIDE SERPL-SCNC: 99 MMOL/L (ref 100–108)
CO2 SERPL-SCNC: 23 MMOL/L (ref 21–32)
CREAT SERPL-MCNC: 0.71 MG/DL (ref 0.6–1.3)
ERYTHROCYTE [DISTWIDTH] IN BLOOD BY AUTOMATED COUNT: 20.1 % (ref 11.6–15.1)
GFR SERPL CREATININE-BSD FRML MDRD: 105 ML/MIN/1.73SQ M
GLUCOSE SERPL-MCNC: 108 MG/DL (ref 65–140)
GLUCOSE SERPL-MCNC: 160 MG/DL (ref 65–140)
HCT VFR BLD AUTO: 30.6 % (ref 34.8–46.1)
HGB BLD-MCNC: 9.4 G/DL (ref 11.5–15.4)
LACTATE SERPL-SCNC: 1.8 MMOL/L (ref 0.5–2)
LACTATE SERPL-SCNC: 3.1 MMOL/L (ref 0.5–2)
MAGNESIUM SERPL-MCNC: 1.3 MG/DL (ref 1.6–2.6)
MCH RBC QN AUTO: 32.3 PG (ref 26.8–34.3)
MCHC RBC AUTO-ENTMCNC: 30.7 G/DL (ref 31.4–37.4)
MCV RBC AUTO: 105 FL (ref 82–98)
PHOSPHATE SERPL-MCNC: 2.3 MG/DL (ref 2.7–4.5)
PLATELET # BLD AUTO: 139 THOUSANDS/UL (ref 149–390)
PMV BLD AUTO: 10.9 FL (ref 8.9–12.7)
POTASSIUM SERPL-SCNC: 3.3 MMOL/L (ref 3.5–5.3)
PROT SERPL-MCNC: 7.4 G/DL (ref 6.4–8.2)
RBC # BLD AUTO: 2.91 MILLION/UL (ref 3.81–5.12)
SODIUM SERPL-SCNC: 134 MMOL/L (ref 136–145)
WBC # BLD AUTO: 7.12 THOUSAND/UL (ref 4.31–10.16)

## 2020-04-17 PROCEDURE — 84100 ASSAY OF PHOSPHORUS: CPT | Performed by: INTERNAL MEDICINE

## 2020-04-17 PROCEDURE — 80053 COMPREHEN METABOLIC PANEL: CPT | Performed by: INTERNAL MEDICINE

## 2020-04-17 PROCEDURE — C9113 INJ PANTOPRAZOLE SODIUM, VIA: HCPCS | Performed by: INTERNAL MEDICINE

## 2020-04-17 PROCEDURE — 99239 HOSP IP/OBS DSCHRG MGMT >30: CPT | Performed by: INTERNAL MEDICINE

## 2020-04-17 PROCEDURE — 94762 N-INVAS EAR/PLS OXIMTRY CONT: CPT

## 2020-04-17 PROCEDURE — 99232 SBSQ HOSP IP/OBS MODERATE 35: CPT | Performed by: INTERNAL MEDICINE

## 2020-04-17 PROCEDURE — 83605 ASSAY OF LACTIC ACID: CPT | Performed by: INTERNAL MEDICINE

## 2020-04-17 PROCEDURE — 85027 COMPLETE CBC AUTOMATED: CPT | Performed by: INTERNAL MEDICINE

## 2020-04-17 PROCEDURE — 82948 REAGENT STRIP/BLOOD GLUCOSE: CPT

## 2020-04-17 PROCEDURE — 83735 ASSAY OF MAGNESIUM: CPT | Performed by: INTERNAL MEDICINE

## 2020-04-17 RX ORDER — MAGNESIUM SULFATE HEPTAHYDRATE 40 MG/ML
2 INJECTION, SOLUTION INTRAVENOUS ONCE
Status: COMPLETED | OUTPATIENT
Start: 2020-04-17 | End: 2020-04-17

## 2020-04-17 RX ORDER — OXYCODONE HYDROCHLORIDE 10 MG/1
10 TABLET ORAL EVERY 6 HOURS PRN
Qty: 15 TABLET | Refills: 0 | Status: SHIPPED | OUTPATIENT
Start: 2020-04-17

## 2020-04-17 RX ORDER — PANTOPRAZOLE SODIUM 40 MG/1
40 TABLET, DELAYED RELEASE ORAL
Status: DISCONTINUED | OUTPATIENT
Start: 2020-04-17 | End: 2020-04-17 | Stop reason: HOSPADM

## 2020-04-17 RX ORDER — CEFDINIR 300 MG/1
300 CAPSULE ORAL EVERY 12 HOURS SCHEDULED
Qty: 24 CAPSULE | Refills: 0 | Status: SHIPPED | OUTPATIENT
Start: 2020-04-17 | End: 2020-04-29

## 2020-04-17 RX ORDER — METRONIDAZOLE 500 MG/1
500 TABLET ORAL EVERY 8 HOURS SCHEDULED
Qty: 36 TABLET | Refills: 0 | Status: SHIPPED | OUTPATIENT
Start: 2020-04-17 | End: 2020-04-29

## 2020-04-17 RX ORDER — DICYCLOMINE HCL 20 MG
20 TABLET ORAL 3 TIMES DAILY PRN
Qty: 90 TABLET | Refills: 0 | Status: SHIPPED | OUTPATIENT
Start: 2020-04-17

## 2020-04-17 RX ORDER — FOLIC ACID 1 MG/1
1 TABLET ORAL DAILY
Status: DISCONTINUED | OUTPATIENT
Start: 2020-04-18 | End: 2020-04-17 | Stop reason: HOSPADM

## 2020-04-17 RX ORDER — THIAMINE MONONITRATE (VIT B1) 100 MG
100 TABLET ORAL DAILY
Status: DISCONTINUED | OUTPATIENT
Start: 2020-04-18 | End: 2020-04-17 | Stop reason: HOSPADM

## 2020-04-17 RX ADMIN — PANTOPRAZOLE SODIUM 40 MG: 40 INJECTION, POWDER, FOR SOLUTION INTRAVENOUS at 08:26

## 2020-04-17 RX ADMIN — PANCRELIPASE 24000 UNITS: 24000; 76000; 120000 CAPSULE, DELAYED RELEASE PELLETS ORAL at 08:26

## 2020-04-17 RX ADMIN — DICYCLOMINE HYDROCHLORIDE 20 MG: 20 TABLET ORAL at 08:24

## 2020-04-17 RX ADMIN — FLUTICASONE PROPIONATE 1 SPRAY: 50 SPRAY, METERED NASAL at 08:27

## 2020-04-17 RX ADMIN — ACETAMINOPHEN 650 MG: 650 SUSPENSION ORAL at 08:25

## 2020-04-17 RX ADMIN — METRONIDAZOLE 500 MG: 500 INJECTION, SOLUTION INTRAVENOUS at 04:22

## 2020-04-17 RX ADMIN — CLONIDINE HYDROCHLORIDE 0.3 MG: 0.1 TABLET ORAL at 08:24

## 2020-04-17 RX ADMIN — MAGNESIUM SULFATE HEPTAHYDRATE 2 G: 40 INJECTION, SOLUTION INTRAVENOUS at 11:04

## 2020-04-17 RX ADMIN — LOPERAMIDE HYDROCHLORIDE 2 MG: 2 CAPSULE ORAL at 08:36

## 2020-04-17 RX ADMIN — LORATADINE 10 MG: 10 TABLET ORAL at 08:24

## 2020-04-17 RX ADMIN — ACETAMINOPHEN 650 MG: 650 SUSPENSION ORAL at 01:17

## 2020-04-17 RX ADMIN — HYDROMORPHONE HYDROCHLORIDE 1 MG: 1 INJECTION, SOLUTION INTRAMUSCULAR; INTRAVENOUS; SUBCUTANEOUS at 08:37

## 2020-04-17 RX ADMIN — HYDROMORPHONE HYDROCHLORIDE 1 MG: 1 INJECTION, SOLUTION INTRAMUSCULAR; INTRAVENOUS; SUBCUTANEOUS at 04:23

## 2020-04-17 RX ADMIN — FOLIC ACID: 5 INJECTION, SOLUTION INTRAMUSCULAR; INTRAVENOUS; SUBCUTANEOUS at 08:37

## 2020-04-17 RX ADMIN — ZINC 1 TABLET: TAB ORAL at 10:59

## 2020-04-18 LAB — CALPROTECTIN STL-MCNT: 82 UG/G (ref 0–120)

## 2020-04-20 LAB
BACTERIA BLD CULT: NORMAL
BACTERIA BLD CULT: NORMAL

## 2020-04-23 ENCOUNTER — TELEPHONE (OUTPATIENT)
Dept: NEUROLOGY | Facility: CLINIC | Age: 45
End: 2020-04-23

## 2020-04-26 ENCOUNTER — HOSPITAL ENCOUNTER (EMERGENCY)
Facility: HOSPITAL | Age: 45
Discharge: HOME | End: 2020-04-27
Attending: EMERGENCY MEDICINE | Admitting: EMERGENCY MEDICINE
Payer: COMMERCIAL

## 2020-04-26 ENCOUNTER — APPOINTMENT (EMERGENCY)
Dept: CT IMAGING | Facility: HOSPITAL | Age: 45
End: 2020-04-26
Payer: COMMERCIAL

## 2020-04-26 DIAGNOSIS — R10.9 ABDOMINAL PAIN: ICD-10-CM

## 2020-04-26 DIAGNOSIS — K74.60 CIRRHOSIS (HCC): ICD-10-CM

## 2020-04-26 DIAGNOSIS — R50.9 FEVER: Primary | ICD-10-CM

## 2020-04-26 DIAGNOSIS — R74.01 TRANSAMINITIS: ICD-10-CM

## 2020-04-26 DIAGNOSIS — R18.8 ASCITES: ICD-10-CM

## 2020-04-26 DIAGNOSIS — K52.9 COLITIS: ICD-10-CM

## 2020-04-26 LAB
ALBUMIN SERPL BCP-MCNC: 1.9 G/DL (ref 3.5–5)
ALP SERPL-CCNC: 497 U/L (ref 46–116)
ALT SERPL W P-5'-P-CCNC: 9 U/L (ref 12–78)
ANION GAP SERPL CALCULATED.3IONS-SCNC: 9 MMOL/L (ref 4–13)
APTT PPP: 34 SECONDS (ref 23–37)
AST SERPL W P-5'-P-CCNC: 102 U/L (ref 5–45)
BASOPHILS # BLD AUTO: 0.08 THOUSANDS/ΜL (ref 0–0.1)
BASOPHILS NFR BLD AUTO: 1 % (ref 0–1)
BILIRUB DIRECT SERPL-MCNC: 2.4 MG/DL (ref 0–0.2)
BILIRUB SERPL-MCNC: 2.9 MG/DL (ref 0.2–1)
BUN SERPL-MCNC: 3 MG/DL (ref 5–25)
CALCIUM SERPL-MCNC: 7.3 MG/DL (ref 8.3–10.1)
CHLORIDE SERPL-SCNC: 98 MMOL/L (ref 100–108)
CO2 SERPL-SCNC: 30 MMOL/L (ref 21–32)
CREAT SERPL-MCNC: 0.75 MG/DL (ref 0.6–1.3)
EOSINOPHIL # BLD AUTO: 0.1 THOUSAND/ΜL (ref 0–0.61)
EOSINOPHIL NFR BLD AUTO: 1 % (ref 0–6)
ERYTHROCYTE [DISTWIDTH] IN BLOOD BY AUTOMATED COUNT: 22 % (ref 11.6–15.1)
ETHANOL SERPL-MCNC: 6 MG/DL (ref 0–3)
GFR SERPL CREATININE-BSD FRML MDRD: 98 ML/MIN/1.73SQ M
GLUCOSE SERPL-MCNC: 169 MG/DL (ref 65–140)
HCG SERPL QL: NEGATIVE
HCT VFR BLD AUTO: 31.5 % (ref 34.8–46.1)
HGB BLD-MCNC: 9.8 G/DL (ref 11.5–15.4)
IMM GRANULOCYTES # BLD AUTO: 0.07 THOUSAND/UL (ref 0–0.2)
IMM GRANULOCYTES NFR BLD AUTO: 1 % (ref 0–2)
INR PPP: 1.46 (ref 0.84–1.19)
LACTATE SERPL-SCNC: 5.5 MMOL/L (ref 0.5–2)
LIPASE SERPL-CCNC: 38 U/L (ref 73–393)
LYMPHOCYTES # BLD AUTO: 2.19 THOUSANDS/ΜL (ref 0.6–4.47)
LYMPHOCYTES NFR BLD AUTO: 15 % (ref 14–44)
MCH RBC QN AUTO: 31.9 PG (ref 26.8–34.3)
MCHC RBC AUTO-ENTMCNC: 31.1 G/DL (ref 31.4–37.4)
MCV RBC AUTO: 103 FL (ref 82–98)
MONOCYTES # BLD AUTO: 1.27 THOUSAND/ΜL (ref 0.17–1.22)
MONOCYTES NFR BLD AUTO: 9 % (ref 4–12)
NEUTROPHILS # BLD AUTO: 10.61 THOUSANDS/ΜL (ref 1.85–7.62)
NEUTS SEG NFR BLD AUTO: 73 % (ref 43–75)
NRBC BLD AUTO-RTO: 0 /100 WBCS
PLATELET # BLD AUTO: 151 THOUSANDS/UL (ref 149–390)
PMV BLD AUTO: 12.5 FL (ref 8.9–12.7)
POTASSIUM SERPL-SCNC: 3.1 MMOL/L (ref 3.5–5.3)
PROT SERPL-MCNC: 7 G/DL (ref 6.4–8.2)
PROTHROMBIN TIME: 17.8 SECONDS (ref 11.6–14.5)
RBC # BLD AUTO: 3.07 MILLION/UL (ref 3.81–5.12)
SODIUM SERPL-SCNC: 137 MMOL/L (ref 136–145)
WBC # BLD AUTO: 14.32 THOUSAND/UL (ref 4.31–10.16)

## 2020-04-26 PROCEDURE — 87040 BLOOD CULTURE FOR BACTERIA: CPT | Performed by: EMERGENCY MEDICINE

## 2020-04-26 PROCEDURE — 80076 HEPATIC FUNCTION PANEL: CPT | Performed by: EMERGENCY MEDICINE

## 2020-04-26 PROCEDURE — 99284 EMERGENCY DEPT VISIT MOD MDM: CPT

## 2020-04-26 PROCEDURE — 93005 ELECTROCARDIOGRAM TRACING: CPT

## 2020-04-26 PROCEDURE — 96374 THER/PROPH/DIAG INJ IV PUSH: CPT

## 2020-04-26 PROCEDURE — 99285 EMERGENCY DEPT VISIT HI MDM: CPT | Performed by: EMERGENCY MEDICINE

## 2020-04-26 PROCEDURE — 85730 THROMBOPLASTIN TIME PARTIAL: CPT | Performed by: EMERGENCY MEDICINE

## 2020-04-26 PROCEDURE — 85610 PROTHROMBIN TIME: CPT | Performed by: EMERGENCY MEDICINE

## 2020-04-26 PROCEDURE — 84703 CHORIONIC GONADOTROPIN ASSAY: CPT | Performed by: EMERGENCY MEDICINE

## 2020-04-26 PROCEDURE — 74177 CT ABD & PELVIS W/CONTRAST: CPT

## 2020-04-26 PROCEDURE — 83605 ASSAY OF LACTIC ACID: CPT | Performed by: EMERGENCY MEDICINE

## 2020-04-26 PROCEDURE — 87635 SARS-COV-2 COVID-19 AMP PRB: CPT | Performed by: EMERGENCY MEDICINE

## 2020-04-26 PROCEDURE — 36415 COLL VENOUS BLD VENIPUNCTURE: CPT | Performed by: EMERGENCY MEDICINE

## 2020-04-26 PROCEDURE — 96361 HYDRATE IV INFUSION ADD-ON: CPT

## 2020-04-26 PROCEDURE — 80320 DRUG SCREEN QUANTALCOHOLS: CPT | Performed by: EMERGENCY MEDICINE

## 2020-04-26 PROCEDURE — 80048 BASIC METABOLIC PNL TOTAL CA: CPT | Performed by: EMERGENCY MEDICINE

## 2020-04-26 PROCEDURE — 83690 ASSAY OF LIPASE: CPT | Performed by: EMERGENCY MEDICINE

## 2020-04-26 PROCEDURE — 85025 COMPLETE CBC W/AUTO DIFF WBC: CPT | Performed by: EMERGENCY MEDICINE

## 2020-04-26 RX ORDER — MORPHINE SULFATE 10 MG/ML
6 INJECTION, SOLUTION INTRAMUSCULAR; INTRAVENOUS ONCE
Status: COMPLETED | OUTPATIENT
Start: 2020-04-26 | End: 2020-04-26

## 2020-04-26 RX ADMIN — MORPHINE SULFATE 6 MG: 10 INJECTION INTRAVENOUS at 23:11

## 2020-04-26 RX ADMIN — SODIUM CHLORIDE 2000 ML: 0.9 INJECTION, SOLUTION INTRAVENOUS at 23:03

## 2020-04-27 VITALS
BODY MASS INDEX: 32.48 KG/M2 | TEMPERATURE: 99 F | OXYGEN SATURATION: 94 % | DIASTOLIC BLOOD PRESSURE: 60 MMHG | SYSTOLIC BLOOD PRESSURE: 124 MMHG | HEART RATE: 103 BPM | RESPIRATION RATE: 15 BRPM | HEIGHT: 64 IN | WEIGHT: 190.26 LBS

## 2020-04-27 LAB
ATRIAL RATE: 117 BPM
LACTATE SERPL-SCNC: 3.5 MMOL/L (ref 0.5–2)
P AXIS: 63 DEGREES
PR INTERVAL: 132 MS
QRS AXIS: 56 DEGREES
QRSD INTERVAL: 78 MS
QT INTERVAL: 332 MS
QTC INTERVAL: 463 MS
SARS-COV-2 RNA RESP QL NAA+PROBE: NEGATIVE
T WAVE AXIS: 48 DEGREES
VENTRICULAR RATE: 117 BPM

## 2020-04-27 PROCEDURE — 96376 TX/PRO/DX INJ SAME DRUG ADON: CPT

## 2020-04-27 PROCEDURE — 36415 COLL VENOUS BLD VENIPUNCTURE: CPT | Performed by: EMERGENCY MEDICINE

## 2020-04-27 PROCEDURE — 93010 ELECTROCARDIOGRAM REPORT: CPT | Performed by: INTERNAL MEDICINE

## 2020-04-27 PROCEDURE — 83605 ASSAY OF LACTIC ACID: CPT | Performed by: EMERGENCY MEDICINE

## 2020-04-27 RX ORDER — DIPHENHYDRAMINE HCL 25 MG
25 TABLET ORAL ONCE
Status: COMPLETED | OUTPATIENT
Start: 2020-04-27 | End: 2020-04-27

## 2020-04-27 RX ORDER — MORPHINE SULFATE 10 MG/ML
6 INJECTION, SOLUTION INTRAMUSCULAR; INTRAVENOUS ONCE
Status: COMPLETED | OUTPATIENT
Start: 2020-04-27 | End: 2020-04-27

## 2020-04-27 RX ORDER — DIPHENOXYLATE HYDROCHLORIDE AND ATROPINE SULFATE 2.5; .025 MG/1; MG/1
1 TABLET ORAL 4 TIMES DAILY PRN
Qty: 15 TABLET | Refills: 0 | Status: SHIPPED | OUTPATIENT
Start: 2020-04-27 | End: 2020-05-07

## 2020-04-27 RX ADMIN — IOHEXOL 100 ML: 350 INJECTION, SOLUTION INTRAVENOUS at 00:02

## 2020-04-27 RX ADMIN — MORPHINE SULFATE 6 MG: 10 INJECTION INTRAVENOUS at 01:53

## 2020-04-27 RX ADMIN — DIPHENHYDRAMINE HCL 25 MG: 25 TABLET, COATED ORAL at 02:08

## 2020-05-02 LAB
BACTERIA BLD CULT: NORMAL
BACTERIA BLD CULT: NORMAL

## 2020-05-07 DIAGNOSIS — K52.9 COLITIS: ICD-10-CM

## 2020-05-07 RX ORDER — DICYCLOMINE HCL 20 MG
20 TABLET ORAL 3 TIMES DAILY PRN
Qty: 90 TABLET | Refills: 0 | OUTPATIENT
Start: 2020-05-07

## 2020-05-07 RX ORDER — PANCRELIPASE 24000; 76000; 120000 [USP'U]/1; [USP'U]/1; [USP'U]/1
CAPSULE, DELAYED RELEASE PELLETS ORAL
Qty: 90 CAPSULE | Refills: 0 | OUTPATIENT
Start: 2020-05-07

## 2022-09-27 NOTE — PLAN OF CARE
DISCHARGE PLANNING     Discharge to home or other facility with appropriate resources Progressing        INFECTION - ADULT     Absence or prevention of progression during hospitalization Progressing     Absence of fever/infection during neutropenic period Progressing        Nutrition/Hydration-ADULT     Nutrient/Hydration intake appropriate for improving, restoring or maintaining nutritional needs Progressing        PAIN - ADULT     Verbalizes/displays adequate comfort level or baseline comfort level Progressing        Prexisting or High Potential for Compromised Skin Integrity     Skin integrity is maintained or improved Progressing        SAFETY ADULT     Patient will remain free of falls Progressing     Maintain or return to baseline ADL function Progressing     Maintain or return mobility status to optimal level Progressing n/a

## 2022-10-31 NOTE — ASSESSMENT & PLAN NOTE
Reports that she has been sober but that she try to did drink a "cocktail"today thinking she had abdominal cramps when her pancreatitis set in  Have given patient alcohol counseling given her history of chronic pancreatitis as well as liver cirrhosis from prior alcohol abuse  Concern of withdrawal at this time  Patient daily drinking over last several days   -continue thiamine and folic acid  Sent to requested pharmacy

## 2022-12-22 NOTE — PROGRESS NOTES
[facility] Progress Note  Patient: Lidya Chen 40 y o  female   MRN: [id number]  PCP: Loren Cardozo MD  Unit/Bed#: -Jasson Encounter: [id number]  Date Of Visit: 17    Assessment:    Principal Problem:    Tylenol overdose, accidental or unintentional, initial encounter  Active Problems:    Essential hypertension    Nicotine dependence    Pain, dental    Alcohol abuse    Elevated glucose      Plan:    · 1  Tylenol overdose- patient accidentally took to many tylenol for her tooth pain that has been going on for 3 weeks  Continue supportive care  · 2  Tooth pain- possibly secondary to abscess  Will place on zosyn  Continue pain control  Will f/u blood cultures  Patient will need to followup with dentist or oral surgeon as outpatient  · 3  H/o Alcohol abuse- will place on withdrawal protocol  · 4  HTN- on clonidine  · 5  H/o pancreatitis- on creon      VTE Pharmacologic Prophylaxis:   Pharmacologic: Enoxaparin (Lovenox)  Mechanical VTE Prophylaxis in Place: Yes    Patient Centered Rounds: I have performed bedside rounds with nursing staff today  Discussions with Specialists or Other Care Team Provider:     Education and Discussions with Family / Patient:     Time Spent for Care: 20 minutes  More than 50% of total time spent on counseling and coordination of care as described above  Current Length of Stay: 1 day(s)    Current Patient Status: Inpatient   Certification Statement: The patient will continue to require additional inpatient hospital stay due to tylenol intoxication    Discharge Plan / Estimated Discharge Date: Once tylenol intoxication improves    Code Status: Level 1 - Full Code      Subjective:   Patient seen and examined at bedside  Patient still complains of tooth pain      Objective:     Vitals:   Temp (24hrs), Av 6 °F (37 °C), Min:98 3 °F (36 8 °C), Max:98 7 °F (37 1 °C)    HR:  [] 106  Resp:  [17-20] 18  BP: (160-198)/() 171/96  SpO2:  [96 %-100 %] 98 %  Body mass index is 31 75 kg/m²  Input and Output Summary (last 24 hours): Intake/Output Summary (Last 24 hours) at 11/23/17 1247  Last data filed at 11/22/17 2209   Gross per 24 hour   Intake              250 ml   Output                0 ml   Net              250 ml       Physical Exam:     Physical Exam   Constitutional: She is oriented to person, place, and time  She appears well-developed and well-nourished  HENT:   Head: Normocephalic and atraumatic  Tooth pain  Jaw swollen   Eyes: Conjunctivae and EOM are normal  Pupils are equal, round, and reactive to light  Neck: Normal range of motion  Neck supple  No JVD present  No tracheal deviation present  No thyromegaly present  Cardiovascular: Normal rate, regular rhythm and normal heart sounds  Exam reveals no gallop and no friction rub  No murmur heard  Pulmonary/Chest: Effort normal and breath sounds normal  No respiratory distress  She has no wheezes  She has no rales  Abdominal: Soft  Bowel sounds are normal  She exhibits no distension  There is no tenderness  There is no rebound  Musculoskeletal: Normal range of motion  She exhibits no edema  Neurological: She is alert and oriented to person, place, and time  Vitals reviewed  Additional Data:     Labs:      Results from last 7 days  Lab Units 11/23/17 0434 11/22/17 2016   WBC Thousand/uL 20 51*  --  23 72*   HEMOGLOBIN g/dL 14 1  --  17 3*   HEMATOCRIT % 41 8  --  51 6*   PLATELETS Thousands/uL 203  < > 277   NEUTROS PCT %  --   --  81*   LYMPHS PCT %  --   --  11*   MONOS PCT %  --   --  7   EOS PCT %  --   --  0   < > = values in this interval not displayed      Results from last 7 days  Lab Units 11/23/17  0434   SODIUM mmol/L 139   POTASSIUM mmol/L 3 5   CHLORIDE mmol/L 103   CO2 mmol/L 22   BUN mg/dL 6   CREATININE mg/dL 0 74   CALCIUM mg/dL 8 3   TOTAL PROTEIN g/dL 7 5   BILIRUBIN TOTAL mg/dL 1 00   ALK PHOS U/L 87   ALT U/L 23   AST U/L 26 GLUCOSE RANDOM mg/dL 192*       Results from last 7 days  Lab Units 11/23/17  0434   INR  1 22*       * I Have Reviewed All Lab Data Listed Above  * Additional Pertinent Lab Tests Reviewed: [facility] Admission Reviewed    Imaging:    Imaging Reports Reviewed Today Include:   Imaging Personally Reviewed by Myself Includes:      Recent Cultures (last 7 days):           Last 24 Hours Medication List:     acetylcysteine 100 mg/kg Intravenous Once   cloNIDine 0 1 mg Oral BID   docusate sodium 100 mg Oral BID   enoxaparin 40 mg Subcutaneous Daily   melatonin 6 mg Oral HS   nicotine 1 patch Transdermal Daily   nystatin 500,000 Units Swish & Swallow 4x Daily   pancrelipase (Lip-Prot-Amyl) 24,000 Units Oral TID With Meals   pantoprazole 40 mg Oral Early Morning   penicillin V potassium 500 mg Oral 4x Daily   saccharomyces boulardii 250 mg Oral BID        Today, Patient Was Seen By: Zackery Kocher, MD    ** Please Note: This note has been constructed using a voice recognition system   ** show
